# Patient Record
Sex: FEMALE | Race: WHITE | NOT HISPANIC OR LATINO | Employment: UNEMPLOYED | URBAN - METROPOLITAN AREA
[De-identification: names, ages, dates, MRNs, and addresses within clinical notes are randomized per-mention and may not be internally consistent; named-entity substitution may affect disease eponyms.]

---

## 2021-07-27 ENCOUNTER — TRANSFERRED RECORDS (OUTPATIENT)
Dept: HEALTH INFORMATION MANAGEMENT | Facility: CLINIC | Age: 66
End: 2021-07-27

## 2022-01-19 ENCOUNTER — TRANSFERRED RECORDS (OUTPATIENT)
Dept: HEALTH INFORMATION MANAGEMENT | Facility: CLINIC | Age: 67
End: 2022-01-19

## 2022-01-20 ENCOUNTER — TRANSFERRED RECORDS (OUTPATIENT)
Dept: HEALTH INFORMATION MANAGEMENT | Facility: CLINIC | Age: 67
End: 2022-01-20

## 2022-01-20 LAB — EJECTION FRACTION: 77 %

## 2022-06-30 ENCOUNTER — TRANSFERRED RECORDS (OUTPATIENT)
Dept: HEALTH INFORMATION MANAGEMENT | Facility: CLINIC | Age: 67
End: 2022-06-30

## 2022-06-30 LAB — EJECTION FRACTION: 74 %

## 2022-08-06 LAB
CHOLESTEROL (EXTERNAL): 132 MG/DL
HBA1C MFR BLD: 6.5 % (ref 0–5.7)
HDLC SERPL-MCNC: 46 MG/DL
LDL CHOLESTEROL (EXTERNAL): 53 MG/DL
LDL CHOLESTEROL CALCULATED (EXTERNAL): 53 MG/DL
NON HDL CHOLESTEROL (EXTERNAL): NORMAL MG/DL
TRIGLYCERIDES (EXTERNAL): 165 MG/DL

## 2022-08-24 ENCOUNTER — VIRTUAL VISIT (OUTPATIENT)
Dept: FAMILY MEDICINE | Facility: CLINIC | Age: 67
End: 2022-08-24
Payer: COMMERCIAL

## 2022-08-24 ENCOUNTER — TELEPHONE (OUTPATIENT)
Dept: CARDIOLOGY | Facility: CLINIC | Age: 67
End: 2022-08-24

## 2022-08-24 ENCOUNTER — TELEPHONE (OUTPATIENT)
Dept: FAMILY MEDICINE | Facility: CLINIC | Age: 67
End: 2022-08-24

## 2022-08-24 DIAGNOSIS — I77.819 AORTIC DILATATION (H): ICD-10-CM

## 2022-08-24 DIAGNOSIS — I25.10 CORONARY ARTERY DISEASE INVOLVING NATIVE HEART WITHOUT ANGINA PECTORIS, UNSPECIFIED VESSEL OR LESION TYPE: ICD-10-CM

## 2022-08-24 DIAGNOSIS — R55 VASOVAGAL REACTION: ICD-10-CM

## 2022-08-24 DIAGNOSIS — I35.0 AORTIC VALVE STENOSIS, ETIOLOGY OF CARDIAC VALVE DISEASE UNSPECIFIED: ICD-10-CM

## 2022-08-24 DIAGNOSIS — E11.9 TYPE 2 DIABETES MELLITUS WITHOUT COMPLICATION, WITHOUT LONG-TERM CURRENT USE OF INSULIN (H): ICD-10-CM

## 2022-08-24 DIAGNOSIS — U07.1 INFECTION DUE TO 2019 NOVEL CORONAVIRUS: Primary | ICD-10-CM

## 2022-08-24 DIAGNOSIS — B30.9 ACUTE VIRAL CONJUNCTIVITIS OF BOTH EYES: ICD-10-CM

## 2022-08-24 PROCEDURE — 99204 OFFICE O/P NEW MOD 45 MIN: CPT | Mod: 95 | Performed by: PHYSICIAN ASSISTANT

## 2022-08-24 RX ORDER — ASPIRIN 81 MG/1
81 TABLET, CHEWABLE ORAL DAILY
COMMUNITY
End: 2022-09-16

## 2022-08-24 RX ORDER — FENOFIBRATE 150 MG/1
CAPSULE ORAL
COMMUNITY
End: 2022-09-16

## 2022-08-24 RX ORDER — CETIRIZINE HYDROCHLORIDE 10 MG/1
10 TABLET ORAL EVERY EVENING
COMMUNITY
End: 2024-04-25

## 2022-08-24 RX ORDER — METFORMIN HCL 500 MG
1000 TABLET, EXTENDED RELEASE 24 HR ORAL 2 TIMES DAILY WITH MEALS
COMMUNITY
End: 2022-09-16

## 2022-08-24 RX ORDER — SIMVASTATIN 20 MG
20 TABLET ORAL EVERY OTHER DAY
COMMUNITY
End: 2022-09-16

## 2022-08-24 RX ORDER — NEOMYCIN/POLYMYXIN B/HYDROCORT 3.5-10K-1
1-2 SUSPENSION, DROPS(FINAL DOSAGE FORM)(ML) OPHTHALMIC (EYE) 4 TIMES DAILY
Qty: 7.5 ML | Refills: 0 | Status: SHIPPED | OUTPATIENT
Start: 2022-08-24 | End: 2022-08-29

## 2022-08-24 RX ORDER — DILTIAZEM HCL 60 MG
60 TABLET ORAL 2 TIMES DAILY
COMMUNITY
End: 2022-09-16

## 2022-08-24 RX ORDER — GUAIFENESIN AND DEXTROMETHORPHAN HYDROBROMIDE 1200; 60 MG/1; MG/1
1 TABLET, EXTENDED RELEASE ORAL 2 TIMES DAILY
Qty: 28 TABLET | Refills: 0 | Status: SHIPPED | OUTPATIENT
Start: 2022-08-24 | End: 2022-09-16

## 2022-08-24 RX ORDER — LOSARTAN POTASSIUM 100 MG/1
100 TABLET ORAL DAILY
COMMUNITY
End: 2022-09-16

## 2022-08-24 RX ORDER — SEMAGLUTIDE 1.34 MG/ML
1 INJECTION, SOLUTION SUBCUTANEOUS
COMMUNITY
End: 2022-09-16

## 2022-08-24 RX ORDER — GABAPENTIN 100 MG/1
200 CAPSULE ORAL AT BEDTIME
COMMUNITY
End: 2022-09-16

## 2022-08-24 RX ORDER — MONTELUKAST SODIUM 10 MG/1
10 TABLET ORAL AT BEDTIME
COMMUNITY
End: 2022-09-16

## 2022-08-24 RX ORDER — IPRATROPIUM BROMIDE 42 UG/1
2 SPRAY, METERED NASAL 4 TIMES DAILY
COMMUNITY
End: 2022-09-16

## 2022-08-24 RX ORDER — DAPAGLIFLOZIN 10 MG/1
10 TABLET, FILM COATED ORAL DAILY
COMMUNITY
End: 2022-09-16

## 2022-08-24 NOTE — NURSING NOTE
Patient messaged via [employer] a scan of medication list to pull in to her chart. Changes:    No longer uses [employer]

## 2022-08-24 NOTE — PROGRESS NOTES
Won is a 67 year old who is being evaluated via a billable video visit.      How would you like to obtain your AVS? MyChart  If the video visit is dropped, the invitation should be resent by: Text to cell phone: [phone]  Will anyone else be joining your video visit? No        Assessment & Plan   Problem List Items Addressed This Visit        Endocrine    Type 2 diabetes mellitus without complication, without long-term current use of insulin (H)    Relevant Medications    semaglutide (OZEMPIC, 1 MG/DOSE,) 2 MG/1.5ML pen    metFORMIN (GLUCOPHAGE XR) 500 MG 24 hr tablet    dapagliflozin (FARXIGA) 10 MG TABS tablet       Circulatory    Coronary artery disease involving native heart without angina pectoris, unspecified vessel or lesion type    Relevant Orders    Adult Cardiology Eval  Referral    Aortic valve stenosis, etiology of cardiac valve disease unspecified    Relevant Orders    Adult Cardiology Eval  Referral    Aortic dilatation (H)    Relevant Orders    Adult Cardiology Eval  Referral       Infectious/Inflammatory    Acute viral conjunctivitis of both eyes    Relevant Medications    neomycin-polymyxin-hydrocortisone (CORTISPORIN) 3.5-71711-2 ophthalmic suspension      Other Visit Diagnoses     Infection due to 2019 novel coronavirus    -  Primary    Relevant Medications    nirmatrelvir and ritonavir (PAXLOVID) therapy pack    Dextromethorphan-Guaifenesin  MG TB12    Vasovagal reaction             Paxlovid  Mucinex Dm 1 tablet twice a day for 10-14 days, or as needed until better   Corticosporin for pink eye 1 drops every 6 hours for 5 days     If symptoms worsen-go to ED    Continue current medications for cardiac conditions and establish care with cardiology  Get up slowly  Hydrate well daily     25 minutes spent on the date of the encounter doing chart review, history and exam, documentation and further activities per the note           Return in about 5 days (around  8/29/2022), or if symptoms worsen or fail to improve.    ROGELIO Lang [facility] JAZMIN Upton is a 67 year old, presenting for the following health issues:  Covid 19 Treatment (Positive yesterday, 8/23 with at home test.) and Eye Problem (Itching and drainage)      HPI       COVID-19 Symptom Review  How many days ago did these symptoms start? Yesterday, 8/23    Are any of the following symptoms significant for you?    New or worsening difficulty breathing? No    Worsening cough? Yes, I am coughing up mucus.    Fever or chills? Yes, I felt feverish or had chills.    Headache: YES    Sore throat: No    Chest pain: No    Diarrhea: No    Body aches? YES    What treatments has patient tried? Acetaminophen   Does patient live in a nursing home, group home, or shelter? No  Does patient have a way to get food/medications during quarantined? Yes, I have a friend or family member who can help me.        Eye(s) Problem  Onset/Duration: Yesterday with covid sympytoms  Description:   Location: YES  Pain: No  Redness: YES  Accompanying Signs & Symptoms:  Discharge/mattering: YES  Swelling: No  Visual changes: No  Fever: No  Nasal Congestion: YES  Bothered by bright lights: No  History:  Trauma: No  Foreign body exposure: No  Wearing contacts: No  Precipitating or alleviating factors: None  Therapies tried and outcome: None    Vascular Disease Follow-up      How often do you take nitroglycerin? Never    Do you take an aspirin every day? Yes  Patient has aortic valvee stenosis, aortic dilation.  Patient was seen by cardiology in June 2022 in [place], had echo  and it was stable. Patient had Stress test in March 2022, which as also normal.  Patient reports that she gest lightheaded when she squats, but it resolves immediately.       Review of Systems   Constitutional, HEENT, cardiovascular, pulmonary, gi and gu systems are negative, except as otherwise noted.      Objective            Vitals:  No vitals were obtained today due to virtual visit.    Physical Exam   GENERAL: Healthy, alert and no distress  EYES: Eyes grossly normal to inspection.  No discharge or erythema, or obvious scleral/conjunctival abnormalities.  RESP: No audible wheeze, cough, or visible cyanosis.  No visible retractions or increased work of breathing.    SKIN: Visible skin clear. No significant rash, abnormal pigmentation or lesions.  NEURO: Cranial nerves grossly intact.  Mentation and speech appropriate for age.  PSYCH: Mentation appears normal, affect normal/bright, judgement and insight intact, normal speech and appearance well-groomed.                Video-Visit Details    Video Start Time: 9:35 AM    Type of service:  Video Visit    Video End Time:9:59 AM    Originating Location (pt. Location): Home    Distant Location (provider location):  [facility]     Platform used for Video Visit: [employer]  [place].

## 2022-08-24 NOTE — TELEPHONE ENCOUNTER
Please reschedule [facility] visit on 9/8 to 1 hr new preventative visit--one of the reasons he is being seen is for coronary calcifications.

## 2022-08-24 NOTE — TELEPHONE ENCOUNTER
"Pharmacy requesting for clarification on Dextromethorphan-Guaifenesin  MG TB12. \"Strength only comes in 30-60mg and 1200mg. There is no 60-1200mg. Please clarify which strength is indicated for the patient.\"  "

## 2022-08-24 NOTE — PATIENT INSTRUCTIONS
At [facility], we strive to deliver an exceptional experience to you, every time we see you. If you receive a survey, please complete it as we do value your feedback.  If you have MyChart, you can expect to receive results automatically within 24 hours of their completion.  Your provider will send a note interpreting your results as well.   If you do not have MyChart, you should receive your results in about a week by mail.    Your care team:                            Family Medicine Internal Medicine   MD Tom Mckeon MD Shantel Branch-Fleming, MD Srinivasa Vaka, MD Katya Belousova, MIKAEL Delgadillo CNP, MD ([facility]   Humberto Nye, MD Natasha Morris MD Amelia Massimini APRN CNP Kim Thein, APRN CNP Bethany Templen, MD             Clinic hours: Monday - Thursday 7 am-6 pm; Fridays 7 am-5 pm.   Urgent care: Monday - Friday 10 am- 8 pm; Saturday and Sunday 9 am-5 pm.    Clinic: [phone]       [facility]: Monday - Thursday 8 am - 7 pm; Friday 8 am - 6 pm  [facility]: [phone]

## 2022-08-25 NOTE — TELEPHONE ENCOUNTER
It comes in 60-1200mg Mucinex DM maximum strength. That's the one I have. If they do not have it by prescription, patient can get it over the counter.    Lucia Brand PA-C

## 2022-08-26 NOTE — TELEPHONE ENCOUNTER
Called clinic and left voicemail with provider's message.     Left clinic number to call back.        Rochelle Simmons RN  [facility]

## 2022-09-15 NOTE — PROGRESS NOTES
ASSESSMENT AND PLAN:     (R93.89) Thickened endometrium  (primary encounter diagnosis)  Comment: Updating ultrasound - if endometrium lining no longer thickened, no further evaluation planned. If still present, I would attempt a repeat endometrial biopsy given the scant cellularity on her first.   Plan: US Pelvic W/Transvaginal*          (N95.8) Genitourinary syndrome of menopause  Comment: Chronic, stab.e  Continue vaginal estrogen and gabepentin for itching.   Plan: estradiol (ESTRACE) 0.1 MG/GM vaginal cream,         gabapentin (NEURONTIN) 100 MG capsule          (E11.9) Type 2 diabetes mellitus without complication, without long-term current use of insulin (H)  Comment: Chronic, stable.  Continue current medications  Update labs at physical in December.   Plan: dapagliflozin (FARXIGA) 10 MG TABS tablet,         metFORMIN (GLUCOPHAGE) 500 MG tablet,         Semaglutide, 1 MG/DOSE, (OZEMPIC, 1 MG/DOSE,) 4        MG/3ML SOPN    (E78.5) Hyperlipidemia LDL goal <100  (I25.10,  I25.84) Coronary artery calcification  Comment: Chronic, stable.  Continue current regimen.  On a low dose of simvastatin, 20mg every other day, likely due to drug interaction with diltiazem.   Plan: aspirin (ASA) 81 MG chewable tablet,         Fenofibrate 150 MG CAPS, simvastatin (ZOCOR) 20        MG tablet    (I10) Essential hypertension  Comment: Chronic, experiencing medication side effects  Bps are running quite low in office and home and is symptomatic.   Does have a lot of compelling indications for lower BP but we will trial Losartan at 50mg rather than 100mg.  Gave instructions to monitor BP at home. If raising above 120/80, will go back to Losartan 100mg.   Plan: losartan (COZAAR) 50 MG tablet, diltiazem         (CARDIZEM) 60 MG tablet          (J30.1) Seasonal allergic rhinitis due to pollen  Comment: Chronic, stable.   Plan: ipratropium (ATROVENT) 0.06 % nasal spray,         montelukast (SINGULAIR) 10 MG tablet          (I35.0)  Nonrheumatic aortic valve stenosis  Comment: Chronic, stable.   Has appointment to establish with cardiology here.   Due for echo next summer 2023.   Plan: diltiazem (CARDIZEM) 60 MG tablet          (I71.2) Ascending aortic aneurysm (H)  Comment: Chronic, stable.   Has appointment to establish with cardiology here.   Due for CT next summer 2023  Plan: diltiazem (CARDIZEM) 60 MG tablet     Review of prior external note(s) from - CareEverywhere information from [facility] reviewed  I spent a total of 70 minutes on the day of the visit.   Time spent doing chart review, history and exam, documentation and further activities per the note    Dustin Haynes MD   [facility]  09/16/2022, 6:37 PM      SUBJECTIVE:   Won is a 67 year old female who presents to clinic today to establish care and to discuss the following problem(s).    # Thickened endometrium  - developed vaginal bleeding  - had ultrasound which showed thickened endometrium  - had a biopsy, scant tissue, no abnormalities  - has not followed up on this in the past 3 years  - has not had any bleeding since then    # Genitourinary syndrome of menopause  - uses an estradiol cream 0.01% daily around urethra and twice a week vaginally  - uses cortisone 0.1% OTC cream daily on vulva for itching  - takes Gabapentin 200mg nightly for itching  - uses Aquaphor as well  - no vulvar skin changes  - works well but not always consistent with us    # High Cholesterol  - takes simvastatin 20mg every other day  - has had cholesterol checked recently, wasn't bad    # Hypertension  - takes Losartan 100mg daily and Diltiazem IR 60mg twice a day  - gets lightheaded when she squats for the past few months, mild    - had labs in the last 3 months: Cr 0.75, K 41.1, Na 144    # Aortic Valve Stenosis  # Aortic Dilation  # Coronary Artery Calcification on CT Scan  - 2015 went to the hospital with chest pain  - was found to have an aortic dilatation and AV  stenosis that have been monitored since then  - has had a slight progression since that time  - followed with cardiology  - has never had conventional angiography    - Cardiology note from Dr. Jigar Broussard from 7/1/22 states that she has an ascending aortic aneurysm that was 4.0 cm on a CT chest in 06/2022.  - Recommended a repeat echo and CT chest without contrast in 1 year.   - 1/2022 stress echo negative for ischemia per cardiology note (Jigar Broussard 7/1/22 with [facility])      - has an appointment with cardiology, Dr. Matson, end of October  - takes Losartan 100mg daily and Diltiazem IR 60mg twice a day  - gets lightheaded when she squats for the past few months, mild    - had an episode recently of dizziness after squatting at store, apple watch said pulse went up to 185 and stayed that high for at least 30 minutes  - typically heart rate is 60-80  - this has not happened again since then     Current Regimen  Losartan 100mg daily  Diltiazem IR 60mg twice a day  Aspirin 81mg daily  Simvastatin 20mg every other day (unsure why this is every other day)  Fenofibrate 150mg daily    6/30/22 Echo  1. Normal LV cavity size. Normal LV systolic function. LV EF is measured by Torres's biplane method at 74%. Indeterminate diastolic function due to 2 out of 4 diastolic dysfunction parameters present.   2. Normal RV size. Normal RV systolic function.  3. Severely dilated left atrium  4. Moderate posterior mitral annular calcification. Trace mitral regurgitation.   5. Aortic cusps appear severely calcified. Moderate aortic stenosis (HOWARD 1.2 cm2, mean AV gradient 23 mmHg, peak velocity 3.2 m/s). Trace aortic regurgitation.   6. Compared with prior study of 1/20/22, moderate aortic stenosis is again noted. Otherwise, no significant changes.     # Type 2 Diabetes Mellitus  - diagnosed around 2010  - has lost about 30 lbs since started Ozempic about 3 months ago  - saw endocrinology 1 month into Ozempic and A1c was  6.5%  - started on Ozempic to help with weight loss, not for diabetes control  - before Ozempic was on Victoza  - nausea is annoying but tolerable for now    Current Regimen:   Ozempic 1mg weekly (started 2022)   Farxiga (dapagliflozin) 10mg daily (since )  Metformin IR 1000mg twice a day    - Last eye exam: 2022 - has early cataracts  - Last foot exam: has had in the past year  - Last dental exam: 3 months ago, no concerns    # Environmental Allergies  - got allergy immunotherapy in [place], stopped after moving  - did for 2 years without relief  - summer allergies are the worst  - also has dog/cat allergy, but isn't around these often  - itchy/water eyes, nasal congestion and rhinorrhea    Current Regimen  Cetirizine 10mg daily  Montelukast 10mg daily  Ipratropium nasal spray  Sudafed and Benadryl PRN      Past Medical History:   Diagnosis Date     Aortic stenosis      Aortic valve stenosis      Benign colon polyp      CAD (coronary artery disease)      Diabetes mellitus, type 2 (H)      HTN (hypertension)      Hyperlipidemia LDL goal <100      Post-menopausal atrophic vaginitis      Thickened endometrium      Past Surgical History:   Procedure Laterality Date      SECTION       Family History   Problem Relation Age of Onset     Lung Cancer Mother         Small cell, did smoked     Diabetes Mother      Hypertension Mother      Goiter Mother      Mental Illness Mother         likely bipolar     Coronary Artery Disease Mother 42        MI, smoked     Atrial fibrillation Father      Hypertension Father      Parkinsonism Father      Diabetes Sister      Breast Cancer Sister      Hypertension Sister      Hypertension Brother      Cancer Maternal Grandmother         gynecologic cancer, unknown type     Chronic Obstructive Pulmonary Disease Paternal Grandmother      Stomach Cancer Paternal Grandfather      Bipolar Disorder Son      Colon Cancer No family hx of      Social History     Tobacco Use      "Smoking status: Never Smoker     Smokeless tobacco: Never Used   Vaping Use     Vaping Use: Never used   Substance Use Topics     Alcohol use: Yes     Alcohol/week: 1.0 standard drink     Types: 1 Standard drinks or equivalent per week     Comment: no more than 1 drink a week     Drug use: Never     Social History     Social History Narrative    Lives with     Two son    One son, daughter-in-law, and grandson live in [place]    One son, daughter-in-law live in the [facility] [place]    Gan in [place], [place]       Current Outpatient Medications   Medication     aspirin (ASA) 81 MG chewable tablet     cetirizine (ZYRTEC) 10 MG tablet     dapagliflozin (FARXIGA) 10 MG TABS tablet     diltiazem (CARDIZEM) 60 MG tablet     [START ON 9/19/2022] estradiol (ESTRACE) 0.1 MG/GM vaginal cream     Fenofibrate 150 MG CAPS     gabapentin (NEURONTIN) 100 MG capsule     ipratropium (ATROVENT) 0.06 % nasal spray     losartan (COZAAR) 50 MG tablet     metFORMIN (GLUCOPHAGE) 500 MG tablet     montelukast (SINGULAIR) 10 MG tablet     Semaglutide, 1 MG/DOSE, (OZEMPIC, 1 MG/DOSE,) 4 MG/3ML SOPN     simvastatin (ZOCOR) 20 MG tablet     No current facility-administered medications for this visit.     I have reviewed the patient's past medical, surgical, family, and social history.     OBJECTIVE:   /73 (BP Location: Right arm, Patient Position: Sitting, Cuff Size: Adult Regular)   Pulse 83   Temp 97.7  F (36.5  C) (Temporal)   Resp 12   Ht 1.725 m (5' 7.91\")   Wt 79.7 kg (175 lb 12 oz)   SpO2 97%   Breastfeeding No   BMI 26.79 kg/m      Constitutional: well-appearing, appears stated age  Eyes: conjunctivae without erythema, sclera anicteric.   Cardiac: regular rate and rhythm, 3/6 systolic murmur heart best at RUSB radiating into right carotid, no palpable thrill   Skin: no rashes, lesions, or wounds  Psych: affect is full and appropriate, speech is fluent and non-pressured  "

## 2022-09-16 ENCOUNTER — OFFICE VISIT (OUTPATIENT)
Dept: FAMILY MEDICINE | Facility: CLINIC | Age: 67
End: 2022-09-16
Payer: COMMERCIAL

## 2022-09-16 VITALS
DIASTOLIC BLOOD PRESSURE: 73 MMHG | HEART RATE: 83 BPM | TEMPERATURE: 97.7 F | SYSTOLIC BLOOD PRESSURE: 108 MMHG | RESPIRATION RATE: 12 BRPM | OXYGEN SATURATION: 97 % | HEIGHT: 68 IN | BODY MASS INDEX: 26.64 KG/M2 | WEIGHT: 175.75 LBS

## 2022-09-16 DIAGNOSIS — J30.1 SEASONAL ALLERGIC RHINITIS DUE TO POLLEN: ICD-10-CM

## 2022-09-16 DIAGNOSIS — N95.8 GENITOURINARY SYNDROME OF MENOPAUSE: ICD-10-CM

## 2022-09-16 DIAGNOSIS — I10 ESSENTIAL HYPERTENSION: ICD-10-CM

## 2022-09-16 DIAGNOSIS — R93.89 THICKENED ENDOMETRIUM: Primary | ICD-10-CM

## 2022-09-16 DIAGNOSIS — E78.5 HYPERLIPIDEMIA LDL GOAL <100: ICD-10-CM

## 2022-09-16 DIAGNOSIS — I25.10 CORONARY ARTERY CALCIFICATION: ICD-10-CM

## 2022-09-16 DIAGNOSIS — E11.9 TYPE 2 DIABETES MELLITUS WITHOUT COMPLICATION, WITHOUT LONG-TERM CURRENT USE OF INSULIN (H): ICD-10-CM

## 2022-09-16 DIAGNOSIS — I35.0 NONRHEUMATIC AORTIC VALVE STENOSIS: ICD-10-CM

## 2022-09-16 DIAGNOSIS — I71.21 ASCENDING AORTIC ANEURYSM (H): ICD-10-CM

## 2022-09-16 PROBLEM — N95.2 POST-MENOPAUSAL ATROPHIC VAGINITIS: Status: ACTIVE | Noted: 2022-09-16

## 2022-09-16 PROBLEM — Z91.09 ENVIRONMENTAL ALLERGIES: Chronic | Status: ACTIVE | Noted: 2022-09-16

## 2022-09-16 PROBLEM — N95.2 POST-MENOPAUSAL ATROPHIC VAGINITIS: Status: RESOLVED | Noted: 2022-09-16 | Resolved: 2022-09-16

## 2022-09-16 PROBLEM — B30.9 ACUTE VIRAL CONJUNCTIVITIS OF BOTH EYES: Status: RESOLVED | Noted: 2022-08-24 | Resolved: 2022-09-16

## 2022-09-16 PROBLEM — Z12.11 SCREENING FOR COLON CANCER: Chronic | Status: ACTIVE | Noted: 2022-09-16

## 2022-09-16 RX ORDER — ESTRADIOL 0.1 MG/G
2 CREAM VAGINAL
Qty: 42.5 G | Refills: 11 | Status: SHIPPED | OUTPATIENT
Start: 2022-09-19 | End: 2023-11-06

## 2022-09-16 RX ORDER — ASPIRIN 81 MG/1
81 TABLET, CHEWABLE ORAL DAILY
Qty: 90 TABLET | Refills: 3 | Status: SHIPPED | OUTPATIENT
Start: 2022-09-16 | End: 2022-09-21

## 2022-09-16 RX ORDER — DAPAGLIFLOZIN 10 MG/1
10 TABLET, FILM COATED ORAL DAILY
Qty: 90 TABLET | Refills: 3 | Status: SHIPPED | OUTPATIENT
Start: 2022-09-16 | End: 2022-09-21

## 2022-09-16 RX ORDER — SIMVASTATIN 20 MG
20 TABLET ORAL EVERY OTHER DAY
Qty: 45 TABLET | Refills: 3 | Status: SHIPPED | OUTPATIENT
Start: 2022-09-16 | End: 2023-05-24

## 2022-09-16 RX ORDER — IPRATROPIUM BROMIDE 42 UG/1
2 SPRAY, METERED NASAL 4 TIMES DAILY
Qty: 15 ML | Refills: 11 | Status: SHIPPED | OUTPATIENT
Start: 2022-09-16 | End: 2024-04-25

## 2022-09-16 RX ORDER — LOSARTAN POTASSIUM 50 MG/1
50 TABLET ORAL DAILY
Qty: 90 TABLET | Refills: 3 | Status: SHIPPED | OUTPATIENT
Start: 2022-09-16 | End: 2022-09-21

## 2022-09-16 RX ORDER — GABAPENTIN 100 MG/1
200 CAPSULE ORAL AT BEDTIME
Qty: 180 CAPSULE | Refills: 3 | Status: SHIPPED | OUTPATIENT
Start: 2022-09-16 | End: 2023-05-22

## 2022-09-16 RX ORDER — FENOFIBRATE 150 MG/1
150 CAPSULE ORAL DAILY
Qty: 90 CAPSULE | Refills: 3 | Status: SHIPPED | OUTPATIENT
Start: 2022-09-16 | End: 2022-09-20

## 2022-09-16 RX ORDER — SEMAGLUTIDE 1.34 MG/ML
1 INJECTION, SOLUTION SUBCUTANEOUS WEEKLY
Qty: 12 ML | Refills: 3 | Status: SHIPPED | OUTPATIENT
Start: 2022-09-16 | End: 2023-09-27

## 2022-09-16 RX ORDER — MONTELUKAST SODIUM 10 MG/1
10 TABLET ORAL AT BEDTIME
Qty: 90 TABLET | Refills: 3 | Status: SHIPPED | OUTPATIENT
Start: 2022-09-16 | End: 2022-09-21

## 2022-09-16 RX ORDER — DILTIAZEM HCL 60 MG
60 TABLET ORAL 2 TIMES DAILY
Qty: 180 TABLET | Refills: 3 | Status: SHIPPED | OUTPATIENT
Start: 2022-09-16 | End: 2023-08-28

## 2022-09-16 ASSESSMENT — ANXIETY QUESTIONNAIRES
3. WORRYING TOO MUCH ABOUT DIFFERENT THINGS: NOT AT ALL
GAD7 TOTAL SCORE: 1
6. BECOMING EASILY ANNOYED OR IRRITABLE: SEVERAL DAYS
7. FEELING AFRAID AS IF SOMETHING AWFUL MIGHT HAPPEN: NOT AT ALL
IF YOU CHECKED OFF ANY PROBLEMS ON THIS QUESTIONNAIRE, HOW DIFFICULT HAVE THESE PROBLEMS MADE IT FOR YOU TO DO YOUR WORK, TAKE CARE OF THINGS AT HOME, OR GET ALONG WITH OTHER PEOPLE: NOT DIFFICULT AT ALL
2. NOT BEING ABLE TO STOP OR CONTROL WORRYING: NOT AT ALL
5. BEING SO RESTLESS THAT IT IS HARD TO SIT STILL: NOT AT ALL
1. FEELING NERVOUS, ANXIOUS, OR ON EDGE: NOT AT ALL
GAD7 TOTAL SCORE: 1

## 2022-09-16 ASSESSMENT — PATIENT HEALTH QUESTIONNAIRE - PHQ9
5. POOR APPETITE OR OVEREATING: NOT AT ALL
SUM OF ALL RESPONSES TO PHQ QUESTIONS 1-9: 3

## 2022-09-16 NOTE — PATIENT INSTRUCTIONS
"1) Call the imaging center at [address] [place] [address] to scheduling an ultrasound to reassess your endometrium.  [phone]    2) Send me a copy of your test results: cholesterol, A1c, \"urine microalbumin\"    3) We are decreasing your Losartan from 100mg to 50mg a day for your blood pressure.  Please monitor your pressure for about a week after the change to make sure your average is still less than 120/80.  If it isn't, let me know    4) Consider scheduling with our dietician Shona Arroyo. You schedule with her just like you do with me.   "

## 2022-09-16 NOTE — NURSING NOTE
Prior to immunization administration, verified patients identity using patient s name and date of birth. Please see Immunization Activity for additional information.     Screening Questionnaire for Adult Immunization    Are you sick today?   No   Do you have allergies to medications, food, a vaccine component or latex?   No   Have you ever had a serious reaction after receiving a vaccination?   No   Do you have a long-term health problem with heart, lung, kidney, or metabolic disease (e.g., diabetes), asthma, a blood disorder, no spleen, complement component deficiency, a cochlear implant, or a spinal fluid leak?  Are you on long-term aspirin therapy?   No   Do you have cancer, leukemia, HIV/AIDS, or any other immune system problem?   No   Do you have a parent, brother, or sister with an immune system problem?   No   In the past 3 months, have you taken medications that affect  your immune system, such as prednisone, other steroids, or anticancer drugs; drugs for the treatment of rheumatoid arthritis, Crohn s disease, or psoriasis; or have you had radiation treatments?   No   Have you had a seizure, or a brain or other nervous system problem?   No   During the past year, have you received a transfusion of blood or blood    products, or been given immune (gamma) globulin or antiviral drug?   No   For women: Are you pregnant or is there a chance you could become       pregnant during the next month?   No   Have you received any vaccinations in the past 4 weeks?   No     Immunization questionnaire answers were all negative.        Per orders of Dr. Haynes, injection of Influenza Vaccine given by Monique Phillips MA. Patient instructed to remain in clinic for 15 minutes afterwards, and to report any adverse reaction to me immediately.       Screening performed by Monique Phillips MA on 9/16/2022 at 11:19 AM.

## 2022-09-18 ENCOUNTER — MYC MEDICAL ADVICE (OUTPATIENT)
Dept: FAMILY MEDICINE | Facility: CLINIC | Age: 67
End: 2022-09-18

## 2022-09-20 DIAGNOSIS — I25.10 CORONARY ARTERY CALCIFICATION: ICD-10-CM

## 2022-09-20 DIAGNOSIS — E78.5 HYPERLIPIDEMIA LDL GOAL <100: Primary | ICD-10-CM

## 2022-09-20 RX ORDER — FENOFIBRATE 160 MG/1
160 TABLET ORAL DAILY
Qty: 90 TABLET | Refills: 3 | Status: SHIPPED | OUTPATIENT
Start: 2022-09-20 | End: 2022-09-21

## 2022-09-20 NOTE — TELEPHONE ENCOUNTER
Original fenofibrate 150 mg not covered by insurance; 150 mg tab fenofibrate is alternative - consulted with Dr Long NORTH for fenofibrate 150 mg dose.  Forwarding to pt's pharmacy.  Elena Mann RN  [facility]

## 2022-09-21 ENCOUNTER — TELEPHONE (OUTPATIENT)
Dept: FAMILY MEDICINE | Facility: CLINIC | Age: 67
End: 2022-09-21

## 2022-09-21 DIAGNOSIS — I25.10 CORONARY ARTERY CALCIFICATION: ICD-10-CM

## 2022-09-21 DIAGNOSIS — J30.1 SEASONAL ALLERGIC RHINITIS DUE TO POLLEN: ICD-10-CM

## 2022-09-21 DIAGNOSIS — E78.5 HYPERLIPIDEMIA LDL GOAL <100: ICD-10-CM

## 2022-09-21 DIAGNOSIS — I10 ESSENTIAL HYPERTENSION: ICD-10-CM

## 2022-09-21 DIAGNOSIS — E11.9 TYPE 2 DIABETES MELLITUS WITHOUT COMPLICATION, WITHOUT LONG-TERM CURRENT USE OF INSULIN (H): ICD-10-CM

## 2022-09-21 RX ORDER — DAPAGLIFLOZIN 10 MG/1
10 TABLET, FILM COATED ORAL DAILY
Qty: 100 TABLET | Refills: 3 | Status: SHIPPED | OUTPATIENT
Start: 2022-09-21 | End: 2023-02-09

## 2022-09-21 RX ORDER — MONTELUKAST SODIUM 10 MG/1
10 TABLET ORAL AT BEDTIME
Qty: 100 TABLET | Refills: 3 | Status: SHIPPED | OUTPATIENT
Start: 2022-09-21 | End: 2023-08-11

## 2022-09-21 RX ORDER — ASPIRIN 81 MG/1
81 TABLET, CHEWABLE ORAL DAILY
Qty: 100 TABLET | Refills: 3 | Status: SHIPPED | OUTPATIENT
Start: 2022-09-21 | End: 2023-10-02

## 2022-09-21 RX ORDER — LOSARTAN POTASSIUM 50 MG/1
50 TABLET ORAL DAILY
Qty: 100 TABLET | Refills: 3 | Status: SHIPPED | OUTPATIENT
Start: 2022-09-21 | End: 2023-06-26

## 2022-09-21 RX ORDER — FENOFIBRATE 160 MG/1
160 TABLET ORAL DAILY
Qty: 100 TABLET | Refills: 3 | Status: SHIPPED | OUTPATIENT
Start: 2022-09-21 | End: 2023-09-05

## 2022-09-21 NOTE — TELEPHONE ENCOUNTER
[facility] contacted us and asked that all of Won's prescriptions sent on 9/16/22 for #90 tablets, be changed to #100 tablets to maximize her insurance benefits.  Rx's sent.  JORDI BarrettN, RN, [facility]  09/21/22  10:46 AM

## 2022-10-03 ENCOUNTER — HEALTH MAINTENANCE LETTER (OUTPATIENT)
Age: 67
End: 2022-10-03

## 2022-10-05 ENCOUNTER — ANCILLARY PROCEDURE (OUTPATIENT)
Dept: ULTRASOUND IMAGING | Facility: CLINIC | Age: 67
End: 2022-10-05
Attending: FAMILY MEDICINE
Payer: COMMERCIAL

## 2022-10-05 DIAGNOSIS — R93.89 THICKENED ENDOMETRIUM: ICD-10-CM

## 2022-10-05 PROCEDURE — 76830 TRANSVAGINAL US NON-OB: CPT | Performed by: RADIOLOGY

## 2022-10-05 PROCEDURE — 76856 US EXAM PELVIC COMPLETE: CPT | Performed by: RADIOLOGY

## 2022-10-06 NOTE — TELEPHONE ENCOUNTER
Action 10/6/22 [place]  [place]  Fax #[phone]   Action Taken Requested:    Cardiology Office notes    EKG Strips    Cardiac monitor    Cardiac imaging reports   2021 - current     2021 - current    2021 - current    2021 - current     Sent records to scanning 10/11

## 2022-10-21 ASSESSMENT — ANXIETY QUESTIONNAIRES
7. FEELING AFRAID AS IF SOMETHING AWFUL MIGHT HAPPEN: NOT AT ALL
8. IF YOU CHECKED OFF ANY PROBLEMS, HOW DIFFICULT HAVE THESE MADE IT FOR YOU TO DO YOUR WORK, TAKE CARE OF THINGS AT HOME, OR GET ALONG WITH OTHER PEOPLE?: NOT DIFFICULT AT ALL
GAD7 TOTAL SCORE: 0
IF YOU CHECKED OFF ANY PROBLEMS ON THIS QUESTIONNAIRE, HOW DIFFICULT HAVE THESE PROBLEMS MADE IT FOR YOU TO DO YOUR WORK, TAKE CARE OF THINGS AT HOME, OR GET ALONG WITH OTHER PEOPLE: NOT DIFFICULT AT ALL
2. NOT BEING ABLE TO STOP OR CONTROL WORRYING: NOT AT ALL
GAD7 TOTAL SCORE: 0
4. TROUBLE RELAXING: NOT AT ALL
1. FEELING NERVOUS, ANXIOUS, OR ON EDGE: NOT AT ALL
7. FEELING AFRAID AS IF SOMETHING AWFUL MIGHT HAPPEN: NOT AT ALL
3. WORRYING TOO MUCH ABOUT DIFFERENT THINGS: NOT AT ALL
5. BEING SO RESTLESS THAT IT IS HARD TO SIT STILL: NOT AT ALL
6. BECOMING EASILY ANNOYED OR IRRITABLE: NOT AT ALL

## 2022-10-24 ENCOUNTER — LAB (OUTPATIENT)
Dept: LAB | Facility: CLINIC | Age: 67
End: 2022-10-24
Payer: COMMERCIAL

## 2022-10-24 ENCOUNTER — OFFICE VISIT (OUTPATIENT)
Dept: CARDIOLOGY | Facility: CLINIC | Age: 67
End: 2022-10-24
Attending: INTERNAL MEDICINE
Payer: COMMERCIAL

## 2022-10-24 ENCOUNTER — PRE VISIT (OUTPATIENT)
Dept: CARDIOLOGY | Facility: CLINIC | Age: 67
End: 2022-10-24

## 2022-10-24 ENCOUNTER — TELEPHONE (OUTPATIENT)
Dept: FAMILY MEDICINE | Facility: CLINIC | Age: 67
End: 2022-10-24

## 2022-10-24 VITALS
HEART RATE: 70 BPM | BODY MASS INDEX: 27.44 KG/M2 | OXYGEN SATURATION: 97 % | WEIGHT: 174.8 LBS | HEIGHT: 67 IN | DIASTOLIC BLOOD PRESSURE: 76 MMHG | SYSTOLIC BLOOD PRESSURE: 118 MMHG

## 2022-10-24 DIAGNOSIS — Z11.59 NEED FOR HEPATITIS C SCREENING TEST: Primary | ICD-10-CM

## 2022-10-24 DIAGNOSIS — E11.9 TYPE 2 DIABETES MELLITUS WITHOUT COMPLICATION, WITHOUT LONG-TERM CURRENT USE OF INSULIN (H): Primary | Chronic | ICD-10-CM

## 2022-10-24 DIAGNOSIS — I25.10 CORONARY ARTERY DISEASE INVOLVING NATIVE HEART WITHOUT ANGINA PECTORIS, UNSPECIFIED VESSEL OR LESION TYPE: ICD-10-CM

## 2022-10-24 DIAGNOSIS — R42 DIZZINESS: ICD-10-CM

## 2022-10-24 DIAGNOSIS — I25.10 CORONARY ARTERY CALCIFICATION: Chronic | ICD-10-CM

## 2022-10-24 DIAGNOSIS — I77.819 AORTIC DILATATION (H): ICD-10-CM

## 2022-10-24 DIAGNOSIS — I10 ESSENTIAL HYPERTENSION: Chronic | ICD-10-CM

## 2022-10-24 DIAGNOSIS — I35.0 AORTIC VALVE STENOSIS, ETIOLOGY OF CARDIAC VALVE DISEASE UNSPECIFIED: ICD-10-CM

## 2022-10-24 LAB — APO A-I SERPL-MCNC: 45 MG/DL

## 2022-10-24 PROCEDURE — 99204 OFFICE O/P NEW MOD 45 MIN: CPT | Performed by: INTERNAL MEDICINE

## 2022-10-24 PROCEDURE — 83695 ASSAY OF LIPOPROTEIN(A): CPT | Performed by: INTERNAL MEDICINE

## 2022-10-24 PROCEDURE — 93005 ELECTROCARDIOGRAM TRACING: CPT

## 2022-10-24 PROCEDURE — G0463 HOSPITAL OUTPT CLINIC VISIT: HCPCS | Mod: 25

## 2022-10-24 PROCEDURE — 86803 HEPATITIS C AB TEST: CPT | Performed by: PHYSICIAN ASSISTANT

## 2022-10-24 PROCEDURE — 36415 COLL VENOUS BLD VENIPUNCTURE: CPT | Performed by: PATHOLOGY

## 2022-10-24 ASSESSMENT — PAIN SCALES - GENERAL: PAINLEVEL: NO PAIN (0)

## 2022-10-24 NOTE — PATIENT INSTRUCTIONS
October 24, 2022    Cardiology Provider You Saw During Your Visit: Dr. Matson      Medication Changes: none      Follow Up:   - Labs when able (Lpa)  - Follow-up with Dr. Matson in 6 months with Fasting Labs, Echocardiogram, and Carotid Ultrasound prior        Follow the American Heart Association Diet and Lifestyle Recommendations:  -Limit saturated fat, trans fat, sodium, red meat, sweets and sugar-sweetened beverages. If you choose to eat red meat, compare labels and select the leanest cuts available.  -Aim for at least 150 minutes of moderate physical activity or 75 minutes of vigorous physical activity - or an equal combination of both - each week.      To Reach Us:  -During business hours: [phone], press option # 1 to schedule an appointment or to leave a message for your care team.     -After hours, weekends or holidays: [phone], press option #4 and ask to speak to the on-call cardiologist. Inform them you are a patient at the [facility].      Paulina Hurst RN  Cardiology Care Coordinator - General Cardiology  [facility]

## 2022-10-24 NOTE — NURSING NOTE
October 24, 2022    Medication Changes: none      Follow Up:   - Labs when able (Lpa)  - Follow-up with Dr. Matson in 6 months with Fasting Labs, Echocardiogram, and Carotid Ultrasound prior    Patient verbalized understanding of all health information given and agreed to call with further questions or concerns.      Edison Monteiro

## 2022-10-24 NOTE — CONFIDENTIAL NOTE
Sammy Matson MD  Physician  Specialty:  Cardiovascular Disease  Progress Notes            HPI:     PAST MEDICAL HISTORY:  Past Medical History:   Diagnosis Date     Aortic stenosis      Aortic valve stenosis      Benign colon polyp      CAD (coronary artery disease)      Diabetes mellitus, type 2 (H)      HTN (hypertension)      Hyperlipidemia LDL goal <100      Post-menopausal atrophic vaginitis      Thickened endometrium        CURRENT MEDICATIONS:  Current Outpatient Medications   Medication Sig Dispense Refill     aspirin (ASA) 81 MG chewable tablet Take 1 tablet (81 mg) by mouth daily 100 tablet 3     cetirizine (ZYRTEC) 10 MG tablet Take 10 mg by mouth daily       dapagliflozin (FARXIGA) 10 MG TABS tablet Take 1 tablet (10 mg) by mouth daily 100 tablet 3     diltiazem (CARDIZEM) 60 MG tablet Take 1 tablet (60 mg) by mouth 2 times daily 180 tablet 3     estradiol (ESTRACE) 0.1 MG/GM vaginal cream Place 2 g vaginally twice a week 42.5 g 11     fenofibrate (TRIGLIDE/LOFIBRA) 160 MG tablet Take 1 tablet (160 mg) by mouth daily 100 tablet 3     gabapentin (NEURONTIN) 100 MG capsule Take 2 capsules (200 mg) by mouth At Bedtime 180 capsule 3     ipratropium (ATROVENT) 0.06 % nasal spray Spray 2 sprays into both nostrils 4 times daily 15 mL 11     losartan (COZAAR) 50 MG tablet Take 1 tablet (50 mg) by mouth daily 100 tablet 3     metFORMIN (GLUCOPHAGE) 500 MG tablet Take 2 tablets (1,000 mg) by mouth 2 times daily (with meals) 360 tablet 3     montelukast (SINGULAIR) 10 MG tablet Take 1 tablet (10 mg) by mouth At Bedtime 100 tablet 3     Semaglutide, 1 MG/DOSE, (OZEMPIC, 1 MG/DOSE,) 4 MG/3ML SOPN Inject 1 mg Subcutaneous once a week 12 mL 3     simvastatin (ZOCOR) 20 MG tablet Take 1 tablet (20 mg) by mouth every other day 45 tablet 3       PAST SURGICAL HISTORY:  Past Surgical History:   Procedure Laterality Date      SECTION         ALLERGIES     Allergies   Allergen Reactions     Avapro [Irbesartan]  Cough       FAMILY HISTORY:  Family History   Problem Relation Age of Onset     Lung Cancer Mother         Small cell, did smoked     Diabetes Mother      Hypertension Mother      Goiter Mother      Mental Illness Mother         likely bipolar     Coronary Artery Disease Mother 42        MI, smoked     Atrial fibrillation Father      Hypertension Father      Parkinsonism Father      Diabetes Sister      Breast Cancer Sister      Hypertension Sister      Hypertension Brother      Cancer Maternal Grandmother         gynecologic cancer, unknown type     Chronic Obstructive Pulmonary Disease Paternal Grandmother      Stomach Cancer Paternal Grandfather      Bipolar Disorder Son      Colon Cancer No family hx of        SOCIAL HISTORY:  Social History     Socioeconomic History     Marital status:      Spouse name: None     Number of children: None     Years of education: None     Highest education level: None   Tobacco Use     Smoking status: Never     Smokeless tobacco: Never   Vaping Use     Vaping Use: Never used   Substance and Sexual Activity     Alcohol use: Yes     Alcohol/week: 1.0 standard drink     Types: 1 Standard drinks or equivalent per week     Comment: no more than 1 drink a week     Drug use: Never     Sexual activity: Not Currently     Partners: Male   Social History Narrative    Lives with     Two son    One son, daughter-in-law, and grandson live in [place]    One son, daughter-in-law live in the same [facility]    Gan in [place], [place]       ROS:   Constitutional: No fever, chills, or sweats. No weight gain/loss   ENT: No visual disturbance, ear ache, epistaxis, sore throat  Allergies/Immunologic: Negative.   Respiratory: No cough, hemoptysia  Cardiovascular: As per HPI  GI: No nausea, vomiting, hematemesis, melena, or hematochezia  : No urinary frequency, dysuria, or hematuria  Integument: Negative  Psychiatric: Negative  Neuro: Negative  Endocrinology: Negative  "  Musculoskeletal: Negative    EXAM:  /76 (BP Location: Left arm, Patient Position: Sitting, Cuff Size: Adult Large)   Pulse 70   Ht 1.692 m (5' 6.61\")   Wt 79.3 kg (174 lb 12.8 oz)   SpO2 97%   BMI 27.70 kg/m    In general, the patient is a pleasant female in no apparent distress.    HEENT: NC/AT.  PERRLA.  EOMI.  Sclerae white, not injected.  Nares clear.  Pharynx without erythema or exudate.  Dentition intact.    Neck: No adenopathy.  No thyromegaly. Carotids +4/4 bilaterally without bruits.  No jugular venous distension.   Heart: RRR. Normal S1, S2 splits physiologically. No murmur, rub, click, or gallop. The PMI is in the 5th ICS in the midclavicular line. There is no heave.    Lungs: CTA.  No ronchi, wheezes, rales.  No dullness to percussion.   Abdomen: Soft, nontender, nondistended. No organomegaly.  No bruits.   Extremities: No clubbing, cyanosis, or edema.  The pulses are +4/4 at the radial, brachial, femoral, popliteal, DP, and PT sites bilaterally.  No bruits are noted.  Neurologic: Alert and oriented to person/place/time, normal speech, gait and affect  Skin: No petechiae, purpura or rash.    Labs:  LIPID RESULTS:  Lab Results   Component Value Date    TRIG 165 08/06/2022     Lpa is 45 mg/dl          CC  Patient Care Team:  Dustin Haynes MD as PCP - General (Family Medicine)  Dustin Haynes MD as Assigned PCP  Andre Rodriguez MD as Resident (OB/Gyn)  CAREN SIMMONS"

## 2022-10-24 NOTE — NURSING NOTE
Chief Complaint   Patient presents with     New Patient     Harvey Matson pt, referred d/t Aortic valve stenosis, aortic dilatation, coronary artery disease       Vitals were taken, medications reconciled, and EKG performed.    Louis Quijano  11:30 AM

## 2022-10-24 NOTE — LETTER
10/24/2022      RE: Won Urena  [address]  [facility] [address]       Dear Colleague,    Thank you for the opportunity to participate in the care of your patient, Won Urena, at the [facility] [place] [facility]. Please see a copy of my visit note below.    HPI:     I had the privilege to evaluate and examine Mrs Orlin Urena, wo is a 67 yr old female with a Hx of diabetes mellitus type 2, mixed dyslipidemia, non-rheumatic aorta calcified valve stenosis, aorta dilation.    She has been seen in [facility] - note is in patient's chart.    Patient denies chest pain, shortness of breath, palpations  and intermittent claudication.           PAST MEDICAL HISTORY:  Past Medical History:   Diagnosis Date     Abnormal Pap smear of cervix      Aortic stenosis      Aortic valve stenosis      Benign colon polyp      CAD (coronary artery disease)      Diabetes mellitus, type 2 (H)      Gestational diabetes      HTN (hypertension)      Hyperlipidemia LDL goal <100      Post-menopausal atrophic vaginitis      Thickened endometrium        CURRENT MEDICATIONS:  Current Outpatient Medications   Medication Sig Dispense Refill     aspirin (ASA) 81 MG chewable tablet Take 1 tablet (81 mg) by mouth daily 100 tablet 3     cetirizine (ZYRTEC) 10 MG tablet Take 10 mg by mouth daily       dapagliflozin (FARXIGA) 10 MG TABS tablet Take 1 tablet (10 mg) by mouth daily 100 tablet 3     diltiazem (CARDIZEM) 60 MG tablet Take 1 tablet (60 mg) by mouth 2 times daily 180 tablet 3     estradiol (ESTRACE) 0.1 MG/GM vaginal cream Place 2 g vaginally twice a week 42.5 g 11     fenofibrate (TRIGLIDE/LOFIBRA) 160 MG tablet Take 1 tablet (160 mg) by mouth daily 100 tablet 3     gabapentin (NEURONTIN) 100 MG capsule Take 2 capsules (200 mg) by mouth At Bedtime 180 capsule 3     ipratropium (ATROVENT) 0.06 % nasal spray Spray 2 sprays into both  nostrils 4 times daily 15 mL 11     losartan (COZAAR) 50 MG tablet Take 1 tablet (50 mg) by mouth daily 100 tablet 3     metFORMIN (GLUCOPHAGE) 500 MG tablet Take 2 tablets (1,000 mg) by mouth 2 times daily (with meals) 360 tablet 3     montelukast (SINGULAIR) 10 MG tablet Take 1 tablet (10 mg) by mouth At Bedtime 100 tablet 3     Semaglutide, 1 MG/DOSE, (OZEMPIC, 1 MG/DOSE,) 4 MG/3ML SOPN Inject 1 mg Subcutaneous once a week 12 mL 3     simvastatin (ZOCOR) 20 MG tablet Take 1 tablet (20 mg) by mouth every other day 45 tablet 3       PAST SURGICAL HISTORY:  Past Surgical History:   Procedure Laterality Date      SECTION       GYN SURGERY         ALLERGIES     Allergies   Allergen Reactions     Avapro [Irbesartan] Cough       FAMILY HISTORY:  Family History   Problem Relation Age of Onset     Lung Cancer Mother         Small cell, did smoked     Diabetes Mother      Hypertension Mother      Goiter Mother      Mental Illness Mother         likely bipolar     Coronary Artery Disease Mother         MI, smoked     Hyperlipidemia Mother      Thyroid Disease Mother         Goiter     Obesity Mother         most of family is obese     Atrial fibrillation Father      Hypertension Father      Parkinsonism Father      Hyperlipidemia Father      Diabetes Sister      Breast Cancer Sister      Hypertension Sister      Hypertension Brother      Cancer Maternal Grandmother         gynecologic cancer, unknown type     Uterine Cancer Maternal Grandmother      Chronic Obstructive Pulmonary Disease Paternal Grandmother      Uterine Cancer Paternal Grandmother      Stomach Cancer Paternal Grandfather      Colon Cancer Paternal Grandfather      Bipolar Disorder Son      Mental Illness Son      Depression Son        SOCIAL HISTORY:  Social History     Socioeconomic History     Marital status:      Spouse name: None     Number of children: None     Years of education: None     Highest education level: None   Tobacco  "Use     Smoking status: Never     Smokeless tobacco: Never   Vaping Use     Vaping Use: Never used   Substance and Sexual Activity     Alcohol use: Yes     Alcohol/week: 1.0 standard drink     Types: 1 Standard drinks or equivalent per week     Comment: less than 1 drink a week     Drug use: Never     Sexual activity: Not Currently     Partners: Male   Social History Narrative    Lives with     Two son    One son, daughter-in-law, and grandson live in [place]    One son, daughter-in-law live in the same [facility]    Gan in [place], [place]       ROS:   Constitutional: No fever, chills, or sweats. No weight gain/loss   ENT: No visual disturbance, ear ache, epistaxis, sore throat  Allergies/Immunologic: Negative.   Respiratory: No cough, hemoptysia  Cardiovascular: As per HPI  GI: No nausea, vomiting, hematemesis, melena, or hematochezia  : No urinary frequency, dysuria, or hematuria  Integument: Negative  Psychiatric: Negative  Neuro: Negative  Endocrinology: Negative   Musculoskeletal: Negative    EXAM:    /76 (BP Location: Left arm, Patient Position: Sitting, Cuff Size: Adult Large)   Pulse 70   Ht 1.692 m (5' 6.61\")   Wt 79.3 kg (174 lb 12.8 oz)   SpO2 97%   BMI 27.70 kg/m    In general, the patient is a pleasant female in no apparent distress.    HEENT: NC/AT.  PERRLA.  EOMI.  Sclerae white, not injected.  Nares clear.  Pharynx without erythema or exudate.  Dentition intact.    Neck: No adenopathy.  No thyromegaly. Carotids +4/4 bilaterally without bruits.  No jugular venous distension.   Heart: RRR. Systolic bruit at R2 and radiation to the neck and precordium.   Lungs: CTA.  No ronchi, wheezes, rales.  No dullness to percussion.   Abdomen: Soft, nontender, nondistended. No organomegaly.  No bruits.   Extremities: No clubbing, cyanosis, or edema.  The pulses are +4/4 at the radial, brachial, femoral, popliteal, DP, and PT sites bilaterally.  No bruits are noted.  Neurologic: " Alert and oriented to person/place/time, normal speech, gait and affect  Skin: No petechiae, purpura or rash.    Labs:  LIPID RESULTS:  Lab Results   Component Value Date    TRIG 165 08/06/2022       Procedures:  EKG: sin rhythm, RBBB.    Assessment and Plan:     I discussed the results with patient.  I discussed the importance of a heart healthy diabetes diet and lifestyle.    I discussed following items:    Medication Changes: none     Follow Up:   - Labs when able (Lpa)  - Follow-up with Dr. Matson in 6 months with Fasting Labs, Echocardiogram, and Carotid Ultrasound prior      Sammy Matson MD, PhD  Professor of Medicine  Division of Cardiology      CC  Patient Care Team:  Dustin Haynes MD as PCP - General (Family Medicine)  Andre Rodriguez MD as Resident (OB/Gyn)  CAREN SIMMONS

## 2022-10-25 LAB — HCV AB SERPL QL IA: NONREACTIVE

## 2022-10-26 LAB
ATRIAL RATE - MUSE: 72 BPM
DIASTOLIC BLOOD PRESSURE - MUSE: NORMAL MMHG
INTERPRETATION ECG - MUSE: NORMAL
P AXIS - MUSE: 39 DEGREES
PR INTERVAL - MUSE: 170 MS
QRS DURATION - MUSE: 148 MS
QT - MUSE: 442 MS
QTC - MUSE: 483 MS
R AXIS - MUSE: 270 DEGREES
SYSTOLIC BLOOD PRESSURE - MUSE: NORMAL MMHG
T AXIS - MUSE: 18 DEGREES
VENTRICULAR RATE- MUSE: 72 BPM

## 2022-10-26 ASSESSMENT — ENCOUNTER SYMPTOMS
DISTURBANCES IN COORDINATION: 0
HEADACHES: 0
INSOMNIA: 0
SPEECH CHANGE: 0
DEPRESSION: 0
DECREASED CONCENTRATION: 1
PANIC: 0
MEMORY LOSS: 0
SEIZURES: 0
TINGLING: 0
DIZZINESS: 1
NERVOUS/ANXIOUS: 1
TREMORS: 0
PARALYSIS: 0
NUMBNESS: 0
DYSURIA: 1
WEAKNESS: 0
LOSS OF CONSCIOUSNESS: 0
DECREASED LIBIDO: 0
HOT FLASHES: 0

## 2022-10-27 ENCOUNTER — OFFICE VISIT (OUTPATIENT)
Dept: OBGYN | Facility: CLINIC | Age: 67
End: 2022-10-27
Payer: COMMERCIAL

## 2022-10-27 VITALS
SYSTOLIC BLOOD PRESSURE: 128 MMHG | HEART RATE: 69 BPM | BODY MASS INDEX: 27.9 KG/M2 | HEIGHT: 66 IN | WEIGHT: 173.6 LBS | DIASTOLIC BLOOD PRESSURE: 78 MMHG

## 2022-10-27 DIAGNOSIS — Z87.42 HISTORY OF POSTMENOPAUSAL BLEEDING: Primary | ICD-10-CM

## 2022-10-27 DIAGNOSIS — R30.0 DYSURIA: ICD-10-CM

## 2022-10-27 LAB
ALBUMIN UR-MCNC: NEGATIVE MG/DL
APPEARANCE UR: CLEAR
BILIRUB UR QL STRIP: NEGATIVE
COLOR UR AUTO: YELLOW
GLUCOSE UR STRIP-MCNC: >=1000 MG/DL
HGB UR QL STRIP: NEGATIVE
KETONES UR STRIP-MCNC: NEGATIVE MG/DL
LEUKOCYTE ESTERASE UR QL STRIP: NEGATIVE
NITRATE UR QL: NEGATIVE
PH UR STRIP: 6.5 [PH] (ref 5–7)
RBC URINE: 0 /HPF
SP GR UR STRIP: 1.04 (ref 1–1.03)
UROBILINOGEN UR STRIP-MCNC: NORMAL MG/DL
WBC URINE: 0 /HPF

## 2022-10-27 PROCEDURE — 81001 URINALYSIS AUTO W/SCOPE: CPT | Performed by: STUDENT IN AN ORGANIZED HEALTH CARE EDUCATION/TRAINING PROGRAM

## 2022-10-27 PROCEDURE — G0463 HOSPITAL OUTPT CLINIC VISIT: HCPCS

## 2022-10-27 PROCEDURE — 99203 OFFICE O/P NEW LOW 30 MIN: CPT | Mod: GE | Performed by: OBSTETRICS & GYNECOLOGY

## 2022-10-27 NOTE — PROCEDURES
[facility]  New Visit    S: Ms. Won Urena is a 67 year old postmenopausal female presenting for a consultation due to previous hx of thickened endometrium. She says that she went through menopause at the age of 53 years. She had postmenopausal bleeding in , scant in nature but occurring 2x/month leading to an US that showed a thickened endometrium. Attempt was made to obtain a EMB but an inadequate sample was obtained. She was told that she had atrophic endometritis. She had this initial presentation and work up in [place], [place]. She does not have records of this care with her but plans to bring it next time. She had a recent US due to this hx. She denies any further bleeding. In addition, she notes that she sees her PCP for chronic vulvar irritation. She is currently using cortisone, vaginal estrogen cream, aquaphor, and gabapentin which helps. Sometimes has irritation of her urethra without infection. Would like to be tested for an UTI today.     GYN hx:  - had abnormal pap smears in the past that required cryo therapy. Remembers having 10 years worth of normal pap smears afterwards and was told that she no longer needs pap smears  - records not available for review  - currently taking cortisone, vaginal estrogen cream, aquaphor, and gabapentin for chronic vulvar irritation. Never had a biopsy.     PMH:  Past Medical History:   Diagnosis Date     Abnormal Pap smear of cervix      Aortic stenosis      Aortic valve stenosis      Benign colon polyp      CAD (coronary artery disease)      Diabetes mellitus, type 2 (H)      Gestational diabetes      HTN (hypertension)      Hyperlipidemia LDL goal <100      Post-menopausal atrophic vaginitis      Thickened endometrium      PSH:  Past Surgical History:   Procedure Laterality Date      SECTION       GYN SURGERY       Meds:  Current Outpatient Medications   Medication     aspirin (ASA) 81 MG chewable tablet     cetirizine (ZYRTEC) 10  "MG tablet     dapagliflozin (FARXIGA) 10 MG TABS tablet     diltiazem (CARDIZEM) 60 MG tablet     estradiol (ESTRACE) 0.1 MG/GM vaginal cream     fenofibrate (TRIGLIDE/LOFIBRA) 160 MG tablet     gabapentin (NEURONTIN) 100 MG capsule     ipratropium (ATROVENT) 0.06 % nasal spray     losartan (COZAAR) 50 MG tablet     metFORMIN (GLUCOPHAGE) 500 MG tablet     montelukast (SINGULAIR) 10 MG tablet     Semaglutide, 1 MG/DOSE, (OZEMPIC, 1 MG/DOSE,) 4 MG/3ML SOPN     simvastatin (ZOCOR) 20 MG tablet     No current facility-administered medications for this visit.     Allergies:   Allergies   Allergen Reactions     Avapro [Irbesartan] Cough     O:  /78 (BP Location: Right arm, Patient Position: Chair)   Pulse 69   Ht 1.676 m (5' 6\")   Wt 78.7 kg (173 lb 9.6 oz)   BMI 28.02 kg/m    Gen: Well-appearing, NAD  HEENT: Normocephalic, atraumatic  CV:        Well perfused; no LE edema  Pulm:  Normal respiratory effort and rate  Abd: Soft, non-tender, non-distended  Pelvic:  Normal appearing external female genitalia. Normal hair distribution. Vulva without lesions. No erythema. Vagina is without lesions. Physiological discharge. Cervix with no lesions, no cervical motion tenderness. Uterus is small, mobile, non-tender. No adnexal tenderness or masses.    TVUS:  EXAMINATION: US PELVIC TRANSABDOMINAL AND TRANSVAGINAL, 10/5/2022  11:15 AM      COMPARISON: None.     HISTORY: h/o postmenopausal bleeding 2019, found to have thickened  endometrium, biopsy with scant tissue. Has not follow up since but not  having bleeding; Thickened endometrium     TECHNIQUE: The pelvis was scanned in standard fashion with  transabdominal and transvaginal transducer(s) using both grey scale  and limited color Doppler techniques.     FINDINGS:  The uterus measures 5.2 x 4.4 x 2.7, and there is no evidence of a  focal fibroid.  The endometrium is within normal limits and measures 2  mm. Hypoechoic avascular area in the region of the cervix " demonstrated  measuring approximately 1.5 x 1.4 x 1.7 cm. There is no free fluid in  the pelvis.     The right ovary measures 1.2 x 0.7 x 1 cm and is within normal limits.  The left ovary is not visualized transabdominally or endovaginally.  There are some prominent vessels in the left adnexal region. No  adnexal mass. There is normal blood flow to the right ovary.                                                                      IMPRESSION:   1.  Endometrium is thin and uniform.  2.  There is a hypoechoic avascular area in the region of the cervix  of uncertain significance or etiology. No internal vascularity.  Correlation with speculum exam/hysteroscopy and previous Pap smears  suggested.  3.  Left ovary not visualized. Prominent left adnexal vessels.  4.  The right ovary within normal limits for postmenopausal status.    A/P:  Ms. Won Urena is a 67 year old postmenopausal female presenting for a consultation due to previous hx of thickened endometrium. Most recent TVUS with normal endometrial lining. No further bleeding since prior to her previous work-up. Hypoechoic, avascular area seen near the cervix on TVUS with unclear significance. Speculum exam and bimanual exam normal. Recommended having records from [place] sent to us for review. 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 Physical exam of vulva normal without any lesions. Can continue her current regimen. UA ordered per patient request due to mild irritation symptoms. No other concerns at this time. No interventions required. Recommended that she follow-up as needed or if she starts having postmenopausal bleeding again.     Staffed with Dr. Jacquelyn Rodriguez MD  OB/GYN PGY-4  10/27/22 6:17 PM    The Patient was seen in [facility] by KRISTEN RODRIGUEZ.  I reviewed the history & exam. Assessment and plan were jointly made.    Shital Valladares MD

## 2022-10-30 NOTE — PROGRESS NOTES
HPI:     I had the privilege to evaluate and examine Mrs Orlin Urena, wo is a 67 yr old female with a Hx of diabetes mellitus type 2, mixed dyslipidemia, non-rheumatic aorta calcified valve stenosis, aorta dilation.    She has been seen in [facility] - note is in patient's chart.    Patient denies chest pain, shortness of breath, palpations  and intermittent claudication.           PAST MEDICAL HISTORY:  Past Medical History:   Diagnosis Date     Abnormal Pap smear of cervix      Aortic stenosis      Aortic valve stenosis      Benign colon polyp      CAD (coronary artery disease)      Diabetes mellitus, type 2 (H)      Gestational diabetes      HTN (hypertension)      Hyperlipidemia LDL goal <100      Post-menopausal atrophic vaginitis      Thickened endometrium        CURRENT MEDICATIONS:  Current Outpatient Medications   Medication Sig Dispense Refill     aspirin (ASA) 81 MG chewable tablet Take 1 tablet (81 mg) by mouth daily 100 tablet 3     cetirizine (ZYRTEC) 10 MG tablet Take 10 mg by mouth daily       dapagliflozin (FARXIGA) 10 MG TABS tablet Take 1 tablet (10 mg) by mouth daily 100 tablet 3     diltiazem (CARDIZEM) 60 MG tablet Take 1 tablet (60 mg) by mouth 2 times daily 180 tablet 3     estradiol (ESTRACE) 0.1 MG/GM vaginal cream Place 2 g vaginally twice a week 42.5 g 11     fenofibrate (TRIGLIDE/LOFIBRA) 160 MG tablet Take 1 tablet (160 mg) by mouth daily 100 tablet 3     gabapentin (NEURONTIN) 100 MG capsule Take 2 capsules (200 mg) by mouth At Bedtime 180 capsule 3     ipratropium (ATROVENT) 0.06 % nasal spray Spray 2 sprays into both nostrils 4 times daily 15 mL 11     losartan (COZAAR) 50 MG tablet Take 1 tablet (50 mg) by mouth daily 100 tablet 3     metFORMIN (GLUCOPHAGE) 500 MG tablet Take 2 tablets (1,000 mg) by mouth 2 times daily (with meals) 360 tablet 3     montelukast (SINGULAIR) 10 MG tablet Take 1 tablet (10 mg) by mouth At Bedtime 100 tablet 3     Semaglutide, 1  MG/DOSE, (OZEMPIC, 1 MG/DOSE,) 4 MG/3ML SOPN Inject 1 mg Subcutaneous once a week 12 mL 3     simvastatin (ZOCOR) 20 MG tablet Take 1 tablet (20 mg) by mouth every other day 45 tablet 3       PAST SURGICAL HISTORY:  Past Surgical History:   Procedure Laterality Date      SECTION       GYN SURGERY         ALLERGIES     Allergies   Allergen Reactions     Avapro [Irbesartan] Cough       FAMILY HISTORY:  Family History   Problem Relation Age of Onset     Lung Cancer Mother         Small cell, did smoked     Diabetes Mother      Hypertension Mother      Goiter Mother      Mental Illness Mother         likely bipolar     Coronary Artery Disease Mother         MI, smoked     Hyperlipidemia Mother      Thyroid Disease Mother         Goiter     Obesity Mother         most of family is obese     Atrial fibrillation Father      Hypertension Father      Parkinsonism Father      Hyperlipidemia Father      Diabetes Sister      Breast Cancer Sister      Hypertension Sister      Hypertension Brother      Cancer Maternal Grandmother         gynecologic cancer, unknown type     Uterine Cancer Maternal Grandmother      Chronic Obstructive Pulmonary Disease Paternal Grandmother      Uterine Cancer Paternal Grandmother      Stomach Cancer Paternal Grandfather      Colon Cancer Paternal Grandfather      Bipolar Disorder Son      Mental Illness Son      Depression Son        SOCIAL HISTORY:  Social History     Socioeconomic History     Marital status:      Spouse name: None     Number of children: None     Years of education: None     Highest education level: None   Tobacco Use     Smoking status: Never     Smokeless tobacco: Never   Vaping Use     Vaping Use: Never used   Substance and Sexual Activity     Alcohol use: Yes     Alcohol/week: 1.0 standard drink     Types: 1 Standard drinks or equivalent per week     Comment: less than 1 drink a week     Drug use: Never     Sexual activity: Not Currently     Partners:  "Male   Social History Narrative    Lives with     Two son    One son, daughter-in-law, and grandson live in [place]    One son, daughter-in-law live in the same condo building    Gan in [place], [place]       ROS:   Constitutional: No fever, chills, or sweats. No weight gain/loss   ENT: No visual disturbance, ear ache, epistaxis, sore throat  Allergies/Immunologic: Negative.   Respiratory: No cough, hemoptysia  Cardiovascular: As per HPI  GI: No nausea, vomiting, hematemesis, melena, or hematochezia  : No urinary frequency, dysuria, or hematuria  Integument: Negative  Psychiatric: Negative  Neuro: Negative  Endocrinology: Negative   Musculoskeletal: Negative    EXAM:    /76 (BP Location: Left arm, Patient Position: Sitting, Cuff Size: Adult Large)   Pulse 70   Ht 1.692 m (5' 6.61\")   Wt 79.3 kg (174 lb 12.8 oz)   SpO2 97%   BMI 27.70 kg/m    In general, the patient is a pleasant female in no apparent distress.    HEENT: NC/AT.  PERRLA.  EOMI.  Sclerae white, not injected.  Nares clear.  Pharynx without erythema or exudate.  Dentition intact.    Neck: No adenopathy.  No thyromegaly. Carotids +4/4 bilaterally without bruits.  No jugular venous distension.   Heart: RRR. Systolic bruit at R2 and radiation to the neck and precordium.   Lungs: CTA.  No ronchi, wheezes, rales.  No dullness to percussion.   Abdomen: Soft, nontender, nondistended. No organomegaly.  No bruits.   Extremities: No clubbing, cyanosis, or edema.  The pulses are +4/4 at the radial, brachial, femoral, popliteal, DP, and PT sites bilaterally.  No bruits are noted.  Neurologic: Alert and oriented to person/place/time, normal speech, gait and affect  Skin: No petechiae, purpura or rash.    Labs:  LIPID RESULTS:  Lab Results   Component Value Date    TRIG 165 08/06/2022       Procedures:  EKG: sin rhythm, RBBB.    Assessment and Plan:     I discussed the results with patient.  I discussed the importance of a heart healthy " diabetes diet and lifestyle.    I discussed following items:    Medication Changes: none     Follow Up:   - Labs when able (Lpa)  - Follow-up with Dr. Matson in 6 months with Fasting Labs, Echocardiogram, and Carotid Ultrasound prior      Sammy Matson MD, PhD  Professor of Medicine  Division of Cardiology      CC  Patient Care Team:  Dustin Haynes MD as PCP - General (Family Medicine)  Dustin Haynes MD as Assigned PCP  Andre Rodriguez MD as Resident (OB/Gyn)  CAREN SIMOMNS

## 2022-11-11 DIAGNOSIS — E11.9 TYPE 2 DIABETES MELLITUS WITHOUT COMPLICATION, WITHOUT LONG-TERM CURRENT USE OF INSULIN (H): Primary | ICD-10-CM

## 2022-11-11 NOTE — TELEPHONE ENCOUNTER
Last visit 9/16/22, no future visit  Prescription approved per [facility] Refill Protocol.  Elena Mann RN  [facility]

## 2023-02-01 DIAGNOSIS — E11.9 TYPE 2 DIABETES MELLITUS WITHOUT COMPLICATION, WITHOUT LONG-TERM CURRENT USE OF INSULIN (H): ICD-10-CM

## 2023-02-01 RX ORDER — DAPAGLIFLOZIN 10 MG/1
10 TABLET, FILM COATED ORAL DAILY
Qty: 100 TABLET | Refills: 3 | Status: CANCELLED | OUTPATIENT
Start: 2023-02-01

## 2023-02-03 RX ORDER — DAPAGLIFLOZIN 10 MG/1
10 TABLET, FILM COATED ORAL DAILY
Qty: 100 TABLET | Refills: 3 | Status: CANCELLED | OUTPATIENT
Start: 2023-02-03

## 2023-02-09 DIAGNOSIS — E11.9 TYPE 2 DIABETES MELLITUS WITHOUT COMPLICATION, WITHOUT LONG-TERM CURRENT USE OF INSULIN (H): ICD-10-CM

## 2023-02-09 RX ORDER — DAPAGLIFLOZIN 10 MG/1
10 TABLET, FILM COATED ORAL DAILY
Qty: 100 TABLET | Refills: 2 | Status: SHIPPED | OUTPATIENT
Start: 2023-02-09 | End: 2023-09-28

## 2023-02-09 NOTE — TELEPHONE ENCOUNTER
Diabetic test strips and dapagliflozin Rxs were sent to [facility] last Fall for a year supply but they state they do not have any remaining refills on file. Will send remaining refills again per pt request.    Rhys WATTS, RN  02/09/23 8:46 AM

## 2023-02-12 ENCOUNTER — HEALTH MAINTENANCE LETTER (OUTPATIENT)
Age: 68
End: 2023-02-12

## 2023-03-03 ENCOUNTER — TELEPHONE (OUTPATIENT)
Dept: FAMILY MEDICINE | Facility: CLINIC | Age: 68
End: 2023-03-03

## 2023-03-03 DIAGNOSIS — E11.9 TYPE 2 DIABETES MELLITUS WITHOUT COMPLICATION, WITHOUT LONG-TERM CURRENT USE OF INSULIN (H): Primary | ICD-10-CM

## 2023-03-03 NOTE — TELEPHONE ENCOUNTER
Pt requesting new blood glucose monitor meter. Called [employer] to confirm that the requested meter would be covered by pt plan. Pharmacist confirms that Accu-Check Guide meter is covered.    Rhys WATTS, RN  03/03/23 4:10 PM

## 2023-03-06 ASSESSMENT — ACTIVITIES OF DAILY LIVING (ADL): CURRENT_FUNCTION: NO ASSISTANCE NEEDED

## 2023-03-06 ASSESSMENT — ENCOUNTER SYMPTOMS
HEMATURIA: 0
FEVER: 0
PALPITATIONS: 0
SHORTNESS OF BREATH: 0
HEADACHES: 0
SORE THROAT: 0
MYALGIAS: 0
ARTHRALGIAS: 0
WEAKNESS: 0
JOINT SWELLING: 0
PARESTHESIAS: 0
COUGH: 0
NERVOUS/ANXIOUS: 1
CONSTIPATION: 0
DYSURIA: 0
FREQUENCY: 0
CHILLS: 0
DIZZINESS: 0
ABDOMINAL PAIN: 0
DIARRHEA: 0
NAUSEA: 0
HEMATOCHEZIA: 0
BREAST MASS: 0
EYE PAIN: 0
HEARTBURN: 0

## 2023-03-09 ASSESSMENT — ACTIVITIES OF DAILY LIVING (ADL): CURRENT_FUNCTION: NO ASSISTANCE NEEDED

## 2023-03-09 ASSESSMENT — ENCOUNTER SYMPTOMS
HEMATURIA: 0
DIARRHEA: 0
DYSURIA: 0
DIZZINESS: 0
COUGH: 0
CHILLS: 0
ARTHRALGIAS: 0
ABDOMINAL PAIN: 0
JOINT SWELLING: 0
SORE THROAT: 0
SHORTNESS OF BREATH: 0
CONSTIPATION: 0
FEVER: 0
PARESTHESIAS: 0
BREAST MASS: 0
PALPITATIONS: 0
MYALGIAS: 0
NERVOUS/ANXIOUS: 1
HEADACHES: 0
EYE PAIN: 0
HEARTBURN: 0
HEMATOCHEZIA: 0
WEAKNESS: 0
FREQUENCY: 0
NAUSEA: 0

## 2023-03-09 NOTE — PROGRESS NOTES
"SUBJECTIVE:   Won is a 68 year old who presents for Preventive Visit.    Are you in the first 12 months of your Medicare coverage?  No    Healthy Habits:     In general, how would you rate your overall health?  Fair    Frequency of exercise:  None    Do you usually eat at least 4 servings of fruit and vegetables a day, include whole grains    & fiber and avoid regularly eating high fat or \"junk\" foods?  No    Taking medications regularly:  Yes    Medication side effects:  None    Ability to successfully perform activities of daily living:  No assistance needed    Home Safety:  No safety concerns identified    Hearing Impairment:  Difficulty following a conversation in a noisy restaurant or crowded room, need to ask people to speak up or repeat themselves and difficulty understanding speech on the telephone    In the past 6 months, have you been bothered by leaking of urine? Yes    In general, how would you rate your overall mental or emotional health?  Fair      PHQ-2 Total Score: 0    Additional concerns today:  Yes      Have you ever done Advance Care Planning? (For example, a Health Directive, POLST, or a discussion with a medical provider or your loved ones about your wishes): Yes, advance care planning is on file.     Fall risk  Fallen 2 or more times in the past year?: No  Any fall with injury in the past year?: No    Cognitive Screening   1) Repeat 3 items (Leader, Season, Table)    2) Clock draw: NORMAL  3) 3 item recall: Recalls 3 objects  Results: 3 items recalled: COGNITIVE IMPAIRMENT LESS LIKELY    Mini-CogTM Copyright DEYSI Holman. Licensed by the author for use in [facility]; reprinted with permission (raghu@.[email or web address]). All rights reserved.      # Anxiety  - a naturally anxious person, \"neurotic\"  - 's health concerns have become overwhelming for both of them  - son encouraged her to talk about medication    - last went to therapy earlier in the pandemic, not a good fit    - sleeps " well    PHQ 9/16/2022 3/12/2023   PHQ-9 Total Score 3 3   Q9: Thoughts of better off dead/self-harm past 2 weeks Not at all Not at all     NICOLETTE-7 SCORE 9/16/2022 10/21/2022 3/12/2023   Total Score - 0 (minimal anxiety) 2 (minimal anxiety)   Total Score 1 0 2         # Rash  - present for 2-3 months,  - waxes and wanes  - location is over coccyx in gluteal cleft  - scaly, bright red at times  - has used monistat and tinactin, topical steroids  - makes it uncomfortable to sit at times  - close to vagina but no vaginal irritation or discharge  - no history of eczema or psoriasis    # Itchy and watery eyes  # Environmental Allergies  - has been worse this winter, off and on  - gets rhinorrhea with it    - has allergies to ragweed, dust mites, dogs and cats  - stopped doing immunotherapy shots because of the time commitment and didn't feel they were helping after 2 years  - can't use eye drops for redness because they make itching worse  - uses lubricating eye drops    - Has used Flonase in the past which helps but gives metallic taste in mouth      - got allergy immunotherapy in [place], stopped after moving  - did for 2 years without relief  - summer allergies are the worst  - also has dog/cat allergy, but isn't around these often  - itchy/water eyes, nasal congestion and rhinorrhea     Current Regimen  Cetirizine 10mg daily  Montelukast 10mg daily  Ipratropium nasal spray  Sudafed and Benadryl PRN    # Urinary Frequency  - In the past 1-2 weeks frequency and urgency have increased, not getting to the bathroom in time  - no dysuria or hematuria  - getting up a couple of times at night to urinate, not every night  - has tried cutting down on tea  - has been inconsistent with vaginal and periurethral estrogen cream, but no more inconsistent than usual  - has had UTIs in the past but infrequent, maybe once a decade  - doesn't feel like a UTI      # Genitourinary syndrome of menopause  - uses an estradiol cream 0.01% daily  around urethra and twice a week vaginally  - uses cortisone 0.1% OTC cream daily on vulva for itching  - takes Gabapentin 200mg nightly for itching  - uses Aquaphor as well  - no vulvar skin changes  - works well but not always consistent with us    # High Cholesterol  - takes simvastatin 20mg every other day  - 10/24/22 Lp(a) 45  - 8/6/22 Chol 132, , HDL 46, LDL 53    # Hypertension   BP Readings from Last 5 Encounters:   03/13/23 105/72   10/27/22 128/78   10/24/22 118/76   09/16/22 108/73   - Current Regimen: Losartan 50mg daily, Diltiazem IR 60mg twice a day  - we decreased her Losartan from 100mg to 50mg daily after our last visit due to orthostatic dizziness which has now improved      # Aortic Valve Stenosis  # Aortic Dilation  # Coronary Artery Calcification on CT Scan  - 2015 went to the hospital with chest pain  - was found to have an aortic dilatation and AV stenosis that have been monitored since then  - has had a slight progression since that time  - followed with cardiology  - has never had conventional angiography     - Cardiology note from Dr. Jigar Broussard from 7/1/22 states that she has an ascending aortic aneurysm that was 4.0 cm on a CT chest in 06/2022.  - Recommended a repeat echo and CT chest without contrast in 1 year.   - 1/2022 stress echo negative for ischemia per cardiology note (Jigar Broussard 7/1/22 with [facility])    Current Regimen  Losartan 100mg daily  Diltiazem IR 60mg twice a day  Aspirin 81mg daily  Simvastatin 20mg every other day (unsure why this is every other day)  Fenofibrate 150mg daily     6/30/22 Echo  1. Normal LV cavity size. Normal LV systolic function. LV EF is measured by Torres's biplane method at 74%. Indeterminate diastolic function due to 2 out of 4 diastolic dysfunction parameters present.   2. Normal RV size. Normal RV systolic function.  3. Severely dilated left atrium  4. Moderate posterior mitral annular calcification. Trace mitral  regurgitation.   5. Aortic cusps appear severely calcified. Moderate aortic stenosis (HOWARD 1.2 cm2, mean AV gradient 23 mmHg, peak velocity 3.2 m/s). Trace aortic regurgitation.   6. Compared with prior study of 1/20/22, moderate aortic stenosis is again noted. Otherwise, no significant changes.       # Type 2 Diabetes Mellitus  - diagnosed around 2010    Current Regimen:   Ozempic 1mg weekly (started 06/2022)   Farxiga (dapagliflozin) 10mg daily (since 2017)  Metformin IR 1000mg twice a day    Wt Readings from Last 5 Encounters:   03/13/23 78.5 kg (173 lb)   10/27/22 78.7 kg (173 lb 9.6 oz)   10/24/22 79.3 kg (174 lb 12.8 oz)   09/16/22 79.7 kg (175 lb 12 oz)     - Last eye exam: scheduled in the next month or two, no history of retinopathy  - Last foot exam: today  - Last dental exam: a month ago, no concerns      Reviewed and updated as needed this visit by clinical staff   Tobacco  Allergies  Meds              Reviewed and updated as needed this visit by Provider     Meds             Social History     Tobacco Use     Smoking status: Never     Smokeless tobacco: Never   Substance Use Topics     Alcohol use: Yes     Alcohol/week: 1.0 standard drink     Types: 1 Standard drinks or equivalent per week     Comment: less than 1 drink a week       Alcohol Use 3/6/2023   Prescreen: >3 drinks/day or >7 drinks/week? No     Current providers sharing in care for this patient include:   Patient Care Team:  Dustin Haynes MD as PCP - General (Family Medicine)  Dustin Haynes MD as Assigned PCP  Andre Rodriguez MD as Resident (OB/Gyn)  Sammy Matson MD as Assigned Heart and Vascular Provider  Ghanshyam Ramirez MD as MD (Ophthalmology)    The following health maintenance items are reviewed in Epic and correct as of today:  Health Maintenance   Topic Date Due     BMP  Never done     DEXA  Never done     MICROALBUMIN  Never done     DIABETIC FOOT EXAM  Never done     EYE EXAM  Never done     MAMMO  SCREENING  Never done     ZOSTER IMMUNIZATION (2 of 2) 12/27/2019     MEDICARE ANNUAL WELLNESS VISIT  Never done     Pneumococcal Vaccine: 65+ Years (2 - PPSV23 if available, else PCV20) 08/23/2021     A1C  11/06/2022     COLORECTAL CANCER SCREENING  01/24/2026 (Originally 1955)     LIPID  08/06/2023     FALL RISK ASSESSMENT  03/13/2024     ADVANCE CARE PLANNING  03/13/2028     DTAP/TDAP/TD IMMUNIZATION (2 - Td or Tdap) 12/12/2029     HEPATITIS C SCREENING  Completed     PHQ-2 (once per calendar year)  Completed     INFLUENZA VACCINE  Completed     COVID-19 Vaccine  Completed     IPV IMMUNIZATION  Aged Out     MENINGITIS IMMUNIZATION  Aged Out     Any new diagnosis of family breast, ovarian, or bowel cancer? No    FHS-7: No flowsheet data found.    Mammogram Screening: Recommended mammography every 1-2 years with patient discussion and risk factor consideration  Pertinent mammograms are reviewed under the imaging tab.    Review of Systems   Constitutional: Negative for chills and fever.   HENT: Negative for congestion, ear pain, hearing loss and sore throat.    Eyes: Negative for pain and visual disturbance.   Respiratory: Negative for cough and shortness of breath.    Cardiovascular: Negative for chest pain, palpitations and peripheral edema.   Gastrointestinal: Negative for abdominal pain, constipation, diarrhea, heartburn, hematochezia and nausea.   Breasts:  Negative for tenderness, breast mass and discharge.   Genitourinary: Negative for dysuria, frequency, genital sores, hematuria, pelvic pain, urgency, vaginal bleeding and vaginal discharge.   Musculoskeletal: Negative for arthralgias, joint swelling and myalgias.   Skin: Negative for rash.   Neurological: Negative for dizziness, weakness, headaches and paresthesias.   Psychiatric/Behavioral: Positive for mood changes. The patient is nervous/anxious.        OBJECTIVE:   /72 (BP Location: Right arm, Patient Position: Sitting, Cuff Size: Adult  "Regular)   Pulse 70   Temp (!) 96.6  F (35.9  C) (Skin)   Resp 15   Ht 1.702 m (5' 7\")   Wt 78.5 kg (173 lb)   SpO2 97%   BMI 27.10 kg/m   Estimated body mass index is 27.1 kg/m  as calculated from the following:    Height as of this encounter: 1.702 m (5' 7\").    Weight as of this encounter: 78.5 kg (173 lb).  Physical Exam      Skin: flat blanching erythematous patch in superior gluteal cleft and both buttocks.Border is ill defined.     Diabetic foot exam: normal DP and PT pulses, no trophic changes or ulcerative lesions and normal sensory exam      Diagnostic Test Results:  Labs reviewed in Epic    ASSESSMENT / PLAN:     (Z00.00) Encounter for Medicare annual wellness exam  (primary encounter diagnosis)  Comment: Age appropriate screening and preventive services provided.   Has had both Shingrix vaccines - will send me dates.  Has had Dexa scan 2 years ago - will send me results.   Plan:     (R35.0) Urinary frequency  (N95.8) Genitourinary syndrome of menopause  Comment: Chronic, progressed. Likely due to KARSON of menopause. UA today with no signs of infections. Resume more regular kegel exercises and more regular use of vaginal and periurethral estrogen cream (twice a week). If not improving with this, will send for pelvic PT.   Plan: Urinalysis Macroscopic, CANCELED: Urinalysis         Macroscopic          (Z23) Need for pneumococcal vaccine  Comment: Plan: PNEUMOCOCCAL 20 VALENT CONJUGATE (PREVNAR 20)          (Z12.31) Encounter for screening mammogram for breast cancer  Comment: Plan: Mammogram - routine screening          (L30.9) Dermatitis  Comment: Unclear diagnosis - will refer to derm. Advised no more than 1 week on and a full week off for OTC hydrocortisone.   Plan: Adult Dermatology Referral          (E11.9) Type 2 diabetes mellitus without complication, without long-term current use of insulin (H)  Comment: Chronic, stable.   Has upcoming A1c and urine microalbumin with Dr. Matson   Has upcoming " "eye exam  Plan: AK FOOT EXAM NO CHARGE          (F41.9) Anxiety  Comment: Chronic, progressed.  Recommended follow up with [facility] for DA and counselign, which Won was agreeable to.   Plan:     (I35.0) Nonrheumatic aortic valve stenosis  (I71.21) Aneurysm of ascending aorta without rupture  (I25.10,  I25.84) Coronary artery calcification  (I10) Essential hypertension  (E78.5) Hyperlipidemia LDL goal <100  Comment: Chronic, stable. Follows with [facility] cardiology.   Plan:     (Z91.09) Environmental allergies  Comment: Chronic, progressed eye symptoms.   Recommended trying either ketotifen eye drops and/or Flonase. Continue montelukast and oral antihistamine.   Plan:     COUNSELING:  Reviewed preventive health counseling, as reflected in patient instructions      BMI:   Estimated body mass index is 27.1 kg/m  as calculated from the following:    Height as of this encounter: 1.702 m (5' 7\").    Weight as of this encounter: 78.5 kg (173 lb).       She reports that she has never smoked. She has never used smokeless tobacco.      Appropriate preventive services were discussed with this patient, including applicable screening as appropriate for cardiovascular disease, diabetes, osteopenia/osteoporosis, and glaucoma.  As appropriate for age/gender, discussed screening for colorectal cancer, prostate cancer, breast cancer, and cervical cancer. Checklist reviewing preventive services available has been given to the patient.    Reviewed patients plan of care and provided an AVS. The Basic Care Plan (routine screening as documented in Health Maintenance) for Won meets the Care Plan requirement. This Care Plan has been established and reviewed with the Patient.    Dustin Haynes MD  [facility]    Identified Health Risks:  Answers for HPI/ROS submitted by the patient on 3/12/2023  If you checked off any problems, how difficult have these problems made it for you to do your work, take care of things at home, or get " along with other people?: Not difficult at all  PHQ9 TOTAL SCORE: 3  NICOLETTE 7 TOTAL SCORE: 2

## 2023-03-12 ASSESSMENT — ANXIETY QUESTIONNAIRES
1. FEELING NERVOUS, ANXIOUS, OR ON EDGE: SEVERAL DAYS
8. IF YOU CHECKED OFF ANY PROBLEMS, HOW DIFFICULT HAVE THESE MADE IT FOR YOU TO DO YOUR WORK, TAKE CARE OF THINGS AT HOME, OR GET ALONG WITH OTHER PEOPLE?: SOMEWHAT DIFFICULT
6. BECOMING EASILY ANNOYED OR IRRITABLE: NOT AT ALL
IF YOU CHECKED OFF ANY PROBLEMS ON THIS QUESTIONNAIRE, HOW DIFFICULT HAVE THESE PROBLEMS MADE IT FOR YOU TO DO YOUR WORK, TAKE CARE OF THINGS AT HOME, OR GET ALONG WITH OTHER PEOPLE: SOMEWHAT DIFFICULT
7. FEELING AFRAID AS IF SOMETHING AWFUL MIGHT HAPPEN: NOT AT ALL
GAD7 TOTAL SCORE: 2
4. TROUBLE RELAXING: NOT AT ALL
GAD7 TOTAL SCORE: 2
7. FEELING AFRAID AS IF SOMETHING AWFUL MIGHT HAPPEN: NOT AT ALL
3. WORRYING TOO MUCH ABOUT DIFFERENT THINGS: NOT AT ALL
GAD7 TOTAL SCORE: 2
5. BEING SO RESTLESS THAT IT IS HARD TO SIT STILL: NOT AT ALL
2. NOT BEING ABLE TO STOP OR CONTROL WORRYING: SEVERAL DAYS

## 2023-03-12 ASSESSMENT — PATIENT HEALTH QUESTIONNAIRE - PHQ9
10. IF YOU CHECKED OFF ANY PROBLEMS, HOW DIFFICULT HAVE THESE PROBLEMS MADE IT FOR YOU TO DO YOUR WORK, TAKE CARE OF THINGS AT HOME, OR GET ALONG WITH OTHER PEOPLE: NOT DIFFICULT AT ALL
SUM OF ALL RESPONSES TO PHQ QUESTIONS 1-9: 3
SUM OF ALL RESPONSES TO PHQ QUESTIONS 1-9: 3

## 2023-03-13 ENCOUNTER — OFFICE VISIT (OUTPATIENT)
Dept: FAMILY MEDICINE | Facility: CLINIC | Age: 68
End: 2023-03-13
Payer: COMMERCIAL

## 2023-03-13 VITALS
RESPIRATION RATE: 15 BRPM | HEIGHT: 67 IN | WEIGHT: 173 LBS | BODY MASS INDEX: 27.15 KG/M2 | HEART RATE: 70 BPM | OXYGEN SATURATION: 97 % | DIASTOLIC BLOOD PRESSURE: 72 MMHG | SYSTOLIC BLOOD PRESSURE: 105 MMHG | TEMPERATURE: 96.6 F

## 2023-03-13 DIAGNOSIS — E11.9 TYPE 2 DIABETES MELLITUS WITHOUT COMPLICATION, WITHOUT LONG-TERM CURRENT USE OF INSULIN (H): Chronic | ICD-10-CM

## 2023-03-13 DIAGNOSIS — Z23 NEED FOR PNEUMOCOCCAL VACCINE: ICD-10-CM

## 2023-03-13 DIAGNOSIS — I10 ESSENTIAL HYPERTENSION: Chronic | ICD-10-CM

## 2023-03-13 DIAGNOSIS — I71.21 ANEURYSM OF ASCENDING AORTA WITHOUT RUPTURE (H): Chronic | ICD-10-CM

## 2023-03-13 DIAGNOSIS — Z00.00 ENCOUNTER FOR MEDICARE ANNUAL WELLNESS EXAM: Primary | ICD-10-CM

## 2023-03-13 DIAGNOSIS — F41.9 ANXIETY: ICD-10-CM

## 2023-03-13 DIAGNOSIS — Z91.09 ENVIRONMENTAL ALLERGIES: Chronic | ICD-10-CM

## 2023-03-13 DIAGNOSIS — Z12.31 ENCOUNTER FOR SCREENING MAMMOGRAM FOR BREAST CANCER: ICD-10-CM

## 2023-03-13 DIAGNOSIS — L30.9 DERMATITIS: ICD-10-CM

## 2023-03-13 DIAGNOSIS — E78.5 HYPERLIPIDEMIA LDL GOAL <100: Chronic | ICD-10-CM

## 2023-03-13 DIAGNOSIS — R35.0 URINARY FREQUENCY: ICD-10-CM

## 2023-03-13 DIAGNOSIS — I25.10 CORONARY ARTERY CALCIFICATION: Chronic | ICD-10-CM

## 2023-03-13 DIAGNOSIS — N95.8 GENITOURINARY SYNDROME OF MENOPAUSE: Chronic | ICD-10-CM

## 2023-03-13 DIAGNOSIS — I35.0 NONRHEUMATIC AORTIC VALVE STENOSIS: Chronic | ICD-10-CM

## 2023-03-13 LAB
ALBUMIN UR-MCNC: NEGATIVE MG/DL
APPEARANCE UR: CLEAR
BILIRUB UR QL STRIP: NEGATIVE
COLOR UR AUTO: YELLOW
GLUCOSE UR STRIP-MCNC: >=1000 MG/DL
HGB UR QL STRIP: NEGATIVE
KETONES UR STRIP-MCNC: NEGATIVE MG/DL
LEUKOCYTE ESTERASE UR QL STRIP: NEGATIVE
NITRATE UR QL: NEGATIVE
PH UR STRIP: 7 [PH] (ref 5–7)
SP GR UR STRIP: 1.01 (ref 1–1.03)
UROBILINOGEN UR STRIP-ACNC: 0.2 E.U./DL

## 2023-03-13 NOTE — PATIENT INSTRUCTIONS
For your eyes,  Try Ketotifen eye drops (allergy eye drops like Zatidor)  Try Flonase nasal spray      For your urinary symptoms:  We are checking for an infection today  Stay consistent with your kegel exercises and vaginal estrogen cream  If you're not getting better with these things AND don't have an infection, then let me know in a MyChart message and I'll refer you to pelvic floor physical therapy.       For anxiety:  Schedule a visit with our counselor Shawn Ullrich after making sure he's covered by your insurance plan      Please send me a message with the date of your last bone density scan and ideally a picture of the results or if they just say completely normal that's fine    Please send me the dates of your Shingrix vaccines    To schedule your mammogram, please call the imaging center  [address] [place] [address]  [phone]      For the rash:  Our [facility] dermatology group will call you to schedule    to Dermatology   [address], [address]   [place] [address] [phone]       [facility]   [address], [address]   [place] [address] [phone]

## 2023-03-13 NOTE — NURSING NOTE
Screening Questionnaire for Adult Immunization    Are you sick today?   No   Do you have allergies to medications, food, a vaccine component or latex?   No   Have you ever had a serious reaction after receiving a vaccination?   No   Do you have a long-term health problem with heart disease, lung disease, asthma, kidney disease, metabolic disease (e.g. diabetes), anemia, or other blood disorder?   No   Do you have cancer, leukemia, HIV/AIDS, or any other immune system problem?   No   In the past 3 months, have you taken medications that affect  your immune system, such as prednisone, other steroids, or anticancer drugs; drugs for the treatment of rheumatoid arthritis, Crohn s disease, or psoriasis; or have you had radiation treatments?   No   Have you had a seizure, or a brain or other nervous system problem?   No   During the past year, have you received a transfusion of blood or blood     products, or been given immune (gamma) globulin or antiviral drug?   No   For women: Are you pregnant or is there a chance you could become        pregnant during the next month?   No   Have you received any vaccinations in the past 4 weeks?   No     Immunization questionnaire answers were all negative.        Per orders of Dr. Haynes Patient instructed to remain in clinic for 15 minutes afterwards, and to report any adverse reaction to me immediately.       Screening performed by Kelsy Yap LPN on 3/13/2023 at 9:52 AM.

## 2023-03-13 NOTE — NURSING NOTE
"68 year old  Chief Complaint   Patient presents with     Physical     Rash on tailbone; itchy, watery eyes; anxiety; frequency and urgency to urinate.        Blood pressure 105/72, pulse 70, temperature (!) 96.6  F (35.9  C), temperature source Skin, resp. rate 15, height 1.702 m (5' 7\"), weight 78.5 kg (173 lb), SpO2 97 %, not currently breastfeeding. Body mass index is 27.1 kg/m .  Patient Active Problem List   Diagnosis     Type 2 diabetes mellitus without complication, without long-term current use of insulin (H)     Coronary artery calcification     Nonrheumatic aortic valve stenosis     Ascending aortic aneurysm     Thickened endometrium     Hyperlipidemia LDL goal <100     Essential hypertension     Screening for colon cancer     Genitourinary syndrome of menopause     Environmental allergies     Seasonal allergic rhinitis due to pollen       Wt Readings from Last 2 Encounters:   03/13/23 78.5 kg (173 lb)   10/27/22 78.7 kg (173 lb 9.6 oz)     BP Readings from Last 3 Encounters:   03/13/23 105/72   10/27/22 128/78   10/24/22 118/76         Current Outpatient Medications   Medication     aspirin (ASA) 81 MG chewable tablet     blood glucose (NO BRAND SPECIFIED) test strip     blood glucose monitoring (NO BRAND SPECIFIED) meter device kit     cetirizine (ZYRTEC) 10 MG tablet     dapagliflozin (FARXIGA) 10 MG TABS tablet     diltiazem (CARDIZEM) 60 MG tablet     estradiol (ESTRACE) 0.1 MG/GM vaginal cream     fenofibrate (TRIGLIDE/LOFIBRA) 160 MG tablet     gabapentin (NEURONTIN) 100 MG capsule     ipratropium (ATROVENT) 0.06 % nasal spray     losartan (COZAAR) 50 MG tablet     metFORMIN (GLUCOPHAGE) 500 MG tablet     montelukast (SINGULAIR) 10 MG tablet     Semaglutide, 1 MG/DOSE, (OZEMPIC, 1 MG/DOSE,) 4 MG/3ML SOPN     simvastatin (ZOCOR) 20 MG tablet     No current facility-administered medications for this visit.       Social History     Tobacco Use     Smoking status: Never     Smokeless tobacco: Never "   Vaping Use     Vaping Use: Never used   Substance Use Topics     Alcohol use: Yes     Alcohol/week: 1.0 standard drink     Types: 1 Standard drinks or equivalent per week     Comment: less than 1 drink a week     Drug use: Never       Health Maintenance Due   Topic Date Due     BMP  Never done     DEXA  Never done     MICROALBUMIN  Never done     DIABETIC FOOT EXAM  Never done     EYE EXAM  Never done     MAMMO SCREENING  Never done     ZOSTER IMMUNIZATION (2 of 2) 12/27/2019     MEDICARE ANNUAL WELLNESS VISIT  Never done     Pneumococcal Vaccine: 65+ Years (2 - PPSV23 if available, else PCV20) 08/23/2021     A1C  11/06/2022       No results found for: PAP      March 13, 2023 8:39 AM

## 2023-03-15 PROBLEM — R93.89 THICKENED ENDOMETRIUM: Chronic | Status: RESOLVED | Noted: 2022-09-16 | Resolved: 2023-03-15

## 2023-04-01 ENCOUNTER — MYC MEDICAL ADVICE (OUTPATIENT)
Dept: OPHTHALMOLOGY | Facility: CLINIC | Age: 68
End: 2023-04-01

## 2023-04-01 ENCOUNTER — OFFICE VISIT (OUTPATIENT)
Dept: OPHTHALMOLOGY | Facility: CLINIC | Age: 68
End: 2023-04-01
Payer: COMMERCIAL

## 2023-04-01 DIAGNOSIS — H02.831 DERMATOCHALASIS OF BOTH UPPER EYELIDS: ICD-10-CM

## 2023-04-01 DIAGNOSIS — H04.123 DRY EYE SYNDROME OF BILATERAL LACRIMAL GLANDS: ICD-10-CM

## 2023-04-01 DIAGNOSIS — H33.302 RETINAL BREAK, LEFT: ICD-10-CM

## 2023-04-01 DIAGNOSIS — E11.9 TYPE 2 DIABETES MELLITUS WITHOUT COMPLICATION, WITHOUT LONG-TERM CURRENT USE OF INSULIN (H): Primary | ICD-10-CM

## 2023-04-01 DIAGNOSIS — H25.813 COMBINED FORMS OF AGE-RELATED CATARACT OF BOTH EYES: ICD-10-CM

## 2023-04-01 DIAGNOSIS — H02.834 DERMATOCHALASIS OF BOTH UPPER EYELIDS: ICD-10-CM

## 2023-04-01 DIAGNOSIS — H53.60 NYCTALOPIA: ICD-10-CM

## 2023-04-01 PROCEDURE — 92004 COMPRE OPH EXAM NEW PT 1/>: CPT | Performed by: STUDENT IN AN ORGANIZED HEALTH CARE EDUCATION/TRAINING PROGRAM

## 2023-04-01 ASSESSMENT — TONOMETRY
OS_IOP_MMHG: 10
OD_IOP_MMHG: 11
IOP_METHOD: ICARE

## 2023-04-01 ASSESSMENT — REFRACTION_WEARINGRX
OD_CYLINDER: SPHERE
SPECS_TYPE: PAL
OD_SPHERE: +0.50
OS_SPHERE: +0.50
OS_ADD: +2.75
OS_AXIS: 010
OD_ADD: +2.75
OS_CYLINDER: +0.75

## 2023-04-01 ASSESSMENT — REFRACTION_MANIFEST
OD_CYLINDER: SPHERE
OD_ADD: +2.75
OS_CYLINDER: +0.75
OS_AXIS: 175
OS_ADD: +2.75
OS_SPHERE: +0.50
OD_SPHERE: +0.50

## 2023-04-01 ASSESSMENT — VISUAL ACUITY
METHOD: SNELLEN - LINEAR
OD_CC: 20/20
CORRECTION_TYPE: GLASSES
OS_CC+: -2
OS_CC: 20/20

## 2023-04-01 ASSESSMENT — CONF VISUAL FIELD
OD_SUPERIOR_TEMPORAL_RESTRICTION: 0
OD_NORMAL: 1
OD_INFERIOR_TEMPORAL_RESTRICTION: 0
OS_INFERIOR_NASAL_RESTRICTION: 0
OS_SUPERIOR_NASAL_RESTRICTION: 0
OS_NORMAL: 1
METHOD: COUNTING FINGERS
OD_INFERIOR_NASAL_RESTRICTION: 0
OD_SUPERIOR_NASAL_RESTRICTION: 0
OS_INFERIOR_TEMPORAL_RESTRICTION: 0
OS_SUPERIOR_TEMPORAL_RESTRICTION: 0

## 2023-04-01 ASSESSMENT — CUP TO DISC RATIO
OD_RATIO: 0.45
OS_RATIO: 0.45

## 2023-04-01 ASSESSMENT — EXTERNAL EXAM - LEFT EYE: OS_EXAM: NORMAL

## 2023-04-01 ASSESSMENT — EXTERNAL EXAM - RIGHT EYE: OD_EXAM: NORMAL

## 2023-04-01 NOTE — NURSING NOTE
Chief Complaints and History of Present Illnesses   Patient presents with     Eye Exam For Diabetes     Chief Complaint(s) and History of Present Illness(es)     Eye Exam For Diabetes            Laterality: both eyes    Context: night driving    Associated symptoms: glare (mild).  Negative for eye pain, flashes and floaters    Treatments tried: no treatments          Comments    Won Urena is a 68 year old female who presents for a diabetic exam. Last eye exam was over 1 year ago in [place]. Since exam, vision has slightly changed - she requires more lighting to read. Glare and night driving is bothersome. Denies using lubricating drops. No flashes or floaters. No eye pain.    Last A1c: 6.5%    Elvin Grosso COT 8:41 AM April 1, 2023

## 2023-04-01 NOTE — PROGRESS NOTES
CC -   diabetic eye exam    INTERVAL HISTORY - Initial visit    PMH -   Won Urena is a 68 year old female with PMH of DM2 who is here for a diabetic eye exam. She does note nyctalopia - she has been turning lights on so much her  has noticed. She has no cancer history. Her last eye exam was last Februray. She has never been diagnosed with diabetic eye disease.     Last A1c: 6.5% 8/2022    Past Medical History:  DM2  HTN  HLD  Aortic aneurysms (ascending)  Aortic stenosis (follows with cardiology annually)    Past Ocular History:  None, no laser, no LASIK      ASSESSMENT & PLAN    #DM2 without BDR   - age of diagnosis: 46   - Not on insulin   - discussed blood sugar and blood glucose control    # Nyctalopia OU   - developed over last 8 months   - could be 2/2 occult ischemia 2/2 DM   - DDx is broad, will bring back to clinic for OCT Mac and Optos with FAF   - consider FA and ffERG   - no hx of malignancy, no unexplained weight loss    # Operculated hole   - appears free of traction and without any associated SRF   - given phakic status, likely would benefit from laser in the future before cataract surgery   - reviewed RD sx 04/01/23    #Cataract   - mild, monitor    # Dermatochalasis   - pt bothered by lid hooding   - will refer to plastics    # JOVANY   - surface appears relatively healthy, but pt with sx of JOVANY   - may be exacerbated with blepharoplasty   - ATs recommended today    #Refractive error   - Mrx not released    return to clinic:   1. Retina for VTD with OCT Mac and Optos  2. Plastics for dermatochalasis       ATTESTATION     Attending Attestation:     Complete documentation of historical and exam elements from today's encounter can be found in the full encounter summary report (not reduplicated in this progress note).  I personally obtained the chief complaint(s) and history of present illness.  I confirmed and edited as necessary the review of systems, past medical/surgical history, family  history, social history, and examination findings as documented by others; and I examined the patient myself.  I personally reviewed the relevant tests, images, and reports as documented above.  I formulated and edited as necessary the assessment and plan and discussed the findings and management plan with the patient and family      Ghanshyam Ramirez MD  Retina Fellow  Department of Ophthalmology  [facility]  Pager: [phone]

## 2023-04-06 NOTE — TELEPHONE ENCOUNTER
FUTURE VISIT INFORMATION      FUTURE VISIT INFORMATION:    Date: 5/30/23    Time: 12:15pm    Location: [facility]  REFERRAL INFORMATION:    Referring provider:  Ghanshyam Ramirez MD    Referring providers clinic:  [facility]    Reason for visit/diagnosis  dermatochalasis    RECORDS REQUESTED FROM:       Clinic name Comments Records Status Imaging Status   [facility] Ov/referral 4/1/23 epic

## 2023-04-10 ENCOUNTER — TELEPHONE (OUTPATIENT)
Dept: OPHTHALMOLOGY | Facility: CLINIC | Age: 68
End: 2023-04-10
Payer: COMMERCIAL

## 2023-04-10 NOTE — TELEPHONE ENCOUNTER
Spoke with patient regarding rescheduling for sooner appt from wait list. Patient accepted sooner appt and rescheduled accordingly.

## 2023-04-13 NOTE — TELEPHONE ENCOUNTER
FUTURE VISIT INFORMATION      FUTURE VISIT INFORMATION:    Date: 5/16/23    Time: 10:00am    Location: [facility]  REFERRAL INFORMATION:    Referring provider:  Ghanshyam Ramirez MD    Referring providers clinic:  [facility]    Reason for visit/diagnosis  dermatochalasis     RECORDS REQUESTED FROM:         Clinic name Comments Records Status Imaging Status   [facility]/referral 4/1/23 epic

## 2023-04-18 DIAGNOSIS — E11.9 TYPE 2 DIABETES MELLITUS WITHOUT COMPLICATION, WITHOUT LONG-TERM CURRENT USE OF INSULIN (H): Primary | ICD-10-CM

## 2023-04-18 DIAGNOSIS — H43.393 VITREOUS SYNERESIS, BILATERAL: ICD-10-CM

## 2023-04-24 ENCOUNTER — ANCILLARY PROCEDURE (OUTPATIENT)
Dept: CARDIOLOGY | Facility: CLINIC | Age: 68
End: 2023-04-24
Attending: INTERNAL MEDICINE
Payer: COMMERCIAL

## 2023-04-24 ENCOUNTER — ANCILLARY PROCEDURE (OUTPATIENT)
Dept: ULTRASOUND IMAGING | Facility: CLINIC | Age: 68
End: 2023-04-24
Attending: INTERNAL MEDICINE
Payer: COMMERCIAL

## 2023-04-24 ENCOUNTER — LAB (OUTPATIENT)
Dept: LAB | Facility: CLINIC | Age: 68
End: 2023-04-24
Payer: COMMERCIAL

## 2023-04-24 VITALS
HEART RATE: 72 BPM | WEIGHT: 173.7 LBS | DIASTOLIC BLOOD PRESSURE: 84 MMHG | SYSTOLIC BLOOD PRESSURE: 137 MMHG | OXYGEN SATURATION: 97 % | HEIGHT: 68 IN | BODY MASS INDEX: 26.33 KG/M2

## 2023-04-24 DIAGNOSIS — R00.2 PALPITATIONS: ICD-10-CM

## 2023-04-24 DIAGNOSIS — I65.23 CAROTID ARTERY STENOSIS, ASYMPTOMATIC, BILATERAL: ICD-10-CM

## 2023-04-24 DIAGNOSIS — I35.0 AORTIC VALVE STENOSIS, ETIOLOGY OF CARDIAC VALVE DISEASE UNSPECIFIED: ICD-10-CM

## 2023-04-24 DIAGNOSIS — R07.9 CHEST PAIN: ICD-10-CM

## 2023-04-24 DIAGNOSIS — I25.10 CORONARY ARTERY DISEASE INVOLVING NATIVE HEART WITHOUT ANGINA PECTORIS, UNSPECIFIED VESSEL OR LESION TYPE: ICD-10-CM

## 2023-04-24 DIAGNOSIS — R00.2 PALPITATIONS: Primary | ICD-10-CM

## 2023-04-24 DIAGNOSIS — I10 ESSENTIAL HYPERTENSION: Chronic | ICD-10-CM

## 2023-04-24 DIAGNOSIS — R42 DIZZINESS: ICD-10-CM

## 2023-04-24 DIAGNOSIS — E11.9 TYPE 2 DIABETES MELLITUS WITHOUT COMPLICATION, WITHOUT LONG-TERM CURRENT USE OF INSULIN (H): Chronic | ICD-10-CM

## 2023-04-24 DIAGNOSIS — R07.2 PRECORDIAL PAIN: ICD-10-CM

## 2023-04-24 LAB
ALBUMIN SERPL BCG-MCNC: 4.7 G/DL (ref 3.5–5.2)
ALP SERPL-CCNC: 48 U/L (ref 35–104)
ALT SERPL W P-5'-P-CCNC: 12 U/L (ref 10–35)
ANION GAP SERPL CALCULATED.3IONS-SCNC: 9 MMOL/L (ref 7–15)
AST SERPL W P-5'-P-CCNC: 21 U/L (ref 10–35)
BILIRUB SERPL-MCNC: 0.4 MG/DL
BUN SERPL-MCNC: 21 MG/DL (ref 8–23)
CALCIUM SERPL-MCNC: 9.9 MG/DL (ref 8.8–10.2)
CHLORIDE SERPL-SCNC: 104 MMOL/L (ref 98–107)
CHOLEST SERPL-MCNC: 149 MG/DL
CREAT SERPL-MCNC: 0.76 MG/DL (ref 0.51–0.95)
CREAT UR-MCNC: 52.3 MG/DL
DEPRECATED HCO3 PLAS-SCNC: 28 MMOL/L (ref 22–29)
GFR SERPL CREATININE-BSD FRML MDRD: 85 ML/MIN/1.73M2
GLUCOSE SERPL-MCNC: 118 MG/DL (ref 70–99)
HBA1C MFR BLD: 5.7 %
HDLC SERPL-MCNC: 59 MG/DL
LDLC SERPL CALC-MCNC: 77 MG/DL
LDLC SERPL DIRECT ASSAY-MCNC: 76 MG/DL
LVEF ECHO: NORMAL
MICROALBUMIN UR-MCNC: <12 MG/L
MICROALBUMIN/CREAT UR: NORMAL MG/G{CREAT}
NONHDLC SERPL-MCNC: 90 MG/DL
POTASSIUM SERPL-SCNC: 4.1 MMOL/L (ref 3.4–5.3)
PROT SERPL-MCNC: 7.2 G/DL (ref 6.4–8.3)
SODIUM SERPL-SCNC: 141 MMOL/L (ref 136–145)
TRIGL SERPL-MCNC: 66 MG/DL

## 2023-04-24 PROCEDURE — 80061 LIPID PANEL: CPT | Performed by: PATHOLOGY

## 2023-04-24 PROCEDURE — 36415 COLL VENOUS BLD VENIPUNCTURE: CPT | Performed by: PATHOLOGY

## 2023-04-24 PROCEDURE — 93248 EXT ECG>7D<15D REV&INTERPJ: CPT | Performed by: INTERNAL MEDICINE

## 2023-04-24 PROCEDURE — 82570 ASSAY OF URINE CREATININE: CPT | Performed by: INTERNAL MEDICINE

## 2023-04-24 PROCEDURE — 93306 TTE W/DOPPLER COMPLETE: CPT | Performed by: INTERNAL MEDICINE

## 2023-04-24 PROCEDURE — 99214 OFFICE O/P EST MOD 30 MIN: CPT | Mod: 25 | Performed by: INTERNAL MEDICINE

## 2023-04-24 PROCEDURE — 83036 HEMOGLOBIN GLYCOSYLATED A1C: CPT | Performed by: INTERNAL MEDICINE

## 2023-04-24 PROCEDURE — 83721 ASSAY OF BLOOD LIPOPROTEIN: CPT | Performed by: INTERNAL MEDICINE

## 2023-04-24 PROCEDURE — 80053 COMPREHEN METABOLIC PANEL: CPT | Performed by: PATHOLOGY

## 2023-04-24 PROCEDURE — 93880 EXTRACRANIAL BILAT STUDY: CPT | Performed by: RADIOLOGY

## 2023-04-24 PROCEDURE — G0463 HOSPITAL OUTPT CLINIC VISIT: HCPCS | Performed by: INTERNAL MEDICINE

## 2023-04-24 ASSESSMENT — PAIN SCALES - GENERAL: PAINLEVEL: NO PAIN (0)

## 2023-04-24 NOTE — NURSING NOTE
Chief Complaint   Patient presents with     Follow Up     Huyen F/U     Vitals were taken and medications reconciled.    Yvon Luna, EMT  2:59 PM

## 2023-04-24 NOTE — PATIENT INSTRUCTIONS
April 24, 2023    Cardiology Provider You Saw During Your Visit: Dr. Matson      Medication Changes: None      Follow Up:   -14 day heart monitor placed today  -Coronary CT angiogram after 14 day heart monitor is off  -Follow up with Dr. Matson in 1 year with fasting labs prior to visit      Follow the American Heart Association Diet and Lifestyle Recommendations:  -Limit saturated fat, trans fat, sodium, red meat, sweets and sugar-sweetened beverages. If you choose to eat red meat, compare labels and select the leanest cuts available.  -Aim for at least 150 minutes of moderate physical activity or 75 minutes of vigorous physical activity - or an equal combination of both - each week.      To Reach Us:  -During business hours: [phone], press option # 1 to schedule an appointment or to leave a message for your care team.     -After hours, weekends or holidays: [phone], press option #4 and ask to speak to the on-call cardiologist. Inform them you are a patient at the [facility].        **If you have a cardiac device, please make sure you schedule an in-person device check just prior to your cardiology provider appointments**        Paulina Hurst RN  Cardiology Care Coordinator - General Cardiology  [facility]

## 2023-04-24 NOTE — NURSING NOTE
April 24, 2023    Medication Changes: None      Follow Up:   -14 day heart monitor placed today  -Coronary CT angiogram after 14 day heart monitor is off  -Follow up with Dr. Matson in 1 year with fasting labs prior to visit      Patient verbalized understanding of all health information given and agreed to call with further questions or concerns.      Paulina Hurst RN

## 2023-04-24 NOTE — LETTER
4/24/2023      RE: Won Urena  [address]  [facility] [address]       Dear Colleague,    Thank you for the opportunity to participate in the care of your patient, Won Urena, at the [facility] [place] [facility]. Please see a copy of my visit note below.    HPI:   I had the privilege to evaluate and examine Mrs Orlin Urena, wo is a 68 yr old female with a Hx of diabetes mellitus type 2, mixed dyslipidemia, non-rheumatic aorta calcified valve stenosis, aorta dilation.     She has been seen in [facility] - note is in patient's chart. Previously we saw patient in October 2022.     Patient denies chest pain, shortness of breath, palpations  and intermittent claudication.       PAST MEDICAL HISTORY:  Past Medical History:   Diagnosis Date    Abnormal Pap smear of cervix     Aortic stenosis     Aortic valve stenosis     Benign colon polyp     CAD (coronary artery disease)     Diabetes mellitus, type 2 (H)     Gestational diabetes     HTN (hypertension)     Hyperlipidemia LDL goal <100     Nonsenile cataract 2022    Very beginnings    Post-menopausal atrophic vaginitis     Thickened endometrium        CURRENT MEDICATIONS:  Current Outpatient Medications   Medication Sig Dispense Refill    aspirin (ASA) 81 MG chewable tablet Take 1 tablet (81 mg) by mouth daily 100 tablet 3    blood glucose (NO BRAND SPECIFIED) test strip Use to test blood sugar 1 times daily or as directed. 100 strip 2    blood glucose monitoring (NO BRAND SPECIFIED) meter device kit Use to test blood sugar one times daily or as directed. 1 kit 0    cetirizine (ZYRTEC) 10 MG tablet Take 10 mg by mouth daily      dapagliflozin (FARXIGA) 10 MG TABS tablet Take 1 tablet (10 mg) by mouth daily 100 tablet 2    diltiazem (CARDIZEM) 60 MG tablet Take 1 tablet (60 mg) by mouth 2 times daily 180 tablet 3    estradiol (ESTRACE) 0.1 MG/GM vaginal cream Place 2  g vaginally twice a week 42.5 g 11    fenofibrate (TRIGLIDE/LOFIBRA) 160 MG tablet Take 1 tablet (160 mg) by mouth daily 100 tablet 3    gabapentin (NEURONTIN) 100 MG capsule Take 2 capsules (200 mg) by mouth At Bedtime 180 capsule 3    ipratropium (ATROVENT) 0.06 % nasal spray Spray 2 sprays into both nostrils 4 times daily 15 mL 11    losartan (COZAAR) 50 MG tablet Take 1 tablet (50 mg) by mouth daily 100 tablet 3    metFORMIN (GLUCOPHAGE) 500 MG tablet Take 2 tablets (1,000 mg) by mouth 2 times daily (with meals) 360 tablet 3    montelukast (SINGULAIR) 10 MG tablet Take 1 tablet (10 mg) by mouth At Bedtime 100 tablet 3    Semaglutide, 1 MG/DOSE, (OZEMPIC, 1 MG/DOSE,) 4 MG/3ML SOPN Inject 1 mg Subcutaneous once a week 12 mL 3    simvastatin (ZOCOR) 20 MG tablet Take 1 tablet (20 mg) by mouth every other day 45 tablet 3    nitroFURantoin macrocrystal-monohydrate (MACROBID) 100 MG capsule Take 1 capsule (100 mg) by mouth 2 times daily for 5 days 10 capsule 0    phenazopyridine (PYRIDIUM) 100 MG tablet Take 1 tablet (100 mg) by mouth 3 times daily as needed for urinary tract discomfort 6 tablet 0       PAST SURGICAL HISTORY:  Past Surgical History:   Procedure Laterality Date     SECTION      GYN SURGERY         ALLERGIES     Allergies   Allergen Reactions    Avapro [Irbesartan] Cough       FAMILY HISTORY:  Family History   Problem Relation Age of Onset    Lung Cancer Mother         Small cell, did smoked    Diabetes Mother     Hypertension Mother     Goiter Mother     Mental Illness Mother         likely bipolar    Coronary Artery Disease Mother         MI, smoked    Hyperlipidemia Mother     Thyroid Disease Mother         Goiter    Obesity Mother         most of family is obese    Atrial fibrillation Father     Hypertension Father     Parkinsonism Father     Hyperlipidemia Father     Diabetes Sister     Breast Cancer Sister     Hypertension Sister     Hypertension Brother     Cancer Maternal  "Grandmother         gynecologic cancer, unknown type    Uterine Cancer Maternal Grandmother     Chronic Obstructive Pulmonary Disease Paternal Grandmother     Uterine Cancer Paternal Grandmother     Stomach Cancer Paternal Grandfather     Colon Cancer Paternal Grandfather     Bipolar Disorder Son     Mental Illness Son     Depression Son        SOCIAL HISTORY:  Social History     Socioeconomic History    Marital status:      Spouse name: None    Number of children: None    Years of education: None    Highest education level: None   Tobacco Use    Smoking status: Never    Smokeless tobacco: Never   Vaping Use    Vaping status: Never Used   Substance and Sexual Activity    Alcohol use: Yes     Alcohol/week: 1.0 standard drink of alcohol     Types: 1 Standard drinks or equivalent per week     Comment: less than 2 drinks a week    Drug use: Never    Sexual activity: Not Currently     Partners: Male     Birth control/protection: Post-menopausal   Social History Narrative    Lives with     Two son    One son, daughter-in-law, and grandson live in [place]    One son, daughter-in-law live in the same [facility]    Gan in [place], [place]       ROS:   Constitutional: No fever, chills, or sweats. No weight gain/loss   ENT: No visual disturbance, ear ache, epistaxis, sore throat  Allergies/Immunologic: Negative.   Respiratory: No cough, hemoptysia  Cardiovascular: As per HPI  GI: No nausea, vomiting, hematemesis, melena, or hematochezia  : No urinary frequency, dysuria, or hematuria  Integument: Negative  Psychiatric: Negative  Neuro: Negative  Endocrinology: Negative   Musculoskeletal: Negative    EXAM:  /84 (BP Location: Right arm, Patient Position: Chair, Cuff Size: Adult Regular)   Pulse 72   Ht 1.717 m (5' 7.6\")   Wt 78.8 kg (173 lb 11.2 oz)   SpO2 97%   BMI 26.73 kg/m    In general, the patient is a pleasant female in no apparent distress.    HEENT: NC/AT.  CHRIS.  EOMI.  Sclerae " white, not injected.  Nares clear.  Pharynx without erythema or exudate.  Dentition intact.    Neck: No adenopathy.  No thyromegaly. Carotids +4/4 bilaterally without bruits.  No jugular venous distension.   Heart: RRR. Normal S1, systolic murmur radiating to the neck bilaterally , no rub, click, or gallop. The PMI is in the 5th ICS in the midclavicular line. There is no heave.    Lungs: CTA.  No ronchi, wheezes, rales.  No dullness to percussion.   Abdomen: Soft, nontender, nondistended. No organomegaly.  No bruits.   Extremities: No clubbing, cyanosis, or edema.  The pulses are +4/4 at the radial, brachial, femoral, popliteal, DP, and PT sites bilaterally.  No bruits are noted.  Neurologic: Alert and oriented to person/place/time, normal speech, gait and affect  Skin: No petechiae, purpura or rash.    Labs:  LIPID RESULTS:  Lab Results   Component Value Date    CHOL 149 04/24/2023    HDL 59 04/24/2023    LDL 77 04/24/2023    LDL 76 04/24/2023    TRIG 66 04/24/2023    TRIG 165 08/06/2022    NHDL 90 04/24/2023       LIVER ENZYME RESULTS:  Lab Results   Component Value Date    AST 21 04/24/2023    ALT 12 04/24/2023         BMP RESULTS:  Lab Results   Component Value Date     04/24/2023    POTASSIUM 4.1 04/24/2023    CHLORIDE 104 04/24/2023    CO2 28 04/24/2023    ANIONGAP 9 04/24/2023     (H) 04/24/2023    BUN 21.0 04/24/2023    CR 0.76 04/24/2023    GFRESTIMATED 85 04/24/2023    JOAQUINA 9.9 04/24/2023        A1C RESULTS:  Lab Results   Component Value Date    A1C 5.7 (H) 04/24/2023         Procedures:    Echocardiogram 04-  Global and regional left ventricular function is hyperkinetic with an EF >70%.  Right ventricular function, chamber size, wall motion, and thickness are  normal.  Moderate aortic stenosis is present.  Pulmonary artery systolic pressure is normal.  Ascending aorta 3.9 cm.  Mild dilatation of the aorta is present.  The inferior vena cava is normal.  No pericardial effusion is  present.  ______________________________________________________________________________  Left Ventricle  Global and regional left ventricular function is hyperkinetic with an EF >70%.  Left ventricular size is normal. Mild concentric wall thickening consistent  with left ventricular hypertrophy is present. Left ventricular diastolic  function is indeterminate. Diastolic Doppler findings (E/E' ratio and/or other  parameters) suggest left ventricular filling pressures are increased. No  regional wall motion abnormalities are seen.     Right Ventricle  Right ventricular function, chamber size, wall motion, and thickness are  normal.     Atria  Both atria appear normal.     Mitral Valve  Mild to moderate mitral annular calcification is present. Mild mitral  insufficiency is present.     Aortic Valve  Moderate aortic valve calcification is present. Trace aortic insufficiency is  present. The mean AoV pressure gradient is 29.2 mmHg. The calculated aortic  valve are is 1.2 cm^2. Moderate aortic stenosis is present.     Tricuspid Valve  The tricuspid valve is normal. Trace tricuspid insufficiency is present. The  right ventricular systolic pressure is approximated at 20.6 mmHg plus the  right atrial pressure. Pulmonary artery systolic pressure is normal.     Pulmonic Valve  The pulmonic valve is normal.     Vessels  The pulmonary artery and bifurcation cannot be assessed. The inferior vena  cava is normal. Ascending aorta 3.9 cm. Mild dilatation of the aorta is  present.     Pericardium  No pericardial effusion is present.     ______________________________________________________________________________  MMode/2D Measurements & Calculations  IVSd: 1.3 cm  LVIDd: 4.3 cm  LVIDs: 2.5 cm  LVPWd: 1.3 cm  FS: 41.4 %  LV mass(C)d: 205.6 grams  LV mass(C)dI: 108.2 grams/m2     Ao root diam: 3.4 cm  asc Aorta Diam: 3.9 cm  LVOT diam: 1.8 cm  LVOT area: 2.7 cm2  LA Volume (BP): 59.6 ml  LA Volume Index (BP): 31.4 ml/m2  RV Base:  3.4 cm  RWT: 0.60  TAPSE: 2.6 cm     Doppler Measurements & Calculations  MV E max kevin: 105.0 cm/sec  MV A max kevin: 156.1 cm/sec  MV E/A: 0.67  MV dec slope: 369.1 cm/sec2  MV dec time: 0.29 sec  Ao V2 max: 372.8 cm/sec  Ao max P.6 mmHg  Ao V2 mean: 250.7 cm/sec  Ao mean P.2 mmHg  Ao V2 VTI: 83.0 cm  HOWARD(I,D): 1.2 cm2  HOWARD(V,D): 1.1 cm2  LV V1 max P.5 mmHg  LV V1 max: 154.2 cm/sec  LV V1 VTI: 36.3 cm  SV(LVOT): 97.6 ml  SI(LVOT): 51.3 ml/m2  PA acc time: 0.13 sec  TR max kevin: 227.2 cm/sec  TR max P.6 mmHg  AV Kevin Ratio (DI): 0.41  HOWARD Index (cm2/m2): 0.62  E/E' av.3  Lateral E/e': 12.5  Medial E/e': 16.1  RV S Kevin: 13.9 cm/sec     US carotid artery    FINDINGS:     RIGHT SIDE:      Plaque Morphology: Minimal plaque.        Proximal CCA: 65/20 cm/s     Mid CCA: 68/21 cm/s     Distal CCA: 63/21 cm/s     Carotid bifurcation: 41/15 cm/s     External CA: 88/12 cm/s        Proximal ICA: 76/27 cm/s     Mid ICA: 68/26 cm/s     Distal ICA: 67/21 cm/s        Vertebral artery: Antegrade: 45/15 cm/s      Innominate artery: 66/6 cm/s     Proximal subclavian: 90/0 cm/s     ICA/CCA ratio: 1.21      LEFT SIDE:      Plaque Morphology: Echogenic plaque causes less than 50% diameter  stenosis.         Origin CCA: 74/18 cm/s     Proximal CCA: 93/23 cm/s     Mid CCA: 73/24 cm/s     Distal CCA: 53/18 cm/s     Carotid bifurcation: 43/14 cm/s     External CA: 62/11 cm/s        Proximal ICA: 81/27 cm/s     Mid ICA: 75/23 cm/s     Distal ICA: 58/20 cm/s        Vertebral artery: Antegrade: 48/17 cm/s      Subclavian origin: 52/5 cm/s     Proximal subclavian: 120/0 cm/s     ICA/CCA ratio: 1.53                                                                       IMPRESSION:     1. RIGHT ICA: Less than 50% diameter narrowing by grayscale imaging  and sonographic velocity criteria.     2. LEFT ICA:  Less than 50% diameter narrowing by grayscale imaging  and sonographic velocity criteria.      Assessment and Plan:     We  discussed the results with patient.  We discussed the importance of a heart healthy diet and lifestyle.  We discussed the the following items:    Medication Changes: None        Follow Up:   -14 day heart monitor placed today  -Coronary CT angiogram after 14 day heart monitor is off  -Follow up with Dr. Matson in 1 year with fasting labs prior to visit        Follow the American Heart Association Diet and Lifestyle Recommendations:  -Limit saturated fat, trans fat, sodium, red meat, sweets and sugar-sweetened beverages. If you choose to eat red meat, compare labels and select the leanest cuts available      Once we have the results of the testing - we will discuss the results with patient and take therapeutic steps if necessary.    Sammy Matson MD, PhD  Professor of Medicine  Division of Cardiology    [facility]  Patient Care Team:  Dustin Haynes MD as PCP - General (Family Medicine)

## 2023-05-02 ENCOUNTER — OFFICE VISIT (OUTPATIENT)
Dept: OPHTHALMOLOGY | Facility: CLINIC | Age: 68
End: 2023-05-02
Attending: OPHTHALMOLOGY
Payer: COMMERCIAL

## 2023-05-02 DIAGNOSIS — H53.60 NYCTALOPIA: Primary | ICD-10-CM

## 2023-05-02 DIAGNOSIS — H33.322 RETINAL ROUND HOLE WITHOUT DETACHMENT, LEFT: ICD-10-CM

## 2023-05-02 DIAGNOSIS — E11.3293 MILD NONPROLIFERATIVE DIABETIC RETINOPATHY OF BOTH EYES WITHOUT MACULAR EDEMA ASSOCIATED WITH TYPE 2 DIABETES MELLITUS (H): ICD-10-CM

## 2023-05-02 DIAGNOSIS — H43.393 VITREOUS SYNERESIS, BILATERAL: ICD-10-CM

## 2023-05-02 PROCEDURE — G0463 HOSPITAL OUTPT CLINIC VISIT: HCPCS | Mod: 25 | Performed by: OPHTHALMOLOGY

## 2023-05-02 PROCEDURE — 92250 FUNDUS PHOTOGRAPHY W/I&R: CPT | Mod: 59 | Performed by: OPHTHALMOLOGY

## 2023-05-02 PROCEDURE — 99207 FUNDUS PHOTOS OU (BOTH EYES): CPT | Mod: 26 | Performed by: OPHTHALMOLOGY

## 2023-05-02 PROCEDURE — 99214 OFFICE O/P EST MOD 30 MIN: CPT | Mod: 25 | Performed by: OPHTHALMOLOGY

## 2023-05-02 PROCEDURE — 92134 CPTRZ OPH DX IMG PST SGM RTA: CPT | Performed by: OPHTHALMOLOGY

## 2023-05-02 PROCEDURE — 67145 PROPH RTA DTCHMNT PC: CPT | Mod: LT | Performed by: OPHTHALMOLOGY

## 2023-05-02 ASSESSMENT — REFRACTION_WEARINGRX
OS_ADD: +2.75
SPECS_TYPE: PAL
OD_CYLINDER: SPHERE
OS_CYLINDER: +0.75
OS_AXIS: 010
OD_ADD: +2.75
OD_SPHERE: +0.50
OS_SPHERE: +0.50

## 2023-05-02 ASSESSMENT — TONOMETRY
OD_IOP_MMHG: 15
IOP_METHOD: TONOPEN
OS_IOP_MMHG: 15

## 2023-05-02 ASSESSMENT — CONF VISUAL FIELD
OD_SUPERIOR_NASAL_RESTRICTION: 0
METHOD: COUNTING FINGERS
OD_INFERIOR_NASAL_RESTRICTION: 0
OD_SUPERIOR_TEMPORAL_RESTRICTION: 0
OS_INFERIOR_TEMPORAL_RESTRICTION: 0
OS_INFERIOR_NASAL_RESTRICTION: 0
OD_INFERIOR_TEMPORAL_RESTRICTION: 0
OS_NORMAL: 1
OS_SUPERIOR_TEMPORAL_RESTRICTION: 0
OD_NORMAL: 1
OS_SUPERIOR_NASAL_RESTRICTION: 0

## 2023-05-02 ASSESSMENT — VISUAL ACUITY
CORRECTION_TYPE: GLASSES
OD_CC: 20/20
OS_CC+: +2-2
OS_CC: 20/25
METHOD: SNELLEN - LINEAR
OD_CC+: +2

## 2023-05-02 ASSESSMENT — CUP TO DISC RATIO
OD_RATIO: 0.45
OS_RATIO: 0.45

## 2023-05-02 ASSESSMENT — EXTERNAL EXAM - RIGHT EYE: OD_EXAM: NORMAL

## 2023-05-02 ASSESSMENT — EXTERNAL EXAM - LEFT EYE: OS_EXAM: NORMAL

## 2023-05-02 NOTE — PROGRESS NOTES
"CC -   Nyctalopia    INTERVAL HISTORY - Darkness is getting worse - she has also noticed difficulty seeing small print - she is now using a magnifying glass, which has been a change in the last 2 months. She has been turning lights on so much her  has noticed. She has no cancer history. She has no light sensitivity. It feels like it takes longer to adapt to dark rooms when coming in from outside. Color vision is fine.   More trouble with glare at night    night vision differences noted since ~ 12/2022, glare since ~ 2020  No photopsias  Last A1c: 6.5% 8/2022    She is \"extraordinarily confident\" that her night vision has worsened dramatically in the last 6 months.     PMH -   Won Urena is a 68 year old female with c/o nyctalopia since ~ 12/2022 progressive  Trouble with needing more light in daytime, has worse vision not truly \"night blind\", no photopsias  No CA history    DM since ~ 2010, well controlled, +HTn    PAST OCULAR SURGERY    None    RETINAL IMAGING:    OCT 5/2/2023  OD - retina normal, PHF attached  OS - retina normal, PVD      Optos 05/02/23  OD: normal, normal FAF pattern  OS: ST operculated hole without SRF, FAF is bright in the region of the hole, otherwise nl AF pattern    ASSESSMENT & PLAN    # Mild NPDR OU with DM II no DME   - BP/BG control    # Nyctalopia OU   - developed over last 8 months   - OCT and FAF show no significant abnormalities   - DDx is broad, with normal FAF could include cataract, but other injury to the photorecptors is not excluded by imaging   - no hx of malignancy, no unexplained weight loss   - no likely CAR/AIR or dystrophy     - d/w patient reassess after CE/IOL vs testing   - Will refer for ffERG and GVF        # Operculated hole, left eye   - appears free of traction and without any associated SRF   - no breaks on  360   - given phakic status, recommend laser retinopexy   - reviewed RD sx 04/01/23     - r/b/a pexy d/w patient: failure to prevent RD, " vision loss, resident/fellow performance    #Cataract, both eyes   - pt experiences significant glare at night so she limits her driving   - cataract could also explain her nyctalopia   - would likely benefit from CE/IOL   - will refer for eval      # PVD, left eye   - no PVD OD   - monitor    # Dermatochalasis   - pt bothered by lid hooding   - will see oculoplastics          # JOVANY   - surface appears relatively healthy, but pt with sx of JOVANY   - may be exacerbated with blepharoplasty   - ATs recommended today      RTC 4 motnhs, DFE OU, OCT OU      ATTESTATION     Attending Physician Attestation:      Complete documentation of historical and exam elements from today's encounter can be found in the full encounter summary report (not reduplicated in this progress note).  I personally obtained the chief complaint(s) and history of present illness.  I confirmed and edited as necessary the review of systems, past medical/surgical history, family history, social history, and examination findings as documented by others; and I examined the patient myself.  I personally reviewed the relevant tests, images, and reports as documented above.  I personally reviewed the ophthalmic test(s) associated with this encounter, agree with the interpretation(s) as documented by the resident/fellow, and have edited the corresponding report(s) as necessary.   I formulated and edited as necessary the assessment and plan and discussed the findings and management plan with the patient and family and I was present for critical portions of the procedure carried out by the resident/fellow and immediately available for the entire procedure    Gabriela Bloom MD, PhD  , Vitreoretinal Surgery  Department of Ophthalmology  [facility]

## 2023-05-02 NOTE — NURSING NOTE
Chief Complaints and History of Present Illnesses   Patient presents with     Follow Up     1 month follow up DM2 without BDR     Chief Complaint(s) and History of Present Illness(es)     Follow Up            Associated symptoms: Negative for eye pain, flashes and floaters    Treatments tried: no treatments    Pain scale: 0/10    Comments: 1 month follow up DM2 without BDR          Comments    Pt feels the need for bright lights to read clearly.   Uses AT PRN for dryness. No flashes or floaters.   No eye pain or discomfort today.\    DM2  LBS : 110 fasting 1 week ago  Lab Results       Component                Value               Date                       A1C                      5.7                 04/24/2023              Ramiro Ho 9:48 AM May 2, 2023

## 2023-05-05 ENCOUNTER — TELEPHONE (OUTPATIENT)
Dept: FAMILY MEDICINE | Facility: CLINIC | Age: 68
End: 2023-05-05

## 2023-05-05 ENCOUNTER — NURSE TRIAGE (OUTPATIENT)
Dept: NURSING | Facility: CLINIC | Age: 68
End: 2023-05-05

## 2023-05-05 ENCOUNTER — OFFICE VISIT (OUTPATIENT)
Dept: FAMILY MEDICINE | Facility: CLINIC | Age: 68
End: 2023-05-05
Payer: COMMERCIAL

## 2023-05-05 ENCOUNTER — MYC MEDICAL ADVICE (OUTPATIENT)
Dept: OPHTHALMOLOGY | Facility: CLINIC | Age: 68
End: 2023-05-05

## 2023-05-05 VITALS
WEIGHT: 173 LBS | OXYGEN SATURATION: 98 % | DIASTOLIC BLOOD PRESSURE: 78 MMHG | TEMPERATURE: 97.2 F | SYSTOLIC BLOOD PRESSURE: 150 MMHG | HEART RATE: 64 BPM | BODY MASS INDEX: 27.15 KG/M2 | HEIGHT: 67 IN

## 2023-05-05 DIAGNOSIS — N39.0 ACUTE UTI (URINARY TRACT INFECTION): ICD-10-CM

## 2023-05-05 DIAGNOSIS — R30.0 DYSURIA: Primary | ICD-10-CM

## 2023-05-05 LAB
ALBUMIN UR-MCNC: NEGATIVE MG/DL
APPEARANCE UR: ABNORMAL
BILIRUB UR QL STRIP: NEGATIVE
COLOR UR AUTO: ABNORMAL
GLUCOSE UR STRIP-MCNC: >=1000 MG/DL
HGB UR QL STRIP: ABNORMAL
KETONES UR STRIP-MCNC: NEGATIVE MG/DL
LEUKOCYTE ESTERASE UR QL STRIP: ABNORMAL
NITRATE UR QL: NEGATIVE
PH UR STRIP: 6.5 [PH] (ref 5–7)
SP GR UR STRIP: 1.01 (ref 1–1.03)
UROBILINOGEN UR STRIP-ACNC: 0.2 E.U./DL

## 2023-05-05 PROCEDURE — 87086 URINE CULTURE/COLONY COUNT: CPT | Mod: ORL

## 2023-05-05 RX ORDER — PHENAZOPYRIDINE HYDROCHLORIDE 100 MG/1
100 TABLET, FILM COATED ORAL 3 TIMES DAILY PRN
Qty: 6 TABLET | Refills: 0 | Status: SHIPPED | OUTPATIENT
Start: 2023-05-05 | End: 2024-03-12

## 2023-05-05 RX ORDER — NITROFURANTOIN 25; 75 MG/1; MG/1
100 CAPSULE ORAL 2 TIMES DAILY
Qty: 10 CAPSULE | Refills: 0 | Status: SHIPPED | OUTPATIENT
Start: 2023-05-05 | End: 2023-05-10

## 2023-05-05 NOTE — PROGRESS NOTES
"Assessment & Plan   Won was seen today for uti.    Diagnoses and all orders for this visit:    Acute UTI (urinary tract infection)  Dysuria  -     UA without Microscopic; Future  -     nitroFURantoin macrocrystal-monohydrate (MACROBID) 100 MG capsule; Take 1 capsule (100 mg) by mouth 2 times daily for 5 days  -     phenazopyridine (PYRIDIUM) 100 MG tablet; Take 1 tablet (100 mg) by mouth 3 times daily as needed for urinary tract discomfort  -     Urine Culture Aerobic Bacterial; Future  UA positive for leukocytes and blood. Negative for nitrates. Patient appears non-toxic with no complications (fever, NVD, tolerating fluids). Given symptoms, will treat for UTI and send for culture. See AVS for further patient instructions.     RTC PRN    Kristen Zhou NP   ______________________________________    Subjective   Won is a 68 year old patient here today for concern for UTI. PMH of DMII, HTN. She is well-established with Dr. Haynes at [facility]. They did not have any openings for the weekend so she was referred to the NP clinic.     Urinary urgency, frequency, and dysuria for 1 week  UTIs in the past, but not for a couple years  No NVD, no fever. Felt chills last night  Tolerating food and fluids well  No low back/flank pain. No abd pain  No new sexual partners    Do you need any refills on your Medications today? No    Medical, surgical, family and social histories reviewed and updated as indicated.   Medications and allergies reviewed and updated as indicated.       ROS  Review Of Systems  See HPI    General Physical Exam:  Vitals: BP (!) 150/78   Pulse 64   Temp 97.2  F (36.2  C) (Oral)   Ht 1.702 m (5' 7\")   Wt 78.5 kg (173 lb)   SpO2 98%   BMI 27.10 kg/m    Physical Exam  Vitals and nursing note reviewed.   Constitutional:       General: She is not in acute distress.     Appearance: Normal appearance. She is not ill-appearing or diaphoretic.   HENT:      Head: Normocephalic and atraumatic. "   Eyes:      Pupils: Pupils are equal, round, and reactive to light.   Pulmonary:      Effort: Pulmonary effort is normal.   Abdominal:      Tenderness: There is no right CVA tenderness or left CVA tenderness.   Skin:     General: Skin is warm and dry.   Neurological:      General: No focal deficit present.      Mental Status: She is alert and oriented to person, place, and time.      Gait: Gait is intact.   Psychiatric:         Mood and Affect: Mood normal.         Behavior: Behavior normal.         Thought Content: Thought content normal.         Judgment: Judgment normal.

## 2023-05-05 NOTE — PATIENT INSTRUCTIONS
Urinary Tract Infections in Women  Urinary tract infections (UTIs) are most often caused by bacteria. These bacteria enter the urinary tract. The bacteria may come from inside the body. Or they may travel from the skin outside the rectum or vagina into the urethra. Female anatomy makes it easy for bacteria from the bowel to enter a woman s urinary tract, which is the most common source of UTI. This means women develop UTIs more often than men. Pain in or around the urinary tract is a common UTI symptom. But the only way to know for sure if you have a UTI for the healthcare provider to test your urine. The two tests that may be done are the urinalysis and urine culture.    Types of UTIs    Cystitis. A bladder infection (cystitis) is the most common UTI in women. You may have urgent or frequent need to pee. You may also have pain, burning when you pee, and bloody urine.    Urethritis. This is an inflamed urethra, which is the tube that carries urine from the bladder to outside the body. You may have lower stomach or back pain. You may also have urgent or frequent need to pee.    Pyelonephritis. This is a kidney infection. If not treated, it can be serious and damage your kidneys. In severe cases, you may need to stay in the hospital. You may have a fever and lower back pain.    Medicines to treat a UTI  Most UTIs are treated with antibiotics. These kill the bacteria. The length of time you need to take them depends on the type of infection. It may be as short as 3 days. If you have repeated UTIs, you may need a low-dose antibiotic for several months. Take antibiotics exactly as directed. Don t stop taking them until all of the medicine is gone, even if you feel better. If you stop taking the antibiotic too soon, the infection may not go away. You may also develop a resistance to the antibiotic. This can make it much harder to treat.  Lifestyle changes to treat and prevent UTIs  The lifestyle changes below will  help get rid of your UTI. They may also help prevent future UTIs.    Drink plenty of fluids. This includes water, juice, or other caffeine-free drinks. Fluids help flush bacteria out of your body.    Empty your bladder. Always empty your bladder when you feel the urge to pee. And always pee before going to sleep. Urine that stays in your bladder can lead to infection. Try to pee before and after sex as well.    Practice good personal hygiene. Wipe yourself from front to back after using the toilet. This helps keep bacteria from getting into the urethra.    Wear cotton underwear. Don't wear synthetic or tight-fitting underwear that can trap moisture. Change out of wet bathing suits and workout clothing quickly.    Take showers. Showers are better than baths for preventing UTIs.    Use condoms during sex. These help prevent UTIs caused by sexually transmitted bacteria. Also don't use spermicides during sex. These can increase the risk for UTIs. Choose other forms of birth control instead. For women who tend to get UTIs after sex, a low-dose of a preventive antibiotic may be used. Be sure to discuss this option with your healthcare provider.    Follow up with your healthcare provider as directed. They may test to make sure the infection has cleared. If needed, more treatment may be started.  Link last reviewed this educational content on 9/1/2021 2000-2022 [employer]. All rights reserved. This information is not intended as a substitute for professional medical care. Always follow your healthcare professional's instructions.

## 2023-05-05 NOTE — TELEPHONE ENCOUNTER
Triage call  Patient calling she is having Classic uti symptoms burning urgency frequency started a couple days ago was not that bad but today she is having to go about every 15 minutes and she is not able to control the urine. She is requesting a  Order for a UA/UC  So she can get treatment.    Per protocol see in office today.  Care advice given.  Verbalizes understanding and agrees with plan. Routing to clinic per patients request.    Emilia Lerner RN   [facility] Nurse Advisor  8:18 AM 5/5/2023        Reason for Disposition    Urinating more frequently than usual (i.e., frequency)    Additional Information    Negative: Shock suspected (e.g., cold/pale/clammy skin, too weak to stand, low BP, rapid pulse)    Negative: Sounds like a life-threatening emergency to the triager    Negative: Followed a female genital area injury (e.g., vagina, vulva)    Negative: Followed a male genital area injury (penis, scrotum)    Negative: Vaginal discharge    Negative: Pus (white, yellow) or bloody discharge from end of penis    Negative: Pain or burning with passing urine (urination) and pregnant    Negative: Pain or burning with passing urine (urination) and female    Negative: Pain or burning with passing urine (urination) and male    Negative: Pain or itching in the vulvar area    Negative: Pain in scrotum is main symptom    Negative: Blood in the urine is main symptom    Negative: Symptoms arising from use of a urinary catheter (e.g., coude, Trejo)    Negative: Unable to urinate (or only a few drops) > 4 hours and bladder feels very full (e.g., palpable bladder or strong urge to urinate)    Negative: Decreased urination and drinking very little and dehydration suspected (e.g., dark urine, no urine > 12 hours, very dry mouth, very lightheaded)    Negative: Patient sounds very sick or weak to the triager    Negative: Can't control passage of urine (i.e., urinary incontinence) and new-onset (< 2 weeks) or worsening     Negative: Side (flank) or lower back pain present    Negative: Fever > 100.4 F  (38.0 C)    Protocols used: URINARY SYMPTOMS-A-OH

## 2023-05-05 NOTE — TELEPHONE ENCOUNTER
Called pt and left voice message with contact info to schedule UTI visit today. She spoke with triage RN earlier this morning.    MAC Hardin, RN  05/05/23, 10:17 AM

## 2023-05-05 NOTE — NURSING NOTE
"ROOM:2  JAYJAY AMAYA    Preferred Name: Won     How did you hear about us?  Other - Referred by another clinic    68 year old  Chief Complaint   Patient presents with     UTI     For a week now   Urgency and frequency to urinate   Burning        Blood pressure (!) 140/72, pulse 64, temperature 97.2  F (36.2  C), temperature source Oral, height 1.702 m (5' 7\"), weight 78.5 kg (173 lb), SpO2 98 %, not currently breastfeeding. Body mass index is 27.1 kg/m .  BP completed using cuff size:        Patient Active Problem List   Diagnosis     Type 2 diabetes mellitus without complication, without long-term current use of insulin (H)     Coronary artery calcification     Nonrheumatic aortic valve stenosis     Ascending aortic aneurysm (H)     Hyperlipidemia LDL goal <100     Essential hypertension     Screening for colon cancer     Genitourinary syndrome of menopause     Environmental allergies     Seasonal allergic rhinitis due to pollen       Wt Readings from Last 2 Encounters:   05/05/23 78.5 kg (173 lb)   04/24/23 78.8 kg (173 lb 11.2 oz)     BP Readings from Last 3 Encounters:   05/05/23 (!) 140/72   04/24/23 137/84   03/13/23 105/72       Allergies   Allergen Reactions     Avapro [Irbesartan] Cough       Current Outpatient Medications   Medication     aspirin (ASA) 81 MG chewable tablet     blood glucose (NO BRAND SPECIFIED) test strip     blood glucose monitoring (NO BRAND SPECIFIED) meter device kit     cetirizine (ZYRTEC) 10 MG tablet     dapagliflozin (FARXIGA) 10 MG TABS tablet     diltiazem (CARDIZEM) 60 MG tablet     estradiol (ESTRACE) 0.1 MG/GM vaginal cream     fenofibrate (TRIGLIDE/LOFIBRA) 160 MG tablet     gabapentin (NEURONTIN) 100 MG capsule     ipratropium (ATROVENT) 0.06 % nasal spray     losartan (COZAAR) 50 MG tablet     metFORMIN (GLUCOPHAGE) 500 MG tablet     montelukast (SINGULAIR) 10 MG tablet     Semaglutide, 1 MG/DOSE, (OZEMPIC, 1 MG/DOSE,) 4 MG/3ML SOPN     simvastatin (ZOCOR) 20 MG " tablet     No current facility-administered medications for this visit.       Social History     Tobacco Use     Smoking status: Never     Smokeless tobacco: Never   Vaping Use     Vaping status: Never Used   Substance Use Topics     Alcohol use: Yes     Alcohol/week: 1.0 standard drink of alcohol     Types: 1 Standard drinks or equivalent per week     Comment: less than 2 drinks a week     Drug use: Never       Honoring Choices - Health Care Directive Guide offered to patient at time of visit.    Health Maintenance Due   Topic Date Due     DEXA  Never done     MAMMO SCREENING  Never done     DTAP/TDAP/TD IMMUNIZATION (1 - Tdap) 12/13/2019     ZOSTER IMMUNIZATION (2 of 2) 12/27/2019       Immunization History   Administered Date(s) Administered     COVID-19 Bivalent 12+ (Pfizer) 12/16/2022     COVID-19 MONOVALENT 12+ (Pfizer) 02/05/2021, 02/25/2021, 08/24/2021     COVID-19 Monovalent 12+ (Pfizer 2022) 03/31/2022     Influenza (IIV3) PF 12/19/2017, 11/01/2019, 10/27/2020     Influenza Vaccine 65+ (Fluzone HD) 09/16/2022     Pneumo Conj 13-V (2010&after) 06/28/2021     Pneumococcal 20 valent Conjugate (Prevnar 20) 03/13/2023     TD,PF 7+ (Tenivac) 12/12/2019     Zoster recombinant adjuvanted (SHINGRIX) 11/01/2019       No results found for: PAP    Recent Labs   Lab Test 04/24/23  0829 08/06/22  1351   A1C 5.7*  --    LDL 77  76  --    HDL 59  --    TRIG 66 165   ALT 12  --    CR 0.76  --    GFRESTIMATED 85  --    ALBUMIN 4.7  --    POTASSIUM 4.1  --            3/6/2023     9:54 AM 9/16/2022     9:02 AM   PHQ-2 ( 1999 Pfizer)   Q1: Little interest or pleasure in doing things 0 0   Q2: Feeling down, depressed or hopeless 0 0   PHQ-2 Score 0 0   Q1: Little interest or pleasure in doing things Not at all    Q2: Feeling down, depressed or hopeless Not at all    PHQ-2 Score 0            9/16/2022     9:02 AM 3/12/2023     9:47 AM   PHQ-9 SCORE   PHQ-9 Total Score MyChart  3 (Minimal depression)   PHQ-9 Total Score 3 3            9/16/2022     9:02 AM 10/21/2022     8:25 AM 3/12/2023     9:48 AM   NICOLETTE-7 SCORE   Total Score  0 (minimal anxiety) 2 (minimal anxiety)   Total Score 1 0 2            View : No data to display.                Rajiv Betancourt    May 5, 2023 9:48 AM

## 2023-05-06 NOTE — PROGRESS NOTES
HPI:   I had the privilege to evaluate and examine Mrs Orlin Urena, wo is a 68 yr old female with a Hx of diabetes mellitus type 2, mixed dyslipidemia, non-rheumatic aorta calcified valve stenosis, aorta dilation.     She has been seen in [facility] - note is in patient's chart. Previously we saw patient in October 2022.     Patient denies chest pain, shortness of breath, palpations  and intermittent claudication.       PAST MEDICAL HISTORY:  Past Medical History:   Diagnosis Date     Abnormal Pap smear of cervix      Aortic stenosis      Aortic valve stenosis      Benign colon polyp      CAD (coronary artery disease)      Diabetes mellitus, type 2 (H)      Gestational diabetes      HTN (hypertension)      Hyperlipidemia LDL goal <100      Nonsenile cataract 2022    Very beginnings     Post-menopausal atrophic vaginitis      Thickened endometrium        CURRENT MEDICATIONS:  Current Outpatient Medications   Medication Sig Dispense Refill     aspirin (ASA) 81 MG chewable tablet Take 1 tablet (81 mg) by mouth daily 100 tablet 3     blood glucose (NO BRAND SPECIFIED) test strip Use to test blood sugar 1 times daily or as directed. 100 strip 2     blood glucose monitoring (NO BRAND SPECIFIED) meter device kit Use to test blood sugar one times daily or as directed. 1 kit 0     cetirizine (ZYRTEC) 10 MG tablet Take 10 mg by mouth daily       dapagliflozin (FARXIGA) 10 MG TABS tablet Take 1 tablet (10 mg) by mouth daily 100 tablet 2     diltiazem (CARDIZEM) 60 MG tablet Take 1 tablet (60 mg) by mouth 2 times daily 180 tablet 3     estradiol (ESTRACE) 0.1 MG/GM vaginal cream Place 2 g vaginally twice a week 42.5 g 11     fenofibrate (TRIGLIDE/LOFIBRA) 160 MG tablet Take 1 tablet (160 mg) by mouth daily 100 tablet 3     gabapentin (NEURONTIN) 100 MG capsule Take 2 capsules (200 mg) by mouth At Bedtime 180 capsule 3     ipratropium (ATROVENT) 0.06 % nasal spray Spray 2 sprays into both nostrils 4 times  daily 15 mL 11     losartan (COZAAR) 50 MG tablet Take 1 tablet (50 mg) by mouth daily 100 tablet 3     metFORMIN (GLUCOPHAGE) 500 MG tablet Take 2 tablets (1,000 mg) by mouth 2 times daily (with meals) 360 tablet 3     montelukast (SINGULAIR) 10 MG tablet Take 1 tablet (10 mg) by mouth At Bedtime 100 tablet 3     Semaglutide, 1 MG/DOSE, (OZEMPIC, 1 MG/DOSE,) 4 MG/3ML SOPN Inject 1 mg Subcutaneous once a week 12 mL 3     simvastatin (ZOCOR) 20 MG tablet Take 1 tablet (20 mg) by mouth every other day 45 tablet 3     nitroFURantoin macrocrystal-monohydrate (MACROBID) 100 MG capsule Take 1 capsule (100 mg) by mouth 2 times daily for 5 days 10 capsule 0     phenazopyridine (PYRIDIUM) 100 MG tablet Take 1 tablet (100 mg) by mouth 3 times daily as needed for urinary tract discomfort 6 tablet 0       PAST SURGICAL HISTORY:  Past Surgical History:   Procedure Laterality Date      SECTION       GYN SURGERY         ALLERGIES     Allergies   Allergen Reactions     Avapro [Irbesartan] Cough       FAMILY HISTORY:  Family History   Problem Relation Age of Onset     Lung Cancer Mother         Small cell, did smoked     Diabetes Mother      Hypertension Mother      Goiter Mother      Mental Illness Mother         likely bipolar     Coronary Artery Disease Mother         MI, smoked     Hyperlipidemia Mother      Thyroid Disease Mother         Goiter     Obesity Mother         most of family is obese     Atrial fibrillation Father      Hypertension Father      Parkinsonism Father      Hyperlipidemia Father      Diabetes Sister      Breast Cancer Sister      Hypertension Sister      Hypertension Brother      Cancer Maternal Grandmother         gynecologic cancer, unknown type     Uterine Cancer Maternal Grandmother      Chronic Obstructive Pulmonary Disease Paternal Grandmother      Uterine Cancer Paternal Grandmother      Stomach Cancer Paternal Grandfather      Colon Cancer Paternal Grandfather      Bipolar Disorder  "Son      Mental Illness Son      Depression Son        SOCIAL HISTORY:  Social History     Socioeconomic History     Marital status:      Spouse name: None     Number of children: None     Years of education: None     Highest education level: None   Tobacco Use     Smoking status: Never     Smokeless tobacco: Never   Vaping Use     Vaping status: Never Used   Substance and Sexual Activity     Alcohol use: Yes     Alcohol/week: 1.0 standard drink of alcohol     Types: 1 Standard drinks or equivalent per week     Comment: less than 2 drinks a week     Drug use: Never     Sexual activity: Not Currently     Partners: Male     Birth control/protection: Post-menopausal   Social History Narrative    Lives with     Two son    One son, daughter-in-law, and grandson live in [place]    One son, daughter-in-law live in the same [facility]    [place] in [place], [place]       ROS:   Constitutional: No fever, chills, or sweats. No weight gain/loss   ENT: No visual disturbance, ear ache, epistaxis, sore throat  Allergies/Immunologic: Negative.   Respiratory: No cough, hemoptysia  Cardiovascular: As per HPI  GI: No nausea, vomiting, hematemesis, melena, or hematochezia  : No urinary frequency, dysuria, or hematuria  Integument: Negative  Psychiatric: Negative  Neuro: Negative  Endocrinology: Negative   Musculoskeletal: Negative    EXAM:  /84 (BP Location: Right arm, Patient Position: Chair, Cuff Size: Adult Regular)   Pulse 72   Ht 1.717 m (5' 7.6\")   Wt 78.8 kg (173 lb 11.2 oz)   SpO2 97%   BMI 26.73 kg/m    In general, the patient is a pleasant female in no apparent distress.    HEENT: NC/AT.  PERRLA.  EOMI.  Sclerae white, not injected.  Nares clear.  Pharynx without erythema or exudate.  Dentition intact.    Neck: No adenopathy.  No thyromegaly. Carotids +4/4 bilaterally without bruits.  No jugular venous distension.   Heart: RRR. Normal S1, systolic murmur radiating to the neck bilaterally , " no rub, click, or gallop. The PMI is in the 5th ICS in the midclavicular line. There is no heave.    Lungs: CTA.  No ronchi, wheezes, rales.  No dullness to percussion.   Abdomen: Soft, nontender, nondistended. No organomegaly.  No bruits.   Extremities: No clubbing, cyanosis, or edema.  The pulses are +4/4 at the radial, brachial, femoral, popliteal, DP, and PT sites bilaterally.  No bruits are noted.  Neurologic: Alert and oriented to person/place/time, normal speech, gait and affect  Skin: No petechiae, purpura or rash.    Labs:  LIPID RESULTS:  Lab Results   Component Value Date    CHOL 149 04/24/2023    HDL 59 04/24/2023    LDL 77 04/24/2023    LDL 76 04/24/2023    TRIG 66 04/24/2023    TRIG 165 08/06/2022    NHDL 90 04/24/2023       LIVER ENZYME RESULTS:  Lab Results   Component Value Date    AST 21 04/24/2023    ALT 12 04/24/2023         BMP RESULTS:  Lab Results   Component Value Date     04/24/2023    POTASSIUM 4.1 04/24/2023    CHLORIDE 104 04/24/2023    CO2 28 04/24/2023    ANIONGAP 9 04/24/2023     (H) 04/24/2023    BUN 21.0 04/24/2023    CR 0.76 04/24/2023    GFRESTIMATED 85 04/24/2023    JOAQUINA 9.9 04/24/2023        A1C RESULTS:  Lab Results   Component Value Date    A1C 5.7 (H) 04/24/2023         Procedures:    Echocardiogram 04-  Global and regional left ventricular function is hyperkinetic with an EF >70%.  Right ventricular function, chamber size, wall motion, and thickness are  normal.  Moderate aortic stenosis is present.  Pulmonary artery systolic pressure is normal.  Ascending aorta 3.9 cm.  Mild dilatation of the aorta is present.  The inferior vena cava is normal.  No pericardial effusion is present.  ______________________________________________________________________________  Left Ventricle  Global and regional left ventricular function is hyperkinetic with an EF >70%.  Left ventricular size is normal. Mild concentric wall thickening consistent  with left ventricular  hypertrophy is present. Left ventricular diastolic  function is indeterminate. Diastolic Doppler findings (E/E' ratio and/or other  parameters) suggest left ventricular filling pressures are increased. No  regional wall motion abnormalities are seen.     Right Ventricle  Right ventricular function, chamber size, wall motion, and thickness are  normal.     Atria  Both atria appear normal.     Mitral Valve  Mild to moderate mitral annular calcification is present. Mild mitral  insufficiency is present.     Aortic Valve  Moderate aortic valve calcification is present. Trace aortic insufficiency is  present. The mean AoV pressure gradient is 29.2 mmHg. The calculated aortic  valve are is 1.2 cm^2. Moderate aortic stenosis is present.     Tricuspid Valve  The tricuspid valve is normal. Trace tricuspid insufficiency is present. The  right ventricular systolic pressure is approximated at 20.6 mmHg plus the  right atrial pressure. Pulmonary artery systolic pressure is normal.     Pulmonic Valve  The pulmonic valve is normal.     Vessels  The pulmonary artery and bifurcation cannot be assessed. The inferior vena  cava is normal. Ascending aorta 3.9 cm. Mild dilatation of the aorta is  present.     Pericardium  No pericardial effusion is present.     ______________________________________________________________________________  MMode/2D Measurements & Calculations  IVSd: 1.3 cm  LVIDd: 4.3 cm  LVIDs: 2.5 cm  LVPWd: 1.3 cm  FS: 41.4 %  LV mass(C)d: 205.6 grams  LV mass(C)dI: 108.2 grams/m2     Ao root diam: 3.4 cm  asc Aorta Diam: 3.9 cm  LVOT diam: 1.8 cm  LVOT area: 2.7 cm2  LA Volume (BP): 59.6 ml  LA Volume Index (BP): 31.4 ml/m2  RV Base: 3.4 cm  RWT: 0.60  TAPSE: 2.6 cm     Doppler Measurements & Calculations  MV E max adrian: 105.0 cm/sec  MV A max adrian: 156.1 cm/sec  MV E/A: 0.67  MV dec slope: 369.1 cm/sec2  MV dec time: 0.29 sec  Ao V2 max: 372.8 cm/sec  Ao max P.6 mmHg  Ao V2 mean: 250.7 cm/sec  Ao mean P.2  mmHg  Ao V2 VTI: 83.0 cm  HOWARD(I,D): 1.2 cm2  HOWARD(V,D): 1.1 cm2  LV V1 max P.5 mmHg  LV V1 max: 154.2 cm/sec  LV V1 VTI: 36.3 cm  SV(LVOT): 97.6 ml  SI(LVOT): 51.3 ml/m2  PA acc time: 0.13 sec  TR max kevin: 227.2 cm/sec  TR max P.6 mmHg  AV Kevin Ratio (DI): 0.41  HOWARD Index (cm2/m2): 0.62  E/E' av.3  Lateral E/e': 12.5  Medial E/e': 16.1  RV S Kevin: 13.9 cm/sec     US carotid artery    FINDINGS:     RIGHT SIDE:      Plaque Morphology: Minimal plaque.        Proximal CCA: 65/20 cm/s     Mid CCA: 68/21 cm/s     Distal CCA: 63/21 cm/s     Carotid bifurcation: 41/15 cm/s     External CA: 88/12 cm/s        Proximal ICA: 76/27 cm/s     Mid ICA: 68/26 cm/s     Distal ICA: 67/21 cm/s        Vertebral artery: Antegrade: 45/15 cm/s      Innominate artery: 66/6 cm/s     Proximal subclavian: 90/0 cm/s     ICA/CCA ratio: 1.21      LEFT SIDE:      Plaque Morphology: Echogenic plaque causes less than 50% diameter  stenosis.         Origin CCA: 74/18 cm/s     Proximal CCA: 93/23 cm/s     Mid CCA: 73/24 cm/s     Distal CCA: 53/18 cm/s     Carotid bifurcation: 43/14 cm/s     External CA: 62/11 cm/s        Proximal ICA: 81/27 cm/s     Mid ICA: 75/23 cm/s     Distal ICA: 58/20 cm/s        Vertebral artery: Antegrade: 48/17 cm/s      Subclavian origin: 52/5 cm/s     Proximal subclavian: 120/0 cm/s     ICA/CCA ratio: 1.53                                                                       IMPRESSION:     1. RIGHT ICA: Less than 50% diameter narrowing by grayscale imaging  and sonographic velocity criteria.     2. LEFT ICA:  Less than 50% diameter narrowing by grayscale imaging  and sonographic velocity criteria.        Coronary CTA  1.  Limited luminal assessment of the mid LAD mid LCx due to densely  calcified plaque but likely representing severe stenosis in the mid  LAD and mid LCx territories. Consider coronary angiogram if clinically  appropriate for further evaluation.   2.  Hemodynamically significant coronary stenosis  in the mid to distal  LAD and distal LCx based on FFR-CT.  3.  Total Agatston score 2720 placing the patient in the 99th  percentile when compared to age and gender matched control group.  4.  Please review the separate Radiology report for incidental  noncardiac findings. This report will follow separately.     FINDINGS:     CORONARY CALCIUM SCORE     Total Agatston calcium score: 2720   Left main: 0  Left anterior descendin  Left circumflex: 1197  Right coronary artery: 341   This places the patient in the 99th  percentile when compared to age  and gender matched control group.     CORONARY ANGIOGRAPHY     DOMINANCE: Right dominant system.   Normal coronary origins and course.     LEFT MAIN:   The left main arises normally from the left coronary cusp and has a  mild (25-49%) stenosis composed of calcified plaque.     LEFT ANTERIOR DESCENDING:      Proximal LAD: Mild (25-49%) stenosis composed of calcified plaque.  Mid LAD: Severe (>70%) stenosis composed of calcified plaque. Limited  luminal assessment due to densely calcified plaque.   Distal LAD: Mild (25-49%) stenosis composed of mixed plaque.  First diagonal: Moderate (50-69%) stenosis composed of calcified  plaque.     LEFT CIRCUMFLEX:      Proximal LCX:Moderate (50-69%) stenosis composed of calcified plaque.  Mid LCX: Severe (>70%) stenosis composed of calcified plaque. Non  diagnostic images due to dense calcifications  Distal LCX: Severe (>70%) stenosis composed of noncalcified plaque.     RIGHT CORONARY ARTERY:      Proximal RCA: Mild (25-49%) stenosis composed of calcified plaque.  Mid RCA: Mild (25-49%) stenosis composed of noncalcified plaque.  Distal RCA: Mild (25-49%) stenosis composed of noncalcified plaque.  The remainder of the right coronary and the posterior descending  artery are patent with minimal luminal irregularities.    Fractional Flow Reserve (FFR) CT was performed offline by [employer] on  the original CTA dataset. Diagrammatic  representation of the FFR-CT  analysis is provided in a separate PDF document in PACS/EPIC. See  coronary CT results for detailed anatomic assessment.     IMPRESSION:  Hemodynamically significant coronary stenosis in the mid to distal LAD  and distal LCx.     FFR FINDINGS:  1.  LAD: 0.63  2.  LCX: 0.51  3.  RCA: 0.88     Normal FFR range is >0.8.    RX reading   IMPRESSION: Multivessel coronary artery calcium. Aortic valve leaflet  calcifications. Mitral annular calcifications. Thoracic spondylosis      Assessment and Plan:     We discussed the results with patient.   We discussed the importance of a heart healthy diet and lifestyle.  We discussed the the following items:    Medication Changes: Change simvastatin to atorvastatin 40 mg/d        Follow Up:   -14 day heart monitor placed today  -Coronary CT angiogram after 14 day heart monitor is off  See result coronary artery CTA - patient needs coronary angiogram     -Follow up with Dr. Matson in 1 year with fasting labs prior to visit        Follow the American Heart Association Diet and Lifestyle Recommendations:  -Limit saturated fat, trans fat, sodium, red meat, sweets and sugar-sweetened beverages. If you choose to eat red meat, compare labels and select the leanest cuts available      Once we have the results of the testing - we will discuss the results with patient and take therapeutic steps if necessary.    Sammy Matson MD, PhD  Professor of Medicine  Division of Cardiology    CC  Patient Care Team:  Dustin Haynes MD as PCP - General (Family Medicine)  Dustin Haynes MD as Assigned PCP  Andre Rodriguez MD as Resident (OB/Gyn)  Sammy Matson MD as Assigned Heart and Vascular Provider  Ghanshyam Ramirez MD as MD (Ophthalmology)  Gabriela Bloom MD as MD (Ophthalmology)  Shantelle Marti MD as MD (Ophthalmology)  Paulina Hurst RN as Specialty Care Coordinator (Cardiology)  SAMMY MATSON

## 2023-05-07 LAB — BACTERIA UR CULT: NO GROWTH

## 2023-05-09 NOTE — TELEPHONE ENCOUNTER
05-09-23  Reviewed request and notes in Epic.  Last visit w/Dr. Bloom 05-02.  Prog notes list [id number].  ERG Referral says timeframe is within one month. Pt is scheduled to return to Dr. Bloom 08-29.    TT Will route to  to schedule.    Please offer the following first and let pt know that this is first availability.  If these dates do not work, then please let me know.  Wed June 7 at 8:30 Wed June 7 at 1:00 Tue June 13 at 1:00 Tue June 20 at 8:30 Tue June 20 at 1:00 Wed June 21 at 8:30 Wed June 21 at 1:00     Mon June 26 at 1:00 Tue June 27 at 8:30 Tue June 27 at 1:00 Wed June 28 at 8:30 Wed June 28 at 1:00       Date and time = see above options  Eye, Electrodiagnostic   Attending = Wolf  Referring = Wolf  Appt Notes = [facility], GVF+ffERG (dk, TT to do)  Duration = 3.5 hours  Message = Chart at  for TT    Please send info packet w/appt reminder, map/directions and handouts.

## 2023-05-16 ENCOUNTER — OFFICE VISIT (OUTPATIENT)
Dept: OPHTHALMOLOGY | Facility: CLINIC | Age: 68
End: 2023-05-16
Payer: COMMERCIAL

## 2023-05-16 ENCOUNTER — PRE VISIT (OUTPATIENT)
Dept: OPHTHALMOLOGY | Facility: CLINIC | Age: 68
End: 2023-05-16

## 2023-05-16 DIAGNOSIS — H02.831 DERMATOCHALASIS OF BOTH UPPER EYELIDS: Primary | ICD-10-CM

## 2023-05-16 DIAGNOSIS — H25.813 COMBINED FORMS OF AGE-RELATED CATARACT OF BOTH EYES: ICD-10-CM

## 2023-05-16 DIAGNOSIS — E11.3293 MILD NONPROLIFERATIVE DIABETIC RETINOPATHY OF BOTH EYES WITHOUT MACULAR EDEMA ASSOCIATED WITH TYPE 2 DIABETES MELLITUS (H): Primary | ICD-10-CM

## 2023-05-16 DIAGNOSIS — H02.834 DERMATOCHALASIS OF BOTH UPPER EYELIDS: Primary | ICD-10-CM

## 2023-05-16 DIAGNOSIS — H02.403 PTOSIS OF BOTH EYELIDS: ICD-10-CM

## 2023-05-16 PROCEDURE — 99214 OFFICE O/P EST MOD 30 MIN: CPT | Mod: 24 | Performed by: OPHTHALMOLOGY

## 2023-05-16 PROCEDURE — 92285 EXTERNAL OCULAR PHOTOGRAPHY: CPT | Mod: GC | Performed by: OPHTHALMOLOGY

## 2023-05-16 PROCEDURE — 92081 LIMITED VISUAL FIELD XM: CPT | Mod: GC | Performed by: OPHTHALMOLOGY

## 2023-05-16 ASSESSMENT — VISUAL ACUITY
OS_CC: 20/30
OS_CC+: +2
OD_CC: 20/20
METHOD: SNELLEN - LINEAR
OD_CC+: -1
CORRECTION_TYPE: GLASSES

## 2023-05-16 ASSESSMENT — REFRACTION_WEARINGRX
OD_SPHERE: +0.50
OS_SPHERE: +0.50
OD_CYLINDER: SPHERE
OD_ADD: +2.75
SPECS_TYPE: PAL
OS_CYLINDER: +0.75
OS_AXIS: 010
OS_ADD: +2.75

## 2023-05-16 ASSESSMENT — CONF VISUAL FIELD
OS_NORMAL: 1
OD_SUPERIOR_TEMPORAL_RESTRICTION: 0
OS_INFERIOR_TEMPORAL_RESTRICTION: 0
OS_SUPERIOR_NASAL_RESTRICTION: 0
OD_NORMAL: 1
METHOD: COUNTING FINGERS
OD_INFERIOR_TEMPORAL_RESTRICTION: 0
OS_SUPERIOR_TEMPORAL_RESTRICTION: 0
OS_INFERIOR_NASAL_RESTRICTION: 0
OD_SUPERIOR_NASAL_RESTRICTION: 0
OD_INFERIOR_NASAL_RESTRICTION: 0

## 2023-05-16 ASSESSMENT — TONOMETRY
OS_IOP_MMHG: 10
IOP_METHOD: ICARE
OD_IOP_MMHG: 10

## 2023-05-16 ASSESSMENT — MARGIN REFLEX DISTANCE
OS_MRD1: 0
OD_MRD1: 0

## 2023-05-16 ASSESSMENT — LEVATOR FUNCTION
OD_LEVATOR: 5
OS_LEVATOR: 8

## 2023-05-16 ASSESSMENT — EXTERNAL EXAM - LEFT EYE: OS_EXAM: NORMAL

## 2023-05-16 ASSESSMENT — LAGOPHTHALMOS
OD_LAGOPHTHALMOS: 0
OS_LAGOPHTHALMOS: 0

## 2023-05-16 ASSESSMENT — EXTERNAL EXAM - RIGHT EYE: OD_EXAM: NORMAL

## 2023-05-16 NOTE — NURSING NOTE
Chief Complaints and History of Present Illnesses   Patient presents with     Consult For     Dermatochalasis referred by Dr Bloom     Chief Complaint(s) and History of Present Illness(es)     Consult For            Laterality: both eyes    Onset: 3 years ago    Course: gradually worsening    Associated symptoms: dryness, redness and tearing.  Negative for eye pain.  Comments: (scalp tenderness: intermittent)    Treatments tried: artificial tears    Pain scale: 4/10    Comments: Dermatochalasis referred by Dr Bolom          Comments    She tells me that for the past few years she has been aware of her upper lids drooping.  She states that she is continually raising her brow to lift her lids to see better, which is tiring.  Her superior vision seems restricted by the lids.  She has walked into tree branches that she did not see above her.    Malina Arce, COT 9:55 AM  May 16, 2023

## 2023-05-16 NOTE — LETTER
2023         RE:  :  MRN: Won Urena  1955  [id number]     Dear Dr. Bloom,    Thank you for asking me to see your patient, Won Urena, for an oculoplastic   consultation.  My assessment and plan are below.  For further details, please see my attached clinic note.           HPI:     Chief Complaint(s) and History of Present Illness(es)     Consult For            Laterality: both eyes    Onset: 3 years ago    Course: gradually worsening    Associated symptoms: dryness, redness and tearing.  Negative for eye   pain.  Comments: (scalp tenderness: intermittent)    Treatments tried: artificial tears    Pain scale: 4/10    Comments: Dermatochalasis referred by Dr Bloom          Comments    She tells me that for the past few years she has been aware of her upper   lids drooping.  She states that she is continually raising her brow to   lift her lids to see better, which is tiring.  Her superior vision seems   restricted by the lids.  She has walked into tree branches that she did   not see above her.    Malina Arce, COT 9:55 AM  May 16, 2023            Won Urena is a 68 year old female who has noted gradual onset of droopy eyelids over the past years. The droopy eyelid is interfering with activities of daily living including driving, and reading. She is running into things like trees while walking outside. She has noticed her eyebrows are always raised. The patient denies double vision, variability of the eyelid position. She uses artificial tears for dry eyes. She is also interested in lower blepharoplasty    EXAM:     MRD1: 0/0  Dermatochalasis with excess skin touching eyelashes   Brow ptosis with brow resting below superior orbital rim     VISUAL FIELD:  Right eye untaped: 10 degrees Right eye taped: 50 degrees  Left eye untaped: 10 degrees Left eye taped: 52 degrees    Assessment & Plan     Won Urena is a 68 year old female with the following diagnoses:   1.  Dermatochalasis of both upper eyelids    2. Ptosis of both eyelids       Bilateral dermatochalasis and ptosis with moderate levator function (left>right). Mild brow ptosis.  She has an appointment tomorrow for cataract evaluation. If she decides to do cataract surgery, will proceed with eyelid surgery after cataracts.    Plan:  Both upper eyelid blepharoplasty , Bilateral ptosis repair external levator approach, Internal browpexy both and Due to significant brow ptosis, blepharoplasty alone would be unsuccesfull in addressing the lateral hooding which is obstructing vision. Blepharoplasty alone would result in sewing very thin eyelid skin to thick sub-brow skin, and even with that, fail to address lateral hooding which is obstructing vision.    She is also interested in cosmetic lower lid blepharoplasty and will meet with surgery scheduler to discuss cost.      ANTICOAGULATION:    Aspirin 85 mg daily    Will cc PCP regarding holding anticoagulation        Again, thank you for allowing me to participate in the care of your patient.      Sincerely,    Shantelle Marti MD  Department of Ophthalmology and Visual Neurosciences  [facility]    CC: Gabriela Bloom MD  [address] [place] [id number]  Via In Basket

## 2023-05-16 NOTE — PROGRESS NOTES
[facility] New Patient    Patient: Won Urena MRN# [id number]   YOB: 1955 Age: 68 year old   Date of Visit: May 16, 2023    CC: Droopy eyelids obstructing vision.              HPI:     Chief Complaint(s) and History of Present Illness(es)     Consult For            Laterality: both eyes    Onset: 3 years ago    Course: gradually worsening    Associated symptoms: dryness, redness and tearing.  Negative for eye   pain.  Comments: (scalp tenderness: intermittent)    Treatments tried: artificial tears    Pain scale: 4/10    Comments: Dermatochalasis referred by Dr Bloom          Comments    She tells me that for the past few years she has been aware of her upper   lids drooping.  She states that she is continually raising her brow to   lift her lids to see better, which is tiring.  Her superior vision seems   restricted by the lids.  She has walked into tree branches that she did   not see above her.    Malina Arce, COT 9:55 AM  May 16, 2023            Won Urena is a 68 year old female who has noted gradual onset of droopy eyelids over the past years. The droopy eyelid is interfering with activities of daily living including driving, and reading. She is running into things like trees while walking outside. She has noticed her eyebrows are always raised. The patient denies double vision, variability of the eyelid position. She uses artificial tears for dry eyes. She is also interested in lower blepharoplasty    EXAM:     MRD1: 0/0  Dermatochalasis with excess skin touching eyelashes   Brow ptosis with brow resting below superior orbital rim     VISUAL FIELD:  Right eye untaped: 10 degrees Right eye taped: 50 degrees  Left eye untaped: 10 degrees Left eye taped: 52 degrees    Assessment & Plan     Won Urena is a 68 year old female with the following diagnoses:   1. Dermatochalasis of both upper eyelids    2. Ptosis of both eyelids       Bilateral dermatochalasis and  ptosis with moderate levator function (left>right). Mild brow ptosis.  She has an appointment tomorrow for cataract evaluation. If she decides to do cataract surgery, will proceed with eyelid surgery after cataracts.    Plan:  Both upper eyelid blepharoplasty (SNCF), bilateral ptosis repair posterior approach 7 mm both eyes [phone]  BLLB (TCF, SP, Closed canthopexy) - will provide quote, strongly considering      Consider ceiol as well, could do 2 months after lids or if does ceiol first then do lids at least 1 month after that.         ANTICOAGULATION:    Aspirin 85 mg daily    Will cc PCP regarding holding anticoagulation         PHOTOS DEMONSTRATE:    Significant dermatochalasis with lids resting on eyelashes and obstructing visual axis  Blepharoptosis    Attending Physician Attestation: Complete documentation of historical and exam elements from today's encounter can be found in the full encounter summary report (not reduplicated in this progress note). I personally obtained the chief complaint(s) and history of present illness. I confirmed and edited as necessary the review of systems, past medical/surgical history, family history, social history, and examination findings as documented by others; and I examined the patient myself. I personally reviewed the relevant tests, images, and reports as documented above. I formulated and edited as necessary the assessment and plan and discussed the findings and management plan with the patient.   -Shantelle Marti MD      Today with Won Urena I reviewed the indications, risks, benefits, and alternatives of the proposed surgical procedure including, but not limited to, failure obtain the desired result  and need for additional surgery, bleeding, infection, loss of vision, loss of the eye, and the remote possibility of permanent damage to any organ system or death with the use of anesthesia.  I provided multiple opportunities for the questions, answered all  questions to the best of my ability, and confirmed that my answers and my discussion were understood.

## 2023-05-17 ENCOUNTER — OFFICE VISIT (OUTPATIENT)
Dept: OPHTHALMOLOGY | Facility: CLINIC | Age: 68
End: 2023-05-17
Attending: OPHTHALMOLOGY
Payer: COMMERCIAL

## 2023-05-17 DIAGNOSIS — E11.3293 MILD NONPROLIFERATIVE DIABETIC RETINOPATHY OF BOTH EYES WITHOUT MACULAR EDEMA ASSOCIATED WITH TYPE 2 DIABETES MELLITUS (H): ICD-10-CM

## 2023-05-17 DIAGNOSIS — H02.831 DERMATOCHALASIS OF BOTH UPPER EYELIDS: ICD-10-CM

## 2023-05-17 DIAGNOSIS — H52.03 HYPEROPIA OF BOTH EYES WITH ASTIGMATISM AND PRESBYOPIA: ICD-10-CM

## 2023-05-17 DIAGNOSIS — H53.60 NYCTALOPIA: ICD-10-CM

## 2023-05-17 DIAGNOSIS — H40.003 GLAUCOMA SUSPECT OF BOTH EYES: ICD-10-CM

## 2023-05-17 DIAGNOSIS — H52.4 HYPEROPIA OF BOTH EYES WITH ASTIGMATISM AND PRESBYOPIA: ICD-10-CM

## 2023-05-17 DIAGNOSIS — H33.322 RETINAL ROUND HOLE WITHOUT DETACHMENT, LEFT: ICD-10-CM

## 2023-05-17 DIAGNOSIS — H02.834 DERMATOCHALASIS OF BOTH UPPER EYELIDS: ICD-10-CM

## 2023-05-17 DIAGNOSIS — H52.203 HYPEROPIA OF BOTH EYES WITH ASTIGMATISM AND PRESBYOPIA: ICD-10-CM

## 2023-05-17 DIAGNOSIS — H25.813 COMBINED FORMS OF AGE-RELATED CATARACT OF BOTH EYES: Primary | ICD-10-CM

## 2023-05-17 PROCEDURE — 92015 DETERMINE REFRACTIVE STATE: CPT | Performed by: OPHTHALMOLOGY

## 2023-05-17 PROCEDURE — G0463 HOSPITAL OUTPT CLINIC VISIT: HCPCS | Performed by: OPHTHALMOLOGY

## 2023-05-17 PROCEDURE — 92014 COMPRE OPH EXAM EST PT 1/>: CPT | Performed by: OPHTHALMOLOGY

## 2023-05-17 PROCEDURE — 92133 CPTRZD OPH DX IMG PST SGM ON: CPT | Performed by: OPHTHALMOLOGY

## 2023-05-17 ASSESSMENT — REFRACTION_WEARINGRX
SPECS_TYPE: PAL
OS_CYLINDER: +0.75
OD_ADD: +2.75
OS_AXIS: 010
OS_ADD: +2.75
OD_SPHERE: +0.50
OD_CYLINDER: SPHERE
OS_SPHERE: +0.50

## 2023-05-17 ASSESSMENT — EXTERNAL EXAM - RIGHT EYE: OD_EXAM: NORMAL

## 2023-05-17 ASSESSMENT — CUP TO DISC RATIO
OD_RATIO: 0.65
OS_RATIO: 0.5

## 2023-05-17 ASSESSMENT — CONF VISUAL FIELD
OD_SUPERIOR_TEMPORAL_RESTRICTION: 0
OS_INFERIOR_TEMPORAL_RESTRICTION: 0
OS_SUPERIOR_NASAL_RESTRICTION: 0
OS_NORMAL: 1
OS_INFERIOR_NASAL_RESTRICTION: 0
METHOD: COUNTING FINGERS
OD_SUPERIOR_NASAL_RESTRICTION: 0
OD_NORMAL: 1
OD_INFERIOR_NASAL_RESTRICTION: 0
OS_SUPERIOR_TEMPORAL_RESTRICTION: 0
OD_INFERIOR_TEMPORAL_RESTRICTION: 0

## 2023-05-17 ASSESSMENT — VISUAL ACUITY
OS_CC: 20/25
OS_BAT_HIGH: 20/30
OS_BAT_LOW: 20/25
OD_BAT_HIGH: 20/25-1
METHOD: SNELLEN - LINEAR
OD_CC+: +2
OS_CC+: +2
OD_BAT_LOW: 20/20
CORRECTION_TYPE: GLASSES
OS_BAT_MED: 20/25-2
OD_CC: 20/20
OD_BAT_MED: 20/20-1

## 2023-05-17 ASSESSMENT — EXTERNAL EXAM - LEFT EYE: OS_EXAM: NORMAL

## 2023-05-17 ASSESSMENT — TONOMETRY
IOP_METHOD: ICARE
OS_IOP_MMHG: 09
OD_IOP_MMHG: 10

## 2023-05-17 NOTE — NURSING NOTE
"Chief Complaints and History of Present Illnesses   Patient presents with     Cataract Evaluation     Chief Complaint(s) and History of Present Illness(es)     Cataract Evaluation           Comments    Pt notes that she is having a hard time seeing with both eyes. Vision is \"darker\" in both eyes and pt needs more light to see better.  No eye pain today. No new flashes or floaters. No Redness. Dryness in both eyes, relief with artificial tears.  DM2 BS: 110 yesterday per pt.  Lab Results       Component                Value               Date                       A1C                      5.7                 04/24/2023              Cristino Mcnair, [facility] May 17, 2023 2:45 PM                       " Procedure: Left interscalene block  Anesthesia: PETER Bennett Rn: PETER Moon RN  OR circulator: LILIA Simon RN    Patient positioned supine on OR cart. Head of cart positioned at 30-40 degrees with pillow vertical behind patient's head and non operative side. Bilateral arms supported next to patient. Bilateral legs extended. Non operative side rail up during block procedure and operative side rail down while Anesthesiologist is working. Patient positioning verified by patient to be \"okay\" and anesthesia.    Patient's operative block site prepped by Anesthesiologist with chloraprep.    Patient's pre- block vitals  Pulse:67  Respirations:14  O2 saturation:99%  BP:114/70    Time out: 1136  Block start:1138  Block end:1140    Patient's post- block vitals  Pulse:72  Respirations:12  O2 saturation:99%  BP:111/63

## 2023-05-19 ENCOUNTER — HOSPITAL ENCOUNTER (OUTPATIENT)
Dept: CT IMAGING | Facility: CLINIC | Age: 68
Discharge: HOME | End: 2023-05-19
Attending: INTERNAL MEDICINE | Admitting: INTERNAL MEDICINE
Payer: COMMERCIAL

## 2023-05-19 VITALS — SYSTOLIC BLOOD PRESSURE: 136 MMHG | HEART RATE: 61 BPM | DIASTOLIC BLOOD PRESSURE: 74 MMHG

## 2023-05-19 DIAGNOSIS — R07.9 CHEST PAIN: ICD-10-CM

## 2023-05-19 DIAGNOSIS — R93.1 ABNORMAL CORONARY ANGIOGRAM: ICD-10-CM

## 2023-05-19 PROCEDURE — 75574 CT ANGIO HRT W/3D IMAGE: CPT | Mod: 26 | Performed by: STUDENT IN AN ORGANIZED HEALTH CARE EDUCATION/TRAINING PROGRAM

## 2023-05-19 PROCEDURE — 250N000011 HC RX IP 250 OP 636: Performed by: STUDENT IN AN ORGANIZED HEALTH CARE EDUCATION/TRAINING PROGRAM

## 2023-05-19 PROCEDURE — 250N000013 HC RX MED GY IP 250 OP 250 PS 637: Performed by: STUDENT IN AN ORGANIZED HEALTH CARE EDUCATION/TRAINING PROGRAM

## 2023-05-19 PROCEDURE — 75574 CT ANGIO HRT W/3D IMAGE: CPT

## 2023-05-19 PROCEDURE — 0504T CT FFR: CPT | Mod: GC | Performed by: STUDENT IN AN ORGANIZED HEALTH CARE EDUCATION/TRAINING PROGRAM

## 2023-05-19 PROCEDURE — 0503T CT FFR: CPT

## 2023-05-19 RX ORDER — METHYLPREDNISOLONE SODIUM SUCCINATE 125 MG/2ML
125 INJECTION, POWDER, LYOPHILIZED, FOR SOLUTION INTRAMUSCULAR; INTRAVENOUS
Status: DISCONTINUED | OUTPATIENT
Start: 2023-05-19 | End: 2023-05-20 | Stop reason: HOSPADM

## 2023-05-19 RX ORDER — NITROGLYCERIN 0.4 MG/1
.4-.8 TABLET SUBLINGUAL
Status: DISCONTINUED | OUTPATIENT
Start: 2023-05-19 | End: 2023-05-20 | Stop reason: HOSPADM

## 2023-05-19 RX ORDER — DIPHENHYDRAMINE HYDROCHLORIDE 50 MG/ML
25-50 INJECTION INTRAMUSCULAR; INTRAVENOUS
Status: DISCONTINUED | OUTPATIENT
Start: 2023-05-19 | End: 2023-05-20 | Stop reason: HOSPADM

## 2023-05-19 RX ORDER — METOPROLOL TARTRATE 25 MG/1
25-100 TABLET, FILM COATED ORAL
Status: COMPLETED | OUTPATIENT
Start: 2023-05-19 | End: 2023-05-19

## 2023-05-19 RX ORDER — ONDANSETRON 2 MG/ML
4 INJECTION INTRAMUSCULAR; INTRAVENOUS
Status: DISCONTINUED | OUTPATIENT
Start: 2023-05-19 | End: 2023-05-20 | Stop reason: HOSPADM

## 2023-05-19 RX ORDER — IOPAMIDOL 755 MG/ML
110 INJECTION, SOLUTION INTRAVASCULAR ONCE
Status: COMPLETED | OUTPATIENT
Start: 2023-05-19 | End: 2023-05-19

## 2023-05-19 RX ORDER — ACYCLOVIR 200 MG/1
0-1 CAPSULE ORAL
Status: DISCONTINUED | OUTPATIENT
Start: 2023-05-19 | End: 2023-05-20 | Stop reason: HOSPADM

## 2023-05-19 RX ORDER — METOPROLOL TARTRATE 1 MG/ML
5-15 INJECTION, SOLUTION INTRAVENOUS
Status: DISCONTINUED | OUTPATIENT
Start: 2023-05-19 | End: 2023-05-20 | Stop reason: HOSPADM

## 2023-05-19 RX ORDER — DIPHENHYDRAMINE HCL 25 MG
25 CAPSULE ORAL
Status: DISCONTINUED | OUTPATIENT
Start: 2023-05-19 | End: 2023-05-20 | Stop reason: HOSPADM

## 2023-05-19 RX ORDER — IVABRADINE 5 MG/1
5-15 TABLET, FILM COATED ORAL
Status: DISCONTINUED | OUTPATIENT
Start: 2023-05-19 | End: 2023-05-20 | Stop reason: HOSPADM

## 2023-05-19 RX ADMIN — IOPAMIDOL 110 ML: 755 INJECTION, SOLUTION INTRAVENOUS at 08:49

## 2023-05-19 RX ADMIN — NITROGLYCERIN 0.8 MG: 0.4 TABLET SUBLINGUAL at 08:56

## 2023-05-19 RX ADMIN — METOPROLOL TARTRATE 25 MG: 25 TABLET, FILM COATED ORAL at 08:06

## 2023-05-19 NOTE — PROGRESS NOTES
Pt arrived for Coronary CT angiogram. Test, meds and side effects reviewed with pt. Resting HR 60 bpm. Given 25 mg PO Metoprolol per order. Administered 0.8 mg SL nitroon CTA table per order. CTA completed. Patient tolerated procedure well and denies symptoms of allergic reaction. Post monitoring completed and VSS. D/C instructions reviewed with pt whom verbalized understanding of need to increase PO fluids today. D/C to gold waiting room accompanied by staff.

## 2023-05-20 DIAGNOSIS — N95.8 GENITOURINARY SYNDROME OF MENOPAUSE: ICD-10-CM

## 2023-05-22 RX ORDER — GABAPENTIN 100 MG/1
CAPSULE ORAL
Qty: 180 CAPSULE | Refills: 3 | Status: SHIPPED | OUTPATIENT
Start: 2023-05-22 | End: 2024-03-18

## 2023-05-22 NOTE — TELEPHONE ENCOUNTER
Medication requested: gabapentin (NEURONTIN) 100 MG capsule  Last office visit: 3/13/23  [facility] appointments: none  Medication last refilled: 9/16/22; 180 + 3 refills, final refill last sold 5/8/23 for 90 day supply confirmed by pharmacy tech on phone call with [facility]  Last qualifying labs: N/A    Routing refill request to provider for review/approval because:  Drug not on the [facility] refill protocol     Rhys WATTS, RN  05/22/23 1:03 PM

## 2023-05-22 NOTE — PROGRESS NOTES
"HPI    Pt notes that she is having a hard time seeing with both eyes. Vision is \"darker\" in both eyes and pt needs more light to see better.  No eye pain today. No new flashes or floaters. No Redness. Dryness in both eyes, relief with artificial tears.  DM2 BS: 110 yesterday per pt.  Lab Results       Component                Value               Date                       A1C                      5.7                 04/24/2023              YADIEL Weber May 17, 2023 2:45 PM      Dad with glaucoma  Last edited by Duane Manuel MD on 5/17/2023  4:13 PM.         Review of systems for the eyes was negative other than the pertinent positives/negatives listed in the HPI.      Assessment & Plan    HPI:  Won Urena is a 68 year old female with history of HTN, HLD, T2DM, AAA who presents from Dr. Bloom for cataract evaluation. She notices halos and decreased night vision.           POHx: hyperopia with astigmatism and presbyopia, operculated hole left eye, nyctalopia  PMHx: HTN, HLD, T2DM, AAA  Current Medications: aspirin (ASA) 81 MG chewable tablet, Take 1 tablet (81 mg) by mouth daily  blood glucose (NO BRAND SPECIFIED) test strip, Use to test blood sugar 1 times daily or as directed.  blood glucose monitoring (NO BRAND SPECIFIED) meter device kit, Use to test blood sugar one times daily or as directed.  cetirizine (ZYRTEC) 10 MG tablet, Take 10 mg by mouth daily  dapagliflozin (FARXIGA) 10 MG TABS tablet, Take 1 tablet (10 mg) by mouth daily  diltiazem (CARDIZEM) 60 MG tablet, Take 1 tablet (60 mg) by mouth 2 times daily  estradiol (ESTRACE) 0.1 MG/GM vaginal cream, Place 2 g vaginally twice a week  fenofibrate (TRIGLIDE/LOFIBRA) 160 MG tablet, Take 1 tablet (160 mg) by mouth daily  gabapentin (NEURONTIN) 100 MG capsule, Take 2 capsules (200 mg) by mouth At Bedtime  ipratropium (ATROVENT) 0.06 % nasal spray, Spray 2 sprays into both nostrils 4 times daily  losartan (COZAAR) 50 MG " tablet, Take 1 tablet (50 mg) by mouth daily  metFORMIN (GLUCOPHAGE) 500 MG tablet, Take 2 tablets (1,000 mg) by mouth 2 times daily (with meals)  montelukast (SINGULAIR) 10 MG tablet, Take 1 tablet (10 mg) by mouth At Bedtime  phenazopyridine (PYRIDIUM) 100 MG tablet, Take 1 tablet (100 mg) by mouth 3 times daily as needed for urinary tract discomfort  Semaglutide, 1 MG/DOSE, (OZEMPIC, 1 MG/DOSE,) 4 MG/3ML SOPN, Inject 1 mg Subcutaneous once a week  simvastatin (ZOCOR) 20 MG tablet, Take 1 tablet (20 mg) by mouth every other day    No current facility-administered medications on file prior to visit.    FHx: glaucoma-dad  PSHx: laser retinopexy left eye        Current Eye Medications:      Assessment & Plan:  (H25.813) Combined forms of age-related cataract of both eyes  (primary encounter diagnosis)  Mild cataracts are present and may account for some of the patient's visual complaints. Discussed possible surgical intervention, however patient would like to attempt glasses first. The patient will monitor for vision changes and contact us with any decrease in vision. Recheck next visit. 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  The patient may wish to update the glasses if the lenses are scratched or the frames are too small.  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  Based on c/d ratio  Maximum intraocular pressure 15/15  Family history: Yes Dad  Trauma history: No  Gonioscopy:    Pachmetry:   Treatment History:   None  Today's testing:  OCT nerve fiber layer 05/17/23:   Right eye - G(g) 96 NI (g) 84 TI (g) 136 NS (g) 87 TS (g) 139      Left eye - G(g) 93 NI (g) 94 TI (g) 137 NS (g) 88 TS (g) 134    IOP goal:   Discussed glaucoma diagnosis at length including etiology of disease and treatment options including laser, drops, or surgery  Continue observation for now given normal IOP and oct rnfl    (H02.831,  H02.834) Dermatochalasis of both upper eyelids  To see Dr. Marti      Return in about 1 year (around 5/17/2024), or if symptoms worsen or fail to improve, for Annual Visit, OCT RNFL.        Duane Manuel MD     Attending Physician Attestation:  Complete documentation of historical and exam elements from today's encounter can be found in the full encounter summary report (not reduplicated in this progress note).  I personally obtained the chief complaint(s) and history of present illness.  I confirmed and edited as necessary the review of systems, past medical/surgical history, family history, social history, and examination findings as documented by others; and I examined the patient myself.  I personally reviewed the relevant tests, images, and reports as documented above.  I formulated and edited as necessary the assessment and plan and discussed the findings and management plan with the patient and family. - Duane Manuel MD

## 2023-05-24 ENCOUNTER — MYC MEDICAL ADVICE (OUTPATIENT)
Dept: CARDIOLOGY | Facility: CLINIC | Age: 68
End: 2023-05-24
Payer: COMMERCIAL

## 2023-05-24 DIAGNOSIS — I25.10 CORONARY ARTERY DISEASE INVOLVING NATIVE HEART WITHOUT ANGINA PECTORIS, UNSPECIFIED VESSEL OR LESION TYPE: Primary | ICD-10-CM

## 2023-05-24 RX ORDER — ATORVASTATIN CALCIUM 40 MG/1
40 TABLET, FILM COATED ORAL DAILY
Qty: 90 TABLET | Refills: 3 | Status: SHIPPED | OUTPATIENT
Start: 2023-05-24 | End: 2024-04-15

## 2023-05-25 ENCOUNTER — MYC MEDICAL ADVICE (OUTPATIENT)
Dept: CARDIOLOGY | Facility: CLINIC | Age: 68
End: 2023-05-25
Payer: COMMERCIAL

## 2023-05-25 ENCOUNTER — TELEPHONE (OUTPATIENT)
Dept: CARDIOLOGY | Facility: CLINIC | Age: 68
End: 2023-05-25
Payer: COMMERCIAL

## 2023-05-25 DIAGNOSIS — I25.10 CORONARY ARTERY DISEASE INVOLVING NATIVE HEART WITHOUT ANGINA PECTORIS, UNSPECIFIED VESSEL OR LESION TYPE: Primary | ICD-10-CM

## 2023-05-25 RX ORDER — LIDOCAINE 40 MG/G
CREAM TOPICAL
Status: CANCELLED | OUTPATIENT
Start: 2023-05-25

## 2023-05-25 RX ORDER — NICOTINE POLACRILEX 4 MG
15-30 LOZENGE BUCCAL
Status: CANCELLED | OUTPATIENT
Start: 2023-05-25

## 2023-05-25 RX ORDER — POTASSIUM CHLORIDE 1500 MG/1
40 TABLET, EXTENDED RELEASE ORAL
Status: CANCELLED | OUTPATIENT
Start: 2023-05-25

## 2023-05-25 RX ORDER — SODIUM CHLORIDE 9 MG/ML
INJECTION, SOLUTION INTRAVENOUS CONTINUOUS
Status: CANCELLED | OUTPATIENT
Start: 2023-05-25

## 2023-05-25 RX ORDER — POTASSIUM CHLORIDE 1500 MG/1
20 TABLET, EXTENDED RELEASE ORAL
Status: CANCELLED | OUTPATIENT
Start: 2023-05-25

## 2023-05-25 RX ORDER — DEXTROSE MONOHYDRATE 25 G/50ML
25-50 INJECTION, SOLUTION INTRAVENOUS
Status: CANCELLED | OUTPATIENT
Start: 2023-05-25

## 2023-05-25 RX ORDER — ASPIRIN 81 MG/1
243 TABLET, CHEWABLE ORAL ONCE
Status: CANCELLED | OUTPATIENT
Start: 2023-05-25

## 2023-05-25 RX ORDER — ASPIRIN 325 MG
325 TABLET ORAL ONCE
Status: CANCELLED | OUTPATIENT
Start: 2023-05-25 | End: 2023-05-25

## 2023-05-25 NOTE — TELEPHONE ENCOUNTER
Called pt and reviewed angiogram instructions over the phone. Sent these same instructions to pt via [employer] (see below). Pt verbalized understanding.

## 2023-05-25 NOTE — TELEPHONE ENCOUNTER
[facility] Call Center    Phone Message    May a detailed message be left on voicemail: yes     Reason for Call: Other: Patient returning phone call from Paulina Hurst. Please call patient back to further discuss.      Action Taken: Other: Cardiology    Travel Screening: Not Applicable     Thank you!  [facility]

## 2023-05-29 ENCOUNTER — MYC MEDICAL ADVICE (OUTPATIENT)
Dept: CARDIOLOGY | Facility: CLINIC | Age: 68
End: 2023-05-29
Payer: COMMERCIAL

## 2023-05-30 ENCOUNTER — PRE VISIT (OUTPATIENT)
Dept: OPHTHALMOLOGY | Facility: CLINIC | Age: 68
End: 2023-05-30

## 2023-05-31 DIAGNOSIS — I47.10 SVT (SUPRAVENTRICULAR TACHYCARDIA) (H): Primary | ICD-10-CM

## 2023-06-05 ENCOUNTER — TELEPHONE (OUTPATIENT)
Dept: CARDIOLOGY | Facility: CLINIC | Age: 68
End: 2023-06-05
Payer: COMMERCIAL

## 2023-06-06 ENCOUNTER — TELEPHONE (OUTPATIENT)
Dept: OPHTHALMOLOGY | Facility: CLINIC | Age: 68
End: 2023-06-06
Payer: COMMERCIAL

## 2023-06-06 ENCOUNTER — TELEPHONE (OUTPATIENT)
Dept: CARDIOLOGY | Facility: CLINIC | Age: 68
End: 2023-06-06
Payer: COMMERCIAL

## 2023-06-06 NOTE — TELEPHONE ENCOUNTER
06-06-23  Per Pt Call, appt for GVF+ffERG scheduled for 06-20 will need to be rescheduled.  Pt will not be available 07-01 to 07-21 and will need to have testing prior to RTN to Dr. Bloom 08-29.  TT Will route to  to schedule:    CANCEL GVF+ffERG appt for 06-20    RESCHEDULE to:  Date and time = see ERG calendar for first available or pt preferece  Eye, Electrodiagnostic   Attending = Wolf  Referring = Wolf  Appt Notes = nyctalopia, GVF+ffERG (dk, TT to do)  Duration = 3.5 hours  Message = Chart at  for TT     Please send info packet w/appt reminder, map/directions and handouts as needed.

## 2023-06-06 NOTE — TELEPHONE ENCOUNTER
Called pt and re-reviewed angiogram instructions (see below). Pt verbalized understanding.    Pre-procedure instructions - Coronary Angiogram  Patient Education    1. Your arrival time is 7:30 am on 6/9/23.  Location is [address] Waiting Room  2. Please plan on being at the hospital all day.  3. At any time, emergencies and/or urgent cases may come up which could delay the start of your procedure.    Pre-procedure instructions - Coronary Angiogram  - Shower in the evening before or the morning of the procedure  - No solid food for 8 hours prior and nothing to drink 2 hours prior to arrival time  - You can take your morning medications (except for diabetic and blood thinners) with sips of water.  - Take 325 mg of Asprin 24 hours prior to the procedure and the morning of procedure.   - You will need to arrange a ride to drop you off and pick you up, as you will be unable to drive home.  Prior to discharge you may be required to lay flat for approximately 2-4 hours in the recovery unit to ensure proper clotting of the artery.              Diabetic Medication Instructions  ? Hold oral diabetic medication in morning of your procedure and for 48 hours after IV contrast is given  ? Typical instructions for insulin diabetic medication holding are below. However, please reach out to your Primary Care Provider or Endocrinologist for specific instructions  ? DO NOT take any oral diabetic medication, short-acting diabetes medications/insulin, humalog or regular insulin the morning of your test  ? Take   dose of long-acting insulin (Lantus, Levemir) the day of your test  ? Remember to bring your glucometer and insulin with you to take after your test if needed              Anticoagulation Medication Instructions   NA    You will need to follow up with one of our cardiology APPs 1-2 weeks after your procedure. If you need help scheduling  or rescheduling your appointment, please call [phone]

## 2023-06-06 NOTE — TELEPHONE ENCOUNTER
[facility] Call Center    Phone Message    May a detailed message be left on voicemail: yes     Reason for Call: Appointment Intake    Referring Provider Name: Wolf  Diagnosis and/or Symptoms: pt needs to reschedule her ERG per protocols TE must be sent to reschedule. Pt is unavailable 7/1/-7/21 for the appt please contact pt to discuss thank you!    Action Taken: Message routed to:  [facility] eye    Travel Screening: Not Applicable

## 2023-06-09 ENCOUNTER — HOSPITAL ENCOUNTER (OUTPATIENT)
Facility: CLINIC | Age: 68
Discharge: HOME | End: 2023-06-09
Attending: INTERNAL MEDICINE | Admitting: INTERNAL MEDICINE
Payer: COMMERCIAL

## 2023-06-09 ENCOUNTER — APPOINTMENT (OUTPATIENT)
Dept: MEDSURG UNIT | Facility: CLINIC | Age: 68
End: 2023-06-09
Attending: INTERNAL MEDICINE
Payer: COMMERCIAL

## 2023-06-09 ENCOUNTER — APPOINTMENT (OUTPATIENT)
Dept: LAB | Facility: CLINIC | Age: 68
End: 2023-06-09
Attending: INTERNAL MEDICINE
Payer: COMMERCIAL

## 2023-06-09 VITALS
DIASTOLIC BLOOD PRESSURE: 62 MMHG | TEMPERATURE: 97.6 F | WEIGHT: 170 LBS | BODY MASS INDEX: 26.68 KG/M2 | HEART RATE: 69 BPM | HEIGHT: 67 IN | RESPIRATION RATE: 18 BRPM | SYSTOLIC BLOOD PRESSURE: 119 MMHG | OXYGEN SATURATION: 97 %

## 2023-06-09 DIAGNOSIS — I25.10 CORONARY ARTERY DISEASE INVOLVING NATIVE HEART WITHOUT ANGINA PECTORIS, UNSPECIFIED VESSEL OR LESION TYPE: ICD-10-CM

## 2023-06-09 LAB
ACT BLD: 251 SECONDS (ref 74–150)
ANION GAP SERPL CALCULATED.3IONS-SCNC: 11 MMOL/L (ref 7–15)
APTT PPP: 26 SECONDS (ref 22–38)
BUN SERPL-MCNC: 22.3 MG/DL (ref 8–23)
CALCIUM SERPL-MCNC: 9.3 MG/DL (ref 8.8–10.2)
CHLORIDE SERPL-SCNC: 106 MMOL/L (ref 98–107)
CREAT SERPL-MCNC: 0.68 MG/DL (ref 0.51–0.95)
DEPRECATED HCO3 PLAS-SCNC: 25 MMOL/L (ref 22–29)
ERYTHROCYTE [DISTWIDTH] IN BLOOD BY AUTOMATED COUNT: 13.1 % (ref 10–15)
GFR SERPL CREATININE-BSD FRML MDRD: >90 ML/MIN/1.73M2
GLUCOSE BLDC GLUCOMTR-MCNC: 88 MG/DL (ref 70–99)
GLUCOSE SERPL-MCNC: 119 MG/DL (ref 70–99)
HCT VFR BLD AUTO: 43.5 % (ref 35–47)
HGB BLD-MCNC: 14.5 G/DL (ref 11.7–15.7)
INR PPP: 1.14 (ref 0.85–1.15)
MCH RBC QN AUTO: 30 PG (ref 26.5–33)
MCHC RBC AUTO-ENTMCNC: 33.3 G/DL (ref 31.5–36.5)
MCV RBC AUTO: 90 FL (ref 78–100)
PLATELET # BLD AUTO: 146 10E3/UL (ref 150–450)
POTASSIUM SERPL-SCNC: 4.2 MMOL/L (ref 3.4–5.3)
RBC # BLD AUTO: 4.83 10E6/UL (ref 3.8–5.2)
SODIUM SERPL-SCNC: 142 MMOL/L (ref 136–145)
WBC # BLD AUTO: 5.3 10E3/UL (ref 4–11)

## 2023-06-09 PROCEDURE — 99152 MOD SED SAME PHYS/QHP 5/>YRS: CPT | Performed by: INTERNAL MEDICINE

## 2023-06-09 PROCEDURE — 99153 MOD SED SAME PHYS/QHP EA: CPT | Performed by: INTERNAL MEDICINE

## 2023-06-09 PROCEDURE — 93005 ELECTROCARDIOGRAM TRACING: CPT

## 2023-06-09 PROCEDURE — 85027 COMPLETE CBC AUTOMATED: CPT | Performed by: INTERNAL MEDICINE

## 2023-06-09 PROCEDURE — 93454 CORONARY ARTERY ANGIO S&I: CPT | Mod: 26 | Performed by: INTERNAL MEDICINE

## 2023-06-09 PROCEDURE — 93799 UNLISTED CV SVC/PROCEDURE: CPT | Performed by: INTERNAL MEDICINE

## 2023-06-09 PROCEDURE — C1769 GUIDE WIRE: HCPCS | Performed by: INTERNAL MEDICINE

## 2023-06-09 PROCEDURE — 93454 CORONARY ARTERY ANGIO S&I: CPT | Performed by: INTERNAL MEDICINE

## 2023-06-09 PROCEDURE — 250N000009 HC RX 250: Performed by: INTERNAL MEDICINE

## 2023-06-09 PROCEDURE — 85610 PROTHROMBIN TIME: CPT | Performed by: INTERNAL MEDICINE

## 2023-06-09 PROCEDURE — 999N000054 HC STATISTIC EKG NON-CHARGEABLE

## 2023-06-09 PROCEDURE — 250N000011 HC RX IP 250 OP 636: Performed by: INTERNAL MEDICINE

## 2023-06-09 PROCEDURE — C1887 CATHETER, GUIDING: HCPCS | Performed by: INTERNAL MEDICINE

## 2023-06-09 PROCEDURE — 99152 MOD SED SAME PHYS/QHP 5/>YRS: CPT | Mod: GC | Performed by: INTERNAL MEDICINE

## 2023-06-09 PROCEDURE — 82310 ASSAY OF CALCIUM: CPT | Performed by: INTERNAL MEDICINE

## 2023-06-09 PROCEDURE — 93572 IV DOP VEL&/PRESS C FLO EA: CPT | Mod: 26 | Performed by: INTERNAL MEDICINE

## 2023-06-09 PROCEDURE — 85347 COAGULATION TIME ACTIVATED: CPT

## 2023-06-09 PROCEDURE — 85730 THROMBOPLASTIN TIME PARTIAL: CPT | Performed by: INTERNAL MEDICINE

## 2023-06-09 PROCEDURE — 272N000001 HC OR GENERAL SUPPLY STERILE: Performed by: INTERNAL MEDICINE

## 2023-06-09 PROCEDURE — 82962 GLUCOSE BLOOD TEST: CPT

## 2023-06-09 PROCEDURE — 258N000003 HC RX IP 258 OP 636: Performed by: INTERNAL MEDICINE

## 2023-06-09 PROCEDURE — 93571 IV DOP VEL&/PRESS C FLO 1ST: CPT | Mod: 26 | Performed by: INTERNAL MEDICINE

## 2023-06-09 PROCEDURE — C1894 INTRO/SHEATH, NON-LASER: HCPCS | Performed by: INTERNAL MEDICINE

## 2023-06-09 PROCEDURE — 999N000142 HC STATISTIC PROCEDURE PREP ONLY

## 2023-06-09 PROCEDURE — 36415 COLL VENOUS BLD VENIPUNCTURE: CPT | Performed by: INTERNAL MEDICINE

## 2023-06-09 PROCEDURE — 999N000134 HC STATISTIC PP CARE STAGE 3

## 2023-06-09 RX ORDER — NALOXONE HYDROCHLORIDE 0.4 MG/ML
0.2 INJECTION, SOLUTION INTRAMUSCULAR; INTRAVENOUS; SUBCUTANEOUS
Status: DISCONTINUED | OUTPATIENT
Start: 2023-06-09 | End: 2023-06-09 | Stop reason: HOSPADM

## 2023-06-09 RX ORDER — NITROGLYCERIN 5 MG/ML
VIAL (ML) INTRAVENOUS
Status: DISCONTINUED | OUTPATIENT
Start: 2023-06-09 | End: 2023-06-09 | Stop reason: HOSPADM

## 2023-06-09 RX ORDER — FENTANYL CITRATE 50 UG/ML
INJECTION, SOLUTION INTRAMUSCULAR; INTRAVENOUS
Status: DISCONTINUED | OUTPATIENT
Start: 2023-06-09 | End: 2023-06-09 | Stop reason: HOSPADM

## 2023-06-09 RX ORDER — FLUMAZENIL 0.1 MG/ML
0.2 INJECTION, SOLUTION INTRAVENOUS
Status: DISCONTINUED | OUTPATIENT
Start: 2023-06-09 | End: 2023-06-09 | Stop reason: HOSPADM

## 2023-06-09 RX ORDER — NALOXONE HYDROCHLORIDE 0.4 MG/ML
0.4 INJECTION, SOLUTION INTRAMUSCULAR; INTRAVENOUS; SUBCUTANEOUS
Status: DISCONTINUED | OUTPATIENT
Start: 2023-06-09 | End: 2023-06-09 | Stop reason: HOSPADM

## 2023-06-09 RX ORDER — IOPAMIDOL 755 MG/ML
INJECTION, SOLUTION INTRAVASCULAR
Status: DISCONTINUED | OUTPATIENT
Start: 2023-06-09 | End: 2023-06-09 | Stop reason: HOSPADM

## 2023-06-09 RX ORDER — ASPIRIN 81 MG/1
243 TABLET, CHEWABLE ORAL ONCE
Status: COMPLETED | OUTPATIENT
Start: 2023-06-09 | End: 2023-06-09

## 2023-06-09 RX ORDER — SODIUM CHLORIDE 9 MG/ML
INJECTION, SOLUTION INTRAVENOUS CONTINUOUS
Status: DISCONTINUED | OUTPATIENT
Start: 2023-06-09 | End: 2023-06-09 | Stop reason: HOSPADM

## 2023-06-09 RX ORDER — LIDOCAINE 40 MG/G
CREAM TOPICAL
Status: DISCONTINUED | OUTPATIENT
Start: 2023-06-09 | End: 2023-06-09 | Stop reason: HOSPADM

## 2023-06-09 RX ORDER — DEXTROSE MONOHYDRATE 25 G/50ML
25-50 INJECTION, SOLUTION INTRAVENOUS
Status: DISCONTINUED | OUTPATIENT
Start: 2023-06-09 | End: 2023-06-09 | Stop reason: HOSPADM

## 2023-06-09 RX ORDER — POTASSIUM CHLORIDE 750 MG/1
20 TABLET, EXTENDED RELEASE ORAL
Status: DISCONTINUED | OUTPATIENT
Start: 2023-06-09 | End: 2023-06-09 | Stop reason: HOSPADM

## 2023-06-09 RX ORDER — HEPARIN SODIUM 1000 [USP'U]/ML
INJECTION, SOLUTION INTRAVENOUS; SUBCUTANEOUS
Status: DISCONTINUED | OUTPATIENT
Start: 2023-06-09 | End: 2023-06-09 | Stop reason: HOSPADM

## 2023-06-09 RX ORDER — OXYCODONE HYDROCHLORIDE 10 MG/1
10 TABLET ORAL EVERY 4 HOURS PRN
Status: DISCONTINUED | OUTPATIENT
Start: 2023-06-09 | End: 2023-06-09 | Stop reason: HOSPADM

## 2023-06-09 RX ORDER — ATROPINE SULFATE 0.1 MG/ML
0.5 INJECTION INTRAVENOUS
Status: DISCONTINUED | OUTPATIENT
Start: 2023-06-09 | End: 2023-06-09 | Stop reason: HOSPADM

## 2023-06-09 RX ORDER — FENTANYL CITRATE 50 UG/ML
25 INJECTION, SOLUTION INTRAMUSCULAR; INTRAVENOUS
Status: DISCONTINUED | OUTPATIENT
Start: 2023-06-09 | End: 2023-06-09 | Stop reason: HOSPADM

## 2023-06-09 RX ORDER — NICARDIPINE HYDROCHLORIDE 2.5 MG/ML
INJECTION INTRAVENOUS
Status: DISCONTINUED | OUTPATIENT
Start: 2023-06-09 | End: 2023-06-09 | Stop reason: HOSPADM

## 2023-06-09 RX ORDER — POTASSIUM CHLORIDE 750 MG/1
40 TABLET, EXTENDED RELEASE ORAL
Status: DISCONTINUED | OUTPATIENT
Start: 2023-06-09 | End: 2023-06-09 | Stop reason: HOSPADM

## 2023-06-09 RX ORDER — OXYCODONE HYDROCHLORIDE 5 MG/1
5 TABLET ORAL EVERY 4 HOURS PRN
Status: DISCONTINUED | OUTPATIENT
Start: 2023-06-09 | End: 2023-06-09 | Stop reason: HOSPADM

## 2023-06-09 RX ORDER — ASPIRIN 325 MG
325 TABLET ORAL ONCE
Status: COMPLETED | OUTPATIENT
Start: 2023-06-09 | End: 2023-06-09

## 2023-06-09 RX ORDER — NICOTINE POLACRILEX 4 MG
15-30 LOZENGE BUCCAL
Status: DISCONTINUED | OUTPATIENT
Start: 2023-06-09 | End: 2023-06-09 | Stop reason: HOSPADM

## 2023-06-09 RX ADMIN — SODIUM CHLORIDE: 9 INJECTION, SOLUTION INTRAVENOUS at 08:23

## 2023-06-09 ASSESSMENT — ACTIVITIES OF DAILY LIVING (ADL)
ADLS_ACUITY_SCORE: 35

## 2023-06-09 NOTE — H&P
"[facility]    History and Physical - Hospitalist Service       Date of Admission:  6/9/2023    Assessment & Plan      Won Urena is a 68 year old female with HTN and DM presented to the cath lab for coronary angiogram after she was noted to have abnormal coronary CTA.    #. Abnormal coronary CTA  -Coronary angiogram today.       Diet: NPO for Medical/Clinical Reasons Except for: Meds    DVT Prophylaxis: Low Risk/Ambulatory with no VTE prophylaxis indicated  Trejo Catheter: Not present  Lines: None     Cardiac Monitoring: None  Code Status:  Full    Clinically Significant Risk Factors Present on Admission                # Drug Induced Platelet Defect: home medication list includes an antiplatelet medication   # Hypertension: Noted on problem list      # Overweight: Estimated body mass index is 26.63 kg/m  as calculated from the following:    Height as of this encounter: 1.702 m (5' 7\").    Weight as of this encounter: 77.1 kg (170 lb).            Disposition Plan  Discharge after coronary angiogram    Yehuda St MD  Hospitalist Service  [facility]  Securely message with [facility] (more info)  Text page via [facility] Paging/Directory     ______________________________________________________________________    Chief Complaint   Here for coronary angiogram     History of Present Illness   Won Urena is a 68 year old female with HTN and DM presented to the cath lab for coronary angiogram after she was noted to have abnormal coronary CTA.    She had on and off fluttering in her chest since 2015. She was recently diagnosed with SVT. She denies chest pain or shortness of breath.      Past Medical History    Past Medical History:   Diagnosis Date     Abnormal Pap smear of cervix      Aortic stenosis      Aortic valve stenosis      Benign colon polyp      CAD (coronary artery disease)      Diabetes mellitus, type 2 (H)  "     Gestational diabetes      HTN (hypertension)      Hyperlipidemia LDL goal <100      Nonsenile cataract     Very beginnings     Post-menopausal atrophic vaginitis      Thickened endometrium        Past Surgical History   Past Surgical History:   Procedure Laterality Date      SECTION       GYN SURGERY         Prior to Admission Medications   Prior to Admission Medications   Prescriptions Last Dose Informant Patient Reported? Taking?   Semaglutide, 1 MG/DOSE, (OZEMPIC, 1 MG/DOSE,) 4 MG/3ML SOPN 2023 Self No No   Sig: Inject 1 mg Subcutaneous once a week   aspirin (ASA) 81 MG chewable tablet 2023 at 0545 Self No Yes   Sig: Take 1 tablet (81 mg) by mouth daily   atorvastatin (LIPITOR) 40 MG tablet 2023 at 1900 Self No Yes   Sig: Take 1 tablet (40 mg) by mouth daily   blood glucose (NO BRAND SPECIFIED) test strip  Self No No   Sig: Use to test blood sugar 1 times daily or as directed.   blood glucose monitoring (NO BRAND SPECIFIED) meter device kit  Self No No   Sig: Use to test blood sugar one times daily or as directed.   cetirizine (ZYRTEC) 10 MG tablet 2023 at 2200 Self Yes Yes   Sig: Take 10 mg by mouth daily   dapagliflozin (FARXIGA) 10 MG TABS tablet 2023 at am Self No Yes   Sig: Take 1 tablet (10 mg) by mouth daily   diltiazem (CARDIZEM) 60 MG tablet 2023 at 0545 Self No Yes   Sig: Take 1 tablet (60 mg) by mouth 2 times daily   estradiol (ESTRACE) 0.1 MG/GM vaginal cream Past Week Self No Yes   Sig: Place 2 g vaginally twice a week   fenofibrate (TRIGLIDE/LOFIBRA) 160 MG tablet 2023 at 0545 Self No Yes   Sig: Take 1 tablet (160 mg) by mouth daily   gabapentin (NEURONTIN) 100 MG capsule 2023 at 2200 Self No Yes   Sig: TAKE 2 CAPSULES BY MOUTH AT BEDTIME   ipratropium (ATROVENT) 0.06 % nasal spray Past Month Self No Yes   Sig: Spray 2 sprays into both nostrils 4 times daily   losartan (COZAAR) 50 MG tablet 2023 at 0545 Self No Yes   Sig: Take 1 tablet (50  mg) by mouth daily   metFORMIN (GLUCOPHAGE) 500 MG tablet 6/8/2023 at pm Self No Yes   Sig: Take 2 tablets (1,000 mg) by mouth 2 times daily (with meals)   montelukast (SINGULAIR) 10 MG tablet 6/8/2023 at pm Self No Yes   Sig: Take 1 tablet (10 mg) by mouth At Bedtime   phenazopyridine (PYRIDIUM) 100 MG tablet Past Month Self No Yes   Sig: Take 1 tablet (100 mg) by mouth 3 times daily as needed for urinary tract discomfort      Facility-Administered Medications: None        Physical Exam   Vital Signs: Temp: 98  F (36.7  C) Temp src: Oral BP: 120/69 Pulse: 65   Resp: 16 SpO2: 96 % O2 Device: None (Room air)    Weight: 170 lbs 0 oz    Constitutional: Awake, alert, cooperative.  HEENT: Normocephalic.   Neck: ROM normal.  Pulmonary: No increased work of breathing, CTAB.  Cardiovascular: RRR  Abdominal: Soft. ND.  Musculoskletal: Full range of motion noted.  No LE edema.  Skin: Warm.   Neurological: Awake, alert, oriented to name, place and time.     Psych: Appropriate mood and affect.        Data   Imaging results reviewed over the past 24 hrs:   No results found for this or any previous visit (from the past 24 hour(s)).  Recent Labs   Lab 06/09/23  0740   WBC 5.3   HGB 14.5   MCV 90   *   INR 1.14      POTASSIUM 4.2   CHLORIDE 106   CO2 25   BUN 22.3   CR 0.68   ANIONGAP 11   JOAQUINA 9.3   *

## 2023-06-09 NOTE — DISCHARGE INSTRUCTIONS
Going Home after a Coronary Angiogram  ______________________________________________    After you go home:  Have an adult stay with you for 24 hours.  Drink plenty of fluids.  You may eat your normal diet, unless your doctor tells you otherwise.  For 24 hours:  Relax and take it easy.  Do NOT smoke.  Do NOT make any important or legal decisions.  Do NOT drive or operate machines at home or at work.  Do NOT drink alcohol.  Remove the Band-Aid after 24 hours. If there is minor oozing, apply another Band-aid and remove it after 12 hours.  For 2 days, do NOT have sex or do any heavy exercise.  Do NOT take a bath, or use a hot tub or pool for at least 3 days. You may shower.    Care of wrist or arm site  It is normal to have soreness at the puncture site and mild tingling in your hand for up to 3 days.  For 2 days, do not use your hand or arm to support your weight (such as rising from a chair) or bend your wrist (such as lifting a garage door).  For 2 days, do not lift more than 5 pounds or exercise your arm (tennis, golf or bowling).    If you start bleeding from the site in your arm:  Sit down and press firmly on the site with your fingers for 10 minutes. Call your doctor as soon as you can.  If the bleeding stops, sit still and keep your wrist straight for 2 hours.    Medicines  If you have started taking Plavix or Effient, do not stop taking it until you talk to your heart doctor (cardiologist).  If you are on metformin (Glucophage), do not restart it until you have blood tests (within 2 to 3 days after discharge). When your doctor tells you it is safe, you may restart the metformin.  If you have stopped any other medicines, check with your nurse or provider about when to restart them.    Call 911 right away if you have bleeding that is heavy or does not stop.    Call your doctor if:  You have a large or growing hard lump around the site.  The site is red, swollen, hot or tender.  Blood or fluid is draining from  the site.  You have chills or a fever greater than 101 F (38 C).  Your leg or arm feels numb or cool.  You have hives, a rash or unusual itching.  [facility] [place]:  [phone] (7 days a week)

## 2023-06-09 NOTE — PROGRESS NOTES
D/I/A: Pt roomed on 3C in bay 31.  Arrived via litter and accompanied by cath lab RN on monitor.  VSSA.  Rhythm upon arrival SR on monitor.  Denies pain or sob.  Reviewed activity restrictions and when to notify RN, ie-changes to breathing or increased chest pressure or chest pain.  CCL access:  Right TR band with 13 ml of air.  Deflation to start at 1230.  BG at arrival 88  P: Continue to monitor status.  Discharge to home once meeting criteria.

## 2023-06-09 NOTE — PROGRESS NOTES
Pt arrives to 2a, with spouse, for CORS. H&P needs to be updated. Consent needs to be signed. Labs resulted. Pt prepped.

## 2023-06-12 ENCOUNTER — MYC MEDICAL ADVICE (OUTPATIENT)
Dept: CARDIOLOGY | Facility: CLINIC | Age: 68
End: 2023-06-12
Payer: COMMERCIAL

## 2023-06-12 NOTE — TELEPHONE ENCOUNTER
Called pt to review angiogram findings and see how pt is doing post procedure/check on how her access site looks.    Pt did not answer, so sent pt a [employer] message (see below).

## 2023-06-14 LAB
ATRIAL RATE - MUSE: 66 BPM
DIASTOLIC BLOOD PRESSURE - MUSE: NORMAL MMHG
INTERPRETATION ECG - MUSE: NORMAL
P AXIS - MUSE: 57 DEGREES
PR INTERVAL - MUSE: 160 MS
QRS DURATION - MUSE: 150 MS
QT - MUSE: 448 MS
QTC - MUSE: 469 MS
R AXIS - MUSE: 265 DEGREES
SYSTOLIC BLOOD PRESSURE - MUSE: NORMAL MMHG
T AXIS - MUSE: 15 DEGREES
VENTRICULAR RATE- MUSE: 66 BPM

## 2023-06-26 ENCOUNTER — OFFICE VISIT (OUTPATIENT)
Dept: CARDIOLOGY | Facility: CLINIC | Age: 68
End: 2023-06-26
Attending: CASE MANAGER/CARE COORDINATOR
Payer: COMMERCIAL

## 2023-06-26 VITALS
DIASTOLIC BLOOD PRESSURE: 87 MMHG | BODY MASS INDEX: 27.27 KG/M2 | SYSTOLIC BLOOD PRESSURE: 143 MMHG | WEIGHT: 174.1 LBS | HEART RATE: 73 BPM | OXYGEN SATURATION: 98 %

## 2023-06-26 DIAGNOSIS — E78.5 HYPERLIPIDEMIA LDL GOAL <100: Chronic | ICD-10-CM

## 2023-06-26 DIAGNOSIS — I25.10 CORONARY ARTERY DISEASE INVOLVING NATIVE HEART WITHOUT ANGINA PECTORIS, UNSPECIFIED VESSEL OR LESION TYPE: Primary | ICD-10-CM

## 2023-06-26 DIAGNOSIS — I10 ESSENTIAL HYPERTENSION: ICD-10-CM

## 2023-06-26 DIAGNOSIS — I71.21 ANEURYSM OF ASCENDING AORTA WITHOUT RUPTURE (H): Chronic | ICD-10-CM

## 2023-06-26 PROCEDURE — 99214 OFFICE O/P EST MOD 30 MIN: CPT | Performed by: CASE MANAGER/CARE COORDINATOR

## 2023-06-26 PROCEDURE — G0463 HOSPITAL OUTPT CLINIC VISIT: HCPCS | Performed by: CASE MANAGER/CARE COORDINATOR

## 2023-06-26 RX ORDER — LOSARTAN POTASSIUM 50 MG/1
50 TABLET ORAL EVERY MORNING
Qty: 100 TABLET | Refills: 3 | COMMUNITY
Start: 2023-06-26 | End: 2024-04-10

## 2023-06-26 ASSESSMENT — PAIN SCALES - GENERAL: PAINLEVEL: NO PAIN (0)

## 2023-06-26 NOTE — PATIENT INSTRUCTIONS
Patient Instructions:  It was a pleasure to see you in the cardiology clinic today.      If you have any questions, call  Mignon Foster RN, at [phone].   [facility] Cardiology Clinics.  To schedule an appointment or to leave a message for your Care Team Press #1  If you are a physician calling for another physician Press #2  For Billing Press #3  For Medical Records Press #4  We are encouraging the use of [employer] to communicate with your HealthCare Provider    Note the new medications: increase the losartan to 75 mg by mouth daily  Stop the following medications: none    The results from today include: none  Please follow up with cardiology in one year with an ECHO & fasting labs      If you have an urgent need after hours (8:00 am to 4:30 pm) please call [phone] and ask for the cardiology fellow on call.

## 2023-06-26 NOTE — LETTER
6/26/2023      RE: Won Urena  [address]  [facility] [address]       Dear Colleague,    Thank you for the opportunity to participate in the care of your patient, Won Urena, at the [facility] [place] [facility]. Please see a copy of my visit note below.      [facility] - [facility] [place]   Cardiology Clinic Note      HPI:   Ms. Won Urena is a 68 year old female with medical history pertinent for multivessel CAD, HLD, HTN, aortic valve stenosis, ascending aortic aneurysm, and T2DM. She presents to cardiology clinic for follow up of coronary angiogram.    Today in clinic, Orlin notes overall feeling well. She denies chest pain, dizziness, syncope, or lower extremity edema. She notes occasional fluttering sensations in her chest often associated with anxiety, which usually resolve with deep breathing.     She occasionally checks her blood pressure at home and notes that it's occasionally 130-140/90.    PAST MEDICAL HISTORY:  Past Medical History:   Diagnosis Date    Abnormal Pap smear of cervix     Aortic stenosis     Aortic valve stenosis     Benign colon polyp     CAD (coronary artery disease)     Diabetes mellitus, type 2 (H)     Gestational diabetes     HTN (hypertension)     Hyperlipidemia LDL goal <100     Nonsenile cataract 2022    Very beginnings    Post-menopausal atrophic vaginitis     Thickened endometrium        FAMILY HISTORY:  Family History   Problem Relation Age of Onset    Lung Cancer Mother         Small cell, did smoked    Diabetes Mother     Hypertension Mother     Goiter Mother     Mental Illness Mother         likely bipolar    Coronary Artery Disease Mother         MI, smoked    Hyperlipidemia Mother     Thyroid Disease Mother         Goiter    Obesity Mother         most of family is obese    Glaucoma Father     Atrial fibrillation Father     Hypertension Father     Parkinsonism Father      Hyperlipidemia Father     Cancer Maternal Grandmother         gynecologic cancer, unknown type    Uterine Cancer Maternal Grandmother     Chronic Obstructive Pulmonary Disease Paternal Grandmother     Uterine Cancer Paternal Grandmother     Stomach Cancer Paternal Grandfather     Colon Cancer Paternal Grandfather     Hypertension Brother     Diabetes Sister     Breast Cancer Sister     Hypertension Sister     Bipolar Disorder Son     Mental Illness Son     Depression Son     Macular Degeneration No family hx of        SOCIAL HISTORY:  Social History     Socioeconomic History    Marital status:    Tobacco Use    Smoking status: Never    Smokeless tobacco: Never   Vaping Use    Vaping Use: Never used   Substance and Sexual Activity    Alcohol use: Yes     Alcohol/week: 1.0 standard drink of alcohol     Types: 1 Standard drinks or equivalent per week     Comment: less than 2 drinks a week    Drug use: Never    Sexual activity: Not Currently     Partners: Male     Birth control/protection: Post-menopausal   Social History Narrative    Lives with     Two son    One son, daughter-in-law, and grandson live in [place]    One son, daughter-in-law live in the same [facility] in [place], [place]       CURRENT MEDICATIONS:  aspirin (ASA) 81 MG chewable tablet, Take 1 tablet (81 mg) by mouth daily  atorvastatin (LIPITOR) 40 MG tablet, Take 1 tablet (40 mg) by mouth daily  blood glucose (NO BRAND SPECIFIED) test strip, Use to test blood sugar 1 times daily or as directed.  blood glucose monitoring (NO BRAND SPECIFIED) meter device kit, Use to test blood sugar one times daily or as directed.  cetirizine (ZYRTEC) 10 MG tablet, Take 10 mg by mouth daily  dapagliflozin (FARXIGA) 10 MG TABS tablet, Take 1 tablet (10 mg) by mouth daily  diltiazem (CARDIZEM) 60 MG tablet, Take 1 tablet (60 mg) by mouth 2 times daily  estradiol (ESTRACE) 0.1 MG/GM vaginal cream, Place 2 g vaginally twice a  week  fenofibrate (TRIGLIDE/LOFIBRA) 160 MG tablet, Take 1 tablet (160 mg) by mouth daily  gabapentin (NEURONTIN) 100 MG capsule, TAKE 2 CAPSULES BY MOUTH AT BEDTIME  ipratropium (ATROVENT) 0.06 % nasal spray, Spray 2 sprays into both nostrils 4 times daily  losartan (COZAAR) 50 MG tablet, Take 1 tablet (50 mg) by mouth daily  metFORMIN (GLUCOPHAGE) 500 MG tablet, Take 2 tablets (1,000 mg) by mouth 2 times daily (with meals)  montelukast (SINGULAIR) 10 MG tablet, Take 1 tablet (10 mg) by mouth At Bedtime  phenazopyridine (PYRIDIUM) 100 MG tablet, Take 1 tablet (100 mg) by mouth 3 times daily as needed for urinary tract discomfort  Semaglutide, 1 MG/DOSE, (OZEMPIC, 1 MG/DOSE,) 4 MG/3ML SOPN, Inject 1 mg Subcutaneous once a week    No current facility-administered medications on file prior to visit.      ROS:   Refer to HPI    EXAM:  BP (!) 143/87 (BP Location: Right arm, Patient Position: Chair, Cuff Size: Adult Regular)   Pulse 73   Wt 79 kg (174 lb 1.6 oz)   SpO2 98%   BMI 27.27 kg/m    GENERAL: Appears comfortable, in no acute distress.   HEENT: Eye symmetrical, no discharge or icterus bilaterally. Mucous membranes moist and without lesions.  CV: RRR, +S1S2, holosystolic murmur. JVP not elevated   RESPIRATORY: Respirations regular, even, and unlabored. Lungs CTA throughout.   GI: Soft and non distended with normoactive bowel sounds present in all quadrants. No tenderness, rebound, guarding.  EXTREMITIES: No peripheral edema. 2+ bilateral pedal pulses.   NEUROLOGIC: Alert and oriented x 3. No focal deficits.   MUSCULOSKELETAL: No joint swelling or tenderness.   SKIN: No jaundice. No rashes or lesions.     Labs, reviewed with patient in clinic today:  CBC RESULTS:  Lab Results   Component Value Date    WBC 5.3 06/09/2023    RBC 4.83 06/09/2023    HGB 14.5 06/09/2023    HCT 43.5 06/09/2023    MCV 90 06/09/2023    MCH 30.0 06/09/2023    MCHC 33.3 06/09/2023    RDW 13.1 06/09/2023     (L) 06/09/2023        CMP RESULTS:  Lab Results   Component Value Date     06/09/2023    POTASSIUM 4.2 06/09/2023    CHLORIDE 106 06/09/2023    CO2 25 06/09/2023    ANIONGAP 11 06/09/2023    GLC 88 06/09/2023     (H) 06/09/2023    BUN 22.3 06/09/2023    CR 0.68 06/09/2023    GFRESTIMATED >90 06/09/2023    JOAQUINA 9.3 06/09/2023    BILITOTAL 0.4 04/24/2023    ALBUMIN 4.7 04/24/2023    ALKPHOS 48 04/24/2023    ALT 12 04/24/2023    AST 21 04/24/2023        INR RESULTS:  Lab Results   Component Value Date    INR 1.14 06/09/2023       No results found for: MAG  No results found for: NTBNPI  No results found for: NTBNP    EKG:    Echocardiogram 4/24/23:  Procedure  Echocardiogram with two-dimensional, color and spectral Doppler performed.  ______________________________________________________________________________  Interpretation Summary  Global and regional left ventricular function is hyperkinetic with an EF >70%.  Right ventricular function, chamber size, wall motion, and thickness are  normal.  Moderate aortic stenosis is present.  Pulmonary artery systolic pressure is normal.  Ascending aorta 3.9 cm.  Mild dilatation of the aorta is present.  The inferior vena cava is normal.  No pericardial effusion is present.  ______________________________________________________________________________  Left Ventricle  Global and regional left ventricular function is hyperkinetic with an EF >70%.  Left ventricular size is normal. Mild concentric wall thickening consistent  with left ventricular hypertrophy is present. Left ventricular diastolic  function is indeterminate. Diastolic Doppler findings (E/E' ratio and/or other  parameters) suggest left ventricular filling pressures are increased. No  regional wall motion abnormalities are seen.     Right Ventricle  Right ventricular function, chamber size, wall motion, and thickness are  normal.     Atria  Both atria appear normal.     Mitral Valve  Mild to moderate mitral annular  calcification is present. Mild mitral  insufficiency is present.     Aortic Valve  Moderate aortic valve calcification is present. Trace aortic insufficiency is  present. The mean AoV pressure gradient is 29.2 mmHg. The calculated aortic  valve are is 1.2 cm^2. Moderate aortic stenosis is present.     Tricuspid Valve  The tricuspid valve is normal. Trace tricuspid insufficiency is present. The  right ventricular systolic pressure is approximated at 20.6 mmHg plus the  right atrial pressure. Pulmonary artery systolic pressure is normal.     Pulmonic Valve  The pulmonic valve is normal.     Vessels  The pulmonary artery and bifurcation cannot be assessed. The inferior vena  cava is normal. Ascending aorta 3.9 cm. Mild dilatation of the aorta is  present.     Pericardium  No pericardial effusion is present.     ______________________________________________________________________________  MMode/2D Measurements & Calculations  IVSd: 1.3 cm  LVIDd: 4.3 cm  LVIDs: 2.5 cm  LVPWd: 1.3 cm  FS: 41.4 %  LV mass(C)d: 205.6 grams  LV mass(C)dI: 108.2 grams/m2     Ao root diam: 3.4 cm  asc Aorta Diam: 3.9 cm  LVOT diam: 1.8 cm  LVOT area: 2.7 cm2  LA Volume (BP): 59.6 ml  LA Volume Index (BP): 31.4 ml/m2  RV Base: 3.4 cm  RWT: 0.60  TAPSE: 2.6 cm     Doppler Measurements & Calculations  MV E max kevin: 105.0 cm/sec  MV A max kevin: 156.1 cm/sec  MV E/A: 0.67  MV dec slope: 369.1 cm/sec2  MV dec time: 0.29 sec  Ao V2 max: 372.8 cm/sec  Ao max P.6 mmHg  Ao V2 mean: 250.7 cm/sec  Ao mean P.2 mmHg  Ao V2 VTI: 83.0 cm  HOWARD(I,D): 1.2 cm2  HOWARD(V,D): 1.1 cm2  LV V1 max P.5 mmHg  LV V1 max: 154.2 cm/sec  LV V1 VTI: 36.3 cm  SV(LVOT): 97.6 ml  SI(LVOT): 51.3 ml/m2  PA acc time: 0.13 sec  TR max kevin: 227.2 cm/sec  TR max P.6 mmHg  AV Kevin Ratio (DI): 0.41  HOWARD Index (cm2/m2): 0.62  E/E' av.3  Lateral E/e': 12.5  Medial E/e': 16.1  RV S Kevin: 13.9 cm/sec    CT Angiogram 23:    IMPRESSION:  1.  Limited luminal  assessment of the mid LAD mid LCx due to densely  calcified plaque but likely representing severe stenosis in the mid  LAD and mid LCx territories. Consider coronary angiogram if clinically  appropriate for further evaluation.   2.  Hemodynamically significant coronary stenosis in the mid to distal  LAD and distal LCx based on FFR-CT.  3.  Total Agatston score 2720 placing the patient in the 99th  percentile when compared to age and gender matched control group.  4.  Please review the separate Radiology report for incidental  noncardiac findings. This report will follow separately.     FINDINGS:     CORONARY CALCIUM SCORE     Total Agatston calcium score: 2720   Left main: 0  Left anterior descendin  Left circumflex: 1197  Right coronary artery: 341   This places the patient in the 99th  percentile when compared to age  and gender matched control group.     CORONARY ANGIOGRAPHY     DOMINANCE: Right dominant system.   Normal coronary origins and course.     LEFT MAIN:   The left main arises normally from the left coronary cusp and has a  mild (25-49%) stenosis composed of calcified plaque.     LEFT ANTERIOR DESCENDING:      Proximal LAD: Mild (25-49%) stenosis composed of calcified plaque.  Mid LAD: Severe (>70%) stenosis composed of calcified plaque. Limited  luminal assessment due to densely calcified plaque.   Distal LAD: Mild (25-49%) stenosis composed of mixed plaque.  First diagonal: Moderate (50-69%) stenosis composed of calcified  plaque.     LEFT CIRCUMFLEX:      Proximal LCX:Moderate (50-69%) stenosis composed of calcified plaque.  Mid LCX: Severe (>70%) stenosis composed of calcified plaque. Non  diagnostic images due to dense calcifications  Distal LCX: Severe (>70%) stenosis composed of noncalcified plaque.     RIGHT CORONARY ARTERY:      Proximal RCA: Mild (25-49%) stenosis composed of calcified plaque.  Mid RCA: Mild (25-49%) stenosis composed of noncalcified plaque.  Distal RCA: Mild (25-49%)  stenosis composed of noncalcified plaque.  The remainder of the right coronary and the posterior descending  artery are patent with minimal luminal irregularities.     TECHNIQUE:  Fractional Flow Reserve (FFR) CT was performed offline by [employer] on  the original CTA dataset. Diagrammatic representation of the FFR-CT  analysis is provided in a separate PDF document in PACS/EPIC. See  coronary CT results for detailed anatomic assessment.     IMPRESSION:  Hemodynamically significant coronary stenosis in the mid to distal LAD  and distal LCx.     FINDINGS:  1.  LAD: 0.63  2.  LCX: 0.51  3.  RCA: 0.88     Normal FFR range is >0.8.    Coronary Angiogram 6/9/23:    Pre Procedure Diagnosis    stable known CAD       Conclusion         3rd Mrg lesion is 30% stenosed.    1st Diag lesion is 50% stenosed.    Prox RCA to Dist RCA lesion is 40% stenosed.    Prox LAD to Mid LAD lesion is 60% stenosed.    Prox Cx to Dist Cx lesion is 60% stenosed.     Moderate diffuse disease of the LAD and Lcx (Borderline Pd/Pa 0.92, dPR 0.88 for both lesions). Plan for medical management given lack of clinical symptoms.  The lesion are long, moderate and calcified.  Mild non-obstructive RCA disease.         Plan     Follow bedrest per protocol   Continued medical management and lifestyle modifications for cardiovascular risk factor optimizations.   Discharge today per protocol     Recommendations    General Recommendations:  - Patient given specific instructions regarding care of arteriotomy site, activity restrictions, signs and symptoms of cardiac or vascular complications and to seek immediate medical evaluation should they occur.   - Recommended follow up with doctor Dr. Matson.   - Arterial sheath removed from radial artery with TR Band placement.       Medications:  - Continue high dose statin therapy indefinitely.   - Risk factor management for atherosclerosis.     Coronary Findings    Diagnostic  Dominance: Right  Left Main   The vessel  "was visualized by selective angiography, is moderate in size and is angiographically normal.      Left Anterior Descending   The vessel is moderate in size.   Prox LAD to Mid LAD lesion is 60% stenosed. 60% diffuse LAD disease was visualized on diagnostic angiography. A 6 Fr XB 3.5 GC was used to engage the LM. Heparin was given to keep ACT >250. A 0.014\" Opsens was normalized in the LM and wired past the disease into the distal LAD. Pd/Pa was 0.92 and iFR was 0.88; given the diffuse nature of the disease and lack of clinical symptoms, no intervention was performed. Wire was removed and final angiography showed TESSY 3 flow and no evidence of dissection or perforation.      First Diagonal Branch   The vessel is large.   1st Diag lesion is 50% stenosed.      First Septal Branch   The vessel is small and is angiographically normal.      Second Septal Branch   The vessel is small and is angiographically normal.      Left Circumflex   Prox Cx to Dist Cx lesion is 60% stenosed. 60% diffuse LCx disease was visualized on diagnostic angiography. A 6 Fr XB 3.5 GC was used to engage the LM. Heparin was given to keep ACT >250. A 0.014\" Opsens was normalized in the LM and wired pastthe lesion into the distal LCx. Pd/Pa was 0.92 and iFR was 0.88; given the diffuse nature of the disease and lack of clinical symptoms, no intervention was performed. Wire was removed and final angiography showed TESSY 3 flow and no evidence ofdissection or perforation.      First Obtuse Marginal Branch   The vessel is small. There is mild diffuse disease throughout the vessel.      Second Obtuse Marginal Branch   The vessel is small and is angiographically normal.      Third Obtuse Marginal Branch   The vessel is moderate in size.   3rd Mrg lesion is 30% stenosed.      Right Coronary Artery   The vessel was visualized by selective angiography and is moderate in size.   Prox RCA to Dist RCA lesion is 40% stenosed.      Acute Marginal Branch   The " vessel is small.      Right Ventricular Branch   The vessel is small.      Right Posterior Descending Artery   The vessel is small and is angiographically normal.      Right Posterior Atrioventricular Artery   The vessel is moderate in size. There is mild diffuse disease throughout the vessel.      First Right Posterolateral Branch   The vessel is small and is angiographically normal.      Second Right Posterolateral Branch   The vessel is small and is angiographically normal.         Intervention     No interventions have been documented.         Assessment and Plan:   Ms. Urena is a 68 year old female with a PMH of multivessel CAD, HLD, HTN, aortic valve stenosis, ascending aortic aneurysm, and T2DM.    # Multivessel CAD  # HLD  Recent angiogram showed moderate diffuse disease of the LAD and Lcx (Borderline Pd/Pa 0.92, dPR 0.88 for both lesions), and mild non-obstructive RCA disease. Discussed with patient that there was no indication for stent placement, that that lesions will be medically managed.  - Continue atorvastatin 40mg daily  - Continue aspirin 81mg daily    # Ascending aortic aneurysm  # Aortic valve stenosis  Recent echocardiogram measured ascending aorta diameter at 3.9 cm. Mean AoV pressure gradient is 29.2 mmHg. The calculated aortic valve area is 1.2 cm^2.  She is not symptomatic, we discussed the importance of annual monitoring and valve replacements when pts start to become symptomatic.   - Annual echo monitoring     # HTN  Won notes that her BP at home occasionally creeps up to 130-140/90s. Hypertensive in clinic today.  - Increase losartan to 75mg daily  - Diltiazem 60mg BID    # DM2  - Farxiga 10mg dialy  - Metformin 1000mg daily  - Ozempic 1mg subcutaneous weekly      Follow up: 1 year with echo prior    Chart review time today: 10 minutes  Visit time today: 25 minutes  Total time spent today: 35 minutes        MIKAEL JULES CNP  General Cardiology   06/26/23

## 2023-06-26 NOTE — NURSING NOTE
Chief Complaint   Patient presents with     Follow Up     hospital follow up for coronary angiogram     Vitals were taken and medications reconciled.    John Lima, EMT  3:48 PM

## 2023-06-26 NOTE — PROGRESS NOTES
[facility] Cardiology - [facility] [place]   Cardiology Clinic Note      HPI:   Ms. Won Urena is a 68 year old female with medical history pertinent for multivessel CAD, HLD, HTN, aortic valve stenosis, ascending aortic aneurysm, and T2DM. She presents to cardiology clinic for follow up of coronary angiogram.    Today in clinic, Orlin notes overall feeling well. She denies chest pain, dizziness, syncope, or lower extremity edema. She notes occasional fluttering sensations in her chest often associated with anxiety, which usually resolve with deep breathing.     She occasionally checks her blood pressure at home and notes that it's occasionally 130-140/90.    PAST MEDICAL HISTORY:  Past Medical History:   Diagnosis Date     Abnormal Pap smear of cervix      Aortic stenosis      Aortic valve stenosis      Benign colon polyp      CAD (coronary artery disease)      Diabetes mellitus, type 2 (H)      Gestational diabetes      HTN (hypertension)      Hyperlipidemia LDL goal <100      Nonsenile cataract 2022    Very beginnings     Post-menopausal atrophic vaginitis      Thickened endometrium        FAMILY HISTORY:  Family History   Problem Relation Age of Onset     Lung Cancer Mother         Small cell, did smoked     Diabetes Mother      Hypertension Mother      Goiter Mother      Mental Illness Mother         likely bipolar     Coronary Artery Disease Mother         MI, smoked     Hyperlipidemia Mother      Thyroid Disease Mother         Goiter     Obesity Mother         most of family is obese     Glaucoma Father      Atrial fibrillation Father      Hypertension Father      Parkinsonism Father      Hyperlipidemia Father      Cancer Maternal Grandmother         gynecologic cancer, unknown type     Uterine Cancer Maternal Grandmother      Chronic Obstructive Pulmonary Disease Paternal Grandmother      Uterine Cancer Paternal Grandmother      Stomach Cancer Paternal Grandfather      Colon Cancer Paternal Grandfather       Hypertension Brother      Diabetes Sister      Breast Cancer Sister      Hypertension Sister      Bipolar Disorder Son      Mental Illness Son      Depression Son      Macular Degeneration No family hx of        SOCIAL HISTORY:  Social History     Socioeconomic History     Marital status:    Tobacco Use     Smoking status: Never     Smokeless tobacco: Never   Vaping Use     Vaping Use: Never used   Substance and Sexual Activity     Alcohol use: Yes     Alcohol/week: 1.0 standard drink of alcohol     Types: 1 Standard drinks or equivalent per week     Comment: less than 2 drinks a week     Drug use: Never     Sexual activity: Not Currently     Partners: Male     Birth control/protection: Post-menopausal   Social History Narrative    Lives with     Two son    One son, daughter-in-law, and grandson live in [place]    One son, daughter-in-law live in the same [facility] in [place], [place]       CURRENT MEDICATIONS:  aspirin (ASA) 81 MG chewable tablet, Take 1 tablet (81 mg) by mouth daily  atorvastatin (LIPITOR) 40 MG tablet, Take 1 tablet (40 mg) by mouth daily  blood glucose (NO BRAND SPECIFIED) test strip, Use to test blood sugar 1 times daily or as directed.  blood glucose monitoring (NO BRAND SPECIFIED) meter device kit, Use to test blood sugar one times daily or as directed.  cetirizine (ZYRTEC) 10 MG tablet, Take 10 mg by mouth daily  dapagliflozin (FARXIGA) 10 MG TABS tablet, Take 1 tablet (10 mg) by mouth daily  diltiazem (CARDIZEM) 60 MG tablet, Take 1 tablet (60 mg) by mouth 2 times daily  estradiol (ESTRACE) 0.1 MG/GM vaginal cream, Place 2 g vaginally twice a week  fenofibrate (TRIGLIDE/LOFIBRA) 160 MG tablet, Take 1 tablet (160 mg) by mouth daily  gabapentin (NEURONTIN) 100 MG capsule, TAKE 2 CAPSULES BY MOUTH AT BEDTIME  ipratropium (ATROVENT) 0.06 % nasal spray, Spray 2 sprays into both nostrils 4 times daily  losartan (COZAAR) 50 MG tablet, Take 1 tablet (50 mg) by  mouth daily  metFORMIN (GLUCOPHAGE) 500 MG tablet, Take 2 tablets (1,000 mg) by mouth 2 times daily (with meals)  montelukast (SINGULAIR) 10 MG tablet, Take 1 tablet (10 mg) by mouth At Bedtime  phenazopyridine (PYRIDIUM) 100 MG tablet, Take 1 tablet (100 mg) by mouth 3 times daily as needed for urinary tract discomfort  Semaglutide, 1 MG/DOSE, (OZEMPIC, 1 MG/DOSE,) 4 MG/3ML SOPN, Inject 1 mg Subcutaneous once a week    No current facility-administered medications on file prior to visit.      ROS:   Refer to HPI    EXAM:  BP (!) 143/87 (BP Location: Right arm, Patient Position: Chair, Cuff Size: Adult Regular)   Pulse 73   Wt 79 kg (174 lb 1.6 oz)   SpO2 98%   BMI 27.27 kg/m    GENERAL: Appears comfortable, in no acute distress.   HEENT: Eye symmetrical, no discharge or icterus bilaterally. Mucous membranes moist and without lesions.  CV: RRR, +S1S2, holosystolic murmur. JVP not elevated   RESPIRATORY: Respirations regular, even, and unlabored. Lungs CTA throughout.   GI: Soft and non distended with normoactive bowel sounds present in all quadrants. No tenderness, rebound, guarding.  EXTREMITIES: No peripheral edema. 2+ bilateral pedal pulses.   NEUROLOGIC: Alert and oriented x 3. No focal deficits.   MUSCULOSKELETAL: No joint swelling or tenderness.   SKIN: No jaundice. No rashes or lesions.     Labs, reviewed with patient in clinic today:  CBC RESULTS:  Lab Results   Component Value Date    WBC 5.3 06/09/2023    RBC 4.83 06/09/2023    HGB 14.5 06/09/2023    HCT 43.5 06/09/2023    MCV 90 06/09/2023    MCH 30.0 06/09/2023    MCHC 33.3 06/09/2023    RDW 13.1 06/09/2023     (L) 06/09/2023       CMP RESULTS:  Lab Results   Component Value Date     06/09/2023    POTASSIUM 4.2 06/09/2023    CHLORIDE 106 06/09/2023    CO2 25 06/09/2023    ANIONGAP 11 06/09/2023    GLC 88 06/09/2023     (H) 06/09/2023    BUN 22.3 06/09/2023    CR 0.68 06/09/2023    GFRESTIMATED >90 06/09/2023    JOAQUINA 9.3 06/09/2023     BILITOTAL 0.4 04/24/2023    ALBUMIN 4.7 04/24/2023    ALKPHOS 48 04/24/2023    ALT 12 04/24/2023    AST 21 04/24/2023        INR RESULTS:  Lab Results   Component Value Date    INR 1.14 06/09/2023       No results found for: MAG  No results found for: NTBNPI  No results found for: NTBNP    EKG:    Echocardiogram 4/24/23:  Procedure  Echocardiogram with two-dimensional, color and spectral Doppler performed.  ______________________________________________________________________________  Interpretation Summary  Global and regional left ventricular function is hyperkinetic with an EF >70%.  Right ventricular function, chamber size, wall motion, and thickness are  normal.  Moderate aortic stenosis is present.  Pulmonary artery systolic pressure is normal.  Ascending aorta 3.9 cm.  Mild dilatation of the aorta is present.  The inferior vena cava is normal.  No pericardial effusion is present.  ______________________________________________________________________________  Left Ventricle  Global and regional left ventricular function is hyperkinetic with an EF >70%.  Left ventricular size is normal. Mild concentric wall thickening consistent  with left ventricular hypertrophy is present. Left ventricular diastolic  function is indeterminate. Diastolic Doppler findings (E/E' ratio and/or other  parameters) suggest left ventricular filling pressures are increased. No  regional wall motion abnormalities are seen.     Right Ventricle  Right ventricular function, chamber size, wall motion, and thickness are  normal.     Atria  Both atria appear normal.     Mitral Valve  Mild to moderate mitral annular calcification is present. Mild mitral  insufficiency is present.     Aortic Valve  Moderate aortic valve calcification is present. Trace aortic insufficiency is  present. The mean AoV pressure gradient is 29.2 mmHg. The calculated aortic  valve are is 1.2 cm^2. Moderate aortic stenosis is present.     Tricuspid Valve  The  tricuspid valve is normal. Trace tricuspid insufficiency is present. The  right ventricular systolic pressure is approximated at 20.6 mmHg plus the  right atrial pressure. Pulmonary artery systolic pressure is normal.     Pulmonic Valve  The pulmonic valve is normal.     Vessels  The pulmonary artery and bifurcation cannot be assessed. The inferior vena  cava is normal. Ascending aorta 3.9 cm. Mild dilatation of the aorta is  present.     Pericardium  No pericardial effusion is present.     ______________________________________________________________________________  MMode/2D Measurements & Calculations  IVSd: 1.3 cm  LVIDd: 4.3 cm  LVIDs: 2.5 cm  LVPWd: 1.3 cm  FS: 41.4 %  LV mass(C)d: 205.6 grams  LV mass(C)dI: 108.2 grams/m2     Ao root diam: 3.4 cm  asc Aorta Diam: 3.9 cm  LVOT diam: 1.8 cm  LVOT area: 2.7 cm2  LA Volume (BP): 59.6 ml  LA Volume Index (BP): 31.4 ml/m2  RV Base: 3.4 cm  RWT: 0.60  TAPSE: 2.6 cm     Doppler Measurements & Calculations  MV E max kevin: 105.0 cm/sec  MV A max kevin: 156.1 cm/sec  MV E/A: 0.67  MV dec slope: 369.1 cm/sec2  MV dec time: 0.29 sec  Ao V2 max: 372.8 cm/sec  Ao max P.6 mmHg  Ao V2 mean: 250.7 cm/sec  Ao mean P.2 mmHg  Ao V2 VTI: 83.0 cm  HOWARD(I,D): 1.2 cm2  HOWARD(V,D): 1.1 cm2  LV V1 max P.5 mmHg  LV V1 max: 154.2 cm/sec  LV V1 VTI: 36.3 cm  SV(LVOT): 97.6 ml  SI(LVOT): 51.3 ml/m2  PA acc time: 0.13 sec  TR max keivn: 227.2 cm/sec  TR max P.6 mmHg  AV Kevin Ratio (DI): 0.41  HOWARD Index (cm2/m2): 0.62  E/E' av.3  Lateral E/e': 12.5  Medial E/e': 16.1  RV S Kevin: 13.9 cm/sec    CT Angiogram 23:    IMPRESSION:  1.  Limited luminal assessment of the mid LAD mid LCx due to densely  calcified plaque but likely representing severe stenosis in the mid  LAD and mid LCx territories. Consider coronary angiogram if clinically  appropriate for further evaluation.   2.  Hemodynamically significant coronary stenosis in the mid to distal  LAD and distal LCx based on  FFR-CT.  3.  Total Agatston score 2720 placing the patient in the 99th  percentile when compared to age and gender matched control group.  4.  Please review the separate Radiology report for incidental  noncardiac findings. This report will follow separately.     FINDINGS:     CORONARY CALCIUM SCORE     Total Agatston calcium score: 2720   Left main: 0  Left anterior descendin  Left circumflex: 1197  Right coronary artery: 341   This places the patient in the 99th  percentile when compared to age  and gender matched control group.     CORONARY ANGIOGRAPHY     DOMINANCE: Right dominant system.   Normal coronary origins and course.     LEFT MAIN:   The left main arises normally from the left coronary cusp and has a  mild (25-49%) stenosis composed of calcified plaque.     LEFT ANTERIOR DESCENDING:      Proximal LAD: Mild (25-49%) stenosis composed of calcified plaque.  Mid LAD: Severe (>70%) stenosis composed of calcified plaque. Limited  luminal assessment due to densely calcified plaque.   Distal LAD: Mild (25-49%) stenosis composed of mixed plaque.  First diagonal: Moderate (50-69%) stenosis composed of calcified  plaque.     LEFT CIRCUMFLEX:      Proximal LCX:Moderate (50-69%) stenosis composed of calcified plaque.  Mid LCX: Severe (>70%) stenosis composed of calcified plaque. Non  diagnostic images due to dense calcifications  Distal LCX: Severe (>70%) stenosis composed of noncalcified plaque.     RIGHT CORONARY ARTERY:      Proximal RCA: Mild (25-49%) stenosis composed of calcified plaque.  Mid RCA: Mild (25-49%) stenosis composed of noncalcified plaque.  Distal RCA: Mild (25-49%) stenosis composed of noncalcified plaque.  The remainder of the right coronary and the posterior descending  artery are patent with minimal luminal irregularities.     TECHNIQUE:  Fractional Flow Reserve (FFR) CT was performed offline by [email or web address] on  the original CTA dataset. Diagrammatic representation of the FFR-CT  analysis  is provided in a separate PDF document in PACS/EPIC. See  coronary CT results for detailed anatomic assessment.     IMPRESSION:  Hemodynamically significant coronary stenosis in the mid to distal LAD  and distal LCx.     FINDINGS:  1.  LAD: 0.63  2.  LCX: 0.51  3.  RCA: 0.88     Normal FFR range is >0.8.    Coronary Angiogram 6/9/23:    Pre Procedure Diagnosis    stable known CAD       Conclusion          3rd Mrg lesion is 30% stenosed.     1st Diag lesion is 50% stenosed.     Prox RCA to Dist RCA lesion is 40% stenosed.     Prox LAD to Mid LAD lesion is 60% stenosed.     Prox Cx to Dist Cx lesion is 60% stenosed.     1. Moderate diffuse disease of the LAD and Lcx (Borderline Pd/Pa 0.92, dPR 0.88 for both lesions). Plan for medical management given lack of clinical symptoms.  The lesion are long, moderate and calcified.  2. Mild non-obstructive RCA disease.         Plan      Follow bedrest per protocol    Continued medical management and lifestyle modifications for cardiovascular risk factor optimizations.    Discharge today per protocol     Recommendations    General Recommendations:  - Patient given specific instructions regarding care of arteriotomy site, activity restrictions, signs and symptoms of cardiac or vascular complications and to seek immediate medical evaluation should they occur.   - Recommended follow up with doctor Dr. Matson.   - Arterial sheath removed from radial artery with TR Band placement.       Medications:  - Continue high dose statin therapy indefinitely.   - Risk factor management for atherosclerosis.     Coronary Findings    Diagnostic  Dominance: Right  Left Main   The vessel was visualized by selective angiography, is moderate in size and is angiographically normal.      Left Anterior Descending   The vessel is moderate in size.   Prox LAD to Mid LAD lesion is 60% stenosed. 60% diffuse LAD disease was visualized on diagnostic angiography. A 6 Fr XB 3.5 GC was used to engage the LM.  "Heparin was given to keep ACT >250. A 0.014\" Opsens was normalized in the LM and wired past the disease into the distal LAD. Pd/Pa was 0.92 and iFR was 0.88; given the diffuse nature of the disease and lack of clinical symptoms, no intervention was performed. Wire was removed and final angiography showed TESSY 3 flow and no evidence of dissection or perforation.      First Diagonal Branch   The vessel is large.   1st Diag lesion is 50% stenosed.      First Septal Branch   The vessel is small and is angiographically normal.      Second Septal Branch   The vessel is small and is angiographically normal.      Left Circumflex   Prox Cx to Dist Cx lesion is 60% stenosed. 60% diffuse LCx disease was visualized on diagnostic angiography. A 6 Fr XB 3.5 GC was used to engage the LM. Heparin was given to keep ACT >250. A 0.014\" Opsens was normalized in the LM and wired pastthe lesion into the distal LCx. Pd/Pa was 0.92 and iFR was 0.88; given the diffuse nature of the disease and lack of clinical symptoms, no intervention was performed. Wire was removed and final angiography showed TESSY 3 flow and no evidence ofdissection or perforation.      First Obtuse Marginal Branch   The vessel is small. There is mild diffuse disease throughout the vessel.      Second Obtuse Marginal Branch   The vessel is small and is angiographically normal.      Third Obtuse Marginal Branch   The vessel is moderate in size.   3rd Mrg lesion is 30% stenosed.      Right Coronary Artery   The vessel was visualized by selective angiography and is moderate in size.   Prox RCA to Dist RCA lesion is 40% stenosed.      Acute Marginal Branch   The vessel is small.      Right Ventricular Branch   The vessel is small.      Right Posterior Descending Artery   The vessel is small and is angiographically normal.      Right Posterior Atrioventricular Artery   The vessel is moderate in size. There is mild diffuse disease throughout the vessel.      First Right " Posterolateral Branch   The vessel is small and is angiographically normal.      Second Right Posterolateral Branch   The vessel is small and is angiographically normal.         Intervention     No interventions have been documented.         Assessment and Plan:   Ms. Urena is a 68 year old female with a PMH of multivessel CAD, HLD, HTN, aortic valve stenosis, ascending aortic aneurysm, and T2DM.    # Multivessel CAD  # HLD  Recent angiogram showed moderate diffuse disease of the LAD and Lcx (Borderline Pd/Pa 0.92, dPR 0.88 for both lesions), and mild non-obstructive RCA disease. Discussed with patient that there was no indication for stent placement, that that lesions will be medically managed.  - Continue atorvastatin 40mg daily  - Continue aspirin 81mg daily    # Ascending aortic aneurysm  # Aortic valve stenosis  Recent echocardiogram measured ascending aorta diameter at 3.9 cm. Mean AoV pressure gradient is 29.2 mmHg. The calculated aortic valve area is 1.2 cm^2.  She is not symptomatic, we discussed the importance of annual monitoring and valve replacements when pts start to become symptomatic.   - Annual echo monitoring     # HTN  Won notes that her BP at home occasionally creeps up to 130-140/90s. Hypertensive in clinic today.  - Increase losartan to 75mg daily  - Diltiazem 60mg BID    # DM2  - Farxiga 10mg dialy  - Metformin 1000mg daily  - Ozempic 1mg subcutaneous weekly      Follow up: 1 year with echo prior    Chart review time today: 10 minutes  Visit time today: 25 minutes  Total time spent today: 35 minutes        MIKAEL JULES CNP  General Cardiology   06/26/23

## 2023-07-10 ENCOUNTER — ANCILLARY PROCEDURE (OUTPATIENT)
Dept: MAMMOGRAPHY | Facility: CLINIC | Age: 68
End: 2023-07-10
Attending: FAMILY MEDICINE
Payer: COMMERCIAL

## 2023-07-10 DIAGNOSIS — Z12.31 VISIT FOR SCREENING MAMMOGRAM: ICD-10-CM

## 2023-07-10 PROCEDURE — 77063 BREAST TOMOSYNTHESIS BI: CPT | Mod: 26 | Performed by: STUDENT IN AN ORGANIZED HEALTH CARE EDUCATION/TRAINING PROGRAM

## 2023-07-10 PROCEDURE — 77067 SCR MAMMO BI INCL CAD: CPT | Mod: 26 | Performed by: STUDENT IN AN ORGANIZED HEALTH CARE EDUCATION/TRAINING PROGRAM

## 2023-07-10 PROCEDURE — 77067 SCR MAMMO BI INCL CAD: CPT

## 2023-07-11 ENCOUNTER — TELEPHONE (OUTPATIENT)
Dept: NURSING | Facility: CLINIC | Age: 68
End: 2023-07-11
Payer: COMMERCIAL

## 2023-07-11 NOTE — TELEPHONE ENCOUNTER
Patient called and M for writer wanting to schedule surgery with Dr. Marti.     Shari Garrido on 7/11/2023 at 11:50 AM

## 2023-07-12 PROBLEM — H02.834 DERMATOCHALASIS OF BOTH UPPER EYELIDS: Status: ACTIVE | Noted: 2023-05-16

## 2023-07-12 PROBLEM — H02.403 PTOSIS OF BOTH EYELIDS: Status: ACTIVE | Noted: 2023-05-16

## 2023-07-12 PROBLEM — H02.831 DERMATOCHALASIS OF BOTH UPPER EYELIDS: Status: ACTIVE | Noted: 2023-05-16

## 2023-07-12 NOTE — TELEPHONE ENCOUNTER
Date Scheduled: 9-14-23  Facility: Surgery Locations: [facility]  Surgeon: Dr. Marti   Post-op appointment scheduled: 9-26-23 9:15am [facility]   scheduled?: No  Surgery packet/instructions confirmed received?  Yes  Pre op physical/PAC appointment:   Special Considerations:     Patient is interested in doing the lower eyelid blepharoplasty.      Abril Buckley  Surgery Scheduling Coordinator  [phone]

## 2023-07-19 ENCOUNTER — TELEPHONE (OUTPATIENT)
Dept: CARDIOLOGY | Facility: CLINIC | Age: 68
End: 2023-07-19
Payer: COMMERCIAL

## 2023-07-19 NOTE — TELEPHONE ENCOUNTER
Messaged patient to review recent elevated blood pressure reading.  Per [place] Community Me[place] guidelines, patients blood pressure is out of parameters and recheck blood pressure is recommended.    Last Blood Pressure: 6/26/23  Date: 143/87    Recent Blood Pressure: 124/82  Date: 7/17/23    Patient reported blood pressure updated in Epic. Blood pressure falls within [place] Community Measures guidelines.  Patient will follow up as previously advised.

## 2023-07-24 ENCOUNTER — OFFICE VISIT (OUTPATIENT)
Dept: CARDIOLOGY | Facility: CLINIC | Age: 68
End: 2023-07-24
Attending: INTERNAL MEDICINE
Payer: COMMERCIAL

## 2023-07-24 ENCOUNTER — NURSE TRIAGE (OUTPATIENT)
Dept: NURSING | Facility: CLINIC | Age: 68
End: 2023-07-24

## 2023-07-24 VITALS
BODY MASS INDEX: 26.8 KG/M2 | SYSTOLIC BLOOD PRESSURE: 96 MMHG | WEIGHT: 176.8 LBS | HEART RATE: 67 BPM | OXYGEN SATURATION: 96 % | DIASTOLIC BLOOD PRESSURE: 65 MMHG | HEIGHT: 68 IN

## 2023-07-24 DIAGNOSIS — I47.10 SVT (SUPRAVENTRICULAR TACHYCARDIA) (H): Primary | ICD-10-CM

## 2023-07-24 PROCEDURE — G0463 HOSPITAL OUTPT CLINIC VISIT: HCPCS | Performed by: INTERNAL MEDICINE

## 2023-07-24 PROCEDURE — 93010 ELECTROCARDIOGRAM REPORT: CPT | Performed by: INTERNAL MEDICINE

## 2023-07-24 PROCEDURE — 93005 ELECTROCARDIOGRAM TRACING: CPT

## 2023-07-24 PROCEDURE — 99214 OFFICE O/P EST MOD 30 MIN: CPT | Performed by: INTERNAL MEDICINE

## 2023-07-24 ASSESSMENT — PAIN SCALES - GENERAL: PAINLEVEL: MILD PAIN (2)

## 2023-07-24 NOTE — PROGRESS NOTES
HPI:   Ms. Won Urena is a 68 year old female with a PMH of multivessel CAD, HLD, HTN, aortic valve stenosis, ascending aortic aneurysm, T2DM and SVT.  She was referred for an EP evaluation.    She felt palpitation lasting for 5-10 min once a month.  She complained of chest pressure and denied SOB and dizziness.    PAST MEDICAL HISTORY:  Past Medical History:   Diagnosis Date    Abnormal Pap smear of cervix     Aortic stenosis     Aortic valve stenosis     Benign colon polyp     CAD (coronary artery disease)     Diabetes mellitus, type 2 (H)     Gestational diabetes     HTN (hypertension)     Hyperlipidemia LDL goal <100     Nonsenile cataract 2022    Very beginnings    Post-menopausal atrophic vaginitis     Thickened endometrium        CURRENT MEDICATIONS:  Current Outpatient Medications   Medication Sig Dispense Refill    aspirin (ASA) 81 MG chewable tablet Take 1 tablet (81 mg) by mouth daily 100 tablet 3    atorvastatin (LIPITOR) 40 MG tablet Take 1 tablet (40 mg) by mouth daily 90 tablet 3    blood glucose (NO BRAND SPECIFIED) test strip Use to test blood sugar 1 times daily or as directed. 100 strip 2    blood glucose monitoring (NO BRAND SPECIFIED) meter device kit Use to test blood sugar one times daily or as directed. 1 kit 0    cetirizine (ZYRTEC) 10 MG tablet Take 10 mg by mouth daily      dapagliflozin (FARXIGA) 10 MG TABS tablet Take 1 tablet (10 mg) by mouth daily 100 tablet 2    diltiazem (CARDIZEM) 60 MG tablet Take 1 tablet (60 mg) by mouth 2 times daily 180 tablet 3    estradiol (ESTRACE) 0.1 MG/GM vaginal cream Place 2 g vaginally twice a week 42.5 g 11    fenofibrate (TRIGLIDE/LOFIBRA) 160 MG tablet Take 1 tablet (160 mg) by mouth daily 100 tablet 3    gabapentin (NEURONTIN) 100 MG capsule TAKE 2 CAPSULES BY MOUTH AT BEDTIME 180 capsule 3    losartan (COZAAR) 50 MG tablet Take 1.5 tablets (75 mg) by mouth daily 100 tablet 3    metFORMIN (GLUCOPHAGE) 500 MG tablet Take 2 tablets (1,000 mg) by  mouth 2 times daily (with meals) 360 tablet 3    montelukast (SINGULAIR) 10 MG tablet Take 1 tablet (10 mg) by mouth At Bedtime 100 tablet 3    Semaglutide, 1 MG/DOSE, (OZEMPIC, 1 MG/DOSE,) 4 MG/3ML SOPN Inject 1 mg Subcutaneous once a week 12 mL 3    ipratropium (ATROVENT) 0.06 % nasal spray Spray 2 sprays into both nostrils 4 times daily (Patient not taking: Reported on 2023) 15 mL 11    phenazopyridine (PYRIDIUM) 100 MG tablet Take 1 tablet (100 mg) by mouth 3 times daily as needed for urinary tract discomfort (Patient not taking: Reported on 2023) 6 tablet 0       PAST SURGICAL HISTORY:  Past Surgical History:   Procedure Laterality Date     SECTION      CV CORONARY ANGIOGRAM N/A 2023    Procedure: Coronary Angiogram;  Surgeon: Geovanni Ibarra MD;  Location:  HEART CARDIAC CATH LAB    CV INSTANTANEOUS WAVE-FREE RATIO N/A 2023    Procedure: Instantaneous Wave-Free Ratio;  Surgeon: Geovanni Ibarra MD;  Location:  HEART CARDIAC CATH LAB    GYN SURGERY         ALLERGIES:     Allergies   Allergen Reactions    Avapro [Irbesartan] Cough       FAMILY HISTORY:  + Premature coronary artery disease  + Atrial fibrillation  - Sudden cardiac death     SOCIAL HISTORY:  Social History     Tobacco Use    Smoking status: Never    Smokeless tobacco: Never   Vaping Use    Vaping Use: Never used   Substance Use Topics    Alcohol use: Yes     Alcohol/week: 1.0 standard drink of alcohol     Types: 1 Standard drinks or equivalent per week     Comment: less than 2 drinks a week    Drug use: Never       ROS:   Constitutional: No fever, chills, or sweats. Weight stable.   ENT: No visual disturbance, ear ache, epistaxis, sore throat.   Cardiovascular: As per HPI.   Respiratory: No cough, hemoptysis.    GI: No nausea, vomiting, hematemesis, melena, or hematochezia.   : No hematuria.   Integument: Negative.   Psychiatric: Negative.   Hematologic:  Easy bruising, no easy bleeding.  Neuro:  "Negative.   Endocrinology: No significant heat or cold intolerance   Musculoskeletal: No myalgia.    Exam:  BP 96/65 (BP Location: Right arm, Patient Position: Chair, Cuff Size: Adult Regular)   Pulse 67   Ht 1.737 m (5' 8.39\")   Wt 80.2 kg (176 lb 12.8 oz)   SpO2 96%   BMI 26.58 kg/m    GENERAL APPEARANCE: healthy, alert and no distress  HEENT: no icterus, no xanthelasmas, normal pupil size and reaction, normal palate, mucosa moist, no central cyanosis  EXTREMITIES: peripheral pulses normal, no edema, no bruits  NEURO: alert and oriented to person/place/time, normal speech, gait and affect  SKIN: no ecchymoses, no rashes    Labs:  CBC RESULTS:   Lab Results   Component Value Date    WBC 5.3 06/09/2023    RBC 4.83 06/09/2023    HGB 14.5 06/09/2023    HCT 43.5 06/09/2023    MCV 90 06/09/2023    MCH 30.0 06/09/2023    MCHC 33.3 06/09/2023    RDW 13.1 06/09/2023     (L) 06/09/2023       BMP RESULTS:  Lab Results   Component Value Date     06/09/2023    POTASSIUM 4.2 06/09/2023    CHLORIDE 106 06/09/2023    CO2 25 06/09/2023    ANIONGAP 11 06/09/2023    GLC 88 06/09/2023     (H) 06/09/2023    BUN 22.3 06/09/2023    CR 0.68 06/09/2023    GFRESTIMATED >90 06/09/2023    JOAQUINA 9.3 06/09/2023        INR RESULTS:  Lab Results   Component Value Date    INR 1.14 06/09/2023       Procedures:  TTE on 4/24/2023: Reviewed.  Interpretation Summary  Global and regional left ventricular function is hyperkinetic with an EF >70%.  Right ventricular function, chamber size, wall motion, and thickness are  normal.  Moderate aortic stenosis is present.  Pulmonary artery systolic pressure is normal.  Ascending aorta 3.9 cm.  Mild dilatation of the aorta is present.  The inferior vena cava is normal.  No pericardial effusion is present.    CAG on 6/9/2023: Reviewed.    3rd Mrg lesion is 30% stenosed.    1st Diag lesion is 50% stenosed.    Prox RCA to Dist RCA lesion is 40% stenosed.    Prox LAD to Mid LAD lesion is 60% " stenosed.    Prox Cx to Dist Cx lesion is 60% stenosed.     Moderate diffuse disease of the LAD and Lcx (Borderline Pd/Pa 0.92, dPR 0.88 for both lesions). Plan for medical management given lack of clinical symptoms.  The lesion are long, moderate and calcified.  Mild non-obstructive RCA disease.    Zio patch in 5/2023: Reviewed.          ECG on 7/24/2023:Reviewed.      Assessment and Plan:   # Multivessel CAD  # HLD  # HTN  # Aortic valve stenosis  # Ascending aortic aneurysm  # T2DM  # SVT  ->  PAC runs per Zio patch in 5/2023.  I reassured the patient and advised her to let us know if she has more symptoms.  No regular follow up will be required.    I spent a total of 30 min today to review the records, see the patient, and complete the documents.     CC  Patient Care Team:  Dustin Haynes MD as PCP - General (Family Medicine)  Dustin Haynes MD as Assigned PCP  Andre Rodriguez MD as Resident (OB/Gyn)  Sammy Matson MD as Assigned Heart and Vascular Provider  Ghanshyam Ramirez MD as MD (Ophthalmology)  Gabriela Bloom MD as MD (Ophthalmology)  Shantelle Marti MD as MD (Ophthalmology)  Paulina Hurst, RN as Specialty Care Coordinator (Cardiology)  Duane Manuel MD as Assigned Surgical Provider  Niharika Olivo APRN CNP as Nurse Practitioner (Cardiology)  SAMMY MATSON

## 2023-07-24 NOTE — TELEPHONE ENCOUNTER
Nurse Triage SBAR    Is this a 2nd Level Triage? NO  Request for Mammogram Results    Situation:   Pt calls to ask why her mammogram results are not yet visible in MyChart (?)  [facility] patient.  Attempted to warm-transfer pt to [facility]'s back line, however goes to voicemail at this time.  Therefore advised pt her request would be routed to provider team for response.  Pt verbalizes understanding.  Will await response from clinic via phone or [employer].    Please contact pt via [employer] or phone ([phone])    Thank you-    [facility] Nurse Advisor     Reason for Disposition    Caller requesting lab results  (Exception: Routine or non-urgent lab result.)    Protocols used: PCP CALL - NO TRIAGE-A-

## 2023-07-24 NOTE — NURSING NOTE
Chief Complaint   Patient presents with    New Patient     NEW EP referral d/t SVT       Vitals were taken, medications reconciled.    Rivera Rodrigez, Facilitator   9:05 AM  Vitals were performed. Medications reconciled. EKG was performed.   Rivera Rodrigez - Visit Facilitator

## 2023-07-24 NOTE — LETTER
7/24/2023      RE: Won Urena  [address]  [facility] [address]       Dear Colleague,    Thank you for the opportunity to participate in the care of your patient, Won Urena, at the [facility] [place] [facility]. Please see a copy of my visit note below.    HPI:   Ms. Won Urena is a 68 year old female with a PMH of multivessel CAD, HLD, HTN, aortic valve stenosis, ascending aortic aneurysm, T2DM and SVT.  She was referred for an EP evaluation.    She felt palpitation lasting for 5-10 min once a month.  She complained of chest pressure and denied SOB and dizziness.    PAST MEDICAL HISTORY:  Past Medical History:   Diagnosis Date     Abnormal Pap smear of cervix      Aortic stenosis      Aortic valve stenosis      Benign colon polyp      CAD (coronary artery disease)      Diabetes mellitus, type 2 (H)      Gestational diabetes      HTN (hypertension)      Hyperlipidemia LDL goal <100      Nonsenile cataract 2022    Very beginnings     Post-menopausal atrophic vaginitis      Thickened endometrium        CURRENT MEDICATIONS:  Current Outpatient Medications   Medication Sig Dispense Refill     aspirin (ASA) 81 MG chewable tablet Take 1 tablet (81 mg) by mouth daily 100 tablet 3     atorvastatin (LIPITOR) 40 MG tablet Take 1 tablet (40 mg) by mouth daily 90 tablet 3     blood glucose (NO BRAND SPECIFIED) test strip Use to test blood sugar 1 times daily or as directed. 100 strip 2     blood glucose monitoring (NO BRAND SPECIFIED) meter device kit Use to test blood sugar one times daily or as directed. 1 kit 0     cetirizine (ZYRTEC) 10 MG tablet Take 10 mg by mouth daily       dapagliflozin (FARXIGA) 10 MG TABS tablet Take 1 tablet (10 mg) by mouth daily 100 tablet 2     diltiazem (CARDIZEM) 60 MG tablet Take 1 tablet (60 mg) by mouth 2 times daily 180 tablet 3     estradiol (ESTRACE) 0.1 MG/GM vaginal cream Place 2 g vaginally  twice a week 42.5 g 11     fenofibrate (TRIGLIDE/LOFIBRA) 160 MG tablet Take 1 tablet (160 mg) by mouth daily 100 tablet 3     gabapentin (NEURONTIN) 100 MG capsule TAKE 2 CAPSULES BY MOUTH AT BEDTIME 180 capsule 3     losartan (COZAAR) 50 MG tablet Take 1.5 tablets (75 mg) by mouth daily 100 tablet 3     metFORMIN (GLUCOPHAGE) 500 MG tablet Take 2 tablets (1,000 mg) by mouth 2 times daily (with meals) 360 tablet 3     montelukast (SINGULAIR) 10 MG tablet Take 1 tablet (10 mg) by mouth At Bedtime 100 tablet 3     Semaglutide, 1 MG/DOSE, (OZEMPIC, 1 MG/DOSE,) 4 MG/3ML SOPN Inject 1 mg Subcutaneous once a week 12 mL 3     ipratropium (ATROVENT) 0.06 % nasal spray Spray 2 sprays into both nostrils 4 times daily (Patient not taking: Reported on 2023) 15 mL 11     phenazopyridine (PYRIDIUM) 100 MG tablet Take 1 tablet (100 mg) by mouth 3 times daily as needed for urinary tract discomfort (Patient not taking: Reported on 2023) 6 tablet 0       PAST SURGICAL HISTORY:  Past Surgical History:   Procedure Laterality Date      SECTION       CV CORONARY ANGIOGRAM N/A 2023    Procedure: Coronary Angiogram;  Surgeon: Geovanni Ibarra MD;  Location: [facility] CARDIAC CATH LAB     CV INSTANTANEOUS WAVE-FREE RATIO N/A 2023    Procedure: Instantaneous Wave-Free Ratio;  Surgeon: Geovanni Ibarra MD;  Location:  HEART CARDIAC CATH LAB     GYN SURGERY         ALLERGIES:     Allergies   Allergen Reactions     Avapro [Irbesartan] Cough       FAMILY HISTORY:  + Premature coronary artery disease  + Atrial fibrillation  - Sudden cardiac death     SOCIAL HISTORY:  Social History     Tobacco Use     Smoking status: Never     Smokeless tobacco: Never   Vaping Use     Vaping Use: Never used   Substance Use Topics     Alcohol use: Yes     Alcohol/week: 1.0 standard drink of alcohol     Types: 1 Standard drinks or equivalent per week     Comment: less than 2 drinks a week     Drug use: Never  "      ROS:   Constitutional: No fever, chills, or sweats. Weight stable.   ENT: No visual disturbance, ear ache, epistaxis, sore throat.   Cardiovascular: As per HPI.   Respiratory: No cough, hemoptysis.    GI: No nausea, vomiting, hematemesis, melena, or hematochezia.   : No hematuria.   Integument: Negative.   Psychiatric: Negative.   Hematologic:  Easy bruising, no easy bleeding.  Neuro: Negative.   Endocrinology: No significant heat or cold intolerance   Musculoskeletal: No myalgia.    Exam:  BP 96/65 (BP Location: Right arm, Patient Position: Chair, Cuff Size: Adult Regular)   Pulse 67   Ht 1.737 m (5' 8.39\")   Wt 80.2 kg (176 lb 12.8 oz)   SpO2 96%   BMI 26.58 kg/m    GENERAL APPEARANCE: healthy, alert and no distress  HEENT: no icterus, no xanthelasmas, normal pupil size and reaction, normal palate, mucosa moist, no central cyanosis  NECK: no adenopathy, no asymmetry, masses, or scars, thyroid normal to palpation and no bruits, JVP not elevated  RESPIRATORY: lungs clear to auscultation - no rales, rhonchi or wheezes, no use of accessory muscles, no retractions, respirations are unlabored, normal respiratory rate  CARDIOVASCULAR: regular rhythm, normal S1 with physiologic split S2, no S3 or S4 and no murmur, click or rub, precordium quiet with normal PMI.  ABDOMEN: soft, non tender, without hepatosplenomegaly, no masses palpable, bowel sounds normal, aorta not enlarged by palpation, no abdominal bruits  EXTREMITIES: peripheral pulses normal, no edema, no bruits  NEURO: alert and oriented to person/place/time, normal speech, gait and affect  VASC: Radial, femoral, dorsalis pedis and posterior tibialis pulses are normal in volumes and symmetric bilaterally. No bruits are heard.  SKIN: no ecchymoses, no rashes    Labs:  CBC RESULTS:   Lab Results   Component Value Date    WBC 5.3 06/09/2023    RBC 4.83 06/09/2023    HGB 14.5 06/09/2023    HCT 43.5 06/09/2023    MCV 90 06/09/2023    MCH 30.0 06/09/2023    " MCHC 33.3 06/09/2023    RDW 13.1 06/09/2023     (L) 06/09/2023       BMP RESULTS:  Lab Results   Component Value Date     06/09/2023    POTASSIUM 4.2 06/09/2023    CHLORIDE 106 06/09/2023    CO2 25 06/09/2023    ANIONGAP 11 06/09/2023    GLC 88 06/09/2023     (H) 06/09/2023    BUN 22.3 06/09/2023    CR 0.68 06/09/2023    GFRESTIMATED >90 06/09/2023    JOAQUINA 9.3 06/09/2023        INR RESULTS:  Lab Results   Component Value Date    INR 1.14 06/09/2023       Procedures:  TTE on 4/24/2023: Reviewed.  Interpretation Summary  Global and regional left ventricular function is hyperkinetic with an EF >70%.  Right ventricular function, chamber size, wall motion, and thickness are  normal.  Moderate aortic stenosis is present.  Pulmonary artery systolic pressure is normal.  Ascending aorta 3.9 cm.  Mild dilatation of the aorta is present.  The inferior vena cava is normal.  No pericardial effusion is present.    CAG on 6/9/2023: Reviewed.     3rd Mrg lesion is 30% stenosed.     1st Diag lesion is 50% stenosed.     Prox RCA to Dist RCA lesion is 40% stenosed.     Prox LAD to Mid LAD lesion is 60% stenosed.     Prox Cx to Dist Cx lesion is 60% stenosed.     Moderate diffuse disease of the LAD and Lcx (Borderline Pd/Pa 0.92, dPR 0.88 for both lesions). Plan for medical management given lack of clinical symptoms.  The lesion are long, moderate and calcified.  Mild non-obstructive RCA disease.    Zio patch in 5/2023: Reviewed.          ECG on 7/24/2023:Reviewed.      Assessment and Plan:   # Multivessel CAD  # HLD  # HTN  # Aortic valve stenosis  # Ascending aortic aneurysm  # T2DM  # SVT  ->  PAC runs per Zio patch in 5/2023.  I reassured the patient and advised her to let us know if she has more symptoms.  No regular follow up will be required.    I spent a total of 30 min today to review the records, see the patient, and complete the documents.     CC  Patient Care Team:  Dustin Haynes MD as PCP - General  (Family Medicine)  Dustin Haynes MD as Assigned PCP  Andre Rodriguez MD as Resident (OB/Gyn)  Sammy Matson MD as Assigned Heart and Vascular Provider  Ghanshyam Ramirez MD as MD (Ophthalmology)  Gabriela Bloom MD as MD (Ophthalmology)  Shantelle Marti MD as MD (Ophthalmology)  Paulina Hurst RN as Specialty Care Coordinator (Cardiology)  Duane Mnauel MD as Assigned Surgical Provider  Niharika Olivo APRN CNP as Nurse Practitioner (Cardiology)  SAMMY MATSON      Please do not hesitate to contact me if you have any questions/concerns.     Sincerely,     Tyrell Guillaume MD

## 2023-07-24 NOTE — PATIENT INSTRUCTIONS
You were seen in the Electrophysiology Clinic today by: Dr. Guillaume    Plan:   Medication Changes: None.     Follow up Visit: with Dr. Matson as recommended. No regular follow up with Dr. Guillaume required at this time.       If you have further questions, please utilize [employer] to contact us.     Your Care Team:    EP Cardiology   Telephone Number     Nurse Line  Navdeep Hinojosa RN    [phone]     For scheduling appointments:   [place]   [phone]   For procedure scheduling:    Karen Daley     [phone]   For the Device Clinic (Pacemakers, ICDs, Loop Recorders)    During business hours: [phone]  After business hours:   [phone]- select option 4 and ask for job code [id number].       On-call cardiologist for after hours or on weekends:   [phone], option #4, and ask to speak to the on-call cardiologist.     Cardiovascular Clinic:   [address] [place] [address]. [place] [address]      As always, Thank you for trusting us with your health care needs!

## 2023-07-25 LAB
ATRIAL RATE - MUSE: 61 BPM
DIASTOLIC BLOOD PRESSURE - MUSE: NORMAL MMHG
INTERPRETATION ECG - MUSE: NORMAL
P AXIS - MUSE: 38 DEGREES
PR INTERVAL - MUSE: 168 MS
QRS DURATION - MUSE: 146 MS
QT - MUSE: 432 MS
QTC - MUSE: 434 MS
R AXIS - MUSE: 270 DEGREES
SYSTOLIC BLOOD PRESSURE - MUSE: NORMAL MMHG
T AXIS - MUSE: 31 DEGREES
VENTRICULAR RATE- MUSE: 61 BPM

## 2023-08-01 ENCOUNTER — ALLIED HEALTH/NURSE VISIT (OUTPATIENT)
Dept: OPHTHALMOLOGY | Facility: CLINIC | Age: 68
End: 2023-08-01
Attending: OPHTHALMOLOGY
Payer: COMMERCIAL

## 2023-08-01 DIAGNOSIS — E11.3293 MILD NONPROLIFERATIVE DIABETIC RETINOPATHY OF BOTH EYES WITHOUT MACULAR EDEMA ASSOCIATED WITH TYPE 2 DIABETES MELLITUS (H): ICD-10-CM

## 2023-08-01 DIAGNOSIS — H53.60 NYCTALOPIA: ICD-10-CM

## 2023-08-01 DIAGNOSIS — H43.393 VITREOUS SYNERESIS, BILATERAL: ICD-10-CM

## 2023-08-01 PROCEDURE — 92273 FULL FIELD ERG W/I&R: CPT

## 2023-08-01 PROCEDURE — 92273 FULL FIELD ERG W/I&R: CPT | Mod: 26 | Performed by: OPHTHALMOLOGY

## 2023-08-01 PROCEDURE — 92083 EXTENDED VISUAL FIELD XM: CPT | Mod: 26 | Performed by: OPHTHALMOLOGY

## 2023-08-01 PROCEDURE — 92083 EXTENDED VISUAL FIELD XM: CPT

## 2023-08-01 PROCEDURE — 999N000103 HC STATISTIC NO CHARGE FACILITY FEE

## 2023-08-01 ASSESSMENT — REFRACTION_WEARINGRX
OD_SPHERE: +0.50
OD_CYLINDER: SPHERE
OS_ADD: +2.75
SPECS_TYPE: PAL
OD_ADD: +2.75
OS_SPHERE: +0.50
OS_CYLINDER: +0.75
OS_AXIS: 010

## 2023-08-01 ASSESSMENT — VISUAL ACUITY
METHOD: SNELLEN - LINEAR
CORRECTION_TYPE: GLASSES
OD_CC: 20/25
OS_CC+: -1+2
OS_CC: 20/25
OD_CC+: -2+2

## 2023-08-01 NOTE — LETTER
2023      RE: Won Urena  [address]  [place] [address]       STANDARD GVF and ffERG REPORT    ffERG Date:  2023    Referring:  Wolf    RE:  Won Urena  MRN:  [id number]  :  1955    CLINICAL HISTORY: The patient is a  68 year old woman with a history of nyctalopia.  She has been noticing symptoms since  at least, with dramatic worsening in the first half of .    IMPRESSION:            GVF:   A GVF was obtained by the technician. The test is reliable. The test is essentially normal in each eye, with full isopters. There is a small mid-peripheral scotoma noted in the right eye of uncertain clinical significance.  The horizontal field diameter with the III4e isopter was 120 degrees in the right eye and 110 degrees in the left eye.    ffERG:   A full field ERG was obtained in both eye using DTL electrodes and ISCEV standards.  Per the technician, the recording was essentially reliable in both eyes.       Under scotopic conditions, the results for the two eyes were similar with only non-specific irregularities.  In particular,  the dim white flash amplitudes were normal and the implicit times were normal.  The response to the 3.0 and 10.0 bright white flash showed normal A and B-wave amplitudes.  For the 3.0 bright flash, the implicit time was delayed by 2 ms, but was normal for the 10.0 flash.   Oscillatory potentials were normal.     Under photopic conditions, the recordings were normal in each eye.  In particular, the 30 Hz flicker amplitudes were normal and the implicit times were normal.    The single photopic flash morphology was normal in each right eye.  Numerically,  the A-wave amplitudes were normal and the implicit times were normal.  The B-wave amplitudes were normal and the implicit times were normal.  The 30Hz flicker is more reliable for numerical measurements of the photopic response.                    Visual Acuity Right Eye : 20/25-2+2      W/gls,  +0.50sph    Visual Acuity Left Eye : 20/25-1+2      W/gls, +0.50 + 0.75x010                                                          ALL AVERAGED                   Data for Full-Field ERG  Right Eye  Left Eye   DARK-ADAPTED Patient Normal Patient   0.01 ERG (tisha) b-wave amplitude ( v) 196 69.6 to 348.2 173*   0.01 ERG (tisha) b-wave implicit time (ms) 93.7 78.2 to 117.2 97.1*         3.0 ERG (combined) a-wave amplitude ( v) -142 -259.4 to -98 -133   3.0 ERG (combined) a-wave implicit time (ms) 16.6 11.5 to 20.3 21.1   3.0 ERG (combined) b-wave amplitude ( v) 163 159.2 to 421.6 234   3.0 ERG (combined) b-wave implicit time (ms) 59.4 41.2 to 57.2 57.1         10.0 ERG (brighter combined)a-wave amplitude ( v) -184 -291 to -110 -174   10.0 ERG (brighter combined)a-wave implicit time (ms) 13.9 9.9 to 15.1 14.4   10.0 ERG (brighter combined)b-wave amplitude ( v) 275 191.5 to 403.1 263*   10.0 ERG (brighter combined)b-wave implicit time (ms) 52.7 39.1 to 55.3 53.3*         3.0 Oscillatory Potentials + present +   LIGHT-ADAPTED       3.0 Flicker (30Hz) amplitude ( v) 74 56.4 to 151.2 66   3.0 Flicker (30Hz) implicit time (ms) 25.5 21.8 to 31.6 25.5         3.0 ERG (cone) a-wave amplitude ( v) -19 -45.1 to -13.7 -19   3.0 ERG (cone) a-wave implicit time (ms) 13.9 11.4 to 16.8 12.8   3.0 ERG (cone) b-wave amplitude ( v) 84 62.4 to 174.2 80   3.0 ERG (cone) b-wave implicit time (ms) 29.4 26.8 to 34.2 30   * = manipulated cursors  parentheses = cursors at selected peaks  ---- = residual to non-measurable  xxxx = not tested          INTERPRETATION  The GVE had only mild non-specific abnormalities in the right eye with normal isopter width.    The ffERG shows only mild non-specific abnormalities in the right eye.  Together, these non-specific abnormalities are most likely not clinically significant. There is no likely evidence of dystrophy or inflammatory process causing the patient's symptoms. If there is still a clinical concern  after her cataract surgery, repeat testing could be performed to look for interval change.            Sincerely,     Gabriela Bloom MD, PhD               [facility] EYE FULL FIELD ERG

## 2023-08-01 NOTE — PROGRESS NOTES
STANDARD GVF and ffERG REPORT    ffERG Date:  2023    Referring:  Wolf    RE:  Won Urena  MRN:  [id number]  :  1955    CLINICAL HISTORY: The patient is a  68 year old woman with a history of nyctalopia.  She has been noticing symptoms since  at least, with dramatic worsening in the first half of .    IMPRESSION:            GVF:   A GVF was obtained by the technician. The test is reliable. The test is essentially normal in each eye, with full isopters. There is a small mid-peripheral scotoma noted in the right eye of uncertain clinical significance.  The horizontal field diameter with the III4e isopter was 120 degrees in the right eye and 110 degrees in the left eye.    ffERG:   A full field ERG was obtained in both eye using DTL electrodes and ISCEV standards.  Per the technician, the recording was essentially reliable in both eyes.       Under scotopic conditions, the results for the two eyes were similar with only non-specific irregularities.  In particular,  the dim white flash amplitudes were normal and the implicit times were normal.  The response to the 3.0 and 10.0 bright white flash showed normal A and B-wave amplitudes.  For the 3.0 bright flash, the implicit time was delayed by 2 ms, but was normal for the 10.0 flash.   Oscillatory potentials were normal.     Under photopic conditions, the recordings were normal in each eye.  In particular, the 30 Hz flicker amplitudes were normal and the implicit times were normal.    The single photopic flash morphology was normal in each right eye.  Numerically,  the A-wave amplitudes were normal and the implicit times were normal.  The B-wave amplitudes were normal and the implicit times were normal.  The 30Hz flicker is more reliable for numerical measurements of the photopic response.                    Visual Acuity Right Eye : 20/25-2+2      W/gls, +0.50sph    Visual Acuity Left Eye : 20/25-1+2      W/gls, +0.50 + 0.75x010                                                           ALL AVERAGED                   Data for Full-Field ERG  Right Eye  Left Eye   DARK-ADAPTED Patient Normal Patient   0.01 ERG (tisha) b-wave amplitude ( v) 196 69.6 to 348.2 173*   0.01 ERG (tisha) b-wave implicit time (ms) 93.7 78.2 to 117.2 97.1*         3.0 ERG (combined) a-wave amplitude ( v) -142 -259.4 to -98 -133   3.0 ERG (combined) a-wave implicit time (ms) 16.6 11.5 to 20.3 21.1   3.0 ERG (combined) b-wave amplitude ( v) 163 159.2 to 421.6 234   3.0 ERG (combined) b-wave implicit time (ms) 59.4 41.2 to 57.2 57.1         10.0 ERG (brighter combined)a-wave amplitude ( v) -184 -291 to -110 -174   10.0 ERG (brighter combined)a-wave implicit time (ms) 13.9 9.9 to 15.1 14.4   10.0 ERG (brighter combined)b-wave amplitude ( v) 275 191.5 to 403.1 263*   10.0 ERG (brighter combined)b-wave implicit time (ms) 52.7 39.1 to 55.3 53.3*         3.0 Oscillatory Potentials + present +   LIGHT-ADAPTED       3.0 Flicker (30Hz) amplitude ( v) 74 56.4 to 151.2 66   3.0 Flicker (30Hz) implicit time (ms) 25.5 21.8 to 31.6 25.5         3.0 ERG (cone) a-wave amplitude ( v) -19 -45.1 to -13.7 -19   3.0 ERG (cone) a-wave implicit time (ms) 13.9 11.4 to 16.8 12.8   3.0 ERG (cone) b-wave amplitude ( v) 84 62.4 to 174.2 80   3.0 ERG (cone) b-wave implicit time (ms) 29.4 26.8 to 34.2 30   * = manipulated cursors  parentheses = cursors at selected peaks  ---- = residual to non-measurable  xxxx = not tested          INTERPRETATION  The GVE had only mild non-specific abnormalities in the right eye with normal isopter width.    The ffERG shows only mild non-specific abnormalities in the right eye.  Together, these non-specific abnormalities are most likely not clinically significant. There is no likely evidence of dystrophy or inflammatory process causing the patient's symptoms. If there is still a clinical concern after her cataract surgery, repeat testing could be performed to look for interval  change.            Sincerely,     Gabriela Bloom MD, PhD

## 2023-08-01 NOTE — NURSING NOTE
Referred by Dr. Bloom for GVF+ffERG.  Has seen multiple eye providers including Kaleb Carpenter for CE, [facility] for blepharoplasty.  Last eye exam w/Dr. Bloom 05-02-23:  Mild NPDR with DM II, no DME.  Nyctalopia developed over last 8 months.  Dermatochalasis.  JOVANY. DDx is broad, with normal FAF could include cataract, but other injury to the photoreceptors is not excluded by imaging.  No hx of malignancy, no unexplained weight loss.  No likely CAR/AIR or dystrophy.  Scheduled to return to Dr. Bloom 08-29; will await results.

## 2023-08-09 ENCOUNTER — TRANSCRIBE ORDERS (OUTPATIENT)
Dept: OTHER | Age: 68
End: 2023-08-09

## 2023-08-09 ENCOUNTER — PATIENT OUTREACH (OUTPATIENT)
Dept: ONCOLOGY | Facility: CLINIC | Age: 68
End: 2023-08-09
Payer: COMMERCIAL

## 2023-08-09 DIAGNOSIS — Z80.3 FAMILY HISTORY OF BREAST CANCER: Primary | ICD-10-CM

## 2023-08-09 NOTE — PROGRESS NOTES
Writer received Cancer Risk Management Program referral, referred for:     Referral called in per Pt/ following mammogram, radiologist suggested GC as Pt sister has Breast Cancer/ Pt records, mammogram with [facility]      Reviewed for appropriate plan, and sent to New Patient Scheduling for completion.

## 2023-08-10 DIAGNOSIS — J30.1 SEASONAL ALLERGIC RHINITIS DUE TO POLLEN: ICD-10-CM

## 2023-08-10 DIAGNOSIS — E11.3293 MILD NONPROLIFERATIVE DIABETIC RETINOPATHY OF BOTH EYES WITHOUT MACULAR EDEMA ASSOCIATED WITH TYPE 2 DIABETES MELLITUS (H): Primary | ICD-10-CM

## 2023-08-10 DIAGNOSIS — E11.9 TYPE 2 DIABETES MELLITUS WITHOUT COMPLICATION, WITHOUT LONG-TERM CURRENT USE OF INSULIN (H): ICD-10-CM

## 2023-08-11 RX ORDER — MONTELUKAST SODIUM 10 MG/1
1 TABLET ORAL AT BEDTIME
Qty: 100 TABLET | Refills: 2 | Status: SHIPPED | OUTPATIENT
Start: 2023-08-11 | End: 2024-05-24

## 2023-08-11 RX ORDER — BLOOD-GLUCOSE METER
EACH MISCELLANEOUS
Qty: 1 KIT | Refills: 0 | Status: SHIPPED | OUTPATIENT
Start: 2023-08-11 | End: 2024-03-18

## 2023-08-11 NOTE — TELEPHONE ENCOUNTER
Last visit 3/13/23, no future appt  Prescription approved per [facility] Refill Protocol.  Elena Mann RN  [facility]

## 2023-08-13 ENCOUNTER — HEALTH MAINTENANCE LETTER (OUTPATIENT)
Age: 68
End: 2023-08-13

## 2023-08-20 ENCOUNTER — HOSPITAL ENCOUNTER (EMERGENCY)
Facility: CLINIC | Age: 68
Discharge: HOME | End: 2023-08-21
Attending: EMERGENCY MEDICINE
Payer: COMMERCIAL

## 2023-08-20 ENCOUNTER — NURSE TRIAGE (OUTPATIENT)
Dept: NURSING | Facility: CLINIC | Age: 68
End: 2023-08-20
Payer: COMMERCIAL

## 2023-08-20 DIAGNOSIS — H53.9 VISION CHANGES: ICD-10-CM

## 2023-08-20 LAB
ALBUMIN SERPL BCG-MCNC: 4.8 G/DL (ref 3.5–5.2)
ALP SERPL-CCNC: 49 U/L (ref 35–104)
ALT SERPL W P-5'-P-CCNC: 10 U/L (ref 0–50)
ANION GAP SERPL CALCULATED.3IONS-SCNC: 12 MMOL/L (ref 7–15)
AST SERPL W P-5'-P-CCNC: 29 U/L (ref 0–45)
BASOPHILS # BLD AUTO: 0 10E3/UL (ref 0–0.2)
BASOPHILS NFR BLD AUTO: 1 %
BILIRUB SERPL-MCNC: 0.3 MG/DL
BUN SERPL-MCNC: 23.4 MG/DL (ref 8–23)
CALCIUM SERPL-MCNC: 10.1 MG/DL (ref 8.8–10.2)
CHLORIDE SERPL-SCNC: 105 MMOL/L (ref 98–107)
CREAT SERPL-MCNC: 0.87 MG/DL (ref 0.51–0.95)
CRP SERPL-MCNC: <3 MG/L
DEPRECATED HCO3 PLAS-SCNC: 23 MMOL/L (ref 22–29)
EOSINOPHIL # BLD AUTO: 0.2 10E3/UL (ref 0–0.7)
EOSINOPHIL NFR BLD AUTO: 3 %
ERYTHROCYTE [DISTWIDTH] IN BLOOD BY AUTOMATED COUNT: 13.1 % (ref 10–15)
ERYTHROCYTE [SEDIMENTATION RATE] IN BLOOD BY WESTERGREN METHOD: 5 MM/HR (ref 0–30)
GFR SERPL CREATININE-BSD FRML MDRD: 73 ML/MIN/1.73M2
GLUCOSE SERPL-MCNC: 105 MG/DL (ref 70–99)
HCT VFR BLD AUTO: 40.7 % (ref 35–47)
HGB BLD-MCNC: 13.5 G/DL (ref 11.7–15.7)
IMM GRANULOCYTES # BLD: 0 10E3/UL
IMM GRANULOCYTES NFR BLD: 0 %
INR PPP: 1.12 (ref 0.85–1.15)
LYMPHOCYTES # BLD AUTO: 2.5 10E3/UL (ref 0.8–5.3)
LYMPHOCYTES NFR BLD AUTO: 43 %
MCH RBC QN AUTO: 29.3 PG (ref 26.5–33)
MCHC RBC AUTO-ENTMCNC: 33.2 G/DL (ref 31.5–36.5)
MCV RBC AUTO: 89 FL (ref 78–100)
MONOCYTES # BLD AUTO: 0.5 10E3/UL (ref 0–1.3)
MONOCYTES NFR BLD AUTO: 8 %
NEUTROPHILS # BLD AUTO: 2.7 10E3/UL (ref 1.6–8.3)
NEUTROPHILS NFR BLD AUTO: 45 %
NRBC # BLD AUTO: 0 10E3/UL
NRBC BLD AUTO-RTO: 0 /100
PLATELET # BLD AUTO: 155 10E3/UL (ref 150–450)
POTASSIUM SERPL-SCNC: 4.3 MMOL/L (ref 3.4–5.3)
PROT SERPL-MCNC: 7 G/DL (ref 6.4–8.3)
RBC # BLD AUTO: 4.6 10E6/UL (ref 3.8–5.2)
SODIUM SERPL-SCNC: 140 MMOL/L (ref 136–145)
WBC # BLD AUTO: 5.9 10E3/UL (ref 4–11)

## 2023-08-20 PROCEDURE — 99285 EMERGENCY DEPT VISIT HI MDM: CPT | Mod: 25 | Performed by: EMERGENCY MEDICINE

## 2023-08-20 PROCEDURE — 36415 COLL VENOUS BLD VENIPUNCTURE: CPT | Performed by: EMERGENCY MEDICINE

## 2023-08-20 PROCEDURE — 80053 COMPREHEN METABOLIC PANEL: CPT | Performed by: EMERGENCY MEDICINE

## 2023-08-20 PROCEDURE — 99284 EMERGENCY DEPT VISIT MOD MDM: CPT | Performed by: EMERGENCY MEDICINE

## 2023-08-20 PROCEDURE — 85025 COMPLETE CBC W/AUTO DIFF WBC: CPT | Performed by: EMERGENCY MEDICINE

## 2023-08-20 PROCEDURE — 86140 C-REACTIVE PROTEIN: CPT | Performed by: EMERGENCY MEDICINE

## 2023-08-20 PROCEDURE — 85610 PROTHROMBIN TIME: CPT | Performed by: EMERGENCY MEDICINE

## 2023-08-20 PROCEDURE — 85652 RBC SED RATE AUTOMATED: CPT | Performed by: EMERGENCY MEDICINE

## 2023-08-20 RX ORDER — IOPAMIDOL 755 MG/ML
75 INJECTION, SOLUTION INTRAVASCULAR ONCE
Status: COMPLETED | OUTPATIENT
Start: 2023-08-21 | End: 2023-08-21

## 2023-08-20 ASSESSMENT — VISUAL ACUITY
OD: 20/25
OS: 20/25

## 2023-08-20 ASSESSMENT — ACTIVITIES OF DAILY LIVING (ADL): ADLS_ACUITY_SCORE: 35

## 2023-08-21 ENCOUNTER — APPOINTMENT (OUTPATIENT)
Dept: CT IMAGING | Facility: CLINIC | Age: 68
End: 2023-08-21
Attending: EMERGENCY MEDICINE
Payer: COMMERCIAL

## 2023-08-21 VITALS
TEMPERATURE: 97.8 F | OXYGEN SATURATION: 99 % | RESPIRATION RATE: 18 BRPM | HEART RATE: 62 BPM | DIASTOLIC BLOOD PRESSURE: 80 MMHG | SYSTOLIC BLOOD PRESSURE: 137 MMHG

## 2023-08-21 PROCEDURE — 70498 CT ANGIOGRAPHY NECK: CPT

## 2023-08-21 PROCEDURE — 70450 CT HEAD/BRAIN W/O DYE: CPT | Mod: XU

## 2023-08-21 PROCEDURE — 70496 CT ANGIOGRAPHY HEAD: CPT

## 2023-08-21 PROCEDURE — 250N000011 HC RX IP 250 OP 636: Performed by: EMERGENCY MEDICINE

## 2023-08-21 PROCEDURE — 70498 CT ANGIOGRAPHY NECK: CPT | Mod: 26 | Performed by: RADIOLOGY

## 2023-08-21 PROCEDURE — 70450 CT HEAD/BRAIN W/O DYE: CPT | Mod: 26 | Performed by: RADIOLOGY

## 2023-08-21 PROCEDURE — 70496 CT ANGIOGRAPHY HEAD: CPT | Mod: 26 | Performed by: RADIOLOGY

## 2023-08-21 RX ORDER — CARBOXYMETHYLCELLULOSE SODIUM 5 MG/ML
1 SOLUTION/ DROPS OPHTHALMIC 4 TIMES DAILY
Qty: 70 EACH | Refills: 0 | Status: SHIPPED | OUTPATIENT
Start: 2023-08-21

## 2023-08-21 RX ADMIN — IOPAMIDOL 75 ML: 755 INJECTION, SOLUTION INTRAVENOUS at 00:04

## 2023-08-21 ASSESSMENT — ACTIVITIES OF DAILY LIVING (ADL)
ADLS_ACUITY_SCORE: 35
ADLS_ACUITY_SCORE: 35

## 2023-08-21 NOTE — CONSULTS
OPHTHALMOLOGY CONSULT NOTE  08/21/23    Patient: Won Urena    ASSESSMENT/PLAN:     Won Urena is a 68 year old female who presented with transient visual change.    #Transient visual disturbance, both eyes  #Dry Eyes, both eyes  #Nuclear sclerotic cataracts, both eyes  #Increased cup to disc ratio, R > L   #Mild Nonproliferative diabetic retinopathy OU  Episode of blurred vision both eyes only noticeable with reading small print. Has waxed and waned during ED course. Evaluation by neurology negative. On exam, vision is J1 OU. does have 1-2+ Punctate epithelial erosions bilaterally with mild Meibomian gland dysfunction. Cataracts notable on exam and possibly visually significant. Fundus notable for increased cup to disc ratio in both eyes, R worse than L, though intraocular pressure okay. Mild Nonproliferative diabetic retinopathy in both eyes does not appear to have progressed, though OCT macula would be the most appropriate test to elucidate an early, mild macular edema. In short, this patient has multiple ocular comorbidities that could be causing a subtle vision changes.Transient blurry vision while reading may be related to dry eyes.    Recommendations:  - Artificial tears four times per day, both eyes  - Artificial tears ointment nightly at bedtime, both eyes  - warm compresses BID to both eyes x 5-10 min each time  - Follow up in retina clinic as scheduled on 08/29 for ongoing monitoring of mild nonproliferative diabetic retinopathy OU and OCT macula if appropriate per Dr. Bloom and reassessment of blurry vision  - Follow up during annual appointment with Dr. Manuel for cataract eval or sooner if they become more visually significant, along with evaluation for dry eyes  - counseled return/RD precautions  - further work up and management per primary    It is our pleasure to participate in this patient's care and treatment. Please contact us with any further questions or  concerns.    Discussed with Dr. Brower who agreed with this assessment and plan.     Ophthalmology will follow up in clinic. Please contact ophthalmologist on call with any questions or concerns. We appreciate your care of this patient.    Moshe Daley MD, PGY2  Ophthalmology Resident  [facility]    HISTORY OF PRESENTING ILLNESS:     Won Urena is a 68 year old female who presented on 08/21/23 with an isolated episode of transient visual change.    Noted that her vision has started to be more blurry when trying to read the small print of books or magazines. This has been occurring for about one day. States that she is able to read the small print of a handout in the emergency department.    10+ review of systems were otherwise negative except for that which has been stated above.      OCULAR/MEDICAL/SURGICAL HISTORIES:     Past Ocular History: Mild Nonproliferative diabetic retinopathy OU, glaucoma suspect OU, retinal tear s/p laser OS  Last eye exam: 8/01/23   Glasses: wearing  Eyedrops: none    Pertinent Systemic Medications:   No current facility-administered medications for this encounter.     Current Outpatient Medications   Medication     aspirin (ASA) 81 MG chewable tablet     atorvastatin (LIPITOR) 40 MG tablet     blood glucose (NO BRAND SPECIFIED) test strip     Blood Glucose Monitoring Suppl (ACCU-CHEK GUIDE) w/Device KIT     cetirizine (ZYRTEC) 10 MG tablet     dapagliflozin (FARXIGA) 10 MG TABS tablet     diltiazem (CARDIZEM) 60 MG tablet     estradiol (ESTRACE) 0.1 MG/GM vaginal cream     fenofibrate (TRIGLIDE/LOFIBRA) 160 MG tablet     gabapentin (NEURONTIN) 100 MG capsule     ipratropium (ATROVENT) 0.06 % nasal spray     losartan (COZAAR) 50 MG tablet     metFORMIN (GLUCOPHAGE) 500 MG tablet     montelukast (SINGULAIR) 10 MG tablet     phenazopyridine (PYRIDIUM) 100 MG tablet     Semaglutide, 1 MG/DOSE, (OZEMPIC, 1 MG/DOSE,) 4 MG/3ML SOPN         Past Medical History:  Past  Medical History:   Diagnosis Date     Abnormal Pap smear of cervix      Aortic stenosis      Aortic valve stenosis      Benign colon polyp      CAD (coronary artery disease)      Diabetes mellitus, type 2 (H)      Gestational diabetes      HTN (hypertension)      Hyperlipidemia LDL goal <100      Nonsenile cataract     Very beginnings     Post-menopausal atrophic vaginitis      Thickened endometrium        Past Surgical History:   Past Surgical History:   Procedure Laterality Date      SECTION       CV CORONARY ANGIOGRAM N/A 2023    Procedure: Coronary Angiogram;  Surgeon: Geovanni Ibarra MD;  Location:  [facility]     CV INSTANTANEOUS WAVE-FREE RATIO N/A 2023    Procedure: Instantaneous Wave-Free Ratio;  Surgeon: Geovanni Ibarra MD;  Location:  [facility]     GYN SURGERY         Family History:   No history of macular degeneration or glaucoma    Social History:   No tobacco use    EXAMINATION:     Base Eye Exam       Visual Acuity (Snellen - Linear)         Right Left    Near cc J1 J1              Tonometry (Tonopen, 3:36 AM)         Right Left    Pressure 10 11              Pupils         Pupils Shape React APD    Right PERRL Round Brisk None    Left PERRL Round Brisk None              Visual Fields         Left Right     Full Full              Extraocular Movement         Right Left     Full, Ortho Full, Ortho              Neuro/Psych       Oriented x3: Yes    Mood/Affect: Normal              Dilation       Both eyes: 1.0% Mydriacyl, 2.5% Jp Synephrine @ 3:36 AM                  Additional Tests       Color         Right Left    Ishihara                   Slit Lamp and Fundus Exam       External Exam         Right Left    External Normal Normal              Slit Lamp Exam         Right Left    Lids/Lashes dermatochalasis with some frontalis drive and mild-moderate hooding of lids dermatochalasis with some frontalis drive and  mild-moderate hooding of lids    Conjunctiva/Sclera White and quiet White and quiet    Cornea 1+ PEE 1+ PEE    Anterior Chamber Deep and quiet Deep and quiet    Iris Dilated Dilated    Lens 2+ Nuclear sclerosis cataract, 1+ Cortical cataract 2+ Nuclear sclerosis cataract, Trace Cortical cataract    Anterior Vitreous syneresis, AV cler PVD, AV cler              Fundus Exam         Right Left    Disc slight tilt Normal    C/D Ratio 0.65 0.5    Macula Normal Normal    Vessels Normal no NVE    Periphery small DBH ST ST operculated hole with surrounding laser                    Labs/Studies/Imaging Performed    CT head w/o contrast; CTA head and neck with contrast     IMPRESSION:   HEAD CT:  No acute intracranial process.     HEAD CTA:  Normal CTA [place].     NECK CTA:  Normal neck CTA.    Moshe Daley MD  Resident Physician, PGY2  Department of Ophthalmology  08/21/23 2:39 AM

## 2023-08-21 NOTE — DISCHARGE INSTRUCTIONS
Please follow-up with your ophthalmologist at your previously scheduled appointment.  Return to the emergency department if any worsening symptoms.

## 2023-08-21 NOTE — CONSULTS
[facility]    Stroke Consult Note    Reason for Consult:  Blurry vision    Chief Complaint: Eye Problem     HPI  Won Urena is a 68 year old female with a history of CAD, T2DM, and HTN who presents for evaluation blurry vision while reading fine print. The patient reports that she was in her normal state of health 8/20 at 2000 when she began to notice that she was having blurry vision while reading small print. She notified her  and called the nurse triage line. The nurse triage line recommended that the patient come to the ED for further evaluation of her symptoms.     The patient reports that she has never had any similar symptoms like this before.  Denies any associated weakness, numbness, or speech difficulties.  Reports taking daily aspirin but does not take any blood thinning medications.  No recent drug or alcohol use.  Patient did not feel lightheaded, nor was she hypoglycemic during the episode (glucose 107 per report).  Her blood pressure at home during this episode was approximately 140 systolic.  She notes the vision loss was throughout all visual fields of her bilateral eyes and did not improve with covering 1 eye.  She did not appreciate blurring of vision with anything but reading small print.  The symptoms began to improve at approximately 2130 8/20/23, but were still present on initial evaluation.  No recent medication changes    Impression  68 year old female with a history of CAD, T2DM, and HTN who presents for evaluation blurry vision while reading fine print.  NIHSS 0.  Exam unremarkable.  CT/CTA unremarkable.  At this stage given the nonlocalizing deficits, improving subjective symptoms, negative imaging and unremarkable exam have low suspicion for acute neurological process.  Given this, no further neurological work-up is necessary at this time.    Recommendations   - No further neurological workup is needed at this time    Patient  "Follow-up    - Follow-up with PCP     Thank you for this consult.     The patient was discussed with Stroke Staff Dr. Alberto.    Geovanni Leal MD  PGY-4 Neurology Resident  Stroke [facility] *[id number]  _____________________________________________________    Clinically Significant Risk Factors Present on Admission                # Drug Induced Platelet Defect: home medication list includes an antiplatelet medication   # Hypertension: Noted on problem list      # Overweight: Estimated body mass index is 26.58 kg/m  as calculated from the following:    Height as of 7/24/23: 1.737 m (5' 8.39\").    Weight as of 7/24/23: 80.2 kg (176 lb 12.8 oz).                 Past Medical History    Past Medical History:   Diagnosis Date    Abnormal Pap smear of cervix     Aortic stenosis     Aortic valve stenosis     Benign colon polyp     CAD (coronary artery disease)     Diabetes mellitus, type 2 (H)     Gestational diabetes     HTN (hypertension)     Hyperlipidemia LDL goal <100     Nonsenile cataract 2022    Very beginnings    Post-menopausal atrophic vaginitis     Thickened endometrium      Medications   Home Meds  Prior to Admission medications    Medication Sig Start Date End Date Taking? Authorizing Provider   aspirin (ASA) 81 MG chewable tablet Take 1 tablet (81 mg) by mouth daily 9/21/22   Dustin Haynes MD   atorvastatin (LIPITOR) 40 MG tablet Take 1 tablet (40 mg) by mouth daily 5/24/23   Sammy Matson MD   blood glucose (NO BRAND SPECIFIED) test strip Use to test blood sugar 1 times daily or as directed. 2/9/23   Dustin Haynes MD   Blood Glucose Monitoring Suppl (ACCU-CHEK GUIDE) w/Device KIT USE TO TEST BLOOD SUGAR ONE TIME DAILY OR AS DIRECTED 8/11/23   Dustin Haynes MD   cetirizine (ZYRTEC) 10 MG tablet Take 10 mg by mouth daily    Reported, Patient   dapagliflozin (FARXIGA) 10 MG TABS tablet Take 1 tablet (10 mg) by mouth daily 2/9/23   Dustin Haynes MD   diltiazem (CARDIZEM) 60 MG tablet Take 1 " tablet (60 mg) by mouth 2 times daily 9/16/22   Dustin Haynes MD   estradiol (ESTRACE) 0.1 MG/GM vaginal cream Place 2 g vaginally twice a week 9/19/22   Dustin Haynse MD   fenofibrate (TRIGLIDE/LOFIBRA) 160 MG tablet Take 1 tablet (160 mg) by mouth daily 9/21/22   Dustin Haynes MD   gabapentin (NEURONTIN) 100 MG capsule TAKE 2 CAPSULES BY MOUTH AT BEDTIME 5/22/23   Dustin Haynes MD   ipratropium (ATROVENT) 0.06 % nasal spray Spray 2 sprays into both nostrils 4 times daily  Patient not taking: Reported on 7/24/2023 9/16/22   Dustin Haynes MD   losartan (COZAAR) 50 MG tablet Take 1.5 tablets (75 mg) by mouth daily 6/26/23   Niharika Olivo APRN CNP   metFORMIN (GLUCOPHAGE) 500 MG tablet Take 2 tablets (1,000 mg) by mouth 2 times daily (with meals) 9/16/22   Dustin Haynes MD   montelukast (SINGULAIR) 10 MG tablet TAKE 1 TABLET BY MOUTH AT  BEDTIME 8/11/23   Dustin Haynes MD   phenazopyridine (PYRIDIUM) 100 MG tablet Take 1 tablet (100 mg) by mouth 3 times daily as needed for urinary tract discomfort  Patient not taking: Reported on 7/24/2023 5/5/23   Kristen Zhou, NP   Semaglutide, 1 MG/DOSE, (OZEMPIC, 1 MG/DOSE,) 4 MG/3ML SOPN Inject 1 mg Subcutaneous once a week 9/16/22   Dustin Haynes MD       Scheduled Meds      Infusion Meds      Allergies   Allergies   Allergen Reactions    Avapro [Irbesartan] Cough          PHYSICAL EXAMINATION   Temp:  [97.8  F (36.6  C)] 97.8  F (36.6  C)  Pulse:  [62-69] 62  Resp:  [16] 16  BP: (137-151)/(72-78) 137/72  SpO2:  [97 %-99 %] 99 %    General: Awake and alert in NAD   HEENT: Normocephalic, atraumatic, no epistaxis, no oral lesions  Resp: Breathing comfortably on room air  Extremities: Warm and well perfused, peripheral pulses present, no edema  Skin: Not jaundiced, no rash, no ecchymoses  Psych: Normal mood and affect    Neuro:  Mental status: Awake, alert, attentive; oriented to person, place, and time  Speech: Fluent,  comprehension intact; No dysarthria, no paraphasic errors noted  Cranial nerves: Visual fields intact, Eyes conjugate, EOMI w/ normal and smooth pursuit, face expression symmetric, facial sensation intact to light touch and symmetric, hearing intact to conversation, shoulder shrug strong, palate rise symmetric, tongue/uvula midline.  Motor: Tone normal. LUE is 5/5, RUE is 5/5, LLE is 5/5, RLE is 5/5. No abnormal movements noted. Pronator drift absent.   Reflexes: Toes down-going bilaterally.  Sensory: Intact to light touch in all 4 extremities  Coordination: FNF intact bilaterally with no dysmetria; Normal heel-shin test bilaterally with no dysmetria noted  Gait: Deferred    Stroke Scales  National [employer] Stroke Scale  Exam Interval: ED evaluation   Score    Level of consciousness: (0)   Alert, keenly responsive    LOC questions: (0)   Answers both questions correctly    LOC commands: (0)   Performs both tasks correctly    Best gaze: (0)   Normal    Visual: (0)   No visual loss    Facial palsy: (0)   Normal symmetrical movements    Motor arm (left): (0)   No drift    Motor arm (right): (0)   No drift    Motor leg (left): (0)   No drift    Motor leg (right): (0)   No drift    Limb ataxia: (0)   Absent    Sensory: (0)   Normal- no sensory loss    Best language: (0)   Normal- no aphasia    Dysarthria: (0)   Normal    Extinction and inattention: (0)   No abnormality        Total Score:  0           Imaging  I personally reviewed all imaging; relevant findings per HPI.    Labs Data   CBC  Recent Labs   Lab 08/20/23 2311   WBC 5.9   RBC 4.60   HGB 13.5   HCT 40.7        Basic Metabolic Panel   Recent Labs   Lab 08/20/23 2311      POTASSIUM 4.3   CHLORIDE 105   CO2 23   BUN 23.4*   CR 0.87   *   JOAQUINA 10.1     Liver Panel  Recent Labs   Lab 08/20/23 2311   PROTTOTAL 7.0   ALBUMIN 4.8   BILITOTAL 0.3   ALKPHOS 49   AST 29   ALT 10     INR    Recent Labs   Lab Test 08/20/23 2311  06/09/23  0740   INR 1.12 1.14           Stroke Consult Data Data   This was a non-emergent, non-telestroke consult.

## 2023-08-21 NOTE — ED PROVIDER NOTES
ED Provider Note  [facility]      History     Chief Complaint   Patient presents with    Eye Problem     HPI  Won Urena is a 68 year old female who has known PMH notable for ascending aortic aneurysm, CAD, aortic stenosis, DM2, HTN, HLD, reported extensive ophthalmologic issues (nonproliferative diabetic retinopathy, retinal hole w/o detachment s/p treatment), who presents today with acute onset bilateral vision changes.    Patient describes an extensive ophthalmologic history, including nonproliferative diabetic retinopathy, possible retinal hole without detachment s/p treatment (see EMR for full details).  Had been at her usual baseline where she has occasional floaters, etc. but no acute changes.  Then today when she was reading, she noticed that she had difficulty reading small font and that small font was very blurry for her.  If she looked out further at large font or general things in her room, she did not notice any type of blurred vision or vision changes.  She has had no double vision/diplopia, no loss of vision, no darkening of vision or curtain coming over the other eyes.  No new spider webs, flashers or floaters.  No eye trauma.  No eye pain.  No pain or trouble with extraocular movements.  With this no headache, no neck pain or stiffness.     She reports that she had her carotids which before and I do see this in the EMR where both right and left carotids had less than 50% diameter narrowing.  Has not had particular vision changes like this previously.    No other new symptoms or complaints at this time. Full ROS completed w/o additional findings.       Past Medical History  Past Medical History:   Diagnosis Date    Abnormal Pap smear of cervix     Aortic stenosis     Aortic valve stenosis     Benign colon polyp     CAD (coronary artery disease)     Diabetes mellitus, type 2 (H)     Gestational diabetes     HTN (hypertension)     Hyperlipidemia LDL goal <100      Nonsenile cataract     Very beginnings    Post-menopausal atrophic vaginitis     Thickened endometrium      Past Surgical History:   Procedure Laterality Date     SECTION      CV CORONARY ANGIOGRAM N/A 2023    Procedure: Coronary Angiogram;  Surgeon: Geovanni Ibarra MD;  Location:  [facility]    CV INSTANTANEOUS WAVE-FREE RATIO N/A 2023    Procedure: Instantaneous Wave-Free Ratio;  Surgeon: Geovanni Ibarra MD;  Location:  [facility]    GYN SURGERY       carboxymethylcellulose PF (REFRESH PLUS) 0.5 % ophthalmic solution  aspirin (ASA) 81 MG chewable tablet  atorvastatin (LIPITOR) 40 MG tablet  blood glucose (NO BRAND SPECIFIED) test strip  Blood Glucose Monitoring Suppl (ACCU-CHEK GUIDE) w/Device KIT  cetirizine (ZYRTEC) 10 MG tablet  dapagliflozin (FARXIGA) 10 MG TABS tablet  diltiazem (CARDIZEM) 60 MG tablet  estradiol (ESTRACE) 0.1 MG/GM vaginal cream  fenofibrate (TRIGLIDE/LOFIBRA) 160 MG tablet  gabapentin (NEURONTIN) 100 MG capsule  ipratropium (ATROVENT) 0.06 % nasal spray  losartan (COZAAR) 50 MG tablet  metFORMIN (GLUCOPHAGE) 500 MG tablet  montelukast (SINGULAIR) 10 MG tablet  phenazopyridine (PYRIDIUM) 100 MG tablet  Semaglutide, 1 MG/DOSE, (OZEMPIC, 1 MG/DOSE,) 4 MG/3ML SOPN      Allergies   Allergen Reactions    Avapro [Irbesartan] Cough     Family History  Family History   Problem Relation Age of Onset    Lung Cancer Mother         Small cell, did smoked    Diabetes Mother     Hypertension Mother     Goiter Mother     Mental Illness Mother         likely bipolar    Coronary Artery Disease Mother         MI, smoked    Hyperlipidemia Mother     Thyroid Disease Mother         Goiter    Obesity Mother         most of family is obese    Glaucoma Father     Atrial fibrillation Father     Hypertension Father     Parkinsonism Father     Hyperlipidemia Father     Diabetes Sister     Breast Cancer Sister         in her 60s.    Hypertension  Sister     Cancer Maternal Grandmother         gynecologic cancer, unknown type    Uterine Cancer Maternal Grandmother     Chronic Obstructive Pulmonary Disease Paternal Grandmother     Uterine Cancer Paternal Grandmother     Stomach Cancer Paternal Grandfather     Colon Cancer Paternal Grandfather     Hypertension Brother     Bipolar Disorder Son     Mental Illness Son     Depression Son     Macular Degeneration No family hx of      Social History   Social History     Tobacco Use    Smoking status: Never    Smokeless tobacco: Never   Vaping Use    Vaping Use: Never used   Substance Use Topics    Alcohol use: Yes     Alcohol/week: 1.0 standard drink of alcohol     Types: 1 Standard drinks or equivalent per week     Comment: less than 2 drinks a week    Drug use: Never      Past medical history, past surgical history, medications, allergies, family history, and social history were reviewed with the patient. No additional pertinent items.      A complete review of systems was performed with pertinent positives and negatives noted in the HPI, and all other systems negative.    Physical Exam   BP: (!) 151/78  Pulse: 69  Temp: 97.8  F (36.6  C)  Resp: 16  SpO2: 97 %  Physical Exam    CONSTITUTIONAL: Well-developed and well-nourished. Awake and alert. Non-toxic appearance. No acute distress.   HENT:   - Head: Normocephalic and atraumatic.   - Ears: External ear grossly normal.   - Nose: Nose normal. No rhinorrhea. No epistaxis.   - Mouth/Throat: MMM  EYES: Conjunctivae and lids are normal. No scleral icterus. PERRL. EOMI. No pain w/ EOMs.No abnormal eye movements. Acuity 20/25 on left, right and both.    NECK: Normal range of motion and phonation normal. Neck supple.  No tracheal deviation, no stridor. No edema or erythema noted.  Radiation of cardiac murmur vs bruits audible on both sides of the neck  CARDIOVASCULAR: Normal rate, regular rhythm, loud systolic murmur heard best at the right upper sternal  border  PULMONARY/CHEST: Normal work of breathing. No accessory muscle usage or stridor. No respiratory distress.  No appreciable abnormal breath sounds.  ABDOMEN: Soft, non-distended. No tenderness. No peritoneal findings, no rigidity, rebound or guarding.  No palpable masses or abnormal pulsatility appreciated.  MUSCULOSKELETAL: Extremities warm and seemingly well perfused. No edema or calf tenderness.  NEUROLOGIC: Awake, alert. Not disoriented. No seizure activity. GCS 15. CNs otherwise intact. Strength/sensation intact. Normal coordination.   SKIN: Skin is warm and dry. No rash noted. No diaphoresis. No pallor.   PSYCHIATRIC: Normal mood and affect. Speech and behavior normal. Thought processes linear. Cognition and memory are normal. No SI/HI reported.    ED Course, Procedures, & Data     ED Course as of 08/21/23 0119   Sun Aug 20, 2023   2258 Saw patient in VTA.  She does not seem to be meeting obvious stroke code activation criteria, but I am calling the stroke team to see if they would recommended for more subtle type of presentation.  Or at a minimum we will be getting a stroke consult.   2303 Discussed with the Neuro Stroke team.  They do not think that the patient warrants Stroke Code activation, and with these symptoms they would not recommend TNK, but they will see her in consult.  Appreciate their assistance.               Assessment & Plan    IMPRESSION:   68 year old female w/ PMH notable for ascending aortic aneurysm, CAD, aortic stenosis, DM2, HTN, HLD, reported extensive ophthalmologic issues (nonproliferative diabetic retinopathy, retinal hole w/o detachment s/p treatment), who presents today with acute onset bilateral vision changes.as described further above.     Clinically appears nontoxic, NAD. Vitals WNL. No obvious acute ocular findings on exam, neuro exam WNL/nonfocal. No other acute findings. DDx includes, but not limited to, worsening of retinopathy, recurrent retinal issue, unlikely  stroke, carotid US, et al.       PLAN:   - Labs, head/neck imaging, discussion w/ Neuro   - Risks/benefits of pursuing imaging reviewed and accepted.     RESULTS:  Labs:   - No obvious emergent findings  Imaging: Written preliminary reports reviewed:  HEAD CT:  No acute intracranial process.  HEAD CTA:  Normal CTA [place].  NECK CTA:  Normal neck CTA.  Results/reports reviewed w/ patient who expresses understanding of findings and F/U recommendations.    INTERVENTIONS:   - Neurology consult  - Ophthalmology consult    RE-EVALUATION:  - The patient's symptoms were    - Pt otherwise continues to do well here in the ED, no acute issues or apparent concerning changes in vitals or clinical appearance.    DISCUSSIONS:  - w/ Neuro: Discussed w/ Neuro. They did not think would need stroke code activation. They have seen and evaluated the patient here in the ED and reviewed imaging. They don't think any emergent neurologic cause, defer to Ophthalmology  - w/ Patient: I have reviewed the available findings, plan, need for close follow up, strict return/safety instructions with the patient.     -   SIGNOUT:  Patient signed out to overnight EM Physician.  Impression at shift change:  - Vision changes (improving, cleared by Neurology)  - Loud systolic murmur  Pending at shift change:   - Ophthalmology consult  Tentative plan:   - F/U Ophthalmology consult, dispo as per their thoughts      ______________________________________________________________________            Karyn Oliver MD  [facility] EMERGENCY DEPARTMENT  8/20/2023     Karyn Oliver MD  08/22/23 1627       Karyn Oliver MD  08/30/23 0519       Krayn Oliver MD  08/30/23 0520

## 2023-08-21 NOTE — ED TRIAGE NOTES
Patient ambulatory to triage for vision changes. Patient states around 8pm she was suddenly having trouble reading small font. Patient states it has gotten a little better since 8pm. R eye is 20/25, L eye is 20/25.

## 2023-08-21 NOTE — ED PROVIDER NOTES
Sign Out Provider: Dr. Oliver    Sign Out Plan: Won Urena is a 68 year old female who has known PMH notable for ascending aortic aneurysm, CAD, aortic stenosis, DM2, HTN, HLD, reported extensive ophthalmologic issues (nonproliferative diabetic retinopathy, retinal hole w/o detachment s/p treatment), who presents today with acute onset bilateral vision changes.  Stroke work-up negative.  Patient pending ophthalmology evaluation, recommendations.    Reassessment: Ophthalmology evaluated the patient.  Patient here with transient vision loss, history of multiple comorbidities.  Recommend artificial tears 4 times a day, follow-up in retina clinic as previously scheduled on 8/29.  Plan for discharge.    Disposition: Discharge        Kim Arias MD  08/21/23 2437

## 2023-08-21 NOTE — TELEPHONE ENCOUNTER
Nurse Triage SBAR    Is this a 2nd Level Triage? YES, LICENSED PRACTITIONER REVIEW IS REQUIRED    Situation: Pt called with concerns for visual changes.    Background: Pt states she had sudden visual changes about an hour p/t our conversation.    Assessment: Pt states she suddenly could no longer read fine print. She checked her BP and BG which were fine. She also tried to put some lubricating eye drops in which didn't help either. Denies weakness in any extremity.    Protocol Recommended Disposition:   Go to ED Now (Or PCP Triage)    Recommendation: Dispo to go to ER as pt does not have a PCP within [place]. She was directed to the best ER for her situation closest to her.      Reason for Disposition   [1] Blurred vision or visual changes AND [2] present now AND [3] sudden onset or new (e.g., minutes, hours, days)  (Exception: seeing floaters / black specks OR previously diagnosed migraine headaches with same symptoms)    Additional Information   Negative: Followed getting substance in the eye   Negative: Foreign body or object is or was lodged in the eye   Negative: Weakness of the face, arm or leg on one side of the body   Negative: Complete loss of vision in 1 or both eyes   Negative: SEVERE eye pain   Negative: Followed an eye injury   Negative: Double vision   Negative: Patient sounds very sick or weak to the triager    Protocols used: Vision Loss or Change-DEACON-RANDA Rosas RN  [facility] Nurse Advisor   8/20/2023  9:45 PM

## 2023-08-22 ENCOUNTER — PATIENT OUTREACH (OUTPATIENT)
Dept: FAMILY MEDICINE | Facility: CLINIC | Age: 68
End: 2023-08-22

## 2023-08-22 NOTE — TELEPHONE ENCOUNTER
Met with pt face to face to discuss a number of care coordination issues for her and her .  Most notably, pt is looking for pool therapy resources other than [facility] for her , DME information, and [employer].  She is also looking for dietician information and therapy information for herself.  I gave her a list of pool therapy options in the [facility], and explained the process in getting DME for her  as it is needed.  He is currently in need of a new wheelchair cushion for her power wheelchair. Updated PCP that a wheelchair seating clinic referral may be appropriate.  She has filled out his portion of the [employer] application. I will assist Dr. Haynes in completing and sending in the form.      Finally I gave her the names of the RD and [facility] in clinic and encouraged her to set up appointments with them at the .    MAYNOR Cain

## 2023-08-25 DIAGNOSIS — I35.0 NONRHEUMATIC AORTIC VALVE STENOSIS: ICD-10-CM

## 2023-08-25 DIAGNOSIS — I71.21 ASCENDING AORTIC ANEURYSM (H): ICD-10-CM

## 2023-08-25 DIAGNOSIS — I10 ESSENTIAL HYPERTENSION: ICD-10-CM

## 2023-08-28 RX ORDER — DILTIAZEM HCL 60 MG
60 TABLET ORAL 2 TIMES DAILY
Qty: 200 TABLET | Refills: 2 | Status: SHIPPED | OUTPATIENT
Start: 2023-08-28 | End: 2024-05-24

## 2023-08-28 NOTE — TELEPHONE ENCOUNTER
Medication requested: diltiazem (CARDIZEM) 60 MG tablet   Last office visit: 3/13/23  [facility] appointments: none  Medication last refilled: 9/16/22; 180 + 3 refills  Last qualifying labs:   BP Readings from Last 3 Encounters:   08/21/23 137/80   07/24/23 96/65   06/26/23 (!) 143/87     Component      Latest Ref Rng 8/20/2023  11:11 PM   ALT      0 - 50 U/L 10      Component      Latest Ref Rng 8/20/2023  11:11 PM   Creatinine      0.51 - 0.95 mg/dL 0.87      Prescription approved per [facility] Refill Protocol.    Rhys WATTS, RN  08/28/23 11:52 AM

## 2023-08-29 ENCOUNTER — OFFICE VISIT (OUTPATIENT)
Dept: OPHTHALMOLOGY | Facility: CLINIC | Age: 68
End: 2023-08-29
Attending: OPHTHALMOLOGY
Payer: COMMERCIAL

## 2023-08-29 DIAGNOSIS — E11.3293 MILD NONPROLIFERATIVE DIABETIC RETINOPATHY OF BOTH EYES WITHOUT MACULAR EDEMA ASSOCIATED WITH TYPE 2 DIABETES MELLITUS (H): ICD-10-CM

## 2023-08-29 PROCEDURE — 92134 CPTRZ OPH DX IMG PST SGM RTA: CPT | Performed by: OPHTHALMOLOGY

## 2023-08-29 PROCEDURE — G0463 HOSPITAL OUTPT CLINIC VISIT: HCPCS | Performed by: OPHTHALMOLOGY

## 2023-08-29 PROCEDURE — 99214 OFFICE O/P EST MOD 30 MIN: CPT | Performed by: OPHTHALMOLOGY

## 2023-08-29 ASSESSMENT — CONF VISUAL FIELD
OD_SUPERIOR_TEMPORAL_RESTRICTION: 0
OS_SUPERIOR_NASAL_RESTRICTION: 0
OD_SUPERIOR_NASAL_RESTRICTION: 0
METHOD: COUNTING FINGERS
OD_NORMAL: 1
OS_NORMAL: 1
OS_SUPERIOR_TEMPORAL_RESTRICTION: 0
OS_INFERIOR_TEMPORAL_RESTRICTION: 0
OS_INFERIOR_NASAL_RESTRICTION: 0
OD_INFERIOR_TEMPORAL_RESTRICTION: 0
OD_INFERIOR_NASAL_RESTRICTION: 0

## 2023-08-29 ASSESSMENT — VISUAL ACUITY
OS_CC+: -2
METHOD: SNELLEN - LINEAR
OS_CC: 20/20
OD_CC: 20/25
CORRECTION_TYPE: GLASSES

## 2023-08-29 ASSESSMENT — TONOMETRY
OD_IOP_MMHG: 14
IOP_METHOD: TONOPEN
OS_IOP_MMHG: 14

## 2023-08-29 ASSESSMENT — REFRACTION_WEARINGRX
OS_CYLINDER: +0.75
OS_SPHERE: +0.50
OS_ADD: +2.75
OD_CYLINDER: SPHERE
OD_SPHERE: +0.50
OS_AXIS: 175
OD_ADD: +2.75

## 2023-08-29 ASSESSMENT — EXTERNAL EXAM - LEFT EYE: OS_EXAM: NORMAL

## 2023-08-29 ASSESSMENT — CUP TO DISC RATIO
OS_RATIO: 0.5
OD_RATIO: 0.65

## 2023-08-29 ASSESSMENT — EXTERNAL EXAM - RIGHT EYE: OD_EXAM: NORMAL

## 2023-08-29 NOTE — PROGRESS NOTES
"CC -   Nyctalopia    INTERVAL HISTORY - Was in ED 1 week ago with blurry vision, determined to be JOVANY, improved back to baseline.  Had blurry vision with reading, but distance vision was fine, no amaurosis, both eyes but ?worse OS    No changes to night vision since BONIFACIO      [facility] -   Won Urena is a 68 year old female with c/o nyctalopia since ~ 12/2022 progressive  Trouble with needing more light in daytime, has worse vision not truly \"night blind\", no photopsias  No CA history    DM since ~ 2010, well controlled, +HTn    PAST OCULAR SURGERY  Laser pexy OS 5/2/2023      RETINAL IMAGING:    OCT 5/2/2023  OD - retina normal, PHF attached  OS - retina normal, PVD      Optos 05/02/23  OD: normal, normal FAF pattern  OS: ST operculated hole without SRF, FAF is bright in the region of the hole, otherwise nl AF pattern    ASSESSMENT & PLAN    # Mild NPDR OU with DM II no DME   - BP/BG control    # Nyctalopia OU   - developed over last 8 months   - OCT and FAF show no significant abnormalities   - no hx of malignancy, no unexplained weight loss     - no likely CAR/AIR or dystrophy based on ffERG & GVF 8/2023   - ?d/t cataracts, OAG suspect but still has good RNFL on OCT 5/2023      # OAG suspect   - CDR enlarged/mild asymmetry   - OCT RNFL OK 5/2023   - sees Kaleb        # Operculated hole, left eye   - s/p pexy 5/2023   - well contained      #Cataract, both eyes   - pt experiences significant glare at night so she limits her driving   - cataract could also explain her nyctalopia   - saw Kaleb 5/2023 will defer for now        # PVD, left eye   - no PVD OD   - monitor    # Dermatochalasis   - pt bothered by lid hooding   - saw Elizabethh 5/2023 planned BUBL 9/2023        # JOVANY   - surface appears relatively healthy, but pt with sx of JOVANY   - may be exacerbated with blepharoplasty   - ATs recommended today      RTC 12 motnhs, DFE OU, OCT OU      ATTESTATION     Attending Physician Attestation:      Complete " documentation of historical and exam elements from today's encounter can be found in the full encounter summary report (not reduplicated in this progress note).  I personally obtained the chief complaint(s) and history of present illness.  I confirmed and edited as necessary the review of systems, past medical/surgical history, family history, social history, and examination findings as documented by others; and I examined the patient myself.  I personally reviewed the relevant tests, images, and reports as documented above.  I formulated and edited as necessary the assessment and plan and discussed the findings and management plan with the patient and family    Gabriela Bloom MD, PhD  , Vitreoretinal Surgery  Department of Ophthalmology  [facility]

## 2023-08-29 NOTE — NURSING NOTE
Chief Complaints and History of Present Illnesses   Patient presents with    Diabetic Retinopathy Follow Up     4 month follow up BE     Chief Complaint(s) and History of Present Illness(es)       Diabetic Retinopathy Follow Up              Comments: 4 month follow up BE              Comments    Pt states vision is the same as last visit. No eye pain today. No no new flashes or floaters.  Pt notes that she was seen in the ED about 2 weeks ago for blurred vision.  DM2 BS: Pt did not check sugars today.  Lab Results       Component                Value               Date                       A1C                      5.7                 04/24/2023              YADIEL Weber August 29, 2023 10:13 AM

## 2023-09-04 DIAGNOSIS — I25.10 CORONARY ARTERY CALCIFICATION: ICD-10-CM

## 2023-09-04 DIAGNOSIS — E78.5 HYPERLIPIDEMIA LDL GOAL <100: ICD-10-CM

## 2023-09-05 DIAGNOSIS — E11.9 TYPE 2 DIABETES MELLITUS WITHOUT COMPLICATION, WITHOUT LONG-TERM CURRENT USE OF INSULIN (H): ICD-10-CM

## 2023-09-05 RX ORDER — FENOFIBRATE 160 MG/1
160 TABLET ORAL DAILY
Qty: 100 TABLET | Refills: 1 | Status: SHIPPED | OUTPATIENT
Start: 2023-09-05 | End: 2024-03-18

## 2023-09-05 NOTE — TELEPHONE ENCOUNTER
Medication requested: fenofibrate (TRIGLIDE/LOFIBRA) 160 MG tablet   Last office visit: 3/13/23  [facility] appointments: none  Medication last refilled: 9/21/22; 100 + 3 refills  Last qualifying labs:   Component      Latest Ref Rng 4/24/2023  8:29 AM   Cholesterol      <200 mg/dL 149    Triglycerides      <150 mg/dL 66    HDL Cholesterol      >=50 mg/dL 59    LDL Cholesterol Calculated      <=100 mg/dL 77    Non HDL Cholesterol      <130 mg/dL 90      Prescription approved per [facility] Refill Protocol.    Rhys WATTS, RN  09/05/23 4:15 PM

## 2023-09-06 RX ORDER — DAPAGLIFLOZIN 10 MG/1
10 TABLET, FILM COATED ORAL DAILY
Qty: 100 TABLET | Refills: 2 | OUTPATIENT
Start: 2023-09-06

## 2023-09-12 ENCOUNTER — PRE VISIT (OUTPATIENT)
Dept: SURGERY | Facility: CLINIC | Age: 68
End: 2023-09-12

## 2023-09-12 ENCOUNTER — ANESTHESIA EVENT (OUTPATIENT)
Dept: SURGERY | Facility: AMBULATORY SURGERY CENTER | Age: 68
End: 2023-09-12
Payer: COMMERCIAL

## 2023-09-12 ENCOUNTER — VIRTUAL VISIT (OUTPATIENT)
Dept: SURGERY | Facility: CLINIC | Age: 68
End: 2023-09-12
Payer: COMMERCIAL

## 2023-09-12 DIAGNOSIS — H02.403 PTOSIS OF BOTH EYELIDS: ICD-10-CM

## 2023-09-12 DIAGNOSIS — Z01.818 PREOP EXAMINATION: Primary | ICD-10-CM

## 2023-09-12 DIAGNOSIS — H02.831 DERMATOCHALASIS OF BOTH UPPER EYELIDS: ICD-10-CM

## 2023-09-12 DIAGNOSIS — H02.834 DERMATOCHALASIS OF BOTH UPPER EYELIDS: ICD-10-CM

## 2023-09-12 DIAGNOSIS — E11.9 TYPE 2 DIABETES MELLITUS WITHOUT COMPLICATION, WITHOUT LONG-TERM CURRENT USE OF INSULIN (H): Chronic | ICD-10-CM

## 2023-09-12 PROCEDURE — 99204 OFFICE O/P NEW MOD 45 MIN: CPT | Mod: VID

## 2023-09-12 RX ORDER — MULTIPLE VITAMINS W/ MINERALS TAB 9MG-400MCG
1 TAB ORAL EVERY EVENING
COMMUNITY

## 2023-09-12 RX ORDER — ERGOCALCIFEROL (VITAMIN D2) 10 MCG
1 TABLET ORAL EVERY EVENING
COMMUNITY

## 2023-09-12 RX ORDER — UBIDECARENONE 75 MG
100 CAPSULE ORAL EVERY EVENING
COMMUNITY

## 2023-09-12 ASSESSMENT — COPD QUESTIONNAIRES: COPD: 0

## 2023-09-12 ASSESSMENT — ENCOUNTER SYMPTOMS
SEIZURES: 0
ORTHOPNEA: 0

## 2023-09-12 ASSESSMENT — LIFESTYLE VARIABLES: TOBACCO_USE: 0

## 2023-09-12 NOTE — PATIENT INSTRUCTIONS
Preparing for Your Surgery      Name:  Won Urena   MRN:  [id number]   :  1955   Today's Date:  2023         Arriving for surgery:  Surgery date:  2023  Arrival time:  12:00 pm    Restrictions due to COVID 19:    Please maintain social distance.  Masking is optional.      parking is available for anyone with mobility limitations or disabilities. (Monday- Friday 7 am- 5 pm)    Please come to:    [facility]  [address] [place] [id number]    Please check in on the 5th floor at the Ambulatory Surgery Center.      What can I eat or drink?    -  You may eat and drink normally until 8 hours prior to arrival  time. (Until 4 am)  -  You may have clear liquids until 2 hours prior to arrival  time. (Until 10 am)    Examples of clear liquids:  Water  Clear broth  Juices (apple, white grape, white cranberry  and cider) without pulp  Noncarbonated, powder based beverages  (lemonade and Virgilio-Aid)  Sodas (Sprite, 7-Up, ginger ale and seltzer)  Coffee or tea (without milk or cream)  Gatorade      Which medicines can I take?    Hold Aspirin for 7 days before surgery.   Hold Multivitamins for 7 days before surgery.  Hold Supplements for 7 days before surgery.  Hold Ibuprofen (Advil, Motrin) for 1 day before surgery--unless otherwise directed by surgeon.  Hold Naproxen (Aleve) for 4 days before surgery.    Hold Semaglutide (Ozempic) for 7 days before surgery    Hold Dapgliflozin (Farxiga) for 3 days before surgery         No alcohol or cannabis products for 24 hours prior to procedure.      -  DO NOT take the following medications the day of surgery:  Cetirizine  Losartan  Metformin        -  PLEASE TAKE the following medications the day of surgery:   Diltiazem  Eye drops and Nasal spray per your routine  Phenazopyridine       How do I prepare myself?  - Please take 2 showers (one the night prior to surgery and one the morning of surgery) using Scrubcare or Hibiclens  soap.    Use this soap only from the neck to your toes.     Leave the soap on your skin for one minute--then rinse thoroughly.      You may use your own shampoo and conditioner. No other hair products.   - Please remove all jewelry and body piercings.  - No lotions, deodorants or fragrance.  - No makeup or fingernail polish.   - Bring your ID and insurance card.    -If you have a Deep Brain Stimulator, a Spinal Cord Stimulator, or any implanted Neuro Device, you must bring the remote to the Surgery Center.         ALL PATIENTS ARE REQUIRED TO HAVE A RESPONSIBLE ADULT TO DRIVE AND BE IN ATTENDANCE WITH THEM FOR 24 HOURS FOLLOWING SURGERY.     Covid testing policy as of 12/06/2022  Your surgeon will notify and schedule you for a COVID test if one is needed before surgery--please direct any questions or COVID symptoms to your surgeon      Questions or Concerns:    -For questions regarding the day of surgery, please contact the Ambulatory Surgery Center at [phone].    -If you have health changes between today and your surgery, please contact your surgeon.     - For questions after surgery, please contact your surgeon's office.

## 2023-09-12 NOTE — PROGRESS NOTES
Won is a 68 year old who is being evaluated via a billable video visit.      How would you like to obtain your AVS? Nataly Sanabria   Won is a 68 year old, presenting for the following health issues:  Pre-Op Exam    HPI           Review of Systems       Physical Exam     AYSE Jones LPN

## 2023-09-12 NOTE — H&P
Pre-Operative H & P     CC:  Preoperative exam to assess for increased cardiopulmonary risk while undergoing surgery and anesthesia.    Date of Encounter: 9/12/2023  Primary Care Physician:  Dustin Haynes     Reason for visit:   Encounter Diagnoses   Name Primary?    Preop examination Yes    Dermatochalasis of both upper eyelids     Ptosis of both eyelids     Type 2 diabetes mellitus without complication, without long-term current use of insulin (H)        HPI  Won Urena is a 68 year old female who presents for pre-operative H & P in preparation for  Procedure Information       Case: [id number] Date/Time: 09/28/23 1335    Procedures:       Both upper eyelid blepharoplasty and ptosis repair (Bilateral: Eye)      Both lower eyelid blepharoplasty (Bilateral: Eye)    Anesthesia type: General    Diagnosis:       Dermatochalasis of both upper eyelids [H02.831, H02.834]      Ptosis of both eyelids [H02.403]    Pre-op diagnosis:       Dermatochalasis of both upper eyelids [H02.831, H02.834]      Ptosis of both eyelids [H02.403]    Location: [place] [address] / [facility]    Providers: Shantelle Marti MD            Won Urena is a 68 year old female with a PMH significant for HTN, HLD, CAD, moderate aortic stenosis, ascending aortic aneurysm, and type II DM who has noted gradual onset of droopy eyelids over the past years. The droopy eyelid is interfering with activities of daily living including driving, and reading. She consulted with Dr. Marti on 5/16/23 and the above surgery was recommended for further management.      History is obtained from the patient and chart review    Hx of abnormal bleeding or anti-platelet use: Aspirin 81mg    Menstrual history: No LMP recorded. Patient is postmenopausal.:      Past Medical History  Past Medical History:   Diagnosis Date    Abnormal Pap smear of cervix     Aortic stenosis     Aortic valve stenosis     Benign  colon polyp     CAD (coronary artery disease)     Diabetes mellitus, type 2 (H)     Gestational diabetes     HTN (hypertension)     Hyperlipidemia LDL goal <100     Nonsenile cataract     Very beginnings    Post-menopausal atrophic vaginitis     Thickened endometrium        Past Surgical History  Past Surgical History:   Procedure Laterality Date     SECTION      CV CORONARY ANGIOGRAM N/A 2023    Procedure: Coronary Angiogram;  Surgeon: Geovanni Ibarra MD;  Location:  [facility]    CV INSTANTANEOUS WAVE-FREE RATIO N/A 2023    Procedure: Instantaneous Wave-Free Ratio;  Surgeon: Geovanni Ibarra MD;  Location:  [facility]    GYN SURGERY         Prior to Admission Medications  Current Outpatient Medications   Medication Sig Dispense Refill    aspirin (ASA) 81 MG chewable tablet Take 1 tablet (81 mg) by mouth daily (Patient taking differently: Take 81 mg by mouth every evening) 100 tablet 3    atorvastatin (LIPITOR) 40 MG tablet Take 1 tablet (40 mg) by mouth daily (Patient taking differently: Take 40 mg by mouth every evening) 90 tablet 3    carboxymethylcellulose PF (REFRESH PLUS) 0.5 % ophthalmic solution Place 1 drop into both eyes 4 times daily 70 each 0    cetirizine (ZYRTEC) 10 MG tablet Take 10 mg by mouth every evening      cyanocobalamin (VITAMIN B-12) 100 MCG tablet Take 100 mcg by mouth every evening      dapagliflozin (FARXIGA) 10 MG TABS tablet Take 1 tablet (10 mg) by mouth daily (Patient taking differently: Take 10 mg by mouth every morning) 100 tablet 2    diltiazem (CARDIZEM) 60 MG tablet TAKE 1 TABLET BY MOUTH  TWICE DAILY 200 tablet 2    estradiol (ESTRACE) 0.1 MG/GM vaginal cream Place 2 g vaginally twice a week 42.5 g 11    fenofibrate (TRIGLIDE/LOFIBRA) 160 MG tablet TAKE 1 TABLET BY MOUTH  DAILY (Patient taking differently: Take 160 mg by mouth every evening) 100 tablet 1    gabapentin (NEURONTIN) 100 MG capsule TAKE 2 CAPSULES  BY MOUTH AT BEDTIME (Patient taking differently: Take 200 mg by mouth At Bedtime) 180 capsule 3    ipratropium (ATROVENT) 0.06 % nasal spray Spray 2 sprays into both nostrils 4 times daily (Patient taking differently: Spray 2 sprays into both nostrils 4 times daily as needed) 15 mL 11    losartan (COZAAR) 50 MG tablet Take 75 mg by mouth every morning 100 tablet 3    metFORMIN (GLUCOPHAGE) 500 MG tablet Take 2 tablets (1,000 mg) by mouth 2 times daily (with meals) 360 tablet 3    montelukast (SINGULAIR) 10 MG tablet TAKE 1 TABLET BY MOUTH AT  BEDTIME 100 tablet 2    multivitamin w/minerals (MULTI-VITAMIN) tablet Take 1 tablet by mouth every evening      phenazopyridine (PYRIDIUM) 100 MG tablet Take 1 tablet (100 mg) by mouth 3 times daily as needed for urinary tract discomfort 6 tablet 0    Semaglutide, 1 MG/DOSE, (OZEMPIC, 1 MG/DOSE,) 4 MG/3ML SOPN Inject 1 mg Subcutaneous once a week (Patient taking differently: Inject 1 mg Subcutaneous once a week Sunday) 12 mL 3    Vitamin D, Cholecalciferol, 10 MCG (400 UNIT) TABS Take by mouth every evening      blood glucose (NO BRAND SPECIFIED) test strip Use to test blood sugar 1 times daily or as directed. 100 strip 2    Blood Glucose Monitoring Suppl (ACCU-CHEK GUIDE) w/Device KIT USE TO TEST BLOOD SUGAR ONE TIME DAILY OR AS DIRECTED 1 kit 0       Allergies  Allergies   Allergen Reactions    Avapro [Irbesartan] Cough       Social History  Social History     Socioeconomic History    Marital status:      Spouse name: Not on file    Number of children: Not on file    Years of education: Not on file    Highest education level: Not on file   Occupational History    Not on file   Tobacco Use    Smoking status: Never     Passive exposure: Never    Smokeless tobacco: Never   Vaping Use    Vaping Use: Never used   Substance and Sexual Activity    Alcohol use: Yes     Alcohol/week: 1.0 standard drink of alcohol     Types: 1 Standard drinks or equivalent per week     Comment:  less than 2 drinks a week    Drug use: Never    Sexual activity: Not Currently     Partners: Male     Birth control/protection: Post-menopausal   Other Topics Concern    Not on file   Social History Narrative    Lives with     Two son    One son, daughter-in-law, and grandson live in [place]    One son, daughter-in-law live in the same [facility]    Gan in [place], [place]     Social Determinants of Health     Financial Resource Strain: Not on file   Food Insecurity: Not on file   Transportation Needs: Not on file   Physical Activity: Not on file   Stress: Not on file   Social Connections: Not on file   Intimate Partner Violence: Not on file   Housing Stability: Not on file       Family History  Family History   Problem Relation Age of Onset    Lung Cancer Mother         Small cell, did smoked    Diabetes Mother     Hypertension Mother     Goiter Mother     Mental Illness Mother         likely bipolar    Coronary Artery Disease Mother 42        MI, smoked    Hyperlipidemia Mother     Thyroid Disease Mother         Goiter    Obesity Mother         most of family is obese    Glaucoma Father     Atrial fibrillation Father     Hypertension Father     Parkinsonism Father     Hyperlipidemia Father     Diabetes Sister     Breast Cancer Sister         in her 60s.    Hypertension Sister     Hypertension Brother     Cancer Maternal Grandmother         gynecologic cancer, unknown type    Uterine Cancer Maternal Grandmother     Chronic Obstructive Pulmonary Disease Paternal Grandmother     Uterine Cancer Paternal Grandmother     Stomach Cancer Paternal Grandfather     Colon Cancer Paternal Grandfather     Bipolar Disorder Son     Mental Illness Son     Depression Son     Macular Degeneration No family hx of     Anesthesia Reaction No family hx of     Venous thrombosis No family hx of        Review of Systems  The complete review of systems is negative other than noted in the HPI or here.   Anesthesia  Evaluation   Pt has had prior anesthetic.     No history of anesthetic complications       ROS/MED HX  ENT/Pulmonary:     (+)     EMILIANO risk factors,  hypertension,    allergic rhinitis,                         (-) tobacco use, asthma, COPD and recent URI   Neurologic:  - neg neurologic ROS  (-) no seizures and no CVA   Cardiovascular:     (+) Dyslipidemia hypertension-range: Avg 120-130/80s at home/ -  CAD -  - -   Taking blood thinners                        valvular problems/murmurs type: AS     Previous cardiac testing   Echo: Date: 4/24/23 Results:  Interpretation Summary  Global and regional left ventricular function is hyperkinetic with an EF >70%.  Right ventricular function, chamber size, wall motion, and thickness are  normal.  Moderate aortic stenosis is present.  Pulmonary artery systolic pressure is normal.  Ascending aorta 3.9 cm.  Mild dilatation of the aorta is present.  The inferior vena cava is normal.  No pericardial effusion is present.    Stress Test:  Date: Results:    ECG Reviewed:  Date: 7/24/23 Results:  Sinus rhythm  Left axis deviation  Right bundle branch block      Cath:  Date: 6/9/23 Results:     3rd Mrg lesion is 30% stenosed.     1st Diag lesion is 50% stenosed.     Prox RCA to Dist RCA lesion is 40% stenosed.     Prox LAD to Mid LAD lesion is 60% stenosed.     Prox Cx to Dist Cx lesion is 60% stenosed.     1. Moderate diffuse disease of the LAD and Lcx (Borderline Pd/Pa 0.92, dPR 0.88 for both lesions). Plan for medical management given lack of clinical symptoms.  The lesion are long, moderate and calcified.  2. Mild non-obstructive RCA disease.   (-) orthopnea/PND   METS/Exercise Tolerance: >4 METS Comment: - Can walk 2-3 miles continuously without exertional symptoms    Hematologic:  - neg hematologic  ROS  (-) history of blood clots and history of blood transfusion   Musculoskeletal:  - neg musculoskeletal ROS     GI/Hepatic:  - neg GI/hepatic ROS  (-) GERD and liver disease    Renal/Genitourinary:  - neg Renal ROS  (-) renal disease   Endo:     (+)  type II DM, Last HgA1c: 5.7, date: 4/24/23,   Normal glucose range: Avg FBG around ,            (-) chronic steroid usage   Psychiatric/Substance Use:  - neg psychiatric ROS     Infectious Disease:  - neg infectious disease ROS     Malignancy:  - neg malignancy ROS  (-) malignancy   Other:            Virtual visit -  No vitals were obtained    Physical Exam  Constitutional: Awake, alert, cooperative, no apparent distress, and appears stated age.  Eyes: Pupils equal  HENT: Normocephalic  Respiratory: non labored breathing   Neurologic: Awake, alert, oriented to name, place and time.   Neuropsychiatric: Calm, cooperative. Normal affect.      Prior Labs/Diagnostic Studies   All labs and imaging personally reviewed     Component      Latest Ref Rng 8/20/2023  11:11 PM   WBC      4.0 - 11.0 10e3/uL 5.9    RBC Count      3.80 - 5.20 10e6/uL 4.60    Hemoglobin      11.7 - 15.7 g/dL 13.5    Hematocrit      35.0 - 47.0 % 40.7    MCV      78 - 100 fL 89    MCH      26.5 - 33.0 pg 29.3    MCHC      31.5 - 36.5 g/dL 33.2    RDW      10.0 - 15.0 % 13.1    Platelet Count      150 - 450 10e3/uL 155    % Neutrophils      % 45    % Lymphocytes      % 43    % Monocytes      % 8    % Eosinophils      % 3    % Basophils      % 1    % Immature Granulocytes      % 0    NRBCs per 100 WBC      <1 /100 0    Absolute Neutrophils      1.6 - 8.3 10e3/uL 2.7    Absolute Lymphocytes      0.8 - 5.3 10e3/uL 2.5    Absolute Monocytes      0.0 - 1.3 10e3/uL 0.5    Absolute Eosinophils      0.0 - 0.7 10e3/uL 0.2    Absolute Basophils      0.0 - 0.2 10e3/uL 0.0    Absolute Immature Granulocytes      <=0.4 10e3/uL 0.0    Absolute NRBCs      10e3/uL 0.0    Sodium      136 - 145 mmol/L 140    Potassium      3.4 - 5.3 mmol/L 4.3    Chloride      98 - 107 mmol/L 105    Carbon Dioxide (CO2)      22 - 29 mmol/L 23    Anion Gap      7 - 15 mmol/L 12    Urea Nitrogen      8.0 -  23.0 mg/dL 23.4 (H)    Creatinine      0.51 - 0.95 mg/dL 0.87    Calcium      8.8 - 10.2 mg/dL 10.1    Glucose      70 - 99 mg/dL 105 (H)    Alkaline Phosphatase      35 - 104 U/L 49    AST      0 - 45 U/L 29    ALT      0 - 50 U/L 10    Protein Total      6.4 - 8.3 g/dL 7.0    Albumin      3.5 - 5.2 g/dL 4.8    Bilirubin Total      <=1.2 mg/dL 0.3    GFR Estimate      >60 mL/min/1.73m2 73       Legend:  (H) High    EKG/ stress test - if available please see in ROS above       The patient's records and results personally reviewed by this provider.     Outside records reviewed from: Care Everywhere    Labs today: A1C      Assessment    Won Urena is a 68 year old female seen as a PAC referral for risk assessment and optimization for anesthesia.    Plan/Recommendations  Pt will be optimized for the proposed procedure.  See below for details on the assessment, risk, and preoperative recommendations    NEUROLOGY  - No history of TIA, CVA or seizure  -Post Op delirium risk factors:  No risk identified    HEENT  - No current airway concerns.  Will need to be reassessed day of surgery.  Mallampati: Unable to assess  TM: Unable to assess  - Ptosis of both eyelids (see HPI) - surgery planned as above.  - Seasonal allergies  Hold cetirizine on DOS.     CARDIAC  - No history of Afib  - CAD. Moderate diffuse disease of the LAD and LCx and mild non-obstructive disease of the RCA per angiogram on 6/9/23 (see above). She is medically managed due to a lack of symptoms. Last visit with cardiology on 7/24/23. Patient is able to achieve > 4 METS and denies any symptoms of CP, chest tightness, SOB.   - Ascending aortic aneurysm (3.9cm) per echo 4/24/23. Monitored with yearly echos per cardiology.  - Moderate aortic stenosis. Denies SOB, orthopnea or LE edema.   - Hypertension, well-controlled.   Hold losartan on DOS.   - Hyperlipidemia managed on atorvastatin and fenofibrate  - METS (Metabolic Equivalents)  Patient performs 4 or  "more METS exercise without symptoms            Total Score: 0      RCRI-Low risk: Class 2 0.9% complication rate            Total Score: 1    RCRI: CAD        PULMONARY  EMILIANO Low Risk            Total Score: 2    EMILIANO: Hypertension    EMILIANO: Over 50 ys old      - Denies asthma or inhaler use  - Tobacco History    History   Smoking Status    Never   Smokeless Tobacco    Never       GI  PONV High Risk  Total Score: 3           1 AN PONV: Pt is Female    1 AN PONV: Patient is not a current smoker    1 AN PONV: Intended Post Op Opioids        /RENAL  - Baseline Creatinine  0.87    ENDOCRINE    - BMI: Estimated body mass index is 26.58 kg/m  as calculated from the following:    Height as of 7/24/23: 1.737 m (5' 8.39\").    Weight as of 7/24/23: 80.2 kg (176 lb 12.8 oz).  Overweight (BMI 25.0-29.9)  - Diabetes  Diabetes Mellitus, Type II, non-insulin dependent.  Last A1C on 4/24/23 was 5.7. Rechecking today.   Please see PAC RN instructions for medication hold recommendations. Recommend close monitoring of the patient's blood glucose levels throughout the perioperative period.    HEME  VTE Low Risk 0.26%            Total Score: 1    VTE: Greater than 59 yrs old      - Platelet disfunction second to Aspirin (Jinny, many others)      Different anesthesia methods/types have been discussed with the patient, but they are aware that the final plan will be decided by the assigned anesthesia provider on the date of service.    The patient is optimized for their procedure. AVS with information on surgery time/arrival time, meds and NPO status given by nursing staff. No further diagnostic testing indicated.    Please refer to the physical examination documented by the anesthesiologist in the anesthesia record on the day of surgery.    Video-Visit Details    Type of service:  Video Visit    Provider received verbal consent for a Video Visit from the patient? Yes     Originating Location (pt. Location): Home    Distant Location " (provider location):  Off-site  Mode of Communication:  Video Conference via [employer]  On the day of service:     Prep time: 19 minutes  Visit time: 10 minutes  Documentation time: 15 minutes  ------------------------------------------  Total time: 44 minutes      MIKAEL Martinez CNP  Preoperative Assessment Center  [facility]  Phone: [phone]  Fax: [phone]

## 2023-09-12 NOTE — TELEPHONE ENCOUNTER
FUTURE VISIT INFORMATION      SURGERY INFORMATION:  Date: 23  Location:  or  Surgeon:  Shantelle Marti MD   Anesthesia Type:  general  Procedure: Both upper eyelid blepharoplasty and ptosis repair Both lower eyelid blepharoplasty     RECORDS REQUESTED FROM:       Primary Care Provider:   Dustin Haynes MD   - [facility]    Pertinent Medical History: coronary artery calcification, hypertension, CAD    Most recent EKG+ [id number]    Most recent ECHO: 23    Most recent Coronary Angiogram: 23

## 2023-09-13 ENCOUNTER — TELEPHONE (OUTPATIENT)
Dept: OPHTHALMOLOGY | Facility: CLINIC | Age: 68
End: 2023-09-13
Payer: COMMERCIAL

## 2023-09-13 NOTE — TELEPHONE ENCOUNTER
Spoke with patient regarding rescheduling POST-OP Appointment. Scheduled patient accordingly and patient is aware of date, time and location.-Per Patient

## 2023-09-15 ENCOUNTER — LAB (OUTPATIENT)
Dept: LAB | Facility: CLINIC | Age: 68
End: 2023-09-15
Payer: COMMERCIAL

## 2023-09-15 DIAGNOSIS — E11.9 TYPE 2 DIABETES MELLITUS WITHOUT COMPLICATION, WITHOUT LONG-TERM CURRENT USE OF INSULIN (H): Chronic | ICD-10-CM

## 2023-09-15 DIAGNOSIS — Z01.818 PREOP EXAMINATION: ICD-10-CM

## 2023-09-15 LAB — HBA1C MFR BLD: 6.3 %

## 2023-09-15 PROCEDURE — 99000 SPECIMEN HANDLING OFFICE-LAB: CPT | Performed by: PATHOLOGY

## 2023-09-15 PROCEDURE — 36415 COLL VENOUS BLD VENIPUNCTURE: CPT | Performed by: PATHOLOGY

## 2023-09-15 PROCEDURE — 83036 HEMOGLOBIN GLYCOSYLATED A1C: CPT

## 2023-09-20 ENCOUNTER — OFFICE VISIT (OUTPATIENT)
Dept: FAMILY MEDICINE | Facility: CLINIC | Age: 68
End: 2023-09-20
Payer: COMMERCIAL

## 2023-09-20 VITALS
HEART RATE: 70 BPM | SYSTOLIC BLOOD PRESSURE: 118 MMHG | BODY MASS INDEX: 26.72 KG/M2 | TEMPERATURE: 98.3 F | DIASTOLIC BLOOD PRESSURE: 79 MMHG | OXYGEN SATURATION: 98 % | WEIGHT: 177.75 LBS | RESPIRATION RATE: 17 BRPM

## 2023-09-20 DIAGNOSIS — Z23 NEED FOR VACCINATION: Primary | ICD-10-CM

## 2023-09-20 DIAGNOSIS — L08.9 LOCAL INFECTION OF SKIN AND SUBCUTANEOUS TISSUE: ICD-10-CM

## 2023-09-20 LAB
MRSA DNA SPEC QL NAA+PROBE: NEGATIVE
SA TARGET DNA: POSITIVE

## 2023-09-20 PROCEDURE — 87641 MR-STAPH DNA AMP PROBE: CPT | Mod: ORL | Performed by: FAMILY MEDICINE

## 2023-09-20 RX ORDER — MUPIROCIN 20 MG/G
OINTMENT TOPICAL 2 TIMES DAILY
Qty: 30 G | Refills: 0 | Status: SHIPPED | OUTPATIENT
Start: 2023-09-20 | End: 2023-09-30

## 2023-09-20 NOTE — NURSING NOTE
68 year old  Chief Complaint   Patient presents with    Derm Problem     Sores on nose, cysts, lump under armpit -- doing eye surgery next Thursday, wanting to ensure there aren't any infections.       Blood pressure 118/79, pulse 70, temperature 98.3  F (36.8  C), temperature source Temporal, resp. rate 17, weight 80.6 kg (177 lb 12 oz), SpO2 98 %, not currently breastfeeding. Body mass index is 26.72 kg/m .  Patient Active Problem List   Diagnosis    Type 2 diabetes mellitus without complication, without long-term current use of insulin (H)    Coronary artery calcification    Nonrheumatic aortic valve stenosis    Ascending aortic aneurysm (H)    Hyperlipidemia LDL goal <100    Essential hypertension    Screening for colon cancer    Genitourinary syndrome of menopause    Environmental allergies    Seasonal allergic rhinitis due to pollen    Status post coronary angiogram    Coronary artery disease involving native heart without angina pectoris, unspecified vessel or lesion type    Dermatochalasis of both upper eyelids    Ptosis of both eyelids       Wt Readings from Last 2 Encounters:   09/20/23 80.6 kg (177 lb 12 oz)   07/24/23 80.2 kg (176 lb 12.8 oz)     BP Readings from Last 3 Encounters:   09/20/23 118/79   08/21/23 137/80   07/24/23 96/65         Current Outpatient Medications   Medication    aspirin (ASA) 81 MG chewable tablet    atorvastatin (LIPITOR) 40 MG tablet    blood glucose (NO BRAND SPECIFIED) test strip    Blood Glucose Monitoring Suppl (ACCU-CHEK GUIDE) w/Device KIT    carboxymethylcellulose PF (REFRESH PLUS) 0.5 % ophthalmic solution    cetirizine (ZYRTEC) 10 MG tablet    cyanocobalamin (VITAMIN B-12) 100 MCG tablet    dapagliflozin (FARXIGA) 10 MG TABS tablet    diltiazem (CARDIZEM) 60 MG tablet    estradiol (ESTRACE) 0.1 MG/GM vaginal cream    fenofibrate (TRIGLIDE/LOFIBRA) 160 MG tablet    gabapentin (NEURONTIN) 100 MG capsule    ipratropium (ATROVENT) 0.06 % nasal spray    losartan (COZAAR)  50 MG tablet    metFORMIN (GLUCOPHAGE) 500 MG tablet    montelukast (SINGULAIR) 10 MG tablet    multivitamin w/minerals (MULTI-VITAMIN) tablet    phenazopyridine (PYRIDIUM) 100 MG tablet    Semaglutide, 1 MG/DOSE, (OZEMPIC, 1 MG/DOSE,) 4 MG/3ML SOPN    Vitamin D, Cholecalciferol, 10 MCG (400 UNIT) TABS     No current facility-administered medications for this visit.       Social History     Tobacco Use    Smoking status: Never     Passive exposure: Never    Smokeless tobacco: Never   Vaping Use    Vaping Use: Never used   Substance Use Topics    Alcohol use: Yes     Alcohol/week: 1.0 standard drink of alcohol     Types: 1 Standard drinks or equivalent per week     Comment: less than 2 drinks a week    Drug use: Never       Health Maintenance Due   Topic Date Due    DEXA  Never done    DTAP/TDAP/TD IMMUNIZATION (1 - Tdap) 12/13/2019    ZOSTER IMMUNIZATION (2 of 2) 12/27/2019    COVID-19 Vaccine (6 - Pfizer series) 04/16/2023    INFLUENZA VACCINE (1) 09/01/2023       No results found for: PAP      September 20, 2023 1:45 PM    Prior to immunization administration, verified patients identity using patient s name and date of birth. Please see Immunization Activity for additional information.     Screening Questionnaire for Adult Immunization    Are you sick today?   No   Do you have allergies to medications, food, a vaccine component or latex?   No   Have you ever had a serious reaction after receiving a vaccination?   No   Do you have a long-term health problem with heart, lung, kidney, or metabolic disease (e.g., diabetes), asthma, a blood disorder, no spleen, complement component deficiency, a cochlear implant, or a spinal fluid leak?  Are you on long-term aspirin therapy?   No   Do you have cancer, leukemia, HIV/AIDS, or any other immune system problem?   No   Do you have a parent, brother, or sister with an immune system problem?   No   In the past 3 months, have you taken medications that affect  your immune  system, such as prednisone, other steroids, or anticancer drugs; drugs for the treatment of rheumatoid arthritis, Crohn s disease, or psoriasis; or have you had radiation treatments?   No   Have you had a seizure, or a brain or other nervous system problem?   No   During the past year, have you received a transfusion of blood or blood    products, or been given immune (gamma) globulin or antiviral drug?   No   For women: Are you pregnant or is there a chance you could become       pregnant during the next month?   No   Have you received any vaccinations in the past 4 weeks?   No     Immunization questionnaire answers were all negative.      Patient instructed to remain in clinic for 15 minutes afterwards, and to report any adverse reactions.     Screening performed by Kelsy Yap LPN on 9/20/2023 at 1:50 PM.

## 2023-09-20 NOTE — PROGRESS NOTES
"  Assessment & Plan   Problem List Items Addressed This Visit    None  Visit Diagnoses       Need for vaccination    -  Primary    Relevant Orders    INFLUENZA VACCINE 65+ (FLUZONE HD) (Completed)    Local infection of skin and subcutaneous tissue        Relevant Medications    mupirocin (BACTROBAN) 2 % external ointment    Other Relevant Orders    Methicillin Resistant Staph Aureus PCR           Chief suspicion for MRSA colonization. Swab as noted above. Starting topical mupirocin as ordered with plan to notify her of lab results. I do not think this should prohibit upcoming surgery, but will await lab results and symptom progress.     32 minutes spent on the date of the encounter doing chart review, history and exam, documentation and further activities as noted.      MD HUSSEIN Mccarty [facility]    Subjective   Won is a 68 year old, presenting for the following health issues:  Derm Problem (Sores on nose, cysts, lump under armpit -- doing eye surgery next Thursday, wanting to ensure there aren't any infections.)      HPI   \"Cysts on face, under arms, sores in nose\"  Sores in her nose  - first noticed these a few weeks ago  - started using a saline rinse in her nose and symptoms eventually resolved  - but then returned within the past few days  - is trying to use the saline spray again  Sores on external nose  - two lesions, now resolving  Swollen lump under LEFT arm  - shaves her arm pits  - moderate tenderness to palpation, no bleeding or drainage, no surrounding redness   For all of the above issues, Won is principally worried because she has a surgery for her eyes coming up and she is concerned that if this is all some sort of a bacterial infection it could get much worse if she proceeds with the surgery.        Review of Systems   Constitutional, HEENT, cardiovascular, pulmonary, gi and gu systems are negative, except as otherwise noted.      Objective    /79 (BP Location: " Left arm, Patient Position: Sitting, Cuff Size: Adult Regular)   Pulse 70   Temp 98.3  F (36.8  C) (Temporal)   Resp 17   Wt 80.6 kg (177 lb 12 oz)   SpO2 98%   BMI 26.72 kg/m    Body mass index is 26.72 kg/m .  Physical Exam   GENERAL: healthy, alert and no distress  NECK: no adenopathy, no asymmetry, masses, or scars and thyroid normal to palpation  RESP: lungs clear to auscultation - no rales, rhonchi or wheezes  CV: regular rate and rhythm, normal S1 S2, no S3 or S4, no murmur, click or rub, no peripheral edema and peripheral pulses strong  ABDOMEN: soft, nontender, no hepatosplenomegaly, no masses and bowel sounds normal  MS: no gross musculoskeletal defects noted, no edema  SKIN:   - 2 healing lesions on bridge and LEFT side of nose: no bleeding, drainage, swelling, associated erythema  - no visualized sores in nares today, no bleeding or drainage from nostrils  - mass at LEFT axilla: mobile, rubbery, minimal tenderness to palpation, no bleeding or drainage, no surrounding erythema

## 2023-09-25 DIAGNOSIS — E11.9 TYPE 2 DIABETES MELLITUS WITHOUT COMPLICATION, WITHOUT LONG-TERM CURRENT USE OF INSULIN (H): ICD-10-CM

## 2023-09-27 DIAGNOSIS — E11.9 TYPE 2 DIABETES MELLITUS WITHOUT COMPLICATION, WITHOUT LONG-TERM CURRENT USE OF INSULIN (H): ICD-10-CM

## 2023-09-27 RX ORDER — SEMAGLUTIDE 1.34 MG/ML
INJECTION, SOLUTION SUBCUTANEOUS
Qty: 9 ML | Refills: 3 | Status: SHIPPED | OUTPATIENT
Start: 2023-09-27 | End: 2024-03-12

## 2023-09-27 NOTE — TELEPHONE ENCOUNTER
Medication requested: Semaglutide, 1 MG/DOSE, (OZEMPIC, 1 MG/DOSE,) 4 MG/3ML SOPN   Last office visit: 9/20/23  [facility] appointments: none  Medication last refilled: 9/16/22; 12 mL + 3 refills  Last qualifying labs:   Component      Latest Ref Rng 9/15/2023  2:57 PM   Hemoglobin A1C      <5.7 % 6.3 (H)       Component      Latest Ref Rng 8/20/2023  11:11 PM   Creatinine      0.51 - 0.95 mg/dL 0.87      Prescription approved per [facility] Refill Protocol.    Rhys WATTS, RN  09/27/23 11:25 AM

## 2023-09-28 ENCOUNTER — HOSPITAL ENCOUNTER (OUTPATIENT)
Facility: AMBULATORY SURGERY CENTER | Age: 68
Discharge: HOME | End: 2023-09-28
Attending: OPHTHALMOLOGY
Payer: COMMERCIAL

## 2023-09-28 ENCOUNTER — ANESTHESIA (OUTPATIENT)
Dept: SURGERY | Facility: AMBULATORY SURGERY CENTER | Age: 68
End: 2023-09-28
Payer: COMMERCIAL

## 2023-09-28 VITALS
OXYGEN SATURATION: 99 % | SYSTOLIC BLOOD PRESSURE: 126 MMHG | RESPIRATION RATE: 20 BRPM | HEART RATE: 64 BPM | BODY MASS INDEX: 26.22 KG/M2 | TEMPERATURE: 97 F | DIASTOLIC BLOOD PRESSURE: 62 MMHG | WEIGHT: 173 LBS | HEIGHT: 68 IN

## 2023-09-28 DIAGNOSIS — H02.834 DERMATOCHALASIS OF BOTH UPPER EYELIDS: ICD-10-CM

## 2023-09-28 DIAGNOSIS — Z98.890 POSTSURGICAL STATES FOLLOWING SURGERY OF EYE AND ADNEXA: Primary | ICD-10-CM

## 2023-09-28 DIAGNOSIS — H02.831 DERMATOCHALASIS OF BOTH UPPER EYELIDS: ICD-10-CM

## 2023-09-28 DIAGNOSIS — H02.403 PTOSIS OF BOTH EYELIDS: ICD-10-CM

## 2023-09-28 DIAGNOSIS — J30.1 SEASONAL ALLERGIC RHINITIS DUE TO POLLEN: ICD-10-CM

## 2023-09-28 LAB — GLUCOSE BLDC GLUCOMTR-MCNC: 108 MG/DL (ref 70–99)

## 2023-09-28 PROCEDURE — 370N000011: Mod: DBP

## 2023-09-28 PROCEDURE — 67903 REPAIR EYELID DEFECT: CPT | Mod: 50 | Performed by: OPHTHALMOLOGY

## 2023-09-28 PROCEDURE — 96999 UNLISTED SPEC DERM SVC/PX: CPT | Performed by: OPHTHALMOLOGY

## 2023-09-28 PROCEDURE — 82962 GLUCOSE BLOOD TEST: CPT | Performed by: PATHOLOGY

## 2023-09-28 PROCEDURE — 360N000061 HC FACILITY ASC - 30 MINUTES: Mod: DBP

## 2023-09-28 PROCEDURE — 67903 REPAIR EYELID DEFECT: CPT | Mod: RT

## 2023-09-28 RX ORDER — FENTANYL CITRATE 50 UG/ML
INJECTION, SOLUTION INTRAMUSCULAR; INTRAVENOUS PRN
Status: DISCONTINUED | OUTPATIENT
Start: 2023-09-28 | End: 2023-09-28

## 2023-09-28 RX ORDER — ERYTHROMYCIN 5 MG/G
OINTMENT OPHTHALMIC
Qty: 14 G | Refills: 0 | Status: SHIPPED | OUTPATIENT
Start: 2023-09-28 | End: 2023-10-10

## 2023-09-28 RX ORDER — OXYCODONE HYDROCHLORIDE 5 MG/1
10 TABLET ORAL
Status: DISCONTINUED | OUTPATIENT
Start: 2023-09-28 | End: 2023-09-29 | Stop reason: HOSPADM

## 2023-09-28 RX ORDER — LIDOCAINE HYDROCHLORIDE 20 MG/ML
INJECTION, SOLUTION INFILTRATION; PERINEURAL PRN
Status: DISCONTINUED | OUTPATIENT
Start: 2023-09-28 | End: 2023-09-28

## 2023-09-28 RX ORDER — FENTANYL CITRATE 50 UG/ML
25 INJECTION, SOLUTION INTRAMUSCULAR; INTRAVENOUS EVERY 5 MIN PRN
Status: DISCONTINUED | OUTPATIENT
Start: 2023-09-28 | End: 2023-09-28 | Stop reason: HOSPADM

## 2023-09-28 RX ORDER — LIDOCAINE 40 MG/G
CREAM TOPICAL
Status: DISCONTINUED | OUTPATIENT
Start: 2023-09-28 | End: 2023-09-28 | Stop reason: HOSPADM

## 2023-09-28 RX ORDER — HYDROMORPHONE HYDROCHLORIDE 1 MG/ML
0.2 INJECTION, SOLUTION INTRAMUSCULAR; INTRAVENOUS; SUBCUTANEOUS EVERY 5 MIN PRN
Status: DISCONTINUED | OUTPATIENT
Start: 2023-09-28 | End: 2023-09-28 | Stop reason: HOSPADM

## 2023-09-28 RX ORDER — ONDANSETRON 4 MG/1
4 TABLET, ORALLY DISINTEGRATING ORAL EVERY 30 MIN PRN
Status: DISCONTINUED | OUTPATIENT
Start: 2023-09-28 | End: 2023-09-28 | Stop reason: HOSPADM

## 2023-09-28 RX ORDER — SODIUM CHLORIDE, SODIUM LACTATE, POTASSIUM CHLORIDE, CALCIUM CHLORIDE 600; 310; 30; 20 MG/100ML; MG/100ML; MG/100ML; MG/100ML
INJECTION, SOLUTION INTRAVENOUS CONTINUOUS
Status: DISCONTINUED | OUTPATIENT
Start: 2023-09-28 | End: 2023-09-28 | Stop reason: HOSPADM

## 2023-09-28 RX ORDER — ONDANSETRON 4 MG/1
4 TABLET, ORALLY DISINTEGRATING ORAL EVERY 30 MIN PRN
Status: DISCONTINUED | OUTPATIENT
Start: 2023-09-28 | End: 2023-09-29 | Stop reason: HOSPADM

## 2023-09-28 RX ORDER — ONDANSETRON 2 MG/ML
4 INJECTION INTRAMUSCULAR; INTRAVENOUS EVERY 30 MIN PRN
Status: DISCONTINUED | OUTPATIENT
Start: 2023-09-28 | End: 2023-09-28 | Stop reason: HOSPADM

## 2023-09-28 RX ORDER — ONDANSETRON 2 MG/ML
4 INJECTION INTRAMUSCULAR; INTRAVENOUS EVERY 30 MIN PRN
Status: DISCONTINUED | OUTPATIENT
Start: 2023-09-28 | End: 2023-09-29 | Stop reason: HOSPADM

## 2023-09-28 RX ORDER — HYDROMORPHONE HYDROCHLORIDE 1 MG/ML
0.4 INJECTION, SOLUTION INTRAMUSCULAR; INTRAVENOUS; SUBCUTANEOUS EVERY 5 MIN PRN
Status: DISCONTINUED | OUTPATIENT
Start: 2023-09-28 | End: 2023-09-28 | Stop reason: HOSPADM

## 2023-09-28 RX ORDER — ERYTHROMYCIN 5 MG/G
OINTMENT OPHTHALMIC PRN
Status: DISCONTINUED | OUTPATIENT
Start: 2023-09-28 | End: 2023-09-28 | Stop reason: HOSPADM

## 2023-09-28 RX ORDER — OXYCODONE HYDROCHLORIDE 5 MG/1
5 TABLET ORAL
Status: DISCONTINUED | OUTPATIENT
Start: 2023-09-28 | End: 2023-09-29 | Stop reason: HOSPADM

## 2023-09-28 RX ORDER — LABETALOL HYDROCHLORIDE 5 MG/ML
10 INJECTION, SOLUTION INTRAVENOUS
Status: DISCONTINUED | OUTPATIENT
Start: 2023-09-28 | End: 2023-09-28 | Stop reason: HOSPADM

## 2023-09-28 RX ORDER — NEOMYCIN POLYMYXIN B SULFATES AND DEXAMETHASONE 3.5; 10000; 1 MG/ML; [USP'U]/ML; MG/ML
SUSPENSION/ DROPS OPHTHALMIC
Qty: 10 ML | Refills: 0 | Status: SHIPPED | OUTPATIENT
Start: 2023-09-28 | End: 2023-10-10

## 2023-09-28 RX ORDER — ACETAMINOPHEN 325 MG/1
975 TABLET ORAL ONCE
Status: COMPLETED | OUTPATIENT
Start: 2023-09-28 | End: 2023-09-28

## 2023-09-28 RX ORDER — FENTANYL CITRATE 50 UG/ML
50 INJECTION, SOLUTION INTRAMUSCULAR; INTRAVENOUS EVERY 5 MIN PRN
Status: DISCONTINUED | OUTPATIENT
Start: 2023-09-28 | End: 2023-09-28 | Stop reason: HOSPADM

## 2023-09-28 RX ORDER — HYDRALAZINE HYDROCHLORIDE 20 MG/ML
2.5-5 INJECTION INTRAMUSCULAR; INTRAVENOUS EVERY 10 MIN PRN
Status: DISCONTINUED | OUTPATIENT
Start: 2023-09-28 | End: 2023-09-28 | Stop reason: HOSPADM

## 2023-09-28 RX ORDER — PROPOFOL 10 MG/ML
INJECTION, EMULSION INTRAVENOUS CONTINUOUS PRN
Status: DISCONTINUED | OUTPATIENT
Start: 2023-09-28 | End: 2023-09-28

## 2023-09-28 RX ORDER — EPHEDRINE SULFATE 50 MG/ML
INJECTION, SOLUTION INTRAMUSCULAR; INTRAVENOUS; SUBCUTANEOUS PRN
Status: DISCONTINUED | OUTPATIENT
Start: 2023-09-28 | End: 2023-09-28

## 2023-09-28 RX ORDER — DAPAGLIFLOZIN 10 MG/1
10 TABLET, FILM COATED ORAL EVERY MORNING
Qty: 100 TABLET | Refills: 1 | Status: SHIPPED | OUTPATIENT
Start: 2023-09-28 | End: 2024-02-26

## 2023-09-28 RX ORDER — PROPOFOL 10 MG/ML
INJECTION, EMULSION INTRAVENOUS PRN
Status: DISCONTINUED | OUTPATIENT
Start: 2023-09-28 | End: 2023-09-28

## 2023-09-28 RX ORDER — DEXAMETHASONE SODIUM PHOSPHATE 4 MG/ML
INJECTION, SOLUTION INTRA-ARTICULAR; INTRALESIONAL; INTRAMUSCULAR; INTRAVENOUS; SOFT TISSUE PRN
Status: DISCONTINUED | OUTPATIENT
Start: 2023-09-28 | End: 2023-09-28

## 2023-09-28 RX ORDER — ONDANSETRON 2 MG/ML
INJECTION INTRAMUSCULAR; INTRAVENOUS PRN
Status: DISCONTINUED | OUTPATIENT
Start: 2023-09-28 | End: 2023-09-28

## 2023-09-28 RX ORDER — GLYCOPYRROLATE 0.2 MG/ML
INJECTION, SOLUTION INTRAMUSCULAR; INTRAVENOUS PRN
Status: DISCONTINUED | OUTPATIENT
Start: 2023-09-28 | End: 2023-09-28

## 2023-09-28 RX ADMIN — GLYCOPYRROLATE 0.2 MG: 0.2 INJECTION, SOLUTION INTRAMUSCULAR; INTRAVENOUS at 11:05

## 2023-09-28 RX ADMIN — Medication 100 MCG: at 11:29

## 2023-09-28 RX ADMIN — SODIUM CHLORIDE, SODIUM LACTATE, POTASSIUM CHLORIDE, CALCIUM CHLORIDE: 600; 310; 30; 20 INJECTION, SOLUTION INTRAVENOUS at 10:36

## 2023-09-28 RX ADMIN — LIDOCAINE HYDROCHLORIDE 100 MG: 20 INJECTION, SOLUTION INFILTRATION; PERINEURAL at 10:59

## 2023-09-28 RX ADMIN — ACETAMINOPHEN 975 MG: 325 TABLET ORAL at 10:28

## 2023-09-28 RX ADMIN — FENTANYL CITRATE 50 MCG: 50 INJECTION, SOLUTION INTRAMUSCULAR; INTRAVENOUS at 10:59

## 2023-09-28 RX ADMIN — PROPOFOL 150 MCG/KG/MIN: 10 INJECTION, EMULSION INTRAVENOUS at 10:59

## 2023-09-28 RX ADMIN — Medication 100 MCG: at 11:18

## 2023-09-28 RX ADMIN — Medication 100 MCG: at 10:59

## 2023-09-28 RX ADMIN — ONDANSETRON 4 MG: 2 INJECTION INTRAMUSCULAR; INTRAVENOUS at 11:05

## 2023-09-28 RX ADMIN — Medication 100 MCG: at 11:37

## 2023-09-28 RX ADMIN — EPHEDRINE SULFATE 5 MG: 50 INJECTION, SOLUTION INTRAMUSCULAR; INTRAVENOUS; SUBCUTANEOUS at 11:05

## 2023-09-28 RX ADMIN — Medication 100 MCG: at 11:16

## 2023-09-28 RX ADMIN — Medication 100 MCG: at 11:01

## 2023-09-28 RX ADMIN — Medication 100 MCG: at 12:00

## 2023-09-28 RX ADMIN — Medication 100 MCG: at 11:52

## 2023-09-28 RX ADMIN — Medication 100 MCG: at 11:41

## 2023-09-28 RX ADMIN — Medication 100 MCG: at 11:09

## 2023-09-28 RX ADMIN — PROPOFOL 120 MG: 10 INJECTION, EMULSION INTRAVENOUS at 10:59

## 2023-09-28 RX ADMIN — Medication 100 MCG: at 11:49

## 2023-09-28 RX ADMIN — DEXAMETHASONE SODIUM PHOSPHATE 4 MG: 4 INJECTION, SOLUTION INTRA-ARTICULAR; INTRALESIONAL; INTRAMUSCULAR; INTRAVENOUS; SOFT TISSUE at 11:05

## 2023-09-28 NOTE — ANESTHESIA PREPROCEDURE EVALUATION
Anesthesia Pre-Procedure Evaluation    Patient: Won Urena   MRN: [id number] : 1955        Procedure : Procedure(s):  Both upper eyelid blepharoplasty and ptosis repair  Both lower eyelid blepharoplasty          Past Medical History:   Diagnosis Date    Abnormal Pap smear of cervix     Aortic stenosis     Aortic valve stenosis     Benign colon polyp     CAD (coronary artery disease)     Diabetes mellitus, type 2 (H)     Gestational diabetes     HTN (hypertension)     Hyperlipidemia LDL goal <100     Nonsenile cataract     Very beginnings    Post-menopausal atrophic vaginitis     Thickened endometrium       Past Surgical History:   Procedure Laterality Date     SECTION      CV CORONARY ANGIOGRAM N/A 2023    Procedure: Coronary Angiogram;  Surgeon: Geovanni Ibarra MD;  Location:  [facility]    CV INSTANTANEOUS WAVE-FREE RATIO N/A 2023    Procedure: Instantaneous Wave-Free Ratio;  Surgeon: Geovanni Ibarra MD;  Location:  [facility]    GYN SURGERY        Allergies   Allergen Reactions    Avapro [Irbesartan] Cough      Social History     Tobacco Use    Smoking status: Never     Passive exposure: Never    Smokeless tobacco: Never   Substance Use Topics    Alcohol use: Yes     Alcohol/week: 1.0 standard drink of alcohol     Types: 1 Standard drinks or equivalent per week     Comment: less than 2 drinks a week      Wt Readings from Last 1 Encounters:   23 78.5 kg (173 lb)        Anesthesia Evaluation            ROS/MED HX  ENT/Pulmonary:       Neurologic:       Cardiovascular: Comment: Aortic Stenosis    (+) Dyslipidemia hypertension- -  CAD -  - -                                      METS/Exercise Tolerance:     Hematologic:       Musculoskeletal:       GI/Hepatic:       Renal/Genitourinary:       Endo:     (+)  type II DM,                    Psychiatric/Substance Use:       Infectious Disease:       Malignancy:       Other:             Physical Exam    Airway  airway exam normal      Mallampati: II   TM distance: > 3 FB   Neck ROM: full   Mouth opening: > 3 cm    Respiratory Devices and Support         Dental       (+) Completely normal teeth      Cardiovascular          Rhythm and rate: regular and normal     Pulmonary   pulmonary exam normal        breath sounds clear to auscultation           OUTSIDE LABS:  CBC:   Lab Results   Component Value Date    WBC 5.9 08/20/2023    WBC 5.3 06/09/2023    HGB 13.5 08/20/2023    HGB 14.5 06/09/2023    HCT 40.7 08/20/2023    HCT 43.5 06/09/2023     08/20/2023     (L) 06/09/2023     BMP:   Lab Results   Component Value Date     08/20/2023     06/09/2023    POTASSIUM 4.3 08/20/2023    POTASSIUM 4.2 06/09/2023    CHLORIDE 105 08/20/2023    CHLORIDE 106 06/09/2023    CO2 23 08/20/2023    CO2 25 06/09/2023    BUN 23.4 (H) 08/20/2023    BUN 22.3 06/09/2023    CR 0.87 08/20/2023    CR 0.68 06/09/2023     (H) 09/28/2023     (H) 08/20/2023     COAGS:   Lab Results   Component Value Date    PTT 26 06/09/2023    INR 1.12 08/20/2023     POC: No results found for: BGM, HCG, HCGS  HEPATIC:   Lab Results   Component Value Date    ALBUMIN 4.8 08/20/2023    PROTTOTAL 7.0 08/20/2023    ALT 10 08/20/2023    AST 29 08/20/2023    ALKPHOS 49 08/20/2023    BILITOTAL 0.3 08/20/2023     OTHER:   Lab Results   Component Value Date    A1C 6.3 (H) 09/15/2023    JOAQUINA 10.1 08/20/2023    SED 5 08/20/2023       Anesthesia Plan    ASA Status:  3    NPO Status:  NPO Appropriate    Anesthesia Type: General.     - Airway: LMA   Induction: Intravenous.   Maintenance: Balanced.        Consents    Anesthesia Plan(s) and associated risks, benefits, and realistic alternatives discussed. Questions answered and patient/representative(s) expressed understanding.     - Discussed:     - Discussed with:  Patient      - Extended Intubation/Ventilatory Support Discussed: No.      - Patient is DNR/DNI Status: No      Use of blood products discussed: No .     Postoperative Care    Pain management: IV analgesics, Oral pain medications, Multi-modal analgesia.   PONV prophylaxis: Ondansetron (or other 5HT-3), Background Propofol Infusion     Comments:                Leobardo Lehman MD

## 2023-09-28 NOTE — ANESTHESIA POSTPROCEDURE EVALUATION
Patient: Won Urena    Procedure: Procedure(s):  Both upper eyelid blepharoplasty and ptosis repair  Both lower eyelid blepharoplasty       Anesthesia Type:  General    Note:  Disposition: Outpatient   Postop Pain Control: Uneventful            Sign Out: Well controlled pain   PONV: No   Neuro/Psych: Uneventful            Sign Out: Acceptable/Baseline neuro status   Airway/Respiratory: Uneventful            Sign Out: Acceptable/Baseline resp. status   CV/Hemodynamics: Uneventful            Sign Out: Acceptable CV status   Other NRE: NONE   DID A NON-ROUTINE EVENT OCCUR? No           Last vitals:  Vitals Value Taken Time   /65 09/28/23 1230   Temp 36.1  C (97  F) 09/28/23 1230   Pulse 68 09/28/23 1238   Resp 9 09/28/23 1238   SpO2 99 % 09/28/23 1238   Vitals shown include unvalidated device data.    Electronically Signed By: Leobardo Lehman MD  September 28, 2023  2:54 PM

## 2023-09-28 NOTE — ANESTHESIA PROCEDURE NOTES
Airway       Patient location during procedure: OR       Procedure Start/Stop Times: 9/28/2023 11:00 AM  Staff -        CRNA: Kiera Griffith       Performed By: CRNAIndications and Patient Condition       Indications for airway management: erinn-procedural       Induction type:intravenous       Mask difficulty assessment: 1 - vent by mask    Final Airway Details       Final airway type: supraglottic airway    Supraglottic Airway Details        Type: LMA       Brand: LMA Unique       LMA size: 4    Post intubation assessment        Placement verified by: capnometry, equal breath sounds and chest rise        Number of attempts at approach: 1       Number of other approaches attempted: 0       Secured with: silk tape       Ease of procedure: easy       Dentition: Intact and Unchanged    Medication(s) Administered   Medication Administration Time: 9/28/2023 11:00 AM

## 2023-09-28 NOTE — TELEPHONE ENCOUNTER
Dapagliflozin (Farxiga) 10 mg    Last Office Visit: 9/20/23  [facility] Appointments: None  Medication last refilled: 2/9/23 #100 with 2 refill(s)    Required labs per protocol:    LAB REF RANGE 4/24/23 8/20/23   Creatinine 0.67-1.17 mg/dL 0.76 0.87   Potassium 3.4-5.3 mmol/L 4.1 4.3   Hgb A1C 0.0-5.6 % 5.7 High 6.3 High     Prescription approved per [facility] Refill Protocol.    Maria L Samayoa, JORDIN, RN, CCM

## 2023-09-28 NOTE — DISCHARGE INSTRUCTIONS
Post-operative Instructions  Ophthalmic Plastic and Reconstructive Surgery    Shantelle Marti M.D.     All instructions apply to the operated eye(s) or eyelid(s).    Wound care and personal care  ? Apply ice compresses and gentle pressure 15 minutes on, 15 minutes off, for 2 days. If you are sleeping, you don't need to wake up to ice. As long as there is no further bleeding, after two days, switch to warm water compresses for five minutes, four times a day until seen by your physician.   ? You may shower or wash your hair the day after surgery. Do not go swimming for at least 2 weeks to prevent contamination of your wounds.  ? You may go for walks and light activity is ok, but no heavy (over 15 pounds) lifting, bending or excessive straining for one week.   ? Do not apply make-up to the eyes or eyelids for 2 weeks after surgery.  ? Expect some swelling, bruising, black eye (even into the lower eyelids and cheeks). Also expect serum caking, crusting and discharge from the eye and/or incisions. A small amount of surface bleeding, and depending on the type of surgery, bleeding from the inside of the eyelid, is normal for the first 48 hours.  ? Avoid straining, bending at the waist, or lifting more than 15 pounds for 1 week. Sleeping with your head elevated, such as in a recliner, for the first several days can help swelling resolve more quickly.   ? Do continue to ambulate (walk) as you normally would - being sedentary after surgery can cause blood clots.   ? Your eye(s) and eyelid(s) may be painful and tender. This is normal after surgery.    Contact information and follow-up  ? Return to the Eye Clinic for a follow-up appointment with your physician as scheduled. If no appointment has been scheduled:   - [facility] eye clinic: [phone] for an appointment with Dr. Marti within 2 to 3 weeks from your date of surgery.    After hours or on weekends and holidays, call [phone] and ask to  speak with the ophthalmologist on call.    An on call person can be reached after hours for concerns. The on call doctor should not call in medication refill requests after hours or on weekends, so please plan accordingly. An effort has been made to provide adequate pain medications following every surgery, and refills will not be provided in most instances.     Medications  ? Restart all regular home medications and eye drops. If you take Plavix or Aspirin on a regular basis, wait for 72 hours after your surgery before restarting these in order to decrease the risk of bleeding complications.  ? Avoid aspirin and aspirin-like medications (Motrin, Aleve, Ibuprofen, [place] etc) for 72 hours to reduce the risk of bleeding. You may take Tylenol (acetaminophen) for pain.  ? In addition to your home medications, take the following post-operative medications as prescribed by your physician.    ? Apply antibiotic ointment to all sutures three times a day, and into the operated eye(s) at night.   Once you run out, you can apply Vaseline or Aquaphor (over the counter) to the incisions. Don't put the Vaseline or Aquaphor into your eyes.   ? If you have ocular irritation, you can use over the counter artificial tears such as Refresh, Systane, or Blink. Do not use Visine, Clear Eyes, or any other drop that gets the red out.   ? In many cases, postoperative discomfort can be managed with Tylenol alone. If narcotic pain medication was prescribed, take pain pills at prescribed frequency as needed for pain.    WARNING:   ? Prescribed narcotic medications may make you drowsy. You must not drive a car, operate heavy machinery or drink alcohol while taking them.  ? Prescribed narcotic pain medications may cause constipation and nausea.   [facility]  Home Care Following Anesthesia  For 24 hours after surgery:  Get plenty of rest.  A responsible adult must stay with you for at least 24 hours  after you leave the surgery center.  Do not drive or use heavy equipment.  If you have weakness or tingling, don't drive or use heavy equipment until this feeling goes away.   Do not drink alcohol.   Avoid strenuous or risky activities.  Ask for help when climbing stairs.  You may feel lightheaded.  IF so, sit for a few minutes before standing.  Have someone help you get up.   If you have nausea (feel sick to your stomach): Drink only clear liquids such as apple juice, ginger ale, broth or 7-Up.  Rest may also help.  Be sure to drink enough fluids.  Move to a regular diet as you feel able.   You may have a slight fever.  Call the doctor if your fever is over 100 F (37.7 C) (taken under the tongue) or lasts longer than 24 hours.  You may have a dry mouth, a sore throat, muscle aches or trouble sleeping. These should go away after 24 hours.  Do not make important or legal decisions.   It is recommended to avoid smoking.               Tips for taking pain medications  To get the best pain relief possible, remember these points:  Take pain medications as directed, before pain becomes severe.  Pain medication can upset your stomach: taking it with food may help.  Constipation is a common side effect of pain medication. Drink plenty of  fluids.  Eat foods high in fiber. Take a stool softener if recommended by your doctor or pharmacist.  Do not drink alcohol, drive or operate machinery while taking pain medications.  Ask about other ways to control pain, such as with heat, ice or relaxation.    Tylenol/Acetaminophen Consumption    If you feel your pain relief is insufficient, you may take Tylenol/Acetaminophen in addition to your narcotic pain medication.   Be careful not to exceed 4,000 mg of Tylenol/Acetaminophen in a 24 hour period from all sources.  If you are taking extra strength Tylenol/acetaminophen (500 mg), the maximum dose is 8 tablets in 24 hours.  If you are taking regular strength acetaminophen (325 mg), the  maximum dose is 12 tablets in 24 hours.    Call a doctor for any of the following:  Signs of infection (fever, growing tenderness at the surgery site, a large amount of drainage or bleeding, severe pain, foul-smelling drainage, redness, swelling).  It has been over 8 to 10 hours since surgery and you are still not able to urinate (pass water).  Headache for over 24 hours.  Numbness, tingling or weakness the day after surgery (if you had spinal anesthesia).  Signs of Covid-19 infection (temperature over 100 degrees, shortness of breath, cough, loss of taste/smell, generalized body aches, persistent headache, chills, sore throat, nausea/vomiting/diarrhea)  Your doctor is:       Dr. Shantelle Marti, Ophthalmology: [phone]               Or dial [phone] and ask for the resident on call for:  Ophthalmology  For emergency care, call the:  [facility]:  [phone] (TTY for hearing impaired: [phone])

## 2023-09-28 NOTE — OP NOTE
PREOPERATIVE DIAGNOSES:   1. Bilateral upper eyelid ptosis and dermatochalasis.   2. Bilateral lower eyelid steatochalasis and dermatochalasis.   POSTOPERATIVE DIAGNOSES:   1. Bilateral upper eyelid ptosis and dermatochalasis.   2.  Bilateral lower eyelid steatochalasis and dermatochalasis.   PROCEDURES:   1. Bilateral upper eyelid blepharoplasty.   2. Bilateral upper eyelid ptosis repair posterior advancement of upper eyelid retractors 7 mm right eye and 7 mm left eye.   3. Bilateral lower eyelid cosmetic blepharoplasty.   ANESTHESIA: General anesthesia with local infiltration of 1% lidocaine with epinephrine 1:061229 and 0.5% Marcaine in a 50/50 mixture.   SURGEON: Shantelle Marti MD  ASSISTANT: Moshe Daley MD  COMPLICATIONS: None.   ESTIMATED BLOOD LOSS: Less than 5 mL.   INDICATION FOR PROCEDURE: Won Urena  presented with upper lid ptosis and dermatochalasis interfering with the superior visual field and activities of daily living, and also had lower eyelid steatochalasis and dermatochalasis and desired aesthetic correction. After risks, benefits and alternatives to the proposed procedures were explained, informed consent was obtained.   DESCRIPTION OF PROCEDURE: The patient was brought to the operating room and placed supine on the operating table. Under general anesthesia, the upper lid crease and excess upper eyelid skin was marked on each upper eyelid and the upper and lower eyelids infiltrated with local anesthetic, and she was prepped and draped in the typical sterile ophthalmic fashion.     Attention was first directed to the right lower eyelid. Transconjunctival incision was performed in the fornix with monopolar cautery. The herniated orbital fat pads were debulked with monopolar cautery. Care was taken to avoid the inferior oblique muscle during the dissection between the medial and central fat pads. Hemostasis was obtained with monopolar cautery. Skin was closed with interrupted 6-0  plain gut sutures. A pinch skin incision was then performed using the green fixation forceps to pinch the excess skin inferior to the lash line in the subciliary space. Pinched skin was excised with a Lovely scissors. Hemostasis was obtained with high temperature cautery and the skin closed with running 6-0 plain gut suture. Attention was directed to the left side, where the same lower lid procedures were performed.     Attention was then directed to the right upper lid. Skin was incised following marked lines, and the skin flap was excised with a high temperature cautery. Hemostasis was obtained. Nasally, the orbicularis muscle and septum were opened a small amount of the nasal fat pad was excised with the cautery. A 4-0 silk suture was placed through the eyelid margin, and the eyelid everted over a Desmarres retractor. A 6-0 silk suture was threaded 3.5 mm from the superior tarsal border through the conjunctiva, Moss's muscle, and levator aponeurosis. This suture was then used to elevate the conjunctiva, Moss's muscle, an levator aponeurosis, which was then clamped with a Putterman clamp. A 6-0 plain gut suture was run in a horizontal mattress fashion from lateral to medial and then medial to lateral in a double mattress fashion. The clamped tissues were excised with a #15 blade. This effectively advanced the upper eyelid retractors. The sutures were externalized through the upper eyelid blepharoplasty incision. A double armed 5-0 Vicryl through the lateral canthal angle into the upper eyelid wound. This was then passed through the periosteum of the lateral orbital rim. The upper eyelid skin was then closed with running 6-0 plain gut suture after the lid was reverted to its normal position and the 4-0 silk stay suture removed.     Attention was directed to the left upper eyelid, where the same procedures were performed. Erythromycin ophthalmic ointment was applied to all incisions and into the eyes. The  patient tolerated the procedure well. Won Urena  left the operating room in stable condition.     Shantelle Marti MD

## 2023-09-28 NOTE — ANESTHESIA CARE TRANSFER NOTE
Patient: Won Urena    Procedure: Procedure(s):  Both upper eyelid blepharoplasty and ptosis repair  Both lower eyelid blepharoplasty       Diagnosis: Dermatochalasis of both upper eyelids [H02.831, H02.834]  Ptosis of both eyelids [H02.403]  Diagnosis Additional Information: No value filed.    Anesthesia Type:   General     Note:    Oropharynx: oropharynx clear of all foreign objects and spontaneously breathing  Level of Consciousness: drowsy  Oxygen Supplementation: face mask  Level of Supplemental Oxygen (L/min / FiO2): 8  Independent Airway: airway patency satisfactory and stable  Dentition: dentition unchanged  Vital Signs Stable: post-procedure vital signs reviewed and stable  Report to RN Given: handoff report given  Patient transferred to: PACU    Handoff Report: Identifed the Patient, Identified the Reponsible Provider, Reviewed the pertinent medical history, Discussed the surgical course, Reviewed Intra-OP anesthesia mangement and issues during anesthesia, Set expectations for post-procedure period and Allowed opportunity for questions and acknowledgement of understanding      Vitals:  Vitals Value Taken Time   /65 09/28/23 1216   Temp 97.2    Pulse 73 09/28/23 1218   Resp 17 09/28/23 1218   SpO2 99 % 09/28/23 1218   Vitals shown include unvalidated device data.    Electronically Signed By: JOSIE SMILEY  September 28, 2023  12:19 PM

## 2023-10-02 ENCOUNTER — E-VISIT (OUTPATIENT)
Dept: URGENT CARE | Facility: CLINIC | Age: 68
End: 2023-10-02
Payer: COMMERCIAL

## 2023-10-02 ENCOUNTER — OFFICE VISIT (OUTPATIENT)
Dept: FAMILY MEDICINE | Facility: CLINIC | Age: 68
End: 2023-10-02
Payer: COMMERCIAL

## 2023-10-02 VITALS
OXYGEN SATURATION: 97 % | DIASTOLIC BLOOD PRESSURE: 84 MMHG | SYSTOLIC BLOOD PRESSURE: 149 MMHG | TEMPERATURE: 98 F | RESPIRATION RATE: 20 BRPM | BODY MASS INDEX: 26.33 KG/M2 | WEIGHT: 173.2 LBS | HEART RATE: 88 BPM

## 2023-10-02 DIAGNOSIS — R30.0 DYSURIA: Primary | ICD-10-CM

## 2023-10-02 DIAGNOSIS — N39.0 URINARY TRACT INFECTION WITHOUT HEMATURIA, SITE UNSPECIFIED: Primary | ICD-10-CM

## 2023-10-02 DIAGNOSIS — N30.00 ACUTE CYSTITIS WITHOUT HEMATURIA: Primary | ICD-10-CM

## 2023-10-02 LAB
BACTERIA #/AREA URNS HPF: ABNORMAL /HPF
CLUE CELLS: ABNORMAL
RBC #/AREA URNS AUTO: ABNORMAL /HPF
SQUAMOUS #/AREA URNS AUTO: ABNORMAL /LPF
TRICHOMONAS, WET PREP: ABNORMAL
WBC #/AREA URNS AUTO: ABNORMAL /HPF
WBC CLUMPS #/AREA URNS HPF: PRESENT /HPF
WBC'S/HIGH POWER FIELD, WET PREP: ABNORMAL
YEAST, WET PREP: ABNORMAL

## 2023-10-02 PROCEDURE — 99213 OFFICE O/P EST LOW 20 MIN: CPT | Performed by: FAMILY MEDICINE

## 2023-10-02 PROCEDURE — 87210 SMEAR WET MOUNT SALINE/INK: CPT | Performed by: FAMILY MEDICINE

## 2023-10-02 PROCEDURE — 81015 MICROSCOPIC EXAM OF URINE: CPT | Performed by: FAMILY MEDICINE

## 2023-10-02 PROCEDURE — 99421 OL DIG E/M SVC 5-10 MIN: CPT | Performed by: EMERGENCY MEDICINE

## 2023-10-02 PROCEDURE — 87186 SC STD MICRODIL/AGAR DIL: CPT | Performed by: FAMILY MEDICINE

## 2023-10-02 PROCEDURE — 87086 URINE CULTURE/COLONY COUNT: CPT | Performed by: FAMILY MEDICINE

## 2023-10-02 RX ORDER — NITROFURANTOIN 25; 75 MG/1; MG/1
100 CAPSULE ORAL 2 TIMES DAILY
Qty: 10 CAPSULE | Refills: 0 | Status: SHIPPED | OUTPATIENT
Start: 2023-10-02 | End: 2023-10-07

## 2023-10-02 NOTE — PROGRESS NOTES
Assessment & Plan     Urinary tract infection without hematuria, site unspecified  *no sign pyelo  - UA Microscopic with Reflex to Culture  - Wet prep - Clinic Collect  - Urine Culture  - nitroFURantoin macrocrystal-monohydrate (MACROBID) 100 MG capsule  Dispense: 10 capsule; Refill: 0       [id number]}    No follow-ups on file.    Vikas Dumont MD  [facility]    ------------------------------------------------------------------------  Subjective     Fifidurga Urena presents to clinic today for the following health issues:  chief complaint  HPI  About 5 days of frequency, urgenycy odor and dysuria. Not really vag discharge. No fever nor flank pain. No sti risks       Review of Systems        Objective    BP (!) 149/84 (BP Location: Left arm, Patient Position: Sitting, Cuff Size: Adult Regular)   Pulse 88   Temp 98  F (36.7  C) (Oral)   Resp 20   Wt 78.6 kg (173 lb 3.2 oz)   SpO2 97%   BMI 26.33 kg/m    Physical Exam   GENERAL: healthy, alert and no distress    Results for orders placed or performed in visit on 10/02/23   UA Microscopic with Reflex to Culture     Status: Abnormal   Result Value Ref Range    Bacteria Urine Many (A) None Seen /HPF    RBC Urine 0-2 0-2 /HPF /HPF    WBC Urine  (A) 0-5 /HPF /HPF    Squamous Epithelials Urine Few (A) None Seen /LPF    WBC Clumps Urine Present (A) None Seen /HPF   Wet prep - Clinic Collect     Status: Abnormal    Specimen: Vagina; Swab   Result Value Ref Range    Trichomonas Absent Absent    Yeast Absent Absent    Clue Cells Absent Absent    WBCs/high power field 3+ (A) None

## 2023-10-02 NOTE — PATIENT INSTRUCTIONS
Dear Won Urena,     After reviewing your responses, I would like you to come in for a urine test to make sure we treat you correctly. This urine test is to evaluate you for a possible urinary tract infection, and should be scheduled for today or tomorrow. Schedule a Lab Only appointment here.     Lab appointments are not available at most locations on the weekends. If no Lab Only appointment is available, you should be seen in any of our convenient Walk-in or Urgent Care Centers, which can be found on our website here.     You will receive instructions with your results in [employer] once they are available.     If your symptoms worsen, you develop pain in your back or stomach, develop fevers, or are not improving in 5 days, please contact your primary care provider for an appointment or visit a Walk-in or Urgent Care Center to be seen.     Thanks again for choosing us as your health care partner,     Geovanni Cornell MD

## 2023-10-04 LAB — BACTERIA UR CULT: ABNORMAL

## 2023-10-10 ENCOUNTER — TELEPHONE (OUTPATIENT)
Dept: OPHTHALMOLOGY | Facility: CLINIC | Age: 68
End: 2023-10-10

## 2023-10-10 ENCOUNTER — OFFICE VISIT (OUTPATIENT)
Dept: OPHTHALMOLOGY | Facility: CLINIC | Age: 68
End: 2023-10-10
Payer: COMMERCIAL

## 2023-10-10 DIAGNOSIS — H02.403 PTOSIS OF BOTH EYELIDS: ICD-10-CM

## 2023-10-10 DIAGNOSIS — H02.834 DERMATOCHALASIS OF BOTH UPPER EYELIDS: Primary | ICD-10-CM

## 2023-10-10 DIAGNOSIS — H02.831 DERMATOCHALASIS OF BOTH UPPER EYELIDS: Primary | ICD-10-CM

## 2023-10-10 DIAGNOSIS — H04.123 DRY EYE SYNDROME OF BILATERAL LACRIMAL GLANDS: ICD-10-CM

## 2023-10-10 PROCEDURE — 99024 POSTOP FOLLOW-UP VISIT: CPT | Performed by: OPHTHALMOLOGY

## 2023-10-10 NOTE — TELEPHONE ENCOUNTER
Spoke with patient regarding scheduling a Return in about 8 weeks (around 12/5/2023). Patient was scheduled accordingly [facility]Per [facility]

## 2023-10-10 NOTE — PROGRESS NOTES
"Chief Complaint(s) and History of Present Illness(es)    No visit information to display       Doing well. Happy with outcome. Excited she doesn't have to raise her eyebrows to see.     Patient Instructions   - Apply warm compresses for 1 minute two to three times a day until your bruising has resolved. You can continue this for one more month.   - Cool compresses can help with swelling and itching, you can alternate cool compresses with warm compresses if you find it helpful.  - Apply Aquaphor or Vaseline to the incision at bedtime until it is smooth.    - If you have symptoms of eye irritation, it is good to use over the counter artificial tears. Good brands include Refresh, Blink, and Systane. Do NOT get drops that are for \"red eyes.\"   - It is normal for the incision to appear raised, red, itch and have small lumps. You can gently massage any small bumps along the incision line. These can take up to six months to resolve.    Return in about 8 weeks (around 12/5/2023).      Attending Physician Attestation: Complete documentation of historical and exam elements from today's encounter can be found in the full encounter summary report (not reduplicated in this progress note). I personally obtained the chief complaint(s) and history of present illness. I confirmed and edited as necessary the review of systems, past medical/surgical history, family history, social history, and examination findings as documented by others; and I examined the patient myself. I personally reviewed the relevant tests, images, and reports as documented above. I formulated and edited as necessary the assessment and plan and discussed the findings and management plan with the patient and family. I personally reviewed the ophthalmic test(s) associated with this encounter, agree with the interpretation(s) as documented by the resident/fellow, and have edited the corresponding report(s) as necessary. Shantelle Marti MD    "

## 2023-10-13 ENCOUNTER — TELEPHONE (OUTPATIENT)
Dept: OPHTHALMOLOGY | Facility: CLINIC | Age: 68
End: 2023-10-13
Payer: COMMERCIAL

## 2023-10-13 NOTE — TELEPHONE ENCOUNTER
LVM for patient regarding request for rescheduling POST-OP with . Dates of 12/6/23 through 12/8/23 would require patient to be seen in [facility]. Provided direct number for rescheduling. Also, sent patient a [employer]  message.

## 2023-10-16 ENCOUNTER — TELEPHONE (OUTPATIENT)
Dept: OPHTHALMOLOGY | Facility: CLINIC | Age: 68
End: 2023-10-16
Payer: COMMERCIAL

## 2023-10-16 ENCOUNTER — TELEPHONE (OUTPATIENT)
Dept: CARDIOLOGY | Facility: CLINIC | Age: 68
End: 2023-10-16
Payer: COMMERCIAL

## 2023-10-16 NOTE — TELEPHONE ENCOUNTER
Messaged patient to review recent elevated blood pressure reading.  Per [place] Community Me[place], patients blood pressure is out of parameters and recheck blood pressure is recommended.    Recent Blood Pressure: 124/84  Date: 10/6/23    Patient reported blood pressure updated in Epic. Blood pressure falls within [place] Community Me[place] guidelines.  Patient will follow up as previously advised.

## 2023-10-16 NOTE — TELEPHONE ENCOUNTER
Patient returned VM regarding rescheduling POST-OP with . Rescheduled patient accordingly and patient will see appointment in [facility].-Per Patient

## 2023-10-23 DIAGNOSIS — E11.9 TYPE 2 DIABETES MELLITUS WITHOUT COMPLICATION, WITHOUT LONG-TERM CURRENT USE OF INSULIN (H): ICD-10-CM

## 2023-10-24 NOTE — TELEPHONE ENCOUNTER
Metformin (Glucophage) 500 mg    Last Office Visit: 10/2/23  [place] [facility] [place]  Medication last refilled: 9/16/22 #360 with 3 refill(s)    Required labs per protocol:    LAB REF RANGE 4/24/23 8/20/23   Creatinine 0.67-1.17 mg/dL 0.76 0.87   Hgb A1C 0.0-5.6 % 5.7 High 6.3 High     Prescription approved per [facility] Refill Protocol.    JORDI BarrettN, RN, CCM

## 2023-11-01 ENCOUNTER — TELEPHONE (OUTPATIENT)
Dept: DERMATOLOGY | Facility: CLINIC | Age: 68
End: 2023-11-01
Payer: COMMERCIAL

## 2023-11-01 NOTE — TELEPHONE ENCOUNTER
Via phone patient was rescheduled for the following:    Appointment type: New  Provider: Dr. Payton  Return date: 02-29-24  Specialty phone number: [phone]

## 2023-11-05 DIAGNOSIS — N95.8 GENITOURINARY SYNDROME OF MENOPAUSE: ICD-10-CM

## 2023-11-06 RX ORDER — ESTRADIOL 0.1 MG/G
CREAM VAGINAL
Qty: 85 G | Refills: 3 | Status: SHIPPED | OUTPATIENT
Start: 2023-11-06 | End: 2024-10-01

## 2023-11-06 NOTE — TELEPHONE ENCOUNTER
Medication requested: estradiol (ESTRACE) 0.1 MG/GM vaginal cream   Last office visit: 9/20/23  [facility] appointments: none  Medication last refilled: 9/19/22; 42.5 g + 11 refills  Last qualifying labs:   BP Readings from Last 3 Encounters:   10/02/23 (!) 149/84   09/28/23 126/62   09/20/23 118/79     Last mammogram 7/10/23    Routing refill request to provider for review/approval because:  BP greater than 140/90 within last year, failed protocol    Rhys WATTS, RN  11/06/23 10:50 AM

## 2023-11-18 ENCOUNTER — IMMUNIZATION (OUTPATIENT)
Dept: FAMILY MEDICINE | Facility: CLINIC | Age: 68
End: 2023-11-18
Payer: COMMERCIAL

## 2023-11-18 PROCEDURE — 91320 SARSCV2 VAC 30MCG TRS-SUC IM: CPT

## 2023-11-18 PROCEDURE — 90480 ADMN SARSCOV2 VAC 1/ONLY CMP: CPT

## 2023-12-08 ENCOUNTER — MYC MEDICAL ADVICE (OUTPATIENT)
Dept: FAMILY MEDICINE | Facility: CLINIC | Age: 68
End: 2023-12-08

## 2023-12-08 NOTE — TELEPHONE ENCOUNTER
Pt sent message about testing positive for covid and is interested in treatment.   After reviewing medication list, [email or web address] as she has several medications that require a provider visit, and we do not have any openings left today in our schedule for a virtual visit.    MAC Hardin, RN  12/08/23, 3:02 PM

## 2023-12-31 ENCOUNTER — HEALTH MAINTENANCE LETTER (OUTPATIENT)
Age: 68
End: 2023-12-31

## 2024-01-02 ENCOUNTER — TELEPHONE (OUTPATIENT)
Dept: OPHTHALMOLOGY | Facility: CLINIC | Age: 69
End: 2024-01-02
Payer: COMMERCIAL

## 2024-01-09 ENCOUNTER — OFFICE VISIT (OUTPATIENT)
Dept: OPHTHALMOLOGY | Facility: CLINIC | Age: 69
End: 2024-01-09
Payer: COMMERCIAL

## 2024-01-09 DIAGNOSIS — Z98.890 POST-OPERATIVE STATE: Primary | ICD-10-CM

## 2024-01-09 PROCEDURE — 99212 OFFICE O/P EST SF 10 MIN: CPT | Mod: GC | Performed by: OPHTHALMOLOGY

## 2024-01-09 ASSESSMENT — VISUAL ACUITY
OD_SC+: -2
OS_SC+: -2
OS_SC: 20/25
METHOD: SNELLEN - LINEAR
OD_SC: 20/20

## 2024-01-09 ASSESSMENT — TONOMETRY
OS_IOP_MMHG: 10
OD_IOP_MMHG: 10
IOP_METHOD: ICARE

## 2024-01-09 ASSESSMENT — MARGIN REFLEX DISTANCE
OS_MRD1: 5
OD_MRD1: 5

## 2024-01-09 ASSESSMENT — EXTERNAL EXAM - LEFT EYE: OS_EXAM: NORMAL

## 2024-01-09 ASSESSMENT — EXTERNAL EXAM - RIGHT EYE: OD_EXAM: NORMAL

## 2024-01-09 NOTE — NURSING NOTE
Chief Complaints and History of Present Illnesses   Patient presents with    Post Op (Ophthalmology) Both Eyes     POM#3 s/p BULB, BUL ptosis repair posterior advancement of upper eyelid retractors 7 mm RE and 7mm LE, BLL cosmetic blepharoplasty       Chief Complaint(s) and History of Present Illness(es)       Post Op (Ophthalmology) Both Eyes              Laterality: both eyes    Associated symptoms: redness.  Negative for dryness, eye pain and tearing    Treatments tried: artificial tears    Response to treatment: significant improvement    Comments: POM#3 s/p BULB, BUL ptosis repair posterior advancement of upper eyelid retractors 7 mm RE and 7mm LE, BLL cosmetic blepharoplasty                Comments    Pt notes that her eyes are feeling good, she notes that she has had a rash on the RLL for a few weeks and feels that it is improving. Denies itching, burning, or pain. Patient is using AT OU BID.     Lorraine SHAH January 9, 2024 7:25 AM

## 2024-01-09 NOTE — PROGRESS NOTES
Chief Complaint(s) and History of Present Illness(es)     Post Op (Ophthalmology) Both Eyes            Laterality: both eyes    Associated symptoms: redness.  Negative for dryness, eye pain and tearing      Treatments tried: artificial tears    Response to treatment: significant improvement    Comments: POM#3 s/p BULB, BUL ptosis repair posterior advancement of   upper eyelid retractors 7 mm RE and 7mm LE, BLL cosmetic blepharoplasty         Comments    Pt notes that her eyes are feeling good, she notes that she has had a rash   on the RLL for a few weeks and feels that it is improving. Denies itching,   burning, or pain. Patient is using AT OU BID.     Lorraine Lazo COT January 9, 2024 7:25 AM     Won Urena is about 3.5 months status post op.  She is doing well. 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 See patient instructions with regards to warm compresses and wipes.     She will follow up with me on an as needed basis. Has follow-up with Dr. Bloom and Dr. Manuel.     Yvon Andres MD  Resident Physician, PGY-2  Department of Ophthalmology      Complete documentation of historical and exam elements from today's encounter can be found in the full encounter summary report (not reduplicated in this progress note). I personally obtained the chief complaint(s) and history of present illness.  I confirmed and edited as necessary the review of systems, past medical/surgical history, family history, social history, and examination findings as documented by others; and I examined the patient myself. I personally reviewed the relevant tests, images, and reports as documented above. I formulated and edited as necessary the assessment and plan and discussed the findings and management plan with the patient and family. - Shantelle Marti MD

## 2024-01-09 NOTE — PATIENT INSTRUCTIONS
Lid scrubs: These can help prevent buildup of debris on eyelids/eyelashes and help reduce inflammation of the eyelids (blepharitis). Use daily. You can also use baby shampoo on a warm washcloth when you shower instead of the below wipes.     -Ocusoft Plus lid wipes  -Ocusoft Plus Hypochlor foaming lid cleanser  -Systane lid wipes  -Tranquileyes 1% Tea Tree Eyelid & Facial Cleanser by [employer]  -Avenova Lid   -Cliradex Lid Wipes  -We Love Eyes Foaming Tea Tree Cleanser      Warm compresses: Use 1-2x/day for 5-10 minutes over closed eyelids  -Mitul mask  -Tranquileyes beaded mask  -Mibo heating pad  -[place] REST & RELIEF Eye Mask (Hot or Cold)  -I-RELIEF Therapy Mask  -OcuTherm Essentials Kit    You can also use a warm wet washcloth - however this frequently loses heat quickly and can dry your skin out a bit so we recommend any of the above re-usable beaded/gel eyemasks      To purchase these products you can look over-the-counter at [employer] or purchase online at the following websites:  -www.[email or web address]  -www.[employer]

## 2024-01-12 DIAGNOSIS — I25.10 CORONARY ARTERY CALCIFICATION: ICD-10-CM

## 2024-01-12 DIAGNOSIS — E78.5 HYPERLIPIDEMIA LDL GOAL <100: ICD-10-CM

## 2024-01-12 DIAGNOSIS — E11.9 TYPE 2 DIABETES MELLITUS WITHOUT COMPLICATION, WITHOUT LONG-TERM CURRENT USE OF INSULIN (H): ICD-10-CM

## 2024-01-12 RX ORDER — DAPAGLIFLOZIN 10 MG/1
10 TABLET, FILM COATED ORAL EVERY MORNING
Qty: 100 TABLET | Refills: 2 | OUTPATIENT
Start: 2024-01-12

## 2024-01-12 RX ORDER — FENOFIBRATE 160 MG/1
160 TABLET ORAL DAILY
Qty: 100 TABLET | Refills: 2 | OUTPATIENT
Start: 2024-01-12

## 2024-01-13 ENCOUNTER — LAB (OUTPATIENT)
Dept: LAB | Facility: CLINIC | Age: 69
End: 2024-01-13
Payer: COMMERCIAL

## 2024-01-13 DIAGNOSIS — I25.10 CORONARY ARTERY DISEASE INVOLVING NATIVE HEART WITHOUT ANGINA PECTORIS, UNSPECIFIED VESSEL OR LESION TYPE: ICD-10-CM

## 2024-01-13 LAB
CHOLEST SERPL-MCNC: 119 MG/DL
FASTING STATUS PATIENT QL REPORTED: YES
HDLC SERPL-MCNC: 44 MG/DL
LDLC SERPL CALC-MCNC: 59 MG/DL
LDLC SERPL DIRECT ASSAY-MCNC: 64 MG/DL
NONHDLC SERPL-MCNC: 75 MG/DL
TRIGL SERPL-MCNC: 80 MG/DL

## 2024-01-13 PROCEDURE — 99000 SPECIMEN HANDLING OFFICE-LAB: CPT | Performed by: PATHOLOGY

## 2024-01-13 PROCEDURE — 80061 LIPID PANEL: CPT | Performed by: PATHOLOGY

## 2024-01-13 PROCEDURE — 83721 ASSAY OF BLOOD LIPOPROTEIN: CPT | Performed by: INTERNAL MEDICINE

## 2024-01-13 PROCEDURE — 36415 COLL VENOUS BLD VENIPUNCTURE: CPT | Performed by: PATHOLOGY

## 2024-01-15 DIAGNOSIS — I25.10 CORONARY ARTERY CALCIFICATION: ICD-10-CM

## 2024-01-15 DIAGNOSIS — E11.9 TYPE 2 DIABETES MELLITUS WITHOUT COMPLICATION, WITHOUT LONG-TERM CURRENT USE OF INSULIN (H): ICD-10-CM

## 2024-01-15 DIAGNOSIS — E78.5 HYPERLIPIDEMIA LDL GOAL <100: ICD-10-CM

## 2024-01-16 RX ORDER — BLOOD-GLUCOSE METER
EACH MISCELLANEOUS
OUTPATIENT
Start: 2024-01-16

## 2024-01-16 RX ORDER — FENOFIBRATE 160 MG/1
160 TABLET ORAL DAILY
Qty: 100 TABLET | Refills: 2 | OUTPATIENT
Start: 2024-01-16

## 2024-01-16 RX ORDER — DAPAGLIFLOZIN 10 MG/1
10 TABLET, FILM COATED ORAL EVERY MORNING
Qty: 100 TABLET | Refills: 2 | OUTPATIENT
Start: 2024-01-16

## 2024-01-19 DIAGNOSIS — E11.9 TYPE 2 DIABETES MELLITUS WITHOUT COMPLICATION, WITHOUT LONG-TERM CURRENT USE OF INSULIN (H): ICD-10-CM

## 2024-01-22 RX ORDER — BLOOD SUGAR DIAGNOSTIC
STRIP MISCELLANEOUS
Qty: 100 STRIP | Refills: 2 | Status: ON HOLD | OUTPATIENT
Start: 2024-01-22 | End: 2024-06-12

## 2024-02-09 ENCOUNTER — TELEPHONE (OUTPATIENT)
Dept: FAMILY MEDICINE | Facility: CLINIC | Age: 69
End: 2024-02-09

## 2024-02-09 NOTE — TELEPHONE ENCOUNTER
Prior Authorization Retail Medication Request    Medication/Dose: OZEMPIC, 1 MG/DOSE, 4 MG/3ML pen  Diagnosis and ICD code (if different than what is on RX):  Same  New/renewal/insurance change PA/secondary ins. PA: Renewal  Previously Tried and Failed:  N/A  Rationale:  N/A    Insurance   Primary: Same  Insurance ID:  Same    Secondary (if applicable): Same  Insurance ID:  Same    Pharmacy Information (if different than what is on RX)  Name:  Same  Phone:  Same  Fax: Same    Rhys WATTS, RN  02/09/24 12:14 PM

## 2024-02-13 NOTE — TELEPHONE ENCOUNTER
Prior Authorization Not Needed per Insurance    Medication: OZEMPIC (1 MG/DOSE) 4 MG/3ML SC SOPN      No pa needed, test claim comes back refill too soon, which tells me that the insurance is covering the medication.

## 2024-02-23 DIAGNOSIS — E11.9 TYPE 2 DIABETES MELLITUS WITHOUT COMPLICATION, WITHOUT LONG-TERM CURRENT USE OF INSULIN (H): ICD-10-CM

## 2024-02-26 RX ORDER — DAPAGLIFLOZIN 10 MG/1
10 TABLET, FILM COATED ORAL EVERY MORNING
Qty: 100 TABLET | Refills: 1 | Status: SHIPPED | OUTPATIENT
Start: 2024-02-26 | End: 2024-07-29

## 2024-02-26 NOTE — TELEPHONE ENCOUNTER
Medication requested: dapagliflozin (FARXIGA) 10 MG TABS tablet   Last office visit: 9/20/23  [facility] appointments: 3/15/24  Medication last refilled: 9/28/23; 100 + 1 refill  Last qualifying labs:   Component      Latest Ref Rng 9/15/2023  2:57 PM   Hemoglobin A1C      <5.7 % 6.3 (H)      Component      Latest Ref Rng 8/20/2023  11:11 PM   Potassium      3.4 - 5.3 mmol/L 4.3      Component      Latest Ref Rng 8/20/2023  11:11 PM   GFR Estimate      >60 mL/min/1.73m2 73      Prescription approved per [facility] Refill Protocol.    Rhys WATTS, RN  02/26/24 1:31 PM

## 2024-03-08 SDOH — HEALTH STABILITY: PHYSICAL HEALTH: ON AVERAGE, HOW MANY DAYS PER WEEK DO YOU ENGAGE IN MODERATE TO STRENUOUS EXERCISE (LIKE A BRISK WALK)?: 4 DAYS

## 2024-03-08 SDOH — HEALTH STABILITY: PHYSICAL HEALTH: ON AVERAGE, HOW MANY MINUTES DO YOU ENGAGE IN EXERCISE AT THIS LEVEL?: 30 MIN

## 2024-03-08 ASSESSMENT — SOCIAL DETERMINANTS OF HEALTH (SDOH): HOW OFTEN DO YOU GET TOGETHER WITH FRIENDS OR RELATIVES?: TWICE A WEEK

## 2024-03-12 ENCOUNTER — MYC REFILL (OUTPATIENT)
Dept: FAMILY MEDICINE | Facility: CLINIC | Age: 69
End: 2024-03-12

## 2024-03-12 DIAGNOSIS — I25.10 CORONARY ARTERY DISEASE INVOLVING NATIVE HEART WITHOUT ANGINA PECTORIS, UNSPECIFIED VESSEL OR LESION TYPE: ICD-10-CM

## 2024-03-12 DIAGNOSIS — E11.9 TYPE 2 DIABETES MELLITUS WITHOUT COMPLICATION, WITHOUT LONG-TERM CURRENT USE OF INSULIN (H): ICD-10-CM

## 2024-03-12 DIAGNOSIS — I25.10 CORONARY ARTERY CALCIFICATION: ICD-10-CM

## 2024-03-12 DIAGNOSIS — E78.5 HYPERLIPIDEMIA LDL GOAL <100: ICD-10-CM

## 2024-03-12 DIAGNOSIS — N95.8 GENITOURINARY SYNDROME OF MENOPAUSE: ICD-10-CM

## 2024-03-12 RX ORDER — SEMAGLUTIDE 1.34 MG/ML
INJECTION, SOLUTION SUBCUTANEOUS
Qty: 9 ML | Refills: 0 | Status: SHIPPED | OUTPATIENT
Start: 2024-03-12 | End: 2024-05-24

## 2024-03-12 NOTE — TELEPHONE ENCOUNTER
Semaglutide (1 mg/dose) Ozempic 4 mg/3 ml pen    Last Office Visit: 10/2/23  [facility] [place]  Medication last refilled: 9/27/23 #9 ml with 3 refill(s)    Required labs per protocol:    LAB REF RANGE 4/24/23 9/15/23   Creatinine 0.67-1.17 mg/dL 0.76 0.87   Hgb A1C 0.0-5.6 % 5.7 High 6.3 High       MyChart message sent to patient to call and schedule appointment.    Maria L Samayoa, BSN, RN, CCM

## 2024-03-15 ENCOUNTER — OFFICE VISIT (OUTPATIENT)
Dept: FAMILY MEDICINE | Facility: CLINIC | Age: 69
End: 2024-03-15
Payer: COMMERCIAL

## 2024-03-15 VITALS
SYSTOLIC BLOOD PRESSURE: 109 MMHG | TEMPERATURE: 97.7 F | DIASTOLIC BLOOD PRESSURE: 71 MMHG | WEIGHT: 170 LBS | BODY MASS INDEX: 25.76 KG/M2 | RESPIRATION RATE: 17 BRPM | HEART RATE: 66 BPM | HEIGHT: 68 IN | OXYGEN SATURATION: 96 %

## 2024-03-15 DIAGNOSIS — Z23 NEED FOR VACCINATION: ICD-10-CM

## 2024-03-15 DIAGNOSIS — N30.01 ACUTE CYSTITIS WITH HEMATURIA: ICD-10-CM

## 2024-03-15 DIAGNOSIS — R30.0 DYSURIA: ICD-10-CM

## 2024-03-15 DIAGNOSIS — Z00.00 ENCOUNTER FOR MEDICARE ANNUAL WELLNESS EXAM: Primary | ICD-10-CM

## 2024-03-15 DIAGNOSIS — Z78.0 ASYMPTOMATIC POSTMENOPAUSAL STATUS: ICD-10-CM

## 2024-03-15 DIAGNOSIS — E11.9 TYPE 2 DIABETES MELLITUS WITHOUT COMPLICATION, WITHOUT LONG-TERM CURRENT USE OF INSULIN (H): ICD-10-CM

## 2024-03-15 PROBLEM — H02.403 PTOSIS OF BOTH EYELIDS: Status: RESOLVED | Noted: 2023-05-16 | Resolved: 2024-03-15

## 2024-03-15 PROBLEM — H02.834 DERMATOCHALASIS OF BOTH UPPER EYELIDS: Status: RESOLVED | Noted: 2023-05-16 | Resolved: 2024-03-15

## 2024-03-15 PROBLEM — H02.831 DERMATOCHALASIS OF BOTH UPPER EYELIDS: Status: RESOLVED | Noted: 2023-05-16 | Resolved: 2024-03-15

## 2024-03-15 LAB
ALBUMIN UR-MCNC: 100 MG/DL
APPEARANCE UR: ABNORMAL
BILIRUB UR QL STRIP: ABNORMAL
CAOX CRY #/AREA URNS HPF: ABNORMAL /HPF
COLOR UR AUTO: ABNORMAL
GLUCOSE UR STRIP-MCNC: >=1000 MG/DL
HBA1C MFR BLD: 5.8 % (ref 0–5.6)
HGB UR QL STRIP: ABNORMAL
KETONES UR STRIP-MCNC: NEGATIVE MG/DL
LEUKOCYTE ESTERASE UR QL STRIP: ABNORMAL
MUCOUS THREADS #/AREA URNS LPF: PRESENT /LPF
NITRATE UR QL: POSITIVE
PH UR STRIP: 6 [PH] (ref 5–7)
RBC URINE: 47 /HPF
SP GR UR STRIP: 1.02 (ref 1–1.03)
SQUAMOUS EPITHELIAL: 1 /HPF
TRANSITIONAL EPI: 2 /HPF
UROBILINOGEN UR STRIP-MCNC: 4 MG/DL
WBC CLUMPS #/AREA URNS HPF: PRESENT /HPF
WBC URINE: >182 /HPF

## 2024-03-15 PROCEDURE — 81001 URINALYSIS AUTO W/SCOPE: CPT | Mod: ORL | Performed by: FAMILY MEDICINE

## 2024-03-15 PROCEDURE — 87086 URINE CULTURE/COLONY COUNT: CPT | Mod: ORL | Performed by: FAMILY MEDICINE

## 2024-03-15 PROCEDURE — 87186 SC STD MICRODIL/AGAR DIL: CPT | Mod: ORL | Performed by: FAMILY MEDICINE

## 2024-03-15 PROCEDURE — 80048 BASIC METABOLIC PNL TOTAL CA: CPT | Mod: ORL | Performed by: FAMILY MEDICINE

## 2024-03-15 PROCEDURE — 82570 ASSAY OF URINE CREATININE: CPT | Mod: ORL | Performed by: FAMILY MEDICINE

## 2024-03-15 RX ORDER — RESPIRATORY SYNCYTIAL VIRUS VACCINE 120MCG/0.5
0.5 KIT INTRAMUSCULAR ONCE
Qty: 1 EACH | Refills: 0 | Status: CANCELLED | OUTPATIENT
Start: 2024-03-15 | End: 2024-03-15

## 2024-03-15 RX ORDER — NITROFURANTOIN 25; 75 MG/1; MG/1
100 CAPSULE ORAL 2 TIMES DAILY
Qty: 10 CAPSULE | Refills: 0 | Status: SHIPPED | OUTPATIENT
Start: 2024-03-15 | End: 2024-03-20

## 2024-03-15 NOTE — PATIENT INSTRUCTIONS
Nice to meet you today!    We will draw lab tests today. I will contact you in the next 2-3 business days with the results of these labs.   We may consider decreasing ozempic dosing or changing to an alternative medication    Try switching cetirizine to Xyzal (levocetirizine) or to Allegra (fexofenadine).  Preventive Care Advice   This is general advice given by our system to help you stay healthy. However, your care team may have specific advice just for you. Please talk to your care team about your preventive care needs.  Nutrition  Eat 5 or more servings of fruits and vegetables each day.  Try wheat bread, brown rice and whole grain pasta (instead of white bread, rice, and pasta).  Get enough calcium and vitamin D. Check the label on foods and aim for 100% of the RDA (recommended daily allowance).  Lifestyle  Exercise at least 150 minutes each week   (30 minutes a day, 5 days a week).  Do muscle strengthening activities 2 days a week. These help control your weight and prevent disease.  No smoking.  Wear sunscreen to prevent skin cancer.  Have a dental exam and cleaning every 6 months.  Yearly exams  See your health care team every year to talk about:  Any changes in your health.  Any medicines your care team has prescribed.  Preventive care, family planning, and ways to prevent chronic diseases.  Shots (vaccines)   HPV shots (up to age 26), if you've never had them before.  Hepatitis B shots (up to age 59), if you've never had them before.  COVID-19 shot: Get this shot when it's due.  Flu shot: Get a flu shot every year.  Tetanus shot: Get a tetanus shot every 10 years.  Pneumococcal, hepatitis A, and RSV shots: Ask your care team if you need these based on your risk.  Shingles shot (for age 50 and up).  General health tests  Diabetes screening:  Starting at age 35, Get screened for diabetes at least every 3 years.  If you are younger than age 35, ask your care team if you should be screened for  diabetes.  Cholesterol test: At age 39, start having a cholesterol test every 5 years, or more often if advised.  Bone density scan (DEXA): At age 50, ask your care team if you should have this scan for osteoporosis (brittle bones).  Hepatitis C: Get tested at least once in your life.  STIs (sexually transmitted infections)  Before age 24: Ask your care team if you should be screened for STIs.  After age 24: Get screened for STIs if you're at risk. You are at risk for STIs (including HIV) if:  You are sexually active with more than one person.  You don't use condoms every time.  You or a partner was diagnosed with a sexually transmitted infection.  If you are at risk for HIV, ask about PrEP medicine to prevent HIV.  Get tested for HIV at least once in your life, whether you are at risk for HIV or not.  Cancer screening tests  Cervical cancer screening: If you have a cervix, begin getting regular cervical cancer screening tests at age 21. Most people who have regular screenings with normal results can stop after age 65. Talk about this with your provider.  Breast cancer scan (mammogram): If you've ever had breasts, begin having regular mammograms starting at age 40. This is a scan to check for breast cancer.  Colon cancer screening: It is important to start screening for colon cancer at age 45.  Have a colonoscopy test every 10 years (or more often if you're at risk) Or, ask your provider about stool tests like a FIT test every year or Cologuard test every 3 years.  To learn more about your testing options, visit: https://www.[employer]/173191.pdf.  For help making a decision, visit: https://bit.ly/es06484.  Prostate cancer screening test: If you have a prostate and are age 55 to 69, ask your provider if you would benefit from a yearly prostate cancer screening test.  Lung cancer screening: If you are a current or former smoker age 50 to 80, ask your care team if ongoing lung cancer screenings are right for  you.  For informational purposes only. Not to replace the advice of your health care provider. Copyright   2023 [facility]. All rights reserved. Clinically reviewed by the  [employer] [place] [facility]. [employer] 411038 - REV 01/24.    Hearing Loss: Care Instructions  Overview     Hearing loss is a sudden or slow decrease in how well you hear. It can range from slight to profound. Permanent hearing loss can occur with aging. It also can happen when you are exposed long-term to loud noise. Examples include listening to loud music, riding motorcycles, or being around other loud machines.  Hearing loss can affect your work and home life. It can make you feel lonely or depressed. You may feel that you have lost your independence. But hearing aids and other devices can help you hear better and feel connected to others.  Follow-up care is a key part of your treatment and safety. Be sure to make and go to all appointments, and call your doctor if you are having problems. It's also a good idea to know your test results and keep a list of the medicines you take.  How can you care for yourself at home?  Avoid loud noises whenever possible. This helps keep your hearing from getting worse.  Always wear hearing protection around loud noises.  Wear a hearing aid as directed.  A professional can help you pick a hearing aid that will work best for you.  You can also get hearing aids over the counter for mild to moderate hearing loss.  Have hearing tests as your doctor suggests. They can show whether your hearing has changed. Your hearing aid may need to be adjusted.  Use other devices as needed. These may include:  Telephone amplifiers and hearing aids that can connect to a television, stereo, radio, or microphone.  Devices that use lights or vibrations. These alert you to the doorbell, a ringing telephone, or a baby monitor.  Television closed-captioning. This shows the words at the bottom of the screen.  "Most new TVs can do this.  TTY (text telephone). This lets you type messages back and forth on the telephone instead of talking or listening. These devices are also called TDD. When messages are typed on the keyboard, they are sent over the phone line to a receiving TTY. The message is shown on a monitor.  Use text messaging, social media, and email if it is hard for you to communicate by telephone.  Try to learn a listening technique called speechreading. It is not lipreading. You pay attention to people's gestures, expressions, posture, and tone of voice. These clues can help you understand what a person is saying. Face the person you are talking to, and have them face you. Make sure the lighting is good. You need to see the other person's face clearly.  Think about counseling if you need help to adjust to your hearing loss.  When should you call for help?  Watch closely for changes in your health, and be sure to contact your doctor if:    You think your hearing is getting worse.     You have new symptoms, such as dizziness or nausea.   Where can you learn more?  Go to https://www.[employer].net/patiented  Enter R798 in the search box to learn more about \"Hearing Loss: Care Instructions.\"  Current as of: September 27, 2023               Content Version: 14.0    7460-9487 [employer].   Care instructions adapted under license by your healthcare professional. If you have questions about a medical condition or this instruction, always ask your healthcare professional. [employer] disclaims any warranty or liability for your use of this information.      Learning About Stress  What is stress?     Stress is your body's response to a hard situation. Your body can have a physical, emotional, or mental response. Stress is a fact of life for most people, and it affects everyone differently. What causes stress for you may not be stressful for someone else.  A lot of things can cause stress. You may " feel stress when you go on a job interview, take a test, or run a race. This kind of short-term stress is normal and even useful. It can help you if you need to work hard or react quickly. For example, stress can help you finish an important job on time.  Long-term stress is caused by ongoing stressful situations or events. Examples of long-term stress include long-term health problems, ongoing problems at work, or conflicts in your family. Long-term stress can harm your health.  How does stress affect your health?  When you are stressed, your body responds as though you are in danger. It makes hormones that speed up your heart, make you breathe faster, and give you a burst of energy. This is called the fight-or-flight stress response. If the stress is over quickly, your body goes back to normal and no harm is done.  But if stress happens too often or lasts too long, it can have bad effects. Long-term stress can make you more likely to get sick, and it can make symptoms of some diseases worse. If you tense up when you are stressed, you may develop neck, shoulder, or low back pain. Stress is linked to high blood pressure and heart disease.  Stress also harms your emotional health. It can make you leon, tense, or depressed. Your relationships may suffer, and you may not do well at work or school.  What can you do to manage stress?  You can try these things to help manage stress:   Do something active. Exercise or activity can help reduce stress. Walking is a great way to get started. Even everyday activities such as housecleaning or yard work can help.  Try yoga or cesar chi. These techniques combine exercise and meditation. You may need some training at first to learn them.  Do something you enjoy. For example, listen to music or go to a movie. Practice your hobby or do volunteer work.  Meditate. This can help you relax, because you are not worrying about what happened before or what may happen in the future.  Do  "guided imagery. Imagine yourself in any setting that helps you feel calm. You can use online videos, books, or a teacher to guide you.  Do breathing exercises. For example:  From a standing position, bend forward from the waist with your knees slightly bent. Let your arms dangle close to the floor.  Breathe in slowly and deeply as you return to a standing position. Roll up slowly and lift your head last.  Hold your breath for just a few seconds in the standing position.  Breathe out slowly and bend forward from the waist.  Let your feelings out. Talk, laugh, cry, and express anger when you need to. Talking with supportive friends or family, a counselor, or a jeni leader about your feelings is a healthy way to relieve stress. Avoid discussing your feelings with people who make you feel worse.  Write. It may help to write about things that are bothering you. This helps you find out how much stress you feel and what is causing it. When you know this, you can find better ways to cope.  What can you do to prevent stress?  You might try some of these things to help prevent stress:  Manage your time. This helps you find time to do the things you want and need to do.  Get enough sleep. Your body recovers from the stresses of the day while you are sleeping.  Get support. Your family, friends, and community can make a difference in how you experience stress.  Limit your news feed. Avoid or limit time on social media or news that may make you feel stressed.  Do something active. Exercise or activity can help reduce stress. Walking is a great way to get started.  Where can you learn more?  Go to https://www.[employer].net/patiented  Enter N032 in the search box to learn more about \"Learning About Stress.\"  Current as of: October 24, 2023               Content Version: 14.0    7057-4067 [employer].   Care instructions adapted under license by your healthcare professional. If you have questions about a medical " condition or this instruction, always ask your healthcare professional. [employer] disclaims any warranty or liability for your use of this information.

## 2024-03-15 NOTE — NURSING NOTE
"69 year old  Chief Complaint   Patient presents with    Physical     Urethritis -- pain is bad today, what can she do? Currently taking AZO. Moles -- check or derm referral? OTC allergy meds -- hay fever is bad right now.       Blood pressure 109/71, pulse 66, temperature 97.7  F (36.5  C), temperature source Temporal, resp. rate 17, height 1.727 m (5' 8\"), weight 77.1 kg (170 lb), SpO2 96%, not currently breastfeeding. Body mass index is 25.85 kg/m .  Patient Active Problem List   Diagnosis    Type 2 diabetes mellitus without complication, without long-term current use of insulin (H)    Coronary artery calcification    Nonrheumatic aortic valve stenosis    Ascending aortic aneurysm (H24)    Hyperlipidemia LDL goal <100    Essential hypertension    Screening for colon cancer    Genitourinary syndrome of menopause    Environmental allergies    Seasonal allergic rhinitis due to pollen    Status post coronary angiogram    Coronary artery disease involving native heart without angina pectoris, unspecified vessel or lesion type    Dermatochalasis of both upper eyelids    Ptosis of both eyelids       Wt Readings from Last 2 Encounters:   03/15/24 77.1 kg (170 lb)   10/02/23 78.6 kg (173 lb 3.2 oz)     BP Readings from Last 3 Encounters:   03/15/24 109/71   10/02/23 (!) 149/84   09/28/23 126/62         Current Outpatient Medications   Medication    ACCU-CHEK GUIDE test strip    atorvastatin (LIPITOR) 40 MG tablet    Blood Glucose Monitoring Suppl (ACCU-CHEK GUIDE) w/Device KIT    carboxymethylcellulose PF (REFRESH PLUS) 0.5 % ophthalmic solution    cetirizine (ZYRTEC) 10 MG tablet    cyanocobalamin (VITAMIN B-12) 100 MCG tablet    dapagliflozin (FARXIGA) 10 MG TABS tablet    diltiazem (CARDIZEM) 60 MG tablet    estradiol (ESTRACE) 0.1 MG/GM vaginal cream    fenofibrate (TRIGLIDE/LOFIBRA) 160 MG tablet    gabapentin (NEURONTIN) 100 MG capsule    ipratropium (ATROVENT) 0.06 % nasal spray    losartan (COZAAR) 50 MG tablet    " "metFORMIN (GLUCOPHAGE) 500 MG tablet    montelukast (SINGULAIR) 10 MG tablet    multivitamin w/minerals (MULTI-VITAMIN) tablet    Semaglutide, 1 MG/DOSE, (OZEMPIC, 1 MG/DOSE,) 4 MG/3ML pen    Vitamin D, Cholecalciferol, 10 MCG (400 UNIT) TABS     No current facility-administered medications for this visit.       Social History     Tobacco Use    Smoking status: Never     Passive exposure: Never    Smokeless tobacco: Never   Vaping Use    Vaping Use: Never used   Substance Use Topics    Alcohol use: Yes     Alcohol/week: 1.0 standard drink of alcohol     Types: 1 Standard drinks or equivalent per week     Comment: less than 2 drinks a week    Drug use: Never       Health Maintenance Due   Topic Date Due    DEXA  Never done    RSV VACCINE (Pregnancy & 60+) (1 - 1-dose 60+ series) Never done    DTAP/TDAP/TD IMMUNIZATION (1 - Tdap) 12/13/2019    A1C  12/15/2023    MEDICARE ANNUAL WELLNESS VISIT  03/13/2024    DIABETIC FOOT EXAM  03/15/2024       No results found for: \"PAP\"      March 15, 2024 7:52 AM    "

## 2024-03-15 NOTE — PROGRESS NOTES
Preventive Care Visit  [facility]  Nannette Gallagher MD, Family Medicine  Mar 15, 2024      Assessment & Plan     Won was seen today for physical.    Diagnoses and all orders for this visit:    Encounter for Medicare annual wellness exam    Type 2 diabetes mellitus without complication, without long-term current use of insulin (H)  -     HEMOGLOBIN A1C; Future  -     Basic metabolic panel; Future  -     Albumin Random Urine Quantitative with Creat Ratio; Future  -     FOOT EXAM  NO CHARGE [[id number]]    Asymptomatic postmenopausal status  -     DEXA HIP/PELVIS/SPINE - Future; Future    Dysuria  -     Routine UA with micro reflex to culture; Standing    Need for vaccination  -     COVID-19 12+ (2023-24) (MODERNA)      Diabetes - recheck A1c today. If < 6.5, would consider reducing semaglutide dose and/or changing to alternative GLP1 with better insurance coverage (appears Trulicity may be preferred although all meds require PA)    Following with cardiology for HLD, coronary artery calcifications, known AORTIC STENOSIS    Recurrent UTI, most recent ESBL. Will check UA/Ucx today given symptoms. Standing order for UA placed, would prefer not to treat without culture/sensitivities given history of resistant infection.    Allergies - trial alternative oral antihistamine. Consider referral back to allergy given incomplete improvement with current therapy    Update DEXA.    Monitor lesion on mons pubis - recommend shave removal/biopsy if growing/changing    Has appointment with breast cancer risk assessment team.      Counseling  Appropriate preventive services were discussed with this patient, including applicable screening as appropriate for fall prevention, nutrition, physical activity, Tobacco-use cessation, weight loss and cognition.  Checklist reviewing preventive services available has been given to the patient.  Reviewed patient's diet, addressing concerns and/or questions.   The patient was provided with  written information regarding signs of hearing loss.   Information on urinary incontinence and treatment options given to patient.     Follow-up: 6 months for DM    Subjective   Won is a 69 year old, presenting for the following:  Physical (Urethritis -- pain is bad today, what can she do? Currently taking AZO. Moles -- check or derm referral? OTC allergy meds -- hay fever is bad right now.)        Health Care Directive  Patient does not have a Health Care Directive or Living Will: Advance Directive received and scanned. Click on Code in the patient header to view.    HPI    Has moles that she would like looked at.    Gets urethritis - sudden onset of urgency/frequency  Takes Azo, sometimes will resolve the symptoms, sometimes it will not. Often needs antibiotics.    Allergies - cetirizine is not incredibly effective  Was doing allergy shots, helped some but didn't want to continue  Lost sense of taste with Flonase. Using Atrovent nasal spray and sinus irrigation    FBG < 100 regularly  Has been exercising more regularly, lost 5#    Gabapentin - uses for vulvar discomfort.   Using vaginal estrogen, seems to help. Also hoping to reduce episode of urthetritis        3/8/2024   General Health   How would you rate your overall physical health? Good   Feel stress (tense, anxious, or unable to sleep) To some extent   (!) STRESS CONCERN      3/8/2024   Nutrition   Diet: Diabetic         3/8/2024   Exercise   Days per week of moderate/strenous exercise 4 days   Average minutes spent exercising at this level 30 min         3/8/2024   Social Factors   Frequency of gathering with friends or relatives Twice a week   Worry food won't last until get money to buy more No   Food not last or not have enough money for food? No   Do you have housing?  Yes   Are you worried about losing your housing? Yes   Lack of transportation? No   Unable to get utilities (heat,electricity)? No   Want help with housing or utility concern? No    (!) HOUSING CONCERN PRESENT      3/8/2024   Fall Risk   Fallen 2 or more times in the past year? No   Trouble with walking or balance? No          3/8/2024   Activities of Daily Living- Home Safety   Needs help with the following daily activites None of the above   Safety concerns in the home None of the above         3/8/2024   Dental   Dentist two times every year? Yes         3/8/2024   Hearing Screening   Hearing concerns? (!) I NEED TO ASK PEOPLE TO SPEAK UP OR REPEAT THEMSELVES.    (!) IT'S HARD TO FOLLOW A CONVERSATION IN A NOISY RESTAURANT OR CROWDED ROOM.    (!) TROUBLE UNDERSTANDING SOFT OR WHISPERED SPEECH.    (!) TROUBLE UNDERSTANDING SPEECH ON THE TELEPHONE   Has a hearing exam coming up at hearing school (has hearing aid)        3/8/2024   Driving Risk Screening   Patient/family members have concerns about driving No         3/8/2024   General Alertness/Fatigue Screening   Have you been more tired than usual lately? No         3/8/2024   Urinary Incontinence Screening   Bothered by leaking urine in past 6 months Yes   Usually associated with urethritis  Rarely happens outside of this        3/8/2024   TB Screening   Were you born outside of [place]?  No         Today's PHQ-2 Score:       3/14/2024     1:19 PM   PHQ-2 ( 1999 Pfizer)   Q1: Little interest or pleasure in doing things 0   Q2: Feeling down, depressed or hopeless 0   PHQ-2 Score 0   Q1: Little interest or pleasure in doing things Not at all   Q2: Feeling down, depressed or hopeless Not at all   PHQ-2 Score 0   Planning to talk with Shai about anxiety. This has improved since she retired. Her son feels like she could benefit from medication.  Used Valium very sparingly when she was in a tough spot in her relationship. Took one or two of these TOTAL.        3/8/2024   Substance Use   Alcohol more than 3/day or more than 7/wk No   Do you have a current opioid prescription? No   How severe/bad is pain from 1 to 10? 0/10 (No Pain)   Do you use any  other substances recreationally? No     Social History     Tobacco Use    Smoking status: Never     Passive exposure: Never    Smokeless tobacco: Never   Vaping Use    Vaping Use: Never used   Substance Use Topics    Alcohol use: Yes     Alcohol/week: 1.0 standard drink of alcohol     Types: 1 Standard drinks or equivalent per week     Comment: less than 2 drinks a week    Drug use: Never     Comment: gummy 1x/month           7/10/2023   LAST FHS-7 RESULTS   1st degree relative breast or ovarian cancer Yes   Any relative bilateral breast cancer No   Any male have breast cancer No   Any ONE woman have BOTH breast AND ovarian cancer No   Any woman with breast cancer before 50yrs No   2 or more relatives with breast AND/OR ovarian cancer No   2 or more relatives with breast AND/OR bowel cancer Yes     Mammogram Screening - Mammogram every 1-2 years updated in Health Maintenance based on mutual decision making, has upcoming risk stratification appointment    ASCVD Risk   The ASCVD Risk score (Nick COLÓN, et al., 2019) failed to calculate for the following reasons:    The valid total cholesterol range is 130 to 320 mg/dL      Reviewed and updated as needed this visit by Provider   Tobacco  Allergies  Meds  Problems  Med Hx  Surg Hx  Fam Hx  Soc   Hx Sexual Activity            Current providers sharing in care for this patient include:  Patient Care Team:  Nannette Gallagher MD as PCP - General (Family Medicine)  Dustin Haynes MD as Assigned PCP  Andre Rodriguez MD as Resident (OB/Gyn)  Sammy Matson MD as Assigned Heart and Vascular Provider  Ghanshyam Ramirez MD as MD (Ophthalmology)  Gabriela Bloom MD as MD (Ophthalmology)  Shantelle Marti MD as MD (Ophthalmology)  Paulina Hurst RN as Specialty Care Coordinator (Cardiology)  Niharika Olivo, APRN CNP as Nurse Practitioner (Cardiology)  Karyn Matthews MD as Resident (Student in organized health care  "education/training program)  Lita Crespo APRN CNP as Nurse Practitioner (Anesthesiology)  Shantelle Marti MD as Assigned Surgical Provider  Priya Lindsay PA-C as Physician Assistant (Dermatology)  Self, Referred, MD as Lu Rodriguez AuD as Audiologist (Audiology)    The following health maintenance items are reviewed in Epic and correct as of today:  Health Maintenance   Topic Date Due    DEXA  Never done    DTAP/TDAP/TD IMMUNIZATION (1 - Tdap) 12/13/2019    A1C  12/15/2023    COLORECTAL CANCER SCREENING  01/24/2026 (Originally 1/24/1965)    MICROALBUMIN  04/24/2024    BMP  08/20/2024    EYE EXAM  08/29/2024    LIPID  01/13/2025    MEDICARE ANNUAL WELLNESS VISIT  03/15/2025    DIABETIC FOOT EXAM  03/15/2025    FALL RISK ASSESSMENT  03/15/2025    MAMMO SCREENING  07/10/2025    ADVANCE CARE PLANNING  03/15/2028    HEPATITIS C SCREENING  Completed    PHQ-2 (once per calendar year)  Completed    INFLUENZA VACCINE  Completed    Pneumococcal Vaccine: 65+ Years  Completed    ZOSTER IMMUNIZATION  Completed    RSV VACCINE (Pregnancy & 60+)  Completed    COVID-19 Vaccine  Completed    IPV IMMUNIZATION  Aged Out    HPV IMMUNIZATION  Aged Out    MENINGITIS IMMUNIZATION  Aged Out    RSV MONOCLONAL ANTIBODY  Aged Out          Objective    Exam  /71 (BP Location: Left arm, Patient Position: Sitting, Cuff Size: Adult Regular)   Pulse 66   Temp 97.7  F (36.5  C) (Temporal)   Resp 17   Ht 1.727 m (5' 8\")   Wt 77.1 kg (170 lb)   SpO2 96%   BMI 25.85 kg/m     Estimated body mass index is 25.85 kg/m  as calculated from the following:    Height as of this encounter: 1.727 m (5' 8\").    Weight as of this encounter: 77.1 kg (170 lb).    Physical Exam  GENERAL: alert and no distress  EYES: Eyes grossly normal to inspection, PERRL and conjunctivae and sclerae normal  HENT: ear canals and TM's normal, nose and mouth without ulcers or lesions  NECK: no adenopathy, no asymmetry, masses, or scars  RESP: lungs clear to " auscultation - no rales, rhonchi or wheezes  CV: regular rate and rhythm, grade III-IV/VI systolic murmur best heard over RUSB  ABDOMEN: soft, nontender, no hepatosplenomegaly, no masses and bowel sounds normal  MS: no gross musculoskeletal defects noted, no edema  SKIN: no suspicious lesions or rashes. Scattered Sks and hemangiomas. 3 mm pigmented lesion over mons pubis   NEURO: Normal strength and tone, mentation intact and speech normal  PSYCH: mentation appears normal, affect normal/bright         3/15/2024   Mini Cog   Clock Draw Score 2 Normal   3 Item Recall 3 objects recalled   Mini Cog Total Score 5       Signed Electronically by: Nannette Gallagher MD

## 2024-03-15 NOTE — PROGRESS NOTES
Prior to immunization administration, verified patients identity using patient s name and date of birth. Please see Immunization Activity for additional information.     Screening Questionnaire for Adult Immunization    Are you sick today?   No   Do you have allergies to medications, food, a vaccine component or latex?   No   Have you ever had a serious reaction after receiving a vaccination?   No   Do you have a long-term health problem with heart, lung, kidney, or metabolic disease (e.g., diabetes), asthma, a blood disorder, no spleen, complement component deficiency, a cochlear implant, or a spinal fluid leak?  Are you on long-term aspirin therapy?   No   Do you have cancer, leukemia, HIV/AIDS, or any other immune system problem?   No   Do you have a parent, brother, or sister with an immune system problem?   No   In the past 3 months, have you taken medications that affect  your immune system, such as prednisone, other steroids, or anticancer drugs; drugs for the treatment of rheumatoid arthritis, Crohn s disease, or psoriasis; or have you had radiation treatments?   No   Have you had a seizure, or a brain or other nervous system problem?   No   During the past year, have you received a transfusion of blood or blood    products, or been given immune (gamma) globulin or antiviral drug?   No   For women: Are you pregnant or is there a chance you could become       pregnant during the next month?   No   Have you received any vaccinations in the past 4 weeks?   No     Immunization questionnaire answers were all negative.      Patient instructed to remain in clinic for 15 minutes afterwards, and to report any adverse reactions.     Screening performed by Kelsy Yap LPN on 3/15/2024 at 8:47 AM.

## 2024-03-16 LAB
ANION GAP SERPL CALCULATED.3IONS-SCNC: 10 MMOL/L (ref 7–15)
BACTERIA UR CULT: ABNORMAL
BUN SERPL-MCNC: 21.8 MG/DL (ref 8–23)
CALCIUM SERPL-MCNC: 9.5 MG/DL (ref 8.8–10.2)
CHLORIDE SERPL-SCNC: 107 MMOL/L (ref 98–107)
CREAT SERPL-MCNC: 0.78 MG/DL (ref 0.51–0.95)
CREAT UR-MCNC: 238 MG/DL
DEPRECATED HCO3 PLAS-SCNC: 26 MMOL/L (ref 22–29)
EGFRCR SERPLBLD CKD-EPI 2021: 82 ML/MIN/1.73M2
GLUCOSE SERPL-MCNC: 101 MG/DL (ref 70–99)
MICROALBUMIN UR-MCNC: 431 MG/L
MICROALBUMIN/CREAT UR: 181.09 MG/G CR (ref 0–25)
POTASSIUM SERPL-SCNC: 4.2 MMOL/L (ref 3.4–5.3)
SODIUM SERPL-SCNC: 143 MMOL/L (ref 135–145)

## 2024-03-18 ENCOUNTER — OFFICE VISIT (OUTPATIENT)
Dept: FAMILY MEDICINE | Facility: CLINIC | Age: 69
End: 2024-03-18
Payer: COMMERCIAL

## 2024-03-18 DIAGNOSIS — Z71.3 DIETARY COUNSELING AND SURVEILLANCE: Primary | ICD-10-CM

## 2024-03-18 RX ORDER — FENOFIBRATE 160 MG/1
160 TABLET ORAL EVERY EVENING
Qty: 100 TABLET | Refills: 3 | Status: SHIPPED | OUTPATIENT
Start: 2024-03-18

## 2024-03-18 RX ORDER — GABAPENTIN 100 MG/1
200 CAPSULE ORAL AT BEDTIME
Qty: 200 CAPSULE | Refills: 3 | Status: SHIPPED | OUTPATIENT
Start: 2024-03-18 | End: 2024-10-01

## 2024-03-18 RX ORDER — BLOOD-GLUCOSE METER
EACH MISCELLANEOUS
Qty: 1 KIT | Refills: 0 | Status: ON HOLD | OUTPATIENT
Start: 2024-03-18 | End: 2024-06-12

## 2024-03-18 RX ORDER — ATORVASTATIN CALCIUM 40 MG/1
40 TABLET, FILM COATED ORAL DAILY
Qty: 90 TABLET | Refills: 3 | OUTPATIENT
Start: 2024-03-18

## 2024-03-18 NOTE — PATIENT INSTRUCTIONS
Would suggest a goal 75-90 grams of protein per day. One way to achieve this is to try for around 25 grams per meal in addition to a protein source with your snack. My Fitness Pal is a nice peterson to start tracking.  Protein sources: nut butter (2 tbsp - 6 g), eggs (1 = 6 g), meat (1 oz- 8 gm), beans/lentil (1/2 cup = 8-10 g), greek yogurt, cottage cheese, cheese/string cheese, quinoa are all examples (check labels for exact protein count). Aim for protein and produce each time you eat.   Smoothies might be a nice way to include some extra fruit/vegetable - but also a way to include protein (greek yogurt, protein powder, cottage cheese)  One sweet treat per day - either pastry in the morning or ice cream at night, but not both. Consider hot tea as an evening ritual for the times that you don't have ice cream.    Follow up: In 1-3 months or as needed; [facility]    Shona Arroyo RD

## 2024-03-18 NOTE — PROGRESS NOTES
[facility] Nutrition Assessment    Won is a 69 year old female who presents for nutrition counseling related to weight management (maintenance while decreasing dose of ozempic) and overall health. Hx of diabetes now well controlled. She describes having always struggled with weight, was up to 285 lb for a long time. Has been on Ozempic for several years, now decreasing dose and would like to maintain weight as well as build strength.     Won's  has a complicated health picture including hx of bowel obstruction and ileus needing a low fiber diet. They are comfortable navigating this for the most part, but have gotten away from including vegetables and it is something they would like to include more of. Also starting to notice need for more intentional protein sources. Finds it too easy to eat toast and peanut butter often. She also notes having a sweet tooth.     Recent diet recall:  Wake at 7 am, coffee   will do one boost per day, thinking about Naked protein powder  Breakfast 9 am: toast and peanut butter, likely some fruit (fresh or frozen)  Lunch: 1/2 sandwich or leftovers  Dinner: pasta (frozen bag with red or cream sauce), meat and potatoes, 1-2 vegetables  Ice cream or dessert before bed  Likes pastry/sweets also, sometimes for breakfast      Social:  ([place]os[place]). Retired one month ago (still working 5-10 hrs/week).  is wheelchair bound, NWB on foot after a surgery. Will be mobile in 2-6 months.    Physical activity: Working out for the last 4 weeks. 30 minutes on the orat.io.   Medications: includes farxiga, ozempic, metformin, lipitor  Vitamins/Supplements: not discussed today    Recent weights:   Wt Readings from Last 6 Encounters:   03/15/24 77.1 kg (170 lb)   10/02/23 78.6 kg (173 lb 3.2 oz)   09/28/23 78.5 kg (173 lb)   09/20/23 80.6 kg (177 lb 12 oz)   07/24/23 80.2 kg (176 lb 12.8 oz)   06/26/23 79 kg (174 lb 1.6 oz)     Estimated body mass index is 25.85 kg/m  as  "calculated from the following:    Height as of 3/15/24: 1.727 m (5' 8\").    Weight as of 3/15/24: 77.1 kg (170 lb).      Recent A1C values:   Hemoglobin A1C   Date Value Ref Range Status   03/15/2024 5.8 (H) 0.0 - 5.6 % Final     Comment:     Normal <5.7%   Prediabetes 5.7-6.4%    Diabetes 6.5% or higher     Note: Adopted from ADA consensus guidelines.   09/15/2023 6.3 (H) <5.7 % Final     Comment:     Normal <5.7%   Prediabetes 5.7-6.4%    Diabetes 6.5% or higher     Note: Adopted from ADA consensus guidelines.   04/24/2023 5.7 (H) <5.7 % Final     Comment:     Normal <5.7%   Prediabetes 5.7-6.4%    Diabetes 6.5% or higher     Note: Adopted from ADA consensus guidelines.     Recent lipid values:   Cholesterol   Date Value Ref Range Status   01/13/2024 119 <200 mg/dL Final   04/24/2023 149 <200 mg/dL Final     Direct Measure HDL   Date Value Ref Range Status   01/13/2024 44 (L) >=50 mg/dL Final   04/24/2023 59 >=50 mg/dL Final     LDL Cholesterol Calculated   Date Value Ref Range Status   01/13/2024 59 <=100 mg/dL Final   04/24/2023 77 <=100 mg/dL Final     LDL Cholesterol Direct   Date Value Ref Range Status   01/13/2024 64 <100 mg/dL Final     Comment:     Age 2-19 years:  Desirable: 0-110 mg/dL   Borderline high: 110-129 mg/dL   High: >= 130 mg/dL    Age 20 years and older:  Desirable: <100mg/dL  Above desirable: 100-129 mg/dL   Borderline high: 130-159 mg/dL   High: 160-189 mg/dL   Very high: >= 190 mg/dL   04/24/2023 76 <100 mg/dL Final     Comment:     Age 2-19 years:  Desirable: 0-110 mg/dL   Borderline high: 110-129 mg/dL   High: >= 130 mg/dL    Age 20 years and older:  Desirable: <100mg/dL  Above desirable: 100-129 mg/dL   Borderline high: 130-159 mg/dL   High: 160-189 mg/dL   Very high: >= 190 mg/dL     Triglycerides   Date Value Ref Range Status   01/13/2024 80 <150 mg/dL Final   04/24/2023 66 <150 mg/dL Final     Triglycerides (External)   Date Value Ref Range Status   08/06/2022 165 n/a - n/a mg/dL " Final         Intervention(s):  Won is a 69 year old female who presents for nutrition counseling related to weight management (maintenance while decreasing dose of ozempic) and overall health.   Would suggest a goal 75-90 grams of protein per day. One way to achieve this is to try for around 25 grams per meal in addition to a protein source with your snack. My Fitness Pal is a nice peterson to start tracking.  Protein sources: nut butter (2 tbsp - 6 g), eggs (1 = 6 g), meat (1 oz- 8 gm), beans/lentil (1/2 cup = 8-10 g), greek yogurt, cottage cheese, cheese/string cheese, quinoa are all examples (check labels for exact protein count). Aim for protein and produce each time you eat.   Smoothies might be a nice way to include some extra fruit/vegetable - but also a way to include protein (greek yogurt, protein powder, cottage cheese)  One sweet treat per day - either pastry in the morning or ice cream at night, but not both. Consider hot tea as an evening ritual for the times that you don't have ice cream.    Follow up: In 1-3 months or as needed; [facility]. Discussed options for pairing a follow up visit with an individual visit with .     Patient referred by Nannette Gallagher MD   Total time spent with patient 45 minutes    Shona Arroyo RD

## 2024-03-19 NOTE — TELEPHONE ENCOUNTER
Medication requested: Blood Glucose Monitoring Suppl (ACCU-CHEK GUIDE) w/Device KIT   Last office visit: 9/20/2023  [facility] appointments: 3/15/2024  Medication last refilled: 8/11/2023; 1 kit + 0 refills    Medication requested: fenofibrate (TRIGLIDE/LOFIBRA) 160 MG tablet   Medication last refilled: 9/5/2023; 100 tabs + 1 refill    Medication requested: gabapentin (NEURONTIN) 100 MG capsule   Medication last refilled: 5/22/2023; 180 + 3 refills  Last qualifying labs:     Pt has annual physical on 3/15/2024. Will hold in case Dr. Gallagher orders meds during visit.    MAC Hardin, RN  03/12/24, 1:36 PM    
Awake/Alert

## 2024-03-22 ENCOUNTER — MYC MEDICAL ADVICE (OUTPATIENT)
Dept: FAMILY MEDICINE | Facility: CLINIC | Age: 69
End: 2024-03-22

## 2024-03-22 DIAGNOSIS — I25.10 CORONARY ARTERY DISEASE INVOLVING NATIVE HEART WITHOUT ANGINA PECTORIS, UNSPECIFIED VESSEL OR LESION TYPE: ICD-10-CM

## 2024-03-23 ENCOUNTER — HOSPITAL ENCOUNTER (EMERGENCY)
Facility: CLINIC | Age: 69
Discharge: HOME | End: 2024-03-23
Attending: STUDENT IN AN ORGANIZED HEALTH CARE EDUCATION/TRAINING PROGRAM | Admitting: STUDENT IN AN ORGANIZED HEALTH CARE EDUCATION/TRAINING PROGRAM
Payer: COMMERCIAL

## 2024-03-23 ENCOUNTER — APPOINTMENT (OUTPATIENT)
Dept: GENERAL RADIOLOGY | Facility: CLINIC | Age: 69
End: 2024-03-23
Attending: STUDENT IN AN ORGANIZED HEALTH CARE EDUCATION/TRAINING PROGRAM
Payer: COMMERCIAL

## 2024-03-23 VITALS
HEART RATE: 73 BPM | OXYGEN SATURATION: 98 % | RESPIRATION RATE: 18 BRPM | DIASTOLIC BLOOD PRESSURE: 97 MMHG | TEMPERATURE: 98.3 F | SYSTOLIC BLOOD PRESSURE: 119 MMHG

## 2024-03-23 DIAGNOSIS — I47.10 SVT (SUPRAVENTRICULAR TACHYCARDIA) (H): ICD-10-CM

## 2024-03-23 DIAGNOSIS — I35.0 NONRHEUMATIC AORTIC VALVE STENOSIS: Chronic | ICD-10-CM

## 2024-03-23 LAB
ALBUMIN SERPL BCG-MCNC: 4.5 G/DL (ref 3.5–5.2)
ALBUMIN UR-MCNC: NEGATIVE MG/DL
ALP SERPL-CCNC: 56 U/L (ref 40–150)
ALT SERPL W P-5'-P-CCNC: 18 U/L (ref 0–50)
ANION GAP SERPL CALCULATED.3IONS-SCNC: 14 MMOL/L (ref 7–15)
APPEARANCE UR: CLEAR
AST SERPL W P-5'-P-CCNC: 33 U/L (ref 0–45)
BASOPHILS # BLD AUTO: 0 10E3/UL (ref 0–0.2)
BASOPHILS NFR BLD AUTO: 1 %
BILIRUB SERPL-MCNC: 0.5 MG/DL
BILIRUB UR QL STRIP: NEGATIVE
BUN SERPL-MCNC: 20.4 MG/DL (ref 8–23)
CALCIUM SERPL-MCNC: 10 MG/DL (ref 8.8–10.2)
CHLORIDE SERPL-SCNC: 107 MMOL/L (ref 98–107)
COLOR UR AUTO: ABNORMAL
CREAT SERPL-MCNC: 0.71 MG/DL (ref 0.51–0.95)
DEPRECATED HCO3 PLAS-SCNC: 21 MMOL/L (ref 22–29)
EGFRCR SERPLBLD CKD-EPI 2021: >90 ML/MIN/1.73M2
EOSINOPHIL # BLD AUTO: 0.2 10E3/UL (ref 0–0.7)
EOSINOPHIL NFR BLD AUTO: 5 %
ERYTHROCYTE [DISTWIDTH] IN BLOOD BY AUTOMATED COUNT: 13.5 % (ref 10–15)
FLUAV RNA SPEC QL NAA+PROBE: NEGATIVE
FLUBV RNA RESP QL NAA+PROBE: NEGATIVE
GLUCOSE SERPL-MCNC: 103 MG/DL (ref 70–99)
GLUCOSE UR STRIP-MCNC: >=1000 MG/DL
HCT VFR BLD AUTO: 41.5 % (ref 35–47)
HGB BLD-MCNC: 14 G/DL (ref 11.7–15.7)
HGB UR QL STRIP: NEGATIVE
HOLD SPECIMEN: NORMAL
HOLD SPECIMEN: NORMAL
IMM GRANULOCYTES # BLD: 0 10E3/UL
IMM GRANULOCYTES NFR BLD: 0 %
KETONES UR STRIP-MCNC: 10 MG/DL
LEUKOCYTE ESTERASE UR QL STRIP: NEGATIVE
LIPASE SERPL-CCNC: 66 U/L (ref 13–60)
LYMPHOCYTES # BLD AUTO: 1.2 10E3/UL (ref 0.8–5.3)
LYMPHOCYTES NFR BLD AUTO: 25 %
MAGNESIUM SERPL-MCNC: 1.7 MG/DL (ref 1.7–2.3)
MCH RBC QN AUTO: 29.9 PG (ref 26.5–33)
MCHC RBC AUTO-ENTMCNC: 33.7 G/DL (ref 31.5–36.5)
MCV RBC AUTO: 89 FL (ref 78–100)
MONOCYTES # BLD AUTO: 0.4 10E3/UL (ref 0–1.3)
MONOCYTES NFR BLD AUTO: 8 %
MUCOUS THREADS #/AREA URNS LPF: PRESENT /LPF
NEUTROPHILS # BLD AUTO: 3.1 10E3/UL (ref 1.6–8.3)
NEUTROPHILS NFR BLD AUTO: 61 %
NITRATE UR QL: NEGATIVE
NRBC # BLD AUTO: 0 10E3/UL
NRBC BLD AUTO-RTO: 0 /100
PH UR STRIP: 7 [PH] (ref 5–7)
PHOSPHATE SERPL-MCNC: 3.3 MG/DL (ref 2.5–4.5)
PLATELET # BLD AUTO: 151 10E3/UL (ref 150–450)
POTASSIUM SERPL-SCNC: 3.9 MMOL/L (ref 3.4–5.3)
PROT SERPL-MCNC: 6.5 G/DL (ref 6.4–8.3)
RBC # BLD AUTO: 4.69 10E6/UL (ref 3.8–5.2)
RBC URINE: <1 /HPF
RSV RNA SPEC NAA+PROBE: NEGATIVE
SARS-COV-2 RNA RESP QL NAA+PROBE: NEGATIVE
SODIUM SERPL-SCNC: 142 MMOL/L (ref 135–145)
SP GR UR STRIP: 1.02 (ref 1–1.03)
SQUAMOUS EPITHELIAL: <1 /HPF
TROPONIN T SERPL HS-MCNC: 12 NG/L
TROPONIN T SERPL HS-MCNC: 20 NG/L
TROPONIN T SERPL HS-MCNC: 25 NG/L
TSH SERPL DL<=0.005 MIU/L-ACNC: 0.43 UIU/ML (ref 0.3–4.2)
UROBILINOGEN UR STRIP-MCNC: NORMAL MG/DL
WBC # BLD AUTO: 5 10E3/UL (ref 4–11)
WBC URINE: 1 /HPF

## 2024-03-23 PROCEDURE — 258N000003 HC RX IP 258 OP 636: Performed by: STUDENT IN AN ORGANIZED HEALTH CARE EDUCATION/TRAINING PROGRAM

## 2024-03-23 PROCEDURE — 84484 ASSAY OF TROPONIN QUANT: CPT | Performed by: STUDENT IN AN ORGANIZED HEALTH CARE EDUCATION/TRAINING PROGRAM

## 2024-03-23 PROCEDURE — 93010 ELECTROCARDIOGRAM REPORT: CPT | Performed by: STUDENT IN AN ORGANIZED HEALTH CARE EDUCATION/TRAINING PROGRAM

## 2024-03-23 PROCEDURE — 36415 COLL VENOUS BLD VENIPUNCTURE: CPT | Performed by: STUDENT IN AN ORGANIZED HEALTH CARE EDUCATION/TRAINING PROGRAM

## 2024-03-23 PROCEDURE — 99284 EMERGENCY DEPT VISIT MOD MDM: CPT | Mod: 25 | Performed by: STUDENT IN AN ORGANIZED HEALTH CARE EDUCATION/TRAINING PROGRAM

## 2024-03-23 PROCEDURE — 85025 COMPLETE CBC W/AUTO DIFF WBC: CPT | Performed by: STUDENT IN AN ORGANIZED HEALTH CARE EDUCATION/TRAINING PROGRAM

## 2024-03-23 PROCEDURE — 83735 ASSAY OF MAGNESIUM: CPT | Performed by: STUDENT IN AN ORGANIZED HEALTH CARE EDUCATION/TRAINING PROGRAM

## 2024-03-23 PROCEDURE — 96361 HYDRATE IV INFUSION ADD-ON: CPT | Performed by: STUDENT IN AN ORGANIZED HEALTH CARE EDUCATION/TRAINING PROGRAM

## 2024-03-23 PROCEDURE — 250N000013 HC RX MED GY IP 250 OP 250 PS 637: Performed by: STUDENT IN AN ORGANIZED HEALTH CARE EDUCATION/TRAINING PROGRAM

## 2024-03-23 PROCEDURE — 83690 ASSAY OF LIPASE: CPT | Performed by: STUDENT IN AN ORGANIZED HEALTH CARE EDUCATION/TRAINING PROGRAM

## 2024-03-23 PROCEDURE — 71046 X-RAY EXAM CHEST 2 VIEWS: CPT | Mod: 26 | Performed by: RADIOLOGY

## 2024-03-23 PROCEDURE — 81001 URINALYSIS AUTO W/SCOPE: CPT | Performed by: STUDENT IN AN ORGANIZED HEALTH CARE EDUCATION/TRAINING PROGRAM

## 2024-03-23 PROCEDURE — 87637 SARSCOV2&INF A&B&RSV AMP PRB: CPT | Performed by: STUDENT IN AN ORGANIZED HEALTH CARE EDUCATION/TRAINING PROGRAM

## 2024-03-23 PROCEDURE — 96360 HYDRATION IV INFUSION INIT: CPT | Performed by: STUDENT IN AN ORGANIZED HEALTH CARE EDUCATION/TRAINING PROGRAM

## 2024-03-23 PROCEDURE — 84443 ASSAY THYROID STIM HORMONE: CPT | Performed by: STUDENT IN AN ORGANIZED HEALTH CARE EDUCATION/TRAINING PROGRAM

## 2024-03-23 PROCEDURE — 93005 ELECTROCARDIOGRAM TRACING: CPT | Performed by: STUDENT IN AN ORGANIZED HEALTH CARE EDUCATION/TRAINING PROGRAM

## 2024-03-23 PROCEDURE — 99285 EMERGENCY DEPT VISIT HI MDM: CPT | Mod: 25 | Performed by: STUDENT IN AN ORGANIZED HEALTH CARE EDUCATION/TRAINING PROGRAM

## 2024-03-23 PROCEDURE — 80053 COMPREHEN METABOLIC PANEL: CPT | Performed by: STUDENT IN AN ORGANIZED HEALTH CARE EDUCATION/TRAINING PROGRAM

## 2024-03-23 PROCEDURE — 71046 X-RAY EXAM CHEST 2 VIEWS: CPT

## 2024-03-23 PROCEDURE — 84100 ASSAY OF PHOSPHORUS: CPT | Performed by: STUDENT IN AN ORGANIZED HEALTH CARE EDUCATION/TRAINING PROGRAM

## 2024-03-23 RX ORDER — ASPIRIN 81 MG/1
243 TABLET, CHEWABLE ORAL ONCE
Status: COMPLETED | OUTPATIENT
Start: 2024-03-23 | End: 2024-03-23

## 2024-03-23 RX ADMIN — ASPIRIN 81 MG CHEWABLE TABLET 243 MG: 81 TABLET CHEWABLE at 13:31

## 2024-03-23 RX ADMIN — SODIUM CHLORIDE, POTASSIUM CHLORIDE, SODIUM LACTATE AND CALCIUM CHLORIDE 500 ML: 600; 310; 30; 20 INJECTION, SOLUTION INTRAVENOUS at 13:22

## 2024-03-23 ASSESSMENT — ANXIETY QUESTIONNAIRES
8. IF YOU CHECKED OFF ANY PROBLEMS, HOW DIFFICULT HAVE THESE MADE IT FOR YOU TO DO YOUR WORK, TAKE CARE OF THINGS AT HOME, OR GET ALONG WITH OTHER PEOPLE?: SOMEWHAT DIFFICULT
6. BECOMING EASILY ANNOYED OR IRRITABLE: SEVERAL DAYS
IF YOU CHECKED OFF ANY PROBLEMS ON THIS QUESTIONNAIRE, HOW DIFFICULT HAVE THESE PROBLEMS MADE IT FOR YOU TO DO YOUR WORK, TAKE CARE OF THINGS AT HOME, OR GET ALONG WITH OTHER PEOPLE: SOMEWHAT DIFFICULT
GAD7 TOTAL SCORE: 6
GAD7 TOTAL SCORE: 6
3. WORRYING TOO MUCH ABOUT DIFFERENT THINGS: SEVERAL DAYS
4. TROUBLE RELAXING: SEVERAL DAYS
7. FEELING AFRAID AS IF SOMETHING AWFUL MIGHT HAPPEN: SEVERAL DAYS
2. NOT BEING ABLE TO STOP OR CONTROL WORRYING: SEVERAL DAYS
GAD7 TOTAL SCORE: 6
7. FEELING AFRAID AS IF SOMETHING AWFUL MIGHT HAPPEN: SEVERAL DAYS
5. BEING SO RESTLESS THAT IT IS HARD TO SIT STILL: NOT AT ALL
1. FEELING NERVOUS, ANXIOUS, OR ON EDGE: SEVERAL DAYS

## 2024-03-23 ASSESSMENT — COLUMBIA-SUICIDE SEVERITY RATING SCALE - C-SSRS
2. HAVE YOU ACTUALLY HAD ANY THOUGHTS OF KILLING YOURSELF IN THE PAST MONTH?: NO
6. HAVE YOU EVER DONE ANYTHING, STARTED TO DO ANYTHING, OR PREPARED TO DO ANYTHING TO END YOUR LIFE?: NO
1. IN THE PAST MONTH, HAVE YOU WISHED YOU WERE DEAD OR WISHED YOU COULD GO TO SLEEP AND NOT WAKE UP?: NO

## 2024-03-23 ASSESSMENT — ACTIVITIES OF DAILY LIVING (ADL)
ADLS_ACUITY_SCORE: 35

## 2024-03-23 ASSESSMENT — PATIENT HEALTH QUESTIONNAIRE - PHQ9
10. IF YOU CHECKED OFF ANY PROBLEMS, HOW DIFFICULT HAVE THESE PROBLEMS MADE IT FOR YOU TO DO YOUR WORK, TAKE CARE OF THINGS AT HOME, OR GET ALONG WITH OTHER PEOPLE: SOMEWHAT DIFFICULT
SUM OF ALL RESPONSES TO PHQ QUESTIONS 1-9: 6
SUM OF ALL RESPONSES TO PHQ QUESTIONS 1-9: 6

## 2024-03-23 NOTE — ED NOTES
Bed: ED27  Expected date:   Expected time:   Means of arrival:   Comments:  [place] [address]  69F  Was SVT, converted w vagal maneuvers, HR now 100

## 2024-03-23 NOTE — ED TRIAGE NOTES
BIBA for chest pressure and SOB.  EMS found her in SVT.  Valsalva maneuver converted her to NSR.  Was hypertensive while in SVT.  Remains hypertensive but less so.  Chest pressure and SOB resolved.

## 2024-03-23 NOTE — ED PROVIDER NOTES
ED Provider Note  [facility]      History     Chief Complaint   Patient presents with    Chest Pain     HPI  Won Urena is a 69 year old female with history of CAD with no history of stents, ascending aortic aneurysm, moderate aortic stenosis, HTN, DMII, and HLD presents to the emergency department via EMS for chest pressure and shortness of breath, found to be in SVT.    Patient notes that she was on the toilet when she started noticing some chest pressure that was intense in severity, associated with some shortness of breath.  This gradually worsened, and her watch was telling her her heart rate was between 180 and 200.  They called EMS who found that she was in SVT on their EKG there.  Had her blow on a syringe for Valsalva which converted her to normal sinus.  Notes that her chest pressure and shortness of breath have completely resolved, although now does note that she could have some residual pressure if she focused hard on it.    Denies any recent changes in any medications except for her recent the diagnosed urinary tract infection that she is on Macrobid for, with 1 more day of antibiotics.  Feels as though the symptoms are resolving although she does have some baseline urethritis and feels similar dysuria  as her normal baseline because of this.  No recent alcohol or drug use.  Does drink 1 cup of coffee a day which she is decreased from previous.  No other recent changes that she can point to her.  Has been starting an exercise regimen lately and has not been having any exertional chest pain although she is short of breath with getting her heart rate into the 140s    Past Medical History  Past Medical History:   Diagnosis Date    Abnormal Pap smear of cervix     Aortic stenosis     Aortic valve stenosis     Benign colon polyp     CAD (coronary artery disease)     Dermatochalasis of both upper eyelids     Diabetes mellitus, type 2 (H)     Gestational diabetes     HTN  (hypertension)     Hyperlipidemia LDL goal <100     Nonsenile cataract     Very beginnings    Post-menopausal atrophic vaginitis     Ptosis of both eyelids     Thickened endometrium      Past Surgical History:   Procedure Laterality Date     SECTION      COMBINED REPAIR PTOSIS WITH BLEPHAROPLASTY Bilateral 2023    Procedure: Both upper eyelid blepharoplasty and ptosis repair;  Surgeon: Shantelle Marti MD;  Location: [facility] OR    COSMETIC BLEPHAROPLASTY LOWER LIDS BILATERAL Bilateral 2023    Procedure: Both lower eyelid blepharoplasty;  Surgeon: Shantelle Marti MD;  Location: [facility] OR    CV CORONARY ANGIOGRAM N/A 2023    Procedure: Coronary Angiogram;  Surgeon: Geovanni Ibarra MD;  Location:  [facility]    CV INSTANTANEOUS WAVE-FREE RATIO N/A 2023    Procedure: Instantaneous Wave-Free Ratio;  Surgeon: Geovanni Ibarra MD;  [facility]     ACCU-CHEK GUIDE test strip  atorvastatin (LIPITOR) 40 MG tablet  Blood Glucose Monitoring Suppl (ACCU-CHEK GUIDE) w/Device KIT  carboxymethylcellulose PF (REFRESH PLUS) 0.5 % ophthalmic solution  cetirizine (ZYRTEC) 10 MG tablet  cyanocobalamin (VITAMIN B-12) 100 MCG tablet  dapagliflozin (FARXIGA) 10 MG TABS tablet  diltiazem (CARDIZEM) 60 MG tablet  estradiol (ESTRACE) 0.1 MG/GM vaginal cream  fenofibrate (TRIGLIDE/LOFIBRA) 160 MG tablet  gabapentin (NEURONTIN) 100 MG capsule  ipratropium (ATROVENT) 0.06 % nasal spray  losartan (COZAAR) 50 MG tablet  metFORMIN (GLUCOPHAGE) 500 MG tablet  montelukast (SINGULAIR) 10 MG tablet  multivitamin w/minerals (MULTI-VITAMIN) tablet  semaglutide (OZEMPIC) 2 MG/3ML pen  Semaglutide, 1 MG/DOSE, (OZEMPIC, 1 MG/DOSE,) 4 MG/3ML pen  Vitamin D, Cholecalciferol, 10 MCG (400 UNIT) TABS      Allergies   Allergen Reactions    Avapro [Irbesartan] Cough    Fluticasone Other (See Comments)     Loss of sense of taste     Family History  Family History   Problem  Relation Age of Onset    Lung Cancer Mother         Small cell, did smoked    Diabetes Mother     Hypertension Mother     Goiter Mother     Mental Illness Mother         likely bipolar    Coronary Artery Disease Mother 42        MI, smoked    Hyperlipidemia Mother     Thyroid Disease Mother         Goiter    Obesity Mother         most of family is obese    Glaucoma Father     Atrial fibrillation Father     Hypertension Father     Parkinsonism Father     Hyperlipidemia Father     Diabetes Sister     Breast Cancer Sister 60    Hypertension Sister     Hypertension Brother     Cancer Maternal Grandmother         gynecologic cancer, unknown type    Uterine Cancer Maternal Grandmother     Chronic Obstructive Pulmonary Disease Paternal Grandmother     Uterine Cancer Paternal Grandmother     Stomach Cancer Paternal Grandfather     Colon Cancer Paternal Grandfather     Bipolar Disorder Son     Mental Illness Son     Depression Son     Macular Degeneration No family hx of     Anesthesia Reaction No family hx of     Venous thrombosis No family hx of      Social History   Social History     Tobacco Use    Smoking status: Never     Passive exposure: Never    Smokeless tobacco: Never   Vaping Use    Vaping Use: Never used   Substance Use Topics    Alcohol use: Yes     Alcohol/week: 1.0 standard drink of alcohol     Types: 1 Standard drinks or equivalent per week     Comment: less than 2 drinks a week    Drug use: Never     Comment: gummy 1x/month         A medically appropriate review of systems was performed with pertinent positives and negatives noted in the HPI, and all other systems negative.    Physical Exam   BP: (!) 158/84  Pulse: 93  Temp: 98.4  F (36.9  C)  Resp: 18  SpO2: 96 %  Physical Exam  GEN: Well appearing, non toxic, cooperative  HEENT: normocephalic and atraumatic, PERRLA, EOMI  CV: well-perfused, normal skin color for ethnicity, regular rate and rhythm  PULM: breathing comfortably, in no respiratory distress,  clear to auscultation upper and lower lung fields  ABD: nondistended, soft, nontender  EXT: Full range of motion.  No edema.  NEURO: awake, conversant, grossly normal bilateral upper and lower extremity strength & ROM   SKIN: No rashes, ecchymosis, or lacerations  PSYCH: Calm and cooperative, interactive      ED Course, Procedures, & Data      Procedures            EKG Interpretation:      Interpreted by Meg Lei MD  Time reviewed: 1255  Symptoms at time of EKG: slight chest pressure   Rhythm: normal sinus   Rate: normal  Axis: normal  Ectopy: Baseline right bundle branch block  Conduction: normal  ST Segments/ T Waves: T wave changes in 2 and V1 that could represent possible left atrial enlargement new compared to previous  Q Waves: none  Comparison to prior: see below    Clinical Impression: Baseline EKG aside from mild worsening prolonged QTc, possibly rate related, and changes that could be related to possible left atrial enlargement      Results for orders placed or performed during the hospital encounter of 03/23/24   XR Chest 2 Views     Status: None    Narrative    EXAM: XR CHEST 2 VIEWS  3/23/2024 1:43 PM      HISTORY: CP, SVT    COMPARISON: None.    FINDINGS: Two views of the chest. Trachea is midline. Cardiac  silhouette is within normal limits. No focal consolidation. No pleural  effusion or pneumothorax. Visualized upper abdomen is unremarkable. No  acute osseous abnormalities.      Impression    IMPRESSION: No acute airspace disease.    I have personally reviewed the examination and initial interpretation  and I agree with the findings.    ZULY MEADOWS MD         SYSTEM ID:  [id number]   Comprehensive metabolic panel     Status: Abnormal   Result Value Ref Range    Sodium 142 135 - 145 mmol/L    Potassium 3.9 3.4 - 5.3 mmol/L    Carbon Dioxide (CO2) 21 (L) 22 - 29 mmol/L    Anion Gap 14 7 - 15 mmol/L    Urea Nitrogen 20.4 8.0 - 23.0 mg/dL    Creatinine 0.71 0.51 - 0.95 mg/dL    GFR Estimate >90  >60 mL/min/1.73m2    Calcium 10.0 8.8 - 10.2 mg/dL    Chloride 107 98 - 107 mmol/L    Glucose 103 (H) 70 - 99 mg/dL    Alkaline Phosphatase 56 40 - 150 U/L    AST 33 0 - 45 U/L    ALT 18 0 - 50 U/L    Protein Total 6.5 6.4 - 8.3 g/dL    Albumin 4.5 3.5 - 5.2 g/dL    Bilirubin Total 0.5 <=1.2 mg/dL   Troponin T, High Sensitivity     Status: Normal   Result Value Ref Range    Troponin T, High Sensitivity 12 <=14 ng/L   [place] Draw     Status: None (In process)    Narrative    The following orders were created for panel order [place] Draw.  Procedure                               Abnormality         Status                     ---------                               -----------         ------                     Extra Blue Top Tube[[id number]]                              Final result               Extra Red Top Tube[[id number]]                               Final result               Extra Green Top (Lithium...[[id number]]                                                 Extra Purple Top Tube[[id number]]                                                         Please view results for these tests on the individual orders.   CBC with platelets and differential     Status: None   Result Value Ref Range    WBC Count 5.0 4.0 - 11.0 10e3/uL    RBC Count 4.69 3.80 - 5.20 10e6/uL    Hemoglobin 14.0 11.7 - 15.7 g/dL    Hematocrit 41.5 35.0 - 47.0 %    MCV 89 78 - 100 fL    MCH 29.9 26.5 - 33.0 pg    MCHC 33.7 31.5 - 36.5 g/dL    RDW 13.5 10.0 - 15.0 %    Platelet Count 151 150 - 450 10e3/uL    % Neutrophils 61 %    % Lymphocytes 25 %    % Monocytes 8 %    % Eosinophils 5 %    % Basophils 1 %    % Immature Granulocytes 0 %    NRBCs per 100 WBC 0 <1 /100    Absolute Neutrophils 3.1 1.6 - 8.3 10e3/uL    Absolute Lymphocytes 1.2 0.8 - 5.3 10e3/uL    Absolute Monocytes 0.4 0.0 - 1.3 10e3/uL    Absolute Eosinophils 0.2 0.0 - 0.7 10e3/uL    Absolute Basophils 0.0 0.0 - 0.2 10e3/uL    Absolute Immature Granulocytes 0.0 <=0.4 10e3/uL     Absolute NRBCs 0.0 10e3/uL   Extra Blue Top Tube     Status: None   Result Value Ref Range    Hold Specimen JIC    Extra Red Top Tube     Status: None   Result Value Ref Range    Hold Specimen JIC    TSH with free T4 reflex     Status: Normal   Result Value Ref Range    TSH 0.43 0.30 - 4.20 uIU/mL   Magnesium     Status: Normal   Result Value Ref Range    Magnesium 1.7 1.7 - 2.3 mg/dL   Phosphorus     Status: Normal   Result Value Ref Range    Phosphorus 3.3 2.5 - 4.5 mg/dL   Symptomatic Influenza A/B, RSV, & SARS-CoV2 PCR (COVID-19) Nose     Status: Normal    Specimen: Nose; Swab   Result Value Ref Range    Influenza A PCR Negative Negative    Influenza B PCR Negative Negative    RSV PCR Negative Negative    SARS CoV2 PCR Negative Negative    Narrative    Testing was performed using the Xpert Xpress CoV2/Flu/RSV Assay on the Cepheid GeneXpert Instrument. This test should be ordered for the detection of SARS-CoV-2, influenza, and RSV viruses in individuals who meet clinical and/or epidemiological criteria. Test performance is unknown in asymptomatic patients. This test is for in vitro diagnostic use under the FDA EUA for laboratories certified under CLIA to perform high or moderate complexity testing. This test has not been FDA cleared or approved. A negative result does not rule out the presence of PCR inhibitors in the specimen or target RNA in concentration below the limit of detection for the assay. If only one viral target is positive but coinfection with multiple targets is suspected, the sample should be re-tested with another FDA cleared, approved, or authorized test, if coinfection would change clinical management. This test was validated by the [facility] [email or web address]. These laboratories are certified under the Clinical Laboratory Improvement Amendments of 1988 (CLIA-88) as qualified to perform high complexity laboratory testing.   Lipase     Status: Abnormal   Result Value Ref Range    Lipase 66  (H) 13 - 60 U/L   UA with Microscopic reflex to Culture     Status: Abnormal    Specimen: Urine, Clean Catch   Result Value Ref Range    Color Urine Light Yellow Colorless, Straw, Light Yellow, Yellow    Appearance Urine Clear Clear    Glucose Urine >=1000 (A) Negative mg/dL    Bilirubin Urine Negative Negative    Ketones Urine 10 (A) Negative mg/dL    Specific Gravity Urine 1.016 1.003 - 1.035    Blood Urine Negative Negative    pH Urine 7.0 5.0 - 7.0    Protein Albumin Urine Negative Negative mg/dL    Urobilinogen Urine Normal Normal, 2.0 mg/dL    Nitrite Urine Negative Negative    Leukocyte Esterase Urine Negative Negative    Mucus Urine Present (A) None Seen /LPF    RBC Urine <1 <=2 /HPF    WBC Urine 1 <=5 /HPF    Squamous Epithelials Urine <1 <=1 /HPF    Narrative    Urine Culture not indicated   Troponin T, High Sensitivity     Status: Abnormal   Result Value Ref Range    Troponin T, High Sensitivity 25 (H) <=14 ng/L   Troponin T, High Sensitivity     Status: Abnormal   Result Value Ref Range    Troponin T, High Sensitivity 20 (H) <=14 ng/L   EKG 12 lead     Status: None (Preliminary result)   Result Value Ref Range    Systolic Blood Pressure  mmHg    Diastolic Blood Pressure  mmHg    Ventricular Rate 91 BPM    Atrial Rate 91 BPM    KY Interval 204 ms    QRS Duration 132 ms     ms    QTc 501 ms    P Axis 70 degrees    R AXIS 267 degrees    T Axis 24 degrees    Interpretation ECG       Sinus rhythm  Right bundle branch block  Abnormal ECG     CBC with platelets differential     Status: None    Narrative    The following orders were created for panel order CBC with platelets differential.  Procedure                               Abnormality         Status                     ---------                               -----------         ------                     CBC with platelets and d...[[id number]]                      Final result                 Please view results for these tests on the individual  orders.     Medications   lactated ringers BOLUS 500 mL (0 mLs Intravenous Stopped 3/23/24 1649)   aspirin (ASA) chewable tablet 243 mg (243 mg Oral $Given 3/23/24 1331)     Labs Ordered and Resulted from Time of ED Arrival to Time of ED Departure   COMPREHENSIVE METABOLIC PANEL - Abnormal       Result Value    Sodium 142      Potassium 3.9      Carbon Dioxide (CO2) 21 (*)     Anion Gap 14      Urea Nitrogen 20.4      Creatinine 0.71      GFR Estimate >90      Calcium 10.0      Chloride 107      Glucose 103 (*)     Alkaline Phosphatase 56      AST 33      ALT 18      Protein Total 6.5      Albumin 4.5      Bilirubin Total 0.5     LIPASE - Abnormal    Lipase 66 (*)    ROUTINE UA WITH MICROSCOPIC REFLEX TO CULTURE - Abnormal    Color Urine Light Yellow      Appearance Urine Clear      Glucose Urine >=1000 (*)     Bilirubin Urine Negative      Ketones Urine 10 (*)     Specific Gravity Urine 1.016      Blood Urine Negative      pH Urine 7.0      Protein Albumin Urine Negative      Urobilinogen Urine Normal      Nitrite Urine Negative      Leukocyte Esterase Urine Negative      Mucus Urine Present (*)     RBC Urine <1      WBC Urine 1      Squamous Epithelials Urine <1     TROPONIN T, HIGH SENSITIVITY - Abnormal    Troponin T, High Sensitivity 25 (*)    TROPONIN T, HIGH SENSITIVITY - Abnormal    Troponin T, High Sensitivity 20 (*)    TROPONIN T, HIGH SENSITIVITY - Normal    Troponin T, High Sensitivity 12     TSH WITH FREE T4 REFLEX - Normal    TSH 0.43     MAGNESIUM - Normal    Magnesium 1.7     PHOSPHORUS - Normal    Phosphorus 3.3     INFLUENZA A/B, RSV, & SARS-COV2 PCR - Normal    Influenza A PCR Negative      Influenza B PCR Negative      RSV PCR Negative      SARS CoV2 PCR Negative     CBC WITH PLATELETS AND DIFFERENTIAL    WBC Count 5.0      RBC Count 4.69      Hemoglobin 14.0      Hematocrit 41.5      MCV 89      MCH 29.9      MCHC 33.7      RDW 13.5      Platelet Count 151      % Neutrophils 61      %  Lymphocytes 25      % Monocytes 8      % Eosinophils 5      % Basophils 1      % Immature Granulocytes 0      NRBCs per 100 WBC 0      Absolute Neutrophils 3.1      Absolute Lymphocytes 1.2      Absolute Monocytes 0.4      Absolute Eosinophils 0.2      Absolute Basophils 0.0      Absolute Immature Granulocytes 0.0      Absolute NRBCs 0.0       XR Chest 2 Views   Final Result   IMPRESSION: No acute airspace disease.      I have personally reviewed the examination and initial interpretation   and I agree with the findings.      ZULY MEADOWS MD            SYSTEM ID:  [id number]      Echocardiogram Complete    (Results Pending)          Critical care was not performed.     Medical Decision Making  The patient's presentation was of high complexity (an acute health issue posing potential threat to life or bodily function).    The patient's evaluation involved:  review of external note(s) from 3+ sources (see separate area of note for details)  review of 3+ test result(s) ordered prior to this encounter (see separate area of note for details)  ordering and/or review of 3+ test(s) in this encounter (see separate area of note for details)    The patient's management necessitated high risk (a decision regarding hospitalization).    Assessment & Plan    69-year-old female with a past medical history of CAD, ascending aortic aneurysm, aortic stenosis presenting to the emergency department due to a 10 to 15-minute episode of chest pressure and shortness of breath found to be in SVT which was converted using Valsalva maneuver now noted to have significant improvement of her symptoms, and initially stated she had no chest pain but states that if she focuses hard she may have a slight pressure still in her chest.  EKG shows no evidence of current ischemia.  She does have some changes that could be related to left atrial enlargement on her EKG otherwise, baseline EKG for her.     Considered ischemia as a cause of her chest pain  and will plan to evaluate with troponin likely needing to trend this.  Will evaluate for electrolyte abnormalities, thyroid test.  Will plan for x-ray to evaluate for any underlying pneumonia, although lower suspicion for this.  With her recent urinary tract infection, she could have some more cardiac irritability related to this.  Considered worsening of her aortic stenosis, however baseline murmur appreciated here, no other significant concerning symptoms with no head-bobbing, although this could be the cause of her having possible left atrial enlargement.  Did consider complication related to her ascending aortic aneurysm, however with no significant ongoing chest pain, no other concerning findings at this point in time low suspicion of rupture, or dissection.    I have reviewed the nursing notes. I have reviewed the findings, diagnosis, plan and need for follow up with the patient.    New Prescriptions    No medications on file       Final diagnoses:   SVT (supraventricular tachycardia)   Nonrheumatic aortic valve stenosis       Meg Lei MD   [facility] EMERGENCY DEPARTMENT  3/23/2024     Meg Lei MD  03/23/24 1927

## 2024-03-24 NOTE — DISCHARGE INSTRUCTIONS
You were seen in the emergency department due to fast heart rate found to have SVT.  This was broken prior to coming in by blowing on the syringe.    If you develop similar symptoms to what you had before, we would recommend that you do similar by blowing on a syringe, or bearing down as though you are having while holding your breath in order to try and see if you get yourself out of the rhythm.  If you have ongoing severe chest pain associated with it or difficulty breathing, or feel like you are to pass out we would recommend calling 911 and returning to the ER.    Otherwise, we do recommend that you follow-up with your cardiologist.  There were some EKG changes that can happen with increasing pressure in your left atrium which can happen sometimes with aortic stenosis.  As you are due for your echocardiogram anyway, we have ordered this for you.  Generally they will call you in the next 1-2 business days to schedule this.  Otherwise, please follow-up with your primary care doctor or cardiologist as needed.  Return to the ER with any other severe concerns    Regarding your recent medication changes to Xyzal, this is actually the better medication that would be less likely to cause you to go into SVT.  We do recommend you continue this instead of the Zyrtec.

## 2024-03-25 ENCOUNTER — TELEPHONE (OUTPATIENT)
Dept: CARDIOLOGY | Facility: CLINIC | Age: 69
End: 2024-03-25
Payer: COMMERCIAL

## 2024-03-25 LAB
ATRIAL RATE - MUSE: 91 BPM
DIASTOLIC BLOOD PRESSURE - MUSE: NORMAL MMHG
INTERPRETATION ECG - MUSE: NORMAL
P AXIS - MUSE: 70 DEGREES
PR INTERVAL - MUSE: 204 MS
QRS DURATION - MUSE: 132 MS
QT - MUSE: 408 MS
QTC - MUSE: 501 MS
R AXIS - MUSE: 267 DEGREES
SYSTOLIC BLOOD PRESSURE - MUSE: NORMAL MMHG
T AXIS - MUSE: 24 DEGREES
VENTRICULAR RATE- MUSE: 91 BPM
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
## 2024-03-27 ENCOUNTER — OFFICE VISIT (OUTPATIENT)
Dept: BEHAVIORAL HEALTH | Facility: CLINIC | Age: 69
End: 2024-03-27
Payer: COMMERCIAL

## 2024-03-27 DIAGNOSIS — F41.1 GAD (GENERALIZED ANXIETY DISORDER): Primary | ICD-10-CM

## 2024-03-27 RX ORDER — ATORVASTATIN CALCIUM 40 MG/1
40 TABLET, FILM COATED ORAL DAILY
Qty: 90 TABLET | Refills: 3 | OUTPATIENT
Start: 2024-03-27

## 2024-03-27 ASSESSMENT — COLUMBIA-SUICIDE SEVERITY RATING SCALE - C-SSRS
1. HAVE YOU WISHED YOU WERE DEAD OR WISHED YOU COULD GO TO SLEEP AND NOT WAKE UP?: NO
2. HAVE YOU ACTUALLY HAD ANY THOUGHTS OF KILLING YOURSELF?: NO
ATTEMPT LIFETIME: NO
6. HAVE YOU EVER DONE ANYTHING, STARTED TO DO ANYTHING, OR PREPARED TO DO ANYTHING TO END YOUR LIFE?: NO
TOTAL  NUMBER OF INTERRUPTED ATTEMPTS LIFETIME: NO
TOTAL  NUMBER OF ABORTED OR SELF INTERRUPTED ATTEMPTS LIFETIME: NO

## 2024-04-04 ENCOUNTER — ANCILLARY PROCEDURE (OUTPATIENT)
Dept: CARDIOLOGY | Facility: CLINIC | Age: 69
End: 2024-04-04
Attending: STUDENT IN AN ORGANIZED HEALTH CARE EDUCATION/TRAINING PROGRAM
Payer: COMMERCIAL

## 2024-04-04 DIAGNOSIS — I35.0 NONRHEUMATIC AORTIC VALVE STENOSIS: Chronic | ICD-10-CM

## 2024-04-04 DIAGNOSIS — I47.10 SVT (SUPRAVENTRICULAR TACHYCARDIA) (H): ICD-10-CM

## 2024-04-04 LAB — LVEF ECHO: NORMAL

## 2024-04-04 PROCEDURE — 93306 TTE W/DOPPLER COMPLETE: CPT | Performed by: INTERNAL MEDICINE

## 2024-04-04 NOTE — PROGRESS NOTES
[facility]      PATIENT'S NAME: Won Urena  PREFERRED NAME: Won  PRONOUNS:  she/her     MRN: [id number]  : 1955  ADDRESS: [address] [place] [id number]  ACCT. NUMBER:  [id number]  DATE OF SERVICE: 24  PREFERRED PHONE: [phone]  May we leave a program related message: Yes  EMERGENCY CONTACT: was obtained : Cristhian Urena .  SERVICE MODALITY:  In-person      Behavioral Health Clinician Progress Note    Patient Name: Won Urena           Service Type:  Individual      Service Location:   Face to Face in Clinic     Session Start Time: 11:33 am  Session End Time: 12:07 pm      Session Length: 16 - 37      Attendees: Patient     Service Modality:  In-person    Visit Activities (Refresh list every visit): [facility] Only    Diagnostic Assessment Date: 3/27/24  Treatment Plan Review Date: 2024  CGI Review Date:   Promis 10 Review Date: 24    Clinical Global Impressions  First:  Considering your total clinical experience with this particular patient population, how severe are the patient's symptoms at this time?: 4 (3/28/2024  5:37 PM)    Most recent:  No data recorded    See Flowsheets for today's PHQ-9 and NICOLETTE-7 results  Previous PHQ-9:       3/12/2023     9:47 AM 3/23/2024     3:27 PM 2024    11:22 AM   PHQ-9 SCORE   PHQ-9 Total Score MyChart 3 (Minimal depression) 6 (Mild depression) 3 (Minimal depression)   PHQ-9 Total Score 3 6 3     Previous NICOLETTE-7:       10/21/2022     8:25 AM 3/12/2023     9:48 AM 3/23/2024     3:28 PM   NICOLETTE-7 SCORE   Total Score 0 (minimal anxiety) 2 (minimal anxiety) 6 (mild anxiety)   Total Score 0 2 6           DATA  Extended Session (60+ minutes): No  Interactive Complexity: No  Crisis: No  [facility] Patient: No    Treatment Objective(s) Addressed in This Session:  Target Behavior(s):  exercise; stress management    Anxiety: will experience a reduction in anxiety, will develop more effective coping skills  "to manage anxiety symptoms, will develop healthy cognitive patterns and beliefs, and will increase ability to function adaptively    Current Stressors / Issues:  Won reported mild improvement in mood since initial visit. [facility] used MI interventions to focus on change, elicit change talk, and prompt identification of identification of goals.  Won cited evidence for improvement, namely, she's exercising, \"I'm doing more of chores around the house\",  she's been \"more attentive\" to , and \"I'm cooking a little bit more\".   While Won noted things are currently OK, she knows there will be difficult times to come (eg 's declining health; unexpected issues), so her goals are \"I can come to you and problem solve\", \"I want to do things for myself and not feel bad about it\", and she wants to be able to care for her family without getting irritable, impatient, and not being present (wants to be fully present with family members).  With Won's permission, [facility] educated Won about warning signs, Won responded by sharing her warning signs: \"when I'm wound tight\", \"I get chest pressure\", short with herself and with , \"I procrastinate\", stops exercising).   [facility] then use Sol'n Focused interventions to focus discussion on past coping skills, Won stated \"I love meditation and yoga\" and learned about meditation in [place] and [place].  [facility] educated about how meditation and breathing can be used as both proactive and in-the-moment stress management strategy, and encouraged practice in the morning and evening.  Won was receptive to \"finding tapes\" of past meditation exercises and resuming meditation exercises.  Won agreed to the following:     Meditate 2x's per day  Exercise:  and [place]  Watch for/be mindful of warning signs  Return to [facility] services in 2 weeks         Progress on Treatment Objective(s) / Homework:  New Objective established this session - PREPARATION (Decided to " change - considering how); Intervened by negotiating a change plan and determining options / strategies for behavior change, identifying triggers, exploring social supports, and working towards setting a date to begin behavior change    Motivational Interviewing    MI Intervention: Co-Developed Goal: decreased anxiety, Expressed Empathy/Understanding, Supported Autonomy, Collaboration, Evocation, Open-ended questions, Reflections: simple and complex, and Change talk (evoked)     Change Talk Expressed by the Patient: Desire to change Ability to change Reasons to change Committment to change    Provider Response to Change Talk: E - Evoked more info from patient about behavior change, A - Affirmed patient's thoughts, decisions, or attempts at behavior change, R - Reflected patient's change talk, and S - Summarized patient's change talk statements    Also provided psychoeducation about behavioral health condition, symptoms, and treatment options    Care Plan review completed: Yes    Medication Review:  No current psychiatric medications prescribed    Medication Compliance:  NA    Changes in Health Issues:   None reported    Chemical Use Review:   Substance Use: Chemical use reviewed, no active concerns identified      Tobacco Use: No current tobacco use.      Assessment: Current Emotional / Mental Status (status of significant symptoms):  Risk status (Self / Other harm or suicidal ideation)  Patient denies a history of suicidal ideation, suicide attempts, self-injurious behavior, homicidal ideation, homicidal behavior, and and other safety concerns  Patient denies current fears or concerns for personal safety.  Patient denies current or recent suicidal ideation or behaviors.  Patient denies current or recent homicidal ideation or behaviors.  Patient denies current or recent self injurious behavior or ideation.  Patient denies other safety concerns.  A safety and risk management plan has not been developed at this time,  however patient was encouraged to call [place] [phone] should there be a change in any of these risk factors.    Appearance:   Appropriate   Eye Contact:   Good   Psychomotor Behavior: Normal   Attitude:   Cooperative   Orientation:   All  Speech   Rate / Production: Normal    Volume:  Normal   Mood:    Anxious   Affect:    Worrisome   Thought Content:  Clear   Thought Form:  Coherent  Logical   Insight:    Good     Diagnoses:  1. NICOLETTE (generalized anxiety disorder)        Collateral Reports Completed:  Routed note to PCP    Plan: (Homework, other):  Patient was given information about behavioral services and encouraged to schedule a follow up appointment with the clinic [facility] in 2 weeks.  She was also given information about mental health symptoms and treatment options  and breathing exercises .  CD Recommendations: No indications of CD issues.       Shawn G. Ullrich, [facility], [facility]    ______________________________________________________________________    Integrated Primary Care Behavioral Health Treatment Plan    Patient's Name: Won Urena  YOB: 1955    Date of Creation: 4/5/25  Date Treatment Plan Last Reviewed/Revised: 4/5/24    DSM5 Diagnoses: 300.02 (F41.1) Generalized Anxiety Disorder  Psychosocial / Contextual Factors: , , heterosexual female; 2 adult,  sons, grandchild; works part-time  .  PROMIS (reviewed every 90 days): pt completed on 3/20/24    Referral / Collaboration:  Referral to another professional/service is not indicated at this time..    Anticipated number of session for this episode of care: 8  Anticipation frequency of session: Every other week  Anticipated Duration of each session: 38-52 minutes  Treatment plan will be reviewed in 90 days or when goals have been changed.       MeasurableTreatment Goal(s) related to diagnosis / functional impairment(s)  Goal 1: Patient will increase self-knowledge about anxiety and be able to reduce anxiety by  "effectively implementing 3 coping skills/strategies.     I will know I've met my goal when: \"I want to do things for myself and not feel bad about it\", be present for her family        Objective #A (Patient Action)    Patient will identify 3 initial signs or symptoms of anxiety.  Status: New - Date: 4/5/24      Intervention(s)  Therapist will teach  warning signs; emotional, cognitive, behavioral, and physical .    Objective #B  Patient will practice deep breathing at least 2x;'s a day/meditation  Status: New - Date: 4/5/24      Intervention(s)  Therapist will teach diaphragmatic breathing technique and exercises .      Objective #C  Patient will  attempt at least 3 different relaxation activities in the next 3 months .  Status: New - Date: 4/5/24      Intervention(s)  Therapist will teach relaxation strategies, including guided imagery, PMR, . .        Patient has reviewed and agreed to the above plan.      Shawn G. Ullrich, [facility], [facility]  April 4, 2024        "

## 2024-04-05 ENCOUNTER — OFFICE VISIT (OUTPATIENT)
Dept: BEHAVIORAL HEALTH | Facility: CLINIC | Age: 69
End: 2024-04-05
Payer: COMMERCIAL

## 2024-04-05 DIAGNOSIS — F41.1 GAD (GENERALIZED ANXIETY DISORDER): Primary | ICD-10-CM

## 2024-04-07 DIAGNOSIS — I25.10 CORONARY ARTERY DISEASE INVOLVING NATIVE HEART WITHOUT ANGINA PECTORIS, UNSPECIFIED VESSEL OR LESION TYPE: ICD-10-CM

## 2024-04-08 NOTE — PROGRESS NOTES
[facility] Cardiology - [facility] [place]   Cardiology Clinic Note      HPI:   Ms. Won Urena is a 69 year old female with medical history pertinent for multivessel CAD, HLD, HTN, aortic valve stenosis, ascending aortic aneurysm, and T2DM. She presents to cardiology clinic for follow up of coronary angiogram.    Today in clinic, Orlin notes overall feeling well. She denies chest pain, dizziness, syncope, or lower extremity edema. She notes occasional fluttering sensations in her chest often associated with anxiety, which usually resolve with deep breathing.     She occasionally checks her blood pressure at home and notes that it's occasionally 130-140/90.    Interval History 4/10/24:  Recent ED visit for SVT, resolved with valsava maneuver. Troponin slightly elevated by flat 25 ->20. Won has started exercising more, goes to the gym with a , does [employer], [place] classes, etc. She feels occasionally SOB with exertion, not at rest. She has to work hard to get heart rate above 143. Denies dizziness or syncope. Home /80s.    PAST MEDICAL HISTORY:  Past Medical History:   Diagnosis Date    Abnormal Pap smear of cervix     Aortic stenosis     Aortic valve stenosis     Benign colon polyp     CAD (coronary artery disease)     Dermatochalasis of both upper eyelids     Diabetes mellitus, type 2 (H)     Gestational diabetes     HTN (hypertension)     Hyperlipidemia LDL goal <100     Nonsenile cataract 2022    Very beginnings    Post-menopausal atrophic vaginitis     Ptosis of both eyelids     Thickened endometrium        FAMILY HISTORY:  Family History   Problem Relation Age of Onset    Lung Cancer Mother         Small cell, did smoked    Diabetes Mother     Hypertension Mother     Goiter Mother     Mental Illness Mother         likely bipolar    Coronary Artery Disease Mother 42        MI, smoked    Hyperlipidemia Mother     Thyroid Disease Mother         Goiter    Obesity Mother         most of family is  obese    Glaucoma Father     Atrial fibrillation Father     Hypertension Father     Parkinsonism Father     Hyperlipidemia Father     Diabetes Sister     Breast Cancer Sister 60    Hypertension Sister     Hypertension Brother     Cancer Maternal Grandmother         gynecologic cancer, unknown type    Uterine Cancer Maternal Grandmother     Chronic Obstructive Pulmonary Disease Paternal Grandmother     Uterine Cancer Paternal Grandmother     Stomach Cancer Paternal Grandfather     Colon Cancer Paternal Grandfather     Bipolar Disorder Son     Mental Illness Son     Depression Son     Macular Degeneration No family hx of     Anesthesia Reaction No family hx of     Venous thrombosis No family hx of        SOCIAL HISTORY:  Social History     Socioeconomic History    Marital status:    Tobacco Use    Smoking status: Never    Smokeless tobacco: Never   Vaping Use    Vaping Use: Never used   Substance and Sexual Activity    Alcohol use: Yes     Alcohol/week: 1.0 standard drink of alcohol     Types: 1 Standard drinks or equivalent per week     Comment: less than 2 drinks a week    Drug use: Never    Sexual activity: Not Currently     Partners: Male     Birth control/protection: Post-menopausal   Social History Narrative    Lives with     Two son    One son, daughter-in-law, and grandson live in [place]    One son, daughter-in-law live in the same [facility] in [place], [place]       CURRENT MEDICATIONS:  Current Outpatient Medications   Medication Sig Dispense Refill    ACCU-CHEK GUIDE test strip CHECK BLOOD SUGAR DAILY OR AS  DIRECTED 100 strip 2    atorvastatin (LIPITOR) 40 MG tablet Take 1 tablet (40 mg) by mouth daily (Patient taking differently: Take 40 mg by mouth every evening) 90 tablet 3    Blood Glucose Monitoring Suppl (ACCU-CHEK GUIDE) w/Device KIT USE TO TEST BLOOD SUGAR ONCE  DAILY OR AS DIRECTED 1 kit 0    carboxymethylcellulose PF (REFRESH PLUS) 0.5 % ophthalmic solution  Place 1 drop into both eyes 4 times daily 70 each 0    cyanocobalamin (VITAMIN B-12) 100 MCG tablet Take 100 mcg by mouth every evening      dapagliflozin (FARXIGA) 10 MG TABS tablet TAKE 1 TABLET BY MOUTH IN THE  MORNING 100 tablet 1    diltiazem (CARDIZEM) 60 MG tablet TAKE 1 TABLET BY MOUTH  TWICE DAILY 200 tablet 2    estradiol (ESTRACE) 0.1 MG/GM vaginal cream INSERT 1/2 APPLICATORFUL  VAGINALLY TWICE WEEKLY 85 g 3    fenofibrate (TRIGLIDE/LOFIBRA) 160 MG tablet Take 1 tablet (160 mg) by mouth every evening 100 tablet 3    gabapentin (NEURONTIN) 100 MG capsule Take 2 capsules (200 mg) by mouth at bedtime 200 capsule 3    ipratropium (ATROVENT) 0.06 % nasal spray Spray 2 sprays into both nostrils 4 times daily (Patient taking differently: Spray 2 sprays into both nostrils 4 times daily as needed) 15 mL 11    levocetirizine (XYZAL) 5 MG tablet Take 5 mg by mouth every evening      losartan (COZAAR) 50 MG tablet Take 50 mg by mouth every morning 100 tablet 3    metFORMIN (GLUCOPHAGE) 500 MG tablet TAKE 2 TABLETS BY MOUTH  TWICE DAILY WITH MEALS 360 tablet 3    montelukast (SINGULAIR) 10 MG tablet TAKE 1 TABLET BY MOUTH AT  BEDTIME 100 tablet 2    multivitamin w/minerals (MULTI-VITAMIN) tablet Take 1 tablet by mouth every evening      semaglutide (OZEMPIC) 2 MG/3ML pen Inject 0.5 mg Subcutaneous every 7 days 9 mL 3    Semaglutide, 1 MG/DOSE, (OZEMPIC, 1 MG/DOSE,) 4 MG/3ML pen INJECT SUBCUTANEOUSLY 1 MG  EVERY WEEK 9 mL 0    Vitamin D, Cholecalciferol, 10 MCG (400 UNIT) TABS Take by mouth every evening      cetirizine (ZYRTEC) 10 MG tablet Take 10 mg by mouth every evening (Patient not taking: Reported on 4/10/2024)       No current facility-administered medications for this visit.       ROS:   Refer to HPI    EXAM:  /81 (BP Location: Right arm, Patient Position: Chair, Cuff Size: Adult Regular)   Pulse 63   Wt 78 kg (172 lb)   SpO2 97%   BMI 26.15 kg/m    GENERAL: Appears comfortable, in no acute  "distress.   HEENT: Eye symmetrical, no discharge or icterus bilaterally. Mucous membranes moist and without lesions.  CV: RRR, +S1S2, holosystolic murmur. JVP not elevated   RESPIRATORY: Respirations regular, even, and unlabored. Lungs CTA throughout.   GI: Soft and non distended with normoactive bowel sounds present in all quadrants. No tenderness, rebound, guarding.  EXTREMITIES: No peripheral edema. 2+ bilateral pedal pulses.   NEUROLOGIC: Alert and oriented x 3. No focal deficits.   MUSCULOSKELETAL: No joint swelling or tenderness.   SKIN: No jaundice. No rashes or lesions.     Labs, reviewed with patient in clinic today:  CBC RESULTS:  Lab Results   Component Value Date    WBC 5.0 03/23/2024    RBC 4.69 03/23/2024    HGB 14.0 03/23/2024    HCT 41.5 03/23/2024    MCV 89 03/23/2024    MCH 29.9 03/23/2024    MCHC 33.7 03/23/2024    RDW 13.5 03/23/2024     03/23/2024       CMP RESULTS:  Lab Results   Component Value Date     03/23/2024    POTASSIUM 3.9 03/23/2024    CHLORIDE 107 03/23/2024    CO2 21 (L) 03/23/2024    ANIONGAP 14 03/23/2024     (H) 03/23/2024     (H) 09/28/2023    BUN 20.4 03/23/2024    CR 0.71 03/23/2024    GFRESTIMATED >90 03/23/2024    JOAQUINA 10.0 03/23/2024    BILITOTAL 0.5 03/23/2024    ALBUMIN 4.5 03/23/2024    ALKPHOS 56 03/23/2024    ALT 18 03/23/2024    AST 33 03/23/2024        INR RESULTS:  Lab Results   Component Value Date    INR 1.12 08/20/2023       Lab Results   Component Value Date    MAG 1.7 03/23/2024     No results found for: \"NTBNPI\"  No results found for: \"NTBNP\"    EKG 3/2024:      Echocardiogram 4/24/23:  Procedure  Echocardiogram with two-dimensional, color and spectral Doppler performed.  ______________________________________________________________________________  Interpretation Summary  Global and regional left ventricular function is hyperkinetic with an EF >70%.  Right ventricular function, chamber size, wall motion, and thickness " are  normal.  Moderate aortic stenosis is present.  Pulmonary artery systolic pressure is normal.  Ascending aorta 3.9 cm.  Mild dilatation of the aorta is present.  The inferior vena cava is normal.  No pericardial effusion is present.  ______________________________________________________________________________  Left Ventricle  Global and regional left ventricular function is hyperkinetic with an EF >70%.  Left ventricular size is normal. Mild concentric wall thickening consistent  with left ventricular hypertrophy is present. Left ventricular diastolic  function is indeterminate. Diastolic Doppler findings (E/E' ratio and/or other  parameters) suggest left ventricular filling pressures are increased. No  regional wall motion abnormalities are seen.     Right Ventricle  Right ventricular function, chamber size, wall motion, and thickness are  normal.     Atria  Both atria appear normal.     Mitral Valve  Mild to moderate mitral annular calcification is present. Mild mitral  insufficiency is present.     Aortic Valve  Moderate aortic valve calcification is present. Trace aortic insufficiency is  present. The mean AoV pressure gradient is 29.2 mmHg. The calculated aortic  valve are is 1.2 cm^2. Moderate aortic stenosis is present.     Tricuspid Valve  The tricuspid valve is normal. Trace tricuspid insufficiency is present. The  right ventricular systolic pressure is approximated at 20.6 mmHg plus the  right atrial pressure. Pulmonary artery systolic pressure is normal.     Pulmonic Valve  The pulmonic valve is normal.     Vessels  The pulmonary artery and bifurcation cannot be assessed. The inferior vena  cava is normal. Ascending aorta 3.9 cm. Mild dilatation of the aorta is  present.     Pericardium  No pericardial effusion is present.     ______________________________________________________________________________  MMode/2D Measurements & Calculations  IVSd: 1.3 cm  LVIDd: 4.3 cm  LVIDs: 2.5 cm  LVPWd: 1.3  cm  FS: 41.4 %  LV mass(C)d: 205.6 grams  LV mass(C)dI: 108.2 grams/m2     Ao root diam: 3.4 cm  asc Aorta Diam: 3.9 cm  LVOT diam: 1.8 cm  LVOT area: 2.7 cm2  LA Volume (BP): 59.6 ml  LA Volume Index (BP): 31.4 ml/m2  RV Base: 3.4 cm  RWT: 0.60  TAPSE: 2.6 cm     Doppler Measurements & Calculations  MV E max kevin: 105.0 cm/sec  MV A max kevin: 156.1 cm/sec  MV E/A: 0.67  MV dec slope: 369.1 cm/sec2  MV dec time: 0.29 sec  Ao V2 max: 372.8 cm/sec  Ao max P.6 mmHg  Ao V2 mean: 250.7 cm/sec  Ao mean P.2 mmHg  Ao V2 VTI: 83.0 cm  HOWARD(I,D): 1.2 cm2  HOWARD(V,D): 1.1 cm2  LV V1 max P.5 mmHg  LV V1 max: 154.2 cm/sec  LV V1 VTI: 36.3 cm  SV(LVOT): 97.6 ml  SI(LVOT): 51.3 ml/m2  PA acc time: 0.13 sec  TR max kevin: 227.2 cm/sec  TR max P.6 mmHg  AV Kevin Ratio (DI): 0.41  HOWARD Index (cm2/m2): 0.62  E/E' av.3  Lateral E/e': 12.5  Medial E/e': 16.1  RV S Kevin: 13.9 cm/sec    CT Angiogram 23:    IMPRESSION:  1.  Limited luminal assessment of the mid LAD mid LCx due to densely  calcified plaque but likely representing severe stenosis in the mid  LAD and mid LCx territories. Consider coronary angiogram if clinically  appropriate for further evaluation.   2.  Hemodynamically significant coronary stenosis in the mid to distal  LAD and distal LCx based on FFR-CT.  3.  Total Agatston score 2720 placing the patient in the 99th  percentile when compared to age and gender matched control group.  4.  Please review the separate Radiology report for incidental  noncardiac findings. This report will follow separately.     FINDINGS:     CORONARY CALCIUM SCORE     Total Agatston calcium score: 2720   Left main: 0  Left anterior descendin  Left circumflex: 1197  Right coronary artery: 341   This places the patient in the 99th  percentile when compared to age  and gender matched control group.     CORONARY ANGIOGRAPHY     DOMINANCE: Right dominant system.   Normal coronary origins and course.     LEFT MAIN:   The left  main arises normally from the left coronary cusp and has a  mild (25-49%) stenosis composed of calcified plaque.     LEFT ANTERIOR DESCENDING:      Proximal LAD: Mild (25-49%) stenosis composed of calcified plaque.  Mid LAD: Severe (>70%) stenosis composed of calcified plaque. Limited  luminal assessment due to densely calcified plaque.   Distal LAD: Mild (25-49%) stenosis composed of mixed plaque.  First diagonal: Moderate (50-69%) stenosis composed of calcified  plaque.     LEFT CIRCUMFLEX:      Proximal LCX:Moderate (50-69%) stenosis composed of calcified plaque.  Mid LCX: Severe (>70%) stenosis composed of calcified plaque. Non  diagnostic images due to dense calcifications  Distal LCX: Severe (>70%) stenosis composed of noncalcified plaque.     RIGHT CORONARY ARTERY:      Proximal RCA: Mild (25-49%) stenosis composed of calcified plaque.  Mid RCA: Mild (25-49%) stenosis composed of noncalcified plaque.  Distal RCA: Mild (25-49%) stenosis composed of noncalcified plaque.  The remainder of the right coronary and the posterior descending  artery are patent with minimal luminal irregularities.     TECHNIQUE:  Fractional Flow Reserve (FFR) CT was performed offline by [employer] on  the original CTA dataset. Diagrammatic representation of the FFR-CT  analysis is provided in a separate PDF document in PACS/EPIC. See  coronary CT results for detailed anatomic assessment.     IMPRESSION:  Hemodynamically significant coronary stenosis in the mid to distal LAD  and distal LCx.     FINDINGS:  1.  LAD: 0.63  2.  LCX: 0.51  3.  RCA: 0.88     Normal FFR range is >0.8.    Coronary Angiogram 6/9/23:    Pre Procedure Diagnosis    stable known CAD       Conclusion         3rd Mrg lesion is 30% stenosed.    1st Diag lesion is 50% stenosed.    Prox RCA to Dist RCA lesion is 40% stenosed.    Prox LAD to Mid LAD lesion is 60% stenosed.    Prox Cx to Dist Cx lesion is 60% stenosed.     Moderate diffuse disease of the LAD and Lcx  "(Borderline Pd/Pa 0.92, dPR 0.88 for both lesions). Plan for medical management given lack of clinical symptoms.  The lesion are long, moderate and calcified.  Mild non-obstructive RCA disease.         Plan     Follow bedrest per protocol   Continued medical management and lifestyle modifications for cardiovascular risk factor optimizations.   Discharge today per protocol     Recommendations    General Recommendations:  - Patient given specific instructions regarding care of arteriotomy site, activity restrictions, signs and symptoms of cardiac or vascular complications and to seek immediate medical evaluation should they occur.   - Recommended follow up with doctor Dr. Matson.   - Arterial sheath removed from radial artery with TR Band placement.       Medications:  - Continue high dose statin therapy indefinitely.   - Risk factor management for atherosclerosis.     Coronary Findings    Diagnostic  Dominance: Right  Left Main   The vessel was visualized by selective angiography, is moderate in size and is angiographically normal.      Left Anterior Descending   The vessel is moderate in size.   Prox LAD to Mid LAD lesion is 60% stenosed. 60% diffuse LAD disease was visualized on diagnostic angiography. A 6 Fr XB 3.5 GC was used to engage the LM. Heparin was given to keep ACT >250. A 0.014\" Opsens was normalized in the LM and wired past the disease into the distal LAD. Pd/Pa was 0.92 and iFR was 0.88; given the diffuse nature of the disease and lack of clinical symptoms, no intervention was performed. Wire was removed and final angiography showed TESSY 3 flow and no evidence of dissection or perforation.      First Diagonal Branch   The vessel is large.   1st Diag lesion is 50% stenosed.      First Septal Branch   The vessel is small and is angiographically normal.      Second Septal Branch   The vessel is small and is angiographically normal.      Left Circumflex   Prox Cx to Dist Cx lesion is 60% stenosed. 60% " "diffuse LCx disease was visualized on diagnostic angiography. A 6 Fr XB 3.5 GC was used to engage the LM. Heparin was given to keep ACT >250. A 0.014\" Opsens was normalized in the LM and wired pastthe lesion into the distal LCx. Pd/Pa was 0.92 and iFR was 0.88; given the diffuse nature of the disease and lack of clinical symptoms, no intervention was performed. Wire was removed and final angiography showed TESSY 3 flow and no evidence ofdissection or perforation.      First Obtuse Marginal Branch   The vessel is small. There is mild diffuse disease throughout the vessel.      Second Obtuse Marginal Branch   The vessel is small and is angiographically normal.      Third Obtuse Marginal Branch   The vessel is moderate in size.   3rd Mrg lesion is 30% stenosed.      Right Coronary Artery   The vessel was visualized by selective angiography and is moderate in size.   Prox RCA to Dist RCA lesion is 40% stenosed.      Acute Marginal Branch   The vessel is small.      Right Ventricular Branch   The vessel is small.      Right Posterior Descending Artery   The vessel is small and is angiographically normal.      Right Posterior Atrioventricular Artery   The vessel is moderate in size. There is mild diffuse disease throughout the vessel.      First Right Posterolateral Branch   The vessel is small and is angiographically normal.      Second Right Posterolateral Branch   The vessel is small and is angiographically normal.         Intervention     No interventions have been documented.         Assessment and Plan:   Ms. Urena is a 68 year old female with a PMH of multivessel CAD, HLD, HTN, aortic valve stenosis, ascending aortic aneurysm, and T2DM.    # Multivessel CAD  # HLD  Recent angiogram showed moderate diffuse disease of the LAD and Lcx (Borderline Pd/Pa 0.92, dPR 0.88 for both lesions), and mild non-obstructive RCA disease. Discussed with patient that there was no indication for stent placement, that that lesions will " be medically managed.  Pt notes mild leg cramps with atorva that also occurred with simvastatin, she will occasionally take statin EOD.   - Continue atorvastatin 40mg daily  - Continue aspirin 81mg daily    # Aortic valve stenosis  Echo 4/4/24 shows worsening aortic valve stenosis, with peak velocity 4.4 m/sec, MG 41mmHg, and HOWARD 0.98 cm^2, severity has worsened from echo 1 year prior. Pt notes SOB with exertion, none at rest.  - referral to structural clinic for TAVR workup     # NSVT  Recent ED visit for SVT, resolved with Valsalva. Ziopatch 2023 shows 17 runs of NSVT, longest run 17 beats.   - Discussed PRN medication with BB vs valsalva maneuvers, pt would prefer to start with maneuvers, if frequency of SVT increases we can add BB.    # Ascending aortic aneurysm  Stable at 3.7 cm on recent echo, prior echo showed 3.9cm.  - annaul echo  - tight BP control <130/90    # HTN, well controlled   Home /80s  - losartan 50mg daily  - Diltiazem 60mg BID    # DM2  Most recent A1c 5.8%  - Farxiga 10mg daily  - Metformin 1000mg daily  - Ozempic 0.5mg subcutaneous weekly      Follow up: RTC 1 year with echo prior    Chart review time today: 10 minutes  Visit time today: 16 minutes  Total time spent today: 24 minutes        MIKAEL JULES CNP  General Cardiology   04/10/24

## 2024-04-10 ENCOUNTER — OFFICE VISIT (OUTPATIENT)
Dept: CARDIOLOGY | Facility: CLINIC | Age: 69
End: 2024-04-10
Attending: CASE MANAGER/CARE COORDINATOR
Payer: COMMERCIAL

## 2024-04-10 ENCOUNTER — MYC MEDICAL ADVICE (OUTPATIENT)
Dept: CARDIOLOGY | Facility: CLINIC | Age: 69
End: 2024-04-10

## 2024-04-10 ENCOUNTER — TELEPHONE (OUTPATIENT)
Dept: CARDIOLOGY | Facility: CLINIC | Age: 69
End: 2024-04-10

## 2024-04-10 ENCOUNTER — MYC REFILL (OUTPATIENT)
Dept: FAMILY MEDICINE | Facility: CLINIC | Age: 69
End: 2024-04-10

## 2024-04-10 VITALS
SYSTOLIC BLOOD PRESSURE: 137 MMHG | DIASTOLIC BLOOD PRESSURE: 81 MMHG | OXYGEN SATURATION: 97 % | BODY MASS INDEX: 26.15 KG/M2 | HEART RATE: 63 BPM | WEIGHT: 172 LBS

## 2024-04-10 DIAGNOSIS — I25.10 CORONARY ARTERY DISEASE INVOLVING NATIVE HEART WITHOUT ANGINA PECTORIS, UNSPECIFIED VESSEL OR LESION TYPE: ICD-10-CM

## 2024-04-10 DIAGNOSIS — I10 ESSENTIAL HYPERTENSION: ICD-10-CM

## 2024-04-10 DIAGNOSIS — I71.21 ANEURYSM OF ASCENDING AORTA WITHOUT RUPTURE (H): ICD-10-CM

## 2024-04-10 DIAGNOSIS — I47.29 NSVT (NONSUSTAINED VENTRICULAR TACHYCARDIA) (H): ICD-10-CM

## 2024-04-10 DIAGNOSIS — I35.0 NONRHEUMATIC AORTIC VALVE STENOSIS: Primary | ICD-10-CM

## 2024-04-10 DIAGNOSIS — R00.2 PALPITATIONS: ICD-10-CM

## 2024-04-10 PROCEDURE — G0463 HOSPITAL OUTPT CLINIC VISIT: HCPCS | Performed by: CASE MANAGER/CARE COORDINATOR

## 2024-04-10 PROCEDURE — 99213 OFFICE O/P EST LOW 20 MIN: CPT | Performed by: CASE MANAGER/CARE COORDINATOR

## 2024-04-10 RX ORDER — LEVOCETIRIZINE DIHYDROCHLORIDE 5 MG/1
5 TABLET, FILM COATED ORAL EVERY EVENING
COMMUNITY

## 2024-04-10 RX ORDER — ASPIRIN 81 MG/1
81 TABLET ORAL EVERY EVENING
COMMUNITY

## 2024-04-10 ASSESSMENT — PAIN SCALES - GENERAL: PAINLEVEL: NO PAIN (0)

## 2024-04-10 NOTE — LETTER
4/10/2024      RE: Won Urena  [address]  [address]  [facility] [address]       Dear Colleague,    Thank you for the opportunity to participate in the care of your patient, Won Urena, at the [facility] [place] at [facility]. Please see a copy of my visit note below.      [facility] [place] [facility] [place]      HPI:   Ms. Won Urena is a 69 year old female with medical history pertinent for multivessel CAD, HLD, HTN, aortic valve stenosis, ascending aortic aneurysm, and T2DM. She presents to cardiology clinic for follow up of coronary angiogram.    Today in clinic, Orlin notes overall feeling well. She denies chest pain, dizziness, syncope, or lower extremity edema. She notes occasional fluttering sensations in her chest often associated with anxiety, which usually resolve with deep breathing.     She occasionally checks her blood pressure at home and notes that it's occasionally 130-140/90.    Interval History 4/10/24:  Recent ED visit for SVT, resolved with valsava maneuver. Troponin slightly elevated by flat 25 ->20. Won has started exercising more, goes to the gym with a , does [employer], [place] classes, etc. She feels occasionally SOB with exertion, not at rest. She has to work hard to get heart rate above 143. Denies dizziness or syncope. Home /80s.    PAST MEDICAL HISTORY:  Past Medical History:   Diagnosis Date     Abnormal Pap smear of cervix      Aortic stenosis      Aortic valve stenosis      Benign colon polyp      CAD (coronary artery disease)      Dermatochalasis of both upper eyelids      Diabetes mellitus, type 2 (H)      Gestational diabetes      HTN (hypertension)      Hyperlipidemia LDL goal <100      Nonsenile cataract 2022    Very beginnings     Post-menopausal atrophic vaginitis      Ptosis of both eyelids      Thickened endometrium        FAMILY HISTORY:  Family History    Problem Relation Age of Onset     Lung Cancer Mother         Small cell, did smoked     Diabetes Mother      Hypertension Mother      Goiter Mother      Mental Illness Mother         likely bipolar     Coronary Artery Disease Mother 42        MI, smoked     Hyperlipidemia Mother      Thyroid Disease Mother         Goiter     Obesity Mother         most of family is obese     Glaucoma Father      Atrial fibrillation Father      Hypertension Father      Parkinsonism Father      Hyperlipidemia Father      Diabetes Sister      Breast Cancer Sister 60     Hypertension Sister      Hypertension Brother      Cancer Maternal Grandmother         gynecologic cancer, unknown type     Uterine Cancer Maternal Grandmother      Chronic Obstructive Pulmonary Disease Paternal Grandmother      Uterine Cancer Paternal Grandmother      Stomach Cancer Paternal Grandfather      Colon Cancer Paternal Grandfather      Bipolar Disorder Son      Mental Illness Son      Depression Son      Macular Degeneration No family hx of      Anesthesia Reaction No family hx of      Venous thrombosis No family hx of        SOCIAL HISTORY:  Social History     Socioeconomic History     Marital status:    Tobacco Use     Smoking status: Never     Smokeless tobacco: Never   Vaping Use     Vaping Use: Never used   Substance and Sexual Activity     Alcohol use: Yes     Alcohol/week: 1.0 standard drink of alcohol     Types: 1 Standard drinks or equivalent per week     Comment: less than 2 drinks a week     Drug use: Never     Sexual activity: Not Currently     Partners: Male     Birth control/protection: Post-menopausal   Social History Narrative    Lives with     Two son    One son, daughter-in-law, and grandson live in [place]    One son, daughter-in-law live in the same [facility]    Gan in [place], [place]       CURRENT MEDICATIONS:  Current Outpatient Medications   Medication Sig Dispense Refill     ACCU-CHEK GUIDE test strip  CHECK BLOOD SUGAR DAILY OR AS  DIRECTED 100 strip 2     atorvastatin (LIPITOR) 40 MG tablet Take 1 tablet (40 mg) by mouth daily (Patient taking differently: Take 40 mg by mouth every evening) 90 tablet 3     Blood Glucose Monitoring Suppl (ACCU-CHEK GUIDE) w/Device KIT USE TO TEST BLOOD SUGAR ONCE  DAILY OR AS DIRECTED 1 kit 0     carboxymethylcellulose PF (REFRESH PLUS) 0.5 % ophthalmic solution Place 1 drop into both eyes 4 times daily 70 each 0     cyanocobalamin (VITAMIN B-12) 100 MCG tablet Take 100 mcg by mouth every evening       dapagliflozin (FARXIGA) 10 MG TABS tablet TAKE 1 TABLET BY MOUTH IN THE  MORNING 100 tablet 1     diltiazem (CARDIZEM) 60 MG tablet TAKE 1 TABLET BY MOUTH  TWICE DAILY 200 tablet 2     estradiol (ESTRACE) 0.1 MG/GM vaginal cream INSERT 1/2 APPLICATORFUL  VAGINALLY TWICE WEEKLY 85 g 3     fenofibrate (TRIGLIDE/LOFIBRA) 160 MG tablet Take 1 tablet (160 mg) by mouth every evening 100 tablet 3     gabapentin (NEURONTIN) 100 MG capsule Take 2 capsules (200 mg) by mouth at bedtime 200 capsule 3     ipratropium (ATROVENT) 0.06 % nasal spray Spray 2 sprays into both nostrils 4 times daily (Patient taking differently: Spray 2 sprays into both nostrils 4 times daily as needed) 15 mL 11     levocetirizine (XYZAL) 5 MG tablet Take 5 mg by mouth every evening       losartan (COZAAR) 50 MG tablet Take 50 mg by mouth every morning 100 tablet 3     metFORMIN (GLUCOPHAGE) 500 MG tablet TAKE 2 TABLETS BY MOUTH  TWICE DAILY WITH MEALS 360 tablet 3     montelukast (SINGULAIR) 10 MG tablet TAKE 1 TABLET BY MOUTH AT  BEDTIME 100 tablet 2     multivitamin w/minerals (MULTI-VITAMIN) tablet Take 1 tablet by mouth every evening       semaglutide (OZEMPIC) 2 MG/3ML pen Inject 0.5 mg Subcutaneous every 7 days 9 mL 3     Semaglutide, 1 MG/DOSE, (OZEMPIC, 1 MG/DOSE,) 4 MG/3ML pen INJECT SUBCUTANEOUSLY 1 MG  EVERY WEEK 9 mL 0     Vitamin D, Cholecalciferol, 10 MCG (400 UNIT) TABS Take by mouth every evening        "cetirizine (ZYRTEC) 10 MG tablet Take 10 mg by mouth every evening (Patient not taking: Reported on 4/10/2024)       No current facility-administered medications for this visit.       ROS:   Refer to HPI    EXAM:  /81 (BP Location: Right arm, Patient Position: Chair, Cuff Size: Adult Regular)   Pulse 63   Wt 78 kg (172 lb)   SpO2 97%   BMI 26.15 kg/m    GENERAL: Appears comfortable, in no acute distress.   HEENT: Eye symmetrical, no discharge or icterus bilaterally. Mucous membranes moist and without lesions.  CV: RRR, +S1S2, holosystolic murmur. JVP not elevated   RESPIRATORY: Respirations regular, even, and unlabored. Lungs CTA throughout.   GI: Soft and non distended with normoactive bowel sounds present in all quadrants. No tenderness, rebound, guarding.  EXTREMITIES: No peripheral edema. 2+ bilateral pedal pulses.   NEUROLOGIC: Alert and oriented x 3. No focal deficits.   MUSCULOSKELETAL: No joint swelling or tenderness.   SKIN: No jaundice. No rashes or lesions.     Labs, reviewed with patient in clinic today:  CBC RESULTS:  Lab Results   Component Value Date    WBC 5.0 03/23/2024    RBC 4.69 03/23/2024    HGB 14.0 03/23/2024    HCT 41.5 03/23/2024    MCV 89 03/23/2024    MCH 29.9 03/23/2024    MCHC 33.7 03/23/2024    RDW 13.5 03/23/2024     03/23/2024       CMP RESULTS:  Lab Results   Component Value Date     03/23/2024    POTASSIUM 3.9 03/23/2024    CHLORIDE 107 03/23/2024    CO2 21 (L) 03/23/2024    ANIONGAP 14 03/23/2024     (H) 03/23/2024     (H) 09/28/2023    BUN 20.4 03/23/2024    CR 0.71 03/23/2024    GFRESTIMATED >90 03/23/2024    JOAQUINA 10.0 03/23/2024    BILITOTAL 0.5 03/23/2024    ALBUMIN 4.5 03/23/2024    ALKPHOS 56 03/23/2024    ALT 18 03/23/2024    AST 33 03/23/2024        INR RESULTS:  Lab Results   Component Value Date    INR 1.12 08/20/2023       Lab Results   Component Value Date    MAG 1.7 03/23/2024     No results found for: \"NTBNPI\"  No results found for: " "\"NTBNP\"    EKG 3/2024:      Echocardiogram 4/24/23:  Procedure  Echocardiogram with two-dimensional, color and spectral Doppler performed.  ______________________________________________________________________________  Interpretation Summary  Global and regional left ventricular function is hyperkinetic with an EF >70%.  Right ventricular function, chamber size, wall motion, and thickness are  normal.  Moderate aortic stenosis is present.  Pulmonary artery systolic pressure is normal.  Ascending aorta 3.9 cm.  Mild dilatation of the aorta is present.  The inferior vena cava is normal.  No pericardial effusion is present.  ______________________________________________________________________________  Left Ventricle  Global and regional left ventricular function is hyperkinetic with an EF >70%.  Left ventricular size is normal. Mild concentric wall thickening consistent  with left ventricular hypertrophy is present. Left ventricular diastolic  function is indeterminate. Diastolic Doppler findings (E/E' ratio and/or other  parameters) suggest left ventricular filling pressures are increased. No  regional wall motion abnormalities are seen.     Right Ventricle  Right ventricular function, chamber size, wall motion, and thickness are  normal.     Atria  Both atria appear normal.     Mitral Valve  Mild to moderate mitral annular calcification is present. Mild mitral  insufficiency is present.     Aortic Valve  Moderate aortic valve calcification is present. Trace aortic insufficiency is  present. The mean AoV pressure gradient is 29.2 mmHg. The calculated aortic  valve are is 1.2 cm^2. Moderate aortic stenosis is present.     Tricuspid Valve  The tricuspid valve is normal. Trace tricuspid insufficiency is present. The  right ventricular systolic pressure is approximated at 20.6 mmHg plus the  right atrial pressure. Pulmonary artery systolic pressure is normal.     Pulmonic Valve  The pulmonic valve is normal.   "   Vessels  The pulmonary artery and bifurcation cannot be assessed. The inferior vena  cava is normal. Ascending aorta 3.9 cm. Mild dilatation of the aorta is  present.     Pericardium  No pericardial effusion is present.     ______________________________________________________________________________  MMode/2D Measurements & Calculations  IVSd: 1.3 cm  LVIDd: 4.3 cm  LVIDs: 2.5 cm  LVPWd: 1.3 cm  FS: 41.4 %  LV mass(C)d: 205.6 grams  LV mass(C)dI: 108.2 grams/m2     Ao root diam: 3.4 cm  asc Aorta Diam: 3.9 cm  LVOT diam: 1.8 cm  LVOT area: 2.7 cm2  LA Volume (BP): 59.6 ml  LA Volume Index (BP): 31.4 ml/m2  RV Base: 3.4 cm  RWT: 0.60  TAPSE: 2.6 cm     Doppler Measurements & Calculations  MV E max kevin: 105.0 cm/sec  MV A max kevin: 156.1 cm/sec  MV E/A: 0.67  MV dec slope: 369.1 cm/sec2  MV dec time: 0.29 sec  Ao V2 max: 372.8 cm/sec  Ao max P.6 mmHg  Ao V2 mean: 250.7 cm/sec  Ao mean P.2 mmHg  Ao V2 VTI: 83.0 cm  HOWARD(I,D): 1.2 cm2  HOWARD(V,D): 1.1 cm2  LV V1 max P.5 mmHg  LV V1 max: 154.2 cm/sec  LV V1 VTI: 36.3 cm  SV(LVOT): 97.6 ml  SI(LVOT): 51.3 ml/m2  PA acc time: 0.13 sec  TR max kevin: 227.2 cm/sec  TR max P.6 mmHg  AV Kevin Ratio (DI): 0.41  HOWARD Index (cm2/m2): 0.62  E/E' av.3  Lateral E/e': 12.5  Medial E/e': 16.1  RV S Kevin: 13.9 cm/sec    CT Angiogram 23:    IMPRESSION:  1.  Limited luminal assessment of the mid LAD mid LCx due to densely  calcified plaque but likely representing severe stenosis in the mid  LAD and mid LCx territories. Consider coronary angiogram if clinically  appropriate for further evaluation.   2.  Hemodynamically significant coronary stenosis in the mid to distal  LAD and distal LCx based on FFR-CT.  3.  Total Agatston score 2720 placing the patient in the 99th  percentile when compared to age and gender matched control group.  4.  Please review the separate Radiology report for incidental  noncardiac findings. This report will follow separately.      FINDINGS:     CORONARY CALCIUM SCORE     Total Agatston calcium score: 2720   Left main: 0  Left anterior descendin  Left circumflex: 1197  Right coronary artery: 341   This places the patient in the 99th  percentile when compared to age  and gender matched control group.     CORONARY ANGIOGRAPHY     DOMINANCE: Right dominant system.   Normal coronary origins and course.     LEFT MAIN:   The left main arises normally from the left coronary cusp and has a  mild (25-49%) stenosis composed of calcified plaque.     LEFT ANTERIOR DESCENDING:      Proximal LAD: Mild (25-49%) stenosis composed of calcified plaque.  Mid LAD: Severe (>70%) stenosis composed of calcified plaque. Limited  luminal assessment due to densely calcified plaque.   Distal LAD: Mild (25-49%) stenosis composed of mixed plaque.  First diagonal: Moderate (50-69%) stenosis composed of calcified  plaque.     LEFT CIRCUMFLEX:      Proximal LCX:Moderate (50-69%) stenosis composed of calcified plaque.  Mid LCX: Severe (>70%) stenosis composed of calcified plaque. Non  diagnostic images due to dense calcifications  Distal LCX: Severe (>70%) stenosis composed of noncalcified plaque.     RIGHT CORONARY ARTERY:      Proximal RCA: Mild (25-49%) stenosis composed of calcified plaque.  Mid RCA: Mild (25-49%) stenosis composed of noncalcified plaque.  Distal RCA: Mild (25-49%) stenosis composed of noncalcified plaque.  The remainder of the right coronary and the posterior descending  artery are patent with minimal luminal irregularities.     TECHNIQUE:  Fractional Flow Reserve (FFR) CT was performed offline by [employer] on  the original CTA dataset. Diagrammatic representation of the FFR-CT  analysis is provided in a separate PDF document in [employer]/EPIC. See  coronary CT results for detailed anatomic assessment.     IMPRESSION:  Hemodynamically significant coronary stenosis in the mid to distal LAD  and distal LCx.     FINDINGS:  1.  LAD: 0.63  2.  LCX:  "0.51  3.  RCA: 0.88     Normal FFR range is >0.8.    Coronary Angiogram 6/9/23:    Pre Procedure Diagnosis    stable known CAD       Conclusion          3rd Mrg lesion is 30% stenosed.     1st Diag lesion is 50% stenosed.     Prox RCA to Dist RCA lesion is 40% stenosed.     Prox LAD to Mid LAD lesion is 60% stenosed.     Prox Cx to Dist Cx lesion is 60% stenosed.     Moderate diffuse disease of the LAD and Lcx (Borderline Pd/Pa 0.92, dPR 0.88 for both lesions). Plan for medical management given lack of clinical symptoms.  The lesion are long, moderate and calcified.  Mild non-obstructive RCA disease.         Plan      Follow bedrest per protocol    Continued medical management and lifestyle modifications for cardiovascular risk factor optimizations.    Discharge today per protocol     Recommendations    General Recommendations:  - Patient given specific instructions regarding care of arteriotomy site, activity restrictions, signs and symptoms of cardiac or vascular complications and to seek immediate medical evaluation should they occur.   - Recommended follow up with doctor Dr. Matson.   - Arterial sheath removed from radial artery with TR Band placement.       Medications:  - Continue high dose statin therapy indefinitely.   - Risk factor management for atherosclerosis.     Coronary Findings    Diagnostic  Dominance: Right  Left Main   The vessel was visualized by selective angiography, is moderate in size and is angiographically normal.      Left Anterior Descending   The vessel is moderate in size.   Prox LAD to Mid LAD lesion is 60% stenosed. 60% diffuse LAD disease was visualized on diagnostic angiography. A 6 Fr XB 3.5 GC was used to engage the LM. Heparin was given to keep ACT >250. A 0.014\" Opsens was normalized in the LM and wired past the disease into the distal LAD. Pd/Pa was 0.92 and iFR was 0.88; given the diffuse nature of the disease and lack of clinical symptoms, no intervention was performed. Wire " "was removed and final angiography showed TESSY 3 flow and no evidence of dissection or perforation.      First Diagonal Branch   The vessel is large.   1st Diag lesion is 50% stenosed.      First Septal Branch   The vessel is small and is angiographically normal.      Second Septal Branch   The vessel is small and is angiographically normal.      Left Circumflex   Prox Cx to Dist Cx lesion is 60% stenosed. 60% diffuse LCx disease was visualized on diagnostic angiography. A 6 Fr XB 3.5 GC was used to engage the LM. Heparin was given to keep ACT >250. A 0.014\" Opsens was normalized in the LM and wired pastthe lesion into the distal LCx. Pd/Pa was 0.92 and iFR was 0.88; given the diffuse nature of the disease and lack of clinical symptoms, no intervention was performed. Wire was removed and final angiography showed TESSY 3 flow and no evidence ofdissection or perforation.      First Obtuse Marginal Branch   The vessel is small. There is mild diffuse disease throughout the vessel.      Second Obtuse Marginal Branch   The vessel is small and is angiographically normal.      Third Obtuse Marginal Branch   The vessel is moderate in size.   3rd Mrg lesion is 30% stenosed.      Right Coronary Artery   The vessel was visualized by selective angiography and is moderate in size.   Prox RCA to Dist RCA lesion is 40% stenosed.      Acute Marginal Branch   The vessel is small.      Right Ventricular Branch   The vessel is small.      Right Posterior Descending Artery   The vessel is small and is angiographically normal.      Right Posterior Atrioventricular Artery   The vessel is moderate in size. There is mild diffuse disease throughout the vessel.      First Right Posterolateral Branch   The vessel is small and is angiographically normal.      Second Right Posterolateral Branch   The vessel is small and is angiographically normal.         Intervention     No interventions have been documented.         Assessment and Plan:   Ms. " Romel is a 68 year old female with a PMH of multivessel CAD, HLD, HTN, aortic valve stenosis, ascending aortic aneurysm, and T2DM.    # Multivessel CAD  # HLD  Recent angiogram showed moderate diffuse disease of the LAD and Lcx (Borderline Pd/Pa 0.92, dPR 0.88 for both lesions), and mild non-obstructive RCA disease. Discussed with patient that there was no indication for stent placement, that that lesions will be medically managed.  Pt notes mild leg cramps with atorva that also occurred with simvastatin, she will occasionally take statin EOD.   - Continue atorvastatin 40mg daily  - Continue aspirin 81mg daily    # Aortic valve stenosis  Echo 4/4/24 shows worsening aortic valve stenosis, with peak velocity 4.4 m/sec, MG 41mmHg, and HOWARD 0.98 cm^2, severity has worsened from echo 1 year prior. Pt notes SOB with exertion, none at rest.  - referral to structural clinic for TAVR workup     # NSVT  Recent ED visit for SVT, resolved with Valsalva. Ziopatch 2023 shows 17 runs of NSVT, longest run 17 beats.   - Discussed PRN medication with BB vs valsalva maneuvers, pt would prefer to start with maneuvers, if frequency of SVT increases we can add BB.    # Ascending aortic aneurysm  Stable at 3.7 cm on recent echo, prior echo showed 3.9cm.  - annaul echo  - tight BP control <130/90    # HTN, well controlled   Home /80s  - losartan 25mg daily  - Diltiazem 60mg BID    # DM2  Most recent A1c 5.8%  - Farxiga 10mg daily  - Metformin 1000mg daily  - Ozempic 0.5mg subcutaneous weekly      Follow up: RTC 1 year with echo prior    Chart review time today: 10 minutes  Visit time today: 16 minutes  Total time spent today: 24 minutes        MIKAEL JULES CNP  General Cardiology   04/10/24                Optimal Vascular Metrics    Blood Pressure   BP < 140/90 Yes    On Aspirin  No: Started today    On Statin  Yes    Tobacco use  No      Please do not hesitate to contact me if you have any questions/concerns.      Sincerely,     MIKAEL JULES CNP

## 2024-04-10 NOTE — TELEPHONE ENCOUNTER
Left Voicemail (1st ATTEMPT) for the patient to call back and schedule the following:     Appointment type: Aortic Stenosis - TAVR consult  Provider: Dr. England  Return date: tentative 4/25/24  Specialty phone number: [phone]   Additional appointment(s) needed: n/a  Additonal Notes:  Called and left vm with my contact information to call me back .

## 2024-04-10 NOTE — NURSING NOTE
Chief Complaint   Patient presents with    Follow Up     annual follow up for CAD       Vitals were taken, medications reconciled.    Rivera Rodrigez, Facilitator   9:19 AM

## 2024-04-10 NOTE — PATIENT INSTRUCTIONS
You were seen today in the Cardiovascular Clinic at the [facility] by:       MIKAEL SHOOK CNP    Your visit summary and instructions are as follows:    Referral to structural heart clinic for aortic stenosis      Return to cardiology clinic as needed     Thank you for your visit today!     Please MyChart message me or call my nurse if you have any questions or concerns.      During Business Hours:  [phone], option # 1 (University) then option # 4 (medical questions) and ask to speak with my nurse.     After hours, weekends or holidays:   [phone], Option #4  Ask to speak to the On-Call Cardiologist. Inform them you are a cardiology patient at the [facility].

## 2024-04-10 NOTE — PROGRESS NOTES
Optimal Vascular Metrics    Blood Pressure   BP < 140/90 Yes    On Aspirin  No: Started today    On Statin  Yes    Tobacco use  No

## 2024-04-11 RX ORDER — LOSARTAN POTASSIUM 50 MG/1
50 TABLET ORAL EVERY MORNING
Qty: 100 TABLET | Refills: 3 | Status: ON HOLD | OUTPATIENT
Start: 2024-04-11 | End: 2024-06-17

## 2024-04-15 DIAGNOSIS — I35.0 AORTIC VALVE STENOSIS: Primary | ICD-10-CM

## 2024-04-15 DIAGNOSIS — I50.9 ACUTE HEART FAILURE, UNSPECIFIED HEART FAILURE TYPE (H): ICD-10-CM

## 2024-04-15 RX ORDER — ATORVASTATIN CALCIUM 40 MG/1
40 TABLET, FILM COATED ORAL DAILY
Qty: 90 TABLET | Refills: 3 | Status: ON HOLD | OUTPATIENT
Start: 2024-04-15 | End: 2024-06-14

## 2024-04-15 NOTE — TELEPHONE ENCOUNTER
atorvastatin (LIPITOR) 40 MG tablet 90 tablet 3 5/24/2023     Last Office Visit: 4/10/24  Future Office visit:   4/25/24    Antihyperlipidemic agents Passed   Isabela Pedersen RN  P Red Flag Triage/MRT

## 2024-04-18 ASSESSMENT — ANXIETY QUESTIONNAIRES
7. FEELING AFRAID AS IF SOMETHING AWFUL MIGHT HAPPEN: SEVERAL DAYS
GAD7 TOTAL SCORE: 3
6. BECOMING EASILY ANNOYED OR IRRITABLE: NOT AT ALL
4. TROUBLE RELAXING: NOT AT ALL
2. NOT BEING ABLE TO STOP OR CONTROL WORRYING: SEVERAL DAYS
3. WORRYING TOO MUCH ABOUT DIFFERENT THINGS: NOT AT ALL
8. IF YOU CHECKED OFF ANY PROBLEMS, HOW DIFFICULT HAVE THESE MADE IT FOR YOU TO DO YOUR WORK, TAKE CARE OF THINGS AT HOME, OR GET ALONG WITH OTHER PEOPLE?: NOT DIFFICULT AT ALL
GAD7 TOTAL SCORE: 3
IF YOU CHECKED OFF ANY PROBLEMS ON THIS QUESTIONNAIRE, HOW DIFFICULT HAVE THESE PROBLEMS MADE IT FOR YOU TO DO YOUR WORK, TAKE CARE OF THINGS AT HOME, OR GET ALONG WITH OTHER PEOPLE: NOT DIFFICULT AT ALL
1. FEELING NERVOUS, ANXIOUS, OR ON EDGE: SEVERAL DAYS
7. FEELING AFRAID AS IF SOMETHING AWFUL MIGHT HAPPEN: SEVERAL DAYS
5. BEING SO RESTLESS THAT IT IS HARD TO SIT STILL: NOT AT ALL
GAD7 TOTAL SCORE: 3

## 2024-04-18 ASSESSMENT — PATIENT HEALTH QUESTIONNAIRE - PHQ9
SUM OF ALL RESPONSES TO PHQ QUESTIONS 1-9: 2
SUM OF ALL RESPONSES TO PHQ QUESTIONS 1-9: 2
10. IF YOU CHECKED OFF ANY PROBLEMS, HOW DIFFICULT HAVE THESE PROBLEMS MADE IT FOR YOU TO DO YOUR WORK, TAKE CARE OF THINGS AT HOME, OR GET ALONG WITH OTHER PEOPLE: NOT DIFFICULT AT ALL

## 2024-04-18 NOTE — PROGRESS NOTES
[facility]      PATIENT'S NAME: Won Urena  PREFERRED NAME: Won  PRONOUNS:  she/her     MRN: [id number]  : 1955  ADDRESS: [address] [place] [id number]  ACCT. NUMBER:  [id number]  DATE OF SERVICE: 24  PREFERRED PHONE: [phone]  May we leave a program related message: Yes  EMERGENCY CONTACT: was obtained : Cristhian Urena .  SERVICE MODALITY:  In-person      Behavioral Health Clinician Progress Note    Patient Name: Won Urena           Service Type:  Individual      Service Location:   Face to Face in Clinic     Session Start Time: 12:31 pm  Session End Time: 1:02 pm      Session Length: 16 - 37      Attendees: Patient     Service Modality:  In-person    Visit Activities (Refresh list every visit): [facility] Only    Diagnostic Assessment Date: 3/27/24  Treatment Plan Review Date: 2024  CGI Review Date:   Promis 10 Review Date: 24    Clinical Global Impressions  First:  Considering your total clinical experience with this particular patient population, how severe are the patient's symptoms at this time?: 4 (3/28/2024  5:37 PM)    Most recent:  No data recorded    See Flowsheets for today's PHQ-9 and NICOLETTE-7 results  Previous PHQ-9:       3/23/2024     3:27 PM 2024    11:22 AM 2024    10:16 AM   PHQ-9 SCORE   PHQ-9 Total Score MyChart 6 (Mild depression) 3 (Minimal depression) 2 (Minimal depression)   PHQ-9 Total Score 6 3 2     Previous NICOLETTE-7:       3/12/2023     9:48 AM 3/23/2024     3:28 PM 2024    10:17 AM   NICOLETTE-7 SCORE   Total Score 2 (minimal anxiety) 6 (mild anxiety) 3 (minimal anxiety)   Total Score 2 6 3           DATA  Extended Session (60+ minutes): No  Interactive Complexity: No  Crisis: No  [facility] Patient: No    Treatment Objective(s) Addressed in This Session:  Target Behavior(s):  exercise; stress management    Anxiety: will experience a reduction in anxiety, will develop more effective coping skills to  "manage anxiety symptoms, will develop healthy cognitive patterns and beliefs, and will increase ability to function adaptively    Current Stressors / Issues:  Won stated, \"I'm freelling a little more anxiety, a little more chest pressure\", explaining she's worried about upcoming appt regarding possibility of heart valve replacement and her  has been \"more depressed\" lately (has upcoming appt about getting off the cast foot).  [facility] used MI and Solution Focused strategies to focus on change and effective use of coping strategies to manage symptoms.   Won expressed change talk, stating, \"I've worked on our plan a little bit\" and then shared the following:    Effective Strategies:  -breathing and meditating  -some anxiety is reasonable   -signed up for somatic breathing class  -meeting with   -attending [place] exercise class  -Processing the stress with   -Reminding herself of the positives  -Socializing  -4-5 hrs of work per week (enjoys the work)  -normalizing worry by reminding herself that most people would have some worry about a heart procedure    After listing the above strategies, Won stated, \"I have the right mix\", \"I think I have the right plan\" and is confident that if she follows through with plan she can effective manage stressors.       Progress on Treatment Objective(s) / Homework:  No improvement - ACTION (Actively working towards change); Intervened by reinforcing change plan / affirming steps taken    Motivational Interviewing    MI Intervention: Co-Developed Goal: decreased anxiety, Expressed Empathy/Understanding, Supported Autonomy, Collaboration, Evocation, Open-ended questions, Reflections: simple and complex, and Change talk (evoked)     Change Talk Expressed by the Patient: Desire to change Ability to change Reasons to change Committment to change    Provider Response to Change Talk: E - Evoked more info from patient about behavior change, A - Affirmed patient's " thoughts, decisions, or attempts at behavior change, R - Reflected patient's change talk, and S - Summarized patient's change talk statements    Also provided psychoeducation about behavioral health condition, symptoms, and treatment options    Solution-Focused Therapy  Explore patterns in patient's behaviors and relationships and discuss options for new behaviors  Explore new options for problem-solving, communication, managing stress, etc.    Care Plan review completed: Yes    Medication Review:  No current psychiatric medications prescribed    Medication Compliance:  NA    Changes in Health Issues:   None reported    Chemical Use Review:   Substance Use: Chemical use reviewed, no active concerns identified      Tobacco Use: No current tobacco use.      Assessment: Current Emotional / Mental Status (status of significant symptoms):  Risk status (Self / Other harm or suicidal ideation)  Patient denies a history of suicidal ideation, suicide attempts, self-injurious behavior, homicidal ideation, homicidal behavior, and and other safety concerns  Patient denies current fears or concerns for personal safety.  Patient denies current or recent suicidal ideation or behaviors.  Patient denies current or recent homicidal ideation or behaviors.  Patient denies current or recent self injurious behavior or ideation.  Patient denies other safety concerns.  A safety and risk management plan has not been developed at this time, however patient was encouraged to call [facility] should there be a change in any of these risk factors.    Appearance:   Appropriate   Eye Contact:   Good   Psychomotor Behavior: Normal   Attitude:   Cooperative   Orientation:   All  Speech   Rate / Production: Normal    Volume:  Normal   Mood:    Anxious   Affect:    Congruent with mood   Thought Content:  Clear   Thought Form:  Coherent  Logical   Insight:    Good     Diagnoses:  1. NICOLETTE (generalized anxiety disorder)          Collateral  "Reports Completed:  Routed note to PCP    Plan: (Homework, other):  Patient was given information about behavioral services and encouraged to schedule a follow up appointment with the clinic [facility] in 1 month.  She was also given information about mental health symptoms and treatment options  and breathing exercises .  CD Recommendations: No indications of CD issues.       Shawn G. Ullrich, [facility], [facility]    ______________________________________________________________________    Integrated Primary Care Behavioral Health Treatment Plan    Patient's Name: Won Urena  YOB: 1955    Date of Creation: 4/5/25  Date Treatment Plan Last Reviewed/Revised: 4/5/24    DSM5 Diagnoses: 300.02 (F41.1) Generalized Anxiety Disorder  Psychosocial / Contextual Factors: , , heterosexual female; 2 adult,  sons, grandchild; works part-time  .  PROMIS (reviewed every 90 days): pt completed on 3/20/24    Referral / Collaboration:  Referral to another professional/service is not indicated at this time..    Anticipated number of session for this episode of care: 8  Anticipation frequency of session: Every other week  Anticipated Duration of each session: 38-52 minutes  Treatment plan will be reviewed in 90 days or when goals have been changed.       MeasurableTreatment Goal(s) related to diagnosis / functional impairment(s)  Goal 1: Patient will increase self-knowledge about anxiety and be able to reduce anxiety by effectively implementing 3 coping skills/strategies.     I will know I've met my goal when: \"I want to do things for myself and not feel bad about it\", be present for her family        Objective #A (Patient Action)    Patient will identify 3 initial signs or symptoms of anxiety.  Status: New - Date: 4/5/24      Intervention(s)  Therapist will teach  warning signs; emotional, cognitive, behavioral, and physical .    Objective #B  Patient will practice deep breathing at least 2x;'s a " day/meditation  Status: New - Date: 4/5/24      Intervention(s)  Therapist will teach diaphragmatic breathing technique and exercises .      Objective #C  Patient will  attempt at least 3 different relaxation activities in the next 3 months .  Status: New - Date: 4/5/24      Intervention(s)  Therapist will teach relaxation strategies, including guided imagery, PMR, . .        Patient has reviewed and agreed to the above plan.      Shawn G. Ullrich, [facility], [facility]  April 4, 2024

## 2024-04-19 ENCOUNTER — OFFICE VISIT (OUTPATIENT)
Dept: BEHAVIORAL HEALTH | Facility: CLINIC | Age: 69
End: 2024-04-19
Payer: COMMERCIAL

## 2024-04-19 DIAGNOSIS — F41.1 GAD (GENERALIZED ANXIETY DISORDER): Primary | ICD-10-CM

## 2024-04-22 ENCOUNTER — TELEPHONE (OUTPATIENT)
Dept: CARDIOLOGY | Facility: CLINIC | Age: 69
End: 2024-04-22
Payer: COMMERCIAL

## 2024-04-22 NOTE — TELEPHONE ENCOUNTER
Called patient and reviewed TAVR CT instructions. All questioned answered at this time.        Kiera Alves RN  Lead Structural Heart Coordinator  TAVR, [place]

## 2024-04-22 NOTE — PATIENT INSTRUCTIONS
4/25   11:30 Labs then TAVR CT  1:30 pm Clinic TAVR Dr. Bethea     Pre-procedure instructions - CT Angiogram TAVR/TMVR  Patient Education    Your arrival time is 1130.  Location is [address]  How do I prepare for my exam? (Food and drink instructions)  **You will have contrast for this exam.**  No Food and Drink Restrictions     The day before your exam, drink extra fluids-at least six 8-ounce glasses (unless your doctor tells you to restrict your fluids).     What should I wear: Please wear loose clothing, such as a sweat suit or jogging clothes.  Avoid snaps, zippers and other metal. We may ask you to undress and put on a hospital gown.     How long does the exam take: Plan to spend up to 1 hour at the hospital     What should I bring: Please bring any scans or X-rays taken at other hospitals, if similar tests were done. It is safest to leave personal items at home.     Do I need a :  No  is needed.     What do I need to tell my doctor?  Be sure to tell your doctor:  * If you have any allergies.  * If there's any chance you are pregnant.  * If you are breastfeeding.  * If you have diabetes as your medication may need to be adjusted for this exam.     What should I do after the exam: No restrictions, You may resume normal activities.     What is this test: A CT (computed tomography) scan is a series of pictures that allows us to look inside your body. The scanner creates images of the body in cross sections, much like slices of bread. This helps us see any problems more clearly. You may receive contrast (X-ray dye) before or during your scan. Contrast is given through an IV (small needle in your arm).     Who should I call with questions: If you have any questions, please call the Imaging Department where you will have your exam. Directions, parking instructions, and other information is  available on our website, [email or web address].

## 2024-04-25 ENCOUNTER — HOSPITAL ENCOUNTER (OUTPATIENT)
Dept: CT IMAGING | Facility: CLINIC | Age: 69
Discharge: HOME | End: 2024-04-25
Attending: INTERNAL MEDICINE
Payer: COMMERCIAL

## 2024-04-25 ENCOUNTER — LAB (OUTPATIENT)
Dept: LAB | Facility: CLINIC | Age: 69
End: 2024-04-25
Attending: INTERNAL MEDICINE
Payer: COMMERCIAL

## 2024-04-25 ENCOUNTER — OFFICE VISIT (OUTPATIENT)
Dept: CARDIOLOGY | Facility: CLINIC | Age: 69
End: 2024-04-25
Attending: INTERNAL MEDICINE
Payer: COMMERCIAL

## 2024-04-25 VITALS
WEIGHT: 171.1 LBS | DIASTOLIC BLOOD PRESSURE: 76 MMHG | BODY MASS INDEX: 26.02 KG/M2 | HEART RATE: 73 BPM | OXYGEN SATURATION: 98 % | SYSTOLIC BLOOD PRESSURE: 148 MMHG

## 2024-04-25 DIAGNOSIS — I35.0 AORTIC VALVE STENOSIS: ICD-10-CM

## 2024-04-25 DIAGNOSIS — I50.9 ACUTE HEART FAILURE, UNSPECIFIED HEART FAILURE TYPE (H): ICD-10-CM

## 2024-04-25 DIAGNOSIS — I35.0 SEVERE AORTIC STENOSIS: ICD-10-CM

## 2024-04-25 DIAGNOSIS — I35.0 NONRHEUMATIC AORTIC VALVE STENOSIS: Primary | ICD-10-CM

## 2024-04-25 DIAGNOSIS — I35.0 SEVERE AORTIC STENOSIS: Primary | ICD-10-CM

## 2024-04-25 DIAGNOSIS — I25.10 CORONARY ARTERY DISEASE INVOLVING NATIVE CORONARY ARTERY OF NATIVE HEART WITHOUT ANGINA PECTORIS: ICD-10-CM

## 2024-04-25 LAB
ANION GAP SERPL CALCULATED.3IONS-SCNC: 11 MMOL/L (ref 7–15)
BUN SERPL-MCNC: 22.4 MG/DL (ref 8–23)
CALCIUM SERPL-MCNC: 9.8 MG/DL (ref 8.8–10.2)
CHLORIDE SERPL-SCNC: 105 MMOL/L (ref 98–107)
CREAT SERPL-MCNC: 0.73 MG/DL (ref 0.51–0.95)
DEPRECATED HCO3 PLAS-SCNC: 22 MMOL/L (ref 22–29)
EGFRCR SERPLBLD CKD-EPI 2021: 89 ML/MIN/1.73M2
GLUCOSE SERPL-MCNC: 111 MG/DL (ref 70–99)
NT-PROBNP SERPL-MCNC: 198 PG/ML (ref 0–900)
POTASSIUM SERPL-SCNC: 4.2 MMOL/L (ref 3.4–5.3)
SODIUM SERPL-SCNC: 138 MMOL/L (ref 135–145)

## 2024-04-25 PROCEDURE — 250N000011 HC RX IP 250 OP 636: Performed by: STUDENT IN AN ORGANIZED HEALTH CARE EDUCATION/TRAINING PROGRAM

## 2024-04-25 PROCEDURE — 80048 BASIC METABOLIC PNL TOTAL CA: CPT

## 2024-04-25 PROCEDURE — 93010 ELECTROCARDIOGRAM REPORT: CPT | Mod: GC | Performed by: INTERNAL MEDICINE

## 2024-04-25 PROCEDURE — 74174 CTA ABD&PLVS W/CONTRAST: CPT | Mod: 26 | Performed by: STUDENT IN AN ORGANIZED HEALTH CARE EDUCATION/TRAINING PROGRAM

## 2024-04-25 PROCEDURE — 83880 ASSAY OF NATRIURETIC PEPTIDE: CPT

## 2024-04-25 PROCEDURE — 99213 OFFICE O/P EST LOW 20 MIN: CPT | Mod: 25 | Performed by: INTERNAL MEDICINE

## 2024-04-25 PROCEDURE — 36415 COLL VENOUS BLD VENIPUNCTURE: CPT

## 2024-04-25 PROCEDURE — 93005 ELECTROCARDIOGRAM TRACING: CPT

## 2024-04-25 PROCEDURE — 71275 CT ANGIOGRAPHY CHEST: CPT | Mod: 26 | Performed by: STUDENT IN AN ORGANIZED HEALTH CARE EDUCATION/TRAINING PROGRAM

## 2024-04-25 PROCEDURE — 74174 CTA ABD&PLVS W/CONTRAST: CPT

## 2024-04-25 PROCEDURE — G0463 HOSPITAL OUTPT CLINIC VISIT: HCPCS | Mod: 25 | Performed by: INTERNAL MEDICINE

## 2024-04-25 RX ORDER — IOPAMIDOL 755 MG/ML
110 INJECTION, SOLUTION INTRAVASCULAR ONCE
Status: COMPLETED | OUTPATIENT
Start: 2024-04-25 | End: 2024-04-25

## 2024-04-25 RX ADMIN — IOPAMIDOL 110 ML: 755 INJECTION, SOLUTION INTRAVENOUS at 11:54

## 2024-04-25 ASSESSMENT — PAIN SCALES - GENERAL: PAINLEVEL: MILD PAIN (2)

## 2024-04-25 NOTE — PROGRESS NOTES
Structural Heart Coordinator visit:  Provided additional education regarding TAVR/MitraClip procedure, after being present for discussion with physician. Explained the work-up process and next steps for patient. Patient provided our direct contact number and instructed to call with any questions.     Pre- TAVR    Completed 5 meter Walk and KCCQ.       5 meter walk: 4.70  Trial 1:5  Trail 2: 4.89  Trial 3: 4.23      Albumin: 4.5  Dental Clearance: Passed      KCCQ Results:   1a. 5  1b. 5  1c. 4  2. 5  3. 7  4. 7  5. 5  6. 5  7. 3  8a. 4  8b. 5  8c. 5    Sheryl Borrero CMA  Structural Heart   [facility]

## 2024-04-25 NOTE — NURSING NOTE
Chief Complaint   Patient presents with    New Patient     New TAVR- TAVR Work-up     Vitals were taken, medications reconciled, and EKG was performed.    Ezequiel Muller, EMT  1:58 PM

## 2024-04-25 NOTE — LETTER
4/25/2024      RE: Won Urena  [address]  [address]  [facility] [address]       Dear Colleague,    Thank you for the opportunity to participate in the care of your patient, Won Urena, at the [facility]ART [facility]IN[facility]. Please see a copy of my visit note below.        [facility] HEART CARE  CARDIOVASCULAR DIVISION    VALVE CLINIC CONSULTATION    PRIMARY CARDIOLOGIST: Dr. Olivo      PERTINENT CLINICAL HISTORY & REASON FOR CONSULTATION:     Won Urena is a very pleasant 69 year old female with severe aortic valvular stenosis referred to our clinic for evaluation and consideration for potential transcatheter aortic valve replacement (TAVR). Her severe aortic stenosis is characterized with an HOWARD of 0.98 cm2, mean PG 41 mmHg and peak V of 4.4 m/sec with LVEF >70% and DI 0.27 by echocardiogram on 4/5/24. Her additional past medical history if notable for SVT, known coronary artery disease, Type II DM, hyperlipidemia, and hypertension.     Patient has been known to have aortic stenosis, followed serially by echo. Most recent echo shows progression of her aortic stenosis to the severe range. Over the past several months the patient has been experiencing some increased fatigue especially when trying to get her heart rate above 120 bpm. She also reports some retrosternal chest discomfort which typically occurs with anxiety and is not exertional. She denies any PND, orthopnea, and/or leg swelling. She has no other acute complaints at this time.      PAST MEDICAL HISTORY:     Past Medical History:   Diagnosis Date     Abnormal Pap smear of cervix      Aortic stenosis      Aortic valve stenosis      Benign colon polyp      CAD (coronary artery disease)      Dermatochalasis of both upper eyelids      Diabetes mellitus, type 2 (H)      Gestational diabetes      HTN (hypertension)      Hyperlipidemia LDL goal <100      Nonsenile cataract      Very beginnings     Post-menopausal atrophic vaginitis      Ptosis of both eyelids      Thickened endometrium       PAST SURGICAL HISTORY:     Past Surgical History:   Procedure Laterality Date      SECTION       COMBINED REPAIR PTOSIS WITH BLEPHAROPLASTY Bilateral 2023    Procedure: Both upper eyelid blepharoplasty and ptosis repair;  Surgeon: Shantelle Marti MD;  Location: [facility] OR     COSMETIC BLEPHAROPLASTY LOWER LIDS BILATERAL Bilateral 2023    Procedure: Both lower eyelid blepharoplasty;  Surgeon: Shantelle Marti MD;  Location: [facility] OR     CV CORONARY ANGIOGRAM N/A 2023    Procedure: Coronary Angiogram;  Surgeon: Geovanni Ibarra MD;  Location:  HEART CARDIAC CATH LAB     CV INSTANTANEOUS WAVE-FREE RATIO N/A 2023    Procedure: Instantaneous Wave-Free Ratio;  Surgeon: Geovanni Ibrara MD;  Location:  HEART CARDIAC CATH LAB      CURRENT MEDICATIONS:     Current Outpatient Medications   Medication Sig Dispense Refill     ACCU-CHEK GUIDE test strip CHECK BLOOD SUGAR DAILY OR AS  DIRECTED 100 strip 2     aspirin 81 MG EC tablet Take 81 mg by mouth daily       atorvastatin (LIPITOR) 40 MG tablet Take 1 tablet (40 mg) by mouth daily 90 tablet 3     Blood Glucose Monitoring Suppl (ACCU-CHEK GUIDE) w/Device KIT USE TO TEST BLOOD SUGAR ONCE  DAILY OR AS DIRECTED 1 kit 0     carboxymethylcellulose PF (REFRESH PLUS) 0.5 % ophthalmic solution Place 1 drop into both eyes 4 times daily 70 each 0     cyanocobalamin (VITAMIN B-12) 100 MCG tablet Take 100 mcg by mouth every evening       dapagliflozin (FARXIGA) 10 MG TABS tablet TAKE 1 TABLET BY MOUTH IN THE  MORNING 100 tablet 1     diltiazem (CARDIZEM) 60 MG tablet TAKE 1 TABLET BY MOUTH  TWICE DAILY 200 tablet 2     estradiol (ESTRACE) 0.1 MG/GM vaginal cream INSERT 1/2 APPLICATORFUL  VAGINALLY TWICE WEEKLY 85 g 3     fenofibrate (TRIGLIDE/LOFIBRA) 160 MG tablet Take 1 tablet (160 mg) by mouth every evening 100  tablet 3     gabapentin (NEURONTIN) 100 MG capsule Take 2 capsules (200 mg) by mouth at bedtime 200 capsule 3     ipratropium (ATROVENT) 0.06 % nasal spray Spray 2 sprays into both nostrils 4 times daily (Patient taking differently: Spray 2 sprays into both nostrils 4 times daily as needed) 15 mL 11     levocetirizine (XYZAL) 5 MG tablet Take 5 mg by mouth every evening       losartan (COZAAR) 50 MG tablet Take 1 tablet (50 mg) by mouth every morning 100 tablet 3     metFORMIN (GLUCOPHAGE) 500 MG tablet TAKE 2 TABLETS BY MOUTH  TWICE DAILY WITH MEALS 360 tablet 3     montelukast (SINGULAIR) 10 MG tablet TAKE 1 TABLET BY MOUTH AT  BEDTIME 100 tablet 2     multivitamin w/minerals (MULTI-VITAMIN) tablet Take 1 tablet by mouth every evening       Semaglutide, 1 MG/DOSE, (OZEMPIC, 1 MG/DOSE,) 4 MG/3ML pen INJECT SUBCUTANEOUSLY 1 MG  EVERY WEEK 9 mL 0     Vitamin D, Cholecalciferol, 10 MCG (400 UNIT) TABS Take by mouth every evening       cetirizine (ZYRTEC) 10 MG tablet Take 10 mg by mouth every evening (Patient not taking: Reported on 4/10/2024)       semaglutide (OZEMPIC) 2 MG/3ML pen Inject 0.5 mg Subcutaneous every 7 days 9 mL 3      ALLERGIES:     Allergies   Allergen Reactions     Avapro [Irbesartan] Cough     Fluticasone Other (See Comments)     Loss of sense of taste      FAMILY HISTORY:     Family History   Problem Relation Age of Onset     Lung Cancer Mother         Small cell, did smoked     Diabetes Mother      Hypertension Mother      Goiter Mother      Mental Illness Mother         likely bipolar     Coronary Artery Disease Mother 42        MI, smoked     Hyperlipidemia Mother      Thyroid Disease Mother         Goiter     Obesity Mother         most of family is obese     Glaucoma Father      Atrial fibrillation Father      Hypertension Father      Parkinsonism Father      Hyperlipidemia Father      Diabetes Sister      Breast Cancer Sister 60     Hypertension Sister      Hypertension Brother      Cancer  Maternal Grandmother         gynecologic cancer, unknown type     Uterine Cancer Maternal Grandmother      Chronic Obstructive Pulmonary Disease Paternal Grandmother      Uterine Cancer Paternal Grandmother      Stomach Cancer Paternal Grandfather      Colon Cancer Paternal Grandfather      Bipolar Disorder Son      Mental Illness Son      Depression Son      Macular Degeneration No family hx of      Anesthesia Reaction No family hx of      Venous thrombosis No family hx of       SOCIAL HISTORY:     Social History     Socioeconomic History     Marital status:      Spouse name: None     Number of children: None     Years of education: None     Highest education level: None   Tobacco Use     Smoking status: Never     Passive exposure: Never     Smokeless tobacco: Never   Vaping Use     Vaping status: Never Used   Substance and Sexual Activity     Alcohol use: Yes     Alcohol/week: 1.0 standard drink of alcohol     Types: 1 Standard drinks or equivalent per week     Comment: less than 2 drinks a week     Drug use: Never     Comment: gummy 1x/month     Sexual activity: Not Currently     Partners: Male     Birth control/protection: Post-menopausal   Social History Narrative    Lives with     Two son    One son, daughter-in-law, and grandson live in [place]    One son, daughter-in-law live in the same [facility] in [place], [place]     Social Determinants of Health     Financial Resource Strain: Low Risk  (3/8/2024)    Financial Resource Strain      Within the past 12 months, have you or your family members you live with been unable to get utilities (heat, electricity) when it was really needed?: No   Food Insecurity: Low Risk  (3/8/2024)    Food Insecurity      Within the past 12 months, did you worry that your food would run out before you got money to buy more?: No      Within the past 12 months, did the food you bought just not last and you didn t have money to get more?: No    Transportation Needs: Low Risk  (3/8/2024)    Transportation Needs      Within the past 12 months, has lack of transportation kept you from medical appointments, getting your medicines, non-medical meetings or appointments, work, or from getting things that you need?: No   Physical Activity: Insufficiently Active (3/8/2024)    Exercise Vital Sign      Days of Exercise per Week: 4 days      Minutes of Exercise per Session: 30 min   Stress: Stress Concern Present (3/8/2024)    [employer] - Occupational Stress Questionnaire      Feeling of Stress : To some extent   Social Connections: Unknown (3/8/2024)    Social Connection and Isolation Panel [NHANES]      Frequency of Social Gatherings with Friends and Family: Twice a week   Interpersonal Safety: Low Risk  (3/15/2024)    Interpersonal Safety      Do you feel physically and emotionally safe where you currently live?: Yes      Within the past 12 months, have you been hit, slapped, kicked or otherwise physically hurt by someone?: No      Within the past 12 months, have you been humiliated or emotionally abused in other ways by your partner or ex-partner?: No   Housing Stability: High Risk (3/8/2024)    Housing Stability      Do you have housing? : Yes      Are you worried about losing your housing?: Yes      REVIEW OF SYSTEMS:     Constitutional: No fevers or chills  Skin: No new rash or itching  Eyes: No acute change in vision  Ears/Nose/Throat: No purulent rhinorrhea, new hearing loss, or new vertigo  Respiratory: No cough or hemoptysis  Cardiovascular: See HPI  Gastrointestinal: No change in appetite, vomiting, hematemesis or diarrhea  Genitourinary: No dysuria or hematuria  Musculoskeletal: No new back pain, neck pain or muscle pain  Neurologic: No new headaches, focal weakness or behavior changes  Psychiatric: No hallucinations, excessive alcohol consumption or illegal drug usage  Hematologic/Lymphatic/Immunologic: No bleeding,  chills, fever, night sweats or weight loss  Endocrine: No new cold intolerance, heat intolerance, polyphagia, polydipsia or polyuria      PHYSICAL EXAMINATION:     BP (!) 148/76 (BP Location: Right arm, Patient Position: Chair, Cuff Size: Adult Regular)   Pulse 73   Wt 77.6 kg (171 lb 1.6 oz)   SpO2 98%   BMI 26.02 kg/m      GENERAL: No acute distress.  HEENT: EOMI. Sclerae white, not injected. Nares clear. Pharynx without erythema or exudate.   Neck: No adenopathy. No thyromegaly. No jugular venous distension.   Heart: Regular rate and rhythm. Systolic ejection murmur.  Lungs: Clear to auscultation. No ronchi, wheezes, rales.   Abdomen: Soft, nontender, nondistended. Bowel sounds present.  Extremities: No clubbing, cyanosis, or edema.   Neurologic: Alert and oriented to person/place/time, normal speech and affect. No focal deficits.  Skin: No petechiae, purpura or rash.     LABORATORY DATA:     LIPID RESULTS:  Lab Results   Component Value Date    CHOL 119 01/13/2024    HDL 44 (L) 01/13/2024    LDL 64 01/13/2024    LDL 59 01/13/2024    TRIG 80 01/13/2024    TRIG 165 08/06/2022     LIVER ENZYME RESULTS:  Lab Results   Component Value Date    AST 33 03/23/2024    ALT 18 03/23/2024     CBC RESULTS:  Lab Results   Component Value Date    WBC 5.0 03/23/2024    RBC 4.69 03/23/2024    HGB 14.0 03/23/2024    HCT 41.5 03/23/2024    MCV 89 03/23/2024    MCH 29.9 03/23/2024    MCHC 33.7 03/23/2024    RDW 13.5 03/23/2024     03/23/2024     BMP RESULTS:  Lab Results   Component Value Date     04/25/2024    POTASSIUM 4.2 04/25/2024    CHLORIDE 105 04/25/2024    CO2 22 04/25/2024    ANIONGAP 11 04/25/2024     (H) 04/25/2024     (H) 09/28/2023    BUN 22.4 04/25/2024    CR 0.73 04/25/2024    GFRESTIMATED 89 04/25/2024    JOAQUINA 9.8 04/25/2024      A1C RESULTS:  Lab Results   Component Value Date    A1C 5.8 (H) 03/15/2024     INR RESULTS:  Lab Results   Component Value Date    INR 1.12 08/20/2023    INR  1.14 06/09/2023      PROCEDURES & FURTHER ASSESSMENTS:     ECG dated 4/25/24:  Normal sinus rhythm with right bundle branch block and left anterior fascicular block    Echocardiogram dated 4/5/24:  Global and regional left ventricular function is hyperkinetic with an EF >70%.  Right ventricular function, chamber size, wall motion, and thickness are  normal.  Severe aortic stenosis is present.  The peak aortic velocity is 4.4 m/sec.  The mean gradient across the aortic valve is 41 mmHg.  The aortic valve area is 0.98 cm^2, by the continuity equation.  SVI 54.2ml/m2. DI 0.27. AVAI 0.52cm2/m2.  Pulmonary artery systolic pressure is normal.  The inferior vena cava is normal.  No pericardial effusion is present.  Increase in severity of aortic stenosis since previous study.    STS RISK SCORE 2.6%  DENTAL SCREEN: Pass  NYHA CLASS: II    Mallampati score: 3    ASA physical status classification: I     CLINICAL IMPRESSION:     Won Urena is a very pleasant 69 year old female with severe aortic valvular stenosis referred to our clinic for evaluation and consideration for potential transcatheter aortic valve replacement (TAVR). Her severe aortic stenosis is characterized with an HOWARD of 0.98 cm2, mean PG 41 mmHg and peak V of 4.4 m/sec with LVEF >70% and DI 0.27 by echocardiogram on 4/5/24. Her additional past medical history if notable for SVT, known coronary artery disease, Type II DM, hyperlipidemia, and hypertension.      We discussed the natural history of severe aortic stenosis, associated symptoms and available treatment options including transcatheter versus surgical aortic valve replacement. Patient is a appropriate candidate for transcatheter aortic valve replacement based on age, frailty, co-morbidities and surgical mortality risk estimation of 2.6% based on the STS score.     The pre-procedural TAVR evaluation currently requires additional assessment with coronary angiogram, and right heart catheterization  prior to presentation to the Heart team for discussion during our multi-disciplinary conference for consensus decision and procedural planning.     Plan Summary:  1) Severe Aortic Stenosis:  - Proceed with TAVR evalution   - Coronary angiogram, and right heart catheterization and left heart catheterization  - Following completion of TAVR work-up patient case will be discussed during our multi-disciplinary conference for consensus decision and procedural planning.    Patient was evaluated in clinic with Dr. Bethea .    Cameron Heard MD  [facility] Cardiology Team    Greater than 30 minutes were spent with patient and family providing education regarding progression of aortic stenosis, symptom recognition and management as well as approach to treatment and post-procedural expectations.     CC  Patient Care Team:  Nannette Gallagher MD as PCP - General (Family Medicine)  Andre Rodriguez MD as Resident (OB/Gyn)  Ghanshyam Ramirez MD as MD (Ophthalmology)  Gabriela Bloom MD as MD (Ophthalmology)  Shantelle Marti MD as MD (Ophthalmology)  Paulina Hurst RN as Specialty Care Coordinator (Cardiology)  Macria Zhou APRN CNP as Nurse Practitioner (Cardiology)  Karyn Matthews MD as Resident (Student in organized health care education/training program)  Lita Crespo APRN CNP as Nurse Practitioner (Anesthesiology)  Shantelle Marti MD as Assigned Surgical Provider  Priya Lindsay PA-C as Physician Assistant (Dermatology)  Radha Richards MD as Lu Rodriguez AuD as Audiologist (Audiology)  Nannette Gallagher MD as Assigned PCP  Kiera Alves, RN as Registered Nurse (Cardiology)  Kiera Alves, RN as Registered Nurse (Cardiology)  Marcia Zhou, APRN CNP as Assigned Heart and Vascular Provider  MARCIA ZHOU   I have seen and examined the patient with the Valve team and reviewed the pertinent laboratory results. I agree with the assessment and plan of the  progress note above. I spent 40 minutes face-to-face and/or discussing and coordinating care.      Steve Bethea MD  Interventional Cardiology  Pager: [phone]      Structural Heart Coordinator visit:  Provided additional education regarding TAVR/MitraClip procedure, after being present for discussion with physician. Explained the work-up process and next steps for patient. Patient provided our direct contact number and instructed to call with any questions.     Pre- TAVR    Completed 5 meter Walk and KCCQ.       5 meter walk: 4.70  Trial 1:5  Trail 2: 4.89  Trial 3: 4.23      Albumin: 4.5  Dental Clearance: Passed      KCCQ Results:   1a. 5  1b. 5  1c. 4  2. 5  3. 7  4. 7  5. 5  6. 5  7. 3  8a. 4  8b. 5  8c. 5    Sheryl Borrero CMA  Structural Heart   [facility]     Please do not hesitate to contact me if you have any questions/concerns.     Sincerely,     Steve Bethea MD

## 2024-04-25 NOTE — PATIENT INSTRUCTIONS
You were seen today in the Cardiovascular Clinic at the [facility].      Cardiology Providers you saw during your visit:  Dr. Bethea    Recommendations:    Angiogram and Right heart cath 5/2  Surgeon visit pending      Nursing questions:  Kiera PURCELL;  Nataly messages are great! Otherwise [phone] if you don't hear back within 24 hrs please call back.   For emergencies call 911.      Thank you for your visit today!     Kiera Alves RN  Lead Structural Heart Coordinator  TAVR, MitraClip and Watchman      Pre-procedure instructions - Coronary Angiogram  Patient Education    Your arrival time is 0730.  Location is [address] Waiting Room  Please plan on being at the hospital all day.  At any time, emergencies and/or urgent cases may come up which could delay the start of your procedure.    Pre-procedure instructions - Coronary Angiogram  Shower in the evening before or the morning of the procedure  No solid food for 8 hours prior and nothing to drink 2 hours prior to arrival time  You can take your morning medications (except for diabetic and blood thinners) with sips of water.  Take 325 mg of Asprin 24 hours prior to the procedure and the morning of procedure.   You will need to arrange a ride to drop you off and pick you up, as you will be unable to drive home.  Prior to discharge you may be required to lay flat for approximately 2-4 hours in the recovery unit to ensure proper clotting of the artery. You will need a responsible adult to stay with you for 24 hours post-procedure.              Diabetic Medication Instructions  Hold oral diabetic medication in morning of your procedure and for 48 hours after IV contrast is given  Typical instructions for insulin diabetic medication holding are below. However, please reach out to your Primary Care Provider or Endocrinologist for specific instructions  DO  NOT take any oral diabetic medication, short-acting diabetes medications/insulin, humalog or regular insulin the morning of your test  Take   dose of long-acting insulin (Lantus, Levemir) the day of your test  Remember to bring your glucometer and insulin with you to take after your test if needed  GLP-1 Agonists Instructions  DO NOT take injectable GLP-1 agonists semaglutide (Ozempic, Wegovy), dulaglutide (Trulicity), exenatide ER (Bydureon), tirzepatide (Mounjaro), or oral semaglutide (Rybelsus) for 7 days prior your procedure  Hold once daily injectable GLP-1 agonists exenatide (Byetta), liraglutide (Saxenda, Victoza), lixisenatide (Soligua) the day before and day of your procedure                  Anticoagulation Medication Instructions   NA    You will need to follow up with one of our cardiology APPs 1-2 weeks after your procedure. If you need help scheduling or rescheduling your appointment, please call [phone]

## 2024-04-25 NOTE — PROGRESS NOTES
[facility] HEART CARE  CARDIOVASCULAR DIVISION    VALVE CLINIC CONSULTATION    PRIMARY CARDIOLOGIST: Dr. Olivo      PERTINENT CLINICAL HISTORY & REASON FOR CONSULTATION:     Won Urena is a very pleasant 69 year old female with severe aortic valvular stenosis referred to our clinic for evaluation and consideration for potential transcatheter aortic valve replacement (TAVR). Her severe aortic stenosis is characterized with an HOWARD of 0.98 cm2, mean PG 41 mmHg and peak V of 4.4 m/sec with LVEF >70% and DI 0.27 by echocardiogram on 24. Her additional past medical history if notable for SVT, known coronary artery disease, Type II DM, hyperlipidemia, and hypertension.     Patient has been known to have aortic stenosis, followed serially by echo. Most recent echo shows progression of her aortic stenosis to the severe range. Over the past several months the patient has been experiencing some increased fatigue especially when trying to get her heart rate above 120 bpm. She also reports some retrosternal chest discomfort which typically occurs with anxiety and is not exertional. She denies any PND, orthopnea, and/or leg swelling. She has no other acute complaints at this time.      PAST MEDICAL HISTORY:     Past Medical History:   Diagnosis Date    Abnormal Pap smear of cervix     Aortic stenosis     Aortic valve stenosis     Benign colon polyp     CAD (coronary artery disease)     Dermatochalasis of both upper eyelids     Diabetes mellitus, type 2 (H)     Gestational diabetes     HTN (hypertension)     Hyperlipidemia LDL goal <100     Nonsenile cataract     Very beginnings    Post-menopausal atrophic vaginitis     Ptosis of both eyelids     Thickened endometrium       PAST SURGICAL HISTORY:     Past Surgical History:   Procedure Laterality Date     SECTION      COMBINED REPAIR PTOSIS WITH BLEPHAROPLASTY Bilateral 2023    Procedure: Both upper eyelid blepharoplasty and ptosis repair;   Surgeon: Shantelle Marti MD;  Location: [facility] OR    COSMETIC BLEPHAROPLASTY LOWER LIDS BILATERAL Bilateral 09/28/2023    Procedure: Both lower eyelid blepharoplasty;  Surgeon: Shantelle Marti MD;  Location: [facility] OR    CV CORONARY ANGIOGRAM N/A 06/09/2023    Procedure: Coronary Angiogram;  Surgeon: Geovanni Ibarra MD;  Location:  HEART CARDIAC CATH LAB    CV INSTANTANEOUS WAVE-FREE RATIO N/A 06/09/2023    Procedure: Instantaneous Wave-Free Ratio;  Surgeon: Geovanni Ibarra MD;  Location:  HEART CARDIAC CATH LAB      CURRENT MEDICATIONS:     Current Outpatient Medications   Medication Sig Dispense Refill    ACCU-CHEK GUIDE test strip CHECK BLOOD SUGAR DAILY OR AS  DIRECTED 100 strip 2    aspirin 81 MG EC tablet Take 81 mg by mouth daily      atorvastatin (LIPITOR) 40 MG tablet Take 1 tablet (40 mg) by mouth daily 90 tablet 3    Blood Glucose Monitoring Suppl (ACCU-CHEK GUIDE) w/Device KIT USE TO TEST BLOOD SUGAR ONCE  DAILY OR AS DIRECTED 1 kit 0    carboxymethylcellulose PF (REFRESH PLUS) 0.5 % ophthalmic solution Place 1 drop into both eyes 4 times daily 70 each 0    cyanocobalamin (VITAMIN B-12) 100 MCG tablet Take 100 mcg by mouth every evening      dapagliflozin (FARXIGA) 10 MG TABS tablet TAKE 1 TABLET BY MOUTH IN THE  MORNING 100 tablet 1    diltiazem (CARDIZEM) 60 MG tablet TAKE 1 TABLET BY MOUTH  TWICE DAILY 200 tablet 2    estradiol (ESTRACE) 0.1 MG/GM vaginal cream INSERT 1/2 APPLICATORFUL  VAGINALLY TWICE WEEKLY 85 g 3    fenofibrate (TRIGLIDE/LOFIBRA) 160 MG tablet Take 1 tablet (160 mg) by mouth every evening 100 tablet 3    gabapentin (NEURONTIN) 100 MG capsule Take 2 capsules (200 mg) by mouth at bedtime 200 capsule 3    ipratropium (ATROVENT) 0.06 % nasal spray Spray 2 sprays into both nostrils 4 times daily (Patient taking differently: Spray 2 sprays into both nostrils 4 times daily as needed) 15 mL 11    levocetirizine (XYZAL) 5 MG tablet Take 5 mg by mouth every evening       losartan (COZAAR) 50 MG tablet Take 1 tablet (50 mg) by mouth every morning 100 tablet 3    metFORMIN (GLUCOPHAGE) 500 MG tablet TAKE 2 TABLETS BY MOUTH  TWICE DAILY WITH MEALS 360 tablet 3    montelukast (SINGULAIR) 10 MG tablet TAKE 1 TABLET BY MOUTH AT  BEDTIME 100 tablet 2    multivitamin w/minerals (MULTI-VITAMIN) tablet Take 1 tablet by mouth every evening      Semaglutide, 1 MG/DOSE, (OZEMPIC, 1 MG/DOSE,) 4 MG/3ML pen INJECT SUBCUTANEOUSLY 1 MG  EVERY WEEK 9 mL 0    Vitamin D, Cholecalciferol, 10 MCG (400 UNIT) TABS Take by mouth every evening      cetirizine (ZYRTEC) 10 MG tablet Take 10 mg by mouth every evening (Patient not taking: Reported on 4/10/2024)      semaglutide (OZEMPIC) 2 MG/3ML pen Inject 0.5 mg Subcutaneous every 7 days 9 mL 3      ALLERGIES:     Allergies   Allergen Reactions    Avapro [Irbesartan] Cough    Fluticasone Other (See Comments)     Loss of sense of taste      FAMILY HISTORY:     Family History   Problem Relation Age of Onset    Lung Cancer Mother         Small cell, did smoked    Diabetes Mother     Hypertension Mother     Goiter Mother     Mental Illness Mother         likely bipolar    Coronary Artery Disease Mother 42        MI, smoked    Hyperlipidemia Mother     Thyroid Disease Mother         Goiter    Obesity Mother         most of family is obese    Glaucoma Father     Atrial fibrillation Father     Hypertension Father     Parkinsonism Father     Hyperlipidemia Father     Diabetes Sister     Breast Cancer Sister 60    Hypertension Sister     Hypertension Brother     Cancer Maternal Grandmother         gynecologic cancer, unknown type    Uterine Cancer Maternal Grandmother     Chronic Obstructive Pulmonary Disease Paternal Grandmother     Uterine Cancer Paternal Grandmother     Stomach Cancer Paternal Grandfather     Colon Cancer Paternal Grandfather     Bipolar Disorder Son     Mental Illness Son     Depression Son     Macular Degeneration No family hx of     Anesthesia  Reaction No family hx of     Venous thrombosis No family hx of       SOCIAL HISTORY:     Social History     Socioeconomic History    Marital status:      Spouse name: None    Number of children: None    Years of education: None    Highest education level: None   Tobacco Use    Smoking status: Never     Passive exposure: Never    Smokeless tobacco: Never   Vaping Use    Vaping status: Never Used   Substance and Sexual Activity    Alcohol use: Yes     Alcohol/week: 1.0 standard drink of alcohol     Types: 1 Standard drinks or equivalent per week     Comment: less than 2 drinks a week    Drug use: Never     Comment: gummy 1x/month    Sexual activity: Not Currently     Partners: Male     Birth control/protection: Post-menopausal   Social History Narrative    Lives with     Two son    One son, daughter-in-law, and grandson live in [place]    One son, daughter-in-law live in the same [facility] in [place], [place]     Social Determinants of Health     Financial Resource Strain: Low Risk  (3/8/2024)    Financial Resource Strain     Within the past 12 months, have you or your family members you live with been unable to get utilities (heat, electricity) when it was really needed?: No   Food Insecurity: Low Risk  (3/8/2024)    Food Insecurity     Within the past 12 months, did you worry that your food would run out before you got money to buy more?: No     Within the past 12 months, did the food you bought just not last and you didn t have money to get more?: No   Transportation Needs: Low Risk  (3/8/2024)    Transportation Needs     Within the past 12 months, has lack of transportation kept you from medical appointments, getting your medicines, non-medical meetings or appointments, work, or from getting things that you need?: No   Physical Activity: Insufficiently Active (3/8/2024)    Exercise Vital Sign     Days of Exercise per Week: 4 days     Minutes of Exercise per Session: 30 min    Stress: Stress Concern Present (3/8/2024)    [employer] [place] - Occupational Stress Questionnaire     Feeling of Stress : To some extent   Social Connections: Unknown (3/8/2024)    Social Connection and Isolation Panel [NHANES]     Frequency of Social Gatherings with Friends and Family: Twice a week   Interpersonal Safety: Low Risk  (3/15/2024)    Interpersonal Safety     Do you feel physically and emotionally safe where you currently live?: Yes     Within the past 12 months, have you been hit, slapped, kicked or otherwise physically hurt by someone?: No     Within the past 12 months, have you been humiliated or emotionally abused in other ways by your partner or ex-partner?: No   Housing Stability: High Risk (3/8/2024)    Housing Stability     Do you have housing? : Yes     Are you worried about losing your housing?: Yes      REVIEW OF SYSTEMS:     Constitutional: No fevers or chills  Skin: No new rash or itching  Eyes: No acute change in vision  Ears/Nose/Throat: No purulent rhinorrhea, new hearing loss, or new vertigo  Respiratory: No cough or hemoptysis  Cardiovascular: See HPI  Gastrointestinal: No change in appetite, vomiting, hematemesis or diarrhea  Genitourinary: No dysuria or hematuria  Musculoskeletal: No new back pain, neck pain or muscle pain  Neurologic: No new headaches, focal weakness or behavior changes  Psychiatric: No hallucinations, excessive alcohol consumption or illegal drug usage  Hematologic/Lymphatic/Immunologic: No bleeding, chills, fever, night sweats or weight loss  Endocrine: No new cold intolerance, heat intolerance, polyphagia, polydipsia or polyuria      PHYSICAL EXAMINATION:     BP (!) 148/76 (BP Location: Right arm, Patient Position: Chair, Cuff Size: Adult Regular)   Pulse 73   Wt 77.6 kg (171 lb 1.6 oz)   SpO2 98%   BMI 26.02 kg/m      GENERAL: No acute distress.  HEENT: EOMI. Sclerae white, not injected. Nares clear. Pharynx without erythema or  exudate.   Neck: No adenopathy. No thyromegaly. No jugular venous distension.   Heart: Regular rate and rhythm. Systolic ejection murmur.  Lungs: Clear to auscultation. No ronchi, wheezes, rales.   Abdomen: Soft, nontender, nondistended. Bowel sounds present.  Extremities: No clubbing, cyanosis, or edema.   Neurologic: Alert and oriented to person/place/time, normal speech and affect. No focal deficits.  Skin: No petechiae, purpura or rash.     LABORATORY DATA:     LIPID RESULTS:  Lab Results   Component Value Date    CHOL 119 01/13/2024    HDL 44 (L) 01/13/2024    LDL 64 01/13/2024    LDL 59 01/13/2024    TRIG 80 01/13/2024    TRIG 165 08/06/2022     LIVER ENZYME RESULTS:  Lab Results   Component Value Date    AST 33 03/23/2024    ALT 18 03/23/2024     CBC RESULTS:  Lab Results   Component Value Date    WBC 5.0 03/23/2024    RBC 4.69 03/23/2024    HGB 14.0 03/23/2024    HCT 41.5 03/23/2024    MCV 89 03/23/2024    MCH 29.9 03/23/2024    MCHC 33.7 03/23/2024    RDW 13.5 03/23/2024     03/23/2024     BMP RESULTS:  Lab Results   Component Value Date     04/25/2024    POTASSIUM 4.2 04/25/2024    CHLORIDE 105 04/25/2024    CO2 22 04/25/2024    ANIONGAP 11 04/25/2024     (H) 04/25/2024     (H) 09/28/2023    BUN 22.4 04/25/2024    CR 0.73 04/25/2024    GFRESTIMATED 89 04/25/2024    JOAQUINA 9.8 04/25/2024      A1C RESULTS:  Lab Results   Component Value Date    A1C 5.8 (H) 03/15/2024     INR RESULTS:  Lab Results   Component Value Date    INR 1.12 08/20/2023    INR 1.14 06/09/2023      PROCEDURES & FURTHER ASSESSMENTS:     ECG dated 4/25/24:  Normal sinus rhythm with right bundle branch block and left anterior fascicular block    Echocardiogram dated 4/5/24:  Global and regional left ventricular function is hyperkinetic with an EF >70%.  Right ventricular function, chamber size, wall motion, and thickness are  normal.  Severe aortic stenosis is present.  The peak aortic velocity is 4.4 m/sec.  The  mean gradient across the aortic valve is 41 mmHg.  The aortic valve area is 0.98 cm^2, by the continuity equation.  SVI 54.2ml/m2. DI 0.27. AVAI 0.52cm2/m2.  Pulmonary artery systolic pressure is normal.  The inferior vena cava is normal.  No pericardial effusion is present.  Increase in severity of aortic stenosis since previous study.    STS RISK SCORE 2.6%  DENTAL SCREEN: Pass  NYHA CLASS: II    Mallampati score: 3    ASA physical status classification: I     CLINICAL IMPRESSION:     Won Urena is a very pleasant 69 year old female with severe aortic valvular stenosis referred to our clinic for evaluation and consideration for potential transcatheter aortic valve replacement (TAVR). Her severe aortic stenosis is characterized with an HOWARD of 0.98 cm2, mean PG 41 mmHg and peak V of 4.4 m/sec with LVEF >70% and DI 0.27 by echocardiogram on 4/5/24. Her additional past medical history if notable for SVT, known coronary artery disease, Type II DM, hyperlipidemia, and hypertension.      We discussed the natural history of severe aortic stenosis, associated symptoms and available treatment options including transcatheter versus surgical aortic valve replacement. Patient is a appropriate candidate for transcatheter aortic valve replacement based on age, frailty, co-morbidities and surgical mortality risk estimation of 2.6% based on the STS score.     The pre-procedural TAVR evaluation currently requires additional assessment with coronary angiogram, and right heart catheterization prior to presentation to the Heart team for discussion during our multi-disciplinary conference for consensus decision and procedural planning.     Plan Summary:  1) Severe Aortic Stenosis:  - Proceed with TAVR evalution   - Coronary angiogram, and right heart catheterization and left heart catheterization  - Following completion of TAVR work-up patient case will be discussed during our multi-disciplinary conference for consensus decision  and procedural planning.    Patient was evaluated in clinic with Dr. Bethea .    Cameron Heard MD  [facility] Cardiology Team    Greater than 30 minutes were spent with patient and family providing education regarding progression of aortic stenosis, symptom recognition and management as well as approach to treatment and post-procedural expectations.     CC  Patient Care Team:  Nannette Gallagher MD as PCP - General (Family Medicine)  Andre Rodriguez MD as Resident (OB/Gyn)  Ghanshyam Ramirez MD as MD (Ophthalmology)  Gabriela Bloom MD as MD (Ophthalmology)  Shantelle Marti MD as MD (Ophthalmology)  Paulina Hurst RN as Specialty Care Coordinator (Cardiology)  Marcia Zhou APRN CNP as Nurse Practitioner (Cardiology)  Karyn Matthews MD as Resident (Student in organized health care education/training program)  Lita Crespo APRN CNP as Nurse Practitioner (Anesthesiology)  Shantelle Marti MD as Assigned Surgical Provider  Priya Lindsay PA-C as Physician Assistant (Dermatology)  Radha Richards MD as Lu Rodriguez AuD as Audiologist (Audiology)  Nannette Gallagher MD as Assigned PCP  Kiera Alves, RN as Registered Nurse (Cardiology)  Kiera Alves, RN as Registered Nurse (Cardiology)  Marcia Zhou APRN CNP as Assigned Heart and Vascular Provider  MARCIA ZHOU   I have seen and examined the patient with the Valve team and reviewed the pertinent laboratory results. I agree with the assessment and plan of the progress note above. I spent 40 minutes face-to-face and/or discussing and coordinating care.      Steve Bethea MD  Interventional Cardiology  Pager: [phone]

## 2024-04-26 RX ORDER — ASPIRIN 81 MG/1
243 TABLET, CHEWABLE ORAL ONCE
Status: CANCELLED | OUTPATIENT
Start: 2024-04-26

## 2024-04-26 RX ORDER — POTASSIUM CHLORIDE 1500 MG/1
20 TABLET, EXTENDED RELEASE ORAL
Status: CANCELLED | OUTPATIENT
Start: 2024-04-26

## 2024-04-26 RX ORDER — LIDOCAINE 40 MG/G
CREAM TOPICAL
Status: CANCELLED | OUTPATIENT
Start: 2024-04-26

## 2024-04-26 RX ORDER — SODIUM CHLORIDE 9 MG/ML
INJECTION, SOLUTION INTRAVENOUS CONTINUOUS
Status: CANCELLED | OUTPATIENT
Start: 2024-04-26

## 2024-04-26 RX ORDER — ASPIRIN 325 MG
325 TABLET ORAL ONCE
Status: CANCELLED | OUTPATIENT
Start: 2024-04-26 | End: 2024-04-26

## 2024-04-26 RX ORDER — POTASSIUM CHLORIDE 1500 MG/1
40 TABLET, EXTENDED RELEASE ORAL
Status: CANCELLED | OUTPATIENT
Start: 2024-04-26

## 2024-04-29 ENCOUNTER — VIRTUAL VISIT (OUTPATIENT)
Dept: ONCOLOGY | Facility: CLINIC | Age: 69
End: 2024-04-29
Payer: COMMERCIAL

## 2024-04-29 ENCOUNTER — TELEPHONE (OUTPATIENT)
Dept: CARDIOLOGY | Facility: CLINIC | Age: 69
End: 2024-04-29
Payer: COMMERCIAL

## 2024-04-29 DIAGNOSIS — Z80.3 FAMILY HISTORY OF MALIGNANT NEOPLASM OF BREAST: ICD-10-CM

## 2024-04-29 DIAGNOSIS — Z80.0 FAMILY HISTORY OF MALIGNANT NEOPLASM OF COLON: Primary | ICD-10-CM

## 2024-04-29 DIAGNOSIS — Z80.49 FAMILY HISTORY OF MALIGNANT NEOPLASM OF GENITAL ORGAN: ICD-10-CM

## 2024-04-29 PROCEDURE — 96040 HC GENETIC COUNSELING, EACH 30 MINUTES: CPT | Mod: GT,95 | Performed by: GENETIC COUNSELOR, MS

## 2024-04-29 NOTE — TELEPHONE ENCOUNTER
Attempted telephone call to review angiogram instructions, left voicemail.      Kiera Alves RN  Lead Structural Heart Coordinator  TAVR, [facility] and Watchman

## 2024-04-29 NOTE — LETTER
2024    Won Urena  [address]     [facility] [address]      Dear Won,    It was a pleasure speaking with you over video on 2024. Here is a copy of the progress note from our discussion. If you have any additional questions, please feel free to call.    Referring Provider: Self-Referred    Presenting Information:   I met with Won for her video genetic counseling visit, through the Cancer Risk Management Program, to discuss her family history of breast, colon, and gynecological cancer. Today we reviewed this history, cancer screening recommendations, and available genetic testing options.    Personal History:  Won is a 69 year old year old female. She does not have any personal history of cancer. She had her first menstrual period at age 12 or 13, her first child at age 27, and completed menopause at age 53. Won has her ovaries, fallopian tubes and uterus in place, and she has had no ovarian cancer screening to date. She reports that she has not used hormone replacement therapy.      She has annual clinical breast exams and mammograms; her most recent mammogram in 2023 was normal. Won reported beginning her colonoscopies at the age of 51. Her most recent colonoscopy at age 61 was reported as normal and follow-up was recommended in 10 years. She does not regularly do any other cancer screening at this time.     Family History: (Please see scanned pedigree for detailed family history information)  Won's sister was diagnosed with breast cancer at age 62. She had negative genetic testing for 35 genes: APC, DENNISE, AXIN2, BARD1, BMPR1A, BRCA1, BRCA2, BRIP1, CDH1, CDK4, CDKN2A, CHEK2, EPCAM, HOXB13, GALNT12, GREM1, MLH1, MSH2, MSH3, MSH6, MUTYH, NBN, NTHL1, PALB2, PMS2, POLE, POLD1, PTEN, RAD51C, RAD51D, RNF43, RPS20, SMAD4, STK11, and TP53.  Won's mother was diagnosed with lung cancer at age 70 and  at age 74. She had a history of smoking and asbestos  exposure.  Won's maternal grandmother was diagnosed with a gynecological cancer in her mid-50's and  in her late 50's.    Won's paternal grandmother was diagnosed with a gynecological cancer at an older age and  in her 80's.  Won's paternal grandfather was diagnosed with colon or stomach cancer at age 40 and  in his early 50's.  Her maternal ethnicity is [place]. Her paternal ethnicity is [place]. There is no known Ashkenazi Orthodoxy ancestry on either side of her family. There is no reported consanguinity.    Discussion:  Won's family history of colon, breast, and gynecological cancer is suggestive of a hereditary cancer syndrome.  We reviewed the features of sporadic, familial, and hereditary cancers. We discussed that Snowden syndrome could be possible hereditary explanations for her family history of cancer. Snowden syndrome can be caused by a mutation in one of five genes:  MLH1, MSH2, MSH6, PMS2, and EPCAM.  The highest cancer risks associated with Snowden syndrome include colon cancer, endometrial/uterine cancer, gastric cancer, bladder cancer, prostate cancer, and ovarian cancer.  Other cancers have also been reported with Snowden syndrome, such as urinary tract, pancreas, bile duct, and other cancers.  We discussed the natural history and genetics of hereditary cancer. A detailed handout regarding hereditary cancer, along with the other information we discussed, will be mailed to Won at the end of our appointment today and can be found in the after visit summary. Topics included: inheritance pattern, cancer risks, cancer screening recommendations, and also risks, benefits and limitations of testing.  Based on her personal and family history, Won meets current National Comprehensive Cancer Network (NCCN) criteria for genetic testing of Snowden syndrome genes (i.e. EPCAM, MLH1, MSH2, MSH6, and PMS2) along with other high risk colorectal cancer genes.  We discussed that there are  additional genes that could cause increased risk for breast, colon, and/or gynecological cancer. As many of these genes present with overlapping features in a family and accurate cancer risk cannot always be established based upon the pedigree analysis alone, it would be reasonable for Won to consider panel genetic testing to analyze multiple genes at once.  Genetic testing is available for Snowden syndrome as part of the patient's core panel. This will then be automatically reflexed to [facility]'s Common Hereditary Cancers panel.  Genetic testing is available for 48 genes associated with hereditary cancer: Common Hereditary Cancers panel (APC, DENNISE, AXIN2, BAP1, BARD1, BMPR1A, BRCA1, BRCA2, BRIP1, CDH1, CDK4, CDKN2A, CHEK2, CTNNA1, DICER1, EPCAM, FH, GREM1, HOXB13, KIT, MBD4, MEN1, MLH1, MSH2, MSH3, MSH6, MUTYH, NF1, NTHL1, PALB2, PDGFRA, PMS2, POLD1, POLE, PTEN, RAD51C, RAD51D, SDHA, SDHB, SDHC, SDHD, SMAD4, SMARCA4, STK11, TP53, TSC1, TSC2, and VHL).  We discussed that many of the genes in the Common Hereditary Cancers panel are associated with specific hereditary cancer syndromes and published management guidelines: Hereditary Breast and Ovarian Cancer syndrome (BRCA1, BRCA2), Snowden syndrome (MLH1, MSH2, MSH6, PMS2, EPCAM), Familial Adenomatous Polyposis (APC), Hereditary Diffuse Gastric Cancer (CDH1), Familial Atypical Multiple Mole Melanoma syndrome (CDK4, CDKN2A), Juvenile Polyposis syndrome (BMPR1A, SMAD4), Cowden syndrome (PTEN), Li Fraumeni syndrome (TP53), Peutz-Jeghers syndrome (STK11), MUTYH Associated Polyposis (MUTYH), Tuberous Sclerosis complex (TSC1, TSC2), Neurofibromatosis type 1 (NF1), Hereditary Leiomyomatosis and Renal Cell Carcinoma (FH), Multiple Endocrine Neoplasia type 1 (MEN1), Hereditary Paraganglioma and Pheochromocytoma (SDHA, SDHB, SDHC, SDHD), and von Hippel-Lindau (VHL).   The DENNISE, AXIN2, BRIP1, CHEK2, GREM1, MSH3, NTHL1, PALB2, POLD1, POLE, RAD51C, and RAD51D genes are associated  with increased cancer risk and have published management guidelines for certain cancers.    The remaining genes (BAP1, BARD1, CTNNA1, DICER1, HOXB13, KIT, MBD4, PDGFRA, and SMARCA4) are associated with increased cancer risk and may allow us to make medical recommendations when mutations are identified.      Won would like to submit a blood sample for her genetic testing. She will go to her [facility] at her earliest convenience to get her blood drawn for her genetic testing.   Verbal consent was given over video and written on the consent form. Turnaround time is approximately 4 weeks once the lab receives the sample.  Should Won have any questions regarding the billing for her genetic testing, she should visit [facility]'s billing website at [email or web address] or call them at [phone].  Medical Management: For Won, we reviewed that the information from genetic testing may determine:  additional cancer screening for which Won may qualify (i.e. mammogram and breast MRI, more frequent colonoscopies, more frequent dermatologic exams, etc.),  options for risk reducing surgeries Won could consider (i.e. bilateral mastectomy, surgery to remove her ovaries and/or uterus, etc.),    and targeted chemotherapies if she were to develop certain cancers in the future (i.e. immunotherapy for individuals with Snowden syndrome, PARP inhibitors, etc.).   These recommendations and possible targeted chemotherapies will be discussed in detail once genetic testing is completed.     Plan:  1) Today Won elected to proceed with Snowden syndrome testing with automatic reflex to [facility]'s Common Hereditary Cancers panel.  2) A copy of the consent form and the after visit summary will be sent to Won.  3) This information should be available in approximately 4 weeks, once the lab receives the sample.  4) I will call Won with the results once they  become available.    Time spent on video: 28 minutes    John Arce MS, [facility]  Licensed, Certified Genetic Counselor

## 2024-04-29 NOTE — PATIENT INSTRUCTIONS
Pre-procedure instructions - Coronary Angiogram  Patient Education     Your arrival time is 0630.  Location is [address] Waiting Room  Please plan on being at the hospital all day.  At any time, emergencies and/or urgent cases may come up which could delay the start of your procedure.     Pre-procedure instructions - Coronary Angiogram  Shower in the evening before or the morning of the procedure  No solid food for 8 hours prior and nothing to drink 2 hours prior to arrival time  You can take your morning medications (except for diabetic and blood thinners) with sips of water.  Take 325 mg of Asprin 24 hours prior to the procedure and the morning of procedure.   You will need to arrange a ride to drop you off and pick you up, as you will be unable to drive home.  Prior to discharge you may be required to lay flat for approximately 2-4 hours in the recovery unit to ensure proper clotting of the artery. You will need a responsible adult to stay with you for 24 hours post-procedure.               Diabetic Medication Instructions  Hold oral diabetic medication in morning of your procedure and for 48 hours after IV contrast is given  Typical instructions for insulin diabetic medication holding are below. However, please reach out to your Primary Care Provider or Endocrinologist for specific instructions  DO NOT take any oral diabetic medication, short-acting diabetes medications/insulin, humalog or regular insulin the morning of your test  Take   dose of long-acting insulin (Lantus, Levemir) the day of your test  Remember to bring your glucometer and insulin with you to take after your test if needed  GLP-1 Agonists Instructions  DO NOT take injectable GLP-1 agonists semaglutide (Ozempic, Wegovy), dulaglutide (Trulicity), exenatide ER (Bydureon), tirzepatide (Mounjaro), or oral semaglutide (Rybelsus) for 7 days prior your  procedure  Hold once daily injectable GLP-1 agonists exenatide (Byetta), liraglutide (Saxenda, Victoza), lixisenatide (Soligua) the day before and day of your procedure                    Anticoagulation Medication Instructions   NA     You will need to follow up with one of our cardiology APPs 1-2 weeks after your procedure. If you need help scheduling or rescheduling your appointment, please call [phone]

## 2024-04-29 NOTE — TELEPHONE ENCOUNTER
Telephone call to review angiogram instructions. Also discussed [place] message that was sent today in regards to exercise. Attempted telephone call to her , number provided by pt, left voicemail.    Kiera Alves RN, BSN  Lead Structural Heart Coordinator  [facility] [employer] [facility] [employer]

## 2024-04-29 NOTE — PROGRESS NOTES
2024    Referring Provider: Self-Referred    Presenting Information:   I met with Won for her video genetic counseling visit, through the [facility] Program, to discuss her family history of breast, colon, and gynecological cancer. Today we reviewed this history, cancer screening recommendations, and available genetic testing options.    Personal History:  Won is a 69 year old year old female. She does not have any personal history of cancer. She had her first menstrual period at age 12 or 13, her first child at age 27, and completed menopause at age 53. Won has her ovaries, fallopian tubes and uterus in place, and she has had no ovarian cancer screening to date. She reports that she has not used hormone replacement therapy.      She has annual clinical breast exams and mammograms; her most recent mammogram in 2023 was normal. Won reported beginning her colonoscopies at the age of 51. Her most recent colonoscopy at age 61 was reported as normal and follow-up was recommended in 10 years. She does not regularly do any other cancer screening at this time.     Family History: (Please see scanned pedigree for detailed family history information)  Won's sister was diagnosed with breast cancer at age 62. She had negative genetic testing for 35 genes: APC, DENNISE, AXIN2, BARD1, BMPR1A, BRCA1, BRCA2, BRIP1, CDH1, CDK4, CDKN2A, CHEK2, EPCAM, HOXB13, GALNT12, GREM1, MLH1, MSH2, MSH3, MSH6, MUTYH, NBN, NTHL1, PALB2, PMS2, POLE, POLD1, PTEN, RAD51C, RAD51D, RNF43, RPS20, SMAD4, STK11, and TP53.  Won's mother was diagnosed with lung cancer at age 70 and  at age 74. She had a history of smoking and asbestos exposure.  Won's maternal grandmother was diagnosed with a gynecological cancer in her mid-50's and  in her late 50's.  Won's paternal grandmother was diagnosed with a gynecological cancer at an older age and  in her 80's.  Won's paternal grandfather was  diagnosed with colon or stomach cancer at age 40 and  in his early 50's.  Her maternal ethnicity is [place]oese. Her paternal ethnicity is [place]. There is no known Ashkenazi Yarsanism ancestry on either side of her family. There is no reported consanguinity.    Discussion:  Won's family history of colon, breast, and gynecological cancer is suggestive of a hereditary cancer syndrome.  We reviewed the features of sporadic, familial, and hereditary cancers. We discussed that Snowden syndrome could be possible hereditary explanations for her family history of cancer. Snowden syndrome can be caused by a mutation in one of five genes:  MLH1, MSH2, MSH6, PMS2, and EPCAM.  The highest cancer risks associated with Snowden syndrome include colon cancer, endometrial/uterine cancer, gastric cancer, bladder cancer, prostate cancer, and ovarian cancer.  Other cancers have also been reported with Snowden syndrome, such as urinary tract, pancreas, bile duct, and other cancers.  We discussed the natural history and genetics of hereditary cancer. A detailed handout regarding hereditary cancer, along with the other information we discussed, will be mailed to Won at the end of our appointment today and can be found in the after visit summary. Topics included: inheritance pattern, cancer risks, cancer screening recommendations, and also risks, benefits and limitations of testing.  Based on her personal and family history, Won meets current National Comprehensive Cancer Network (NCCN) criteria for genetic testing of Snowden syndrome genes (i.e. EPCAM, MLH1, MSH2, MSH6, and PMS2) along with other high risk colorectal cancer genes.  We discussed that there are additional genes that could cause increased risk for breast, colon, and/or gynecological cancer. As many of these genes present with overlapping features in a family and accurate cancer risk cannot always be established based upon the pedigree analysis alone, it would be reasonable  for Won to consider panel genetic testing to analyze multiple genes at once.  Genetic testing is available for Snowden syndrome as part of the patient's core panel. This will then be automatically reflexed to [facility]'s Common Hereditary Cancers panel.  Genetic testing is available for 48 genes associated with hereditary cancer: Common Hereditary Cancers panel (APC, DENNISE, AXIN2, BAP1, BARD1, BMPR1A, BRCA1, BRCA2, BRIP1, CDH1, CDK4, CDKN2A, CHEK2, CTNNA1, DICER1, EPCAM, FH, GREM1, HOXB13, KIT, MBD4, MEN1, MLH1, MSH2, MSH3, MSH6, MUTYH, NF1, NTHL1, PALB2, PDGFRA, PMS2, POLD1, POLE, PTEN, RAD51C, RAD51D, SDHA, SDHB, SDHC, SDHD, SMAD4, SMARCA4, STK11, TP53, TSC1, TSC2, and VHL).  We discussed that many of the genes in the Common Hereditary Cancers panel are associated with specific hereditary cancer syndromes and published management guidelines: Hereditary Breast and Ovarian Cancer syndrome (BRCA1, BRCA2), Snowden syndrome (MLH1, MSH2, MSH6, PMS2, EPCAM), Familial Adenomatous Polyposis (APC), Hereditary Diffuse Gastric Cancer (CDH1), Familial Atypical Multiple Mole Melanoma syndrome (CDK4, CDKN2A), Juvenile Polyposis syndrome (BMPR1A, SMAD4), Cowden syndrome (PTEN), Li Fraumeni syndrome (TP53), Peutz-Jeghers syndrome (STK11), MUTYH Associated Polyposis (MUTYH), Tuberous Sclerosis complex (TSC1, TSC2), Neurofibromatosis type 1 (NF1), Hereditary Leiomyomatosis and Renal Cell Carcinoma (FH), Multiple Endocrine Neoplasia type 1 (MEN1), Hereditary Paraganglioma and Pheochromocytoma (SDHA, SDHB, SDHC, SDHD), and von Hippel-Lindau (VHL).   The DENNISE, AXIN2, BRIP1, CHEK2, GREM1, MSH3, NTHL1, PALB2, POLD1, POLE, RAD51C, and RAD51D genes are associated with increased cancer risk and have published management guidelines for certain cancers.    The remaining genes (BAP1, BARD1, CTNNA1, DICER1, HOXB13, KIT, MBD4, PDGFRA, and SMARCA4) are associated with increased cancer risk and may allow us to make medical recommendations when  mutations are identified.    Won would like to submit a blood sample for her genetic testing. She will go to her nearest [facility] at her earliest convenience to get her blood drawn for her genetic testing.   Verbal consent was given over video and written on the consent form. Turnaround time is approximately 4 weeks once the lab receives the sample.  Should Won have any questions regarding the billing for her genetic testing, she should visit [facility]'s billing website at [email or web address] or call them at [phone].  Medical Management: For Won, we reviewed that the information from genetic testing may determine:  additional cancer screening for which Won may qualify (i.e. mammogram and breast MRI, more frequent colonoscopies, more frequent dermatologic exams, etc.),  options for risk reducing surgeries Won could consider (i.e. bilateral mastectomy, surgery to remove her ovaries and/or uterus, etc.),    and targeted chemotherapies if she were to develop certain cancers in the future (i.e. immunotherapy for individuals with Snowden syndrome, PARP inhibitors, etc.).   These recommendations and possible targeted chemotherapies will be discussed in detail once genetic testing is completed.     Plan:  1) Today Won elected to proceed with Snowden syndrome testing with automatic reflex to [facility]'s Common Hereditary Cancers panel.  2) A copy of the consent form and the after visit summary will be sent to Won.  3) This information should be available in approximately 4 weeks, once the lab receives the sample.  4) I will call Won with the results once they become available.    Time spent on video: 28 minutes    John Arce MS, [facility] [place]  Licensed, Certified Genetic Counselor      Virtual Visit Details    Type of service:  Video Visit     Originating Location (pt. Location): Home  Distant Location (provider location):  Off-site  Platform  used for Video Visit: [employer]

## 2024-04-29 NOTE — NURSING NOTE
Is the patient currently in the state of [place]? YES    Visit mode:VIDEO    If the visit is dropped, the patient can be reconnected by: VIDEO VISIT: Text to cell phone:   Telephone Information:   Mobile [phone]       Will anyone else be joining the visit? NO  (If patient encounters technical issues they should call [phone]    How would you like to obtain your AVS? MyChart    Are changes needed to the allergy or medication list? N/A    Are refills needed on medications prescribed by this physician? NO    Reason for visit: Consult    Loraine CHRIS

## 2024-04-29 NOTE — PATIENT INSTRUCTIONS
Assessing Cancer Risk  Only about 5-10% of cancers are thought to be due to an inherited cancer susceptibility gene.    These families often have:  Several people with the same or related types of cancer  Cancers diagnosed at a young age (before age 50)  Individuals with more than one primary cancer  Multiple generations of the family affected with cancer    Some people may be candidates for genetic testing of more than one gene.  For these families, genetic testing using a cancer panel may be offered.  These panels will test different genes known to increase the risk for breast, ovarian, uterine, and/or other cancers. All of the genes discussed below have published clinical management guidelines for individuals who are found to carry a mutation. The purpose of this handout is to serve as a brief summary of the genes analyzed by the panels used to inquire about hereditary breast and gynecologic cancer:  APC, DENNISE, BMPR1A, BRCA1, BRCA2, BRIP1, CDH1, CHEK2, EPCAM, GREM1, MLH1, MSH2, MSH6, MUTYH, PMS2, POLD1, POLE, PTEN, PALB2, RAD51C, RAD51D, SMAD4, STK11, and TP53.   ______________________________________________________________________________  Hereditary Breast and Ovarian Cancer Syndrome   (BRCA1 and BRCA2)  A single mutation in one of the copies of BRCA1 or BRCA2 increases the risk for breast and ovarian cancer, among others.  The risk for pancreatic cancer and melanoma may also be slightly increased in some families.  The chart below shows the chance that someone with a BRCA mutation would develop cancer in his or her lifetime1,2,3,4.        A person s ethnic background is also important to consider, as individuals of Ashkenazi Episcopal ancestry have a higher chance of having a BRCA gene mutation.  There are three BRCA mutations that occur more frequently in this population.    Snowden Syndrome   (MLH1, MSH2, MSH6, PMS2, and EPCAM)  Currently five genes are known to cause Snowden Syndrome: MLH1, MSH2, MSH6, PMS2,  and EPCAM.  A single mutation in one of the Snowden Syndrome genes increases the risk for colon, endometrial, ovarian, and stomach cancers.  Other cancers that occur less commonly in Snowden Syndrome include urinary tract, skin, and brain cancers.  The chart below shows the chance that a person with Snowden syndrome would develop cancer in his or her lifetime5.      *Cancer risk varies depending on Snowden syndrome gene found    Cowden Syndrome   (PTEN)  Cowden syndrome is a hereditary condition that increases the risk for breast, thyroid, endometrial, colon, and kidney cancer.  Cowden syndrome is caused by a mutation in the PTEN gene.  A single mutation in one of the copies of PTEN causes Cowden syndrome and increases cancer risk.  The chart below shows the chance that someone with a PTEN mutation would develop cancer in their lifetime6,7.  Other benign features seen in some individuals with Cowden syndrome include benign skin lesions (facial papules, keratoses, lipomas), learning disability, autism, thyroid nodules, colon polyps, and larger head size.      *One recent study found breast cancer risk to be increased to 85%    Li-Fraumeni Syndrome   (TP53)  Li-Fraumeni Syndrome (LFS) is a cancer predisposition syndrome caused by a mutation in the TP53 gene. A single mutation in one of the copies of TP53 increases the risk for multiple cancers. Individuals with LFS are at an increased risk for developing cancer at a young age. The lifetime risk for development of a LFS-associated cancer is 50% by age 30 and 90% by age 60.   Core Cancers: Sarcomas, Breast, Brain, Lung, Leukemias/Lymphomas, Adrenocortical carcinomas  Other Cancers: Gastrointestinal, Thyroid, Skin, Genitourinary    Hereditary Diffuse Gastric Cancer   (CDH1)  Currently, one gene is known to cause hereditary diffuse gastric cancer (HDGC): CDH1.  Individuals with HDGC are at increased risk for diffuse gastric cancer and lobular breast cancer. Of people diagnosed  with HDGC, 30-50% have a mutation in the CDH1 gene.  This suggests there are likely other genes that may cause HDGC that have not been identified yet.      Lifetime Cancer Risks    General Population HDGC    Diffuse Gastric  <1% ~80%   Breast 12% 39-52%       Familial Adenomatous Polyposis (FAP)  FAP is a hereditary cancer syndrome caused by mutations in the APC gene. The condition is known to cause hundreds to thousands of adenomatous polyps in the colon, creating a  carpet  of polyps. Some individuals have what is called attenuated FAP (AFAP), a milder form of FAP with fewer polyps and typically later onset. Individuals with an APC gene mutation are at an increased risk for colon, thyroid, and duodenal cancers, as well as several other types of cancer1.  Other features of this condition may include: osteomas, dental anomalies, benign skin lesions, CHRPE ( freckle  on the inside of the eye), and desmoid tumors.      Lifetime Cancer Risks     Cancer Type General Population FAP   Colon  5% near 100%   Thyroid (papillary) 1% 1-12%   Duodenal <1% 5%   Liver  <1% 1-2% before age 5   Pancreas <1% 1%   Stomach <1% 1%       Juvenile Polyposis Syndrome (JPS)  JPS is characterized by hamartomatous polyps, called juvenile polyps, in the gastrointestinal tract.  Juvenile  refers to the type of polyps seen in this hereditary cancer condition, not the age of onset. Currently, mutations in two genes are known to cause JPS: BMPR1A and SMAD4. Of individuals clinically diagnosed with JPS, only 40% have an identifiable mutation in one of these genes, suggesting there are other genes that cause JPS that have not been discovered yet. Individuals with JPS are at an increased risk for colon cancer and stomach cancer 2,3,4. Pancreatic and small bowel cancers have also been reported in JPS, but the actual risks are unknown.         Lifetime Cancer Risks    Cancer Type General Population JPS   Colon 5% 40-50%   Gastric/Duodenal <1% 10-21%      Some individuals with SMAD4 mutations have a condition called JPS/HHT (Juvenile Polyposis/Hereditary Hemorrhagic Telangiectasia) where in addition to JPS, individuals may have nose bleeds and clotting issues.       MUTYH-Associated Polyposis (MAP)  MAP is a hereditary cancer syndrome caused by mutations in the MUTYH gene. Unlike the other hereditary cancer genes discussed in this handout, two mutations in the MUTYH gene cause MAP and increase cancer risk. Those affected with MAP typically have between  adenomatous polyps. This syndrome also increases the risk for colon and duodenal cancer. Current research suggests that other cancers may be associated with MUTYH mutations, as well. The table below includes the risk that someone with two MUTYH gene mutations would develop cancer in their lifetime; of note, there is also an increased colon cancer risk for individuals who carry only one MUTYH gene mutation5,6,7.       Lifetime Cancer Risks    Cancer Type General Population MAP   Colon 5% %   Duodenal  <1% 5%       Peutz-Jeghers syndrome (PJS)  PJS is a hereditary cancer syndrome caused by mutations in the STK11 gene. This condition can be distinguished from other hereditary syndromes by the presence of hamartomatous polyps in the gastrointestinal tract and freckles present in unusual places such as the hands, feet, neck, and lips. Individuals with Peutz-Jeghers syndrome have an increased risk for colon, breast, pancreatic, and other cancers3.  Men are at risk for testicular tumors which can affect hormones in the body. Women are at risk for sex cord tumors of the ovaries and a rare aggressive type of cervical cancer.     Lifetime Cancer Risks    Cancer Type General Population PJS   Breast 12% 45-50%   Colon 5% 39%   Stomach <1% 29%   Pancreas 1.5% 11-36%   Small Intestine <1% 13%     Ovarian  2%  18%   Lung 6-7% 15-17%     Additional Genes Associated with Increased Colon Cancer Risk  CHEK2  CHEK2 is a  moderate-risk breast cancer gene.  Women who have a mutation in CHEK2 have around a 2-fold increased risk for breast cancer compared to the general population, and this risk may be higher depending upon family history.12,13,14.  Mutations in CHEK2 have also been shown to increase the risk of a number of other cancers, including colon and prostate, however these cancer risks are currently not well understood.     DENNISE  DENNISE is a moderate-risk breast cancer gene. Women who have a mutation in DENNISE can have between a 2-4 fold increased risk for breast cancer compared to the general population8. DENNISE mutations have also been associated with increased risk for pancreatic cancer, however an estimate of this cancer risk is not well understood9. Individuals who inherit two DENNISE mutations have a condition called ataxia-telangiectasia (AT).  This rare autosomal recessive condition affects the nervous system and immune system, and is associated with progressive cerebellar ataxia beginning in childhood.  Individuals with ataxia-telangiectasia often have a weakened immune system and have an increased risk for childhood cancers.    PALB2  Mutations in PALB2 have been shown to increase the risk of breast cancer up to 33-58% in some families; where individuals fall within this risk range is dependent upon family [id number]. PALB2 mutations have also been associated with increased risk for pancreatic cancer, although this risk has not been quantified yet.  Individuals who inherit two PALB2 mutations--one from their mother and one from their father--have a condition called Fanconi Anemia.  This rare autosomal recessive condition is associated with short stature, developmental delay, bone marrow failure, and increased risk for childhood cancers.    GREM1  GREM1 is a moderate-risk colon polyposis gene. Duplications of this gene are more commonly found in individuals with Ashkenazi Adventism [id number]. Mutations in GREM1 are associated with  colon polyps and therefore an increased risk of colon cancer; however the estimated cancer risk is not well [id number].     POLD1 and POLE  POLD1 and POLE are moderate-risk colon cancer genes. Carriers of a mutation in one of these genes increases the lifetime risk of colorectal gmizog66,18,19,20. Mutations in these genes may also be associated with increased risk for other cancers including: endometrial cancer, duodenal adenomas and carcinomas, and brain tumors.    BRIP1, RAD51C and RAD51D  Mutations in BRIP1, RAD51C, and RAD51D have been shown to increase the risk of ovarian cancer and possibly female breast cancer as well14,15 .       Lifetime Cancer Risk    General Population BRIP1 RAD51C RAD51D   Ovarian 1-2% ~5-8% ~5-9% ~7-15%       Inheritance  All of the cancer syndromes reviewed above are inherited in an autosomal dominant pattern.  This means that if a parent has a mutation, each of his or her children will have a 50% chance of inheriting that same mutation.  Therefore, each child--male or female--would have a 50% chance of being at increased risk for developing cancer.      Image obtained from Genetics Home Reference, 2013     Mutations in some genes can occur de robin, which means that a person s mutation occurred for the first time in them and was not inherited from a parent.  Now that they have the mutation, however, it can be passed on to future generations.    Genetic Testing  Genetic testing involves a blood test and will look at the genetic information in the APC, DENNISE, BMPR1A, BRCA1, BRCA2, BRIP1, CDH1, CHEK2, EPCAM, GREM1, MLH1, MSH2, MSH6, MUTYH, PMS2, POLD1, POLE, PTEN, PALB2, RAD51C, RAD51D, SMAD4, STK11, and TP53.genes for any harmful mutations that are associated with increased cancer risk.  If possible, it is recommended that the person(s) who has had cancer be tested before other family members.  That person will give us the most useful information about whether or not a specific gene is  associated with the cancer in the family.    Results  There are three possible results of genetic testing:  Positive--a harmful mutation was identified in one or more of the genes  Negative--no mutation was identified in any of the genes on this panel  Variant of unknown significance--a variation in one of the genes was identified, but it is unclear how this impacts cancer risk in the family    Advantages and Disadvantages   There are advantages and disadvantages to genetic testing.    Advantages  May clarify your cancer risk  Can help you make medical decisions  May explain the cancers in your family  May give useful information to your family members (if you share your results)    Disadvantages  Possible negative emotional impact of learning about inherited cancer risk  Uncertainty in interpreting a negative test result in some situations  Possible genetic discrimination concerns (see below)    Genetic Information Nondiscrimination Act (LUKE)  LUKE is a federal law that protects individuals from health insurance or employment discrimination based on a genetic test result alone.  Although rare, there are currently no legal discrimination protections in terms of life insurance, long term care, or disability insurances.  Visit the National Human Genome Research [place] website to learn more.    Reducing Cancer Risk  All of the genes described above have nationally recognized cancer screening guidelines that would be recommended for individuals who test positive.  In addition to increased cancer screening, surgeries may be offered or recommended to reduce cancer risk.  Recommendations are based upon an individual s genetic test result as well as their personal and family history of cancer.    Questions to Think About Regarding Genetic Testing:  What effect will the test result have on me and my relationship with my family members if I have an inherited gene mutation?  If I don t have a gene mutation?  Should I share  my test results, and how will my family react to this news, which may also affect them?  Are my children ready to learn new information that may one day affect their own health?    Hereditary Cancer Resources    FORCE: Facing Our Risk of Cancer Empowered [email or web address]   Bright Pink bebrightpink.org   Li-Fraumeni Syndrome Association lfsassociation.org   PTEN World PTENworld.com   No stomach for cancer, Inc. nostomachforcancer.org   Stomach cancer relief network Scrnet.org   Collaborative Group of the Americas on Inherited Colorectal Cancer (CGA) cgaicc.com    Cancer Care cancercare.org   American Cancer Society (ACS) cancer.org   National Cancer [place] (NCI) cancer.gov     Please call us if you have any questions or concerns.   Cancer Risk Management Program [phone] ([phone])  John Arce, MS, [facility]  [phone]  Bianca Dinero, MS [facility] [phone]  Nery Lockhart, MS, [facility]  [phone]  Lou Guerrier, MS, [facility]  [phone]  Yael Thurston, MS, [facility]  [phone]  Melody Aviles, MS, [facility]  [phone]  Lilly Abebe, MS, [facility]  [phone]    References  Kenisha WORTHY, Gt PDP, Kavitha S, Zoila ROBLEDO, Yonas JE, Joelle JL, Eldon N, Alex H, Francisco O, Wilian A, Isidra B, Chelsea P, Manadriana S, Aria DM, Krishna N, Raul E, Reta H, Vlad E, Luz J, Osmel J, Heather B, Tulcarolus H, Thorlacius S, Eerola H, Raquelna H, Kianna K, Damaris OP. Average risks of breast and ovarian cancer associated with BRCA1 or BRCA2 mutations detected in case series unselected for family history: a combined analysis of 222 studies. Am J Hum Vero. 2003;72:1117-30.  Shelby N, Vinita M, Morgan G.  BRCA1 and BRCA2 Hereditary Breast and Ovarian Cancer. Gene Reviews online. 2013.  Chilango YC, Harsha S, Yolande G, Mer S. Breast cancer risk among male BRCA1 and BRCA2 mutation carriers. J Natl Cancer Inst. 2007;99:1811-4.  Bradley TOBAR, Silva I, Shay J, Miguel A E, Yuko CORRAL, Nora F. Risk of breast cancer in  male BRCA2 carriers. J Med Vero. 2010;47:710-1.  National Comprehensive Cancer Network. Clinical practice guidelines in oncology, colorectal cancer screening. Available online (registration required). 2015.  Francisco MH, Candi J, Jessica J, Elmer LA, Rayray MS, Michelle C. Lifetime cancer risks in individuals with germline PTEN mutations. Clin Cancer Res. 2012;18:400-7.  [place] BEGUM. Cowden Syndrome: A Critical Review of the Clinical Literature. J Vero . 2009:18:13-27.  Suzie A, Dallas D, Hua S, Nury P, Funmilayo T, Asia M, Juventino B, Omayra H, Aj R, Shelly K, Amarjit L, Bradley DG, Aria D, Kasi DF, Mary Grace MR, The Breast Cancer Susceptibility Collaboration ([place]) & Triston TAVERA. DENNISE mutations that cause ataxia-telangiectasia are breast cancer susceptibility alleles. Nature Genetics. 2006;38:873-875  Ye N , Jacky Y, Soniya J, Julio Cesar L, Briana GM , Spring ML, Gallinger S, Giron AG, Syngal S, Johana ML, Chelsea J , Evonne R, Shantelle SZ, Eskeren JR, Александр VE, Merrill M, Vogelstein B, Louise N, Mimi RH, Long KW, and Marcelle AP. DENNISE mutations in patients with hereditary pancreatic cancer. Cancer Discover. 2012;2:41-46  Kenisha CERVANTES., et al. Breast-Cancer Risk in Families with Mutations in PALB2. NEJM. 2014; 371(6):497-506.  CHEK2 Breast Cancer Case-Control Consortium. CHEK2*1100delC and susceptibility to breast cancer: A collaborative analysis involving 10,860 breast cancer cases and 9,065 controls from 10 studies. Am J Hum Vero, 74 (2004), pp. 6798-9938  Jocelyn T, Lee S, Orlando K, et al. Spectrum of Mutations in BRCA1, BRCA2, CHEK2, and TP53 in Families at High Risk of Breast Cancer. GERARDO. 2006;295(12):2038-9364.   Rita COREA, Anthony MCCORMICK, Pilo WORTHY, et al. Risk of breast cancer in women with a CHEK2 mutation with and without a family history of breast cancer. J Clin Oncol. 2011;29:9859-5302.  Alex H, Soraya E, Julio BARKSDALE, et al. Contribution of germline mutations in the RAD51B,  RAD51C, and RAD51D genes to ovarian cancer in the population. J Clin Oncol. 2015;33(26):7839-2904. Doi:10.1200/JCO.2015.61.2408.  Lola MA, Sandra NAVARRO, Josefina P, et al. Mutations in BRIP1 confer high risk of ovarian cancer. Kavita Vero. 2011;43(11):9433-4667. doi:10.1038/ng.955.  Cait MOORE, Shannon HILLS, Carlos G, Kevin E, Rika J, et al. The Prevalence of thyroid cancer and benign thyroid disease in patients with familial adenomatous polyposis may be higher than previously recognized. Clin Colorectal Cancer. 2012;11:304-308.  Yumiko L, Van Sagrario A, Diamond L, To COREA, Petar K, et al. Risk of colorectal cancer in juvenile polyposis. Gut. 2007;56:965-967.  Jason FG, Earle MN, Mango CA. Colorectal cancer risk in hamartomatous polyposis syndromes. World Journal of Gastrointestinal Surgery. 2015;27:25-32  Sarahi MG. Guidance on gastrointestinal surveillance for hereditary non-polyposis colorectal cancer, familial adenomatous polypolis, juvenile polyposis, and Peutz-Jeghers syndrome. Gut. 2002;51:21-27.  Miguelito AK, Joel LATHA, Omari HILLSG et al. Risk of extracolonic cancers for people with biallelic and monoallelic mutations in MUTYH. Int J of Cancer. 2016;139:4339-6692.  Ainsley S, Demetrilafwn S, Gonikki H, Markel K, Chapin M, et al. MUTYH-associated polyposis: 70 of 71 patients with biallelic mutations present with an attenuated or atypical phenotype. Int J of Cancer. 2006;119:807-814.  Alec GIRON, Poornima F, Angel I, Jayce M, Alec H, et al. MUTYH mutation carriers have increased breast cancer risk. Cancer. 2012;2290-8743.  Francisco MH, Candi J, Jessica J, Elmer LA, Orjenelle MS, Eng C. Lifetime cancer risks in individuals with germline PTEN mutations. Clin Cancer Res. 2012;18:400-7.  [place] BEGUM. Cowden Syndrome: A Critical Review of the Clinical Literature. J Vero . 2009:18:13-27.  National [facility] Cancer Network. Clinical practice guidelines in oncology, colorectal cancer screening.  Available online (registration required). 2013.  National Cancer [place]. SEER Cancer Stat Fact Sheets.  December 2013.  CHEK2 Breast Cancer Case-Control Consortium. CHEK2*1100delC and susceptibility to breast cancer: A collaborative analysis involving 10,860 breast cancer cases and 9,065 controls from 10 studies. Am J Hum Vero, 74 (2004), pp. 2409-2533  Jocelyn T, Lee S, Orlando K, et al. Spectrum of Mutations in BRCA1, BRCA2, CHEK2, and TP53 in Families at High Risk of Breast Cancer. GERARDO. 2006;295(12):1325-3044.  Rita C, Anthony D, Pilo A, et al. Risk of breast cancer in women with a CHEK2 mutation with and without a family history of breast cancer. J Clin Oncol. 2011;29:0212-7375.  Erick HILLS, Philip E, Andrea J, Annie N, Elijah M et al. Defining the polyposis/colorectal cancer phenotype associated with the GREM1 duplication: counseling and management guidelines. Vero .Res. 2016;98:1-5.  Ashok E, Joao S, Troy A, Lashawn Mcneal, et al. Hereditary mixed polyposis syndrome is caused by a 40kb upstream duplication that leads to increased and ectopic expression of the BMP antagonist GREM1. Kavita Vero. 2015;44:699-703.  ANMOL Cullen. et al. Germline mutations affecting the proofreading domains of POLE and POLD1 predispose to colorectal adenomas and carcinomas. Kavita. Vero. 45, 136-44 (2013).  ALFREDO Ozuna. et al. POLE and POLD1 mutations in 529 roslyn with familial colorectal cancer and/or polyposis: review of reported cases and recommendations for genetic testing and surveillance. Vero. Med. (2015). doi:10.1038/gim.2015.75  OSCAR Dumont. et al. New insights into POLE and POLD1 germline mutations in familial colorectal cancer and polyposis. Hum. Mol. Vero. 23, 0259-12 (2014).  KENDALL Dupree et al. Frequency and phenotypic spectrum of germline mutations in POLE and seven other polymerase genes in 266 patients with colorectal adenomas and carcinomas. Int. J. Cancer 137, 320-31 (2015).

## 2024-04-30 LAB
ATRIAL RATE - MUSE: 69 BPM
DIASTOLIC BLOOD PRESSURE - MUSE: NORMAL MMHG
INTERPRETATION ECG - MUSE: NORMAL
P AXIS - MUSE: 57 DEGREES
PR INTERVAL - MUSE: 178 MS
QRS DURATION - MUSE: 146 MS
QT - MUSE: 434 MS
QTC - MUSE: 465 MS
R AXIS - MUSE: -88 DEGREES
SYSTOLIC BLOOD PRESSURE - MUSE: NORMAL MMHG
T AXIS - MUSE: 37 DEGREES
VENTRICULAR RATE- MUSE: 69 BPM

## 2024-05-02 ENCOUNTER — HOSPITAL ENCOUNTER (OUTPATIENT)
Facility: CLINIC | Age: 69
Discharge: HOME | End: 2024-05-02
Attending: INTERNAL MEDICINE | Admitting: INTERNAL MEDICINE
Payer: COMMERCIAL

## 2024-05-02 ENCOUNTER — APPOINTMENT (OUTPATIENT)
Dept: MEDSURG UNIT | Facility: CLINIC | Age: 69
End: 2024-05-02
Attending: INTERNAL MEDICINE
Payer: COMMERCIAL

## 2024-05-02 ENCOUNTER — APPOINTMENT (OUTPATIENT)
Dept: LAB | Facility: CLINIC | Age: 69
End: 2024-05-02
Attending: INTERNAL MEDICINE
Payer: COMMERCIAL

## 2024-05-02 VITALS
HEIGHT: 68 IN | HEART RATE: 57 BPM | BODY MASS INDEX: 25.13 KG/M2 | RESPIRATION RATE: 21 BRPM | DIASTOLIC BLOOD PRESSURE: 69 MMHG | TEMPERATURE: 97.8 F | WEIGHT: 165.8 LBS | OXYGEN SATURATION: 99 % | SYSTOLIC BLOOD PRESSURE: 119 MMHG

## 2024-05-02 DIAGNOSIS — I25.10 CAD (CORONARY ARTERY DISEASE): ICD-10-CM

## 2024-05-02 DIAGNOSIS — I25.10 CORONARY ARTERY DISEASE INVOLVING NATIVE CORONARY ARTERY OF NATIVE HEART WITHOUT ANGINA PECTORIS: ICD-10-CM

## 2024-05-02 DIAGNOSIS — I35.0 SEVERE AORTIC STENOSIS: ICD-10-CM

## 2024-05-02 DIAGNOSIS — E11.9 TYPE 2 DIABETES MELLITUS WITHOUT COMPLICATION, WITHOUT LONG-TERM CURRENT USE OF INSULIN (H): Chronic | ICD-10-CM

## 2024-05-02 DIAGNOSIS — I25.10 CORONARY ARTERY DISEASE INVOLVING NATIVE CORONARY ARTERY WITHOUT ANGINA PECTORIS: ICD-10-CM

## 2024-05-02 PROBLEM — Z98.890 STATUS POST CORONARY ANGIOGRAM: Status: ACTIVE | Noted: 2023-06-09

## 2024-05-02 LAB
ANION GAP SERPL CALCULATED.3IONS-SCNC: 10 MMOL/L (ref 7–15)
BUN SERPL-MCNC: 21.9 MG/DL (ref 8–23)
CALCIUM SERPL-MCNC: 9.8 MG/DL (ref 8.8–10.2)
CHLORIDE SERPL-SCNC: 107 MMOL/L (ref 98–107)
CREAT SERPL-MCNC: 0.73 MG/DL (ref 0.51–0.95)
DEPRECATED HCO3 PLAS-SCNC: 24 MMOL/L (ref 22–29)
EGFRCR SERPLBLD CKD-EPI 2021: 89 ML/MIN/1.73M2
ERYTHROCYTE [DISTWIDTH] IN BLOOD BY AUTOMATED COUNT: 13.8 % (ref 10–15)
GLUCOSE BLDC GLUCOMTR-MCNC: 85 MG/DL (ref 70–99)
GLUCOSE SERPL-MCNC: 117 MG/DL (ref 70–99)
HCT VFR BLD AUTO: 40.2 % (ref 35–47)
HGB BLD-MCNC: 12.8 G/DL (ref 11.7–15.7)
HGB BLD-MCNC: 13.2 G/DL (ref 11.7–15.7)
INR PPP: 1.2 (ref 0.85–1.15)
MCH RBC QN AUTO: 29.7 PG (ref 26.5–33)
MCHC RBC AUTO-ENTMCNC: 32.8 G/DL (ref 31.5–36.5)
MCV RBC AUTO: 90 FL (ref 78–100)
PLATELET # BLD AUTO: 141 10E3/UL (ref 150–450)
POTASSIUM SERPL-SCNC: 4.2 MMOL/L (ref 3.4–5.3)
RBC # BLD AUTO: 4.45 10E6/UL (ref 3.8–5.2)
SODIUM SERPL-SCNC: 141 MMOL/L (ref 135–145)
WBC # BLD AUTO: 4.3 10E3/UL (ref 4–11)

## 2024-05-02 PROCEDURE — 250N000009 HC RX 250: Performed by: INTERNAL MEDICINE

## 2024-05-02 PROCEDURE — 85027 COMPLETE CBC AUTOMATED: CPT | Performed by: INTERNAL MEDICINE

## 2024-05-02 PROCEDURE — 85610 PROTHROMBIN TIME: CPT | Performed by: INTERNAL MEDICINE

## 2024-05-02 PROCEDURE — 93005 ELECTROCARDIOGRAM TRACING: CPT

## 2024-05-02 PROCEDURE — C1751 CATH, INF, PER/CENT/MIDLINE: HCPCS | Performed by: INTERNAL MEDICINE

## 2024-05-02 PROCEDURE — 93456 R HRT CORONARY ARTERY ANGIO: CPT | Performed by: INTERNAL MEDICINE

## 2024-05-02 PROCEDURE — 250N000011 HC RX IP 250 OP 636: Performed by: INTERNAL MEDICINE

## 2024-05-02 PROCEDURE — 36415 COLL VENOUS BLD VENIPUNCTURE: CPT | Performed by: INTERNAL MEDICINE

## 2024-05-02 PROCEDURE — C1894 INTRO/SHEATH, NON-LASER: HCPCS | Performed by: INTERNAL MEDICINE

## 2024-05-02 PROCEDURE — C1726 CATH, BAL DIL, NON-VASCULAR: HCPCS | Performed by: INTERNAL MEDICINE

## 2024-05-02 PROCEDURE — 99152 MOD SED SAME PHYS/QHP 5/>YRS: CPT | Performed by: INTERNAL MEDICINE

## 2024-05-02 PROCEDURE — 999N000134 HC STATISTIC PP CARE STAGE 3

## 2024-05-02 PROCEDURE — 85018 HEMOGLOBIN: CPT

## 2024-05-02 PROCEDURE — 82962 GLUCOSE BLOOD TEST: CPT

## 2024-05-02 PROCEDURE — 93456 R HRT CORONARY ARTERY ANGIO: CPT | Mod: 26 | Performed by: INTERNAL MEDICINE

## 2024-05-02 PROCEDURE — 99153 MOD SED SAME PHYS/QHP EA: CPT | Performed by: INTERNAL MEDICINE

## 2024-05-02 PROCEDURE — 80048 BASIC METABOLIC PNL TOTAL CA: CPT | Performed by: INTERNAL MEDICINE

## 2024-05-02 PROCEDURE — 99152 MOD SED SAME PHYS/QHP 5/>YRS: CPT | Mod: GC | Performed by: INTERNAL MEDICINE

## 2024-05-02 PROCEDURE — C1887 CATHETER, GUIDING: HCPCS | Performed by: INTERNAL MEDICINE

## 2024-05-02 PROCEDURE — 272N000001 HC OR GENERAL SUPPLY STERILE: Performed by: INTERNAL MEDICINE

## 2024-05-02 PROCEDURE — 999N000142 HC STATISTIC PROCEDURE PREP ONLY

## 2024-05-02 PROCEDURE — 999N000054 HC STATISTIC EKG NON-CHARGEABLE

## 2024-05-02 RX ORDER — HEPARIN SODIUM 1000 [USP'U]/ML
INJECTION, SOLUTION INTRAVENOUS; SUBCUTANEOUS
Status: DISCONTINUED | OUTPATIENT
Start: 2024-05-02 | End: 2024-05-02 | Stop reason: HOSPADM

## 2024-05-02 RX ORDER — FENTANYL CITRATE 50 UG/ML
25 INJECTION, SOLUTION INTRAMUSCULAR; INTRAVENOUS
Status: DISCONTINUED | OUTPATIENT
Start: 2024-05-02 | End: 2024-05-02 | Stop reason: HOSPADM

## 2024-05-02 RX ORDER — FENTANYL CITRATE 50 UG/ML
INJECTION, SOLUTION INTRAMUSCULAR; INTRAVENOUS
Status: DISCONTINUED | OUTPATIENT
Start: 2024-05-02 | End: 2024-05-02 | Stop reason: HOSPADM

## 2024-05-02 RX ORDER — ATROPINE SULFATE 0.1 MG/ML
0.5 INJECTION INTRAVENOUS
Status: DISCONTINUED | OUTPATIENT
Start: 2024-05-02 | End: 2024-05-02 | Stop reason: HOSPADM

## 2024-05-02 RX ORDER — SODIUM CHLORIDE 9 MG/ML
INJECTION, SOLUTION INTRAVENOUS CONTINUOUS
Status: DISCONTINUED | OUTPATIENT
Start: 2024-05-02 | End: 2024-05-02 | Stop reason: HOSPADM

## 2024-05-02 RX ORDER — NALOXONE HYDROCHLORIDE 0.4 MG/ML
0.2 INJECTION, SOLUTION INTRAMUSCULAR; INTRAVENOUS; SUBCUTANEOUS
Status: DISCONTINUED | OUTPATIENT
Start: 2024-05-02 | End: 2024-05-02 | Stop reason: HOSPADM

## 2024-05-02 RX ORDER — LIDOCAINE 40 MG/G
CREAM TOPICAL
Status: DISCONTINUED | OUTPATIENT
Start: 2024-05-02 | End: 2024-05-02 | Stop reason: HOSPADM

## 2024-05-02 RX ORDER — OXYCODONE HYDROCHLORIDE 10 MG/1
10 TABLET ORAL EVERY 4 HOURS PRN
Status: DISCONTINUED | OUTPATIENT
Start: 2024-05-02 | End: 2024-05-02 | Stop reason: HOSPADM

## 2024-05-02 RX ORDER — FLUMAZENIL 0.1 MG/ML
0.2 INJECTION, SOLUTION INTRAVENOUS
Status: DISCONTINUED | OUTPATIENT
Start: 2024-05-02 | End: 2024-05-02 | Stop reason: HOSPADM

## 2024-05-02 RX ORDER — ASPIRIN 325 MG
325 TABLET ORAL ONCE
Status: COMPLETED | OUTPATIENT
Start: 2024-05-02 | End: 2024-05-02

## 2024-05-02 RX ORDER — ASPIRIN 81 MG/1
243 TABLET, CHEWABLE ORAL ONCE
Status: COMPLETED | OUTPATIENT
Start: 2024-05-02 | End: 2024-05-02

## 2024-05-02 RX ORDER — NALOXONE HYDROCHLORIDE 0.4 MG/ML
0.4 INJECTION, SOLUTION INTRAMUSCULAR; INTRAVENOUS; SUBCUTANEOUS
Status: DISCONTINUED | OUTPATIENT
Start: 2024-05-02 | End: 2024-05-02 | Stop reason: HOSPADM

## 2024-05-02 RX ORDER — POTASSIUM CHLORIDE 750 MG/1
40 TABLET, EXTENDED RELEASE ORAL
Status: DISCONTINUED | OUTPATIENT
Start: 2024-05-02 | End: 2024-05-02 | Stop reason: HOSPADM

## 2024-05-02 RX ORDER — OXYCODONE HYDROCHLORIDE 5 MG/1
5 TABLET ORAL EVERY 4 HOURS PRN
Status: DISCONTINUED | OUTPATIENT
Start: 2024-05-02 | End: 2024-05-02 | Stop reason: HOSPADM

## 2024-05-02 RX ORDER — POTASSIUM CHLORIDE 750 MG/1
20 TABLET, EXTENDED RELEASE ORAL
Status: DISCONTINUED | OUTPATIENT
Start: 2024-05-02 | End: 2024-05-02 | Stop reason: HOSPADM

## 2024-05-02 RX ORDER — IOPAMIDOL 755 MG/ML
INJECTION, SOLUTION INTRAVASCULAR
Status: DISCONTINUED | OUTPATIENT
Start: 2024-05-02 | End: 2024-05-02 | Stop reason: HOSPADM

## 2024-05-02 RX ORDER — NICARDIPINE HYDROCHLORIDE 2.5 MG/ML
INJECTION INTRAVENOUS
Status: DISCONTINUED | OUTPATIENT
Start: 2024-05-02 | End: 2024-05-02 | Stop reason: HOSPADM

## 2024-05-02 RX ADMIN — LIDOCAINE: 40 CREAM TOPICAL at 08:21

## 2024-05-02 ASSESSMENT — ACTIVITIES OF DAILY LIVING (ADL)
ADLS_ACUITY_SCORE: 35

## 2024-05-02 NOTE — DISCHARGE INSTRUCTIONS
Going Home after a Coronary Angiogram and Right Heart Catheterization  ______________________________________________  After you go home:  Have an adult stay with you for 24 hours.  Drink plenty of fluids.  You may eat your normal diet, unless your doctor tells you otherwise.  For 24 hours:  Relax and take it easy.  Do NOT smoke.  Do NOT make any important or legal decisions.  Do NOT drive or operate machines at home or at work.  Do NOT drink alcohol.  Remove the Band-Aid after 24 hours. If there is minor oozing, apply another Band-aid and remove it after 12 hours.  For 2 days, do NOT have sex or do any heavy exercise.  Do NOT take a bath, or use a hot tub or pool for at least 3 days. You may shower.    Care of neck site: Monitor neck site for bleeding, swelling, or voice changes. If you notice bleeding or swelling immediately apply pressure to the site and call number below to speak with Cardiology Fellow.  If you experience any changes in your breathing you should call your doctor immediately or come to the closest Emergency Department.  Do not drive yourself.    Care of wrist or arm site  It is normal to have soreness at the puncture site and mild tingling in your hand for up to 3 days.  For 2 days, do not use your hand or arm to support your weight (such as rising from a chair) or bend your wrist (such as lifting a garage door).  For 2 days, do not lift more than 5 pounds or exercise your arm (tennis, golf or bowling).    If you start bleeding from the site in your arm:  Sit down and press firmly on the site with your fingers for 10 minutes. Call your doctor as soon as you can.  If the bleeding stops, sit still and keep your wrist straight for 2 hours.    Medicines  If you have started taking Plavix or Effient, do not stop taking it until you talk to your heart doctor (cardiologist).  If you are on metformin (Glucophage), do not restart it until you have blood tests (within 2 to 3 days after discharge). When  your doctor tells you it is safe, you may restart the metformin.  If you have stopped any other medicines, check with your nurse or provider about when to restart them.    Call 911 right away if you have bleeding that is heavy or does not stop.    Call your doctor if:  You have a large or growing hard lump around the site.  The site is red, swollen, hot or tender.  Blood or fluid is draining from the site.  You have chills or a fever greater than 101 F (38 C).  Your leg or arm feels numb or cool.  You have hives, a rash or unusual itching.      [facility] [place]:  [phone] (7 days a week)

## 2024-05-02 NOTE — PROGRESS NOTES
D/I/A: Pt roomed on 3C in bay 32.  Arrived via litter and accompanied by CCL RN. On/Off: On monitor.  VSSA.  Rhythm upon arrival SB on monitor.  Denies pain or sob.  Reviewed activity restrictions and when to notify RN, ie-changes to breathing or increased chest pressure or chest pain.  CCL access:  TR band to right distal site, C/D/I. Can start to deflate at 1130. RIJ site C/D/I.   P: Continue to monitor status.  Discharge to home once meeting criteria.

## 2024-05-02 NOTE — Clinical Note
dry, intact, no bleeding and no hematoma. 7fr sheath pulled from RIJ. Manual pressure applied. Hemostasis achieved. Occlusive dressing in place.   6fr sheath pulled from right distal radial artery. TR band applied (see flowsheets for volume in band). CMS intact.

## 2024-05-02 NOTE — PROGRESS NOTES
Pt prepped for right heart cath and CORS.PIV placed and NS flush started.Jose Eduardo groin shaved and prepped and pulses marked and charted.IV contrast discharge instructions reviewed and sheet given to pt.Consent signed and questions answered.

## 2024-05-02 NOTE — DISCHARGE SUMMARY
Pt discharged to home. VSS on RA. Right radial access site C/D/I, negative for a hematoma. Up ambulating and voiding w/o difficulty. PIV access removed. Tolerating PO intake. Discharge instructions reviewed and all questions answered. Pt ambulated downstairs with RN to the front entrance where they were picked up by family.

## 2024-05-03 LAB
ATRIAL RATE - MUSE: 49 BPM
DIASTOLIC BLOOD PRESSURE - MUSE: NORMAL MMHG
INTERPRETATION ECG - MUSE: NORMAL
P AXIS - MUSE: 29 DEGREES
PR INTERVAL - MUSE: 172 MS
QRS DURATION - MUSE: 150 MS
QT - MUSE: 494 MS
QTC - MUSE: 446 MS
R AXIS - MUSE: -88 DEGREES
SYSTOLIC BLOOD PRESSURE - MUSE: NORMAL MMHG
T AXIS - MUSE: -3 DEGREES
VENTRICULAR RATE- MUSE: 49 BPM

## 2024-05-03 NOTE — PROGRESS NOTES
0.5mg midazolam IV push inadvertently charted at 5/2/24 1026 intra-procedure; not administered. Total dose given during procedure was 2.0mg.

## 2024-05-06 ENCOUNTER — CARE COORDINATION (OUTPATIENT)
Dept: CARDIOLOGY | Facility: CLINIC | Age: 69
End: 2024-05-06
Payer: COMMERCIAL

## 2024-05-06 NOTE — PROGRESS NOTES
Pt's angiogram result was discussed with Structural heart team. Plan to refer patient for CABG/AVR. Attempted telephone call to patient, will send MyChart message.       Kiera Alves RN  Lead Structural Heart Care Coordinator

## 2024-05-06 NOTE — PROGRESS NOTES
See MedPro messages for communication in regards to note below.    Kiera Alves RN, BSN  Lead Structural Heart Coordinator  TAVR, [employer] and Watchman

## 2024-05-07 ENCOUNTER — LAB (OUTPATIENT)
Dept: LAB | Facility: CLINIC | Age: 69
End: 2024-05-07
Payer: COMMERCIAL

## 2024-05-07 ENCOUNTER — PREP FOR PROCEDURE (OUTPATIENT)
Dept: CARDIOLOGY | Facility: CLINIC | Age: 69
End: 2024-05-07

## 2024-05-07 DIAGNOSIS — Z80.3 FAMILY HISTORY OF MALIGNANT NEOPLASM OF BREAST: ICD-10-CM

## 2024-05-07 DIAGNOSIS — Z80.0 FAMILY HISTORY OF MALIGNANT NEOPLASM OF COLON: ICD-10-CM

## 2024-05-07 DIAGNOSIS — Z80.49 FAMILY HISTORY OF MALIGNANT NEOPLASM OF GENITAL ORGAN: ICD-10-CM

## 2024-05-07 DIAGNOSIS — I25.10 CAD (CORONARY ARTERY DISEASE): Primary | ICD-10-CM

## 2024-05-07 PROCEDURE — 36415 COLL VENOUS BLD VENIPUNCTURE: CPT | Performed by: PATHOLOGY

## 2024-05-08 ENCOUNTER — HOSPITAL ENCOUNTER (INPATIENT)
Facility: CLINIC | Age: 69
Setting detail: SURGERY ADMIT
End: 2024-05-08
Attending: STUDENT IN AN ORGANIZED HEALTH CARE EDUCATION/TRAINING PROGRAM | Admitting: STUDENT IN AN ORGANIZED HEALTH CARE EDUCATION/TRAINING PROGRAM
Payer: COMMERCIAL

## 2024-05-08 ENCOUNTER — TELEPHONE (OUTPATIENT)
Dept: CARDIOLOGY | Facility: CLINIC | Age: 69
End: 2024-05-08
Payer: COMMERCIAL

## 2024-05-08 DIAGNOSIS — I99.8 OTHER DISORDER OF CIRCULATORY SYSTEM: ICD-10-CM

## 2024-05-08 DIAGNOSIS — I35.0 SEVERE AORTIC STENOSIS: ICD-10-CM

## 2024-05-08 DIAGNOSIS — I25.10 CORONARY ARTERY DISEASE DUE TO CALCIFIED CORONARY LESION: Primary | ICD-10-CM

## 2024-05-08 DIAGNOSIS — R09.89 OTHER SPECIFIED SYMPTOMS AND SIGNS INVOLVING THE CIRCULATORY AND RESPIRATORY SYSTEMS: ICD-10-CM

## 2024-05-08 DIAGNOSIS — I25.84 CORONARY ARTERY DISEASE DUE TO CALCIFIED CORONARY LESION: Primary | ICD-10-CM

## 2024-05-09 RX ORDER — CHLORHEXIDINE GLUCONATE ORAL RINSE 1.2 MG/ML
10 SOLUTION DENTAL ONCE
Status: CANCELLED | OUTPATIENT
Start: 2024-05-09 | End: 2024-05-09

## 2024-05-09 RX ORDER — PHENYLEPHRINE HCL IN 0.9% NACL 50MG/250ML
.1-6 PLASTIC BAG, INJECTION (ML) INTRAVENOUS CONTINUOUS
Status: CANCELLED | OUTPATIENT
Start: 2024-05-29

## 2024-05-09 RX ORDER — CEFAZOLIN SODIUM 2 G/50ML
2 SOLUTION INTRAVENOUS SEE ADMIN INSTRUCTIONS
Status: CANCELLED | OUTPATIENT
Start: 2024-05-09

## 2024-05-09 RX ORDER — CEFAZOLIN SODIUM 2 G/50ML
2 SOLUTION INTRAVENOUS
Status: CANCELLED | OUTPATIENT
Start: 2024-05-09

## 2024-05-09 RX ORDER — ACETAMINOPHEN 325 MG/1
975 TABLET ORAL ONCE
Status: CANCELLED | OUTPATIENT
Start: 2024-05-09 | End: 2024-05-09

## 2024-05-09 RX ORDER — FAMOTIDINE 20 MG/1
20 TABLET, FILM COATED ORAL
Status: CANCELLED | OUTPATIENT
Start: 2024-05-09

## 2024-05-09 RX ORDER — GABAPENTIN 100 MG/1
100 CAPSULE ORAL
Status: CANCELLED | OUTPATIENT
Start: 2024-05-09

## 2024-05-09 RX ORDER — NOREPINEPHRINE BITARTRATE 0.06 MG/ML
.01-.1 INJECTION, SOLUTION INTRAVENOUS CONTINUOUS
Status: CANCELLED | OUTPATIENT
Start: 2024-05-29

## 2024-05-09 RX ORDER — DEXMEDETOMIDINE HYDROCHLORIDE 4 UG/ML
.2-1.2 INJECTION, SOLUTION INTRAVENOUS CONTINUOUS
Status: CANCELLED | OUTPATIENT
Start: 2024-05-29

## 2024-05-09 RX ORDER — ASPIRIN 81 MG/1
162 TABLET, CHEWABLE ORAL
Status: CANCELLED | OUTPATIENT
Start: 2024-05-09

## 2024-05-09 RX ORDER — LIDOCAINE 40 MG/G
CREAM TOPICAL
Status: CANCELLED | OUTPATIENT
Start: 2024-05-09

## 2024-05-09 RX ORDER — ASPIRIN 81 MG/1
81 TABLET, CHEWABLE ORAL
Status: CANCELLED | OUTPATIENT
Start: 2024-05-09

## 2024-05-15 DIAGNOSIS — Z80.3 FAMILY HISTORY OF MALIGNANT NEOPLASM OF BREAST: Primary | ICD-10-CM

## 2024-05-15 DIAGNOSIS — Z80.0 FAMILY HISTORY OF MALIGNANT NEOPLASM OF COLON: ICD-10-CM

## 2024-05-15 DIAGNOSIS — Z80.49 FAMILY HISTORY OF MALIGNANT NEOPLASM OF GENITAL ORGAN: ICD-10-CM

## 2024-05-15 LAB
ABO/RH(D): NORMAL
ANTIBODY SCREEN: NEGATIVE
SCANNED LAB RESULT: NORMAL
SPECIMEN EXPIRATION DATE: NORMAL

## 2024-05-16 ENCOUNTER — ANCILLARY PROCEDURE (OUTPATIENT)
Dept: ULTRASOUND IMAGING | Facility: CLINIC | Age: 69
End: 2024-05-16
Attending: STUDENT IN AN ORGANIZED HEALTH CARE EDUCATION/TRAINING PROGRAM
Payer: COMMERCIAL

## 2024-05-16 ENCOUNTER — LAB (OUTPATIENT)
Dept: LAB | Facility: CLINIC | Age: 69
End: 2024-05-16
Payer: COMMERCIAL

## 2024-05-16 ENCOUNTER — OFFICE VISIT (OUTPATIENT)
Dept: SURGERY | Facility: CLINIC | Age: 69
End: 2024-05-16
Payer: COMMERCIAL

## 2024-05-16 ENCOUNTER — PRE VISIT (OUTPATIENT)
Dept: SURGERY | Facility: CLINIC | Age: 69
End: 2024-05-16

## 2024-05-16 ENCOUNTER — PREP FOR PROCEDURE (OUTPATIENT)
Dept: CARDIOLOGY | Facility: CLINIC | Age: 69
End: 2024-05-16

## 2024-05-16 ENCOUNTER — OFFICE VISIT (OUTPATIENT)
Dept: CARDIOLOGY | Facility: CLINIC | Age: 69
End: 2024-05-16
Attending: STUDENT IN AN ORGANIZED HEALTH CARE EDUCATION/TRAINING PROGRAM
Payer: COMMERCIAL

## 2024-05-16 VITALS
SYSTOLIC BLOOD PRESSURE: 113 MMHG | OXYGEN SATURATION: 98 % | HEIGHT: 67 IN | TEMPERATURE: 98.6 F | DIASTOLIC BLOOD PRESSURE: 56 MMHG | WEIGHT: 167 LBS | BODY MASS INDEX: 26.21 KG/M2 | HEART RATE: 60 BPM

## 2024-05-16 VITALS
WEIGHT: 167.8 LBS | OXYGEN SATURATION: 96 % | HEART RATE: 60 BPM | BODY MASS INDEX: 25.51 KG/M2 | DIASTOLIC BLOOD PRESSURE: 71 MMHG | SYSTOLIC BLOOD PRESSURE: 124 MMHG

## 2024-05-16 DIAGNOSIS — I35.0 SEVERE AORTIC STENOSIS: ICD-10-CM

## 2024-05-16 DIAGNOSIS — I25.10 CORONARY ARTERY DISEASE DUE TO CALCIFIED CORONARY LESION: Primary | ICD-10-CM

## 2024-05-16 DIAGNOSIS — I25.84 CORONARY ARTERY DISEASE DUE TO CALCIFIED CORONARY LESION: Primary | ICD-10-CM

## 2024-05-16 DIAGNOSIS — I25.84 CORONARY ARTERY DISEASE DUE TO CALCIFIED CORONARY LESION: ICD-10-CM

## 2024-05-16 DIAGNOSIS — Z01.818 PRE-OP EVALUATION: Primary | ICD-10-CM

## 2024-05-16 DIAGNOSIS — I25.10 CORONARY ARTERY DISEASE DUE TO CALCIFIED CORONARY LESION: ICD-10-CM

## 2024-05-16 DIAGNOSIS — I99.8 OTHER DISORDER OF CIRCULATORY SYSTEM: ICD-10-CM

## 2024-05-16 DIAGNOSIS — I71.21 ANEURYSM OF ASCENDING AORTA WITHOUT RUPTURE (H): Chronic | ICD-10-CM

## 2024-05-16 DIAGNOSIS — I25.10 CORONARY ARTERY DISEASE INVOLVING NATIVE HEART WITHOUT ANGINA PECTORIS, UNSPECIFIED VESSEL OR LESION TYPE: ICD-10-CM

## 2024-05-16 DIAGNOSIS — R09.89 OTHER SPECIFIED SYMPTOMS AND SIGNS INVOLVING THE CIRCULATORY AND RESPIRATORY SYSTEMS: ICD-10-CM

## 2024-05-16 DIAGNOSIS — E78.5 HYPERLIPIDEMIA LDL GOAL <100: Chronic | ICD-10-CM

## 2024-05-16 DIAGNOSIS — I10 ESSENTIAL HYPERTENSION: ICD-10-CM

## 2024-05-16 LAB
ALBUMIN UR-MCNC: NEGATIVE MG/DL
APPEARANCE UR: CLEAR
APTT PPP: 28 SECONDS (ref 22–38)
BILIRUB UR QL STRIP: NEGATIVE
CHOLEST SERPL-MCNC: 137 MG/DL
COLOR UR AUTO: ABNORMAL
FASTING STATUS PATIENT QL REPORTED: NORMAL
GLUCOSE UR STRIP-MCNC: >=1000 MG/DL
HDLC SERPL-MCNC: 53 MG/DL
HGB UR QL STRIP: ABNORMAL
KETONES UR STRIP-MCNC: NEGATIVE MG/DL
LDLC SERPL CALC-MCNC: 71 MG/DL
LEUKOCYTE ESTERASE UR QL STRIP: NEGATIVE
MAGNESIUM SERPL-MCNC: 1.9 MG/DL (ref 1.7–2.3)
NITRATE UR QL: NEGATIVE
NONHDLC SERPL-MCNC: 84 MG/DL
PH UR STRIP: 6 [PH] (ref 5–7)
RBC URINE: 6 /HPF
SP GR UR STRIP: 1.02 (ref 1–1.03)
SQUAMOUS EPITHELIAL: 1 /HPF
TRANSITIONAL EPI: <1 /HPF
TRIGL SERPL-MCNC: 67 MG/DL
UROBILINOGEN UR STRIP-MCNC: NORMAL MG/DL
WBC URINE: 1 /HPF

## 2024-05-16 PROCEDURE — 84134 ASSAY OF PREALBUMIN: CPT | Performed by: STUDENT IN AN ORGANIZED HEALTH CARE EDUCATION/TRAINING PROGRAM

## 2024-05-16 PROCEDURE — 99213 OFFICE O/P EST LOW 20 MIN: CPT | Performed by: PHYSICIAN ASSISTANT

## 2024-05-16 PROCEDURE — 81001 URINALYSIS AUTO W/SCOPE: CPT | Performed by: PATHOLOGY

## 2024-05-16 PROCEDURE — 99204 OFFICE O/P NEW MOD 45 MIN: CPT | Performed by: STUDENT IN AN ORGANIZED HEALTH CARE EDUCATION/TRAINING PROGRAM

## 2024-05-16 PROCEDURE — 83735 ASSAY OF MAGNESIUM: CPT | Performed by: PATHOLOGY

## 2024-05-16 PROCEDURE — 85730 THROMBOPLASTIN TIME PARTIAL: CPT | Performed by: PATHOLOGY

## 2024-05-16 PROCEDURE — G0463 HOSPITAL OUTPT CLINIC VISIT: HCPCS | Performed by: STUDENT IN AN ORGANIZED HEALTH CARE EDUCATION/TRAINING PROGRAM

## 2024-05-16 PROCEDURE — 99000 SPECIMEN HANDLING OFFICE-LAB: CPT | Performed by: PATHOLOGY

## 2024-05-16 PROCEDURE — 86900 BLOOD TYPING SEROLOGIC ABO: CPT | Performed by: STUDENT IN AN ORGANIZED HEALTH CARE EDUCATION/TRAINING PROGRAM

## 2024-05-16 PROCEDURE — 36415 COLL VENOUS BLD VENIPUNCTURE: CPT | Performed by: PATHOLOGY

## 2024-05-16 PROCEDURE — 93880 EXTRACRANIAL BILAT STUDY: CPT | Performed by: STUDENT IN AN ORGANIZED HEALTH CARE EDUCATION/TRAINING PROGRAM

## 2024-05-16 PROCEDURE — 80061 LIPID PANEL: CPT | Performed by: PATHOLOGY

## 2024-05-16 ASSESSMENT — PAIN SCALES - GENERAL
PAINLEVEL: MILD PAIN (3)
PAINLEVEL: NO PAIN (0)

## 2024-05-16 ASSESSMENT — ENCOUNTER SYMPTOMS: SEIZURES: 0

## 2024-05-16 ASSESSMENT — LIFESTYLE VARIABLES: TOBACCO_USE: 0

## 2024-05-16 NOTE — LETTER
Cardiothoracic Surgery          This patient is scheduled to undergo a heart valve repair or replacement.    Please have the dentist sign the attached form and fax or e-mail it to the Cardiothoracic Surgery office prior to their surgery date.    Primary fax number:  [facility] ([facility]) [phone]    E-mail:   [facility] ([facility]): [email or web address]  (Shari)      Please contact Shari Dinero RN at [phone].      Thank you,  Cardiothoracic Surgery Office                             PATIENT:   NOAH RIVERO    :   1955       DATE OF DENTAL VISIT: __________________                                                    ___________________________   was seen by me today for dental clearance prior to heart valve surgery.     ______  No additional treatment is needed prior to surgery.    ______  Further treatment is needed and will be completed prior to surgery date.    ______  Surgery date may need to be rescheduled due to this condition     _________________________________________________________.    Dr. _______________________    Phone _______________________.      Clinic name ____________________________________________________      Address_______________________________________________________

## 2024-05-16 NOTE — PATIENT INSTRUCTIONS
Preparing for Your Surgery      Name:  Won Urena   MRN:  [id number]   :  1955   Today's Date:  2024       Arriving for surgery:  Surgery date:  24  Arrival time:  5:00 am  Surgery time: 7:30 am    Please come to:     Please come to:       [facility] [place] [facility]    [address] [place]  [address]     The [facility] ([facility] [place] Patient/Visitor Ramp is at [address]. Patients and visitors who self-park will receive the reduced hospital parking rate. If the Patient /Visitor Ramp is full, please follow the signs to the [employer] car park located at the main hospital entrance.       parking is available (24 hours/ 7 days a week)      Discounted parking pass options are available for patients and visitors. They can be purchased at the [employer] desk at the main hospital entrance.     -    Stop at the security desk and they will direct surgery patients to the [facility] [phone]        - If you need directions, a wheelchair or an escort please stop at the Information/security desk in the lobby.     What can I eat or drink?  -  You may eat and drink normally up to 8 hours prior to arrival time. (Until 9:00 pm on 24)  -  You may have clear liquids until 2 hours prior to arrival time. (Until 3:00 am on 24)    Examples of clear liquids:  Water  Clear broth  Juices (apple, white grape, white cranberry  and cider) without pulp  Noncarbonated, powder based beverages  (lemonade and Virgilio-Aid)  Sodas (Sprite, 7-Up, ginger ale and seltzer)  Coffee or tea (without milk or cream)  Gatorade    -  No Alcohol or cannabis products for at least 24 hours before surgery.     Which medicines can I take?    Hold Multivitamins for 7 days before surgery.  Hold Supplements for 7 days before surgery.    Hold Ibuprofen (Advil, Motrin) for 7 day(s) before surgery  Hold  Naproxen (Aleve) for 7 days before surgery.    Aspirin- stay on    Hold Semaglutide (Ozempic) for 7 days before surgery- hold dose on 5/26/24    Hold (Dapagliflozin (Farxiga) for 3 days before surgery. Take last dose 5/25/24    Hold Losartan (Cozaar) for 2 days before surgery. Take last dose 5/26/24    -  DO NOT take these medications the day of surgery:   No creams   Fenofibrate (Lofibra)   Metformin (Glucophage)      -  PLEASE TAKE these medications the day of surgery:   Diltiazem      How do I prepare myself?  - Please take 2 showers (one the night prior to surgery and one the morning of surgery) using Scrubcare or Hibiclens soap.    Use this soap only from the neck to your toes.     Leave the soap on your skin for one minute--then rinse thoroughly.      You may use your own shampoo and conditioner. No other hair products.   - Please remove all jewelry and body piercings.  - No lotions, deodorants or fragrance.  - No makeup or fingernail polish.   - Bring your ID and insurance card.    -If you use a CPAP machine, please bring the CPAP machine, tubing, and mask to hospital.    -If you have a Deep Brain Stimulator, Spinal Cord Stimulator, or any Neuro Stimulator device---you must bring the remote control to the hospital.      Covid testing policy as of 12/06/2022  Your surgeon will notify and schedule you for a COVID test if one is needed before surgery--please direct any questions or COVID symptoms to your surgeon      Questions or Concerns:    - For any questions regarding the day of surgery or your hospital stay, please contact the Pre Admission Nursing Office at [phone].       - If you have health changes between today and your surgery, please call your surgeon.       - For questions after surgery, please call your surgeons office.           Current Visitor Guidelines    You may have 2 visitors in the pre op area.    Visiting hours: 8 a.m. to 8:30 p.m.    Patients confirmed or suspected to have symptoms of  COVID 19 or flu:     No visitors allowed for adult patients.   Children (under age 18) can have 1 named visitor.     People who are sick or showing symptoms of COVID 19 or flu:    Are not allowed to visit patients--we can only make exceptions in special situations.       Please follow these guidelines for your visit:          Please maintain social distance          Masking is optional--however at times you may be asked to wear a mask for the safety of yourself and others     Clean your hands with alcohol hand . Do this when you arrive at and leave the building and patient room,    And again after you touch your mask or anything in the room.     Go directly to and from the room you are visiting.     Stay in the patient s room during your visit. Limit going to other places in the hospital as much as possible     Leave bags and jackets at home or in the car.     For everyone s health, please don t come and go during your visit. That includes for smoking   during your visit.

## 2024-05-16 NOTE — LETTER
2024      RE: Won Urena  [address]  [address]  [facility] [address]       Dear Colleague,    Thank you for the opportunity to participate in the care of your patient, Won Urena, at the [facility] [place] [facility]. Please see a copy of my visit note below.    Dear Colleagues,     We had the pleasure of seeing Won Urena today in our Cardiac Surgery Clinic at the [facility]. As you know she is a pleasant 69-year-old woman with a history of CAD, SVT, DM2 (A1c 5.8), HLD, and HTN who presents for evaluation of severe aortic stenosis.     TTE shows severe aortic stenosis with HOWARD 0.98, MG 41, PV 4.4, EF 70%. Cardiac catheterization reveals multivessel coronary artery disease.    Of note she reports increased fatigue and also some chest pressure or discomfort with exertion.     ROS: 10 point review of systems was performed and negative except as described above.     Past Medical History:   Past Medical History:   Diagnosis Date     Abnormal Pap smear of cervix      Aortic stenosis      Aortic valve stenosis      Benign colon polyp      CAD (coronary artery disease)      Dermatochalasis of both upper eyelids      Diabetes mellitus, type 2 (H)      Gestational diabetes      HTN (hypertension)      Hyperlipidemia LDL goal <100      Nonsenile cataract     Very beginnings     Post-menopausal atrophic vaginitis      Ptosis of both eyelids      Thickened endometrium         Past Surgical History:  Past Surgical History:   Procedure Laterality Date      SECTION       COMBINED REPAIR PTOSIS WITH BLEPHAROPLASTY Bilateral 2023    Procedure: Both upper eyelid blepharoplasty and ptosis repair;  Surgeon: Shantelle Marti MD;  Location: [facility] OR     COSMETIC BLEPHAROPLASTY LOWER LIDS BILATERAL Bilateral 2023    Procedure: Both lower eyelid blepharoplasty;  Surgeon: Shantelle Marti MD;  Location:  [facility] OR     CV CORONARY ANGIOGRAM N/A 06/09/2023    Procedure: Coronary Angiogram;  Surgeon: Geovanni Ibarra MD;  Location:  HEART CARDIAC CATH LAB     CV CORONARY ANGIOGRAM N/A 5/2/2024    Procedure: Coronary Angiogram;  Surgeon: Bassam Flores MD;  Location: [facility] CARDIAC CATH LAB     CV INSTANTANEOUS WAVE-FREE RATIO N/A 06/09/2023    Procedure: Instantaneous Wave-Free Ratio;  Surgeon: Geovanni Ibarra MD;  Location:  HEART CARDIAC CATH LAB     CV RIGHT HEART CATH MEASUREMENTS RECORDED N/A 5/2/2024    Procedure: Right Heart Catheterization;  Surgeon: Bassam Flores MD;  Location:  HEART CARDIAC CATH LAB         Family History: Mother - MI at 42     Social History: She lives with  and has 2 sons. She recently retired and exercises regularly. She denies smoking and uses alcohol occasionally.      Medications:   Current Outpatient Medications   Medication Sig Dispense Refill     ACCU-CHEK GUIDE test strip CHECK BLOOD SUGAR DAILY OR AS  DIRECTED 100 strip 2     aspirin 81 MG EC tablet Take 81 mg by mouth every evening       atorvastatin (LIPITOR) 40 MG tablet Take 1 tablet (40 mg) by mouth daily (Patient taking differently: Take 40 mg by mouth every evening) 90 tablet 3     Blood Glucose Monitoring Suppl (ACCU-CHEK GUIDE) w/Device KIT USE TO TEST BLOOD SUGAR ONCE  DAILY OR AS DIRECTED 1 kit 0     carboxymethylcellulose PF (REFRESH PLUS) 0.5 % ophthalmic solution Place 1 drop into both eyes 4 times daily 70 each 0     cyanocobalamin (VITAMIN B-12) 100 MCG tablet Take 100 mcg by mouth every evening       dapagliflozin (FARXIGA) 10 MG TABS tablet TAKE 1 TABLET BY MOUTH IN THE  MORNING 100 tablet 1     diltiazem (CARDIZEM) 60 MG tablet TAKE 1 TABLET BY MOUTH  TWICE DAILY 200 tablet 2     estradiol (ESTRACE) 0.1 MG/GM vaginal cream INSERT 1/2 APPLICATORFUL  VAGINALLY TWICE WEEKLY 85 g 3     fenofibrate (TRIGLIDE/LOFIBRA) 160 MG tablet Take 1 tablet (160 mg) by  mouth every evening 100 tablet 3     gabapentin (NEURONTIN) 100 MG capsule Take 2 capsules (200 mg) by mouth at bedtime 200 capsule 3     levocetirizine (XYZAL) 5 MG tablet Take 5 mg by mouth every evening       losartan (COZAAR) 50 MG tablet Take 1 tablet (50 mg) by mouth every morning 100 tablet 3     metFORMIN (GLUCOPHAGE) 500 MG tablet TAKE 2 TABLETS BY MOUTH  TWICE DAILY WITH MEALS 360 tablet 3     montelukast (SINGULAIR) 10 MG tablet TAKE 1 TABLET BY MOUTH AT  BEDTIME 100 tablet 2     multivitamin w/minerals (MULTI-VITAMIN) tablet Take 1 tablet by mouth every evening       Semaglutide, 1 MG/DOSE, (OZEMPIC, 1 MG/DOSE,) 4 MG/3ML pen INJECT SUBCUTANEOUSLY 1 MG  EVERY WEEK (Patient taking differently: 1 mg every 7 days INJECT SUBCUTANEOUSLY 1 MG  EVERY WEEK Sunday 5/12/24) 9 mL 0     Vitamin D, Cholecalciferol, 10 MCG (400 UNIT) TABS Take by mouth every evening       No current facility-administered medications for this visit.        Allergies:   Allergies   Allergen Reactions     Avapro [Irbesartan] Cough     Fluticasone Other (See Comments)     Loss of sense of taste        Physical Exam:  /71 (BP Location: Right arm, Patient Position: Chair, Cuff Size: Adult Regular)   Pulse 60   Wt 76.1 kg (167 lb 12.8 oz)   SpO2 96%   BMI 25.51 kg/m    Alert, pleasant, in no acute distress  Sclera anicteric  Neck supple JVD flat  Trachea midline  No prior chest incisions  Breathing unlabored on room air  Regular rate and rhythm  Abdomen soft, non-tender  Extremities warm, no edema      Labs  CBC RESULTS:   Recent Labs   Lab Test 05/02/24  1013 05/02/24  0711   WBC  --  4.3   RBC  --  4.45   HGB 12.8 13.2   HCT  --  40.2   MCV  --  90   MCH  --  29.7   MCHC  --  32.8   RDW  --  13.8   PLT  --  141*      Last Comprehensive Metabolic Panel:  Lab Results   Component Value Date     05/02/2024    POTASSIUM 4.2 05/02/2024    CHLORIDE 107 05/02/2024    CO2 24 05/02/2024    ANIONGAP 10 05/02/2024    GLC 85 05/02/2024     BUN 21.9 05/02/2024    CR 0.73 05/02/2024    GFRESTIMATED 89 05/02/2024    JOAQUINA 9.8 05/02/2024        Imaging: All imaging studies were reviewed and interpreted by me.    Cardiac catheterization 5/2/24:  Right Heart Catheterization:  RA --/6/3   RV 30/5  PA 30/10/15   PCW --/15/9   Smooth CO 5.18   Smooth CI 2.74   TD CO 5.87   TD CI 3.1   PA sat 66%   Hgb 12.8 g/dL     LHC significant for 70% LAD stenosis, 60% Lcx stenosis, and 40% RCA stenosis.    TTE 4/4/24:  Global and regional left ventricular function is hyperkinetic with an EF >70%.  Right ventricular function, chamber size, wall motion, and thickness are  normal.  Severe aortic stenosis is present.  The peak aortic velocity is 4.4 m/sec.  The mean gradient across the aortic valve is 41 mmHg.  The aortic valve area is 0.98 cm^2, by the continuity equation.  SVI 54.2ml/m2. DI 0.27. AVAI 0.52cm2/m2.  Pulmonary artery systolic pressure is normal.  The inferior vena cava is normal.  No pericardial effusion is present.  Increase in severity of aortic stenosis since previous study.       In summary Won Urena is a 69-year-old woman with a history of CAD, SVT, DM2 (A1c 5.8), HLD, and HTN who presents for evaluation of severe, symptomatic aortic stenosis and severe, multivessel coronary artery disease.     I recommend surgical aortic valve replacement (tissue) with possible aortic root enlargement and surgical revascularization with coronary artery bypass grafting with LIMA and saphenous vein for conduits. The stenosis of the Lcx artery is not significant enough to support radial artery harvest. I described the technical details of the operation, as well as the expected postoperative course and recovery to the patient. I also discussed the risks, benefits, and alternatives to the procedure. The risks include but are not limited to bleeding, infection, stroke, heart or graft failure with myocardial infarction, dysrhythmia, need for pacemaker, respiratory failure,  kidney or liver injury, bowel or limb ischemia, need for reoperation, and death. The patient understands these risks and would like to proceed with surgery.      The following preoperative workup should be completed prior to surgery: carotid US      Thank you very much for this referral,    Mitali Nicole MD

## 2024-05-16 NOTE — H&P
Pre-Operative H & P     CC:  Preoperative exam to assess for increased cardiopulmonary risk while undergoing surgery and anesthesia.    Date of Encounter: 2024  Primary Care Physician:  Nannette Gallagher     Reason for visit:   Encounter Diagnosis   Name Primary?    Pre-op evaluation Yes       HPI  Won Urena is a 69 year old female who presents for pre-operative H & P in preparation for  Procedure Information       Case: [id number] Date/Time: 24 0730    Procedures:       CORONARY ARTERY BYPASS GRAFT AND ANY ASSOCIATED PROCEDURES, (Chest)      possible AORTIC VALVE REPLACEMENT (Chest)      POSSIBLE AORTIC ROOT ENLARGEMENT (Chest)    Anesthesia type: General    Diagnosis: CAD (coronary artery disease) [I25.10]    Pre-op diagnosis: CAD (coronary artery disease) [I25.10]    Location:  OR [address] [place] [address] OR    Providers: Mitali Nicole MD          Patient is being evaluated for comorbid conditions of hypertension, dyslipidemia, diabetes    Ms. Urena has a history of CAD, aortic valve disease. She was seen by Dr. Nicole earlier today and is now scheduled for the above procedure.     Hx of abnormal bleeding or anti-platelet use: ASA    Menstrual history: No LMP recorded. Patient is postmenopausal.     Past Medical History  Past Medical History:   Diagnosis Date    Abnormal Pap smear of cervix     Aortic stenosis     Aortic valve stenosis     Benign colon polyp     CAD (coronary artery disease)     Dermatochalasis of both upper eyelids     Diabetes mellitus, type 2 (H)     Gestational diabetes     HTN (hypertension)     Hyperlipidemia LDL goal <100     Nonsenile cataract     Very beginnings    Post-menopausal atrophic vaginitis     Ptosis of both eyelids     Thickened endometrium        Past Surgical History  Past Surgical History:   Procedure Laterality Date     SECTION      COMBINED REPAIR PTOSIS WITH BLEPHAROPLASTY Bilateral 2023    Procedure: Both upper eyelid blepharoplasty and ptosis repair;   Surgeon: Shantelle Marti MD;  Location: [facility] OR    COSMETIC BLEPHAROPLASTY LOWER LIDS BILATERAL Bilateral 09/28/2023    Procedure: Both lower eyelid blepharoplasty;  Surgeon: Shantelle Marti MD;  Location: [facility] OR    CV CORONARY ANGIOGRAM N/A 06/09/2023    Procedure: Coronary Angiogram;  Surgeon: Geovanni Ibarra MD;  [facility]    CV CORONARY ANGIOGRAM N/A 5/2/2024    Procedure: Coronary Angiogram;  Surgeon: Bassam Flores MD;  [facility]    CV INSTANTANEOUS WAVE-FREE RATIO N/A 06/09/2023    Procedure: Instantaneous Wave-Free Ratio;  Surgeon: Geovanni Ibarra MD;  [facility]    CV RIGHT HEART CATH MEASUREMENTS RECORDED N/A 5/2/2024    Procedure: Right Heart Catheterization;  Surgeon: Bassam Flores MD;  [facility]       Prior to Admission Medications  Current Outpatient Medications   Medication Sig Dispense Refill    aspirin 81 MG EC tablet Take 81 mg by mouth every evening      atorvastatin (LIPITOR) 40 MG tablet Take 1 tablet (40 mg) by mouth daily (Patient taking differently: Take 40 mg by mouth every evening) 90 tablet 3    carboxymethylcellulose PF (REFRESH PLUS) 0.5 % ophthalmic solution Place 1 drop into both eyes 4 times daily 70 each 0    cyanocobalamin (VITAMIN B-12) 100 MCG tablet Take 100 mcg by mouth every evening      dapagliflozin (FARXIGA) 10 MG TABS tablet TAKE 1 TABLET BY MOUTH IN THE  MORNING 100 tablet 1    diltiazem (CARDIZEM) 60 MG tablet TAKE 1 TABLET BY MOUTH  TWICE DAILY 200 tablet 2    estradiol (ESTRACE) 0.1 MG/GM vaginal cream INSERT 1/2 APPLICATORFUL  VAGINALLY TWICE WEEKLY 85 g 3    fenofibrate (TRIGLIDE/LOFIBRA) 160 MG tablet Take 1 tablet (160 mg) by mouth every evening 100 tablet 3    gabapentin (NEURONTIN) 100 MG capsule Take 2 capsules (200 mg) by mouth at bedtime 200 capsule 3    levocetirizine (XYZAL) 5 MG tablet Take 5 mg by mouth  every evening      losartan (COZAAR) 50 MG tablet Take 1 tablet (50 mg) by mouth every morning 100 tablet 3    metFORMIN (GLUCOPHAGE) 500 MG tablet TAKE 2 TABLETS BY MOUTH  TWICE DAILY WITH MEALS 360 tablet 3    montelukast (SINGULAIR) 10 MG tablet TAKE 1 TABLET BY MOUTH AT  BEDTIME 100 tablet 2    multivitamin w/minerals (MULTI-VITAMIN) tablet Take 1 tablet by mouth every evening      Semaglutide, 1 MG/DOSE, (OZEMPIC, 1 MG/DOSE,) 4 MG/3ML pen INJECT SUBCUTANEOUSLY 1 MG  EVERY WEEK (Patient taking differently: 1 mg every 7 days INJECT SUBCUTANEOUSLY 1 MG  EVERY WEEK Sunday 5/12/24) 9 mL 0    Vitamin D, Cholecalciferol, 10 MCG (400 UNIT) TABS Take by mouth every evening      ACCU-CHEK GUIDE test strip CHECK BLOOD SUGAR DAILY OR AS  DIRECTED 100 strip 2    Blood Glucose Monitoring Suppl (ACCU-CHEK GUIDE) w/Device KIT USE TO TEST BLOOD SUGAR ONCE  DAILY OR AS DIRECTED 1 kit 0       Allergies  Allergies   Allergen Reactions    Avapro [Irbesartan] Cough    Fluticasone Other (See Comments)     Loss of sense of taste       Social History  Social History     Socioeconomic History    Marital status:      Spouse name: Not on file    Number of children: Not on file    Years of education: Not on file    Highest education level: Not on file   Occupational History    Not on file   Tobacco Use    Smoking status: Never     Passive exposure: Never    Smokeless tobacco: Never   Vaping Use    Vaping status: Never Used   Substance and Sexual Activity    Alcohol use: Yes     Alcohol/week: 1.0 standard drink of alcohol     Types: 1 Standard drinks or equivalent per week     Comment: less than 2 drinks a week    Drug use: Not Currently     Comment: gummy 1x/month    Sexual activity: Not Currently     Partners: Male     Birth control/protection: Post-menopausal   Other Topics Concern    Not on file   Social History Narrative    Lives with     Two son    One son, daughter-in-law, and grandson live in [place]    One son,  daughter-in-law live in the same [facility]    [place] in [place], [place]     Social Determinants of Health     Financial Resource Strain: Low Risk  (3/8/2024)    Financial Resource Strain     Within the past 12 months, have you or your family members you live with been unable to get utilities (heat, electricity) when it was really needed?: No   Food Insecurity: Low Risk  (3/8/2024)    Food Insecurity     Within the past 12 months, did you worry that your food would run out before you got money to buy more?: No     Within the past 12 months, did the food you bought just not last and you didn t have money to get more?: No   Transportation Needs: Low Risk  (3/8/2024)    Transportation Needs     Within the past 12 months, has lack of transportation kept you from medical appointments, getting your medicines, non-medical meetings or appointments, work, or from getting things that you need?: No   Physical Activity: Insufficiently Active (3/8/2024)    Exercise Vital Sign     Days of Exercise per Week: 4 days     Minutes of Exercise per Session: 30 min   Stress: Stress Concern Present (3/8/2024)    [employer] - Occupational Stress Questionnaire     Feeling of Stress : To some extent   Social Connections: Unknown (3/8/2024)    Social Connection and Isolation Panel [NHANES]     Frequency of Communication with Friends and Family: Not on file     Frequency of Social Gatherings with Friends and Family: Twice a week     Attends [employer] Services: Not on file     Active Member of Clubs or Organizations: Not on file     Attends Club or Organization Meetings: Not on file     Marital Status: Not on file   Interpersonal Safety: Low Risk  (3/15/2024)    Interpersonal Safety     Do you feel physically and emotionally safe where you currently live?: Yes     Within the past 12 months, have you been hit, slapped, kicked or otherwise physically hurt by someone?: No     Within the past 12 months, have you  been humiliated or emotionally abused in other ways by your partner or ex-partner?: No   Housing Stability: High Risk (3/8/2024)    Housing Stability     Do you have housing? : Yes     Are you worried about losing your housing?: Yes       Family History  Family History   Problem Relation Age of Onset    Lung Cancer Mother         Small cell, did smoked    Diabetes Mother     Hypertension Mother     Goiter Mother     Mental Illness Mother         likely bipolar    Coronary Artery Disease Mother 42        MI, smoked    Hyperlipidemia Mother     Thyroid Disease Mother         Goiter    Obesity Mother         most of family is obese    Glaucoma Father     Atrial fibrillation Father     Hypertension Father     Parkinsonism Father     Hyperlipidemia Father     Diabetes Sister     Breast Cancer Sister 60    Hypertension Sister     Hypertension Brother     Cancer Maternal Grandmother         gynecologic cancer, unknown type    Uterine Cancer Maternal Grandmother     Chronic Obstructive Pulmonary Disease Paternal Grandmother     Uterine Cancer Paternal Grandmother     Stomach Cancer Paternal Grandfather     Colon Cancer Paternal Grandfather     Bipolar Disorder Son     Mental Illness Son     Depression Son     Macular Degeneration No family hx of     Anesthesia Reaction No family hx of     Venous thrombosis No family hx of        Review of Systems  The complete review of systems is negative other than noted in the HPI or here.   Anesthesia Evaluation   Pt has had prior anesthetic.     No history of anesthetic complications       ROS/MED HX  ENT/Pulmonary:     (+)     EMILIANO risk factors,  hypertension,                              (-) tobacco use   Neurologic:  - neg neurologic ROS  (-) no seizures and no CVA   Cardiovascular:     (+) Dyslipidemia hypertension- -  CAD -  - -   Taking blood thinners                              Previous cardiac testing   Echo: Date: 4/2024 Results:  Interpretation Summary  Global and regional  "left ventricular function is hyperkinetic with an EF >70%.  Right ventricular function, chamber size, wall motion, and thickness are  normal.  Severe aortic stenosis is present.  The peak aortic velocity is 4.4 m/sec.  The mean gradient across the aortic valve is 41 mmHg.  The aortic valve area is 0.98 cm^2, by the continuity equation.  SVI 54.2ml/m2. DI 0.27. AVAI 0.52cm2/m2.  Pulmonary artery systolic pressure is normal.  The inferior vena cava is normal.  No pericardial effusion is present.  Increase in severity of aortic stenosis since previous study.    Stress Test:  Date: Results:    ECG Reviewed:  Date: 5/2024 Results:  Sinus bradycardia  Left axis deviation  Right bundle branch block  Abnormal ECG  Cath:  Date: 5/2024 Results:     Right sided filling pressures are normal.     Left sided filling pressures are normal.     Normal PA pressures.     Normal cardiac output level.     Hemodynamic data has been modified in Epic per physician review.      METS/Exercise Tolerance:  Comment: Limited activity (goal HR <110), limited to 10 pounds of weight lifting. Outside of those restrictions, doing regular exercise with some CLARKE   Hematologic:  - neg hematologic  ROS  (-) history of blood clots and history of blood transfusion   Musculoskeletal:  - neg musculoskeletal ROS     GI/Hepatic:  - neg GI/hepatic ROS  (-) GERD   Renal/Genitourinary:  - neg Renal ROS     Endo:     (+)  type II DM, Last HgA1c: 5.8, date: 3/15/24, Not using insulin,  Normal glucose range: fasting <110,            (-) chronic steroid usage   Psychiatric/Substance Use:       Infectious Disease:  - neg infectious disease ROS     Malignancy:       Other:            /56 (BP Location: Right arm, Patient Position: Sitting, Cuff Size: Adult Regular)   Pulse 60   Temp 98.6  F (37  C) (Oral)   Ht 1.702 m (5' 7\")   Wt 75.8 kg (167 lb)   SpO2 98%   BMI 26.16 kg/m      Physical Exam   Constitutional: Awake, alert, cooperative, no apparent " distress, and appears stated age.  Eyes: Pupils equal, round and reactive to light, extra ocular muscles intact, sclera clear, conjunctiva normal.  HENT: Normocephalic, oral pharynx with moist mucus membranes, good dentition.   Respiratory: Clear to auscultation bilaterally, no crackles or wheezing.  Cardiovascular: Regular rate and rhythm, systolic murmur present.  No edema. Palpable pulses to radial arteries.   GI: Normal bowel sounds, soft, non-distended, non-tender  Genitourinary:  deferred  Skin: Warm and dry.    Musculoskeletal: Full ROM of neck. There is no redness, warmth, or swelling of the exposed joints. Gross motor strength is normal.    Neurologic: Awake, alert, oriented to name, place and time. Cranial nerves II-XII are grossly intact.   Neuropsychiatric: Calm, cooperative. Normal affect.     Prior Labs/Diagnostic Studies   All labs and imaging personally reviewed     EKG/ stress test - if available please see in ROS above   Echo result w/o MOPS: Interpretation SummaryGlobal and regional left ventricular function is hyperkinetic with an EF >70%.Right ventricular function, chamber size, wall motion, and thickness arenormal.Severe aortic stenosis is present.The peak aortic velocity is 4.4 m/sec.The mean gradient across the aortic valve is 41 mmHg.The aortic valve area is 0.98 cm^2, by the continuity equation.SVI 54.2ml/m2. DI 0.27. AVAI 0.52cm2/m2.Pulmonary artery systolic pressure is normal.The inferior vena cava is normal.No pericardial effusion is present.Increase in severity of aortic stenosis since previous study.           No data to display                  The patient's records and results personally reviewed by this provider.     Outside records reviewed from: Care Everywhere    LAB/DIAGNOSTIC STUDIES TODAY:  multiple labs    Assessment    Won Urena is a 69 year old female seen as a PAC referral for risk assessment and optimization for anesthesia.    Plan/Recommendations  Pt will be  "optimized for the proposed procedure.  See below for details on the assessment, risk, and preoperative recommendations    NEUROLOGY  - No history of TIA, CVA or seizure  -Post Op delirium risk factors:  No risk identified    ENT  - No current airway concerns.  Will need to be reassessed day of surgery.  Mallampati: II  TM: > 3    CARDIAC  -CAD, aortic valve disease. She is now scheduled for the above procedure.   -hypertension using losartan, will hold x2 days prior to surgery   -dyslipidemia using lipitor  -previous cardiac testing above     PULMONARY  EMILIANO Low Risk             Total Score: 2    EMILIANO: Hypertension    EMILIANO: Over 50 ys old      - Denies asthma or inhaler use  - Tobacco History    History   Smoking Status    Never   Smokeless Tobacco    Never       GI  PONV High Risk  Total Score: 3           1 AN PONV: Pt is Female    1 AN PONV: Patient is not a current smoker    1 AN PONV: Intended Post Op Opioids        /RENAL  - Baseline Creatinine WNL    ENDOCRINE    - BMI: Estimated body mass index is 26.16 kg/m  as calculated from the following:    Height as of this encounter: 1.702 m (5' 7\").    Weight as of this encounter: 75.8 kg (167 lb).  Overweight (BMI 25.0-29.9)  -last A1c 5.8    HEME  VTE Low Risk 0.26%             Total Score: 1    VTE: Greater than 59 yrs old      - Platelet disfunction second to Aspirin (Jinny, many others)  -T&S updated today     MSK  0/5 on frailty screening       Different anesthesia methods/types have been discussed with the patient, but they are aware that the final plan will be decided by the assigned anesthesia provider on the date of service.    The patient is optimized for their procedure. AVS with information on surgery time/arrival time, meds and NPO status given by nursing staff. No further diagnostic testing indicated.      On the day of service:     Prep time: 5 minutes  Visit time: 9 minutes  Documentation time: 12 " minutes  ------------------------------------------  Total time: 26 minutes      Joceline El PA-C  Preoperative Assessment Center  [facility]  [facility]  Phone: [phone]  Fax: [phone]

## 2024-05-16 NOTE — NURSING NOTE
Chief Complaint   Patient presents with    New Patient     New CV surgery     Vitals were taken and medications reconciled.    John Lima, EMT  12:53 PM

## 2024-05-17 LAB — PREALB SERPL-MCNC: 18.2 MG/DL (ref 20–40)

## 2024-05-21 DIAGNOSIS — H40.003 GLAUCOMA SUSPECT OF BOTH EYES: Primary | ICD-10-CM

## 2024-05-21 NOTE — PROGRESS NOTES
Dear Colleagues,     We had the pleasure of seeing Won Urena today in our Cardiac Surgery Clinic at the [facility]. As you know she is a pleasant 69-year-old woman with a history of CAD, SVT, DM2 (A1c 5.8), HLD, and HTN who presents for evaluation of severe aortic stenosis.     TTE shows severe aortic stenosis with HOWARD 0.98, MG 41, PV 4.4, EF 70%. Cardiac catheterization reveals multivessel coronary artery disease.    Of note she reports increased fatigue and also some chest pressure or discomfort with exertion.     ROS: 10 point review of systems was performed and negative except as described above.     Past Medical History:   Past Medical History:   Diagnosis Date    Abnormal Pap smear of cervix     Aortic stenosis     Aortic valve stenosis     Benign colon polyp     CAD (coronary artery disease)     Dermatochalasis of both upper eyelids     Diabetes mellitus, type 2 (H)     Gestational diabetes     HTN (hypertension)     Hyperlipidemia LDL goal <100     Nonsenile cataract     Very beginnings    Post-menopausal atrophic vaginitis     Ptosis of both eyelids     Thickened endometrium         Past Surgical History:  Past Surgical History:   Procedure Laterality Date     SECTION      COMBINED REPAIR PTOSIS WITH BLEPHAROPLASTY Bilateral 2023    Procedure: Both upper eyelid blepharoplasty and ptosis repair;  Surgeon: Shantelle Marti MD;  Location: [facility] OR    COSMETIC BLEPHAROPLASTY LOWER LIDS BILATERAL Bilateral 2023    Procedure: Both lower eyelid blepharoplasty;  Surgeon: Shantelle Marti MD;  Location: [facility] OR    CV CORONARY ANGIOGRAM N/A 2023    Procedure: Coronary Angiogram;  Surgeon: Geovanni Ibarra MD;  Location: [facility] CARDIAC CATH LAB    CV CORONARY ANGIOGRAM N/A 2024    Procedure: Coronary Angiogram;  Surgeon: Bassam Flores MD;  [facility]    CV INSTANTANEOUS WAVE-FREE RATIO N/A 2023     Procedure: Instantaneous Wave-Free Ratio;  Surgeon: Geovanni Ibarra MD;  Location:  [facility]    CV RIGHT HEART CATH MEASUREMENTS RECORDED N/A 5/2/2024    Procedure: Right Heart Catheterization;  Surgeon: Bassam Flores MD;  Location:  [facility]         Family History: Mother - MI at 42     Social History: She lives with  and has 2 sons. She recently retired and exercises regularly. She denies smoking and uses alcohol occasionally.      Medications:   Current Outpatient Medications   Medication Sig Dispense Refill    ACCU-CHEK GUIDE test strip CHECK BLOOD SUGAR DAILY OR AS  DIRECTED 100 strip 2    aspirin 81 MG EC tablet Take 81 mg by mouth every evening      atorvastatin (LIPITOR) 40 MG tablet Take 1 tablet (40 mg) by mouth daily (Patient taking differently: Take 40 mg by mouth every evening) 90 tablet 3    Blood Glucose Monitoring Suppl (ACCU-CHEK GUIDE) w/Device KIT USE TO TEST BLOOD SUGAR ONCE  DAILY OR AS DIRECTED 1 kit 0    carboxymethylcellulose PF (REFRESH PLUS) 0.5 % ophthalmic solution Place 1 drop into both eyes 4 times daily 70 each 0    cyanocobalamin (VITAMIN B-12) 100 MCG tablet Take 100 mcg by mouth every evening      dapagliflozin (FARXIGA) 10 MG TABS tablet TAKE 1 TABLET BY MOUTH IN THE  MORNING 100 tablet 1    diltiazem (CARDIZEM) 60 MG tablet TAKE 1 TABLET BY MOUTH  TWICE DAILY 200 tablet 2    estradiol (ESTRACE) 0.1 MG/GM vaginal cream INSERT 1/2 APPLICATORFUL  VAGINALLY TWICE WEEKLY 85 g 3    fenofibrate (TRIGLIDE/LOFIBRA) 160 MG tablet Take 1 tablet (160 mg) by mouth every evening 100 tablet 3    gabapentin (NEURONTIN) 100 MG capsule Take 2 capsules (200 mg) by mouth at bedtime 200 capsule 3    levocetirizine (XYZAL) 5 MG tablet Take 5 mg by mouth every evening      losartan (COZAAR) 50 MG tablet Take 1 tablet (50 mg) by mouth every morning 100 tablet 3    metFORMIN (GLUCOPHAGE) 500 MG tablet TAKE 2 TABLETS BY MOUTH  TWICE DAILY WITH  MEALS 360 tablet 3    montelukast (SINGULAIR) 10 MG tablet TAKE 1 TABLET BY MOUTH AT  BEDTIME 100 tablet 2    multivitamin w/minerals (MULTI-VITAMIN) tablet Take 1 tablet by mouth every evening      Semaglutide, 1 MG/DOSE, (OZEMPIC, 1 MG/DOSE,) 4 MG/3ML pen INJECT SUBCUTANEOUSLY 1 MG  EVERY WEEK (Patient taking differently: 1 mg every 7 days INJECT SUBCUTANEOUSLY 1 MG  EVERY WEEK Sunday 5/12/24) 9 mL 0    Vitamin D, Cholecalciferol, 10 MCG (400 UNIT) TABS Take by mouth every evening       No current facility-administered medications for this visit.        Allergies:   Allergies   Allergen Reactions    Avapro [Irbesartan] Cough    Fluticasone Other (See Comments)     Loss of sense of taste        Physical Exam:  /71 (BP Location: Right arm, Patient Position: Chair, Cuff Size: Adult Regular)   Pulse 60   Wt 76.1 kg (167 lb 12.8 oz)   SpO2 96%   BMI 25.51 kg/m    Alert, pleasant, in no acute distress  Sclera anicteric  Neck supple JVD flat  Trachea midline  No prior chest incisions  Breathing unlabored on room air  Regular rate and rhythm  Abdomen soft, non-tender  Extremities warm, no edema      Labs  CBC RESULTS:   Recent Labs   Lab Test 05/02/24  1013 05/02/24  0711   WBC  --  4.3   RBC  --  4.45   HGB 12.8 13.2   HCT  --  40.2   MCV  --  90   MCH  --  29.7   MCHC  --  32.8   RDW  --  13.8   PLT  --  141*      Last Comprehensive Metabolic Panel:  Lab Results   Component Value Date     05/02/2024    POTASSIUM 4.2 05/02/2024    CHLORIDE 107 05/02/2024    CO2 24 05/02/2024    ANIONGAP 10 05/02/2024    GLC 85 05/02/2024    BUN 21.9 05/02/2024    CR 0.73 05/02/2024    GFRESTIMATED 89 05/02/2024    JOAQUINA 9.8 05/02/2024        Imaging: All imaging studies were reviewed and interpreted by me.    Cardiac catheterization 5/2/24:  Right Heart Catheterization:  RA --/6/3   RV 30/5  PA 30/10/15   PCW --/15/9   Smotoh CO 5.18   Smooth CI 2.74   TD CO 5.87   TD CI 3.1   PA sat 66%   Hgb 12.8 g/dL     LHC significant for  70% LAD stenosis, 60% Lcx stenosis, and 40% RCA stenosis.    TTE 4/4/24:  Global and regional left ventricular function is hyperkinetic with an EF >70%.  Right ventricular function, chamber size, wall motion, and thickness are  normal.  Severe aortic stenosis is present.  The peak aortic velocity is 4.4 m/sec.  The mean gradient across the aortic valve is 41 mmHg.  The aortic valve area is 0.98 cm^2, by the continuity equation.  SVI 54.2ml/m2. DI 0.27. AVAI 0.52cm2/m2.  Pulmonary artery systolic pressure is normal.  The inferior vena cava is normal.  No pericardial effusion is present.  Increase in severity of aortic stenosis since previous study.       In summary Won Urena is a 69-year-old woman with a history of CAD, SVT, DM2 (A1c 5.8), HLD, and HTN who presents for evaluation of severe, symptomatic aortic stenosis and severe, multivessel coronary artery disease.     I recommend surgical aortic valve replacement (tissue) with possible aortic root enlargement and surgical revascularization with coronary artery bypass grafting with LIMA and saphenous vein for conduits. The stenosis of the Lcx artery is not significant enough to support radial artery harvest. I described the technical details of the operation, as well as the expected postoperative course and recovery to the patient. I also discussed the risks, benefits, and alternatives to the procedure. The risks include but are not limited to bleeding, infection, stroke, heart or graft failure with myocardial infarction, dysrhythmia, need for pacemaker, respiratory failure, kidney or liver injury, bowel or limb ischemia, need for reoperation, and death. The patient understands these risks and would like to proceed with surgery.      The following preoperative workup should be completed prior to surgery: carotid US      Thank you very much for this referral,    Mitali Nicole MD

## 2024-05-22 ENCOUNTER — OFFICE VISIT (OUTPATIENT)
Dept: OPHTHALMOLOGY | Facility: CLINIC | Age: 69
End: 2024-05-22
Attending: OPHTHALMOLOGY
Payer: COMMERCIAL

## 2024-05-22 ENCOUNTER — VIRTUAL VISIT (OUTPATIENT)
Dept: ONCOLOGY | Facility: CLINIC | Age: 69
End: 2024-05-22
Attending: GENETIC COUNSELOR, MS
Payer: COMMERCIAL

## 2024-05-22 DIAGNOSIS — H40.003 GLAUCOMA SUSPECT OF BOTH EYES: ICD-10-CM

## 2024-05-22 DIAGNOSIS — H25.813 COMBINED FORMS OF AGE-RELATED CATARACT OF BOTH EYES: ICD-10-CM

## 2024-05-22 DIAGNOSIS — H52.203 HYPEROPIA OF BOTH EYES WITH ASTIGMATISM AND PRESBYOPIA: ICD-10-CM

## 2024-05-22 DIAGNOSIS — H33.322 RETINAL ROUND HOLE WITHOUT DETACHMENT, LEFT: ICD-10-CM

## 2024-05-22 DIAGNOSIS — Z80.0 FAMILY HISTORY OF MALIGNANT NEOPLASM OF COLON: ICD-10-CM

## 2024-05-22 DIAGNOSIS — H52.4 HYPEROPIA OF BOTH EYES WITH ASTIGMATISM AND PRESBYOPIA: ICD-10-CM

## 2024-05-22 DIAGNOSIS — E11.9 TYPE 2 DIABETES MELLITUS WITHOUT RETINOPATHY (H): Primary | ICD-10-CM

## 2024-05-22 DIAGNOSIS — Z80.49 FAMILY HISTORY OF MALIGNANT NEOPLASM OF GENITAL ORGAN: ICD-10-CM

## 2024-05-22 DIAGNOSIS — H52.03 HYPEROPIA OF BOTH EYES WITH ASTIGMATISM AND PRESBYOPIA: ICD-10-CM

## 2024-05-22 DIAGNOSIS — Z80.3 FAMILY HISTORY OF MALIGNANT NEOPLASM OF BREAST: Primary | ICD-10-CM

## 2024-05-22 PROCEDURE — G0463 HOSPITAL OUTPT CLINIC VISIT: HCPCS | Performed by: OPHTHALMOLOGY

## 2024-05-22 PROCEDURE — 92015 DETERMINE REFRACTIVE STATE: CPT

## 2024-05-22 PROCEDURE — 92014 COMPRE OPH EXAM EST PT 1/>: CPT | Performed by: OPHTHALMOLOGY

## 2024-05-22 PROCEDURE — 92133 CPTRZD OPH DX IMG PST SGM ON: CPT | Performed by: OPHTHALMOLOGY

## 2024-05-22 PROCEDURE — 999N000069 HC STATISTIC GENETIC COUNSELING, < 16 MIN: Mod: GT,95 | Performed by: GENETIC COUNSELOR, MS

## 2024-05-22 ASSESSMENT — REFRACTION_WEARINGRX
OD_CYLINDER: SPHERE
OS_ADD: +2.75
OD_SPHERE: +0.50
OS_SPHERE: +0.50
OD_ADD: +2.75
OS_AXIS: 175
OS_CYLINDER: +0.75

## 2024-05-22 ASSESSMENT — EXTERNAL EXAM - RIGHT EYE: OD_EXAM: NORMAL

## 2024-05-22 ASSESSMENT — CONF VISUAL FIELD
OS_INFERIOR_NASAL_RESTRICTION: 0
OS_SUPERIOR_TEMPORAL_RESTRICTION: 0
OS_INFERIOR_TEMPORAL_RESTRICTION: 0
OD_SUPERIOR_TEMPORAL_RESTRICTION: 0
OS_NORMAL: 1
OD_INFERIOR_NASAL_RESTRICTION: 0
OS_SUPERIOR_NASAL_RESTRICTION: 0
OD_SUPERIOR_NASAL_RESTRICTION: 0
METHOD: COUNTING FINGERS
OD_NORMAL: 1
OD_INFERIOR_TEMPORAL_RESTRICTION: 0

## 2024-05-22 ASSESSMENT — VISUAL ACUITY
OS_CC+: -3
METHOD: SNELLEN - LINEAR
OD_CC: 20/20
CORRECTION_TYPE: GLASSES
OS_CC: 20/20
OD_CC+: -2

## 2024-05-22 ASSESSMENT — TONOMETRY
OS_IOP_MMHG: 13
OD_IOP_MMHG: 12
IOP_METHOD: TONOPEN

## 2024-05-22 ASSESSMENT — REFRACTION_MANIFEST
OS_ADD: +2.75
OD_ADD: +2.75
OD_SPHERE: +0.25
OS_AXIS: 175
OS_CYLINDER: +1.00
OS_SPHERE: +0.25

## 2024-05-22 ASSESSMENT — SLIT LAMP EXAM - LIDS
COMMENTS: NORMAL
COMMENTS: NORMAL

## 2024-05-22 ASSESSMENT — EXTERNAL EXAM - LEFT EYE: OS_EXAM: NORMAL

## 2024-05-22 ASSESSMENT — CUP TO DISC RATIO
OS_RATIO: 0.5
OD_RATIO: 0.65

## 2024-05-22 NOTE — PROGRESS NOTES
"5/22/2024    Referring Provider: Self Referred    Presenting Information:  I spoke to Won by video today to discuss her genetic testing results. Her blood was drawn on 5/7/24. The Common Hereditary Cancers panel was ordered from [employer]. This testing was done because of Won's family history of breast, stomach, and gynecological cancer.    Genetic Testing Result: NEGATIVE  Won is negative for mutations in APC, DENNISE, AXIN2, BAP1, BARD1, BMPR1A, BRCA1, BRCA2, BRIP1, CDH1, CDK4, CDKN2A, CHEK2, CTNNA1, DICER1, EPCAM, FH, GREM1, HOXB13, KIT, MBD4, MEN1, MLH1, MSH2, MSH3, MSH6, MUTYH, NF1, NTHL1, PALB2, PDGFRA, PMS2, POLD1, POLE, PTEN, RAD51C, RAD51D, SDHA, SDHB, SDHC, SDHD, SMAD4, SMARCA4, STK11, TP53, TSC1, TSC2, and VHL. No mutations were found in any of the 48 genes analyzed. This test involved sequencing and deletion/duplication analysis of all genes with the exceptions of EPCAM and GREM1 (deletions/duplications only) and SDHA (sequencing only).     A copy of the test report can be found in the Laboratory tab, dated 5/7/24, and named \"LABORATORY MISCELLANEOUS ORDER\". The report is scanned in as a linked document.    Interpretation:  We discussed several different interpretations of this negative test result.    One explanation may be that there is a different gene or combination of genes and environment that are associated with the cancers in this family.  It is possible that her paternal grandfather did have a mutation in one of these genes and she did not inherit it.  There is also a small possibility that there is a mutation in one of these genes, and the testing laboratory could not find it with their current testing methods.       Screening:  Based on this negative test result, it is important for Won and her relatives to refer back to the family history for appropriate cancer screening.    Based on her personal and family history, Won has a 8.2% lifetime risk of developing breast cancer based " on the SUMMER model. Therefore, Won does not meet current National Comprehensive Cancer Network (NCCN) guidelines for high risk breast screening, which is offered to women with a 20% lifetime risk or higher. However, it is still important for Won to continue with routine breast screening under the care of her physicians. Breast cancer screening is generally recommended to begin approximately 10 years younger than the earliest age of breast cancer diagnosis in the family, or at age 40, whichever comes first. In this family, screening may begin at age 40. Won is encouraged to discuss breast screening with her physicians.   Other population cancer screening options, such as those recommended by the American Cancer Society and the National Comprehensive Cancer Network (NCCN), are also appropriate for Won and her family. These screening recommendations may change if there are changes to Won's personal and/or family history of cancer. Final screening recommendations should be made by each individual's primary care provider.      Inheritance:  We reviewed autosomal dominant inheritance. We discussed that Won did not pass on an identifiable mutation in these genes to her children based on this test result. Mutations in these genes do not skip generations.      Summary:  We do not have an explanation for Won's family history of cancer. While no genetic changes were identified, Won may still be at risk for certain cancers due to family history, environmental factors, or other genetic causes not identified by this test. Because of that, it is important that she continue with cancer screening based on her personal and family history as discussed above.    Genetic testing is rapidly advancing, and new cancer susceptibility genes will most likely be identified in the future. Therefore, I encouraged Won to contact me annually or if there are changes in her personal or family history. This may  change how we assess her cancer risk, screening, and the testing we would offer.    Plan:  1.  A copy of the test results will be sent to Won.  2. She plans to follow-up with her other providers.  3. She should contact me regularly, or sooner if her family history changes.    If Won has any further questions, I encouraged her to contact me via [employer].    Time spent on video: 5 minutes.    John Arce MS, [facility]  Licensed, Certified Genetic Counselor      Virtual Visit Details    Type of service:  Video Visit     Originating Location (pt. Location): Home  Distant Location (provider location):  Off-site  Platform used for Video Visit: [employer]

## 2024-05-22 NOTE — LETTER
"May 22, 2024    Won Urena  [address]    [place] [address]      Dear Won,    It was a pleasure speaking with you over video on 5/22/2024. Here is a copy of the progress note from our discussion. If you have any additional questions, please feel free to call.    Referring Provider: Self Referred    Presenting Information:  I spoke to Won by video today to discuss her genetic testing results. Her blood was drawn on 5/7/24. The Common Hereditary Cancers panel was ordered from [employer]. This testing was done because of Won's family history of breast, stomach, and gynecological cancer.    Genetic Testing Result: NEGATIVE  Won is negative for mutations in APC, DENNISE, AXIN2, BAP1, BARD1, BMPR1A, BRCA1, BRCA2, BRIP1, CDH1, CDK4, CDKN2A, CHEK2, CTNNA1, DICER1, EPCAM, FH, GREM1, HOXB13, KIT, MBD4, MEN1, MLH1, MSH2, MSH3, MSH6, MUTYH, NF1, NTHL1, PALB2, PDGFRA, PMS2, POLD1, POLE, PTEN, RAD51C, RAD51D, SDHA, SDHB, SDHC, SDHD, SMAD4, SMARCA4, STK11, TP53, TSC1, TSC2, and VHL. No mutations were found in any of the 48 genes analyzed. This test involved sequencing and deletion/duplication analysis of all genes with the exceptions of EPCAM and GREM1 (deletions/duplications only) and SDHA (sequencing only).     A copy of the test report can be found in the Laboratory tab, dated 5/7/24, and named \"LABORATORY MISCELLANEOUS ORDER\". The report is scanned in as a linked document.    Interpretation:  We discussed several different interpretations of this negative test result.    One explanation may be that there is a different gene or combination of genes and environment that are associated with the cancers in this family.  It is possible that her paternal grandfather did have a mutation in one of these genes and she did not inherit it.  There is also a small possibility that there is a mutation in one of these genes, and the testing laboratory could not find it with their current testing methods.   "       Screening:  Based on this negative test result, it is important for Won and her relatives to refer back to the family history for appropriate cancer screening.    Based on her personal and family history, Won has a 8.2% lifetime risk of developing breast cancer based on the SUMMER model. Therefore, Won does not meet current National Comprehensive Cancer Network (NCCN) guidelines for high risk breast screening, which is offered to women with a 20% lifetime risk or higher. However, it is still important for Won to continue with routine breast screening under the care of her physicians. Breast cancer screening is generally recommended to begin approximately 10 years younger than the earliest age of breast cancer diagnosis in the family, or at age 40, whichever comes first. In this family, screening may begin at age 40. Won is encouraged to discuss breast screening with her physicians.   Other population cancer screening options, such as those recommended by the American Cancer Society and the National Comprehensive Cancer Network (NCCN), are also appropriate for Won and her family. These screening recommendations may change if there are changes to Won's personal and/or family history of cancer. Final screening recommendations should be made by each individual's primary care provider.      Inheritance:  We reviewed autosomal dominant inheritance. We discussed that Won did not pass on an identifiable mutation in these genes to her children based on this test result. Mutations in these genes do not skip generations.      Summary:  We do not have an explanation for Won's family history of cancer. While no genetic changes were identified, Won may still be at risk for certain cancers due to family history, environmental factors, or other genetic causes not identified by this test. Because of that, it is important that she continue with cancer screening based on her personal and  family history as discussed above.    Genetic testing is rapidly advancing, and new cancer susceptibility genes will most likely be identified in the future. Therefore, I encouraged Won to contact me annually or if there are changes in her personal or family history. This may change how we assess her cancer risk, screening, and the testing we would offer.    Plan:  1.  A copy of the test results will be sent to Won.  2. She plans to follow-up with her other providers.  3. She should contact me regularly, or sooner if her family history changes.    If Won has any further questions, I encouraged her to contact me via [employer].    Time spent on video: 5 minutes.    John Arce MS, [facility]  Licensed, Certified Genetic Counselor

## 2024-05-22 NOTE — NURSING NOTE
Chief Complaints and History of Present Illnesses   Patient presents with    Follow Up     Follow up for glaucoma suspect     Chief Complaint(s) and History of Present Illness(es)       Follow Up              Laterality: both eyes    Associated symptoms: Negative for flashes and floaters    Treatments tried: artificial tears    Pain scale: 0/10    Comments: Follow up for glaucoma suspect              Comments    The patient reports stable vision.  She is scheduled with open heart surgery next week.  She has no flashes or floaters.  Kathi Powers COA, COA 8:01 AM 05/22/2024

## 2024-05-22 NOTE — NURSING NOTE
Is the patient currently in the state of [place]? YES    Visit mode:VIDEO    If the visit is dropped, the patient can be reconnected by: VIDEO VISIT: Text to cell phone:   Telephone Information:   Mobile [phone]       Will anyone else be joining the visit? No  (If patient encounters technical issues they should call [phone])    How would you like to obtain your AVS? MyChart    Are changes needed to the allergy or medication list? N/A    Are refills needed on medications prescribed by this physician? NO    Rooming Documentation: Assigned questionnaire(s) completed .    Reason for visit: DOT Angela

## 2024-05-22 NOTE — Clinical Note
"    5/22/2024         RE: Won Urena  [address]  [address]  [place] [address]        Dear Colleague,    Thank you for referring your patient, Won Urena, to the [facility]. Please see a copy of my visit note below.    Virtual Visit Details    Type of service:  Video Visit   Video Start Time: {video visit start/end time for provider to select:711346}  Video End Time:{video visit start/end time for provider to select:720827}    Originating Location (pt. Location): {video visit patient location:491714::\"Home\"}  {PROVIDER LOCATION On-site should be selected for visits conducted from your clinic location or adjoining [facility] hospital, academic office, or other nearby [facility] building. Off-site should be selected for all other provider locations, including home:659810}  Distant Location (provider location):  {virtual location provider:632063}  Platform used for Video Visit: {Virtual Visit Platforms:770955::\"[employer]\"}    5/22/2024    Referring Provider: Self Referred    Presenting Information:  I spoke to Won by video today to discuss her genetic testing results. Her blood was drawn on 5/7/24. The Common Hereditary Cancers panel was ordered from [employer]. This testing was done because of Won's family history of breast, stomach, and gynecological cancer.    Genetic Testing Result: NEGATIVE  Won is negative for mutations in APC, DENNISE, AXIN2, BAP1, BARD1, BMPR1A, BRCA1, BRCA2, BRIP1, CDH1, CDK4, CDKN2A, CHEK2, CTNNA1, DICER1, EPCAM, FH, GREM1, HOXB13, KIT, MBD4, MEN1, MLH1, MSH2, MSH3, MSH6, MUTYH, NF1, NTHL1, PALB2, PDGFRA, PMS2, POLD1, POLE, PTEN, RAD51C, RAD51D, SDHA, SDHB, SDHC, SDHD, SMAD4, SMARCA4, STK11, TP53, TSC1, TSC2, and VHL. No mutations were found in any of the 48 genes analyzed. This test involved sequencing and deletion/duplication analysis of all genes with the exceptions of EPCAM and GREM1 (deletions/duplications only) and SDHA (sequencing only).     A copy of " "the test report can be found in the Laboratory tab, dated 5/7/24, and named \"LABORATORY MISCELLANEOUS ORDER\". The report is scanned in as a linked document.    Interpretation:  We discussed several different interpretations of this negative test result.    One explanation may be that there is a different gene or combination of genes and environment that are associated with the cancers in this family.  It is possible that her paternal grandfather did have a mutation in one of these genes and she did not inherit it.  There is also a small possibility that there is a mutation in one of these genes, and the testing laboratory could not find it with their current testing methods.       Screening:  Based on this negative test result, it is important for Won and her relatives to refer back to the family history for appropriate cancer screening.    Based on her personal and family history, Won has a 8.2% lifetime risk of developing breast cancer based on the SUMMER model. Therefore, Won does not meet current National Comprehensive Cancer Network (NCCN) guidelines for high risk breast screening, which is offered to women with a 20% lifetime risk or higher. However, it is still important for Won to continue with routine breast screening under the care of her physicians. Breast cancer screening is generally recommended to begin approximately 10 years younger than the earliest age of breast cancer diagnosis in the family, or at age 40, whichever comes first. In this family, screening may begin at age 40. Won is encouraged to discuss breast screening with her physicians.   Other population cancer screening options, such as those recommended by the American Cancer Society and the National Comprehensive Cancer Network (NCCN), are also appropriate for Won and her family. These screening recommendations may change if there are changes to Won's personal and/or family history of cancer. Final screening " recommendations should be made by each individual's primary care provider.      Inheritance:  We reviewed autosomal dominant inheritance. We discussed that Won did not pass on an identifiable mutation in these genes to her children based on this test result. Mutations in these genes do not skip generations.      Summary:  We do not have an explanation for Won's family history of cancer. While no genetic changes were identified, Won may still be at risk for certain cancers due to family history, environmental factors, or other genetic causes not identified by this test. Because of that, it is important that she continue with cancer screening based on her personal and family history as discussed above.    Genetic testing is rapidly advancing, and new cancer susceptibility genes will most likely be identified in the future. Therefore, I encouraged Won to contact me annually or if there are changes in her personal or family history. This may change how we assess her cancer risk, screening, and the testing we would offer.    Plan:  1.  A copy of the test results will be sent to Wno.  2. She plans to follow-up with her other providers.  3. She should contact me regularly, or sooner if her family history changes.    If Won has any further questions, I encouraged her to contact me via [employer].    Time spent on video: *** minutes.    John Arce MS, [facility]  Licensed, Certified Genetic Counselor    ***  Virtual Visit Details    Type of service:  Video Visit     Originating Location (pt. Location): Home  Distant Location (provider location):  Off-site  Platform used for Video Visit: [employer]      Again, thank you for allowing me to participate in the care of your patient.        Sincerely,        John Arce GC

## 2024-05-22 NOTE — PROGRESS NOTES
HPI       Follow Up    In both eyes.  Associated symptoms include Negative for flashes and floaters.  Treatments tried include artificial tears.  Pain was noted as 0/10. Additional comments: Follow up for glaucoma suspect             Comments    The patient reports stable vision.  She is scheduled with open heart surgery next week.  She has no flashes or floaters.  WOJCIECH Nielsen, COA 8:01 AM 05/22/2024            Last edited by Kathi Powers COA on 5/22/2024  8:01 AM.         Review of systems for the eyes was negative other than the pertinent positives/negatives listed in the HPI.      Assessment & Plan    HPI:  Won Urena is a 69 year old female with history of HTN, HLD, T2DM, AAA who presents for complete eye exam. She is seeing well. Denies redness, tearing flashes or floaters.       POHx: hyperopia with astigmatism and presbyopia, operculated hole left eye, nyctalopia  PMHx: HTN, HLD, T2DM, AAA  Current Medications:   Current Outpatient Medications   Medication Sig Dispense Refill    ACCU-CHEK GUIDE test strip CHECK BLOOD SUGAR DAILY OR AS  DIRECTED 100 strip 2    aspirin 81 MG EC tablet Take 81 mg by mouth every evening      atorvastatin (LIPITOR) 40 MG tablet Take 1 tablet (40 mg) by mouth daily (Patient taking differently: Take 40 mg by mouth every evening) 90 tablet 3    Blood Glucose Monitoring Suppl (ACCU-CHEK GUIDE) w/Device KIT USE TO TEST BLOOD SUGAR ONCE  DAILY OR AS DIRECTED 1 kit 0    carboxymethylcellulose PF (REFRESH PLUS) 0.5 % ophthalmic solution Place 1 drop into both eyes 4 times daily 70 each 0    cyanocobalamin (VITAMIN B-12) 100 MCG tablet Take 100 mcg by mouth every evening      dapagliflozin (FARXIGA) 10 MG TABS tablet TAKE 1 TABLET BY MOUTH IN THE  MORNING 100 tablet 1    diltiazem (CARDIZEM) 60 MG tablet TAKE 1 TABLET BY MOUTH  TWICE DAILY 200 tablet 2    estradiol (ESTRACE) 0.1 MG/GM vaginal cream INSERT 1/2 APPLICATORFUL  VAGINALLY TWICE WEEKLY 85 g 3     fenofibrate (TRIGLIDE/LOFIBRA) 160 MG tablet Take 1 tablet (160 mg) by mouth every evening 100 tablet 3    gabapentin (NEURONTIN) 100 MG capsule Take 2 capsules (200 mg) by mouth at bedtime 200 capsule 3    levocetirizine (XYZAL) 5 MG tablet Take 5 mg by mouth every evening      losartan (COZAAR) 50 MG tablet Take 1 tablet (50 mg) by mouth every morning 100 tablet 3    metFORMIN (GLUCOPHAGE) 500 MG tablet TAKE 2 TABLETS BY MOUTH  TWICE DAILY WITH MEALS 360 tablet 3    montelukast (SINGULAIR) 10 MG tablet TAKE 1 TABLET BY MOUTH AT  BEDTIME 100 tablet 2    multivitamin w/minerals (MULTI-VITAMIN) tablet Take 1 tablet by mouth every evening      Semaglutide, 1 MG/DOSE, (OZEMPIC, 1 MG/DOSE,) 4 MG/3ML pen INJECT SUBCUTANEOUSLY 1 MG  EVERY WEEK (Patient taking differently: 1 mg every 7 days INJECT SUBCUTANEOUSLY 1 MG  EVERY WEEK Sunday 5/12/24) 9 mL 0    Vitamin D, Cholecalciferol, 10 MCG (400 UNIT) TABS Take by mouth every evening       No current facility-administered medications for this visit.     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 Discussed possible surgical intervention, however patient would like to attempt glasses first. The patient will monitor for vision changes and contact us with any decrease in vision. Recheck next visit. May consider cataract surgery at any time    (E11.9) Type 2 diabetes mellitus without retinopathy (H)  (primary encounter diagnosis)  Most recent HgBA1c 5.8 on 3/15/24  No background diabetic retinopathy or neovascularization noted on today's exam.  Discussed ocular and systemic benefits of blood pressure and blood sugar control.  Return in 1 year for full exam with dilation or sooner if changes to vision.        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  The patient may wish to update the glasses if the lenses are scratched or the frames are too small.  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  Based on c/d ratio  Maximum intraocular pressure 15/15  Family history: Yes Dad  Trauma history: No  Gonioscopy:   Pachmetry:   Treatment History:   None  Today's testing:  OCT nerve fiber layer 05/22/24   Right eye - G(g) 91 NI (g) 78 TI (g) 125 NS (g) 89 TS (g) 138      Left eye - G(g) 99 NI (g) 94 TI (g) 136 NS (g) 91 TS (g) 140  OCT nerve fiber layer 05/17/23:   Right eye - G(g) 96 NI (g) 84 TI (g) 136 NS (g) 87 TS (g) 139      Left eye - G(g) 93 NI (g) 94 TI (g) 137 NS (g) 88 TS (g) 134    Mild thinning inferiorly right eye vs previous but still within normal limits, IOP within normal limits  Continue observation for now given normal IOP and oct rnfl      Return in about 1 year (around 5/22/2025) for Annual Visit-v/t/d/MRx, OCT RNFL.        Duane Manuel MD     Attending Physician Attestation:  Complete documentation of historical and exam elements from today's encounter can be found in the full encounter summary report (not reduplicated in this progress note).  I personally obtained the chief complaint(s) and history of present illness.  I confirmed and edited as necessary the review of systems, past medical/surgical history, family history, social history, and examination findings as documented by others; and I examined the patient myself.  I personally reviewed the relevant tests, images, and reports as documented above.  I formulated and edited as necessary the assessment and plan and discussed the findings and  management plan with the patient and family. - Duane Manuel MD

## 2024-05-24 ENCOUNTER — MYC MEDICAL ADVICE (OUTPATIENT)
Dept: FAMILY MEDICINE | Facility: CLINIC | Age: 69
End: 2024-05-24

## 2024-05-24 DIAGNOSIS — I10 ESSENTIAL HYPERTENSION: ICD-10-CM

## 2024-05-24 DIAGNOSIS — I35.0 NONRHEUMATIC AORTIC VALVE STENOSIS: ICD-10-CM

## 2024-05-24 DIAGNOSIS — I71.21 ASCENDING AORTIC ANEURYSM (H): ICD-10-CM

## 2024-05-24 DIAGNOSIS — E11.9 TYPE 2 DIABETES MELLITUS WITHOUT COMPLICATION, WITHOUT LONG-TERM CURRENT USE OF INSULIN (H): ICD-10-CM

## 2024-05-24 DIAGNOSIS — J30.1 SEASONAL ALLERGIC RHINITIS DUE TO POLLEN: ICD-10-CM

## 2024-05-24 RX ORDER — DILTIAZEM HCL 60 MG
60 TABLET ORAL 2 TIMES DAILY
Qty: 200 TABLET | Refills: 2 | Status: ON HOLD | OUTPATIENT
Start: 2024-05-24 | End: 2024-06-17

## 2024-05-24 RX ORDER — SEMAGLUTIDE 1.34 MG/ML
INJECTION, SOLUTION SUBCUTANEOUS
Qty: 9 ML | Refills: 2 | Status: SHIPPED | OUTPATIENT
Start: 2024-05-24

## 2024-05-24 RX ORDER — MONTELUKAST SODIUM 10 MG/1
1 TABLET ORAL AT BEDTIME
Qty: 100 TABLET | Refills: 2 | Status: SHIPPED | OUTPATIENT
Start: 2024-05-24

## 2024-05-24 NOTE — TELEPHONE ENCOUNTER
Semaglutide (Ozempic) 4 mg/3 ml, Montelukast (Singulair) 10 mg + Diltiazem (Cardizem) 60 mg    Last Office Visit: 3/15/24  [facility] Appointments: None    Medication last refilled:   8/28/23 #200 with 2 refill(s) - Diltiazem  8/11/23 #100 with 2 refill(s) - Montelukast  3/12/24 #9 ml with 0 refill(s) - Semaglutide    Vital Signs Systolic Diastolic   5/16/24 113 56   8/21/23 123 77   10/24/22 118 76     Required labs per protocol:    LAB REF RANGE 8/20/23 5/2/24   Creatinine 0.67-1.17 mg/dL 0.87 0.73   GFR >60 mL/min/1.73m2  73 89   ALT 10-50 U/L 10 18   Hgb A1C 0.0 - 5.6 % 6.3 High 5.8 High     Prescription approved per [facility] Refill Protocol.    Maria L Samayoa, JORDIN, RN, CCM

## 2024-05-29 ENCOUNTER — PREP FOR PROCEDURE (OUTPATIENT)
Dept: CARDIOLOGY | Facility: CLINIC | Age: 69
End: 2024-05-29
Payer: COMMERCIAL

## 2024-05-29 ENCOUNTER — MYC MEDICAL ADVICE (OUTPATIENT)
Dept: FAMILY MEDICINE | Facility: CLINIC | Age: 69
End: 2024-05-29

## 2024-05-29 ENCOUNTER — TELEPHONE (OUTPATIENT)
Dept: CARDIOLOGY | Facility: CLINIC | Age: 69
End: 2024-05-29
Payer: COMMERCIAL

## 2024-05-29 DIAGNOSIS — I25.10 CAD (CORONARY ARTERY DISEASE): Primary | ICD-10-CM

## 2024-05-29 NOTE — TELEPHONE ENCOUNTER
Due to a emergency , pts 5/29 surgery with Dr. Nicole needs to be rescheduled. Talked with pt and rescheduled surgery for 6/12. Will call if anything changes

## 2024-05-30 ENCOUNTER — DOCUMENTATION ONLY (OUTPATIENT)
Dept: OTHER | Facility: CLINIC | Age: 69
End: 2024-05-30
Payer: COMMERCIAL

## 2024-05-30 NOTE — TELEPHONE ENCOUNTER
Alerted [employer] to the presence of the healthcare directive for review.    Ethel Reveles, LICSW

## 2024-05-31 DIAGNOSIS — I35.0 SEVERE AORTIC STENOSIS: ICD-10-CM

## 2024-05-31 DIAGNOSIS — I25.10 CORONARY ARTERY DISEASE DUE TO CALCIFIED CORONARY LESION: Primary | ICD-10-CM

## 2024-05-31 DIAGNOSIS — I25.84 CORONARY ARTERY DISEASE DUE TO CALCIFIED CORONARY LESION: Primary | ICD-10-CM

## 2024-05-31 RX ORDER — ASPIRIN 81 MG/1
162 TABLET, CHEWABLE ORAL
Status: CANCELLED | OUTPATIENT
Start: 2024-05-31

## 2024-05-31 RX ORDER — CEFAZOLIN SODIUM 2 G/50ML
2 SOLUTION INTRAVENOUS
Status: CANCELLED | OUTPATIENT
Start: 2024-05-31

## 2024-05-31 RX ORDER — LIDOCAINE 40 MG/G
CREAM TOPICAL
Status: CANCELLED | OUTPATIENT
Start: 2024-05-31

## 2024-05-31 RX ORDER — CEFAZOLIN SODIUM 2 G/50ML
2 SOLUTION INTRAVENOUS SEE ADMIN INSTRUCTIONS
Status: CANCELLED | OUTPATIENT
Start: 2024-05-31

## 2024-05-31 RX ORDER — DEXMEDETOMIDINE HYDROCHLORIDE 4 UG/ML
.2-1.2 INJECTION, SOLUTION INTRAVENOUS CONTINUOUS
Status: CANCELLED | OUTPATIENT
Start: 2024-06-12

## 2024-05-31 RX ORDER — GABAPENTIN 100 MG/1
100 CAPSULE ORAL
Status: CANCELLED | OUTPATIENT
Start: 2024-05-31

## 2024-05-31 RX ORDER — ASPIRIN 81 MG/1
81 TABLET, CHEWABLE ORAL
Status: CANCELLED | OUTPATIENT
Start: 2024-05-31

## 2024-05-31 RX ORDER — PHENYLEPHRINE HCL IN 0.9% NACL 50MG/250ML
.1-6 PLASTIC BAG, INJECTION (ML) INTRAVENOUS CONTINUOUS
Status: CANCELLED | OUTPATIENT
Start: 2024-06-12

## 2024-05-31 RX ORDER — ACETAMINOPHEN 325 MG/1
975 TABLET ORAL ONCE
Status: CANCELLED | OUTPATIENT
Start: 2024-05-31 | End: 2024-05-31

## 2024-05-31 RX ORDER — NOREPINEPHRINE BITARTRATE 0.06 MG/ML
.01-.1 INJECTION, SOLUTION INTRAVENOUS CONTINUOUS
Status: CANCELLED | OUTPATIENT
Start: 2024-06-12

## 2024-05-31 RX ORDER — CHLORHEXIDINE GLUCONATE ORAL RINSE 1.2 MG/ML
10 SOLUTION DENTAL ONCE
Status: CANCELLED | OUTPATIENT
Start: 2024-05-31 | End: 2024-05-31

## 2024-05-31 RX ORDER — FAMOTIDINE 20 MG/1
20 TABLET, FILM COATED ORAL
Status: CANCELLED | OUTPATIENT
Start: 2024-05-31

## 2024-06-10 ENCOUNTER — ANESTHESIA EVENT (OUTPATIENT)
Dept: SURGERY | Facility: CLINIC | Age: 69
DRG: 219 | End: 2024-06-10
Payer: COMMERCIAL

## 2024-06-10 NOTE — H&P
CV ICU H&P    ASSESSMENT: Won Urena is a 69 year old female with PMH of DM2, HTN, HLD, CAD, severe AS. Presents to [facility] for CABG and AVR by Dr. Nicole.    Signout: 2-vessel CABG and AVR 25 mm, LIMA to LAD, R saphenous to OM  - ETT 8.0 at 24cm, right radial A-line, RIJ MAC w/ HF, PIV 18g  - Fent 750 mcg, Versed 1 mg, Dilaudid 1 mg, reversed  - Arrives on prop 50, NE 0.07, Epi 0.03   - IVF 3.5L, 500ml Albumin, 397ml Cell Saver, UOP 600cc  - Coming off bypass no issues. AV gradient 11-16  - CVP last 14, very volume responsive, can tolerate lot of fluid, likely euvolemic  - CT x4 2 (2 Meds, 2 Pleurals)  - A & V wires, in NSR now. AAI at 50 bpm backup  - SBP , Fastrack    CO-MORBIDITIES:   Patient Active Problem List   Diagnosis    Type 2 diabetes mellitus without complication, without long-term current use of insulin (H)    Coronary artery calcification    Nonrheumatic aortic valve stenosis    Ascending aortic aneurysm (H24)    Hyperlipidemia LDL goal <100    Essential hypertension    Screening for colon cancer    Genitourinary syndrome of menopause    Environmental allergies    Seasonal allergic rhinitis due to pollen    Status post coronary angiogram    Coronary artery disease involving native heart without angina pectoris, unspecified vessel or lesion type    CAD (coronary artery disease)    Severe aortic stenosis    Coronary artery disease involving native coronary artery without angina pectoris    Coronary artery disease involving native coronary artery of native heart without angina pectoris    Coronary artery disease due to calcified coronary lesion         PLAN:  Neuro/ pain/ sedation:  - Monitor neurological status. Notify the MD for any acute changes in exam.  #Acute postoperative pain  - Scheduled: Tylenol  - PRN: Tylenol, oxycodone, Dilaudid  #Sedation  - Gtt: prop 50    Pulmonary care:   #Mechanical ventilation  Resp: 16     - Goal SpO2 > 92%  - Encourage IS q15-30 minutes when  awake.    Cardiovascular:    #Cardiogenic shock  #CAD  #Severe AS s/p AVR and CABG  Pre CPB: Normal BiV systolic function, EF 60%. Grade II DD. Severe AS. Mild MR. Mild TR.   Post CPB: Unchanged BiV function. New aortic bioprosthetic valve, no leak. Mild TR.  - Epi, NE, Vaso gtts for inotropic and pressor support, wean as able  - Goal MAP >65, SBP , monitor hemodynamics  - Hold PTA atorvastatin 40 mg, diltiazem 60 mg, fenofibrate 160 mg, losartan 50 mg  - Give ASA within 6 hours  - Start Coumadin when CTs come out    GI care / Nutrition:   - NPO, bedside swallow eval once extubated  - PPI  - Bowel regimen: MiraLAX, senna    Renal / Fluids / Electrolytes:   BL creat appears to be ~ 0.7  - Strict I/O, daily weights, avoid/limit nephrotoxins  - Replete lytes PRN per protocol    Endocrine:    #Stress hyperglycemia  #DM2  Preop A1c 5.8  - Insulin gtt  - Goal BG <180 for optimal healing  - Hold dapagliflozin 10 mg, semaglutide, metformin 500 mg    ID / Antibiotics:  #Stress induced leukocytosis  - To complete perioperative regimen  - Monitor fever curve, WBC, and inflammatory markers as appropriate    Heme:     #Acute blood loss anemia  #Acute blood loss thrombocytopenia  No s/sx active bleeding  - CBC   - Hgb goal > 7.0  - Hemoglobin stable 10.1    MSK / Skin:  #Sternotomy  #Surgical Incision  - Sternal precautions, postop incision management protocol  - PT/OT/CR    Prophylaxis:     - Mechanical DVT ppx  - Chemical DVT ppx: start SQH tomorrow  - PPI    Lines / Tubes / Drains:  - ETT  - RIJ CVC, PA catheter  - Arterial Line  - CTs x4 (2 Meds, 2 Pleurals)  - Trejo    Disposition:  - CVICU      Patient seen, findings and plan discussed with CVICU staff and cardiothoracic surgeons and fellow.    Ghanshyam Avila MD  Anesthesiology Resident, CA-2, PGY-3        ====================================    HPI:   Won Urena is a 69 year old female with PMH of DM2, HTN, HLD, CAD, severe AS. Presents to [facility] for CABG and  AVR by Dr. Nicole. Presents to CVICU intubated and sedated.      PAST MEDICAL HISTORY:   Past Medical History:   Diagnosis Date    Abnormal Pap smear of cervix     Aortic stenosis     Aortic valve stenosis     Benign colon polyp     CAD (coronary artery disease)     Dermatochalasis of both upper eyelids     Diabetes mellitus, type 2 (H)     Gestational diabetes     HTN (hypertension)     Hyperlipidemia LDL goal <100     Nonsenile cataract     Very beginnings    Post-menopausal atrophic vaginitis     Ptosis of both eyelids     Thickened endometrium        PAST SURGICAL HISTORY:   Past Surgical History:   Procedure Laterality Date     SECTION      COMBINED REPAIR PTOSIS WITH BLEPHAROPLASTY Bilateral 2023    Procedure: Both upper eyelid blepharoplasty and ptosis repair;  Surgeon: Shantelle Marti MD;  Location: [facility] OR    COSMETIC BLEPHAROPLASTY LOWER LIDS BILATERAL Bilateral 2023    Procedure: Both lower eyelid blepharoplasty;  Surgeon: Shantelle Marti MD;  Location: [facility] OR    CV CORONARY ANGIOGRAM N/A 2023    Procedure: Coronary Angiogram;  Surgeon: Geovanni Ibarra MD;  Location: [facility]    CV CORONARY ANGIOGRAM N/A 2024    Procedure: Coronary Angiogram;  Surgeon: Bassam Flores MD;  Location: [facility]    CV INSTANTANEOUS WAVE-FREE RATIO N/A 2023    Procedure: Instantaneous Wave-Free Ratio;  Surgeon: Geovanni Ibarra MD;  [facility]    CV RIGHT HEART CATH MEASUREMENTS RECORDED N/A 2024    Procedure: Right Heart Catheterization;  Surgeon: Bassam Flores MD;  [facility]       FAMILY HISTORY:   Family History   Problem Relation Age of Onset    Lung Cancer Mother         Small cell, did smoked    Diabetes Mother     Hypertension Mother     Goiter Mother     Mental Illness Mother         likely bipolar    Coronary Artery Disease Mother 42         MI, smoked    Hyperlipidemia Mother     Thyroid Disease Mother         Goiter    Obesity Mother         most of family is obese    Glaucoma Father     Atrial fibrillation Father     Hypertension Father     Parkinsonism Father     Hyperlipidemia Father     Diabetes Sister     Breast Cancer Sister 60    Hypertension Sister     Hypertension Brother     Cancer Maternal Grandmother         gynecologic cancer, unknown type    Uterine Cancer Maternal Grandmother     Chronic Obstructive Pulmonary Disease Paternal Grandmother     Uterine Cancer Paternal Grandmother     Stomach Cancer Paternal Grandfather     Colon Cancer Paternal Grandfather     Bipolar Disorder Son     Mental Illness Son     Depression Son     Macular Degeneration No family hx of     Anesthesia Reaction No family hx of     Venous thrombosis No family hx of        SOCIAL HISTORY:   Social History     Tobacco Use    Smoking status: Never     Passive exposure: Never    Smokeless tobacco: Never   Substance Use Topics    Alcohol use: Yes     Alcohol/week: 1.0 standard drink of alcohol     Types: 1 Standard drinks or equivalent per week     Comment: less than 2 drinks a week         OBJECTIVE:  1. VITAL SIGNS:   Temp:  [36.6  C (97.8  F)] 36.6  C (97.8  F)  Pulse:  [64] 64  Resp:  [16] 16  BP: (115)/(75) 115/75  SpO2:  [98 %] 98 %    Resp: 16        2. INTAKE/ OUTPUT:   I/O last 3 completed shifts:  In: 3597 [I.V.:2700; Other:397]  Out: 600 [Urine:600]      3. PHYSICAL EXAMINATION:   General: sedated  Neuro: sedated  Resp: intubated, ventilated  CV: S1, S2, RRR, no m/r/g   Abdomen: Soft, non-distended, non-tender  Incisions: c/d/i  Extremities: warm and well perfused  CT: To suction, serosang output, no airleak or crepitus      4. INVESTIGATIONS:   Arterial Blood Gases   Recent Labs   Lab 06/12/24  1509 06/12/24  1408 06/12/24  1350 06/12/24  1317   PH 7.29* 7.31* 7.25* 7.33*   PCO2 44 43 51* 41   PO2 134* 126* 171* 222*   HCO3 21 21 22 22     Complete Blood  Count   Recent Labs   Lab 06/12/24  1510 06/12/24  1408 06/12/24  1350 06/12/24  1317 06/12/24  1314   WBC 13.8*  --   --   --   --    HGB 10.1* 8.6* 9.4* 11.0*  --    *  --   --   --  137*     Basic Metabolic Panel  Recent Labs   Lab 06/12/24  1507 06/12/24  1408 06/12/24  1350 06/12/24  1317 06/12/24  1236   NA  --  143 143 143 143   POTASSIUM  --  4.0 3.2* 3.1* 4.2   * 128* 137* 170* 158*     Liver Function Tests  Recent Labs   Lab 06/12/24  1510 06/12/24  1314   INR 1.79* 1.74*     Pancreatic Enzymes  No lab results found in last 7 days.  Coagulation Profile  Recent Labs   Lab 06/12/24  1510 06/12/24  1314   INR 1.79* 1.74*   PTT 61* 26         5. RADIOLOGY:   Recent Results (from the past 24 hour(s))   WOODY with Report    Narrative    Jose Sun MD     6/12/2024  2:35 PM  Perioperative WOODY Procedure Note    Staff -        Anesthesiologist:  Jose Sun MD       Resident/Fellow: Blas Cherry MD       Performed By: fellow  Preanesthesia Checklist:  Patient identified, IV assessed, risks and   benefits discussed, monitors and equipment assessed, procedure being   performed at surgeon's request and anesthesia consent obtained.    WOODY Probe Insertion    Probe Status PRE Insertion: NO obvious damage  Probe type:  Adult 3D  Bite block used:   Soft  Insertion Technique: Easy, no oropharyngeal manipulation  Insertion complications: None obvious  Billing Report:WOODY report by Anesthesiologist (See Separate Report note)  Probe Status POST Removal: NO obvious damage    WOODY Report  General Procedure Information  Images for this study have been archived.  Modalities: 2D, 3D, CW Doppler, PW Doppler and Color flow mapping  Echocardiographic and Doppler Measurements  Right Ventricle:  Cavity size normal.    Hypertrophy not present.     Thrombus not present.    Global function normal.     Left Ventricle:  Cavity size normal.    Hypertrophy present.   Thrombus   not present.   Global Function normal.    Ejection Fraction 60%.      Ventricular Regional Function:  1- Basal Anteroseptal:  normal  2- Basal Anterior:  normal  3- Basal Anterolateral:  normal  4- Basal Inferolateral:  normal  5- Basal Inferior:  normal  6- Basal Inferoseptal:  normal  7- Mid Anteroseptal:  normal  8- Mid Anterior:  normal  9- Mid Anterolateral:  normal  10- Mid Inferolateral:  normal  11- Mid Inferior:  normal  12- Mid Inferoseptal:  normal  13- Apical Anterior:  normal  14- Apical Lateral:  normal  15- Apical Inferior:  normal  16- Apical Septal:  normal  17- [place]:  normal    Valves  Aortic Valve: Annulus calcified.  Stenosis severe.  Area: 0.9 cm .  Mean   Gradient: 31 mmHg.  Regurgitation absent.  Leaflets calcified.  Leaflet   motions restricted.  Specific leaflet segments with abnormal motions:    RCC, NCC and LCC  Mitral Valve: Annulus normal.  Stenosis not present.  Regurgitation +2.    Leaflets normal.  Leaflet motions normal.    Tricuspid Valve: Annulus normal.  Stenosis not present.  Regurgitation +1.    Leaflets normal.  Leaflet motions normal.    Pulmonic Valve: Annulus normal.  Stenosis not present.  Regurgitation   absent.      Aorta: Ascending Aorta: Size normal.  Diameter 3.51 cm.  Dissection not   present.  Plaque thickness less than 3 mm.  Mobile plaque not present.    Aortic Arch: Size normal.    Dissection not present.   Plaque thickness   less than 3 mm.   Mobile plaque not present.    Descending Aorta: Size normal.    Dissection not present.   Plaque   thickness less than 3 mm.   Mobile plaque not present.      Right Atrium:  Size normal.   Spontaneous echo contrast not present.     Thrombus not present.   Tumor not present.   Device not present.     Left Atrium: Size normal.  Spontaneous echo contrast not present.    Thrombus not present.  Tumor not present.  Device not present.    Left atrial appendage normal.   Other Atria Findings:  LEWIS velocity > 40 cm/s  Atrial Septum: Intra-atrial septal morphology normal.        Ventricular Septum: Intra-ventricular septum morphology normal.      Diastolic Function Measurements:  Diastolic Dysfunction Grade= II.  E=  ms.  A=  ms.  E/A Ratio= .  DT=  ms.    S/D= .  IVRT= ms.  Other Findings:   Pericardium:  normal. Pleural Effusion:  none. Pulmonary Arteries:    normal. Pulmonary Venous Flow:  blunted (decreased) systolic flow.   [place] sinus catheter present.    Post Intervention Findings  Procedure(s) performed:  CABG and Aortic Valve Repair/Replace. Global   function:  Unchanged. Regional wall motion: Unchanged. Surgeon(s) notified   of all postintervention findings: Yes (Notified in real time).   Aortic   Valve: Valve replaced with bioprosthetic valve. No paravalvular leak.                Echocardiogram Comments  Echocardiogram comments: PRE-CPB:  Normal biventricular systolic function w/ LVEF 60%. Grade II DD. Mild MR.   Mild TR. Trace PI. AV is tri-leaflet and calcified with restricted motion   on all cusps. HOWARD consistent with severe stenosis and estimated to be   0.9cm^2. Peak veolcity 3.8m/s. DVI 0.27. Mean PG 31 mmHg.  No AI. No   pericardial effusion. No aortic dissection. All findings communicated to   surgical team.    POST-CPB:  S/p AVR and CABG x2: Biventricular function is unchanged. No new RWMA.   There is a new bioprosthetic valve in the aortic position is well seated,   with no paravalvular leak and a mean PG 11-16 mmHg. The tricuspid   regurgitation is mild. The mitral and pulmonic valves are unchanged. There   is no echo evidence of aortic dissection. No pericardial effusion. All   findings communicated to surgical team..       =========================================

## 2024-06-11 ASSESSMENT — ENCOUNTER SYMPTOMS: SEIZURES: 0

## 2024-06-11 ASSESSMENT — LIFESTYLE VARIABLES: TOBACCO_USE: 0

## 2024-06-11 NOTE — ANESTHESIA PREPROCEDURE EVALUATION
Pre-Operative H & P     CC:  Preoperative exam to assess for increased cardiopulmonary risk while undergoing surgery and anesthesia.    Date of Encounter: 2024  Primary Care Physician:  Nannette Gallagher     Reason for visit:   No diagnosis found.      IRASEMA Urena is a 69 year old female who presents for pre-operative H & P in preparation for  Procedure Information       Case: [id number] Date/Time: 24 0730    Procedures:       CORONARY ARTERY BYPASS GRAFT AORTIC VALVE REPLACEMENT AND ANY ASSOCIATED PROCEDURES (Chest)      AND POSSIBLE AORTIC ROOT ENLARGEMENT (Chest)    Anesthesia type: General    Diagnosis: CAD (coronary artery disease) [I25.10]    Pre-op diagnosis: CAD (coronary artery disease) [I25.10]    Location:  OR  /  OR    Providers: Mitali Nicole MD          Patient is being evaluated for comorbid conditions of hypertension, dyslipidemia, diabetes    Ms. Urena has a history of CAD, aortic valve disease. She was seen by Dr. Nicole earlier today and is now scheduled for the above procedure.     Hx of abnormal bleeding or anti-platelet use: ASA    Menstrual history: No LMP recorded. Patient is postmenopausal.     Past Medical History  Past Medical History:   Diagnosis Date    Abnormal Pap smear of cervix     Aortic stenosis     Aortic valve stenosis     Benign colon polyp     CAD (coronary artery disease)     Dermatochalasis of both upper eyelids     Diabetes mellitus, type 2 (H)     Gestational diabetes     HTN (hypertension)     Hyperlipidemia LDL goal <100     Nonsenile cataract     Very beginnings    Post-menopausal atrophic vaginitis     Ptosis of both eyelids     Thickened endometrium        Past Surgical History  Past Surgical History:   Procedure Laterality Date     SECTION      COMBINED REPAIR PTOSIS WITH BLEPHAROPLASTY Bilateral 2023    Procedure: Both upper eyelid blepharoplasty and ptosis repair;  Surgeon: Shantelle Marti MD;  [place] [facility] [place]    COSMETIC  BLEPHAROPLASTY LOWER LIDS BILATERAL Bilateral 09/28/2023    Procedure: Both lower eyelid blepharoplasty;  Surgeon: Shantelle Marti MD;  Location: [facility] OR    CV CORONARY ANGIOGRAM N/A 06/09/2023    Procedure: Coronary Angiogram;  Surgeon: Geovanni Ibarra MD;  Location:  [facility] CATH LAB    CV CORONARY ANGIOGRAM N/A 5/2/2024    Procedure: Coronary Angiogram;  Surgeon: Bassam Flores MD;  Location:  [facility]ARD[facility] CATH LAB    CV INSTANTANEOUS WAVE-FREE RATIO N/A 06/09/2023    Procedure: Instantaneous Wave-Free Ratio;  Surgeon: Geovanni Ibarra MD;  [facility]    CV RIGHT HEART CATH MEASUREMENTS RECORDED N/A 5/2/2024    Procedure: Right Heart Catheterization;  Surgeon: Bassam Flores MD;  Location:  HE[facility] CARDI[facility] CATH LAB       Prior to Admission Medications  Current Outpatient Medications   Medication Sig Dispense Refill    ACCU-CHEK GUIDE test strip CHECK BLOOD SUGAR DAILY OR AS  DIRECTED 100 strip 2    aspirin 81 MG EC tablet Take 81 mg by mouth every evening      atorvastatin (LIPITOR) 40 MG tablet Take 1 tablet (40 mg) by mouth daily (Patient taking differently: Take 40 mg by mouth every evening) 90 tablet 3    Blood Glucose Monitoring Suppl (ACCU-CHEK GUIDE) w/Device KIT USE TO TEST BLOOD SUGAR ONCE  DAILY OR AS DIRECTED 1 kit 0    carboxymethylcellulose PF (REFRESH PLUS) 0.5 % ophthalmic solution Place 1 drop into both eyes 4 times daily 70 each 0    cyanocobalamin (VITAMIN B-12) 100 MCG tablet Take 100 mcg by mouth every evening      dapagliflozin (FARXIGA) 10 MG TABS tablet TAKE 1 TABLET BY MOUTH IN THE  MORNING 100 tablet 1    diltiazem (CARDIZEM) 60 MG tablet Take 1 tablet (60 mg) by mouth 2 times daily 200 tablet 2    estradiol (ESTRACE) 0.1 MG/GM vaginal cream INSERT 1/2 APPLICATORFUL  VAGINALLY TWICE WEEKLY 85 g 3    fenofibrate (TRIGLIDE/LOFIBRA) 160 MG tablet Take 1 tablet (160 mg) by mouth every evening 100 tablet 3     gabapentin (NEURONTIN) 100 MG capsule Take 2 capsules (200 mg) by mouth at bedtime 200 capsule 3    levocetirizine (XYZAL) 5 MG tablet Take 5 mg by mouth every evening      losartan (COZAAR) 50 MG tablet Take 1 tablet (50 mg) by mouth every morning 100 tablet 3    metFORMIN (GLUCOPHAGE) 500 MG tablet TAKE 2 TABLETS BY MOUTH  TWICE DAILY WITH MEALS 360 tablet 3    montelukast (SINGULAIR) 10 MG tablet Take 1 tablet (10 mg) by mouth at bedtime 100 tablet 2    multivitamin w/minerals (MULTI-VITAMIN) tablet Take 1 tablet by mouth every evening      Semaglutide, 1 MG/DOSE, (OZEMPIC, 1 MG/DOSE,) 4 MG/3ML pen INJECT SUBCUTANEOUSLY 1 MG  EVERY WEEK 9 mL 2    Vitamin D, Cholecalciferol, 10 MCG (400 UNIT) TABS Take by mouth every evening         Allergies  Allergies   Allergen Reactions    Avapro [Irbesartan] Cough    Fluticasone Other (See Comments)     Loss of sense of taste       Social History  Social History     Socioeconomic History    Marital status:      Spouse name: Not on file    Number of children: Not on file    Years of education: Not on file    Highest education level: Not on file   Occupational History    Not on file   Tobacco Use    Smoking status: Never     Passive exposure: Never    Smokeless tobacco: Never   Vaping Use    Vaping status: Never Used   Substance and Sexual Activity    Alcohol use: Yes     Alcohol/week: 1.0 standard drink of alcohol     Types: 1 Standard drinks or equivalent per week     Comment: less than 2 drinks a week    Drug use: Not Currently     Comment: gummy 1x/month    Sexual activity: Not Currently     Partners: Male     Birth control/protection: Post-menopausal   Other Topics Concern    Not on file   Social History Narrative    Lives with     Two son    One son, daughter-in-law, and grandson live in [place]    One son, daughter-in-law live in the same [facility]    Gan in [place], [place]     Social Determinants of Health     Financial Resource Strain: Low  Risk  (3/8/2024)    Financial Resource Strain     Within the past 12 months, have you or your family members you live with been unable to get utilities (heat, electricity) when it was really needed?: No   Food Insecurity: Low Risk  (3/8/2024)    Food Insecurity     Within the past 12 months, did you worry that your food would run out before you got money to buy more?: No     Within the past 12 months, did the food you bought just not last and you didn t have money to get more?: No   Transportation Needs: Low Risk  (3/8/2024)    Transportation Needs     Within the past 12 months, has lack of transportation kept you from medical appointments, getting your medicines, non-medical meetings or appointments, work, or from getting things that you need?: No   Physical Activity: Insufficiently Active (3/8/2024)    Exercise Vital Sign     Days of Exercise per Week: 4 days     Minutes of Exercise per Session: 30 min   Stress: Stress Concern Present (3/8/2024)    [employer] [place] [employer] - Occupational Stress Questionnaire     Feeling of Stress : To some extent   Social Connections: Unknown (3/8/2024)    Social Connection and Isolation Panel [NHANES]     Frequency of Communication with Friends and Family: Not on file     Frequency of Social Gatherings with Friends and Family: Twice a week     Attends Christian Services: Not on file     Active Member of Clubs or Organizations: Not on file     Attends Club or Organization Meetings: Not on file     Marital Status: Not on file   Interpersonal Safety: Low Risk  (3/15/2024)    Interpersonal Safety     Do you feel physically and emotionally safe where you currently live?: Yes     Within the past 12 months, have you been hit, slapped, kicked or otherwise physically hurt by someone?: No     Within the past 12 months, have you been humiliated or emotionally abused in other ways by your partner or ex-partner?: No   Housing Stability: High Risk (3/8/2024)    Housing  Stability     Do you have housing? : Yes     Are you worried about losing your housing?: Yes       Family History  Family History   Problem Relation Age of Onset    Lung Cancer Mother         Small cell, did smoked    Diabetes Mother     Hypertension Mother     Goiter Mother     Mental Illness Mother         likely bipolar    Coronary Artery Disease Mother 42        MI, smoked    Hyperlipidemia Mother     Thyroid Disease Mother         Goiter    Obesity Mother         most of family is obese    Glaucoma Father     Atrial fibrillation Father     Hypertension Father     Parkinsonism Father     Hyperlipidemia Father     Diabetes Sister     Breast Cancer Sister 60    Hypertension Sister     Hypertension Brother     Cancer Maternal Grandmother         gynecologic cancer, unknown type    Uterine Cancer Maternal Grandmother     Chronic Obstructive Pulmonary Disease Paternal Grandmother     Uterine Cancer Paternal Grandmother     Stomach Cancer Paternal Grandfather     Colon Cancer Paternal Grandfather     Bipolar Disorder Son     Mental Illness Son     Depression Son     Macular Degeneration No family hx of     Anesthesia Reaction No family hx of     Venous thrombosis No family hx of        Review of Systems  The complete review of systems is negative other than noted in the HPI or here.   Anesthesia Evaluation   Pt has had prior anesthetic.     No history of anesthetic complications       ROS/MED HX  ENT/Pulmonary:     (+)     EMILIANO risk factors,  hypertension,                              (-) tobacco use   Neurologic:  - neg neurologic ROS  (-) no seizures and no CVA   Cardiovascular:     (+) Dyslipidemia hypertension- -  CAD -  - -   Taking blood thinners                              Previous cardiac testing   Echo: Date: 4/2024 Results:  Interpretation Summary  Global and regional left ventricular function is hyperkinetic with an EF >70%.  Right ventricular function, chamber size, wall motion, and thickness  are  normal.  Severe aortic stenosis is present.  The peak aortic velocity is 4.4 m/sec.  The mean gradient across the aortic valve is 41 mmHg.  The aortic valve area is 0.98 cm^2, by the continuity equation.  SVI 54.2ml/m2. DI 0.27. AVAI 0.52cm2/m2.  Pulmonary artery systolic pressure is normal.  The inferior vena cava is normal.  No pericardial effusion is present.  Increase in severity of aortic stenosis since previous study.  ______________________________________________________________________________  Left Ventricle  Global and regional left ventricular function is hyperkinetic with an EF >70%.  Left ventricular wall thickness is normal. Left ventricular size is normal.  Diastolic function not assessed due to significant mitral annular  calcification. No regional wall motion abnormalities are seen.     Right Ventricle  Right ventricular function, chamber size, wall motion, and thickness are  normal.     Atria  The right atria appears normal. Moderate left atrial enlargement is present.  The atrial septum is intact as assessed by color Doppler .     Mitral Valve  Severe mitral annular calcification is present. Trace to mild mitral  insufficiency is present.     Aortic Valve  Severe aortic valve calcification is present. Trace aortic insufficiency is  present. Severe aortic stenosis is present. The peak aortic velocity is 4.4  m/sec. The mean gradient across the aortic valve is 41 mmHg. The aortic valve  area is 0.98 cm^2, by the continuity equation. SVI 54.2ml/m2. DI 0.27. AVAI  0.52cm2/m2.     Tricuspid Valve  The tricuspid valve is normal. Trace tricuspid insufficiency is present. The  right ventricular systolic pressure is approximated at 26.2 mmHg plus the  right atrial pressure. Pulmonary artery systolic pressure is normal.     Pulmonic Valve  The pulmonic valve is normal.     Vessels  The aorta root is normal. The thoracic aorta is normal. The pulmonary artery  is normal. The inferior vena cava is  normal.     Pericardium  No pericardial effusion is present.       Stress Test:  Date: Results:    ECG Reviewed:  Date: 5/2024 Results:  Sinus bradycardia  Left axis deviation  Right bundle branch block  Abnormal ECG  Cath:  Date: 5/2024 Results:  Diagnostic  Dominance: Right  Left Main  The vessel is moderate in size.    Left Anterior Descending  The vessel is moderate in size.  Prox LAD to Mid LAD lesion is 70% stenosed.    First Diagonal Branch  The vessel is large.  1st Diag lesion is 30% stenosed.    First Septal Branch  The vessel is small and is angiographically normal.    Second Septal Branch  The vessel is small and is angiographically normal.    Left Circumflex  Prox Cx to Dist Cx lesion is 60% stenosed.    First Obtuse Marginal Branch  The vessel is small. There is mild diffuse disease throughout the vessel.    Second Obtuse Marginal Branch  The vessel is small and is angiographically normal.    Third Obtuse Marginal Branch  The vessel is moderate in size.  3rd Mrg lesion is 30% stenosed.    Right Coronary Artery  The vessel is moderate in size.  Prox RCA to Dist RCA lesion is 40% stenosed.    Acute Marginal Branch  The vessel is small.    Right Ventricular Branch  The vessel is small.    Right Posterior Descending Artery  The vessel is small and is angiographically normal.    Right Posterior Atrioventricular Artery  The vessel is moderate in size. There is mild diffuse disease throughout the vessel.    First Right Posterolateral Branch  The vessel is small and is angiographically normal.    Second Right Posterolateral Branch  The vessel is small and is angiographically normal.        RHC:  Right Heart Catheterization:  RA --/6/3   RV 30/5  PA 30/10/15   PCW --/15/9   Smooth CO 5.18   Smooth CI 2.74   TD CO 5.87   TD CI 3.1   PA sat 66%   Hgb 12.8 g/dL         CO(Smooth) = BSA *CI   Normal = 4.0-8.0 L/min  CI(Smooth) = 125/(13.6*Hgb*(SaO2-SvO2)) Normal = 2.5-4.0 L/min/m2  Right sided filling pressures are normal.  Left sided filling pressures are normal. Normal PA pressures. Normal cardiac output level.         METS/Exercise Tolerance:  Comment: Limited activity (goal HR <110), limited to 10 pounds of weight lifting. Outside of those restrictions, doing regular exercise with some CLARKE   Hematologic:  - neg hematologic  ROS  (-) history of blood clots and history of blood transfusion   Musculoskeletal:   (+)  arthritis,             GI/Hepatic:  - neg GI/hepatic ROS  (-) GERD   Renal/Genitourinary:  - neg Renal ROS     Endo:     (+)  type II DM, Last HgA1c: 5.8, date: 3/15/24, Not using insulin,  Normal glucose range: fasting <110,  Diabetic complications: cardiac problems.          (-) chronic steroid usage   Psychiatric/Substance Use:  - neg psychiatric ROS     Infectious Disease:  - neg infectious disease ROS     Malignancy:  - neg malignancy ROS     Other:  - neg other ROS          There were no vitals taken for this visit.    Physical Exam   Constitutional: Awake, alert, cooperative, no apparent distress, and appears stated age.  Eyes: Pupils equal, round and reactive to light, extra ocular muscles intact, sclera clear, conjunctiva normal.  HENT: Normocephalic, oral pharynx with moist mucus membranes, good dentition.   Respiratory: Clear to auscultation bilaterally, no crackles or wheezing.  Cardiovascular: Regular rate and rhythm, systolic murmur present.  No edema. Palpable pulses to radial arteries.   GI: Normal bowel sounds, soft, non-distended, non-tender  Genitourinary:  deferred  Skin: Warm and dry.    Musculoskeletal: Full ROM of neck. There is no redness, warmth, or swelling of the exposed joints. Gross motor strength is normal.    Neurologic: Awake, alert, oriented to name, place and time. Cranial nerves II-XII are grossly intact.   Neuropsychiatric: Calm, cooperative. Normal affect.     Prior Labs/Diagnostic Studies   All labs and imaging personally reviewed     EKG/ stress test - if available please see in ROS  above   Echo result w/o MOPS: Interpretation SummaryGlobal and regional left ventricular function is hyperkinetic with an EF >70%.Right ventricular function, chamber size, wall motion, and thickness arenormal.Severe aortic stenosis is present.The peak aortic velocity is 4.4 m/sec.The mean gradient across the aortic valve is 41 mmHg.The aortic valve area is 0.98 cm^2, by the continuity equation.SVI 54.2ml/m2. DI 0.27. AVAI 0.52cm2/m2.Pulmonary artery systolic pressure is normal.The inferior vena cava is normal.No pericardial effusion is present.Increase in severity of aortic stenosis since previous study.           No data to display                  The patient's records and results personally reviewed by this provider.     Outside records reviewed from: Care Everywhere    LAB/DIAGNOSTIC STUDIES TODAY:  multiple labs    Assessment    Won Urena is a 69 year old female seen as a PAC referral for risk assessment and optimization for anesthesia.    Plan/Recommendations  Pt will be optimized for the proposed procedure.  See below for details on the assessment, risk, and preoperative recommendations    NEUROLOGY  - No history of TIA, CVA or seizure  -Post Op delirium risk factors:  No risk identified    ENT  - No current airway concerns.  Will need to be reassessed day of surgery.  Mallampati: II  TM: > 3    CARDIAC  -CAD, aortic valve disease. She is now scheduled for the above procedure.   -hypertension using losartan, will hold x2 days prior to surgery   -dyslipidemia using lipitor  -previous cardiac testing above     PULMONARY  EMILIANO Low Risk             Total Score: 2    EMILIANO: Hypertension    EMILIANO: Over 50 ys old      - Denies asthma or inhaler use  - Tobacco History    History   Smoking Status    Never   Smokeless Tobacco    Never       GI  PONV High Risk  Total Score: 3           1 AN PONV: Pt is Female    1 AN PONV: Patient is not a current smoker    1 AN PONV: Intended Post Op Opioids        /RENAL  -  "Baseline Creatinine WNL    ENDOCRINE    - BMI: Estimated body mass index is 26.16 kg/m  as calculated from the following:    Height as of 5/16/24: 1.702 m (5' 7\").    Weight as of 5/16/24: 75.8 kg (167 lb).  Overweight (BMI 25.0-29.9)  -last A1c 5.8    HEME  VTE Low Risk 0.26%             Total Score: 1    VTE: Greater than 59 yrs old      - Platelet disfunction second to Aspirin (Jinny, many others)  -T&S updated today     MSK  0/5 on frailty screening       Different anesthesia methods/types have been discussed with the patient, but they are aware that the final plan will be decided by the assigned anesthesia provider on the date of service.    The patient is optimized for their procedure. AVS with information on surgery time/arrival time, meds and NPO status given by nursing staff. No further diagnostic testing indicated.      On the day of service:     Prep time: 5 minutes  Visit time: 9 minutes  Documentation time: 12 minutes  ------------------------------------------  Total time: 26 minutes      Joceline El PA-C  Preoperative Assessment Center  [facility]  Phone: [phone]  Fax: [phone]    Physical Exam    Airway  airway exam normal      Mallampati: II   TM distance: > 3 FB   Neck ROM: full   Mouth opening: > 3 cm    Respiratory Devices and Support         Dental       (+) Minor Abnormalities - some fillings, tiny chips    B=Bridge, C=Chipped, L=Loose, M=Missing    Cardiovascular          Rhythm and rate: regular and normal   (+) murmur       Pulmonary   pulmonary exam normal        breath sounds clear to auscultation           Anesthesia Plan    ASA Status:  4    NPO Status:  NPO Appropriate    Anesthesia Type: General.     - Airway: ETT   Induction: Intravenous.   Maintenance: Balanced.   Techniques and Equipment:     - Lines/Monitors: Arterial Line, Central Line, PAC, CVP, BIS, NIRS, WOODY            WOODY Absolute Contra-indication: NONE            " WOODY Relative Contra-indication: NONE     - Blood: T&S, Cell Saver     - Drips/Meds: Epinephrine, Norepi     Consents    Anesthesia Plan(s) and associated risks, benefits, and realistic alternatives discussed. Questions answered and patient/representative(s) expressed understanding.     - Discussed:     - Discussed with:  Patient      - Extended Intubation/Ventilatory Support Discussed: Yes.      - Patient is DNR/DNI Status: No     Use of blood products discussed: Yes.     - Discussed with: Patient.     - Consented: consented to blood products     Postoperative Care    Pain management: Multi-modal analgesia.   PONV prophylaxis: Ondansetron (or other 5HT-3), Dexamethasone or Solumedrol     Comments:    Other Comments: 69 yoF w/ PMH of HTN, HLD, DM2, severe AS and CAD presents to the OR for AVR and CABG.    Plan for GETA w/ arterial line, WOODY and CVC +/- PAC.

## 2024-06-12 ENCOUNTER — APPOINTMENT (OUTPATIENT)
Dept: GENERAL RADIOLOGY | Facility: CLINIC | Age: 69
DRG: 219 | End: 2024-06-12
Attending: PHYSICIAN ASSISTANT
Payer: COMMERCIAL

## 2024-06-12 ENCOUNTER — HOSPITAL ENCOUNTER (INPATIENT)
Facility: CLINIC | Age: 69
LOS: 5 days | Discharge: HOME | DRG: 219 | End: 2024-06-17
Attending: STUDENT IN AN ORGANIZED HEALTH CARE EDUCATION/TRAINING PROGRAM | Admitting: STUDENT IN AN ORGANIZED HEALTH CARE EDUCATION/TRAINING PROGRAM
Payer: COMMERCIAL

## 2024-06-12 ENCOUNTER — APPOINTMENT (OUTPATIENT)
Dept: GENERAL RADIOLOGY | Facility: CLINIC | Age: 69
DRG: 219 | End: 2024-06-12
Attending: STUDENT IN AN ORGANIZED HEALTH CARE EDUCATION/TRAINING PROGRAM
Payer: COMMERCIAL

## 2024-06-12 ENCOUNTER — ANESTHESIA (OUTPATIENT)
Dept: SURGERY | Facility: CLINIC | Age: 69
DRG: 219 | End: 2024-06-12
Payer: COMMERCIAL

## 2024-06-12 DIAGNOSIS — I25.84 CORONARY ARTERY DISEASE DUE TO CALCIFIED CORONARY LESION: ICD-10-CM

## 2024-06-12 DIAGNOSIS — I35.0 SEVERE AORTIC STENOSIS: ICD-10-CM

## 2024-06-12 DIAGNOSIS — I25.10 CORONARY ARTERY DISEASE DUE TO CALCIFIED CORONARY LESION: ICD-10-CM

## 2024-06-12 DIAGNOSIS — I25.10 CORONARY ARTERY DISEASE INVOLVING NATIVE CORONARY ARTERY OF NATIVE HEART WITHOUT ANGINA PECTORIS: Primary | ICD-10-CM

## 2024-06-12 LAB
ABO/RH(D): NORMAL
ALBUMIN SERPL BCG-MCNC: 3.7 G/DL (ref 3.5–5.2)
ALBUMIN SERPL BCG-MCNC: 3.9 G/DL (ref 3.5–5.2)
ALLEN'S TEST: ABNORMAL
ALP SERPL-CCNC: 32 U/L (ref 40–150)
ALP SERPL-CCNC: 32 U/L (ref 40–150)
ALT SERPL W P-5'-P-CCNC: 12 U/L (ref 0–50)
ALT SERPL W P-5'-P-CCNC: 15 U/L (ref 0–50)
ANION GAP SERPL CALCULATED.3IONS-SCNC: 14 MMOL/L (ref 7–15)
ANION GAP SERPL CALCULATED.3IONS-SCNC: 9 MMOL/L (ref 7–15)
ANION GAP SERPL CALCULATED.3IONS-SCNC: 9 MMOL/L (ref 7–15)
ANTIBODY SCREEN: NEGATIVE
APTT PPP: 26 SECONDS (ref 22–38)
APTT PPP: 29 SECONDS (ref 22–38)
APTT PPP: 61 SECONDS (ref 22–38)
AST SERPL W P-5'-P-CCNC: 41 U/L (ref 0–45)
AST SERPL W P-5'-P-CCNC: 49 U/L (ref 0–45)
BASE EXCESS BLDA CALC-SCNC: -0.4 MMOL/L (ref -3–3)
BASE EXCESS BLDA CALC-SCNC: -0.4 MMOL/L (ref -3–3)
BASE EXCESS BLDA CALC-SCNC: -0.5 MMOL/L (ref -3–3)
BASE EXCESS BLDA CALC-SCNC: -2.1 MMOL/L (ref -3–3)
BASE EXCESS BLDA CALC-SCNC: -2.7 MMOL/L (ref -3–3)
BASE EXCESS BLDA CALC-SCNC: -3.9 MMOL/L (ref -3–3)
BASE EXCESS BLDA CALC-SCNC: -4.1 MMOL/L (ref -3–3)
BASE EXCESS BLDA CALC-SCNC: -4.5 MMOL/L (ref -3–3)
BASE EXCESS BLDA CALC-SCNC: -4.6 MMOL/L (ref -3–3)
BASE EXCESS BLDA CALC-SCNC: -4.9 MMOL/L (ref -3–3)
BASE EXCESS BLDA CALC-SCNC: -5.5 MMOL/L (ref -3–3)
BASE EXCESS BLDA CALC-SCNC: -6.8 MMOL/L (ref -3–3)
BASE EXCESS BLDA CALC-SCNC: 0.3 MMOL/L (ref -3–3)
BASE EXCESS BLDA CALC-SCNC: 1 MMOL/L (ref -3–3)
BILIRUB SERPL-MCNC: 0.6 MG/DL
BILIRUB SERPL-MCNC: 0.9 MG/DL
BLD PROD TYP BPU: NORMAL
BLD PROD TYP BPU: NORMAL
BLOOD COMPONENT TYPE: NORMAL
BLOOD COMPONENT TYPE: NORMAL
BUN SERPL-MCNC: 16.4 MG/DL (ref 8–23)
CA-I BLD-MCNC: 4.2 MG/DL (ref 4.4–5.2)
CA-I BLD-MCNC: 4.3 MG/DL (ref 4.4–5.2)
CA-I BLD-MCNC: 4.5 MG/DL (ref 4.4–5.2)
CA-I BLD-MCNC: 4.6 MG/DL (ref 4.4–5.2)
CA-I BLD-MCNC: 4.6 MG/DL (ref 4.4–5.2)
CA-I BLD-MCNC: 4.7 MG/DL (ref 4.4–5.2)
CA-I BLD-MCNC: 4.8 MG/DL (ref 4.4–5.2)
CA-I BLD-MCNC: 4.8 MG/DL (ref 4.4–5.2)
CA-I BLD-MCNC: 4.9 MG/DL (ref 4.4–5.2)
CA-I BLD-MCNC: 4.9 MG/DL (ref 4.4–5.2)
CA-I BLD-MCNC: 5.3 MG/DL (ref 4.4–5.2)
CA-I BLD-MCNC: 6.4 MG/DL (ref 4.4–5.2)
CALCIUM SERPL-MCNC: 8.5 MG/DL (ref 8.8–10.2)
CALCIUM SERPL-MCNC: 8.5 MG/DL (ref 8.8–10.2)
CALCIUM SERPL-MCNC: 8.7 MG/DL (ref 8.8–10.2)
CHLORIDE SERPL-SCNC: 110 MMOL/L (ref 98–107)
CLOT INIT KAOL IND TO POST HEP NEUT TRTO: 1.1 {RATIO}
CLOT INIT KAOL IND TO POST HEP NEUT TRTO: 1.1 {RATIO}
CLOT INIT KAOLIN IND BLD US: 111 SEC (ref 113–166)
CLOT INIT KAOLIN IND BLD US: 133 SEC (ref 113–166)
CLOT INIT KAOLIN IND P HEP NEUT BLD US: 104 SEC (ref 103–153)
CLOT INIT KAOLIN IND P HEP NEUT BLD US: 126 SEC (ref 103–153)
CLOT STIFF PLT CONT BLD CALC: 12.8 HPA (ref 11.9–29.8)
CLOT STIFF PLT CONT BLD CALC: 18.1 HPA (ref 11.9–29.8)
CLOT STIFF TF IND P HEP NEUT BLD US: 14.4 HPA (ref 13–33.2)
CLOT STIFF TF IND P HEP NEUT BLD US: 20 HPA (ref 13–33.2)
CLOT STIFF TF IND+IIB-IIIA INH P HEP NEU: 1.6 HPA (ref 1–3.7)
CLOT STIFF TF IND+IIB-IIIA INH P HEP NEU: 1.9 HPA (ref 1–3.7)
CODING SYSTEM: NORMAL
CODING SYSTEM: NORMAL
COHGB MFR BLD: 97.2 % (ref 95–96)
COHGB MFR BLD: 99.2 % (ref 95–96)
COHGB MFR BLD: 99.6 % (ref 95–96)
COHGB MFR BLD: >100 % (ref 95–96)
CREAT SERPL-MCNC: 0.55 MG/DL (ref 0.51–0.95)
CREAT SERPL-MCNC: 0.55 MG/DL (ref 0.51–0.95)
CREAT SERPL-MCNC: 0.63 MG/DL (ref 0.51–0.95)
DEPRECATED HCO3 PLAS-SCNC: 19 MMOL/L (ref 22–29)
DEPRECATED HCO3 PLAS-SCNC: 23 MMOL/L (ref 22–29)
DEPRECATED HCO3 PLAS-SCNC: 23 MMOL/L (ref 22–29)
EGFRCR SERPLBLD CKD-EPI 2021: >90 ML/MIN/1.73M2
ERYTHROCYTE [DISTWIDTH] IN BLOOD BY AUTOMATED COUNT: 13.5 % (ref 10–15)
ERYTHROCYTE [DISTWIDTH] IN BLOOD BY AUTOMATED COUNT: 13.6 % (ref 10–15)
FIBRINOGEN PPP-MCNC: 173 MG/DL (ref 170–490)
FIBRINOGEN PPP-MCNC: 210 MG/DL (ref 170–490)
GLUCOSE BLD-MCNC: 114 MG/DL (ref 70–99)
GLUCOSE BLD-MCNC: 128 MG/DL (ref 70–99)
GLUCOSE BLD-MCNC: 128 MG/DL (ref 70–99)
GLUCOSE BLD-MCNC: 133 MG/DL (ref 70–99)
GLUCOSE BLD-MCNC: 137 MG/DL (ref 70–99)
GLUCOSE BLD-MCNC: 149 MG/DL (ref 70–99)
GLUCOSE BLD-MCNC: 158 MG/DL (ref 70–99)
GLUCOSE BLD-MCNC: 170 MG/DL (ref 70–99)
GLUCOSE BLD-MCNC: 173 MG/DL (ref 70–99)
GLUCOSE BLD-MCNC: 182 MG/DL (ref 70–99)
GLUCOSE BLDC GLUCOMTR-MCNC: 119 MG/DL (ref 70–99)
GLUCOSE BLDC GLUCOMTR-MCNC: 136 MG/DL (ref 70–99)
GLUCOSE BLDC GLUCOMTR-MCNC: 142 MG/DL (ref 70–99)
GLUCOSE BLDC GLUCOMTR-MCNC: 164 MG/DL (ref 70–99)
GLUCOSE SERPL-MCNC: 163 MG/DL (ref 70–99)
GLUCOSE SERPL-MCNC: 163 MG/DL (ref 70–99)
GLUCOSE SERPL-MCNC: 176 MG/DL (ref 70–99)
HCO3 BLD-SCNC: 19 MMOL/L (ref 21–28)
HCO3 BLD-SCNC: 21 MMOL/L (ref 21–28)
HCO3 BLD-SCNC: 21 MMOL/L (ref 21–28)
HCO3 BLD-SCNC: 25 MMOL/L (ref 21–28)
HCO3 BLDA-SCNC: 21 MMOL/L (ref 21–28)
HCO3 BLDA-SCNC: 22 MMOL/L (ref 21–28)
HCO3 BLDA-SCNC: 22 MMOL/L (ref 21–28)
HCO3 BLDA-SCNC: 23 MMOL/L (ref 21–28)
HCO3 BLDA-SCNC: 24 MMOL/L (ref 21–28)
HCO3 BLDA-SCNC: 24 MMOL/L (ref 21–28)
HCO3 BLDA-SCNC: 25 MMOL/L (ref 21–28)
HCO3 BLDA-SCNC: 25 MMOL/L (ref 21–28)
HCO3 BLDA-SCNC: 26 MMOL/L (ref 21–28)
HCO3 BLDA-SCNC: 27 MMOL/L (ref 21–28)
HCT VFR BLD AUTO: 26.5 % (ref 35–47)
HCT VFR BLD AUTO: 29.7 % (ref 35–47)
HGB BLD-MCNC: 10.1 G/DL (ref 11.7–15.7)
HGB BLD-MCNC: 10.2 G/DL (ref 11.7–15.7)
HGB BLD-MCNC: 10.5 G/DL (ref 11.7–15.7)
HGB BLD-MCNC: 10.5 G/DL (ref 11.7–15.7)
HGB BLD-MCNC: 10.7 G/DL (ref 11.7–15.7)
HGB BLD-MCNC: 10.8 G/DL (ref 11.7–15.7)
HGB BLD-MCNC: 10.9 G/DL (ref 11.7–15.7)
HGB BLD-MCNC: 11 G/DL (ref 11.7–15.7)
HGB BLD-MCNC: 12.5 G/DL (ref 11.7–15.7)
HGB BLD-MCNC: 8.6 G/DL (ref 11.7–15.7)
HGB BLD-MCNC: 8.9 G/DL (ref 11.7–15.7)
HGB BLD-MCNC: 9.4 G/DL (ref 11.7–15.7)
INR PPP: 1.48 (ref 0.85–1.15)
INR PPP: 1.74 (ref 0.85–1.15)
INR PPP: 1.79 (ref 0.85–1.15)
ISSUE DATE AND TIME: NORMAL
ISSUE DATE AND TIME: NORMAL
LACTATE BLD-SCNC: 0.6 MMOL/L (ref 0.7–2)
LACTATE BLD-SCNC: 0.7 MMOL/L (ref 0.7–2)
LACTATE BLD-SCNC: 0.8 MMOL/L (ref 0.7–2)
LACTATE BLD-SCNC: 0.8 MMOL/L (ref 0.7–2)
LACTATE BLD-SCNC: 0.9 MMOL/L (ref 0.7–2)
LACTATE BLD-SCNC: 1 MMOL/L (ref 0.7–2)
LACTATE BLD-SCNC: 1.1 MMOL/L (ref 0.7–2)
LACTATE BLD-SCNC: 1.9 MMOL/L (ref 0.7–2)
LACTATE BLD-SCNC: 2 MMOL/L (ref 0.7–2)
LACTATE BLD-SCNC: 2.3 MMOL/L (ref 0.7–2)
LACTATE SERPL-SCNC: 0.9 MMOL/L (ref 0.7–2)
LACTATE SERPL-SCNC: 2.2 MMOL/L (ref 0.7–2)
LACTATE SERPL-SCNC: 3.3 MMOL/L (ref 0.7–2)
MAGNESIUM SERPL-MCNC: 2 MG/DL (ref 1.7–2.3)
MAGNESIUM SERPL-MCNC: 2.5 MG/DL (ref 1.7–2.3)
MCH RBC QN AUTO: 30 PG (ref 26.5–33)
MCH RBC QN AUTO: 30.6 PG (ref 26.5–33)
MCHC RBC AUTO-ENTMCNC: 33.6 G/DL (ref 31.5–36.5)
MCHC RBC AUTO-ENTMCNC: 34 G/DL (ref 31.5–36.5)
MCV RBC AUTO: 89 FL (ref 78–100)
MCV RBC AUTO: 90 FL (ref 78–100)
O2/TOTAL GAS SETTING VFR VENT: 30 %
O2/TOTAL GAS SETTING VFR VENT: 38 %
O2/TOTAL GAS SETTING VFR VENT: 40 %
O2/TOTAL GAS SETTING VFR VENT: 62 %
O2/TOTAL GAS SETTING VFR VENT: 62 %
O2/TOTAL GAS SETTING VFR VENT: 65 %
O2/TOTAL GAS SETTING VFR VENT: 78 %
O2/TOTAL GAS SETTING VFR VENT: 80 %
OXYHGB MFR BLDA: 77 % (ref 92–100)
OXYHGB MFR BLDA: 97 % (ref 92–100)
OXYHGB MFR BLDA: 98 % (ref 92–100)
OXYHGB MFR BLDA: 99 % (ref 92–100)
PCO2 BLD: 36 MM HG (ref 35–45)
PCO2 BLD: 39 MM HG (ref 35–45)
PCO2 BLD: 42 MM HG (ref 35–45)
PCO2 BLD: 44 MM HG (ref 35–45)
PCO2 BLDA: 40 MM HG (ref 35–45)
PCO2 BLDA: 41 MM HG (ref 35–45)
PCO2 BLDA: 42 MM HG (ref 35–45)
PCO2 BLDA: 43 MM HG (ref 35–45)
PCO2 BLDA: 43 MM HG (ref 35–45)
PCO2 BLDA: 47 MM HG (ref 35–45)
PCO2 BLDA: 48 MM HG (ref 35–45)
PCO2 BLDA: 49 MM HG (ref 35–45)
PCO2 BLDA: 51 MM HG (ref 35–45)
PCO2 BLDA: 51 MM HG (ref 35–45)
PEEP: 5 CM H2O
PH BLD: 7.29 [PH] (ref 7.35–7.45)
PH BLD: 7.32 [PH] (ref 7.35–7.45)
PH BLD: 7.34 [PH] (ref 7.35–7.45)
PH BLD: 7.38 [PH] (ref 7.35–7.45)
PH BLDA: 7.25 [PH] (ref 7.35–7.45)
PH BLDA: 7.27 [PH] (ref 7.35–7.45)
PH BLDA: 7.3 [PH] (ref 7.35–7.45)
PH BLDA: 7.31 [PH] (ref 7.35–7.45)
PH BLDA: 7.31 [PH] (ref 7.35–7.45)
PH BLDA: 7.33 [PH] (ref 7.35–7.45)
PH BLDA: 7.36 [PH] (ref 7.35–7.45)
PH BLDA: 7.38 [PH] (ref 7.35–7.45)
PH BLDA: 7.39 [PH] (ref 7.35–7.45)
PH BLDA: 7.4 [PH] (ref 7.35–7.45)
PHOSPHATE SERPL-MCNC: 3.8 MG/DL (ref 2.5–4.5)
PLATELET # BLD AUTO: 116 10E3/UL (ref 150–450)
PLATELET # BLD AUTO: 137 10E3/UL (ref 150–450)
PLATELET # BLD AUTO: 83 10E3/UL (ref 150–450)
PO2 BLD: 133 MM HG (ref 80–105)
PO2 BLD: 134 MM HG (ref 80–105)
PO2 BLD: 155 MM HG (ref 80–105)
PO2 BLD: 83 MM HG (ref 80–105)
PO2 BLDA: 126 MM HG (ref 80–105)
PO2 BLDA: 167 MM HG (ref 80–105)
PO2 BLDA: 171 MM HG (ref 80–105)
PO2 BLDA: 222 MM HG (ref 80–105)
PO2 BLDA: 249 MM HG (ref 80–105)
PO2 BLDA: 276 MM HG (ref 80–105)
PO2 BLDA: 318 MM HG (ref 80–105)
PO2 BLDA: 419 MM HG (ref 80–105)
PO2 BLDA: 44 MM HG (ref 80–105)
PO2 BLDA: 452 MM HG (ref 80–105)
POTASSIUM BLD-SCNC: 3.1 MMOL/L (ref 3.4–5.3)
POTASSIUM BLD-SCNC: 3.2 MMOL/L (ref 3.4–5.3)
POTASSIUM BLD-SCNC: 3.7 MMOL/L (ref 3.4–5.3)
POTASSIUM BLD-SCNC: 3.7 MMOL/L (ref 3.4–5.3)
POTASSIUM BLD-SCNC: 4 MMOL/L (ref 3.4–5.3)
POTASSIUM BLD-SCNC: 4 MMOL/L (ref 3.4–5.3)
POTASSIUM BLD-SCNC: 4.2 MMOL/L (ref 3.4–5.3)
POTASSIUM BLD-SCNC: 4.4 MMOL/L (ref 3.4–5.3)
POTASSIUM BLD-SCNC: 4.4 MMOL/L (ref 3.4–5.3)
POTASSIUM BLD-SCNC: 4.5 MMOL/L (ref 3.4–5.3)
POTASSIUM SERPL-SCNC: 4 MMOL/L (ref 3.4–5.3)
POTASSIUM SERPL-SCNC: 4.3 MMOL/L (ref 3.4–5.3)
POTASSIUM SERPL-SCNC: 4.3 MMOL/L (ref 3.4–5.3)
PROT SERPL-MCNC: 4.9 G/DL (ref 6.4–8.3)
PROT SERPL-MCNC: 5.1 G/DL (ref 6.4–8.3)
RBC # BLD AUTO: 2.97 10E6/UL (ref 3.8–5.2)
RBC # BLD AUTO: 3.3 10E6/UL (ref 3.8–5.2)
SAO2 % BLDA: 100 % (ref 95–96)
SAO2 % BLDA: 78 % (ref 95–96)
SAO2 % BLDA: 96 % (ref 92–100)
SAO2 % BLDA: 97 % (ref 92–100)
SAO2 % BLDA: 98 % (ref 92–100)
SAO2 % BLDA: 98 % (ref 92–100)
SAO2 % BLDA: 99 % (ref 95–96)
SAO2 % BLDA: >100 % (ref 95–96)
SAO2 % BLDA: >100 % (ref 95–96)
SODIUM BLD-SCNC: 140 MMOL/L (ref 135–145)
SODIUM BLD-SCNC: 140 MMOL/L (ref 135–145)
SODIUM BLD-SCNC: 141 MMOL/L (ref 135–145)
SODIUM BLD-SCNC: 143 MMOL/L (ref 135–145)
SODIUM SERPL-SCNC: 142 MMOL/L (ref 135–145)
SODIUM SERPL-SCNC: 142 MMOL/L (ref 135–145)
SODIUM SERPL-SCNC: 143 MMOL/L (ref 135–145)
SPECIMEN EXPIRATION DATE: NORMAL
SPECIMEN EXPIRATION DATE: NORMAL
UNIT ABO/RH: NORMAL
UNIT ABO/RH: NORMAL
UNIT NUMBER: NORMAL
UNIT NUMBER: NORMAL
UNIT STATUS: NORMAL
UNIT STATUS: NORMAL
UNIT TYPE ISBT: 6200
UNIT TYPE ISBT: 6200
WBC # BLD AUTO: 13.8 10E3/UL (ref 4–11)
WBC # BLD AUTO: 6.5 10E3/UL (ref 4–11)

## 2024-06-12 PROCEDURE — 33517 CABG ARTERY-VEIN SINGLE: CPT | Mod: GC | Performed by: STUDENT IN AN ORGANIZED HEALTH CARE EDUCATION/TRAINING PROGRAM

## 2024-06-12 PROCEDURE — 410N000003 HC PER-PERFUSION 1ST 30 MIN: Performed by: STUDENT IN AN ORGANIZED HEALTH CARE EDUCATION/TRAINING PROGRAM

## 2024-06-12 PROCEDURE — P9059 PLASMA, FRZ BETWEEN 8-24HOUR: HCPCS | Performed by: STUDENT IN AN ORGANIZED HEALTH CARE EDUCATION/TRAINING PROGRAM

## 2024-06-12 PROCEDURE — 360N000079 HC SURGERY LEVEL 6, PER MIN: Performed by: STUDENT IN AN ORGANIZED HEALTH CARE EDUCATION/TRAINING PROGRAM

## 2024-06-12 PROCEDURE — 250N000011 HC RX IP 250 OP 636: Mod: JZ | Performed by: PHYSICIAN ASSISTANT

## 2024-06-12 PROCEDURE — 85610 PROTHROMBIN TIME: CPT | Performed by: STUDENT IN AN ORGANIZED HEALTH CARE EDUCATION/TRAINING PROGRAM

## 2024-06-12 PROCEDURE — 999N000141 HC STATISTIC PRE-PROCEDURE NURSING ASSESSMENT: Performed by: STUDENT IN AN ORGANIZED HEALTH CARE EDUCATION/TRAINING PROGRAM

## 2024-06-12 PROCEDURE — 74018 RADEX ABDOMEN 1 VIEW: CPT | Mod: 26 | Performed by: RADIOLOGY

## 2024-06-12 PROCEDURE — 71045 X-RAY EXAM CHEST 1 VIEW: CPT | Mod: 26 | Performed by: RADIOLOGY

## 2024-06-12 PROCEDURE — 85384 FIBRINOGEN ACTIVITY: CPT | Performed by: STUDENT IN AN ORGANIZED HEALTH CARE EDUCATION/TRAINING PROGRAM

## 2024-06-12 PROCEDURE — 93005 ELECTROCARDIOGRAM TRACING: CPT

## 2024-06-12 PROCEDURE — 33508 ENDOSCOPIC VEIN HARVEST: CPT | Mod: XU | Performed by: STUDENT IN AN ORGANIZED HEALTH CARE EDUCATION/TRAINING PROGRAM

## 2024-06-12 PROCEDURE — 86850 RBC ANTIBODY SCREEN: CPT | Performed by: STUDENT IN AN ORGANIZED HEALTH CARE EDUCATION/TRAINING PROGRAM

## 2024-06-12 PROCEDURE — 84295 ASSAY OF SERUM SODIUM: CPT | Performed by: PHYSICIAN ASSISTANT

## 2024-06-12 PROCEDURE — 06BQ4ZZ EXCISION OF LEFT SAPHENOUS VEIN, PERCUTANEOUS ENDOSCOPIC APPROACH: ICD-10-PCS | Performed by: STUDENT IN AN ORGANIZED HEALTH CARE EDUCATION/TRAINING PROGRAM

## 2024-06-12 PROCEDURE — 250N000011 HC RX IP 250 OP 636: Mod: JZ

## 2024-06-12 PROCEDURE — 84100 ASSAY OF PHOSPHORUS: CPT | Performed by: PHYSICIAN ASSISTANT

## 2024-06-12 PROCEDURE — C1898 LEAD, PMKR, OTHER THAN TRANS: HCPCS | Performed by: STUDENT IN AN ORGANIZED HEALTH CARE EDUCATION/TRAINING PROGRAM

## 2024-06-12 PROCEDURE — 410N000004: Performed by: STUDENT IN AN ORGANIZED HEALTH CARE EDUCATION/TRAINING PROGRAM

## 2024-06-12 PROCEDURE — 999N000157 HC STATISTIC RCP TIME EA 10 MIN

## 2024-06-12 PROCEDURE — 250N000013 HC RX MED GY IP 250 OP 250 PS 637: Performed by: PHYSICIAN ASSISTANT

## 2024-06-12 PROCEDURE — 120N000002 HC R&B MED SURG/OB UMMC

## 2024-06-12 PROCEDURE — 250N000013 HC RX MED GY IP 250 OP 250 PS 637: Performed by: STUDENT IN AN ORGANIZED HEALTH CARE EDUCATION/TRAINING PROGRAM

## 2024-06-12 PROCEDURE — 85041 AUTOMATED RBC COUNT: CPT | Performed by: PHYSICIAN ASSISTANT

## 2024-06-12 PROCEDURE — 33405 REPLACEMENT AORTIC VALVE OPN: CPT | Performed by: STUDENT IN AN ORGANIZED HEALTH CARE EDUCATION/TRAINING PROGRAM

## 2024-06-12 PROCEDURE — 93010 ELECTROCARDIOGRAM REPORT: CPT | Performed by: INTERNAL MEDICINE

## 2024-06-12 PROCEDURE — 258N000003 HC RX IP 258 OP 636: Mod: JZ | Performed by: STUDENT IN AN ORGANIZED HEALTH CARE EDUCATION/TRAINING PROGRAM

## 2024-06-12 PROCEDURE — 88305 TISSUE EXAM BY PATHOLOGIST: CPT | Mod: 26 | Performed by: PATHOLOGY

## 2024-06-12 PROCEDURE — 83735 ASSAY OF MAGNESIUM: CPT | Performed by: PHYSICIAN ASSISTANT

## 2024-06-12 PROCEDURE — 82330 ASSAY OF CALCIUM: CPT

## 2024-06-12 PROCEDURE — 250N000012 HC RX MED GY IP 250 OP 636 PS 637: Performed by: STUDENT IN AN ORGANIZED HEALTH CARE EDUCATION/TRAINING PROGRAM

## 2024-06-12 PROCEDURE — 250N000011 HC RX IP 250 OP 636

## 2024-06-12 PROCEDURE — 999N000065 XR CHEST PORT 1 VIEW

## 2024-06-12 PROCEDURE — 5A1213Z PERFORMANCE OF CARDIAC PACING, INTERMITTENT: ICD-10-PCS | Performed by: STUDENT IN AN ORGANIZED HEALTH CARE EDUCATION/TRAINING PROGRAM

## 2024-06-12 PROCEDURE — 83605 ASSAY OF LACTIC ACID: CPT | Performed by: STUDENT IN AN ORGANIZED HEALTH CARE EDUCATION/TRAINING PROGRAM

## 2024-06-12 PROCEDURE — 85730 THROMBOPLASTIN TIME PARTIAL: CPT | Performed by: STUDENT IN AN ORGANIZED HEALTH CARE EDUCATION/TRAINING PROGRAM

## 2024-06-12 PROCEDURE — 84295 ASSAY OF SERUM SODIUM: CPT

## 2024-06-12 PROCEDURE — 88311 DECALCIFY TISSUE: CPT | Mod: TC | Performed by: STUDENT IN AN ORGANIZED HEALTH CARE EDUCATION/TRAINING PROGRAM

## 2024-06-12 PROCEDURE — 82330 ASSAY OF CALCIUM: CPT | Performed by: PHYSICIAN ASSISTANT

## 2024-06-12 PROCEDURE — 83735 ASSAY OF MAGNESIUM: CPT | Performed by: STUDENT IN AN ORGANIZED HEALTH CARE EDUCATION/TRAINING PROGRAM

## 2024-06-12 PROCEDURE — 272N000088 HC PUMP APP ADULT PERFUSION: Performed by: STUDENT IN AN ORGANIZED HEALTH CARE EDUCATION/TRAINING PROGRAM

## 2024-06-12 PROCEDURE — C1889 IMPLANT/INSERT DEVICE, NOC: HCPCS | Performed by: STUDENT IN AN ORGANIZED HEALTH CARE EDUCATION/TRAINING PROGRAM

## 2024-06-12 PROCEDURE — 33405 REPLACEMENT AORTIC VALVE OPN: CPT | Mod: GC | Performed by: STUDENT IN AN ORGANIZED HEALTH CARE EDUCATION/TRAINING PROGRAM

## 2024-06-12 PROCEDURE — 83605 ASSAY OF LACTIC ACID: CPT | Performed by: PHYSICIAN ASSISTANT

## 2024-06-12 PROCEDURE — 999N000065 XR ABDOMEN PORT 1 VIEW

## 2024-06-12 PROCEDURE — 250N000009 HC RX 250

## 2024-06-12 PROCEDURE — 250N000011 HC RX IP 250 OP 636: Mod: JZ | Performed by: STUDENT IN AN ORGANIZED HEALTH CARE EDUCATION/TRAINING PROGRAM

## 2024-06-12 PROCEDURE — 250N000009 HC RX 250: Performed by: STUDENT IN AN ORGANIZED HEALTH CARE EDUCATION/TRAINING PROGRAM

## 2024-06-12 PROCEDURE — P9037 PLATE PHERES LEUKOREDU IRRAD: HCPCS | Performed by: STUDENT IN AN ORGANIZED HEALTH CARE EDUCATION/TRAINING PROGRAM

## 2024-06-12 PROCEDURE — 76984 DX INTRAOP THORACIC AORTA US: CPT | Mod: 26 | Performed by: STUDENT IN AN ORGANIZED HEALTH CARE EDUCATION/TRAINING PROGRAM

## 2024-06-12 PROCEDURE — P9045 ALBUMIN (HUMAN), 5%, 250 ML: HCPCS | Mod: JZ

## 2024-06-12 PROCEDURE — 82805 BLOOD GASES W/O2 SATURATION: CPT | Performed by: STUDENT IN AN ORGANIZED HEALTH CARE EDUCATION/TRAINING PROGRAM

## 2024-06-12 PROCEDURE — 02100Z9 BYPASS CORONARY ARTERY, ONE ARTERY FROM LEFT INTERNAL MAMMARY, OPEN APPROACH: ICD-10-PCS | Performed by: STUDENT IN AN ORGANIZED HEALTH CARE EDUCATION/TRAINING PROGRAM

## 2024-06-12 PROCEDURE — 272N000085 HC PACK CELL SAVER CSP: Performed by: STUDENT IN AN ORGANIZED HEALTH CARE EDUCATION/TRAINING PROGRAM

## 2024-06-12 PROCEDURE — 88311 DECALCIFY TISSUE: CPT | Mod: 26 | Performed by: PATHOLOGY

## 2024-06-12 PROCEDURE — 999N000259 HC STATISTIC EXTUBATION

## 2024-06-12 PROCEDURE — 02RF08Z REPLACEMENT OF AORTIC VALVE WITH ZOOPLASTIC TISSUE, OPEN APPROACH: ICD-10-PCS | Performed by: STUDENT IN AN ORGANIZED HEALTH CARE EDUCATION/TRAINING PROGRAM

## 2024-06-12 PROCEDURE — 250N000009 HC RX 250: Performed by: PHYSICIAN ASSISTANT

## 2024-06-12 PROCEDURE — P9045 ALBUMIN (HUMAN), 5%, 250 ML: HCPCS

## 2024-06-12 PROCEDURE — 250N000011 HC RX IP 250 OP 636: Performed by: STUDENT IN AN ORGANIZED HEALTH CARE EDUCATION/TRAINING PROGRAM

## 2024-06-12 PROCEDURE — 85049 AUTOMATED PLATELET COUNT: CPT | Performed by: STUDENT IN AN ORGANIZED HEALTH CARE EDUCATION/TRAINING PROGRAM

## 2024-06-12 PROCEDURE — 370N000017 HC ANESTHESIA TECHNICAL FEE, PER MIN: Performed by: STUDENT IN AN ORGANIZED HEALTH CARE EDUCATION/TRAINING PROGRAM

## 2024-06-12 PROCEDURE — 85396 CLOTTING ASSAY WHOLE BLOOD: CPT

## 2024-06-12 PROCEDURE — 5A1221Z PERFORMANCE OF CARDIAC OUTPUT, CONTINUOUS: ICD-10-PCS | Performed by: STUDENT IN AN ORGANIZED HEALTH CARE EDUCATION/TRAINING PROGRAM

## 2024-06-12 PROCEDURE — 86900 BLOOD TYPING SEROLOGIC ABO: CPT | Performed by: STUDENT IN AN ORGANIZED HEALTH CARE EDUCATION/TRAINING PROGRAM

## 2024-06-12 PROCEDURE — 82330 ASSAY OF CALCIUM: CPT | Performed by: STUDENT IN AN ORGANIZED HEALTH CARE EDUCATION/TRAINING PROGRAM

## 2024-06-12 PROCEDURE — 258N000003 HC RX IP 258 OP 636: Mod: JZ

## 2024-06-12 PROCEDURE — 85610 PROTHROMBIN TIME: CPT | Performed by: PHYSICIAN ASSISTANT

## 2024-06-12 PROCEDURE — 94002 VENT MGMT INPAT INIT DAY: CPT

## 2024-06-12 PROCEDURE — 84132 ASSAY OF SERUM POTASSIUM: CPT | Performed by: STUDENT IN AN ORGANIZED HEALTH CARE EDUCATION/TRAINING PROGRAM

## 2024-06-12 PROCEDURE — 85730 THROMBOPLASTIN TIME PARTIAL: CPT | Performed by: PHYSICIAN ASSISTANT

## 2024-06-12 PROCEDURE — 33533 CABG ARTERIAL SINGLE: CPT | Mod: GC | Performed by: STUDENT IN AN ORGANIZED HEALTH CARE EDUCATION/TRAINING PROGRAM

## 2024-06-12 PROCEDURE — 021009W BYPASS CORONARY ARTERY, ONE ARTERY FROM AORTA WITH AUTOLOGOUS VENOUS TISSUE, OPEN APPROACH: ICD-10-PCS | Performed by: STUDENT IN AN ORGANIZED HEALTH CARE EDUCATION/TRAINING PROGRAM

## 2024-06-12 PROCEDURE — 272N000002 HC OR SUPPLY OTHER OPNP: Performed by: STUDENT IN AN ORGANIZED HEALTH CARE EDUCATION/TRAINING PROGRAM

## 2024-06-12 PROCEDURE — 250N000024 HC ISOFLURANE, PER MIN: Performed by: STUDENT IN AN ORGANIZED HEALTH CARE EDUCATION/TRAINING PROGRAM

## 2024-06-12 PROCEDURE — 272N000001 HC OR GENERAL SUPPLY STERILE: Performed by: STUDENT IN AN ORGANIZED HEALTH CARE EDUCATION/TRAINING PROGRAM

## 2024-06-12 DEVICE — VALVE AORTIC INSPIRIS RESILIA 11500A25 SZ 25MM: Type: IMPLANTABLE DEVICE | Site: HEART | Status: FUNCTIONAL

## 2024-06-12 RX ORDER — AMOXICILLIN 250 MG
1 CAPSULE ORAL 2 TIMES DAILY
Status: DISCONTINUED | OUTPATIENT
Start: 2024-06-12 | End: 2024-06-13

## 2024-06-12 RX ORDER — CALCIUM GLUCONATE 20 MG/ML
1 INJECTION, SOLUTION INTRAVENOUS
Status: DISCONTINUED | OUTPATIENT
Start: 2024-06-12 | End: 2024-06-15

## 2024-06-12 RX ORDER — PROPOFOL 10 MG/ML
5-75 INJECTION, EMULSION INTRAVENOUS CONTINUOUS
Status: DISCONTINUED | OUTPATIENT
Start: 2024-06-12 | End: 2024-06-13

## 2024-06-12 RX ORDER — LIDOCAINE 40 MG/G
CREAM TOPICAL
Status: DISCONTINUED | OUTPATIENT
Start: 2024-06-12 | End: 2024-06-17 | Stop reason: HOSPADM

## 2024-06-12 RX ORDER — KETOROLAC TROMETHAMINE 30 MG/ML
30 INJECTION, SOLUTION INTRAMUSCULAR; INTRAVENOUS ONCE
Status: COMPLETED | OUTPATIENT
Start: 2024-06-12 | End: 2024-06-12

## 2024-06-12 RX ORDER — HEPARIN SODIUM 1000 [USP'U]/ML
INJECTION, SOLUTION INTRAVENOUS; SUBCUTANEOUS PRN
Status: DISCONTINUED | OUTPATIENT
Start: 2024-06-12 | End: 2024-06-12

## 2024-06-12 RX ORDER — NOREPINEPHRINE BITARTRATE 0.06 MG/ML
.01-.6 INJECTION, SOLUTION INTRAVENOUS CONTINUOUS
Status: DISCONTINUED | OUTPATIENT
Start: 2024-06-12 | End: 2024-06-12

## 2024-06-12 RX ORDER — POLYETHYLENE GLYCOL 3350 17 G/17G
17 POWDER, FOR SOLUTION ORAL DAILY
Status: DISCONTINUED | OUTPATIENT
Start: 2024-06-13 | End: 2024-06-13

## 2024-06-12 RX ORDER — NALOXONE HYDROCHLORIDE 0.4 MG/ML
0.4 INJECTION, SOLUTION INTRAMUSCULAR; INTRAVENOUS; SUBCUTANEOUS
Status: DISCONTINUED | OUTPATIENT
Start: 2024-06-12 | End: 2024-06-17 | Stop reason: HOSPADM

## 2024-06-12 RX ORDER — FENTANYL CITRATE 50 UG/ML
INJECTION, SOLUTION INTRAMUSCULAR; INTRAVENOUS PRN
Status: DISCONTINUED | OUTPATIENT
Start: 2024-06-12 | End: 2024-06-12

## 2024-06-12 RX ORDER — ASPIRIN 81 MG/1
81 TABLET, CHEWABLE ORAL
Status: COMPLETED | OUTPATIENT
Start: 2024-06-12 | End: 2024-06-12

## 2024-06-12 RX ORDER — FAMOTIDINE 20 MG/1
20 TABLET, FILM COATED ORAL
Status: COMPLETED | OUTPATIENT
Start: 2024-06-12 | End: 2024-06-12

## 2024-06-12 RX ORDER — BISACODYL 10 MG
10 SUPPOSITORY, RECTAL RECTAL DAILY PRN
Status: DISCONTINUED | OUTPATIENT
Start: 2024-06-12 | End: 2024-06-17 | Stop reason: HOSPADM

## 2024-06-12 RX ORDER — CHLORHEXIDINE GLUCONATE ORAL RINSE 1.2 MG/ML
10 SOLUTION DENTAL ONCE
Status: COMPLETED | OUTPATIENT
Start: 2024-06-12 | End: 2024-06-12

## 2024-06-12 RX ORDER — NICOTINE POLACRILEX 4 MG
15-30 LOZENGE BUCCAL
Status: DISCONTINUED | OUTPATIENT
Start: 2024-06-12 | End: 2024-06-14

## 2024-06-12 RX ORDER — LIDOCAINE 40 MG/G
CREAM TOPICAL
Status: DISCONTINUED | OUTPATIENT
Start: 2024-06-12 | End: 2024-06-12 | Stop reason: HOSPADM

## 2024-06-12 RX ORDER — VANCOMYCIN HYDROCHLORIDE 1 G/200ML
1000 INJECTION, SOLUTION INTRAVENOUS
Status: COMPLETED | OUTPATIENT
Start: 2024-06-12 | End: 2024-06-12

## 2024-06-12 RX ORDER — ACETAMINOPHEN 325 MG/1
650 TABLET ORAL EVERY 4 HOURS PRN
Status: DISCONTINUED | OUTPATIENT
Start: 2024-06-15 | End: 2024-06-15

## 2024-06-12 RX ORDER — METHOCARBAMOL 500 MG/1
500 TABLET, FILM COATED ORAL EVERY 6 HOURS PRN
Status: DISCONTINUED | OUTPATIENT
Start: 2024-06-12 | End: 2024-06-13

## 2024-06-12 RX ORDER — CEFAZOLIN SODIUM/WATER 2 G/20 ML
2 SYRINGE (ML) INTRAVENOUS
Status: COMPLETED | OUTPATIENT
Start: 2024-06-12 | End: 2024-06-12

## 2024-06-12 RX ORDER — HYDROMORPHONE HYDROCHLORIDE 1 MG/ML
0.3 INJECTION, SOLUTION INTRAMUSCULAR; INTRAVENOUS; SUBCUTANEOUS ONCE
Status: COMPLETED | OUTPATIENT
Start: 2024-06-12 | End: 2024-06-12

## 2024-06-12 RX ORDER — PANTOPRAZOLE SODIUM 40 MG/1
40 TABLET, DELAYED RELEASE ORAL DAILY
Status: DISCONTINUED | OUTPATIENT
Start: 2024-06-13 | End: 2024-06-13

## 2024-06-12 RX ORDER — NALOXONE HYDROCHLORIDE 0.4 MG/ML
0.2 INJECTION, SOLUTION INTRAMUSCULAR; INTRAVENOUS; SUBCUTANEOUS
Status: DISCONTINUED | OUTPATIENT
Start: 2024-06-12 | End: 2024-06-17 | Stop reason: HOSPADM

## 2024-06-12 RX ORDER — PHENYLEPHRINE HCL IN 0.9% NACL 50MG/250ML
.1-6 PLASTIC BAG, INJECTION (ML) INTRAVENOUS CONTINUOUS
Status: DISCONTINUED | OUTPATIENT
Start: 2024-06-12 | End: 2024-06-12 | Stop reason: HOSPADM

## 2024-06-12 RX ORDER — CEFAZOLIN SODIUM/WATER 2 G/20 ML
2 SYRINGE (ML) INTRAVENOUS SEE ADMIN INSTRUCTIONS
Status: DISCONTINUED | OUTPATIENT
Start: 2024-06-12 | End: 2024-06-12 | Stop reason: HOSPADM

## 2024-06-12 RX ORDER — PROTAMINE SULFATE 10 MG/ML
INJECTION, SOLUTION INTRAVENOUS PRN
Status: DISCONTINUED | OUTPATIENT
Start: 2024-06-12 | End: 2024-06-12

## 2024-06-12 RX ORDER — DEXTROSE MONOHYDRATE 25 G/50ML
25-50 INJECTION, SOLUTION INTRAVENOUS
Status: DISCONTINUED | OUTPATIENT
Start: 2024-06-12 | End: 2024-06-14

## 2024-06-12 RX ORDER — OXYCODONE HYDROCHLORIDE 5 MG/1
5 TABLET ORAL EVERY 4 HOURS PRN
Status: DISCONTINUED | OUTPATIENT
Start: 2024-06-12 | End: 2024-06-15

## 2024-06-12 RX ORDER — PROPOFOL 10 MG/ML
INJECTION, EMULSION INTRAVENOUS CONTINUOUS PRN
Status: DISCONTINUED | OUTPATIENT
Start: 2024-06-12 | End: 2024-06-12

## 2024-06-12 RX ORDER — ACETAMINOPHEN 325 MG/1
975 TABLET ORAL ONCE
Status: COMPLETED | OUTPATIENT
Start: 2024-06-12 | End: 2024-06-12

## 2024-06-12 RX ORDER — SODIUM CHLORIDE, SODIUM GLUCONATE, SODIUM ACETATE, POTASSIUM CHLORIDE AND MAGNESIUM CHLORIDE 526; 502; 368; 37; 30 MG/100ML; MG/100ML; MG/100ML; MG/100ML; MG/100ML
INJECTION, SOLUTION INTRAVENOUS CONTINUOUS PRN
Status: DISCONTINUED | OUTPATIENT
Start: 2024-06-12 | End: 2024-06-12

## 2024-06-12 RX ORDER — CALCIUM GLUCONATE 20 MG/ML
2 INJECTION, SOLUTION INTRAVENOUS
Status: DISCONTINUED | OUTPATIENT
Start: 2024-06-12 | End: 2024-06-15

## 2024-06-12 RX ORDER — CEFAZOLIN SODIUM 1 G/3ML
1 INJECTION, POWDER, FOR SOLUTION INTRAMUSCULAR; INTRAVENOUS EVERY 8 HOURS
Qty: 15 ML | Refills: 0 | Status: COMPLETED | OUTPATIENT
Start: 2024-06-12 | End: 2024-06-13

## 2024-06-12 RX ORDER — NOREPINEPHRINE BITARTRATE 0.06 MG/ML
.01-.1 INJECTION, SOLUTION INTRAVENOUS CONTINUOUS
Status: DISCONTINUED | OUTPATIENT
Start: 2024-06-12 | End: 2024-06-12 | Stop reason: HOSPADM

## 2024-06-12 RX ORDER — LIDOCAINE HYDROCHLORIDE 20 MG/ML
INJECTION, SOLUTION INFILTRATION; PERINEURAL PRN
Status: DISCONTINUED | OUTPATIENT
Start: 2024-06-12 | End: 2024-06-12

## 2024-06-12 RX ORDER — HYDRALAZINE HYDROCHLORIDE 20 MG/ML
10 INJECTION INTRAMUSCULAR; INTRAVENOUS EVERY 30 MIN PRN
Status: DISCONTINUED | OUTPATIENT
Start: 2024-06-12 | End: 2024-06-15

## 2024-06-12 RX ORDER — VANCOMYCIN HYDROCHLORIDE 1 G/200ML
1000 INJECTION, SOLUTION INTRAVENOUS EVERY 12 HOURS
Qty: 400 ML | Refills: 0 | Status: COMPLETED | OUTPATIENT
Start: 2024-06-12 | End: 2024-06-13

## 2024-06-12 RX ORDER — DEXMEDETOMIDINE HYDROCHLORIDE 4 UG/ML
.2-.7 INJECTION, SOLUTION INTRAVENOUS CONTINUOUS
Status: DISCONTINUED | OUTPATIENT
Start: 2024-06-12 | End: 2024-06-13

## 2024-06-12 RX ORDER — CHLORHEXIDINE GLUCONATE ORAL RINSE 1.2 MG/ML
15 SOLUTION DENTAL EVERY 12 HOURS
Status: DISCONTINUED | OUTPATIENT
Start: 2024-06-12 | End: 2024-06-13

## 2024-06-12 RX ORDER — ACETAMINOPHEN 325 MG/1
975 TABLET ORAL EVERY 8 HOURS
Qty: 27 TABLET | Refills: 0 | Status: COMPLETED | OUTPATIENT
Start: 2024-06-12 | End: 2024-06-15

## 2024-06-12 RX ORDER — PROCHLORPERAZINE MALEATE 5 MG
5 TABLET ORAL EVERY 6 HOURS PRN
Status: DISCONTINUED | OUTPATIENT
Start: 2024-06-12 | End: 2024-06-15

## 2024-06-12 RX ORDER — HYDROMORPHONE HCL IN WATER/PF 6 MG/30 ML
0.4 PATIENT CONTROLLED ANALGESIA SYRINGE INTRAVENOUS
Status: DISCONTINUED | OUTPATIENT
Start: 2024-06-12 | End: 2024-06-15

## 2024-06-12 RX ORDER — DEXMEDETOMIDINE HYDROCHLORIDE 4 UG/ML
.2-1.2 INJECTION, SOLUTION INTRAVENOUS CONTINUOUS
Status: DISCONTINUED | OUTPATIENT
Start: 2024-06-12 | End: 2024-06-12 | Stop reason: HOSPADM

## 2024-06-12 RX ORDER — PROTAMINE SULFATE 10 MG/ML
25 INJECTION, SOLUTION INTRAVENOUS ONCE
Status: COMPLETED | OUTPATIENT
Start: 2024-06-12 | End: 2024-06-12

## 2024-06-12 RX ORDER — HEPARIN SODIUM 5000 [USP'U]/.5ML
5000 INJECTION, SOLUTION INTRAVENOUS; SUBCUTANEOUS EVERY 8 HOURS
Status: DISCONTINUED | OUTPATIENT
Start: 2024-06-13 | End: 2024-06-17 | Stop reason: HOSPADM

## 2024-06-12 RX ORDER — HYDROMORPHONE HCL IN WATER/PF 6 MG/30 ML
0.2 PATIENT CONTROLLED ANALGESIA SYRINGE INTRAVENOUS
Status: DISCONTINUED | OUTPATIENT
Start: 2024-06-12 | End: 2024-06-15

## 2024-06-12 RX ORDER — NOREPINEPHRINE BITARTRATE 0.06 MG/ML
.01-.4 INJECTION, SOLUTION INTRAVENOUS CONTINUOUS
Status: DISCONTINUED | OUTPATIENT
Start: 2024-06-12 | End: 2024-06-13

## 2024-06-12 RX ORDER — POTASSIUM CHLORIDE 29.8 MG/ML
INJECTION INTRAVENOUS PRN
Status: DISCONTINUED | OUTPATIENT
Start: 2024-06-12 | End: 2024-06-12

## 2024-06-12 RX ORDER — OXYCODONE HYDROCHLORIDE 10 MG/1
10 TABLET ORAL EVERY 4 HOURS PRN
Status: DISCONTINUED | OUTPATIENT
Start: 2024-06-12 | End: 2024-06-15

## 2024-06-12 RX ORDER — CALCIUM CHLORIDE 100 MG/ML
INJECTION INTRAVENOUS; INTRAVENTRICULAR PRN
Status: DISCONTINUED | OUTPATIENT
Start: 2024-06-12 | End: 2024-06-12

## 2024-06-12 RX ORDER — ASPIRIN 81 MG/1
162 TABLET, CHEWABLE ORAL DAILY
Status: DISCONTINUED | OUTPATIENT
Start: 2024-06-12 | End: 2024-06-15

## 2024-06-12 RX ORDER — GABAPENTIN 100 MG/1
100 CAPSULE ORAL
Status: COMPLETED | OUTPATIENT
Start: 2024-06-12 | End: 2024-06-12

## 2024-06-12 RX ORDER — PROPOFOL 10 MG/ML
INJECTION, EMULSION INTRAVENOUS PRN
Status: DISCONTINUED | OUTPATIENT
Start: 2024-06-12 | End: 2024-06-12

## 2024-06-12 RX ORDER — HYDROMORPHONE HCL IN WATER/PF 6 MG/30 ML
0.2 PATIENT CONTROLLED ANALGESIA SYRINGE INTRAVENOUS 2 TIMES DAILY PRN
Status: DISCONTINUED | OUTPATIENT
Start: 2024-06-12 | End: 2024-06-13

## 2024-06-12 RX ORDER — ONDANSETRON 2 MG/ML
4 INJECTION INTRAMUSCULAR; INTRAVENOUS EVERY 6 HOURS PRN
Status: DISCONTINUED | OUTPATIENT
Start: 2024-06-12 | End: 2024-06-17 | Stop reason: HOSPADM

## 2024-06-12 RX ORDER — ONDANSETRON 4 MG/1
4 TABLET, ORALLY DISINTEGRATING ORAL EVERY 6 HOURS PRN
Status: DISCONTINUED | OUTPATIENT
Start: 2024-06-12 | End: 2024-06-17 | Stop reason: HOSPADM

## 2024-06-12 RX ORDER — ACETAMINOPHEN 325 MG/1
975 TABLET ORAL ONCE
Status: DISCONTINUED | OUTPATIENT
Start: 2024-06-12 | End: 2024-06-12 | Stop reason: HOSPADM

## 2024-06-12 RX ORDER — SODIUM CHLORIDE, SODIUM LACTATE, POTASSIUM CHLORIDE, CALCIUM CHLORIDE 600; 310; 30; 20 MG/100ML; MG/100ML; MG/100ML; MG/100ML
INJECTION, SOLUTION INTRAVENOUS CONTINUOUS PRN
Status: DISCONTINUED | OUTPATIENT
Start: 2024-06-12 | End: 2024-06-12

## 2024-06-12 RX ORDER — ASPIRIN 81 MG/1
162 TABLET, CHEWABLE ORAL
Status: COMPLETED | OUTPATIENT
Start: 2024-06-12 | End: 2024-06-12

## 2024-06-12 RX ADMIN — Medication 50 MG: at 12:15

## 2024-06-12 RX ADMIN — CHLORHEXIDINE GLUCONATE 0.12% ORAL RINSE 10 ML: 1.2 LIQUID ORAL at 06:20

## 2024-06-12 RX ADMIN — ACETAMINOPHEN 975 MG: 325 TABLET, FILM COATED ORAL at 20:30

## 2024-06-12 RX ADMIN — Medication 2 G: at 12:20

## 2024-06-12 RX ADMIN — PROTAMINE SULFATE 150 MG: 10 INJECTION, SOLUTION INTRAVENOUS at 13:15

## 2024-06-12 RX ADMIN — LIDOCAINE HYDROCHLORIDE 100 MG: 20 INJECTION, SOLUTION INFILTRATION; PERINEURAL at 12:42

## 2024-06-12 RX ADMIN — Medication 12.5 MG: at 06:20

## 2024-06-12 RX ADMIN — HYDROMORPHONE HYDROCHLORIDE 0.3 MG: 1 INJECTION, SOLUTION INTRAMUSCULAR; INTRAVENOUS; SUBCUTANEOUS at 16:55

## 2024-06-12 RX ADMIN — SODIUM CHLORIDE, POTASSIUM CHLORIDE, SODIUM LACTATE AND CALCIUM CHLORIDE: 600; 310; 30; 20 INJECTION, SOLUTION INTRAVENOUS at 07:41

## 2024-06-12 RX ADMIN — CHLORHEXIDINE GLUCONATE 0.12% ORAL RINSE 15 ML: 1.2 LIQUID ORAL at 20:30

## 2024-06-12 RX ADMIN — SODIUM CHLORIDE, SODIUM GLUCONATE, SODIUM ACETATE, POTASSIUM CHLORIDE AND MAGNESIUM CHLORIDE: 526; 502; 368; 37; 30 INJECTION, SOLUTION INTRAVENOUS at 07:38

## 2024-06-12 RX ADMIN — CEFAZOLIN 1 G: 1 INJECTION, POWDER, FOR SOLUTION INTRAMUSCULAR; INTRAVENOUS at 20:31

## 2024-06-12 RX ADMIN — Medication 2 G: at 08:20

## 2024-06-12 RX ADMIN — INSULIN HUMAN 4 UNITS/HR: 1 INJECTION, SOLUTION INTRAVENOUS at 12:06

## 2024-06-12 RX ADMIN — HYDROMORPHONE HYDROCHLORIDE 0.2 MG: 0.2 INJECTION, SOLUTION INTRAMUSCULAR; INTRAVENOUS; SUBCUTANEOUS at 17:26

## 2024-06-12 RX ADMIN — HYDROMORPHONE HYDROCHLORIDE 0.2 MG: 0.2 INJECTION, SOLUTION INTRAMUSCULAR; INTRAVENOUS; SUBCUTANEOUS at 16:00

## 2024-06-12 RX ADMIN — DEXMEDETOMIDINE HYDROCHLORIDE 0.3 MCG/KG/HR: 400 INJECTION INTRAVENOUS at 17:44

## 2024-06-12 RX ADMIN — ASPIRIN 81 MG: 81 TABLET, COATED ORAL at 06:20

## 2024-06-12 RX ADMIN — ASPIRIN 81 MG CHEWABLE TABLET 162 MG: 81 TABLET CHEWABLE at 20:30

## 2024-06-12 RX ADMIN — FAMOTIDINE 20 MG: 20 TABLET ORAL at 06:23

## 2024-06-12 RX ADMIN — SODIUM CHLORIDE, POTASSIUM CHLORIDE, SODIUM LACTATE AND CALCIUM CHLORIDE 1000 ML: 600; 310; 30; 20 INJECTION, SOLUTION INTRAVENOUS at 17:22

## 2024-06-12 RX ADMIN — CALCIUM CHLORIDE INJECTION 1 G: 100 INJECTION, SOLUTION INTRAVENOUS at 12:40

## 2024-06-12 RX ADMIN — HEPARIN SODIUM 25000 UNITS: 1000 INJECTION INTRAVENOUS; SUBCUTANEOUS at 09:32

## 2024-06-12 RX ADMIN — FENTANYL CITRATE 250 MCG: 50 INJECTION INTRAMUSCULAR; INTRAVENOUS at 13:06

## 2024-06-12 RX ADMIN — ACETAMINOPHEN 975 MG: 325 TABLET, FILM COATED ORAL at 06:19

## 2024-06-12 RX ADMIN — PROPOFOL 40 MCG/KG/MIN: 10 INJECTION, EMULSION INTRAVENOUS at 16:02

## 2024-06-12 RX ADMIN — GABAPENTIN 100 MG: 100 CAPSULE ORAL at 06:20

## 2024-06-12 RX ADMIN — HYDROMORPHONE HYDROCHLORIDE 0.4 MG: 0.2 INJECTION, SOLUTION INTRAMUSCULAR; INTRAVENOUS; SUBCUTANEOUS at 20:31

## 2024-06-12 RX ADMIN — PROTAMINE SULFATE 25 MG: 10 INJECTION, SOLUTION INTRAVENOUS at 16:25

## 2024-06-12 RX ADMIN — VANCOMYCIN HYDROCHLORIDE 1000 MG: 1 INJECTION, SOLUTION INTRAVENOUS at 06:33

## 2024-06-12 RX ADMIN — ONDANSETRON 4 MG: 2 INJECTION INTRAMUSCULAR; INTRAVENOUS at 20:31

## 2024-06-12 RX ADMIN — MIDAZOLAM 1 MG: 1 INJECTION INTRAMUSCULAR; INTRAVENOUS at 07:47

## 2024-06-12 RX ADMIN — Medication 50 MG: at 12:24

## 2024-06-12 RX ADMIN — DOCUSATE SODIUM 50 MG AND SENNOSIDES 8.6 MG 1 TABLET: 8.6; 5 TABLET, FILM COATED ORAL at 20:30

## 2024-06-12 RX ADMIN — Medication 50 MG: at 08:54

## 2024-06-12 RX ADMIN — HYDROMORPHONE HYDROCHLORIDE 0.2 MG: 0.2 INJECTION, SOLUTION INTRAMUSCULAR; INTRAVENOUS; SUBCUTANEOUS at 15:49

## 2024-06-12 RX ADMIN — SODIUM CHLORIDE, POTASSIUM CHLORIDE, SODIUM LACTATE AND CALCIUM CHLORIDE 500 ML: 600; 310; 30; 20 INJECTION, SOLUTION INTRAVENOUS at 16:09

## 2024-06-12 RX ADMIN — SODIUM CHLORIDE 7.5 G: 900 INJECTION, SOLUTION INTRAVENOUS at 08:22

## 2024-06-12 RX ADMIN — NOREPINEPHRINE BITARTRATE 0.07 MCG/KG/MIN: 0.06 INJECTION, SOLUTION INTRAVENOUS at 15:22

## 2024-06-12 RX ADMIN — EPINEPHRINE 0.05 MCG/KG/MIN: 1 INJECTION INTRAMUSCULAR; INTRAVENOUS; SUBCUTANEOUS at 12:40

## 2024-06-12 RX ADMIN — POTASSIUM CHLORIDE 20 MEQ: 29.8 INJECTION, SOLUTION INTRAVENOUS at 13:57

## 2024-06-12 RX ADMIN — LIDOCAINE HYDROCHLORIDE 100 MG: 20 INJECTION, SOLUTION INFILTRATION; PERINEURAL at 07:47

## 2024-06-12 RX ADMIN — KETOROLAC TROMETHAMINE 30 MG: 30 INJECTION INTRAMUSCULAR; INTRAVENOUS at 17:25

## 2024-06-12 RX ADMIN — Medication 100 MG: at 07:49

## 2024-06-12 RX ADMIN — SUGAMMADEX 200 MG: 100 INJECTION, SOLUTION INTRAVENOUS at 14:50

## 2024-06-12 RX ADMIN — SODIUM CHLORIDE 1.25 G/HR: 900 INJECTION, SOLUTION INTRAVENOUS at 09:22

## 2024-06-12 RX ADMIN — ALBUMIN (HUMAN): 12.5 SOLUTION INTRAVENOUS at 13:55

## 2024-06-12 RX ADMIN — VANCOMYCIN HYDROCHLORIDE 1000 MG: 1 INJECTION, SOLUTION INTRAVENOUS at 17:49

## 2024-06-12 RX ADMIN — FENTANYL CITRATE 250 MCG: 50 INJECTION INTRAMUSCULAR; INTRAVENOUS at 07:47

## 2024-06-12 RX ADMIN — LIDOCAINE HYDROCHLORIDE 100 MG: 20 INJECTION, SOLUTION INFILTRATION; PERINEURAL at 12:40

## 2024-06-12 RX ADMIN — HYDROMORPHONE HYDROCHLORIDE 1 MG: 1 INJECTION, SOLUTION INTRAMUSCULAR; INTRAVENOUS; SUBCUTANEOUS at 15:21

## 2024-06-12 RX ADMIN — NOREPINEPHRINE BITARTRATE 0.05 MCG/KG/MIN: 0.06 INJECTION, SOLUTION INTRAVENOUS at 08:07

## 2024-06-12 RX ADMIN — PROPOFOL 50 MCG/KG/MIN: 10 INJECTION, EMULSION INTRAVENOUS at 14:18

## 2024-06-12 RX ADMIN — SODIUM CHLORIDE, POTASSIUM CHLORIDE, SODIUM LACTATE AND CALCIUM CHLORIDE 500 ML: 600; 310; 30; 20 INJECTION, SOLUTION INTRAVENOUS at 15:51

## 2024-06-12 RX ADMIN — FENTANYL CITRATE 250 MCG: 50 INJECTION INTRAMUSCULAR; INTRAVENOUS at 08:48

## 2024-06-12 RX ADMIN — PROPOFOL 30 MG: 10 INJECTION, EMULSION INTRAVENOUS at 07:49

## 2024-06-12 ASSESSMENT — ACTIVITIES OF DAILY LIVING (ADL)
ADLS_ACUITY_SCORE: 35
ADLS_ACUITY_SCORE: 33
ADLS_ACUITY_SCORE: 35

## 2024-06-12 NOTE — ANESTHESIA PROCEDURE NOTES
Perioperative WOODY Procedure Note    Staff -        Anesthesiologist:  Jose Sun MD       Resident/Fellow: Blas Cherry MD       Performed By: fellow  Preanesthesia Checklist:  Patient identified, IV assessed, risks and benefits discussed, monitors and equipment assessed, procedure being performed at surgeon's request and anesthesia consent obtained.    WOODY Probe Insertion    Probe Status PRE Insertion: NO obvious damage  Probe type:  Adult 3D  Bite block used:   Soft  Insertion Technique: Easy, no oropharyngeal manipulation  Insertion complications: None obvious  Billing Report:WOODY report by Anesthesiologist (See Separate Report note)  Probe Status POST Removal: NO obvious damage    WOODY Report  General Procedure Information  Images for this study have been archived.  Modalities: 2D, 3D, CW Doppler, PW Doppler and Color flow mapping  Echocardiographic and Doppler Measurements  Right Ventricle:  Cavity size normal.    Hypertrophy not present.   Thrombus not present.    Global function normal.     Left Ventricle:  Cavity size normal.    Hypertrophy present.   Thrombus not present.   Global Function normal.   Ejection Fraction 60%.      Ventricular Regional Function:  1- Basal Anteroseptal:  normal  2- Basal Anterior:  normal  3- Basal Anterolateral:  normal  4- Basal Inferolateral:  normal  5- Basal Inferior:  normal  6- Basal Inferoseptal:  normal  7- Mid Anteroseptal:  normal  8- Mid Anterior:  normal  9- Mid Anterolateral:  normal  10- Mid Inferolateral:  normal  11- Mid Inferior:  normal  12- Mid Inferoseptal:  normal  13- Apical Anterior:  normal  14- Apical Lateral:  normal  15- Apical Inferior:  normal  16- Apical Septal:  normal  17- [place]:  normal    Valves  Aortic Valve: Annulus calcified.  Stenosis severe.  Area: 0.9 cm .  Mean Gradient: 31 mmHg.  Regurgitation absent.  Leaflets calcified.  Leaflet motions restricted.  Specific leaflet segments with abnormal motions:  RCC, NCC and LCC  Mitral  Valve: Annulus normal.  Stenosis not present.  Regurgitation +2.  Leaflets normal.  Leaflet motions normal.    Tricuspid Valve: Annulus normal.  Stenosis not present.  Regurgitation +1.  Leaflets normal.  Leaflet motions normal.    Pulmonic Valve: Annulus normal.  Stenosis not present.  Regurgitation absent.      Aorta: Ascending Aorta: Size normal.  Diameter 3.51 cm.  Dissection not present.  Plaque thickness less than 3 mm.  Mobile plaque not present.    Aortic Arch: Size normal.    Dissection not present.   Plaque thickness less than 3 mm.   Mobile plaque not present.    Descending Aorta: Size normal.    Dissection not present.   Plaque thickness less than 3 mm.   Mobile plaque not present.      Right Atrium:  Size normal.   Spontaneous echo contrast not present.   Thrombus not present.   Tumor not present.   Device not present.     Left Atrium: Size normal.  Spontaneous echo contrast not present.  Thrombus not present.  Tumor not present.  Device not present.    Left atrial appendage normal.   Other Atria Findings:  LEWIS velocity > 40 cm/s  Atrial Septum: Intra-atrial septal morphology normal.       Ventricular Septum: Intra-ventricular septum morphology normal.      Diastolic Function Measurements:  Diastolic Dysfunction Grade= II.  E=  ms.  A=  ms.  E/A Ratio= .  DT=  ms.  S/D= .  IVRT= ms.  Other Findings:   Pericardium:  normal. Pleural Effusion:  none. Pulmonary Arteries:  normal. Pulmonary Venous Flow:  blunted (decreased) systolic flow. Cornoary sinus catheter present.    Post Intervention Findings  Procedure(s) performed:  CABG and Aortic Valve Repair/Replace. Global function:  Unchanged. Regional wall motion: Unchanged. Surgeon(s) notified of all postintervention findings: Yes (Notified in real time).   Aortic Valve: Valve replaced with bioprosthetic valve. No paravalvular leak.              Echocardiogram Comments  Echocardiogram comments: PRE-CPB:  Normal biventricular systolic function w/ LVEF 60%.  Grade II DD. Mild MR. Mild TR. Trace PI. AV is tri-leaflet and calcified with restricted motion on all cusps. HOWARD consistent with severe stenosis and estimated to be 0.9cm^2. Peak veolcity 3.8m/s. DVI 0.27. Mean PG 31 mmHg.  No AI. No pericardial effusion. No aortic dissection. All findings communicated to surgical team.    POST-CPB:  S/p AVR and CABG x2: Biventricular function is unchanged. No new RWMA. There is a new bioprosthetic valve in the aortic position is well seated, with no paravalvular leak and a mean PG 11-16 mmHg. The tricuspid regurgitation is mild. The mitral and pulmonic valves are unchanged. There is no echo evidence of aortic dissection. No pericardial effusion. All findings communicated to surgical team..

## 2024-06-12 NOTE — PROGRESS NOTES
Extubated to 2 lpm NC without issue. Good cough with small tanish secretions,SpO2 100%, BS present.     will continue to help with  deep exercising and coughing.

## 2024-06-12 NOTE — ANESTHESIA PROCEDURE NOTES
Airway       Patient location during procedure: OR       Procedure Start/Stop Times: 6/12/2024 7:53 AM  Staff -        Anesthesiologist:  Jose Sun MD       Resident/Fellow: Yosi Belcher MD       Performed By: resident  Consent for Airway        Urgency: elective  Indications and Patient Condition       Indications for airway management: erinn-procedural       Induction type:intravenous       Mask difficulty assessment: 1 - vent by mask    Final Airway Details       Final airway type: endotracheal airway       Successful airway: ETT - single  Endotracheal Airway Details        ETT size (mm): 8.0       Cuffed: yes       Successful intubation technique: direct laryngoscopy       DL Blade Type: MAC 4       Grade View of Cords: 2       Adjucts: stylet       Position: Center       Measured from: gums/teeth       Secured at (cm): 24       Bite block used: None    Post intubation assessment        Number of attempts at approach: 1       Number of other approaches attempted: 0       Secured with: tape       Ease of procedure: easy       Dentition: Intact and Unchanged    Medication(s) Administered   Medication Administration Time: 6/12/2024 7:53 AM    Additional Comments       Intubated by medical student

## 2024-06-12 NOTE — ANESTHESIA PROCEDURE NOTES
Central Line/PA Catheter Placement    Pre-Procedure   Staff -        Anesthesiologist:  Jose Sun MD       Resident/Fellow: Yosi Belcher MD       Performed By: resident       Location: OR       Pre-Anesthestic Checklist: patient identified, IV checked, site marked, risks and benefits discussed, informed consent, monitors and equipment checked, pre-op evaluation and at physician/surgeon's request  Timeout:       Correct Patient: Yes        Correct Procedure: Yes        Correct Site: Yes        Correct Position: Yes        Correct Laterality: Yes   Line Placement:   This line was placed Post Induction    Procedure   Procedure: central line       Laterality: right       Insertion Site: internal jugular.       Patient Position: Trendelenburg  Sterile Prep        All elements of maximal sterile barrier technique followed       Patient Prep/Sterile Barriers: draped, hand hygiene, gloves , hat , mask , draped, gown, sterile gel and probe cover       Skin prep: Chloraprep  Insertion/Injection        Technique: ultrasound guided and Seldinger Technique        1. Ultrasound was used to evaluate the access site.       2. Vein evaluated via ultrasound for patency/adequacy.       3. Using real-time ultrasound the needle/catheter was observed entering the artery/vein.       Introducer Type: 9 Fr, 2-lumen MAC        Type: Introducer  Narrative         Secured by: suture       Tegaderm and Biopatch dressing used.       Complications: None apparent,        blood aspirated from all lumens,        All lumens flushed: Yes       Verification method: Ultrasound, Placement to be verified post-op and WOODY

## 2024-06-12 NOTE — BRIEF OP NOTE
[facility]    Brief Operative Note    Pre-operative diagnosis: CAD (coronary artery disease) [I25.10]  Post-operative diagnosis Same as pre-operative diagnosis    Procedure: Median Sternotomy, Cardiopulmonary Bypass, Left Internal Mammary Vein [place], Endoscopic [place] of Left Greater Saphenous Vein, Coronary Bypass Graft x 2, Aortic Valve Replacement with Inspiris Resilia Aortic Valve size 25mm, Transesophageal Echocardiogram by Anesthesia, N/A - Chest    Surgeon: Surgeons and Role:     * Mitail Nicole MD - Primary     * Gallo Stauffer PA-C - Assisting     * Den Cheung MD - Resident - Assisting  Anesthesia: General   Estimated Blood Loss: 400 cc    Drains: R pleural osiel, L pleural osiel, mediastinal x2  Specimens:   ID Type Source Tests Collected by Time Destination   1 : Aortic Valve Leaflets Tissue Heart Valve, Aortic SURGICAL PATHOLOGY EXAM Mitali Nicole MD 6/12/2024 11:28 AM      Findings:   Cab x2 (LIMA - LAD, SV to OM), aortic valve replacement (bio)  Complications: None.  Implants:   Implant Name Type Inv. Item Serial No.  Lot No. LRB No. Used Action   VALVE AORTIC INSPIRIS RESILIA [id number] [id number] 25MM - [id number] Valve VALVE AORTIC INSPIRIS RESILIA [id number] SZ 25MM [id number] [employer]  N/A 1 Implanted

## 2024-06-12 NOTE — ANESTHESIA POSTPROCEDURE EVALUATION
Patient: Won Urena    Procedure: Procedure(s):  Median Sternotomy, Cardiopulmonary Bypass, Left Internal Mammary Vein [place], Endoscopic [place] of Left Greater Saphenous Vein, Coronary Bypass Graft x 2, Aortic Valve Replacement with Inspiris Resilia Aortic Valve size 25mm, Transesophageal Echocardiogram by Anesthesia       Anesthesia Type:  General    Note:  Disposition: ICU            ICU Sign Out: Anesthesiologist/ICU physician sign out WAS performed   Postop Pain Control: Uneventful            Sign Out: Well controlled pain   PONV: No   Neuro/Psych: Uneventful            Sign Out: PLANNED postop sedation   Airway/Respiratory: Uneventful            Sign Out: AIRWAY IN SITU/Resp. Support               Airway in situ/Resp. Support: ETT                 Reason: Planned Pre-op   CV/Hemodynamics: Uneventful            Sign Out: Acceptable CV status; No obvious hypovolemia; No obvious fluid overload   Other NRE: NONE   DID A NON-ROUTINE EVENT OCCUR? No    Event details/Postop Comments:  Patient transported from OR to ICU w/ O2, ambu, infusions, emergency medications and emergency airway equipment. VSS throughout transport and handoff.           Last vitals:  Vitals:    06/12/24 0620   BP: 115/75   Pulse: 64   Resp: 16   Temp: 36.6  C (97.8  F)   SpO2: 98%       Electronically Signed By: Jose Sun MD  June 12, 2024  3:22 PM

## 2024-06-12 NOTE — ANESTHESIA CARE TRANSFER NOTE
Patient: Won Urena    Procedure: Procedure(s):  Median Sternotomy, Cardiopulmonary Bypass, Left Internal Mammary Vein [place], Endoscopic [place] of Left Greater Saphenous Vein, Coronary Bypass Graft x 2, Aortic Valve Replacement with Inspiris Resilia Aortic Valve size 25mm, Transesophageal Echocardiogram by Anesthesia       Diagnosis: CAD (coronary artery disease) [I25.10]  Diagnosis Additional Information: No value filed.    Anesthesia Type:   General     Note:    Oropharynx: endotracheal tube in place and ventilatory support  Level of Consciousness: iatrogenic sedation      Independent Airway: airway patency not satisfactory and stable  Dentition: dentition unchanged  Vital Signs Stable: post-procedure vital signs reviewed and stable  Report to RN Given: handoff report given  Patient transferred to: ICU    ICU Handoff: Call for PAUSE to initiate/utilize ICU HANDOFF, Identified Patient, Identified Responsible Provider, Reviewed the Pertinent Medical History, Discussed Surgical Course, Reviewed Intra-OP Anesthesia Management and Issues during Anesthesia, Set Expectations for Post Procedure Period and Allowed Opportunity for Questions and Acknowledgement of Understanding      Vitals:  Vitals Value Taken Time   BP     Temp     Pulse 75 06/12/24 1521   Resp     SpO2 100 % 06/12/24 1521   Vitals shown include unfiled device data.    Electronically Signed By: Yosi Belcher MD  June 12, 2024  3:21 PM

## 2024-06-12 NOTE — ANESTHESIA PROCEDURE NOTES
Arterial Line Procedure Note    Pre-Procedure   Staff -        Anesthesiologist:  Jose Sun MD       Resident/Fellow: Yosi Belcher MD       Performed By: resident       Location: pre-op       Pre-Anesthestic Checklist: patient identified, IV checked, risks and benefits discussed, informed consent, monitors and equipment checked, pre-op evaluation and at physician/surgeon's request  Timeout:       Correct Patient: Yes        Correct Procedure: Yes        Correct Site: Yes        Correct Position: Yes   Line Placement:   This line was placed Pre Induction starting at 6/12/2024 6:45 AM and ending at 6/12/2024 7:15 AM  Procedure   Procedure: arterial line       Laterality: right       Insertion Site: radial.  Sterile Prep        Standard elements of sterile barrier followed       Skin prep: Chloraprep  Insertion/Injection        Technique: Seldinger Technique and ultrasound guided        1. Ultrasound was used to evaluate the access site.       2. Artery evaluated via ultrasound for patency/adequacy.       3. Using real-time ultrasound the needle/catheter was observed entering the artery/vein.       Catheter Type/Size: 20 G, 12 cm  Narrative         Secured by: suture       Tegaderm dressing used.       Complications: None apparent,        Arterial waveform: Yes        IBP within 10% of NIBP: Yes

## 2024-06-13 ENCOUNTER — APPOINTMENT (OUTPATIENT)
Dept: OCCUPATIONAL THERAPY | Facility: CLINIC | Age: 69
DRG: 219 | End: 2024-06-13
Attending: PHYSICIAN ASSISTANT
Payer: COMMERCIAL

## 2024-06-13 ENCOUNTER — APPOINTMENT (OUTPATIENT)
Dept: GENERAL RADIOLOGY | Facility: CLINIC | Age: 69
DRG: 219 | End: 2024-06-13
Attending: PHYSICIAN ASSISTANT
Payer: COMMERCIAL

## 2024-06-13 LAB
ALLEN'S TEST: ABNORMAL
ANION GAP SERPL CALCULATED.3IONS-SCNC: 10 MMOL/L (ref 7–15)
ANION GAP SERPL CALCULATED.3IONS-SCNC: 8 MMOL/L (ref 7–15)
ATRIAL RATE - MUSE: 74 BPM
ATRIAL RATE - MUSE: 79 BPM
ATRIAL RATE - MUSE: 81 BPM
BASE EXCESS BLDA CALC-SCNC: -0.1 MMOL/L (ref -3–3)
BUN SERPL-MCNC: 18.8 MG/DL (ref 8–23)
BUN SERPL-MCNC: 21.9 MG/DL (ref 8–23)
CA-I BLD-MCNC: 4.9 MG/DL (ref 4.4–5.2)
CA-I BLD-MCNC: 4.9 MG/DL (ref 4.4–5.2)
CALCIUM SERPL-MCNC: 8.5 MG/DL (ref 8.8–10.2)
CALCIUM SERPL-MCNC: 8.8 MG/DL (ref 8.8–10.2)
CHLORIDE SERPL-SCNC: 104 MMOL/L (ref 98–107)
CHLORIDE SERPL-SCNC: 108 MMOL/L (ref 98–107)
COHGB MFR BLD: 99 % (ref 95–96)
CREAT SERPL-MCNC: 0.54 MG/DL (ref 0.51–0.95)
CREAT SERPL-MCNC: 0.64 MG/DL (ref 0.51–0.95)
DEPRECATED HCO3 PLAS-SCNC: 23 MMOL/L (ref 22–29)
DEPRECATED HCO3 PLAS-SCNC: 25 MMOL/L (ref 22–29)
DIASTOLIC BLOOD PRESSURE - MUSE: NORMAL MMHG
EGFRCR SERPLBLD CKD-EPI 2021: >90 ML/MIN/1.73M2
EGFRCR SERPLBLD CKD-EPI 2021: >90 ML/MIN/1.73M2
ERYTHROCYTE [DISTWIDTH] IN BLOOD BY AUTOMATED COUNT: 13.8 % (ref 10–15)
GLUCOSE BLDC GLUCOMTR-MCNC: 115 MG/DL (ref 70–99)
GLUCOSE BLDC GLUCOMTR-MCNC: 136 MG/DL (ref 70–99)
GLUCOSE BLDC GLUCOMTR-MCNC: 182 MG/DL (ref 70–99)
GLUCOSE BLDC GLUCOMTR-MCNC: 194 MG/DL (ref 70–99)
GLUCOSE BLDC GLUCOMTR-MCNC: 212 MG/DL (ref 70–99)
GLUCOSE SERPL-MCNC: 129 MG/DL (ref 70–99)
GLUCOSE SERPL-MCNC: 195 MG/DL (ref 70–99)
HCO3 BLD-SCNC: 25 MMOL/L (ref 21–28)
HCT VFR BLD AUTO: 27.2 % (ref 35–47)
HGB BLD-MCNC: 9 G/DL (ref 11.7–15.7)
INTERPRETATION ECG - MUSE: NORMAL
LACTATE SERPL-SCNC: 0.7 MMOL/L (ref 0.7–2)
MAGNESIUM SERPL-MCNC: 1.9 MG/DL (ref 1.7–2.3)
MAGNESIUM SERPL-MCNC: 2.1 MG/DL (ref 1.7–2.3)
MCH RBC QN AUTO: 29.8 PG (ref 26.5–33)
MCHC RBC AUTO-ENTMCNC: 33.1 G/DL (ref 31.5–36.5)
MCV RBC AUTO: 90 FL (ref 78–100)
O2/TOTAL GAS SETTING VFR VENT: 30 %
P AXIS - MUSE: 40 DEGREES
P AXIS - MUSE: 96 DEGREES
P AXIS - MUSE: NORMAL DEGREES
PCO2 BLD: 40 MM HG (ref 35–45)
PH BLD: 7.4 [PH] (ref 7.35–7.45)
PHOSPHATE SERPL-MCNC: 3.3 MG/DL (ref 2.5–4.5)
PLATELET # BLD AUTO: 86 10E3/UL (ref 150–450)
PO2 BLD: 98 MM HG (ref 80–105)
POTASSIUM SERPL-SCNC: 4.2 MMOL/L (ref 3.4–5.3)
POTASSIUM SERPL-SCNC: 4.6 MMOL/L (ref 3.4–5.3)
PR INTERVAL - MUSE: 160 MS
PR INTERVAL - MUSE: 186 MS
PR INTERVAL - MUSE: 248 MS
QRS DURATION - MUSE: 146 MS
QRS DURATION - MUSE: 150 MS
QRS DURATION - MUSE: 150 MS
QT - MUSE: 426 MS
QT - MUSE: 446 MS
QT - MUSE: 478 MS
QTC - MUSE: 488 MS
QTC - MUSE: 518 MS
QTC - MUSE: 530 MS
R AXIS - MUSE: -69 DEGREES
R AXIS - MUSE: -75 DEGREES
R AXIS - MUSE: -76 DEGREES
RBC # BLD AUTO: 3.02 10E6/UL (ref 3.8–5.2)
SAO2 % BLDA: 98 % (ref 92–100)
SODIUM SERPL-SCNC: 137 MMOL/L (ref 135–145)
SODIUM SERPL-SCNC: 141 MMOL/L (ref 135–145)
SYSTOLIC BLOOD PRESSURE - MUSE: NORMAL MMHG
T AXIS - MUSE: 55 DEGREES
T AXIS - MUSE: 83 DEGREES
T AXIS - MUSE: 91 DEGREES
TROPONIN T SERPL HS-MCNC: 442 NG/L
TROPONIN T SERPL HS-MCNC: 515 NG/L
VENTRICULAR RATE- MUSE: 74 BPM
VENTRICULAR RATE- MUSE: 79 BPM
VENTRICULAR RATE- MUSE: 81 BPM
WBC # BLD AUTO: 6.2 10E3/UL (ref 4–11)

## 2024-06-13 PROCEDURE — 80048 BASIC METABOLIC PNL TOTAL CA: CPT | Performed by: STUDENT IN AN ORGANIZED HEALTH CARE EDUCATION/TRAINING PROGRAM

## 2024-06-13 PROCEDURE — 250N000011 HC RX IP 250 OP 636: Mod: JZ | Performed by: STUDENT IN AN ORGANIZED HEALTH CARE EDUCATION/TRAINING PROGRAM

## 2024-06-13 PROCEDURE — 71045 X-RAY EXAM CHEST 1 VIEW: CPT | Mod: 26 | Performed by: RADIOLOGY

## 2024-06-13 PROCEDURE — 120N000002 HC R&B MED SURG/OB UMMC

## 2024-06-13 PROCEDURE — 84484 ASSAY OF TROPONIN QUANT: CPT | Performed by: STUDENT IN AN ORGANIZED HEALTH CARE EDUCATION/TRAINING PROGRAM

## 2024-06-13 PROCEDURE — 93010 ELECTROCARDIOGRAM REPORT: CPT | Performed by: INTERNAL MEDICINE

## 2024-06-13 PROCEDURE — 83605 ASSAY OF LACTIC ACID: CPT | Performed by: STUDENT IN AN ORGANIZED HEALTH CARE EDUCATION/TRAINING PROGRAM

## 2024-06-13 PROCEDURE — 93005 ELECTROCARDIOGRAM TRACING: CPT

## 2024-06-13 PROCEDURE — 97535 SELF CARE MNGMENT TRAINING: CPT | Mod: GO

## 2024-06-13 PROCEDURE — 999N000128 HC STATISTIC PERIPHERAL IV START W/O US GUIDANCE

## 2024-06-13 PROCEDURE — 250N000013 HC RX MED GY IP 250 OP 250 PS 637: Performed by: PHYSICIAN ASSISTANT

## 2024-06-13 PROCEDURE — 83735 ASSAY OF MAGNESIUM: CPT | Performed by: STUDENT IN AN ORGANIZED HEALTH CARE EDUCATION/TRAINING PROGRAM

## 2024-06-13 PROCEDURE — 82805 BLOOD GASES W/O2 SATURATION: CPT | Performed by: STUDENT IN AN ORGANIZED HEALTH CARE EDUCATION/TRAINING PROGRAM

## 2024-06-13 PROCEDURE — 99207 PR NO BILLABLE SERVICE THIS VISIT: CPT | Performed by: SURGERY

## 2024-06-13 PROCEDURE — 82330 ASSAY OF CALCIUM: CPT | Performed by: PHYSICIAN ASSISTANT

## 2024-06-13 PROCEDURE — 250N000011 HC RX IP 250 OP 636: Mod: JZ | Performed by: PHYSICIAN ASSISTANT

## 2024-06-13 PROCEDURE — 99233 SBSQ HOSP IP/OBS HIGH 50: CPT | Mod: 24 | Performed by: SURGERY

## 2024-06-13 PROCEDURE — 97165 OT EVAL LOW COMPLEX 30 MIN: CPT | Mod: GO

## 2024-06-13 PROCEDURE — 250N000013 HC RX MED GY IP 250 OP 250 PS 637: Performed by: STUDENT IN AN ORGANIZED HEALTH CARE EDUCATION/TRAINING PROGRAM

## 2024-06-13 PROCEDURE — 82330 ASSAY OF CALCIUM: CPT | Performed by: STUDENT IN AN ORGANIZED HEALTH CARE EDUCATION/TRAINING PROGRAM

## 2024-06-13 PROCEDURE — 250N000012 HC RX MED GY IP 250 OP 636 PS 637: Performed by: STUDENT IN AN ORGANIZED HEALTH CARE EDUCATION/TRAINING PROGRAM

## 2024-06-13 PROCEDURE — 84100 ASSAY OF PHOSPHORUS: CPT | Performed by: STUDENT IN AN ORGANIZED HEALTH CARE EDUCATION/TRAINING PROGRAM

## 2024-06-13 PROCEDURE — 85027 COMPLETE CBC AUTOMATED: CPT | Performed by: PHYSICIAN ASSISTANT

## 2024-06-13 PROCEDURE — 71045 X-RAY EXAM CHEST 1 VIEW: CPT

## 2024-06-13 RX ORDER — AMOXICILLIN 250 MG
2 CAPSULE ORAL 2 TIMES DAILY
Status: DISCONTINUED | OUTPATIENT
Start: 2024-06-13 | End: 2024-06-17 | Stop reason: HOSPADM

## 2024-06-13 RX ORDER — PANTOPRAZOLE SODIUM 40 MG/1
40 TABLET, DELAYED RELEASE ORAL
Status: DISCONTINUED | OUTPATIENT
Start: 2024-06-14 | End: 2024-06-17 | Stop reason: HOSPADM

## 2024-06-13 RX ORDER — FUROSEMIDE 10 MG/ML
40 INJECTION INTRAMUSCULAR; INTRAVENOUS EVERY 6 HOURS
Status: COMPLETED | OUTPATIENT
Start: 2024-06-13 | End: 2024-06-13

## 2024-06-13 RX ORDER — POLYETHYLENE GLYCOL 3350 17 G/17G
17 POWDER, FOR SOLUTION ORAL 2 TIMES DAILY
Status: DISCONTINUED | OUTPATIENT
Start: 2024-06-13 | End: 2024-06-17 | Stop reason: HOSPADM

## 2024-06-13 RX ORDER — ATORVASTATIN CALCIUM 40 MG/1
40 TABLET, FILM COATED ORAL EVERY EVENING
Status: DISCONTINUED | OUTPATIENT
Start: 2024-06-13 | End: 2024-06-17 | Stop reason: HOSPADM

## 2024-06-13 RX ORDER — METHOCARBAMOL 750 MG/1
750 TABLET, FILM COATED ORAL EVERY 6 HOURS SCHEDULED
Status: DISCONTINUED | OUTPATIENT
Start: 2024-06-13 | End: 2024-06-17 | Stop reason: HOSPADM

## 2024-06-13 RX ORDER — MAGNESIUM SULFATE HEPTAHYDRATE 40 MG/ML
2 INJECTION, SOLUTION INTRAVENOUS ONCE
Status: COMPLETED | OUTPATIENT
Start: 2024-06-13 | End: 2024-06-13

## 2024-06-13 RX ORDER — GABAPENTIN 100 MG/1
200 CAPSULE ORAL AT BEDTIME
Status: DISCONTINUED | OUTPATIENT
Start: 2024-06-13 | End: 2024-06-14

## 2024-06-13 RX ADMIN — HYDROMORPHONE HYDROCHLORIDE 0.2 MG: 0.2 INJECTION, SOLUTION INTRAMUSCULAR; INTRAVENOUS; SUBCUTANEOUS at 17:20

## 2024-06-13 RX ADMIN — HEPARIN SODIUM 5000 UNITS: 5000 INJECTION, SOLUTION INTRAVENOUS; SUBCUTANEOUS at 19:52

## 2024-06-13 RX ADMIN — FUROSEMIDE 40 MG: 10 INJECTION, SOLUTION INTRAVENOUS at 09:52

## 2024-06-13 RX ADMIN — OXYCODONE HYDROCHLORIDE 10 MG: 10 TABLET ORAL at 05:46

## 2024-06-13 RX ADMIN — PANTOPRAZOLE SODIUM 40 MG: 40 TABLET, DELAYED RELEASE ORAL at 07:48

## 2024-06-13 RX ADMIN — INSULIN ASPART 2 UNITS: 100 INJECTION, SOLUTION INTRAVENOUS; SUBCUTANEOUS at 11:48

## 2024-06-13 RX ADMIN — VANCOMYCIN HYDROCHLORIDE 1000 MG: 1 INJECTION, SOLUTION INTRAVENOUS at 06:14

## 2024-06-13 RX ADMIN — ACETAMINOPHEN 975 MG: 325 TABLET, FILM COATED ORAL at 19:52

## 2024-06-13 RX ADMIN — ACETAMINOPHEN 975 MG: 325 TABLET, FILM COATED ORAL at 12:32

## 2024-06-13 RX ADMIN — HYDROMORPHONE HYDROCHLORIDE 0.4 MG: 0.2 INJECTION, SOLUTION INTRAMUSCULAR; INTRAVENOUS; SUBCUTANEOUS at 09:15

## 2024-06-13 RX ADMIN — OXYCODONE HYDROCHLORIDE 10 MG: 10 TABLET ORAL at 23:58

## 2024-06-13 RX ADMIN — METHOCARBAMOL 750 MG: 750 TABLET ORAL at 12:32

## 2024-06-13 RX ADMIN — GABAPENTIN 200 MG: 100 CAPSULE ORAL at 23:08

## 2024-06-13 RX ADMIN — HEPARIN SODIUM 5000 UNITS: 5000 INJECTION, SOLUTION INTRAVENOUS; SUBCUTANEOUS at 12:32

## 2024-06-13 RX ADMIN — OXYCODONE HYDROCHLORIDE 10 MG: 10 TABLET ORAL at 10:48

## 2024-06-13 RX ADMIN — POLYETHYLENE GLYCOL 3350 17 G: 17 POWDER, FOR SOLUTION ORAL at 07:48

## 2024-06-13 RX ADMIN — POLYETHYLENE GLYCOL 3350 17 G: 17 POWDER, FOR SOLUTION ORAL at 19:51

## 2024-06-13 RX ADMIN — DOCUSATE SODIUM 50 MG AND SENNOSIDES 8.6 MG 1 TABLET: 8.6; 5 TABLET, FILM COATED ORAL at 07:48

## 2024-06-13 RX ADMIN — OXYCODONE HYDROCHLORIDE 10 MG: 10 TABLET ORAL at 14:45

## 2024-06-13 RX ADMIN — HYDROMORPHONE HYDROCHLORIDE 0.4 MG: 0.2 INJECTION, SOLUTION INTRAMUSCULAR; INTRAVENOUS; SUBCUTANEOUS at 04:15

## 2024-06-13 RX ADMIN — HYDROMORPHONE HYDROCHLORIDE 0.4 MG: 0.2 INJECTION, SOLUTION INTRAMUSCULAR; INTRAVENOUS; SUBCUTANEOUS at 00:33

## 2024-06-13 RX ADMIN — METHOCARBAMOL 750 MG: 750 TABLET ORAL at 23:08

## 2024-06-13 RX ADMIN — DOCUSATE SODIUM 50 MG AND SENNOSIDES 8.6 MG 2 TABLET: 8.6; 5 TABLET, FILM COATED ORAL at 19:51

## 2024-06-13 RX ADMIN — CEFAZOLIN 1 G: 1 INJECTION, POWDER, FOR SOLUTION INTRAMUSCULAR; INTRAVENOUS at 12:32

## 2024-06-13 RX ADMIN — METHOCARBAMOL 500 MG: 500 TABLET ORAL at 07:48

## 2024-06-13 RX ADMIN — ATORVASTATIN CALCIUM 40 MG: 40 TABLET, FILM COATED ORAL at 19:52

## 2024-06-13 RX ADMIN — ACETAMINOPHEN 975 MG: 325 TABLET, FILM COATED ORAL at 04:15

## 2024-06-13 RX ADMIN — ASPIRIN 81 MG CHEWABLE TABLET 162 MG: 81 TABLET CHEWABLE at 07:48

## 2024-06-13 RX ADMIN — OXYCODONE HYDROCHLORIDE 5 MG: 5 TABLET ORAL at 19:52

## 2024-06-13 RX ADMIN — HYDROMORPHONE HYDROCHLORIDE 0.4 MG: 0.2 INJECTION, SOLUTION INTRAMUSCULAR; INTRAVENOUS; SUBCUTANEOUS at 20:59

## 2024-06-13 RX ADMIN — METHOCARBAMOL 750 MG: 750 TABLET ORAL at 18:44

## 2024-06-13 RX ADMIN — FUROSEMIDE 40 MG: 10 INJECTION, SOLUTION INTRAVENOUS at 15:47

## 2024-06-13 RX ADMIN — MAGNESIUM SULFATE HEPTAHYDRATE 2 G: 2 INJECTION, SOLUTION INTRAVENOUS at 05:45

## 2024-06-13 RX ADMIN — ONDANSETRON 4 MG: 2 INJECTION INTRAMUSCULAR; INTRAVENOUS at 04:16

## 2024-06-13 RX ADMIN — INSULIN ASPART 2 UNITS: 100 INJECTION, SOLUTION INTRAVENOUS; SUBCUTANEOUS at 17:17

## 2024-06-13 RX ADMIN — CEFAZOLIN 1 G: 1 INJECTION, POWDER, FOR SOLUTION INTRAMUSCULAR; INTRAVENOUS at 04:15

## 2024-06-13 ASSESSMENT — ACTIVITIES OF DAILY LIVING (ADL)
ADLS_ACUITY_SCORE: 42
ADLS_ACUITY_SCORE: 46
ADLS_ACUITY_SCORE: 35
ADLS_ACUITY_SCORE: 43
ADLS_ACUITY_SCORE: 35
ADLS_ACUITY_SCORE: 46
ADLS_ACUITY_SCORE: 43
ADLS_ACUITY_SCORE: 46
ADLS_ACUITY_SCORE: 46
ADLS_ACUITY_SCORE: 42
ADLS_ACUITY_SCORE: 46
ADLS_ACUITY_SCORE: 43
ADLS_ACUITY_SCORE: 46
ADLS_ACUITY_SCORE: 35
ADLS_ACUITY_SCORE: 46
ADLS_ACUITY_SCORE: 42
ADLS_ACUITY_SCORE: 46
ADLS_ACUITY_SCORE: 42
ADLS_ACUITY_SCORE: 42

## 2024-06-13 NOTE — PROGRESS NOTES
"CLINICAL NUTRITION SERVICES - BRIEF NOTE    Received provider consult for nutrition education with comments post op cardiovascular surgery (automatic consult on post-op order set). S/p CABGx2 and AVR on 6/12. Nutrition education to be completed as able/appropriate (as pt s/p CABG and/or initial VAD).    RD will follow per LOS protocol or if re-consulted.     Meg Schroeder, MS, RD, LD  [facility] on [facility] - \"4A Clinical Dietitian\"  Weekend/Holiday RD - \"Weekend Clinical Dietitian\"  "

## 2024-06-13 NOTE — PLAN OF CARE
Major Shift Events:  Pt A&Ox4, VSS, afebrile, having incisional pain, but it's improving with PRNs and scheduling robaxin. Weaned to RA this morning, O2 sats ~98%. Pacing wires capped; pt has been in a NSR with rates in the 70s. MAPs >65, sys goal <140. Art line removed. Min output from chest tubes with one larger output while standing this morning. UOP adequate; lasix 40mg IV x1. Removed otto this afternoon. Pt has orders to transfer out of the ICU when bed becomes available. Pt's family at bedside this afternoon and was updated by the team.      Plan: Continue to monitor and update as needed. Transfer to floor once room is available.     For vital signs and complete assessments, please see documentation flowsheets.

## 2024-06-13 NOTE — PROGRESS NOTES
CV ICU Progress Note  06/13/2024      ASSESSMENT: Won Urena is a 69 year old female with PMH of DM2, HTN, HLD, CAD, severe AS, who had a  CABG and AVR done by Dr. Nicole on 6/12/24.       CO-MORBIDITIES:   Patient Active Problem List   Diagnosis    Type 2 diabetes mellitus without complication, without long-term current use of insulin (H)    Coronary artery calcification    Nonrheumatic aortic valve stenosis    Ascending aortic aneurysm (H24)    Hyperlipidemia LDL goal <100    Essential hypertension    Screening for colon cancer    Genitourinary syndrome of menopause    Environmental allergies    Seasonal allergic rhinitis due to pollen    Status post coronary angiogram    Coronary artery disease involving native heart without angina pectoris, unspecified vessel or lesion type    CAD (coronary artery disease)    Severe aortic stenosis    Coronary artery disease involving native coronary artery without angina pectoris    Coronary artery disease involving native coronary artery of native heart without angina pectoris    Coronary artery disease due to calcified coronary lesion     MAJOR CHANGES/UPDATES  - Diuresed with Lasix 40 mg x2 with fluid goal of net -1.0 L  - Pt complained of new chest pressure with some shortness of breath; ECG obtained no acute changes. Troponin obtained 515, will check in q 6hr to make sure it's not uptrending  - Transitioned to medium sliding scale insulin   - Restarted home atorvastatin   - Remove A-line, CVC, Trejo       PLAN:  NEURO   - Monitor neurological status. Notify the MD for any acute changes in exam.    #Acute postoperative pain  - Scheduled: Tylenol, robaxin   - PRN: Tylenol, oxycodone, Dilaudid    PULM   No acute issues   - Pt is doing well on RA  - Goal SpO2 > 92%  - Encourage IS/flutter valve and early mobility     CV  #Cardiogenic shock- resolved   #CAD  #Severe AS s/p AVR and CABG  #Lactic acidosis- resolved  - Pt is currently off of all pressors   - Will compare  cuff BP to A-line BP and plan to remove A-line  - Goal MAP >65, SBP , monitor hemodynamics  - Restarted home atorvastatin 40 mg at bedtime   - Hold diltiazem 60 mg, fenofibrate 160 mg, losartan 50 mg  - Asa 162 mg every day   - Plan to start Coumadin once CTs are removed   - Pt's lactate normalized to 0.7 from 3.3 post-op   - Daily CXR while CTs in place  - CXR this morning, upon my review, with expected atelectatic findings b/l post-op     FEN/GI   - Pt passed bedside swallow  - Diet: Regular   - PPI   - Last BM: PTA Bowel regimen: increased MiraLAX, senna  - Replete lytes PRN per protocol    Renal / Fluids / Electrolytes:   #Fluid overload  BL creat appears to be ~ 0.7  - Pt's feet are edematous and CXR showing atelectasis plan to diurese lasix 40 mg x2 with goal of net -1.0 L   - Strict I/O, daily weights, avoid/limit nephrotoxins      Endocrine:    #Stress hyperglycemia  #DM2  - Preop A1c 5.8  - Goal BG <180 for optimal healing  - Discontinued gtt and transitioned to medium sliding scale insulin   - Hold PTA dapagliflozin 10 mg, semaglutide, metformin 500 mg    ID / Antibiotics:  - Completed perioperative regimen  - Monitor fever curve, WBC, and inflammatory markers as appropriate  - Pt has no leukocytosis with WBC of 6.2 and is afebrile with T max of 99.9 F    Heme:     #Acute blood loss anemia  #Acute blood loss thrombocytopenia  No s/sx active bleeding  - CBC   - Hgb goal > 7.0  - Hemoglobin stable 10.1    MSK / Skin:  #Sternotomy  #Surgical Incision  - Sternal precautions, postop incision management protocol  - PT/OT/CR    Prophylaxis:     - Mechanical DVT ppx  - Chemical DVT ppx: SQH, asa 161  - PPI    Lines / Tubes / Drains:  - ETT- removed  - RIJ CVC, PA catheter - remove  - Arterial Line- remove  - CTs x4 (2 Meds, 2 Pleurals)  - Trejo- remove    Disposition: Floor      Patient seen, findings and plan discussed with CVICU staff and cardiothoracic surgeons.     Total Critical Care time spent by me,  "excluding procedures, was 40 minutes.     Bhaskar Carey PA-C     Clinically Significant Risk Factors        # Hypokalemia: Lowest K = 3.1 mmol/L in last 2 days, will replace as needed   # Hypocalcemia: Lowest iCa = 4.2 mg/dL in last 2 days, will monitor and replace as appropriate  # Hypercalcemia: Highest iCa = 6.4 mg/dL in last 2 days, will monitor as appropriate     # Coagulation Defect: INR = 1.48 (Ref range: 0.85 - 1.15) and/or PTT = 29 Seconds (Ref range: 22 - 38 Seconds), will monitor for bleeding  # Thrombocytopenia: Lowest platelets = 83 in last 2 days, will monitor for bleeding   # Hypertension: Noted on problem list           #Precipitous drop in Hgb/Hct: Lowest Hgb this hospitalization: 8.6 g/dL. Will continue to monitor and treat/transfuse as appropriate.     # Overweight: Estimated body mass index is 26.35 kg/m  as calculated from the following:    Height as of this encounter: 1.702 m (5' 7\").    Weight as of this encounter: 76.3 kg (168 lb 3.4 oz)., PRESENT ON ADMISSION                     ====================================    SUBJECTIVE  Pt complained of new chest pressure this morning that made it difficult for her to take in deep breaths. She states she has had chest pressure prior to the procedure, but this morning it was different.       PAST MEDICAL HISTORY:   Past Medical History:   Diagnosis Date    Abnormal Pap smear of cervix     Aortic stenosis     Aortic valve stenosis     Benign colon polyp     CAD (coronary artery disease)     Dermatochalasis of both upper eyelids     Diabetes mellitus, type 2 (H)     Gestational diabetes     HTN (hypertension)     Hyperlipidemia LDL goal <100     Nonsenile cataract     Very beginnings    Post-menopausal atrophic vaginitis     Ptosis of both eyelids     Thickened endometrium        PAST SURGICAL HISTORY:   Past Surgical History:   Procedure Laterality Date     SECTION      COMBINED REPAIR PTOSIS WITH BLEPHAROPLASTY Bilateral 2023    " Procedure: Both upper eyelid blepharoplasty and ptosis repair;  Surgeon: Shantelle Marti MD;  Location: [facility] OR    COSMETIC BLEPHAROPLASTY LOWER LIDS BILATERAL Bilateral 09/28/2023    Procedure: Both lower eyelid blepharoplasty;  Surgeon: Shantelle Marti MD;  Location: [facility] OR    CV CORONARY ANGIOGRAM N/A 06/09/2023    Procedure: Coronary Angiogram;  Surgeon: Geovanni Ibarra MD;  Location: [facility]    CV CORONARY ANGIOGRAM N/A 5/2/2024    Procedure: Coronary Angiogram;  Surgeon: Bassam Flores MD;  Location: [facility]    CV INSTANTANEOUS WAVE-FREE RATIO N/A 06/09/2023    Procedure: Instantaneous Wave-Free Ratio;  Surgeon: Geovanni Ibarra MD;  Location: [facility]    CV RIGHT HEART CATH MEASUREMENTS RECORDED N/A 5/2/2024    Procedure: Right Heart Catheterization;  Surgeon: Bassam Flores MD;  Location: [facility]       FAMILY HISTORY:   Family History   Problem Relation Age of Onset    Lung Cancer Mother         Small cell, did smoked    Diabetes Mother     Hypertension Mother     Goiter Mother     Mental Illness Mother         likely bipolar    Coronary Artery Disease Mother 42        MI, smoked    Hyperlipidemia Mother     Thyroid Disease Mother         Goiter    Obesity Mother         most of family is obese    Glaucoma Father     Atrial fibrillation Father     Hypertension Father     Parkinsonism Father     Hyperlipidemia Father     Diabetes Sister     Breast Cancer Sister 60    Hypertension Sister     Hypertension Brother     Cancer Maternal Grandmother         gynecologic cancer, unknown type    Uterine Cancer Maternal Grandmother     Chronic Obstructive Pulmonary Disease Paternal Grandmother     Uterine Cancer Paternal Grandmother     Stomach Cancer Paternal Grandfather     Colon Cancer Paternal Grandfather     Bipolar Disorder Son     Mental Illness Son     Depression Son     Macular Degeneration  No family hx of     Anesthesia Reaction No family hx of     Venous thrombosis No family hx of        SOCIAL HISTORY:   Social History     Tobacco Use    Smoking status: Never     Passive exposure: Never    Smokeless tobacco: Never   Substance Use Topics    Alcohol use: Yes     Alcohol/week: 1.0 standard drink of alcohol     Types: 1 Standard drinks or equivalent per week     Comment: less than 2 drinks a week         OBJECTIVE:  1. VITAL SIGNS:   Temp:  [97.7  F (36.5  C)-100  F (37.8  C)] 97.7  F (36.5  C)  Pulse:  [70-84] 74  Resp:  [16-20] 16  BP: (91-93)/(55) 93/55  MAP:  [59 mmHg-101 mmHg] 78 mmHg  Arterial Line BP: ()/(42-73) 112/59  SpO2:  [94 %-100 %] 97 %    Vent Mode: (S) CPAP/PS  (Continuous positive airway pressure with Pressure Support)  Resp Rate (Set): 12 breaths/min  Tidal Volume (Set, mL): 450 mL  PEEP (cm H2O): 5 cmH2O  Pressure Support (cm H2O): 5 cmH2O  Resp: 16        2. INTAKE/ OUTPUT:   I/O last 3 completed shifts:  In: 7166.71 [P.O.:800; I.V.:3259.71; Other:397; IV Piggyback:2000]  Out: 2625 [Urine:1885; Chest Tube:740]      3. PHYSICAL EXAMINATION:   Neuro: Awake. Answers questions appropriately. Follows commands. NAD.   HEENT: Normocephalic, atraumatic. Nonicteric.   CV: RRR on monitor, S1S2, all extremities well perfused. B/l edematous feet   Respiratory:  Normal respiratory effort on RA, equal chest rise b/l   GI: Abdomen soft and non-tender to light palpation. Non-distended   : Voiding with Trejo   MSK: Moves all extremities well with normal appearing strength. Sensation intact in all upper/lower extremities.     Skin: Sternal incision in place, clean, dry, intact. No other rashes or skin lesions.   Psych: Cooperative, congruent mood and affect.       4. INVESTIGATIONS:   Arterial Blood Gases   Recent Labs   Lab 06/13/24  0420 06/12/24  2146 06/12/24  1818 06/12/24  1648   PH 7.40 7.38 7.34* 7.32*   PCO2 40 42 39 36   PO2 98 83 155* 133*   HCO3 25 25 21 19*     Complete Blood  Count   Recent Labs   Lab 06/13/24 0419 06/12/24 2145 06/12/24  1510 06/12/24  1408 06/12/24  1317 06/12/24  1314   WBC 6.2 6.5 13.8*  --   --   --    HGB 9.0* 8.9* 10.1* 8.6*   < >  --    PLT 86* 83* 116*  --   --  137*    < > = values in this interval not displayed.     Basic Metabolic Panel  Recent Labs   Lab 06/13/24  1148 06/13/24  0754 06/13/24  0431 06/13/24 0419 06/12/24  2154 06/12/24 2145 06/12/24  1616 06/12/24 1510 06/12/24  1507 06/12/24  1408   NA  --   --   --  141  --  142  142  --  143  --  143   POTASSIUM  --   --   --  4.2  --  4.3  4.3  --  4.0  --  4.0   CHLORIDE  --   --   --  108*  --  110*  110*  --  110*  --   --    CO2  --   --   --  23  --  23  23  --  19*  --   --    BUN  --   --   --  18.8  --  16.4  16.4  --  16.4  --   --    CR  --   --   --  0.54  --  0.55  0.55  --  0.63  --   --    * 136* 115* 129*   < > 163*  163*   < > 176*   < > 128*    < > = values in this interval not displayed.     Liver Function Tests  Recent Labs   Lab 06/12/24 2145 06/12/24 1510 06/12/24  1314   AST 49* 41  --    ALT 15 12  --    ALKPHOS 32* 32*  --    BILITOTAL 0.6 0.9  --    ALBUMIN 3.9 3.7  --    INR 1.48* 1.79* 1.74*     Pancreatic Enzymes  No lab results found in last 7 days.  Coagulation Profile  Recent Labs   Lab 06/12/24 2145 06/12/24 1510 06/12/24  1314   INR 1.48* 1.79* 1.74*   PTT 29 61* 26         5. RADIOLOGY:   Recent Results (from the past 24 hour(s))   XR Chest Port 1 View    Narrative    EXAM: XR CHEST PORT 1 VIEW 6/12/2024 3:30 PM    INDICATION: Post Op CVTS Surgery    COMPARISON: Chest radiograph 3/23/2024, CT 4/25/2024    TECHNIQUE: Single portable supine AP view of the chest.    FINDINGS:   Post surgical changes of coronary bypass grafting and aortic valve  replacement with intact midline sternotomy wires and aortic valve  hardware. The endotracheal tube tip is 3 cm above the fazal. Right IJ  central venous catheter with tip projecting over the proximal  right  clavicle. Bilateral apical chest tubes and 2 midline mediastinal  drains placed. Enteric tube tip at the level of the GE junction. Sharp  left costophrenic angle, slight blunting of the right costophrenic  angle. Aortic knob calcifications. Streaky perihilar and basilar  opacities.      Impression    IMPRESSION:   1. Postsurgical changes midline sternotomy for aortic valve  replacement and CABG with perihilar and bibasilar streaky opacities  seen with atelectasis/pulmonary edema. Possible trace right pleural  effusion versus atelectasis.  2. Enteric tube tip at the GE junction, recommend advancement into the  stomach.  3. Endotracheal tube tip 3 cm above the fazal.    I have personally reviewed the examination and initial interpretation  and I agree with the findings.    NAV IBARRA MD         SYSTEM ID:  [id number]   XR Abdomen Port 1 View    Narrative    EXAMINATION:  XR ABDOMEN PORT 1 VIEW 6/12/2024     COMPARISON: Same-day chest radiograph    HISTORY: s/p OGT advancement.    TECHNIQUE: One frontal supine view of the abdomen.    FINDINGS: Gastric tube tip projects over the stomach with sidehole in  the area of the gastroesophageal junction. No abnormally dilated  air-filled loops of bowel in the partially visualized upper abdomen.  Status post median sternotomy with stable support devices including  multiple chest drains and the partially visualized endotracheal tube.      Impression    IMPRESSION:   Gastric tube tip within the stomach and sidehole in the area of the  gastroesophageal junction. Recommend advancement.    I have personally reviewed the examination and initial interpretation  and I agree with the findings.    KEY JUDD DO         SYSTEM ID:  [id number]   XR Chest Port 1 View    Narrative    XR CHEST PORT 1 VIEW  6/13/2024 2:01 AM     HISTORY:  Post Op CVTS Surgery       COMPARISON:  6/12/2024    TECHNIQUE: Portable, semiupright, frontal projection radiograph of  the  chest.    FINDINGS:   Stable position of right IJ introducer, right chest tube, left apical  chest tube, mediastinal drains. Interval extubation and removal of  gastric tube. Aortic valve prosthesis. Trachea is midline. Stable  cardiomediastinal silhouette. Slightly increased retrocardiac opacity  silhouetting the medial left hemidiaphragm. Increased interstitial  opacities bilaterally. Shifting platelike atelectasis. No appreciable  pneumothorax.        Impression    IMPRESSION:  Interval extubation and removal of gastric tube. Otherwise, stable  support devices. Increased retrocardiac opacity, likely atelectasis  versus pneumonia. Increased interstitial edema and shifting  atelectasis bilaterally.    I have personally reviewed the examination and initial interpretation  and I agree with the findings.    KEY JUDD DO         SYSTEM ID:  [id number]       =========================================

## 2024-06-13 NOTE — PLAN OF CARE
Neuro: off sedation, PRN dilaudid and toradol for pain; ALARCON, AOx4  Cardio: AV wires, AAI @ 50, SR 70-80s with BBB, MAP >65 (SBP ) on levo, total of 2L LR given, CVP 10, lactic 3.3 team aware, weaned off epi  Respiratory: extubated @ 1845  GI/:otto good UO 125cc/h  Skin: 2med CTs (190cc output) and 2 pleural CTs(240cc output) -20 suction, blanchable redness on sacrum

## 2024-06-13 NOTE — PLAN OF CARE
"PMH of DM2, HTN, HLD, CAD, severe AS     6/12 CABG x2 with AVR, fast tracking extubated at 1845     Nights  NATANAEL    Shift summary:    Pt still has lots of pain in incision and chest tube areas on anterior trunk.  Ice and Dilaudid helpful. Pt unable to get out of bed due to pain.  \"I just need to rest\".  Swallows pill and water appropriately.  Pressors off. -130's Using IS.  Switching to po pain meds.  Slept well.   Neuro:    A/O/4 no signs of delirium. Perrla  Cardio:   AV wires, AAI @ 50, SR 70-80s with BBB, MAP >65 (SBP ) off pressors. CVP 8-10  Respiratory:   Clear diminished. 2L  O2 N/C abg normal.  GI/:  otto 30cc/hr.  No BM yet.  Pt tolerating clear liquids and ready to advance diet this am.  Labs:  M+ 1.9 and replaced.  Lactic normal/ABG normal. Hgb 9.  Skin:   2med (20cc/hr) and 2pleurals (10cc/hr) -20 suction. Chest tube sites changed covered.  Chest incision CDI.    Plan  Advance diet to solids, remove otto cath.  Out of bed to stand up.  Transfer to floor when bed available.        "

## 2024-06-13 NOTE — PROGRESS NOTES
06/13/24 1000   Appointment Info   Signing Clinician's Name / Credentials (OT) Dani Prado OTR/L   Rehab Comments (OT) Sternal precautions.   Living Environment   People in Home spouse   Current Living Arrangements condominium   Home Accessibility no concerns;wheelchair accessible   Transportation Anticipated car, drives self   Self-Care   Usual Activity Tolerance excellent   Current Activity Tolerance fair   Regular Exercise Yes   Activity/Exercise Type biking  ([employer])   Exercise Amount/Frequency 30 mins   Equipment Currently Used at Home none   Fall history within last six months no   General Information   Onset of Illness/Injury or Date of Surgery 06/12/24   Referring Physician Gallo Stauffer PA-C   Patient/Family Therapy Goal Statement (OT) Pt would like to return to independence.   Additional Occupational Profile Info/Pertinent History of Current Problem Won Urena is a 69 year old female with PMH of DM2, HTN, HLD, CAD, severe AS. Presents to [facility] for CABG and AVR by Dr. Nicole.   Existing Precautions/Restrictions cardiac;fall;sternal   Left Upper Extremity (Weight-bearing Status)   (10lbs)   Right Upper Extremity (Weight-bearing Status)   (10lbs)   Left Lower Extremity (Weight-bearing Status) full weight-bearing (FWB)   Right Lower Extremity (Weight-bearing Status) full weight-bearing (FWB)   Cognitive Status Examination   Orientation Status orientation to person, place and time   Visual Perception   Visual Impairment/Limitations WFL;corrective lenses full-time   Sensory   Sensory Quick Adds sensation intact   Pain Assessment   Patient Currently in Pain Yes, see Vital Sign flowsheet   Range of Motion Comprehensive   General Range of Motion bilateral upper extremity ROM WFL   Transfers   Transfer Comments Min A and VC's.   Activities of Daily Living   BADL Assessment/Intervention bathing;upper body dressing;lower body dressing;grooming;toileting   Bathing Assessment/Intervention   [place] Level  (Bathing) set up;verbal cues;moderate assist (50% patient effort)   Comment, (Bathing) Per clinical judgement.   Upper Body Dressing Assessment/Training   Comment, (Upper Body Dressing) Per clinical judgement.   [place] Level (Upper Body Dressing) set up;verbal cues;minimum assist (75% patient effort)   Lower Body Dressing Assessment/Training   Comment, (Lower Body Dressing) Per clinical judgement.   [place] Level (Lower Body Dressing) set up;verbal cues;moderate assist (50% patient effort)   Grooming Assessment/Training   [place] Level (Grooming) set up;verbal cues   Toileting   Comment, (Toileting) Per clinical judgement.   [place] Level (Toileting) set up;verbal cues;minimum assist (75% patient effort)   Clinical Impression   Criteria for Skilled Therapeutic Interventions Met (OT) Yes, treatment indicated   OT Diagnosis Decreased independence with functional transfers and ADLS.   OT Problem List-Impairments impacting ADL problems related to;activity tolerance impaired;fear & anxiety;flexibility;mobility;range of motion (ROM);strength;pain;post-surgical precautions;postural control   Assessment of Occupational Performance 3-5 Performance Deficits   Identified Performance Deficits Decreased independence with functional transfers and ADLS.   Planned Therapy Interventions (OT) ADL retraining;IADL retraining;bed mobility training;ROM;strengthening;stretching;transfer training;home program guidelines;progressive activity/exercise;risk factor education   Clinical Decision Making Complexity (OT) problem focused assessment/low complexity   Risk & Benefits of therapy have been explained evaluation/treatment results reviewed;care plan/treatment goals reviewed;risks/benefits reviewed;patient   OT Total Evaluation Time   OT Eval, Low Complexity Minutes (38213) 10   OT Goals   Therapy Frequency (OT) Daily   OT Predicted Duration/Target Date for Goal Attainment 06/20/24   OT Discharge Planning   OT Plan OT:  Functional transfers, ADLS, Ambulation, Precautions review. Monitor BP.   OT Discharge Recommendation (DC Rec) home with assist;home with outpatient cardiac rehab   OT Rationale for DC Rec Pt is currently below baseline function. Pt will likely progresss to return home with A and OP CR pending continued progress.   OT Brief overview of current status Min A And vc's STS.   Total Session Time   Total Session Time (sum of timed and untimed services) 10

## 2024-06-13 NOTE — CONSULTS
"See below for ESBL discontinuation criteria (snip via \"[employer] Isolation Discontinuation policy)      "

## 2024-06-14 ENCOUNTER — APPOINTMENT (OUTPATIENT)
Dept: GENERAL RADIOLOGY | Facility: CLINIC | Age: 69
DRG: 219 | End: 2024-06-14
Attending: STUDENT IN AN ORGANIZED HEALTH CARE EDUCATION/TRAINING PROGRAM
Payer: COMMERCIAL

## 2024-06-14 ENCOUNTER — APPOINTMENT (OUTPATIENT)
Dept: OCCUPATIONAL THERAPY | Facility: CLINIC | Age: 69
DRG: 219 | End: 2024-06-14
Attending: STUDENT IN AN ORGANIZED HEALTH CARE EDUCATION/TRAINING PROGRAM
Payer: COMMERCIAL

## 2024-06-14 DIAGNOSIS — Z95.2 S/P AVR (AORTIC VALVE REPLACEMENT): ICD-10-CM

## 2024-06-14 DIAGNOSIS — Z95.1 S/P CABG (CORONARY ARTERY BYPASS GRAFT): Primary | ICD-10-CM

## 2024-06-14 LAB
ANION GAP SERPL CALCULATED.3IONS-SCNC: 8 MMOL/L (ref 7–15)
ATRIAL RATE - MUSE: 72 BPM
ATRIAL RATE - MUSE: 73 BPM
BASOPHILS # BLD AUTO: 0 10E3/UL (ref 0–0.2)
BASOPHILS NFR BLD AUTO: 0 %
BUN SERPL-MCNC: 20.7 MG/DL (ref 8–23)
CA-I BLD-MCNC: 4.5 MG/DL (ref 4.4–5.2)
CALCIUM SERPL-MCNC: 8.5 MG/DL (ref 8.8–10.2)
CHLORIDE SERPL-SCNC: 100 MMOL/L (ref 98–107)
CREAT SERPL-MCNC: 0.56 MG/DL (ref 0.51–0.95)
DEPRECATED HCO3 PLAS-SCNC: 24 MMOL/L (ref 22–29)
DIASTOLIC BLOOD PRESSURE - MUSE: NORMAL MMHG
DIASTOLIC BLOOD PRESSURE - MUSE: NORMAL MMHG
EGFRCR SERPLBLD CKD-EPI 2021: >90 ML/MIN/1.73M2
EOSINOPHIL # BLD AUTO: 0.1 10E3/UL (ref 0–0.7)
EOSINOPHIL NFR BLD AUTO: 1 %
ERYTHROCYTE [DISTWIDTH] IN BLOOD BY AUTOMATED COUNT: 13.4 % (ref 10–15)
GLUCOSE BLDC GLUCOMTR-MCNC: 157 MG/DL (ref 70–99)
GLUCOSE BLDC GLUCOMTR-MCNC: 166 MG/DL (ref 70–99)
GLUCOSE BLDC GLUCOMTR-MCNC: 169 MG/DL (ref 70–99)
GLUCOSE BLDC GLUCOMTR-MCNC: 172 MG/DL (ref 70–99)
GLUCOSE SERPL-MCNC: 165 MG/DL (ref 70–99)
HCT VFR BLD AUTO: 28.9 % (ref 35–47)
HGB BLD-MCNC: 9.8 G/DL (ref 11.7–15.7)
IMM GRANULOCYTES # BLD: 0.1 10E3/UL
IMM GRANULOCYTES NFR BLD: 1 %
INTERPRETATION ECG - MUSE: NORMAL
INTERPRETATION ECG - MUSE: NORMAL
LYMPHOCYTES # BLD AUTO: 1.2 10E3/UL (ref 0.8–5.3)
LYMPHOCYTES NFR BLD AUTO: 14 %
MAGNESIUM SERPL-MCNC: 1.7 MG/DL (ref 1.7–2.3)
MCH RBC QN AUTO: 31 PG (ref 26.5–33)
MCHC RBC AUTO-ENTMCNC: 33.9 G/DL (ref 31.5–36.5)
MCV RBC AUTO: 92 FL (ref 78–100)
MONOCYTES # BLD AUTO: 0.5 10E3/UL (ref 0–1.3)
MONOCYTES NFR BLD AUTO: 6 %
NEUTROPHILS # BLD AUTO: 6.7 10E3/UL (ref 1.6–8.3)
NEUTROPHILS NFR BLD AUTO: 78 %
NRBC # BLD AUTO: 0 10E3/UL
NRBC BLD AUTO-RTO: 0 /100
P AXIS - MUSE: 26 DEGREES
P AXIS - MUSE: 6 DEGREES
PHOSPHATE SERPL-MCNC: 1.9 MG/DL (ref 2.5–4.5)
PHOSPHATE SERPL-MCNC: 2.4 MG/DL (ref 2.5–4.5)
PLATELET # BLD AUTO: 98 10E3/UL (ref 150–450)
POTASSIUM SERPL-SCNC: 4.2 MMOL/L (ref 3.4–5.3)
PR INTERVAL - MUSE: 144 MS
PR INTERVAL - MUSE: 154 MS
QRS DURATION - MUSE: 142 MS
QRS DURATION - MUSE: 142 MS
QT - MUSE: 416 MS
QT - MUSE: 442 MS
QTC - MUSE: 458 MS
QTC - MUSE: 483 MS
R AXIS - MUSE: -59 DEGREES
R AXIS - MUSE: -75 DEGREES
RBC # BLD AUTO: 3.16 10E6/UL (ref 3.8–5.2)
SODIUM SERPL-SCNC: 132 MMOL/L (ref 135–145)
SYSTOLIC BLOOD PRESSURE - MUSE: NORMAL MMHG
SYSTOLIC BLOOD PRESSURE - MUSE: NORMAL MMHG
T AXIS - MUSE: 61 DEGREES
T AXIS - MUSE: 84 DEGREES
VENTRICULAR RATE- MUSE: 72 BPM
VENTRICULAR RATE- MUSE: 73 BPM
WBC # BLD AUTO: 8.5 10E3/UL (ref 4–11)

## 2024-06-14 PROCEDURE — 250N000013 HC RX MED GY IP 250 OP 250 PS 637: Performed by: PHYSICIAN ASSISTANT

## 2024-06-14 PROCEDURE — 97530 THERAPEUTIC ACTIVITIES: CPT | Mod: GO

## 2024-06-14 PROCEDURE — 36415 COLL VENOUS BLD VENIPUNCTURE: CPT | Performed by: PHYSICIAN ASSISTANT

## 2024-06-14 PROCEDURE — 250N000011 HC RX IP 250 OP 636: Mod: JZ | Performed by: STUDENT IN AN ORGANIZED HEALTH CARE EDUCATION/TRAINING PROGRAM

## 2024-06-14 PROCEDURE — 97535 SELF CARE MNGMENT TRAINING: CPT | Mod: GO

## 2024-06-14 PROCEDURE — 250N000013 HC RX MED GY IP 250 OP 250 PS 637

## 2024-06-14 PROCEDURE — 250N000013 HC RX MED GY IP 250 OP 250 PS 637: Performed by: STUDENT IN AN ORGANIZED HEALTH CARE EDUCATION/TRAINING PROGRAM

## 2024-06-14 PROCEDURE — 250N000011 HC RX IP 250 OP 636: Performed by: PHYSICIAN ASSISTANT

## 2024-06-14 PROCEDURE — 84100 ASSAY OF PHOSPHORUS: CPT | Performed by: STUDENT IN AN ORGANIZED HEALTH CARE EDUCATION/TRAINING PROGRAM

## 2024-06-14 PROCEDURE — 71045 X-RAY EXAM CHEST 1 VIEW: CPT | Mod: 26 | Performed by: RADIOLOGY

## 2024-06-14 PROCEDURE — 36415 COLL VENOUS BLD VENIPUNCTURE: CPT

## 2024-06-14 PROCEDURE — 83735 ASSAY OF MAGNESIUM: CPT | Performed by: STUDENT IN AN ORGANIZED HEALTH CARE EDUCATION/TRAINING PROGRAM

## 2024-06-14 PROCEDURE — 99233 SBSQ HOSP IP/OBS HIGH 50: CPT | Mod: 24 | Performed by: SURGERY

## 2024-06-14 PROCEDURE — 36415 COLL VENOUS BLD VENIPUNCTURE: CPT | Performed by: STUDENT IN AN ORGANIZED HEALTH CARE EDUCATION/TRAINING PROGRAM

## 2024-06-14 PROCEDURE — 85025 COMPLETE CBC W/AUTO DIFF WBC: CPT

## 2024-06-14 PROCEDURE — 80048 BASIC METABOLIC PNL TOTAL CA: CPT | Performed by: STUDENT IN AN ORGANIZED HEALTH CARE EDUCATION/TRAINING PROGRAM

## 2024-06-14 PROCEDURE — 120N000005 HC R&B MS OVERFLOW UMMC

## 2024-06-14 PROCEDURE — 71045 X-RAY EXAM CHEST 1 VIEW: CPT

## 2024-06-14 PROCEDURE — 82330 ASSAY OF CALCIUM: CPT | Performed by: PHYSICIAN ASSISTANT

## 2024-06-14 RX ORDER — GABAPENTIN 100 MG/1
200 CAPSULE ORAL 3 TIMES DAILY
Status: DISCONTINUED | OUTPATIENT
Start: 2024-06-14 | End: 2024-06-17 | Stop reason: HOSPADM

## 2024-06-14 RX ORDER — MAGNESIUM OXIDE 400 MG/1
400 TABLET ORAL EVERY 4 HOURS
Status: COMPLETED | OUTPATIENT
Start: 2024-06-14 | End: 2024-06-14

## 2024-06-14 RX ORDER — NICOTINE POLACRILEX 4 MG
15-30 LOZENGE BUCCAL
Status: DISCONTINUED | OUTPATIENT
Start: 2024-06-14 | End: 2024-06-17 | Stop reason: HOSPADM

## 2024-06-14 RX ORDER — DEXTROSE MONOHYDRATE 25 G/50ML
25-50 INJECTION, SOLUTION INTRAVENOUS
Status: DISCONTINUED | OUTPATIENT
Start: 2024-06-14 | End: 2024-06-17 | Stop reason: HOSPADM

## 2024-06-14 RX ORDER — ATORVASTATIN CALCIUM 40 MG/1
40 TABLET, FILM COATED ORAL EVERY EVENING
COMMUNITY

## 2024-06-14 RX ORDER — LIDOCAINE 4 G/G
3 PATCH TOPICAL
Status: DISCONTINUED | OUTPATIENT
Start: 2024-06-14 | End: 2024-06-17 | Stop reason: HOSPADM

## 2024-06-14 RX ORDER — FUROSEMIDE 10 MG/ML
40 INJECTION INTRAMUSCULAR; INTRAVENOUS ONCE
Status: COMPLETED | OUTPATIENT
Start: 2024-06-14 | End: 2024-06-14

## 2024-06-14 RX ORDER — MAGNESIUM OXIDE 400 MG/1
400 TABLET ORAL EVERY 4 HOURS
Status: DISCONTINUED | OUTPATIENT
Start: 2024-06-14 | End: 2024-06-14

## 2024-06-14 RX ADMIN — HEPARIN SODIUM 5000 UNITS: 5000 INJECTION, SOLUTION INTRAVENOUS; SUBCUTANEOUS at 20:45

## 2024-06-14 RX ADMIN — Medication 12.5 MG: at 20:44

## 2024-06-14 RX ADMIN — Medication 12.5 MG: at 10:44

## 2024-06-14 RX ADMIN — INSULIN ASPART 1 UNITS: 100 INJECTION, SOLUTION INTRAVENOUS; SUBCUTANEOUS at 08:24

## 2024-06-14 RX ADMIN — OXYCODONE HYDROCHLORIDE 10 MG: 10 TABLET ORAL at 12:40

## 2024-06-14 RX ADMIN — POTASSIUM & SODIUM PHOSPHATES POWDER PACK 280-160-250 MG 2 PACKET: 280-160-250 PACK at 06:28

## 2024-06-14 RX ADMIN — GABAPENTIN 200 MG: 100 CAPSULE ORAL at 14:26

## 2024-06-14 RX ADMIN — HYDROMORPHONE HYDROCHLORIDE 0.2 MG: 0.2 INJECTION, SOLUTION INTRAMUSCULAR; INTRAVENOUS; SUBCUTANEOUS at 07:42

## 2024-06-14 RX ADMIN — PANTOPRAZOLE SODIUM 40 MG: 40 TABLET, DELAYED RELEASE ORAL at 06:30

## 2024-06-14 RX ADMIN — MAGNESIUM HYDROXIDE 30 ML: 400 SUSPENSION ORAL at 12:36

## 2024-06-14 RX ADMIN — POTASSIUM & SODIUM PHOSPHATES POWDER PACK 280-160-250 MG 1 PACKET: 280-160-250 PACK at 21:31

## 2024-06-14 RX ADMIN — MAGNESIUM OXIDE TAB 400 MG (241.3 MG ELEMENTAL MG) 400 MG: 400 (241.3 MG) TAB at 06:28

## 2024-06-14 RX ADMIN — GABAPENTIN 200 MG: 100 CAPSULE ORAL at 20:44

## 2024-06-14 RX ADMIN — ACETAMINOPHEN 975 MG: 325 TABLET, FILM COATED ORAL at 20:44

## 2024-06-14 RX ADMIN — METHOCARBAMOL 750 MG: 750 TABLET ORAL at 12:36

## 2024-06-14 RX ADMIN — ACETAMINOPHEN 975 MG: 325 TABLET, FILM COATED ORAL at 04:11

## 2024-06-14 RX ADMIN — METHOCARBAMOL 750 MG: 750 TABLET ORAL at 05:05

## 2024-06-14 RX ADMIN — OXYCODONE HYDROCHLORIDE 10 MG: 10 TABLET ORAL at 07:42

## 2024-06-14 RX ADMIN — OXYCODONE HYDROCHLORIDE 10 MG: 10 TABLET ORAL at 04:11

## 2024-06-14 RX ADMIN — ACETAMINOPHEN 975 MG: 325 TABLET, FILM COATED ORAL at 12:36

## 2024-06-14 RX ADMIN — FUROSEMIDE 40 MG: 10 INJECTION, SOLUTION INTRAVENOUS at 14:26

## 2024-06-14 RX ADMIN — HEPARIN SODIUM 5000 UNITS: 5000 INJECTION, SOLUTION INTRAVENOUS; SUBCUTANEOUS at 04:11

## 2024-06-14 RX ADMIN — HYDROMORPHONE HYDROCHLORIDE 0.2 MG: 0.2 INJECTION, SOLUTION INTRAMUSCULAR; INTRAVENOUS; SUBCUTANEOUS at 05:05

## 2024-06-14 RX ADMIN — OXYCODONE HYDROCHLORIDE 10 MG: 10 TABLET ORAL at 20:44

## 2024-06-14 RX ADMIN — METHOCARBAMOL 750 MG: 750 TABLET ORAL at 18:11

## 2024-06-14 RX ADMIN — HEPARIN SODIUM 5000 UNITS: 5000 INJECTION, SOLUTION INTRAVENOUS; SUBCUTANEOUS at 12:37

## 2024-06-14 RX ADMIN — DOCUSATE SODIUM 50 MG AND SENNOSIDES 8.6 MG 2 TABLET: 8.6; 5 TABLET, FILM COATED ORAL at 07:42

## 2024-06-14 RX ADMIN — LIDOCAINE 3 PATCH: 4 PATCH TOPICAL at 10:46

## 2024-06-14 RX ADMIN — MAGNESIUM OXIDE TAB 400 MG (241.3 MG ELEMENTAL MG) 400 MG: 400 (241.3 MG) TAB at 10:45

## 2024-06-14 RX ADMIN — POLYETHYLENE GLYCOL 3350 17 G: 17 POWDER, FOR SOLUTION ORAL at 07:42

## 2024-06-14 RX ADMIN — ASPIRIN 81 MG CHEWABLE TABLET 162 MG: 81 TABLET CHEWABLE at 07:42

## 2024-06-14 RX ADMIN — POTASSIUM & SODIUM PHOSPHATES POWDER PACK 280-160-250 MG 2 PACKET: 280-160-250 PACK at 10:44

## 2024-06-14 RX ADMIN — POTASSIUM & SODIUM PHOSPHATES POWDER PACK 280-160-250 MG 2 PACKET: 280-160-250 PACK at 15:21

## 2024-06-14 RX ADMIN — ATORVASTATIN CALCIUM 40 MG: 40 TABLET, FILM COATED ORAL at 20:45

## 2024-06-14 ASSESSMENT — ACTIVITIES OF DAILY LIVING (ADL)
ADLS_ACUITY_SCORE: 31
ADLS_ACUITY_SCORE: 46
ADLS_ACUITY_SCORE: 31
ADLS_ACUITY_SCORE: 31
ADLS_ACUITY_SCORE: 46
ADLS_ACUITY_SCORE: 31
ADLS_ACUITY_SCORE: 46
ADLS_ACUITY_SCORE: 46
ADLS_ACUITY_SCORE: 31
ADLS_ACUITY_SCORE: 46
ADLS_ACUITY_SCORE: 31
ADLS_ACUITY_SCORE: 46
ADLS_ACUITY_SCORE: 31
ADLS_ACUITY_SCORE: 46
ADLS_ACUITY_SCORE: 29
ADLS_ACUITY_SCORE: 46
ADLS_ACUITY_SCORE: 29
ADLS_ACUITY_SCORE: 31
ADLS_ACUITY_SCORE: 31
ADLS_ACUITY_SCORE: 29

## 2024-06-14 NOTE — PROGRESS NOTES
CV ICU Progress Note  06/14/2024      ASSESSMENT: Won Urena is a 69 year old female with PMH of DM2, HTN, HLD, CAD, severe AS, who had a  CABG x 2, and AVR 25 mm Inspiris resilia tissue valve done by Dr. Nicole on 6/12/24.     CO-MORBIDITIES:   Patient Active Problem List   Diagnosis    Type 2 diabetes mellitus without complication, without long-term current use of insulin (H)    Coronary artery calcification    Nonrheumatic aortic valve stenosis    Ascending aortic aneurysm (H24)    Hyperlipidemia LDL goal <100    Essential hypertension    Screening for colon cancer    Genitourinary syndrome of menopause    Environmental allergies    Seasonal allergic rhinitis due to pollen    Status post coronary angiogram    Coronary artery disease involving native heart without angina pectoris, unspecified vessel or lesion type    CAD (coronary artery disease)    Severe aortic stenosis    Coronary artery disease involving native coronary artery without angina pectoris    Coronary artery disease involving native coronary artery of native heart without angina pectoris    Coronary artery disease due to calcified coronary lesion     MAJOR CHANGES/UPDATES  - Lidocaine patches ordered  - Gabapentin for pain regimen optimization  - Start Metoprolol 12.5 mg BID  - FVG: net neg 1L  - Start HDSSI      PLAN:  NEURO   - Monitor neurological status. Notify the MD for any acute changes in exam.    #Acute postoperative pain  - Scheduled: Tylenol, robaxin, gabapentin 200 mg TID (optimize pain regimen), and lidocaine patches  - PRN: Tylenol, oxycodone, Dilaudid    PULM   No acute issues   - Pt is doing well on RA  - Goal SpO2 > 92%  - Encourage IS/flutter valve and early mobility     CV  #Severe AS s/p AVR and CABG  #CAD   #HTN & HLD  - Goal MAP >65, SBP , monitor hemodynamics  - Continue PTA atorvastatin 40 mg at bedtime   - Hold diltiazem 60 mg, fenofibrate 160 mg, losartan 50 mg  - Asa 162 mg every day  - Start metoprolol 12.5 mg  BId   - Plan to start Coumadin once CTs are removed   - Pt's lactate normalized to 0.7 from 3.3 post-op   - Capped pacer wires    GI:  - Pt passed bedside swallow  - Diet: Regular   - PPI   - Last BM: PTA Bowel regimen: increased MiraLAX, senna  - Replete lytes PRN per protocol    Renal / Fluids / Electrolytes:   #Fluid overload  BL creat appears to be ~ 0.7  - FVG: net neg 1L; currently net neg 700 mL will re-dose furosemide accordingly  - Strict I/O, daily weights, avoid/limit nephrotoxins  - Trejo removed    Endocrine:    #Stress hyperglycemia  #DM2  - Preop A1c 5.8  - Goal BG <180 for optimal healing  - MDSSI-->HDSSI  - Hold PTA dapagliflozin 10 mg, semaglutide, metformin 500 mg    ID / Antibiotics:  - Completed perioperative regimen  - Monitor fever curve, WBC, and inflammatory markers as appropriate  - Pt has no leukocytosis with WBC of 6.2 and is afebrile with T max of 99.9 F    Heme:     #Acute blood loss anemia  #Acute blood loss thrombocytopenia  No s/sx active bleeding  - CBC   - Hgb goal > 7.0  - Hemoglobin stable 10.1    MSK / Skin:  #Sternotomy  #Surgical Incision  - Sternal precautions, postop incision management protocol  - PT/OT/CR    Prophylaxis:     - Mechanical DVT ppx  - Chemical DVT ppx: SQH, asa 161  - PPI    Lines / Tubes / Drains:  - CTs x4 (2 Meds, 2 Pleurals)  - Trejo- removed  - PIV    Disposition: Transfer to floor     Patient seen, findings and plan discussed with CVICU staff and cardiothoracic surgeons.     Total time spent 40 minutes.     MIKAEL Foote CNP on 6/14/2024 at 12:28 PM      Clinically Significant Risk Factors        # Hypokalemia: Lowest K = 3.1 mmol/L in last 2 days, will replace as needed   # Hypocalcemia: Lowest iCa = 4.2 mg/dL in last 2 days, will monitor and replace as appropriate  # Hypercalcemia: Highest iCa = 6.4 mg/dL in last 2 days, will monitor as appropriate       # Coagulation Defect: INR = 1.48 (Ref range: 0.85 - 1.15) and/or PTT = 29 Seconds (Ref  "range: 22 - 38 Seconds), will monitor for bleeding  # Thrombocytopenia: Lowest platelets = 83 in last 2 days, will monitor for bleeding   # Hypertension: Noted on problem list           #Precipitous drop in Hgb/Hct: Lowest Hgb this hospitalization: 8.6 g/dL. Will continue to monitor and treat/transfuse as appropriate.     # Overweight: Estimated body mass index is 26.35 kg/m  as calculated from the following:    Height as of this encounter: 1.702 m (5' 7\").    Weight as of this encounter: 76.3 kg (168 lb 3.4 oz)., PRESENT ON ADMISSION      # History of CABG: noted on surgical history                ====================================    SUBJECTIVE  Complaining of incisional CP; workup unremarkable for ACS.       PAST MEDICAL HISTORY:   Past Medical History:   Diagnosis Date    Abnormal Pap smear of cervix     Aortic stenosis     Aortic valve stenosis     Benign colon polyp     CAD (coronary artery disease)     Dermatochalasis of both upper eyelids     Diabetes mellitus, type 2 (H)     Gestational diabetes     HTN (hypertension)     Hyperlipidemia LDL goal <100     Nonsenile cataract     Very beginnings    Post-menopausal atrophic vaginitis     Ptosis of both eyelids     Thickened endometrium        PAST SURGICAL HISTORY:   Past Surgical History:   Procedure Laterality Date    BYPASS GRAFT ARTERY CORONARY, REPAIR VALVE AORTIC, COMBINED N/A 2024    Procedure: Median Sternotomy, Cardiopulmonary Bypass, Left Internal Mammary Vein [place], Endoscopic [place] of Left Greater Saphenous Vein, Coronary Bypass Graft x 2, Aortic Valve Replacement with Inspiris Resilia Aortic Valve size 25mm, Transesophageal Echocardiogram by Anesthesia;  Surgeon: Mitali Nicole MD;  Location: [facility] OR     SECTION      COMBINED REPAIR PTOSIS WITH BLEPHAROPLASTY Bilateral 2023    Procedure: Both upper eyelid blepharoplasty and ptosis repair;  Surgeon: Shantelle Marti MD;  Location: [facility] OR    COSMETIC BLEPHAROPLASTY LOWER " LIDS BILATERAL Bilateral 09/28/2023    Procedure: Both lower eyelid blepharoplasty;  Surgeon: Shantelle Marti MD;  Location: [facility] OR    CV CORONARY ANGIOGRAM N/A 06/09/2023    Procedure: Coronary Angiogram;  Surgeon: Geovanni Ibarra MD;  [facility]    CV CORONARY ANGIOGRAM N/A 5/2/2024    Procedure: Coronary Angiogram;  Surgeon: Bassam Flores MD;  [facility]    CV INSTANTANEOUS WAVE-FREE RATIO N/A 06/09/2023    Procedure: Instantaneous Wave-Free Ratio;  Surgeon: Geovanni Ibarra MD;  [facility]    CV RIGHT HEART CATH MEASUREMENTS RECORDED N/A 5/2/2024    Procedure: Right Heart Catheterization;  Surgeon: Bassam Flores MD;  [facility]       FAMILY HISTORY:   Family History   Problem Relation Age of Onset    Lung Cancer Mother         Small cell, did smoked    Diabetes Mother     Hypertension Mother     Goiter Mother     Mental Illness Mother         likely bipolar    Coronary Artery Disease Mother 42        MI, smoked    Hyperlipidemia Mother     Thyroid Disease Mother         Goiter    Obesity Mother         most of family is obese    Glaucoma Father     Atrial fibrillation Father     Hypertension Father     Parkinsonism Father     Hyperlipidemia Father     Diabetes Sister     Breast Cancer Sister 60    Hypertension Sister     Hypertension Brother     Cancer Maternal Grandmother         gynecologic cancer, unknown type    Uterine Cancer Maternal Grandmother     Chronic Obstructive Pulmonary Disease Paternal Grandmother     Uterine Cancer Paternal Grandmother     Stomach Cancer Paternal Grandfather     Colon Cancer Paternal Grandfather     Bipolar Disorder Son     Mental Illness Son     Depression Son     Macular Degeneration No family hx of     Anesthesia Reaction No family hx of     Venous thrombosis No family hx of        SOCIAL HISTORY:   Social History     Tobacco  Use    Smoking status: Never     Passive exposure: Never    Smokeless tobacco: Never   Substance Use Topics    Alcohol use: Yes     Alcohol/week: 1.0 standard drink of alcohol     Types: 1 Standard drinks or equivalent per week     Comment: less than 2 drinks a week         OBJECTIVE:  1. VITAL SIGNS:   Temp:  [97.3  F (36.3  C)-99.3  F (37.4  C)] 97.3  F (36.3  C)  Pulse:  [67-81] 69  Resp:  [15-27] 16  BP: ()/(54-73) 107/56  Cuff Mean (mmHg):  [73-79] 73  MAP:  [63 mmHg-101 mmHg] 82 mmHg  Arterial Line BP: ()/(46-73) 125/62  SpO2:  [91 %-100 %] 95 %    Resp: 16        2. INTAKE/ OUTPUT:   I/O last 3 completed shifts:  In: 1910 [P.O.:1810; I.V.:100]  Out: 1815 [Urine:1185; Chest Tube:630]      3. PHYSICAL EXAMINATION:     GEN: not in distress  EYES: PERRL, Anicteric sclera.   HEENT:  Normocephalic, atraumatic, trachea midline, Pupils PERRLA  CV: RRR, no gallops, rubs, or murmurs  PULM/CHEST: Coarse breath sounds bilaterally lower lobes without wheeze, symmetric chest rise, incisional chest pain exacerbated with respiratory effort  GI: normal bowel sounds, soft, non-tender, no rebound tenderness or guarding, no masses  : Voids spontaneously  EXTREMITIES: No peripheral edema, moving all extremities, peripheral pulses intact  NEURO: Cranial nerves II-XII grossly intact, no motor-sensory deficits noted  SKIN: Incision well approximated, natural skin color, and no edema  PSYCH:  Affect: appropriate         4. INVESTIGATIONS:   Arterial Blood Gases   Recent Labs   Lab 06/13/24  0420 06/12/24  2146 06/12/24  1818 06/12/24  1648   PH 7.40 7.38 7.34* 7.32*   PCO2 40 42 39 36   PO2 98 83 155* 133*   HCO3 25 25 21 19*     Complete Blood Count   Recent Labs   Lab 06/13/24  0419 06/12/24  2145 06/12/24  1510 06/12/24  1408 06/12/24  1317 06/12/24  1314   WBC 6.2 6.5 13.8*  --   --   --    HGB 9.0* 8.9* 10.1* 8.6*   < >  --    PLT 86* 83* 116*  --   --  137*    < > = values in this interval not displayed.     Basic  Metabolic Panel  Recent Labs   Lab 06/14/24  0456 06/13/24  2312 06/13/24  1553 06/13/24  1452 06/13/24  0431 06/13/24  0419 06/12/24  2154 06/12/24  2145   *  --   --  137  --  141  --  142  142   POTASSIUM 4.2  --   --  4.6  --  4.2  --  4.3  4.3   CHLORIDE 100  --   --  104  --  108*  --  110*  110*   CO2 24  --   --  25  --  23  --  23  23   BUN 20.7  --   --  21.9  --  18.8  --  16.4  16.4   CR 0.56  --   --  0.64  --  0.54  --  0.55  0.55   * 182* 194* 195*   < > 129*   < > 163*  163*    < > = values in this interval not displayed.     Liver Function Tests  Recent Labs   Lab 06/12/24 2145 06/12/24  1510 06/12/24  1314   AST 49* 41  --    ALT 15 12  --    ALKPHOS 32* 32*  --    BILITOTAL 0.6 0.9  --    ALBUMIN 3.9 3.7  --    INR 1.48* 1.79* 1.74*     Pancreatic Enzymes  No lab results found in last 7 days.  Coagulation Profile  Recent Labs   Lab 06/12/24 2145 06/12/24  1510 06/12/24  1314   INR 1.48* 1.79* 1.74*   PTT 29 61* 26         5. RADIOLOGY:   Recent Results (from the past 24 hour(s))   XR Chest Port 1 View    Impression    RESIDENT PRELIMINARY INTERPRETATION  IMPRESSION:  Stable support devices. Decreased interstitial opacities on the right,  increased retrocardiac and patchy opacities on the left, likely  atelectasis but could also represent pneumonia.       =========================================

## 2024-06-14 NOTE — DISCHARGE INSTRUCTIONS
AFTER YOU GO HOME FROM YOUR HEART SURGERY  (S/p Coronary Bypass Graft x 2, Aortic Valve Replacement with Inspiris Resilia Aortic Valve size 25mm by Dr. Nicole on 6/12/24)    You had a sternotomy, avoid lifting anything greater than ten pounds for 8 weeks after surgery and then less than 20 pounds for an additional 4 weeks.   Do not reach backwards or use arms to push out of chair.   Do not let people pull on your arms to assist with standing.   Avoid twisting or reaching too far across your body.    Avoid strenuous activities such as bowling, vacuuming, raking, shoveling, golf or tennis for 12 weeks after your surgery.   It is okay to resume sex if you feel comfortable in doing so. You may have to try different positions with your partner.    Splint your chest incision by hugging a pillow or bringing your arms across your chest when coughing or sneezing.     No driving for 4 weeks after surgery or while on pain medication.    Shower or wash your incisions daily with soap and water (or as instructed), pat dry.   Keep wound clean and dry, showers are okay after discharge, but don't let spray hit directly on incision.   No baths or swimming for 1 month.   Cover chest tube sites with dry gauze until they stop draining, then leave open to air. It is not abnormal for chest tube sites to drain yellowish/clear fluid for up to 2-3 weeks after surgery.   Watch for signs of infection: increased redness, tenderness, warmth or any drainage that appears infected (pus like) or is persistent.  Also a temperature > 100.5 F or chills. Call your surgeon or primary care provider's office immediately.   Remove any skin glue left on incisions after 10-14 days. This will not affect your incision and can speed up healing.    Exercise is very important in your recovery. Please follow the guidelines set up for you in your cardiac rehab classes at the hospital. If outpatient cardiac rehab was ordered for you, we highly recommend you  "participate. If you have problems arranging your cardiac rehab, please call [phone] for all locations, with the exception of [place], please call [phone] and [place], please call [phone].    Avoid sitting for prolonged periods of time, try to walk every hour during the day. If you have a leg incision, elevate your leg often when you are not walking.    Check your weight when you get home from the hospital and continue to check it daily through your recovery for at least a month. If you notice a weight gain of 2-3 pounds in a week, notify your primary care physician, cardiologist or surgeon.    Bowel activity may be slow after surgery. If necessary, you may take an over the counter laxative such as Milk of Magnesia or Miralax. You may have stool softeners prescribed (docusate sodium, Senokot). We recommend using stool softeners while using narcotics for pain (oxycodone/percocet, hydrocodone/vicodin, hydromorphone/dilaudid).      Wean OFF of narcotics (oxycodone, dilaudid, hydrocodone) as soon as possible. You should continue taking acetaminophen as long as you have any surgical pain as the first choice for pain control and add narcotics as necessary for pain to be tolerable.   First wean off of oxycodone (narcotic), then wean off Robaxin (muscle relaxer).    DENTAL VISITS AFTER SURGERY  If you have had your heart valve repaired or replaced, we do not recommend having any dental work done for 6 months and you will need to take an antibiotic prior to dental visits from now on.  Please notify your dentist before any procedure for the proper treatment needed. The antibiotic is taken by mouth one hour prior to visit. This includes routine cleanings.  You can sometimes hear a mechanical valve \"clicking,\" this is normal and not a sign of something wrong.    DO NOT SMOKE.  IF YOU NEED HELP QUITTING, PLEASE TALK WITH YOUR CARDIOLOGIST OR PRIMARY DOCTOR.    You are on a blood thinner, follow the instructions " you were given in the hospital and DO NOT SKIP this medication. Try and take it the same time everyday. Your primary care physician or coumadin clinic will manage the dosing. INR goal is 2-3.    REGARDING PRESCRIPTION REFILLS.  If you need a refill on your pain medication contact us to discuss your pain and a possible one time refill.   All other medications will be adjusted, discontinued and re-filled by your primary care physician and/or your cardiologist as they were prior to your surgery. We have given you enough for one to three month with possibly one refill.    POST-OPERATIVE CLINIC VISITS  You will have a follow up visit in [facility] on at [facility] [place] [facility].  You will then return to the care of your primary provider and your cardiologist. Future medication refills should come from your PCP or Cardiologist.   You should see your primary care provider about 1-2 weeks after discharge.   It is important to see your cardiologist about 4 weeks after discharge.    If you do not hear from a  in 7 days, please call [phone] (choose option 1) and request to be seen with a general cardiologist or someone that you have seen in the past.   If there is a need to return to see CT Surgery please call our  at [phone].    SURGICAL QUESTIONS  Please call Adrian Mason, Christine Mccormack, Elisabeth Ramires or Shari Dinero with surgical recovery and medication questions, their phone numbers are listed below.  They will assist you with your needs and contact other surgery care team members as indicated.    On weekends or after hours, please call [phone] and ask the  to page the Cardiothoracic Surgery fellow on call.      Thank you,    Your Cardiothoracic Surgery Team   Adrian Mason RN Care Coordinator - [phone]  Christine Mccormack RN Care Coordinator - [phone]   Shari Dinero, RN Care Coordinator - [phone]   Elisabeth Ramires, JAZZY Care  [facility] - [phone]

## 2024-06-14 NOTE — PLAN OF CARE
Transfer note    Pt received from  around 11am. Report received from Lisa PASCUAL RN. Pt accompanied by significant other and RN. Pt AOx4, RA, SR with 1st degree AVB and BBB, and moves with assist of 1. 4 CT in place attached to suction, TPW capped, with temporary pacemaker at the bedside. Incision sites intact. 2 RN skin check completed with Aura PURCELL. Surgical sites WDL, some redness and blanchable to the coccyx with a mepilex in place.  Pt settled in room and with no other events at this time.

## 2024-06-14 NOTE — PHARMACY-ADMISSION MEDICATION HISTORY
Pharmacy Intern Admission Medication History    Admission medication history is complete. The information provided in this note is only as accurate as the sources available at the time of the update.    Information Source(s): Patient and CareEverywhere/SureScripts via in-person    Pertinent Information:     -The patient stated that she usually inserts her estradiol cream on Mondays and Thursdays each week.    - The patient stated that she usually injects her semaglutide pen on Sundays each week.       Changes made to PTA medication list:  Added: None  Deleted: None  Changed:   Atorvastatin changed from take daily ---> take every evening.      Allergies reviewed with patient and updates made in EHR: yes    Medication History Completed By: Robert Chaudhari 6/14/2024 12:37 PM    PTA Med List   Medication Sig Note Last Dose    aspirin 81 MG EC tablet Take 81 mg by mouth every evening  6/11/2024 at 2100    atorvastatin (LIPITOR) 40 MG tablet Take 40 mg by mouth every evening  6/11/2024 at 2100    carboxymethylcellulose PF (REFRESH PLUS) 0.5 % ophthalmic solution Place 1 drop into both eyes 4 times daily  6/11/2024 at 2100    cyanocobalamin (VITAMIN B-12) 100 MCG tablet Take 100 mcg by mouth every evening  Past Month    dapagliflozin (FARXIGA) 10 MG TABS tablet TAKE 1 TABLET BY MOUTH IN THE  MORNING  Past Week at 2100    diltiazem (CARDIZEM) 60 MG tablet Take 1 tablet (60 mg) by mouth 2 times daily  6/12/2024 at 0400    estradiol (ESTRACE) 0.1 MG/GM vaginal cream INSERT 1/2 APPLICATORFUL  VAGINALLY TWICE WEEKLY 6/14/2024: Usually inserts on Mondays and Thursdays each week. 6/10/2024    fenofibrate (TRIGLIDE/LOFIBRA) 160 MG tablet Take 1 tablet (160 mg) by mouth every evening  6/11/2024 at 2100    gabapentin (NEURONTIN) 100 MG capsule Take 2 capsules (200 mg) by mouth at bedtime  6/11/2024 at 2100    levocetirizine (XYZAL) 5 MG tablet Take 5 mg by mouth every evening  6/11/2024 at 2100    losartan (COZAAR) 50 MG tablet Take 1  tablet (50 mg) by mouth every morning  Past Week    metFORMIN (GLUCOPHAGE) 500 MG tablet TAKE 2 TABLETS BY MOUTH  TWICE DAILY WITH MEALS  6/11/2024 at 2100    montelukast (SINGULAIR) 10 MG tablet Take 1 tablet (10 mg) by mouth at bedtime  6/11/2024 at 2100    multivitamin w/minerals (MULTI-VITAMIN) tablet Take 1 tablet by mouth every evening  Past Month at 2100    Semaglutide, 1 MG/DOSE, (OZEMPIC, 1 MG/DOSE,) 4 MG/3ML pen INJECT SUBCUTANEOUSLY 1 MG  EVERY WEEK 6/14/2024: Usually injects on Sundays 6/9/2024    Vitamin D, Cholecalciferol, 10 MCG (400 UNIT) TABS Take 1 tablet by mouth every evening  Past Month

## 2024-06-14 NOTE — PLAN OF CARE
Major Shift Events: No major shift events     Neuro: A&Ox4.   Cardiac: SR HR 60- 70's. VSS.   Respiratory: Sating >92% on RA. CT to suction pleural x2 meds x2   GI/: Adequate urine output. No BM  Diet/appetite: Tolerating Reg diet. Eating well.  Activity:  Assist of 1, up to chair and in halls.  Pain: At acceptable level on current regimen. Oxy x3 IV dilaudid x2   Skin: No new deficits noted.  LDA's: PIV x2        Plan: Continue with POC. Notify primary team with changes.  For vital signs and complete assessments, please see documentation flowsheets.

## 2024-06-14 NOTE — PLAN OF CARE
See flowsheets for vital signs, hemodynamics, and detailed assessment.    Major Shift Updates/Changes: ao*4. SBA. Remains SR with first degree and BBB. Remains on room air. ACHS blood glucose, one unit given for AM meal. Reports incisional pain, managed with IV dilaudid,10 mg oxy and lidocaine patches on chest. Report given to Greg Gutierres rn   Shift cared for: 0700 - 1100

## 2024-06-14 NOTE — PLAN OF CARE
Pt is s/p AVR and CABG x2. PMHx of DM2, HTN, HLD, CAD, severe AS     Code status: Full Code   Team: [facility]     Neuro: AOx4, calm and cooperative. Can make needs known. Reinforce the use of the call light  Cardiac/Tele: SR with 1st degree AVB and BBB. Denies chest pain or palpitations, no cardiac events this shift. TPW capped and temporary pacemaker at the bedside. SBP run soft, some low 90's. Afebrile and other VSS  Respiratory: Room air, O2 sats > 92%, LS clear at top diminished at the bases. Some CLARKE noted but no SOB at rest.  GI/: Voids spontaneously. BM x1 this shift. Pt gets up to the bathroom  Diet/Appetite: Regular diet. Poor appetite  Skin: Sternal incision and harvest sites intact and BETTINA, Redness and blanchable to the coccyx mepilex in place. CTx4 to suction, sites covered CDI   Endocrine/Electrolytes: BG checks ACHS. Replace lytes per protocols  LDAs: PIV saline locked  Activity: Up with assist of 1  Pain: Endorses pain on her back and incisions. PRN Oxycodone given once. Dilaudid, tylenol and Robaxin available     Plan: Continue POC. Notify team of concerns

## 2024-06-15 ENCOUNTER — APPOINTMENT (OUTPATIENT)
Dept: GENERAL RADIOLOGY | Facility: CLINIC | Age: 69
DRG: 219 | End: 2024-06-15
Attending: STUDENT IN AN ORGANIZED HEALTH CARE EDUCATION/TRAINING PROGRAM
Payer: COMMERCIAL

## 2024-06-15 ENCOUNTER — APPOINTMENT (OUTPATIENT)
Dept: OCCUPATIONAL THERAPY | Facility: CLINIC | Age: 69
DRG: 219 | End: 2024-06-15
Attending: STUDENT IN AN ORGANIZED HEALTH CARE EDUCATION/TRAINING PROGRAM
Payer: COMMERCIAL

## 2024-06-15 ENCOUNTER — APPOINTMENT (OUTPATIENT)
Dept: GENERAL RADIOLOGY | Facility: CLINIC | Age: 69
DRG: 219 | End: 2024-06-15
Attending: SURGERY
Payer: COMMERCIAL

## 2024-06-15 LAB
ANION GAP SERPL CALCULATED.3IONS-SCNC: 7 MMOL/L (ref 7–15)
BASOPHILS # BLD AUTO: 0 10E3/UL (ref 0–0.2)
BASOPHILS NFR BLD AUTO: 0 %
BUN SERPL-MCNC: 13.9 MG/DL (ref 8–23)
CA-I BLD-MCNC: 4.6 MG/DL (ref 4.4–5.2)
CALCIUM SERPL-MCNC: 8.5 MG/DL (ref 8.8–10.2)
CHLORIDE SERPL-SCNC: 100 MMOL/L (ref 98–107)
CREAT SERPL-MCNC: 0.61 MG/DL (ref 0.51–0.95)
DEPRECATED HCO3 PLAS-SCNC: 27 MMOL/L (ref 22–29)
EGFRCR SERPLBLD CKD-EPI 2021: >90 ML/MIN/1.73M2
EOSINOPHIL # BLD AUTO: 0.1 10E3/UL (ref 0–0.7)
EOSINOPHIL NFR BLD AUTO: 2 %
ERYTHROCYTE [DISTWIDTH] IN BLOOD BY AUTOMATED COUNT: 13.3 % (ref 10–15)
GLUCOSE BLDC GLUCOMTR-MCNC: 137 MG/DL (ref 70–99)
GLUCOSE BLDC GLUCOMTR-MCNC: 153 MG/DL (ref 70–99)
GLUCOSE BLDC GLUCOMTR-MCNC: 164 MG/DL (ref 70–99)
GLUCOSE BLDC GLUCOMTR-MCNC: 188 MG/DL (ref 70–99)
GLUCOSE SERPL-MCNC: 139 MG/DL (ref 70–99)
HCT VFR BLD AUTO: 25.3 % (ref 35–47)
HGB BLD-MCNC: 8.4 G/DL (ref 11.7–15.7)
IMM GRANULOCYTES # BLD: 0 10E3/UL
IMM GRANULOCYTES NFR BLD: 0 %
LYMPHOCYTES # BLD AUTO: 1.2 10E3/UL (ref 0.8–5.3)
LYMPHOCYTES NFR BLD AUTO: 23 %
MAGNESIUM SERPL-MCNC: 1.8 MG/DL (ref 1.7–2.3)
MCH RBC QN AUTO: 29.7 PG (ref 26.5–33)
MCHC RBC AUTO-ENTMCNC: 33.2 G/DL (ref 31.5–36.5)
MCV RBC AUTO: 89 FL (ref 78–100)
MONOCYTES # BLD AUTO: 0.4 10E3/UL (ref 0–1.3)
MONOCYTES NFR BLD AUTO: 8 %
NEUTROPHILS # BLD AUTO: 3.5 10E3/UL (ref 1.6–8.3)
NEUTROPHILS NFR BLD AUTO: 67 %
NRBC # BLD AUTO: 0 10E3/UL
NRBC BLD AUTO-RTO: 0 /100
PHOSPHATE SERPL-MCNC: 2.4 MG/DL (ref 2.5–4.5)
PLATELET # BLD AUTO: 74 10E3/UL (ref 150–450)
POTASSIUM SERPL-SCNC: 4.3 MMOL/L (ref 3.4–5.3)
RBC # BLD AUTO: 2.83 10E6/UL (ref 3.8–5.2)
SODIUM SERPL-SCNC: 134 MMOL/L (ref 135–145)
WBC # BLD AUTO: 5.2 10E3/UL (ref 4–11)

## 2024-06-15 PROCEDURE — 36415 COLL VENOUS BLD VENIPUNCTURE: CPT | Performed by: PHYSICIAN ASSISTANT

## 2024-06-15 PROCEDURE — 250N000011 HC RX IP 250 OP 636: Mod: JZ | Performed by: SURGERY

## 2024-06-15 PROCEDURE — 97535 SELF CARE MNGMENT TRAINING: CPT | Mod: GO

## 2024-06-15 PROCEDURE — 250N000013 HC RX MED GY IP 250 OP 250 PS 637: Performed by: STUDENT IN AN ORGANIZED HEALTH CARE EDUCATION/TRAINING PROGRAM

## 2024-06-15 PROCEDURE — 71045 X-RAY EXAM CHEST 1 VIEW: CPT

## 2024-06-15 PROCEDURE — 85025 COMPLETE CBC W/AUTO DIFF WBC: CPT

## 2024-06-15 PROCEDURE — 71045 X-RAY EXAM CHEST 1 VIEW: CPT | Mod: 26 | Performed by: RADIOLOGY

## 2024-06-15 PROCEDURE — 83735 ASSAY OF MAGNESIUM: CPT | Performed by: STUDENT IN AN ORGANIZED HEALTH CARE EDUCATION/TRAINING PROGRAM

## 2024-06-15 PROCEDURE — 250N000013 HC RX MED GY IP 250 OP 250 PS 637: Performed by: SURGERY

## 2024-06-15 PROCEDURE — 250N000011 HC RX IP 250 OP 636: Mod: JZ | Performed by: STUDENT IN AN ORGANIZED HEALTH CARE EDUCATION/TRAINING PROGRAM

## 2024-06-15 PROCEDURE — 97530 THERAPEUTIC ACTIVITIES: CPT | Mod: GO

## 2024-06-15 PROCEDURE — 71045 X-RAY EXAM CHEST 1 VIEW: CPT | Mod: 77

## 2024-06-15 PROCEDURE — 97110 THERAPEUTIC EXERCISES: CPT | Mod: GO

## 2024-06-15 PROCEDURE — 250N000013 HC RX MED GY IP 250 OP 250 PS 637: Performed by: PHYSICIAN ASSISTANT

## 2024-06-15 PROCEDURE — 80048 BASIC METABOLIC PNL TOTAL CA: CPT | Performed by: STUDENT IN AN ORGANIZED HEALTH CARE EDUCATION/TRAINING PROGRAM

## 2024-06-15 PROCEDURE — 84100 ASSAY OF PHOSPHORUS: CPT | Performed by: STUDENT IN AN ORGANIZED HEALTH CARE EDUCATION/TRAINING PROGRAM

## 2024-06-15 PROCEDURE — 120N000005 HC R&B MS OVERFLOW UMMC

## 2024-06-15 PROCEDURE — 250N000011 HC RX IP 250 OP 636: Performed by: PHYSICIAN ASSISTANT

## 2024-06-15 PROCEDURE — 250N000013 HC RX MED GY IP 250 OP 250 PS 637

## 2024-06-15 PROCEDURE — 82330 ASSAY OF CALCIUM: CPT | Performed by: PHYSICIAN ASSISTANT

## 2024-06-15 RX ORDER — ACETAMINOPHEN 325 MG/1
975 TABLET ORAL 4 TIMES DAILY
Status: DISCONTINUED | OUTPATIENT
Start: 2024-06-15 | End: 2024-06-17 | Stop reason: HOSPADM

## 2024-06-15 RX ORDER — KETOROLAC TROMETHAMINE 30 MG/ML
15 INJECTION, SOLUTION INTRAMUSCULAR; INTRAVENOUS EVERY 8 HOURS PRN
Status: DISCONTINUED | OUTPATIENT
Start: 2024-06-15 | End: 2024-06-15

## 2024-06-15 RX ORDER — HYDROMORPHONE HCL IN WATER/PF 6 MG/30 ML
0.2 PATIENT CONTROLLED ANALGESIA SYRINGE INTRAVENOUS EVERY 4 HOURS PRN
Status: DISCONTINUED | OUTPATIENT
Start: 2024-06-15 | End: 2024-06-15

## 2024-06-15 RX ORDER — HYDRALAZINE HYDROCHLORIDE 20 MG/ML
10 INJECTION INTRAMUSCULAR; INTRAVENOUS EVERY 6 HOURS PRN
Status: DISCONTINUED | OUTPATIENT
Start: 2024-06-15 | End: 2024-06-17 | Stop reason: HOSPADM

## 2024-06-15 RX ORDER — ASPIRIN 81 MG/1
81 TABLET, CHEWABLE ORAL DAILY
Status: DISCONTINUED | OUTPATIENT
Start: 2024-06-16 | End: 2024-06-17 | Stop reason: HOSPADM

## 2024-06-15 RX ORDER — FUROSEMIDE 10 MG/ML
40 INJECTION INTRAMUSCULAR; INTRAVENOUS
Status: COMPLETED | OUTPATIENT
Start: 2024-06-15 | End: 2024-06-17

## 2024-06-15 RX ORDER — OXYCODONE HYDROCHLORIDE 5 MG/1
5 TABLET ORAL EVERY 4 HOURS PRN
Status: DISCONTINUED | OUTPATIENT
Start: 2024-06-15 | End: 2024-06-17 | Stop reason: HOSPADM

## 2024-06-15 RX ORDER — HYDROMORPHONE HCL IN WATER/PF 6 MG/30 ML
0.2 PATIENT CONTROLLED ANALGESIA SYRINGE INTRAVENOUS EVERY 4 HOURS PRN
Status: DISCONTINUED | OUTPATIENT
Start: 2024-06-15 | End: 2024-06-17 | Stop reason: HOSPADM

## 2024-06-15 RX ORDER — MAGNESIUM SULFATE HEPTAHYDRATE 40 MG/ML
2 INJECTION, SOLUTION INTRAVENOUS ONCE
Status: COMPLETED | OUTPATIENT
Start: 2024-06-15 | End: 2024-06-15

## 2024-06-15 RX ADMIN — MAGNESIUM SULFATE HEPTAHYDRATE 2 G: 2 INJECTION, SOLUTION INTRAVENOUS at 08:28

## 2024-06-15 RX ADMIN — METHOCARBAMOL 750 MG: 750 TABLET ORAL at 00:46

## 2024-06-15 RX ADMIN — METHOCARBAMOL 750 MG: 750 TABLET ORAL at 12:33

## 2024-06-15 RX ADMIN — FUROSEMIDE 40 MG: 10 INJECTION, SOLUTION INTRAVENOUS at 16:12

## 2024-06-15 RX ADMIN — GABAPENTIN 200 MG: 100 CAPSULE ORAL at 19:30

## 2024-06-15 RX ADMIN — POTASSIUM & SODIUM PHOSPHATES POWDER PACK 280-160-250 MG 1 PACKET: 280-160-250 PACK at 16:12

## 2024-06-15 RX ADMIN — HEPARIN SODIUM 5000 UNITS: 5000 INJECTION, SOLUTION INTRAVENOUS; SUBCUTANEOUS at 04:57

## 2024-06-15 RX ADMIN — HYDROMORPHONE HYDROCHLORIDE 0.2 MG: 0.2 INJECTION, SOLUTION INTRAMUSCULAR; INTRAVENOUS; SUBCUTANEOUS at 19:30

## 2024-06-15 RX ADMIN — METHOCARBAMOL 750 MG: 750 TABLET ORAL at 18:05

## 2024-06-15 RX ADMIN — ACETAMINOPHEN 975 MG: 325 TABLET, FILM COATED ORAL at 10:09

## 2024-06-15 RX ADMIN — ATORVASTATIN CALCIUM 40 MG: 40 TABLET, FILM COATED ORAL at 19:30

## 2024-06-15 RX ADMIN — LIDOCAINE 3 PATCH: 4 PATCH TOPICAL at 08:29

## 2024-06-15 RX ADMIN — ACETAMINOPHEN 975 MG: 325 TABLET, FILM COATED ORAL at 22:06

## 2024-06-15 RX ADMIN — OXYCODONE HYDROCHLORIDE 7.5 MG: 5 TABLET ORAL at 16:12

## 2024-06-15 RX ADMIN — Medication 12.5 MG: at 19:30

## 2024-06-15 RX ADMIN — PANTOPRAZOLE SODIUM 40 MG: 40 TABLET, DELAYED RELEASE ORAL at 08:28

## 2024-06-15 RX ADMIN — OXYCODONE HYDROCHLORIDE 7.5 MG: 5 TABLET ORAL at 22:06

## 2024-06-15 RX ADMIN — ACETAMINOPHEN 975 MG: 325 TABLET, FILM COATED ORAL at 16:12

## 2024-06-15 RX ADMIN — POTASSIUM & SODIUM PHOSPHATES POWDER PACK 280-160-250 MG 1 PACKET: 280-160-250 PACK at 04:57

## 2024-06-15 RX ADMIN — POTASSIUM & SODIUM PHOSPHATES POWDER PACK 280-160-250 MG 1 PACKET: 280-160-250 PACK at 00:46

## 2024-06-15 RX ADMIN — POTASSIUM & SODIUM PHOSPHATES POWDER PACK 280-160-250 MG 1 PACKET: 280-160-250 PACK at 08:32

## 2024-06-15 RX ADMIN — HYDROMORPHONE HYDROCHLORIDE 0.2 MG: 0.2 INJECTION, SOLUTION INTRAMUSCULAR; INTRAVENOUS; SUBCUTANEOUS at 10:37

## 2024-06-15 RX ADMIN — POTASSIUM & SODIUM PHOSPHATES POWDER PACK 280-160-250 MG 1 PACKET: 280-160-250 PACK at 11:26

## 2024-06-15 RX ADMIN — GABAPENTIN 200 MG: 100 CAPSULE ORAL at 08:28

## 2024-06-15 RX ADMIN — FUROSEMIDE 40 MG: 10 INJECTION, SOLUTION INTRAVENOUS at 10:32

## 2024-06-15 RX ADMIN — ACETAMINOPHEN 975 MG: 325 TABLET, FILM COATED ORAL at 04:57

## 2024-06-15 RX ADMIN — OXYCODONE HYDROCHLORIDE 10 MG: 10 TABLET ORAL at 00:46

## 2024-06-15 RX ADMIN — Medication 12.5 MG: at 08:28

## 2024-06-15 RX ADMIN — OXYCODONE HYDROCHLORIDE 7.5 MG: 5 TABLET ORAL at 10:09

## 2024-06-15 RX ADMIN — METHOCARBAMOL 750 MG: 750 TABLET ORAL at 06:06

## 2024-06-15 RX ADMIN — GABAPENTIN 200 MG: 100 CAPSULE ORAL at 14:14

## 2024-06-15 ASSESSMENT — ACTIVITIES OF DAILY LIVING (ADL)
ADLS_ACUITY_SCORE: 32
ADLS_ACUITY_SCORE: 29
ADLS_ACUITY_SCORE: 33
ADLS_ACUITY_SCORE: 29
ADLS_ACUITY_SCORE: 33
ADLS_ACUITY_SCORE: 32
ADLS_ACUITY_SCORE: 33
ADLS_ACUITY_SCORE: 29
ADLS_ACUITY_SCORE: 32
ADLS_ACUITY_SCORE: 29
ADLS_ACUITY_SCORE: 29
ADLS_ACUITY_SCORE: 32
ADLS_ACUITY_SCORE: 33
ADLS_ACUITY_SCORE: 29
ADLS_ACUITY_SCORE: 32
ADLS_ACUITY_SCORE: 33
ADLS_ACUITY_SCORE: 29

## 2024-06-15 NOTE — PLAN OF CARE
Shift 2454-6118  Temp: 98.8  F (37.1  C) BP: 135/71 Pulse: 74 Resp: 20 SpO2: 100 % O2 Device: None (Room air)    D: s/p CABGx2 w/ AVR 6/12          I/A: Pt is AxOx4. Tele in place, SR w/ BBB. VSS on RA. Left arm PIV in place saline locked. Moderate edema present bilateral upper extremities (since procedure per pt statement). All pulses palpable, no change to neuro. Chest incision BETTINA w/ liquid bandage. Edges approximated. CT sites covered. Ctx4 to -20 suction.     Pleural Cts output during shift: 140cc   Mediastinal Cts output during shift: 10cc    Phos replaced per protocol overnight; recheck in AM. Up with standby assist to restroom x2 without issues; reminding patient to use call light to manage chest tube atriums when ambulating. Bed alarms on. Pt had two bowel movements overnight; this morning has diarrhea. Standing weight this .5 lbs.     P: Continue to monitor and follow POC. Notify [facility] with changes      Goal Outcome Evaluation:      Plan of Care Reviewed With: patient  Overall Patient Progress: improving  Outcome Evaluation: VSS on RA. Pain well controlled with PRN oxy, scheduled tylenol and robaxin. CTs to suction. Ambulating to restroom with SBA    Navarro Barrow RN on 6/15/2024 at 6:36 AM

## 2024-06-15 NOTE — PROGRESS NOTES
CVTS BRIEF PROGRESS NOTE    Atrial TPW cut at the level of the skin after resistance encountered so retained metallic foreign body left in situ.  Patient made aware and history updated to reflect this.  Ventricular TPW removed without significant ectopy or sustained arrhythmia.    After a brief period of observation and no sudden increase in chest tube output or change in output character, mediastinal chest tubes x2 pulled without immediate complication.    Occlusive dressing must remain in place for 24 hrs; reinforce prn.  Post-pull CXR ordered; will follow for result.    Bertrand Villalta, CNP, ACNPC-AG  Cardiothoracic Surgery  Pager [phone]

## 2024-06-15 NOTE — PLAN OF CARE
Pt is s/p AVR and CABG x2. PMHx of DM2, HTN, HLD, CAD, severe AS     Code status: Full Code   Team: CVTS     Neuro: AOx4, calm and cooperative. Can make needs known. Reinforce the use of the call light. Turn bed alarm on at HS for safety  Cardiac/Tele: SR with 1st degree AVB and BBB. Denies chest pain or palpitations, no cardiac events this shift. Mediastinal CTx2 and TPW removed without complications. Occlusive dressing in place leave it on for 24hrs. Afebrile and other VSS  Respiratory: Room air, O2 sats > 92%, LS clear at top diminished at the bases. Some CLARKE noted but no SOB at rest.  GI/: Voids spontaneously. BM x2 this shift. Pt gets up to the bathroom  Diet/Appetite: Regular diet. Fair appetite  Skin: Sternal incision and harvest sites intact and BETTINA, Redness and blanchable to the coccyx mepilex in place. CTx2 to suction, sites covered CDI   Endocrine/Electrolytes: BG checks ACHS. Replace lytes per protocols  LDAs: PIV saline locked  Activity: Up with assist of 1  Pain: Endorses pain on her back and incisions. PRN Oxycodone given x2, IV Dilaudid x1 and scheduled tylenol and Robaxin.    Plan: Continue POC. Reinforce use of the call light. Notify team of concerns

## 2024-06-16 ENCOUNTER — APPOINTMENT (OUTPATIENT)
Dept: GENERAL RADIOLOGY | Facility: CLINIC | Age: 69
DRG: 219 | End: 2024-06-16
Attending: STUDENT IN AN ORGANIZED HEALTH CARE EDUCATION/TRAINING PROGRAM
Payer: COMMERCIAL

## 2024-06-16 ENCOUNTER — APPOINTMENT (OUTPATIENT)
Dept: OCCUPATIONAL THERAPY | Facility: CLINIC | Age: 69
DRG: 219 | End: 2024-06-16
Attending: STUDENT IN AN ORGANIZED HEALTH CARE EDUCATION/TRAINING PROGRAM
Payer: COMMERCIAL

## 2024-06-16 ENCOUNTER — APPOINTMENT (OUTPATIENT)
Dept: GENERAL RADIOLOGY | Facility: CLINIC | Age: 69
DRG: 219 | End: 2024-06-16
Attending: SURGERY
Payer: COMMERCIAL

## 2024-06-16 LAB
ANION GAP SERPL CALCULATED.3IONS-SCNC: 10 MMOL/L (ref 7–15)
BUN SERPL-MCNC: 13.3 MG/DL (ref 8–23)
CALCIUM SERPL-MCNC: 8.7 MG/DL (ref 8.8–10.2)
CHLORIDE SERPL-SCNC: 102 MMOL/L (ref 98–107)
CREAT SERPL-MCNC: 0.67 MG/DL (ref 0.51–0.95)
DEPRECATED HCO3 PLAS-SCNC: 28 MMOL/L (ref 22–29)
EGFRCR SERPLBLD CKD-EPI 2021: >90 ML/MIN/1.73M2
ERYTHROCYTE [DISTWIDTH] IN BLOOD BY AUTOMATED COUNT: 13.5 % (ref 10–15)
GLUCOSE BLDC GLUCOMTR-MCNC: 128 MG/DL (ref 70–99)
GLUCOSE BLDC GLUCOMTR-MCNC: 137 MG/DL (ref 70–99)
GLUCOSE SERPL-MCNC: 144 MG/DL (ref 70–99)
HCT VFR BLD AUTO: 26.2 % (ref 35–47)
HGB BLD-MCNC: 8.6 G/DL (ref 11.7–15.7)
INR PPP: 1.14 (ref 0.85–1.15)
MAGNESIUM SERPL-MCNC: 1.7 MG/DL (ref 1.7–2.3)
MCH RBC QN AUTO: 29.8 PG (ref 26.5–33)
MCHC RBC AUTO-ENTMCNC: 32.8 G/DL (ref 31.5–36.5)
MCV RBC AUTO: 91 FL (ref 78–100)
PHOSPHATE SERPL-MCNC: 2.9 MG/DL (ref 2.5–4.5)
PLATELET # BLD AUTO: 97 10E3/UL (ref 150–450)
POTASSIUM SERPL-SCNC: 3.6 MMOL/L (ref 3.4–5.3)
RBC # BLD AUTO: 2.89 10E6/UL (ref 3.8–5.2)
SODIUM SERPL-SCNC: 140 MMOL/L (ref 135–145)
WBC # BLD AUTO: 4.9 10E3/UL (ref 4–11)

## 2024-06-16 PROCEDURE — 84100 ASSAY OF PHOSPHORUS: CPT | Performed by: SURGERY

## 2024-06-16 PROCEDURE — 71045 X-RAY EXAM CHEST 1 VIEW: CPT | Mod: 77

## 2024-06-16 PROCEDURE — 71045 X-RAY EXAM CHEST 1 VIEW: CPT | Mod: 26 | Performed by: RADIOLOGY

## 2024-06-16 PROCEDURE — 250N000011 HC RX IP 250 OP 636: Mod: JZ | Performed by: SURGERY

## 2024-06-16 PROCEDURE — 36415 COLL VENOUS BLD VENIPUNCTURE: CPT | Performed by: SURGERY

## 2024-06-16 PROCEDURE — 120N000005 HC R&B MS OVERFLOW UMMC

## 2024-06-16 PROCEDURE — 80048 BASIC METABOLIC PNL TOTAL CA: CPT | Performed by: STUDENT IN AN ORGANIZED HEALTH CARE EDUCATION/TRAINING PROGRAM

## 2024-06-16 PROCEDURE — 36415 COLL VENOUS BLD VENIPUNCTURE: CPT | Performed by: STUDENT IN AN ORGANIZED HEALTH CARE EDUCATION/TRAINING PROGRAM

## 2024-06-16 PROCEDURE — 83735 ASSAY OF MAGNESIUM: CPT | Performed by: STUDENT IN AN ORGANIZED HEALTH CARE EDUCATION/TRAINING PROGRAM

## 2024-06-16 PROCEDURE — 250N000013 HC RX MED GY IP 250 OP 250 PS 637: Performed by: SURGERY

## 2024-06-16 PROCEDURE — 250N000013 HC RX MED GY IP 250 OP 250 PS 637: Performed by: STUDENT IN AN ORGANIZED HEALTH CARE EDUCATION/TRAINING PROGRAM

## 2024-06-16 PROCEDURE — 250N000011 HC RX IP 250 OP 636: Performed by: SURGERY

## 2024-06-16 PROCEDURE — 85610 PROTHROMBIN TIME: CPT | Performed by: SURGERY

## 2024-06-16 PROCEDURE — 97110 THERAPEUTIC EXERCISES: CPT | Mod: GO

## 2024-06-16 PROCEDURE — 97535 SELF CARE MNGMENT TRAINING: CPT | Mod: GO

## 2024-06-16 PROCEDURE — 85014 HEMATOCRIT: CPT | Performed by: SURGERY

## 2024-06-16 PROCEDURE — 250N000013 HC RX MED GY IP 250 OP 250 PS 637

## 2024-06-16 PROCEDURE — 97530 THERAPEUTIC ACTIVITIES: CPT | Mod: GO

## 2024-06-16 PROCEDURE — 71045 X-RAY EXAM CHEST 1 VIEW: CPT

## 2024-06-16 RX ORDER — POTASSIUM CHLORIDE 750 MG/1
40 TABLET, EXTENDED RELEASE ORAL ONCE
Status: COMPLETED | OUTPATIENT
Start: 2024-06-16 | End: 2024-06-16

## 2024-06-16 RX ORDER — MAGNESIUM OXIDE 400 MG/1
400 TABLET ORAL EVERY 4 HOURS
Status: COMPLETED | OUTPATIENT
Start: 2024-06-16 | End: 2024-06-16

## 2024-06-16 RX ORDER — WARFARIN SODIUM 5 MG/1
5 TABLET ORAL
Status: COMPLETED | OUTPATIENT
Start: 2024-06-16 | End: 2024-06-16

## 2024-06-16 RX ORDER — METOPROLOL TARTRATE 25 MG/1
25 TABLET, FILM COATED ORAL 2 TIMES DAILY
Status: DISCONTINUED | OUTPATIENT
Start: 2024-06-16 | End: 2024-06-17 | Stop reason: HOSPADM

## 2024-06-16 RX ADMIN — HEPARIN SODIUM 5000 UNITS: 5000 INJECTION, SOLUTION INTRAVENOUS; SUBCUTANEOUS at 20:46

## 2024-06-16 RX ADMIN — METHOCARBAMOL 750 MG: 750 TABLET ORAL at 08:26

## 2024-06-16 RX ADMIN — POTASSIUM CHLORIDE 40 MEQ: 750 TABLET, EXTENDED RELEASE ORAL at 08:23

## 2024-06-16 RX ADMIN — ACETAMINOPHEN 975 MG: 325 TABLET, FILM COATED ORAL at 16:24

## 2024-06-16 RX ADMIN — DOCUSATE SODIUM 50 MG AND SENNOSIDES 8.6 MG 2 TABLET: 8.6; 5 TABLET, FILM COATED ORAL at 20:46

## 2024-06-16 RX ADMIN — FUROSEMIDE 40 MG: 10 INJECTION, SOLUTION INTRAVENOUS at 08:24

## 2024-06-16 RX ADMIN — OXYCODONE HYDROCHLORIDE 7.5 MG: 5 TABLET ORAL at 20:44

## 2024-06-16 RX ADMIN — METHOCARBAMOL 750 MG: 750 TABLET ORAL at 12:12

## 2024-06-16 RX ADMIN — PANTOPRAZOLE SODIUM 40 MG: 40 TABLET, DELAYED RELEASE ORAL at 08:23

## 2024-06-16 RX ADMIN — Medication 400 MG: at 08:24

## 2024-06-16 RX ADMIN — LIDOCAINE 3 PATCH: 4 PATCH TOPICAL at 08:24

## 2024-06-16 RX ADMIN — METHOCARBAMOL 750 MG: 750 TABLET ORAL at 18:22

## 2024-06-16 RX ADMIN — ACETAMINOPHEN 975 MG: 325 TABLET, FILM COATED ORAL at 20:52

## 2024-06-16 RX ADMIN — ACETAMINOPHEN 975 MG: 325 TABLET, FILM COATED ORAL at 12:12

## 2024-06-16 RX ADMIN — WARFARIN SODIUM 5 MG: 5 TABLET ORAL at 18:22

## 2024-06-16 RX ADMIN — METFORMIN HYDROCHLORIDE 1000 MG: 500 TABLET ORAL at 18:22

## 2024-06-16 RX ADMIN — Medication 400 MG: at 11:31

## 2024-06-16 RX ADMIN — GABAPENTIN 200 MG: 100 CAPSULE ORAL at 08:24

## 2024-06-16 RX ADMIN — FUROSEMIDE 40 MG: 10 INJECTION, SOLUTION INTRAVENOUS at 16:24

## 2024-06-16 RX ADMIN — OXYCODONE HYDROCHLORIDE 7.5 MG: 5 TABLET ORAL at 12:12

## 2024-06-16 RX ADMIN — HEPARIN SODIUM 5000 UNITS: 5000 INJECTION, SOLUTION INTRAVENOUS; SUBCUTANEOUS at 12:12

## 2024-06-16 RX ADMIN — GABAPENTIN 200 MG: 100 CAPSULE ORAL at 20:44

## 2024-06-16 RX ADMIN — POLYETHYLENE GLYCOL 3350 17 G: 17 POWDER, FOR SOLUTION ORAL at 20:46

## 2024-06-16 RX ADMIN — METOPROLOL TARTRATE 25 MG: 25 TABLET, FILM COATED ORAL at 20:45

## 2024-06-16 RX ADMIN — OXYCODONE HYDROCHLORIDE 7.5 MG: 5 TABLET ORAL at 06:22

## 2024-06-16 RX ADMIN — ASPIRIN 81 MG CHEWABLE TABLET 81 MG: 81 TABLET CHEWABLE at 08:23

## 2024-06-16 RX ADMIN — Medication 12.5 MG: at 08:23

## 2024-06-16 RX ADMIN — ACETAMINOPHEN 975 MG: 325 TABLET, FILM COATED ORAL at 08:23

## 2024-06-16 RX ADMIN — GABAPENTIN 200 MG: 100 CAPSULE ORAL at 14:42

## 2024-06-16 RX ADMIN — DOCUSATE SODIUM 50 MG AND SENNOSIDES 8.6 MG 2 TABLET: 8.6; 5 TABLET, FILM COATED ORAL at 08:24

## 2024-06-16 RX ADMIN — METHOCARBAMOL 750 MG: 750 TABLET ORAL at 02:13

## 2024-06-16 RX ADMIN — ATORVASTATIN CALCIUM 40 MG: 40 TABLET, FILM COATED ORAL at 20:44

## 2024-06-16 ASSESSMENT — ACTIVITIES OF DAILY LIVING (ADL)
ADLS_ACUITY_SCORE: 31
ADLS_ACUITY_SCORE: 31
ADLS_ACUITY_SCORE: 32
ADLS_ACUITY_SCORE: 32
ADLS_ACUITY_SCORE: 31
ADLS_ACUITY_SCORE: 32
ADLS_ACUITY_SCORE: 32
ADLS_ACUITY_SCORE: 31
ADLS_ACUITY_SCORE: 31
ADLS_ACUITY_SCORE: 32
ADLS_ACUITY_SCORE: 31
ADLS_ACUITY_SCORE: 32
ADLS_ACUITY_SCORE: 31
ADLS_ACUITY_SCORE: 32
ADLS_ACUITY_SCORE: 31
ADLS_ACUITY_SCORE: 31
ADLS_ACUITY_SCORE: 32
ADLS_ACUITY_SCORE: 31

## 2024-06-16 NOTE — PLAN OF CARE
Pt is s/p AVR and CABG x2. PMHx of DM2, HTN, HLD, CAD, severe AS     Code status: Full Code   Team: CVTS     Neuro: AOx4, calm and cooperative. No neuro deficits  Cardiac/Tele: SR with 1st degree AVB and BBB. Denies chest pain or palpitations, no cardiac events this shift. Pleural CTx2 pulled without complications. Occlusive dressing in place leave it on for 24hrs. Afebrile and other VSS  Respiratory: Room air, O2 sats > 92%, LS clear at top diminished at the bases. No SOB at rest.  GI/: Voids spontaneously. BM's regular. Pt gets up to the bathroom independently  Diet/Appetite: Regular diet. Good appetite  Skin: Sternal incision and harvest sites intact and BETTINA, Redness and blanchable to the coccyx skin intact. CT sites covered CDI   Endocrine/Electrolytes: BG checks ACHS. Replace lytes per protocols  LDAs: PIV saline locked  Activity: Up independently. Reinforce sternal precautions  Pain: Endorses incisional pain. PRN Oxycodone given x1 with scheduled tylenol and Robaxin.    Plan: Possible discharge tomorrow pending team evaluation. Continue POC per team

## 2024-06-16 NOTE — PROGRESS NOTES
"  CVTS DAILY PROGRESS NOTE    Won Urena is a 69 year old female with PMH of CAD, HTN, HLD, severe aortic stenosis, and DM2 who presented on 6/12/2024 for elective bioprosthetic AVR and CABG x2 (LIMA-LAD, rSVG-OM).  She transferred out of the ICU on POD 2.    S:  BRUNOO, states she got behind on her pain control this morning.  Pain seems to be centered around chest tubes, worse with deep breathing.  Reports it's tough to take a deep breath and she's only able to pull ~750 ml on her IS.  Otherwise, denies dyspnea, CP, N/V/D/C though she awaits antegrade bowel function following surgery.  Appetite still a bit suppressed.  No sternal popping/clicking/snapping.    O:    /69 (BP Location: Left arm)   Pulse 80   Temp 98.8  F (37.1  C) (Oral)   Resp 19   Ht 1.702 m (5' 7\")   Wt 80.1 kg (176 lb 8 oz)   SpO2 98%   BMI 27.64 kg/m        Intake/Output Summary (Last 24 hours) at 6/16/2024 0119  Last data filed at 6/15/2024 2200  Gross per 24 hour   Intake 1200 ml   Output 2550 ml   Net -1350 ml       CBC  Recent Labs   Lab 06/15/24  0529 06/14/24  0954 06/13/24  0419 06/12/24  2145   WBC 5.2 8.5 6.2 6.5   RBC 2.83* 3.16* 3.02* 2.97*   HGB 8.4* 9.8* 9.0* 8.9*   HCT 25.3* 28.9* 27.2* 26.5*   MCV 89 92 90 89   MCH 29.7 31.0 29.8 30.0   MCHC 33.2 33.9 33.1 33.6   RDW 13.3 13.4 13.8 13.6   PLT 74* 98* 86* 83*     BMP  Recent Labs   Lab 06/15/24  2141 06/15/24  1803 06/15/24  1315 06/15/24  0803 06/15/24  0529 06/14/24  0742 06/14/24  0456 06/13/24  1553 06/13/24  1452 06/13/24  0431 06/13/24  0419   NA  --   --   --   --  134*  --  132*  --  137  --  141   POTASSIUM  --   --   --   --  4.3  --  4.2  --  4.6  --  4.2   CHLORIDE  --   --   --   --  100  --  100  --  104  --  108*   JOAQUINA  --   --   --   --  8.5*  --  8.5*  --  8.8  --  8.5*   CO2  --   --   --   --  27  --  24  --  25  --  23   BUN  --   --   --   --  13.9  --  20.7  --  21.9  --  18.8   CR  --   --   --   --  0.61  --  0.56  --  0.64  --  0.54   GLC " 188* 164* 153* 137* 139*   < > 165*   < > 195*   < > 129*    < > = values in this interval not displayed.       IMAGING:  Recent Results (from the past 24 hour(s))   XR Chest Port 1 View    Narrative    Exam: XR CHEST PORT 1 VIEW, 6/15/2024 10:23 AM    Indication: Postop pneumonia, chest tubes    Comparison: Chest 6/14/2024.    Findings:   Upright chest AP portable. Stable position of left apical chest tube,  right basilar chest tube, mediastinal drains. Prosthetic heart valve.  Intact midline sternotomy wires.    Trachea is midline. Stable cardiac mediastinal silhouette.  Atherosclerosis of the aortic arch. Stable retrocardiac opacity  silhouetting the left hemidiaphragm. Improved patchy opacities in the  left lung base. Improved interstitial opacities on the right.  Persistent platelike opacity in the right midlung field. No  pneumothorax. Possible trace left pleural effusion. Upper abdomen  unremarkable.      Impression    Impression:   1.  Stable postsurgical changes and support devices.  2.  Retrocardiac and patchy opacities at the left base similar to  improved.  3.  Platelike atelectasis in the right mid lung.  4.  Possible trace left pleural effusion.    ZULY MEADOWS MD         SYSTEM ID:  [id number]   XR Chest Port 1 View    Narrative    Exam: XR CHEST PORT 1 VIEW, 6/15/2024 1:47 PM    Indication: s/p mediastinal chest tube removal    Comparison: Same date radiograph    Findings:   Single portable AP view of the chest. Interval removal of mediastinal  drains. Additional chest tubes are in place. Unchanged postoperative  changes. No substantial interval change in basilar opacities and  linear atelectasis of the right mid lung. Perhaps trace right  pneumothorax along the right apical lung with chest tube in place.      Impression    Impression:   1. Interval removal of mediastinal drains, chest tubes in place with  perhaps trace right apical pneumothorax.  2. Otherwise stable appearance of the chest with  unchanged pulmonary  opacities and surgical changes.    I have personally reviewed the examination and initial interpretation  and I agree with the findings.    MARTINE SY MD         SYSTEM ID:  [id number]       EXAM:  Gen:  NAD, resting in bed, visiting with family, calm, pleasant, conversant, cooperative on exam  Neuro:  A&O, CN II-VII grossly intact, no focal deficits, face symmetric, speech clear, ALARCON  Resp:  unlabored breathing on RA  CV:  WWP, RRR - SR on monitor, mild LE edema  GI:  SNTND, no guarding or peritoneal signs  MSK:  ALARCON, no gross deformities  Int:  incision C/D/I without surrounding erythema or induration, skin glue intact      A/P:  Won Urena is a 69 year old female who is s/p bioprosthetic AVR and CABG x2.    VS, I/O, wt, pertinent studies, and nursing notes reviewed from the past 24 hrs.     - Multimodal analgesia for acute post-op pain, including resumption of PTA gabapentin   - Will remove TPW and mediastinal CT today   - Encourage pulm toilet   - Diuresis with 40mg IV Lasix BID   - Post-CABG medical therapy:  ASA dose reduced to 81 mg daily today d/t thrombocytopenia, per Dr. Nicole; metoprolol tartrate BID, statin   - Will need warfarin x3 months for AVR per surgeon preference (to be started after chest tubes removed)   - Hold PTA losartan, diltiazem, fenofibrate   - Combination cardiac/mod consistent carb diet; bowel regimen   - SSI to maintain BG<180 for optimal wound healing; hold PTA Farxiga, metformin, semaglutide for now   - PPX:  PPI, SCD/OOB/activity - SQ heparin held per Dr. Nicole d/t thrombocytopenia   - PT/OT/CR consults; maintain sternal precautions    Discussed with Dr. Nicole.    Bertrand Villalta, CNP, [facility]-  Cardiothoracic Surgery  Pager [phone]

## 2024-06-16 NOTE — PROGRESS NOTES
Cardiovascular Surgery Progress Note  06/16/2024         Assessment and Plan:     Won Urena is a 69 year old female with PMH of CAD, HTN, HLD, severe aortic stenosis, and DM2 who presented on 6/12/2024 for elective bioprosthetic AVR and CABG x2 (LIMA-LAD, rSVG-OM).  She transferred out of the ICU on POD 2.     Cardiovascular:   CAD s/p CABG x2  Severe aortic stenosis s/p bioprosthetic AVR  Hypertension  Hypervolemia  No arrhythmia, HD stable.  Intra-op echo showed LV EF 60%  Will need post-op baseline echo in AM (ordered)   - ASA 81 mg - reduced dose in s/o thrombocytopenia and now initiation of systemic anticoagulation   - Increase metoprolol tartrate to 25mg BID with hold parameters   - Hold PTA losartan, diltiazem, fenofibrate, Farxiga    - PTA atorvastatin resumed   - Diuresis as below   - AC as below per surgeon preference for bioprosthetic valve    Chest tubes: Mediastinal chest tubes removed 6/15, plan to remove pleural chest tubes today  TPW: removed 6/15    Pulmonary:  - Saturating well on RA   - Supplemental O2 PRN to keep sats > 92%  - Pulm toilet, IS, OOB/activity and deep breathing    Neurology / MSK:  Acute post-operative pain  - Multimodal analgesia with acetaminophen, oxycodone PRN, IV dilaudid PRN, Robaxin, lidocaine patches    S/p sternotomy   - PT/OT/CR consults   - Sternal precautions     / Renal / FE:  - Creatinine WNL  - Diuresis: 40mg IV Lasix BID  - Replace electrolytes prn per protocol  - Strict I/O, daily standing weights per protocol    GI / Nutrition:   - Combination cardiac/consistent carb diet, continue bowel regimen    Endocrine:  Stress-induced hyperglycemia, resolved  Prediabetes   - Initially managed on insulin drip postop, transitioned to sliding scale; will discontinue insulin in favor of resuming PTA metformin this evening   - PTA Farxiga, semaglutide all held for now    Infectious Disease:  Stress-induced leukocytosis, resolved  - WBC WNL, afebrile, no signs or  "symptoms of infection  - Completed perioperative antibiotics    Hematology:   Acute blood loss anemia  Thrombocytopenia, improving  Warfarin anticoagulation  Hgb grossly stable; Plt recovering, no signs or symptoms of active bleeding    Anticoagulation:   - ASA 81 mg  - Coumadin x3 months for bioprosthetic valve per surgeon preference, INR goal 2-3    - No heparin bridging; per Dr. Nicole, OK to discharge before INR within goal range    Prophylaxis:   - Stress ulcer prophylaxis: Pantoprazole 40 mg daily for 30 days post-operatively  - DVT prophylaxis: Subcutaneous heparin, SCD, OOB/activity    Disposition:   - Transferred to  on 6/14  - Therapies recommending discharge to home with assistance once medically stable  - Medically Ready for Discharge: Anticipated in 2-4 Days      Discussed with Dr. Nicole.      Bertrand Villalta, CNP, [facility]-AG  Cardiothoracic Surgery  [employer] Pager [phone]        Interval History:     No overnight events.  States pain is well controlled. Up and about in room with minimal assistance for lines/tethers  Tolerating diet, + BM. No nausea or vomiting.  Denies dyspnea.  Denies chest pain, dizziness/lightheadedness, and sternal popping/clicking/snapping.         Physical Exam:   Blood pressure 134/81, pulse 74, temperature 97.4  F (36.3  C), temperature source Oral, resp. rate 16, height 1.702 m (5' 7\"), weight 77.1 kg (170 lb), SpO2 96%, not currently breastfeeding.  Vitals:    06/13/24 0600 06/15/24 0329 06/16/24 0354   Weight: 76.3 kg (168 lb 3.4 oz) 80.1 kg (176 lb 8 oz) 77.1 kg (170 lb)      Admission weight: 74.7 kg  24 hr Fluid status:  -1650 mL    Gen: NAD, resting in bed watching TV  Neuro: A&Ox4, no focal deficits, face symmetric, speech clear, ALARCON  CV: RRR - SR on monitor, WWP, mild LE edema  Pulm: unlabored breathing on RA, no cough  Abd: SNDNT  Ext: mild peripheral edema, RLE EVH incisions C/D/I  Incision: clean, dry, intact, no erythema or induration, sternum " "stable  Tubes/drain sites: small amount of old blood on dressing, serous output from pleural tubes, no air leak       Clinically Significant Risk Factors                # Thrombocytopenia: Lowest platelets = 74 in last 2 days, will monitor for bleeding   # Hypertension: Noted on problem list           #Precipitous drop in Hgb/Hct: Lowest Hgb this hospitalization: 8.4 g/dL. Will continue to monitor and treat/transfuse as appropriate.     # Overweight: Estimated body mass index is 26.63 kg/m  as calculated from the following:    Height as of this encounter: 1.702 m (5' 7\").    Weight as of this encounter: 77.1 kg (170 lb).       # History of CABG: noted on surgical history            Data:    Imaging:  reviewed recent imaging    Recent Results (from the past 24 hour(s))   XR Chest Port 1 View    Narrative    Exam: XR CHEST PORT 1 VIEW, 6/15/2024 10:23 AM    Indication: Postop pneumonia, chest tubes    Comparison: Chest 6/14/2024.    Findings:   Upright chest AP portable. Stable position of left apical chest tube,  right basilar chest tube, mediastinal drains. Prosthetic heart valve.  Intact midline sternotomy wires.    Trachea is midline. Stable cardiac mediastinal silhouette.  Atherosclerosis of the aortic arch. Stable retrocardiac opacity  silhouetting the left hemidiaphragm. Improved patchy opacities in the  left lung base. Improved interstitial opacities on the right.  Persistent platelike opacity in the right midlung field. No  pneumothorax. Possible trace left pleural effusion. Upper abdomen  unremarkable.      Impression    Impression:   1.  Stable postsurgical changes and support devices.  2.  Retrocardiac and patchy opacities at the left base similar to  improved.  3.  Platelike atelectasis in the right mid lung.  4.  Possible trace left pleural effusion.    ZULY MEADOWS MD         SYSTEM ID:  [id number]   XR Chest Port 1 View    Narrative    Exam: XR CHEST PORT 1 VIEW, 6/15/2024 1:47 PM    Indication: " s/p mediastinal chest tube removal    Comparison: Same date radiograph    Findings:   Single portable AP view of the chest. Interval removal of mediastinal  drains. Additional chest tubes are in place. Unchanged postoperative  changes. No substantial interval change in basilar opacities and  linear atelectasis of the right mid lung. Perhaps trace right  pneumothorax along the right apical lung with chest tube in place.      Impression    Impression:   1. Interval removal of mediastinal drains, chest tubes in place with  perhaps trace right apical pneumothorax.  2. Otherwise stable appearance of the chest with unchanged pulmonary  opacities and surgical changes.    I have personally reviewed the examination and initial interpretation  and I agree with the findings.    MARTINE SY MD         SYSTEM ID:  [id number]       Labs:  BMP  Recent Labs   Lab 06/16/24  0840 06/16/24  0613 06/15/24  2141 06/15/24  1803 06/15/24  0803 06/15/24  0529 06/14/24  0742 06/14/24  0456 06/13/24  1553 06/13/24  1452   NA  --  140  --   --   --  134*  --  132*  --  137   POTASSIUM  --  3.6  --   --   --  4.3  --  4.2  --  4.6   CHLORIDE  --  102  --   --   --  100  --  100  --  104   JOAQUINA  --  8.7*  --   --   --  8.5*  --  8.5*  --  8.8   CO2  --  28  --   --   --  27  --  24  --  25   BUN  --  13.3  --   --   --  13.9  --  20.7  --  21.9   CR  --  0.67  --   --   --  0.61  --  0.56  --  0.64   * 144* 188* 164*   < > 139*   < > 165*   < > 195*    < > = values in this interval not displayed.     CBC  Recent Labs   Lab 06/16/24  0613 06/15/24  0529 06/14/24  0954 06/13/24  0419   WBC 4.9 5.2 8.5 6.2   RBC 2.89* 2.83* 3.16* 3.02*   HGB 8.6* 8.4* 9.8* 9.0*   HCT 26.2* 25.3* 28.9* 27.2*   MCV 91 89 92 90   MCH 29.8 29.7 31.0 29.8   MCHC 32.8 33.2 33.9 33.1   RDW 13.5 13.3 13.4 13.8   PLT 97* 74* 98* 86*     INR  Recent Labs   Lab 06/12/24  2145 06/12/24  1510 06/12/24  1314   INR 1.48* 1.79* 1.74*      Hepatic Panel  Recent Labs   Lab  06/12/24  2145 06/12/24  1510   AST 49* 41   ALT 15 12   ALKPHOS 32* 32*   BILITOTAL 0.6 0.9   ALBUMIN 3.9 3.7     GLUCOSE:   Recent Labs   Lab 06/16/24  0840 06/16/24  0613 06/15/24  2141 06/15/24  1803 06/15/24  1315 06/15/24  0803   * 144* 188* 164* 153* 137*

## 2024-06-16 NOTE — PHARMACY-ANTICOAGULATION SERVICE
Clinical Pharmacy - Warfarin Dosing Consult     Pharmacy has been consulted to manage this patient s warfarin therapy.  Indication: Bioprosthetic Heart Valve (Warfarin x 3 months for BIO AVR per surgeon preference)  Therapy Goal: INR 2-3  Provider/Team: CVTS  Warfarin Prior to Admission: No  Significant drug interactions: Concomitant use with aspirin and SQ heparin can increase the risk of bleeding but will not impact INR  Recent documented change in oral intake/nutrition: No    INR   Date Value Ref Range Status   06/16/2024 1.14 0.85 - 1.15 Final   06/12/2024 1.48 (H) 0.85 - 1.15 Final       Recommend warfarin 5 mg today.  Pharmacy will monitor Won Urena daily and order warfarin doses to achieve specified goal.      Please contact pharmacy as soon as possible if the warfarin needs to be held for a procedure or if the warfarin goals change.      Gertrude Aguiar, PharmD, BCCP, BCPS

## 2024-06-16 NOTE — PROGRESS NOTES
CVTS BRIEF PROGRESS NOTE    Bilateral pleural chest tube pulled without immediate complication.    Occlusive dressing must remain in place for 24 hrs; reinforce prn.  Post-pull CXR ordered; will follow for result.    Bertrand Villalta CNP, [facility]-  Cardiothoracic Surgery  Pager [phone]

## 2024-06-16 NOTE — OP NOTE
DATE OF PROCEDURE: 6/12/2024    PREOPERATIVE DIAGNOSIS: Severe aortic stenosis, severe multivessel coronary artery disease    POSTOPERATIVE DIAGNOSIS: Same    ATTENDING SURGEON:Ziggy Nicole MD    FELLOW SURGEON: Den Cheung MD    SURGICAL ASSISTANT: Gallo Stauffer PA-C    PROCEDURES PERFORMED:    1) Median sternotomy  2) Left internal mammary artery harvest  3) Endoscopic harvest of left saphenous vein   4) Placement on central cardiopulmonary bypass  5) Coronary artery bypass grafting x2 (in-situ left internal mammary to left anterior descending and reverse saphenous vein graft to obtuse marginal)  6) Aortic valve replacement with 25mm Inspiris bioprosthetic valve  7) Placement of temporary atrial and ventricular pacing wires  8) Transesophageal echocardiography    INDICATION:   Won Urena is a 69-year-old woman with a history of CAD, SVT, DM2 (A1c 5.8), HLD, and HTN who presents for evaluation of severe, symptomatic aortic stenosis and severe, multivessel coronary artery disease. The remainder of the pre-operative workup was obtained without contraindication to surgery. The patient elected to proceed coronary artery bypass grafting and aortic valve replacement with a tissue valve. Informed consent was obtained.    FINDINGS:  Severely calcified trileaflet aortic valve, replaced with a 25mm Inspiris bioprosthetic valve. Intraoperative WOODY noted thickened subvalvar LVOT septum that may result in elevation of post-AVR gradient, but not significant enough to require further intervention. Preserved biventricular function before and after procedure.    DESCRIPTION OF OPERATION:  A timeout procedure was performed confirming the correct patient, surgical site, and procedure. The patient underwent uneventful general endotracheal anesthesia and invasive hemodynamic monitoring lines were placed. The neck, chest, abdomen, groins, and legs were prepped and draped in the usual sterile fashion.  Pre-procedure transesophageal  echocardiography revealed preserved biventricular function, mild mitral regurgitation, mild tricuspid regurgitation, severe aortic stenosis, and thickened subvalvar LVOT septum that would likely result in elevation of post-AVR gradient, but not significant enough to require further intervention.    A median sternotomy was performed and the left internal mammary artery was harvested in a pedicled fashion while the left greater saphenous vein was harvested endoscopically. 300 units per kilogram of sodium heparin was administered. A sternal retractor was inserted and a pericardial well was created. Epiaortic ultrasound was utilized to evaluate the aorta for calcification and guide placement of the aortic cannulation site and cross-clamp. The patient was cannulated for cardiopulmonary bypass using the high ascending aorta for infusion and venous drainage was accomplished via a two-stage cannula in the right atrial appendage. A retrograde cardioplegic catheter was placed in the coronary sinus. A left ventricular vent was placed in the right superior pulmonary vein. An antegrade cardioplegic catheter was placed in the mid ascending aorta. When a satisfactory activating clotting time was confirmed, cardiopulmonary bypass was initiated, and the patient was cooled to 32 degrees. The aortic cross-clamp was applied and cardioplegic arrest was initiated with 1 liter of cold blood cardioplegia delivered antegrade and 300 milliliters delivered retrograde. Satisfactory diastolic arrest was achieved. Ice slush was used for topical hypothermia. Cardioplegia was re-dosed throughout the case with both antegrade and retrograde cardioplegia with the addition of ostial hand-held cardioplegia cannulas.     The obtuse marginal coronary artery was then identified and incised. The vessel was approximately 1.6 millimeters in size and of good quality. A reverse saphenous vein graft was anastomosed in a running, end-to-side fashion with 7-0  Prolene suture. The graft was gently distended and found to be widely patent and hemostatic. Cold blood cardioplegia was then delivered in a retrograde fashion.     The left anterior descending coronary artery was then identified in the middle third and was approximately 1.6 millimeters in size and of good quality. The left internal mammary artery was brought into the field and spatulated. It had excellent flow. The anastomosis was performed in a running, end-to-side fashion with 7-0 Prolene suture. The temporary clamp on the internal mammary artery was released and excellent flow could be seen distally and the anastomosis appeared hemostatic.     The aorta was incised transversely approximately 1cm above the STJ. The aortic valve was examined and was a calcified, tricuspid valve with heavy annular and subvalvar calcification.?The valve was resected?and the calcium was debrided from the annulus. The LVOT was then irrigated copiously with saline. The native annulus was sized and accommodated a 25mm valve, which was more than adequate for this patient's size. Multiple?non-pledgeted?2-0?Ethibond sutures were placed around?the aortic?annulus in a horizontal mattress fashion. A 25mm Inspiris valve was selected and brought onto the field. The annular sutures were passed through the sewing cuff of the valve.?The valve was secured to the annulus using Cor-Knot.?The annulus was carefully inspected and there were?no visible?gaps?between the annulus and the sewing ring. Both?coronary ostia were open and there was?no obstruction. The aortotomy was then closed in two layers with running 4-0 prolene.    A 4 millimeter punch was created in the ascending aorta and the proximal anastomosis of the saphenous vein graft to the obtuse marginal coronary artery was performed in a running fashion with 6-0 Prolene suture while delivering continuous retrograde cardioplegia.    A terminal dose of warm cardioplegia was delivered in retrograde  fashion, de-airing maneurvers were performed, and the aortic cross-clamp was removed. The heart resumed normal sinus rhythm after defibrillation. Atrial and ventricular pacing wires were placed. Ventilation was resumed, the patient was systemically warmed and he weaned from cardiopulmonary bypass on the first attempt. Post-procedure transesophageal echocardiography revealed a well-seated aortic valve with no aortic regurgitation or paravalvular leaks, no new valvular abnormalities, and preserved biventricular function. Protamine was administered to reverse the anticoagulation and all cannulae for bypass were removed. Hemostasis was satisfactory. The fat within the superior mediastinum was closed over the aorta. Two mediastinal drains and bilateral pleural osiel drains were placed. The sternum was closed with stainless steel wires and the remainder of the closure was routine.     The needles, instruments, and sponges were counted and correct at the end of the case. Patient was transferred back to ICU in stable condition.    The assistance of Galloailyn Stauffer PA-C was required in this case due to their specialized skill set and standard of care. Gallo assisted by performing endoscopic saphenous vein harvest and first-assisted throughout the case.     Mitali Nicole MD

## 2024-06-16 NOTE — PLAN OF CARE
Shift 9516-9501  Temp: 98.4  F (36.9  C) BP: 116/65 Pulse: 72 Resp: 16 SpO2: 97 % O2 Device: None (Room air)    D: s/p elective bioprosthetic AVR and CABG x2 (LIMA-LAD, rSVG-OM) 6/12     I/A: Pt is AxOx4. Tele in place, SR w/ BBB. VSS on RA; HR 60s-70s NOC. Pt rating incisional pain 2-4 on pain scale, PRN oxy 7.5 mg and dilaudid 0.2 mg given during shift, last dose oxy given at 2030. Pt states she was more painful yesterday due to increased activity; reporting pain tolerable this morning. Left arm PIV in place saline locked. +1-2 edema present bilateral upper extremities. All pulses palpable, no change to neuro. Chest incision BETTINA w/ liquid bandage. Edges approximated. CT sites covered. Pleural Ctx2 to -20 suction. Mediastinal CT removed 6/15. Weight 170 lbs this AM.     Pleural Cts output during shift: 120cc     Up with standby assist to restroom multiple times NOC; Bed alarms on.     P: Continue to monitor and follow POC. Bed alarms on / reminding pt to use call light to ambulate to bathroom while chest tube still in place. Notify CVTS with changes         Goal Outcome Evaluation:      Plan of Care Reviewed With: patient  Overall Patient Progress: improving  Outcome Evaluation: Mediastinal chest tubes removed. VSS on RA. Ambulating with SBA. PRN Oxy 7.5mg and dilaudid 0.2mg for breakthrough pain today. Increased appetite compared to yesterday.    Navarro Barrow RN on 6/16/2024 at 6:41 AM

## 2024-06-17 ENCOUNTER — APPOINTMENT (OUTPATIENT)
Dept: OCCUPATIONAL THERAPY | Facility: CLINIC | Age: 69
DRG: 219 | End: 2024-06-17
Attending: STUDENT IN AN ORGANIZED HEALTH CARE EDUCATION/TRAINING PROGRAM
Payer: COMMERCIAL

## 2024-06-17 ENCOUNTER — DOCUMENTATION ONLY (OUTPATIENT)
Dept: ANTICOAGULATION | Facility: CLINIC | Age: 69
End: 2024-06-17

## 2024-06-17 ENCOUNTER — APPOINTMENT (OUTPATIENT)
Dept: CARDIOLOGY | Facility: CLINIC | Age: 69
DRG: 219 | End: 2024-06-17
Attending: SURGERY
Payer: COMMERCIAL

## 2024-06-17 ENCOUNTER — APPOINTMENT (OUTPATIENT)
Dept: GENERAL RADIOLOGY | Facility: CLINIC | Age: 69
DRG: 219 | End: 2024-06-17
Attending: STUDENT IN AN ORGANIZED HEALTH CARE EDUCATION/TRAINING PROGRAM
Payer: COMMERCIAL

## 2024-06-17 VITALS
HEIGHT: 67 IN | WEIGHT: 167.6 LBS | HEART RATE: 67 BPM | OXYGEN SATURATION: 97 % | SYSTOLIC BLOOD PRESSURE: 108 MMHG | BODY MASS INDEX: 26.3 KG/M2 | DIASTOLIC BLOOD PRESSURE: 58 MMHG | TEMPERATURE: 98.4 F | RESPIRATION RATE: 18 BRPM

## 2024-06-17 DIAGNOSIS — I35.0 SEVERE AORTIC STENOSIS: Primary | ICD-10-CM

## 2024-06-17 DIAGNOSIS — I25.84 CORONARY ARTERY DISEASE DUE TO CALCIFIED CORONARY LESION: ICD-10-CM

## 2024-06-17 DIAGNOSIS — I25.10 CORONARY ARTERY DISEASE DUE TO CALCIFIED CORONARY LESION: ICD-10-CM

## 2024-06-17 LAB
ANION GAP SERPL CALCULATED.3IONS-SCNC: 10 MMOL/L (ref 7–15)
BUN SERPL-MCNC: 11.1 MG/DL (ref 8–23)
CALCIUM SERPL-MCNC: 9.1 MG/DL (ref 8.8–10.2)
CHLORIDE SERPL-SCNC: 103 MMOL/L (ref 98–107)
CREAT SERPL-MCNC: 0.53 MG/DL (ref 0.51–0.95)
DEPRECATED HCO3 PLAS-SCNC: 26 MMOL/L (ref 22–29)
EGFRCR SERPLBLD CKD-EPI 2021: >90 ML/MIN/1.73M2
ERYTHROCYTE [DISTWIDTH] IN BLOOD BY AUTOMATED COUNT: 13.7 % (ref 10–15)
GLUCOSE BLDC GLUCOMTR-MCNC: 145 MG/DL (ref 70–99)
GLUCOSE BLDC GLUCOMTR-MCNC: 147 MG/DL (ref 70–99)
GLUCOSE BLDC GLUCOMTR-MCNC: 147 MG/DL (ref 70–99)
GLUCOSE SERPL-MCNC: 131 MG/DL (ref 70–99)
HCT VFR BLD AUTO: 29.9 % (ref 35–47)
HGB BLD-MCNC: 9.8 G/DL (ref 11.7–15.7)
INR PPP: 1.37 (ref 0.85–1.15)
LVEF ECHO: NORMAL
MAGNESIUM SERPL-MCNC: 1.5 MG/DL (ref 1.7–2.3)
MAGNESIUM SERPL-MCNC: 2.2 MG/DL (ref 1.7–2.3)
MCH RBC QN AUTO: 30 PG (ref 26.5–33)
MCHC RBC AUTO-ENTMCNC: 32.8 G/DL (ref 31.5–36.5)
MCV RBC AUTO: 91 FL (ref 78–100)
PATH REPORT.COMMENTS IMP SPEC: NORMAL
PATH REPORT.COMMENTS IMP SPEC: NORMAL
PATH REPORT.FINAL DX SPEC: NORMAL
PATH REPORT.GROSS SPEC: NORMAL
PATH REPORT.MICROSCOPIC SPEC OTHER STN: NORMAL
PATH REPORT.RELEVANT HX SPEC: NORMAL
PHOSPHATE SERPL-MCNC: 3.6 MG/DL (ref 2.5–4.5)
PHOTO IMAGE: NORMAL
PLATELET # BLD AUTO: 176 10E3/UL (ref 150–450)
POTASSIUM SERPL-SCNC: 3.7 MMOL/L (ref 3.4–5.3)
RBC # BLD AUTO: 3.27 10E6/UL (ref 3.8–5.2)
SODIUM SERPL-SCNC: 139 MMOL/L (ref 135–145)
WBC # BLD AUTO: 5.3 10E3/UL (ref 4–11)

## 2024-06-17 PROCEDURE — 250N000013 HC RX MED GY IP 250 OP 250 PS 637: Performed by: STUDENT IN AN ORGANIZED HEALTH CARE EDUCATION/TRAINING PROGRAM

## 2024-06-17 PROCEDURE — 97530 THERAPEUTIC ACTIVITIES: CPT | Mod: GO

## 2024-06-17 PROCEDURE — 93321 DOPPLER ECHO F-UP/LMTD STD: CPT | Mod: 26 | Performed by: INTERNAL MEDICINE

## 2024-06-17 PROCEDURE — 250N000013 HC RX MED GY IP 250 OP 250 PS 637

## 2024-06-17 PROCEDURE — 250N000011 HC RX IP 250 OP 636: Performed by: SURGERY

## 2024-06-17 PROCEDURE — 250N000011 HC RX IP 250 OP 636: Mod: JZ | Performed by: SURGERY

## 2024-06-17 PROCEDURE — 36415 COLL VENOUS BLD VENIPUNCTURE: CPT | Performed by: STUDENT IN AN ORGANIZED HEALTH CARE EDUCATION/TRAINING PROGRAM

## 2024-06-17 PROCEDURE — 71045 X-RAY EXAM CHEST 1 VIEW: CPT

## 2024-06-17 PROCEDURE — 85027 COMPLETE CBC AUTOMATED: CPT | Performed by: STUDENT IN AN ORGANIZED HEALTH CARE EDUCATION/TRAINING PROGRAM

## 2024-06-17 PROCEDURE — 250N000011 HC RX IP 250 OP 636: Mod: JZ | Performed by: STUDENT IN AN ORGANIZED HEALTH CARE EDUCATION/TRAINING PROGRAM

## 2024-06-17 PROCEDURE — 93325 DOPPLER ECHO COLOR FLOW MAPG: CPT

## 2024-06-17 PROCEDURE — 80048 BASIC METABOLIC PNL TOTAL CA: CPT | Performed by: STUDENT IN AN ORGANIZED HEALTH CARE EDUCATION/TRAINING PROGRAM

## 2024-06-17 PROCEDURE — 250N000013 HC RX MED GY IP 250 OP 250 PS 637: Performed by: SURGERY

## 2024-06-17 PROCEDURE — 84100 ASSAY OF PHOSPHORUS: CPT | Performed by: STUDENT IN AN ORGANIZED HEALTH CARE EDUCATION/TRAINING PROGRAM

## 2024-06-17 PROCEDURE — 83735 ASSAY OF MAGNESIUM: CPT | Performed by: STUDENT IN AN ORGANIZED HEALTH CARE EDUCATION/TRAINING PROGRAM

## 2024-06-17 PROCEDURE — 71045 X-RAY EXAM CHEST 1 VIEW: CPT | Mod: 26 | Performed by: RADIOLOGY

## 2024-06-17 PROCEDURE — 85610 PROTHROMBIN TIME: CPT | Performed by: SURGERY

## 2024-06-17 PROCEDURE — 93325 DOPPLER ECHO COLOR FLOW MAPG: CPT | Mod: 26 | Performed by: INTERNAL MEDICINE

## 2024-06-17 PROCEDURE — 93308 TTE F-UP OR LMTD: CPT | Mod: 26 | Performed by: INTERNAL MEDICINE

## 2024-06-17 RX ORDER — FUROSEMIDE 40 MG
40 TABLET ORAL
Status: DISCONTINUED | OUTPATIENT
Start: 2024-06-17 | End: 2024-06-17 | Stop reason: HOSPADM

## 2024-06-17 RX ORDER — ACETAMINOPHEN 325 MG/1
975 TABLET ORAL EVERY 6 HOURS PRN
Qty: 100 TABLET | Refills: 0 | Status: SHIPPED | OUTPATIENT
Start: 2024-06-17

## 2024-06-17 RX ORDER — WARFARIN SODIUM 2 MG/1
4 TABLET ORAL
Status: DISCONTINUED | OUTPATIENT
Start: 2024-06-17 | End: 2024-06-17 | Stop reason: HOSPADM

## 2024-06-17 RX ORDER — FUROSEMIDE 40 MG
40 TABLET ORAL 2 TIMES DAILY
Qty: 14 TABLET | Refills: 0 | Status: SHIPPED | OUTPATIENT
Start: 2024-06-17 | End: 2024-06-20

## 2024-06-17 RX ORDER — METOPROLOL TARTRATE 25 MG/1
25 TABLET, FILM COATED ORAL 2 TIMES DAILY
Qty: 90 TABLET | Refills: 0 | Status: SHIPPED | OUTPATIENT
Start: 2024-06-17 | End: 2024-06-20

## 2024-06-17 RX ORDER — METHOCARBAMOL 750 MG/1
750 TABLET, FILM COATED ORAL EVERY 6 HOURS
Qty: 28 TABLET | Refills: 0 | Status: SHIPPED | OUTPATIENT
Start: 2024-06-17 | End: 2024-06-20

## 2024-06-17 RX ORDER — AMOXICILLIN 250 MG
2 CAPSULE ORAL 2 TIMES DAILY PRN
Qty: 14 TABLET | Refills: 0 | Status: SHIPPED | OUTPATIENT
Start: 2024-06-17 | End: 2024-06-28

## 2024-06-17 RX ORDER — WARFARIN SODIUM 3 MG/1
3 TABLET ORAL DAILY
Qty: 10 TABLET | Refills: 0 | Status: ACTIVE | OUTPATIENT
Start: 2024-06-17 | End: 2024-10-01

## 2024-06-17 RX ORDER — MAGNESIUM SULFATE HEPTAHYDRATE 40 MG/ML
4 INJECTION, SOLUTION INTRAVENOUS ONCE
Status: COMPLETED | OUTPATIENT
Start: 2024-06-17 | End: 2024-06-17

## 2024-06-17 RX ORDER — DAPAGLIFLOZIN 10 MG/1
10 TABLET, FILM COATED ORAL EVERY MORNING
Status: DISCONTINUED | OUTPATIENT
Start: 2024-06-17 | End: 2024-06-17 | Stop reason: HOSPADM

## 2024-06-17 RX ORDER — POTASSIUM CHLORIDE 750 MG/1
20 TABLET, EXTENDED RELEASE ORAL ONCE
Status: COMPLETED | OUTPATIENT
Start: 2024-06-17 | End: 2024-06-17

## 2024-06-17 RX ORDER — OXYCODONE HYDROCHLORIDE 5 MG/1
5 TABLET ORAL EVERY 6 HOURS PRN
Qty: 24 TABLET | Refills: 0 | Status: SHIPPED | OUTPATIENT
Start: 2024-06-17 | End: 2024-06-20

## 2024-06-17 RX ADMIN — OXYCODONE HYDROCHLORIDE 7.5 MG: 5 TABLET ORAL at 04:44

## 2024-06-17 RX ADMIN — POTASSIUM CHLORIDE 20 MEQ: 750 TABLET, EXTENDED RELEASE ORAL at 07:55

## 2024-06-17 RX ADMIN — METOPROLOL TARTRATE 25 MG: 25 TABLET, FILM COATED ORAL at 07:55

## 2024-06-17 RX ADMIN — ACETAMINOPHEN 975 MG: 325 TABLET, FILM COATED ORAL at 12:36

## 2024-06-17 RX ADMIN — METFORMIN HYDROCHLORIDE 1000 MG: 500 TABLET ORAL at 07:54

## 2024-06-17 RX ADMIN — FUROSEMIDE 40 MG: 10 INJECTION, SOLUTION INTRAVENOUS at 07:58

## 2024-06-17 RX ADMIN — METHOCARBAMOL 750 MG: 750 TABLET ORAL at 11:36

## 2024-06-17 RX ADMIN — HEPARIN SODIUM 5000 UNITS: 5000 INJECTION, SOLUTION INTRAVENOUS; SUBCUTANEOUS at 11:36

## 2024-06-17 RX ADMIN — METHOCARBAMOL 750 MG: 750 TABLET ORAL at 05:14

## 2024-06-17 RX ADMIN — HEPARIN SODIUM 5000 UNITS: 5000 INJECTION, SOLUTION INTRAVENOUS; SUBCUTANEOUS at 04:44

## 2024-06-17 RX ADMIN — ACETAMINOPHEN 975 MG: 325 TABLET, FILM COATED ORAL at 07:54

## 2024-06-17 RX ADMIN — PANTOPRAZOLE SODIUM 40 MG: 40 TABLET, DELAYED RELEASE ORAL at 07:54

## 2024-06-17 RX ADMIN — POLYETHYLENE GLYCOL 3350 17 G: 17 POWDER, FOR SOLUTION ORAL at 07:57

## 2024-06-17 RX ADMIN — GABAPENTIN 200 MG: 100 CAPSULE ORAL at 14:33

## 2024-06-17 RX ADMIN — ASPIRIN 81 MG CHEWABLE TABLET 81 MG: 81 TABLET CHEWABLE at 07:58

## 2024-06-17 RX ADMIN — GABAPENTIN 200 MG: 100 CAPSULE ORAL at 07:55

## 2024-06-17 RX ADMIN — DAPAGLIFLOZIN 10 MG: 10 TABLET, FILM COATED ORAL at 13:54

## 2024-06-17 RX ADMIN — METHOCARBAMOL 750 MG: 750 TABLET ORAL at 00:12

## 2024-06-17 RX ADMIN — DOCUSATE SODIUM 50 MG AND SENNOSIDES 8.6 MG 2 TABLET: 8.6; 5 TABLET, FILM COATED ORAL at 07:56

## 2024-06-17 RX ADMIN — MAGNESIUM SULFATE IN WATER 4 G: 40 INJECTION, SOLUTION INTRAVENOUS at 09:20

## 2024-06-17 ASSESSMENT — ACTIVITIES OF DAILY LIVING (ADL)
ADLS_ACUITY_SCORE: 31
ADLS_ACUITY_SCORE: 30
ADLS_ACUITY_SCORE: 30
ADLS_ACUITY_SCORE: 31
ADLS_ACUITY_SCORE: 30
ADLS_ACUITY_SCORE: 31
DEPENDENT_IADLS:: INDEPENDENT
ADLS_ACUITY_SCORE: 30
ADLS_ACUITY_SCORE: 31

## 2024-06-17 NOTE — CONSULTS
Care Management Initial Consult    General Information  Assessment completed with: Patient,    Type of CM/SW Visit: Initial Assessment    Primary Care Provider verified and updated as needed: Yes   Readmission within the last 30 days: no previous admission in last 30 days      Reason for Consult: discharge planning  Advance Care Planning: Advance Care Planning Reviewed: no concerns identified          Communication Assessment  Patient's communication style: spoken language (English or Bilingual)    Hearing Difficulty or Deaf: no   Wear Glasses or Blind: yes    Cognitive  Cognitive/Neuro/Behavioral: WDL                      Living Environment:   People in home: spouse     Current living Arrangements: apartment      Able to return to prior arrangements: yes       Family/Social Support:  Care provided by: self  Provides care for:    Marital Status:              Description of Support System:           Current Resources:   Patient receiving home care services: No     Community Resources: None  Equipment currently used at home: none  Supplies currently used at home: None    Employment/Financial:  Employment Status:          Financial Concerns:             Does the patient's insurance plan have a 3 day qualifying hospital stay waiver?  Yes     Which insurance plan 3 day waiver is available? Alternative insurance waiver    Will the waiver be used for post-acute placement? No    Lifestyle & Psychosocial Needs:  Social Determinants of Health     Food Insecurity: Low Risk  (3/8/2024)    Food Insecurity     Within the past 12 months, did you worry that your food would run out before you got money to buy more?: No     Within the past 12 months, did the food you bought just not last and you didn t have money to get more?: No   Depression: Not at risk (5/14/2024)    PHQ-2     PHQ-2 Score: 0   Housing Stability: High Risk (3/8/2024)    Housing Stability     Do you have housing? : Yes     Are you worried about losing your  housing?: Yes   Tobacco Use: Low Risk  (5/22/2024)    Patient History     Smoking Tobacco Use: Never     Smokeless Tobacco Use: Never     Passive Exposure: Never   Financial Resource Strain: Low Risk  (3/8/2024)    Financial Resource Strain     Within the past 12 months, have you or your family members you live with been unable to get utilities (heat, electricity) when it was really needed?: No   Alcohol Use: Not on file   Transportation Needs: Low Risk  (3/8/2024)    Transportation Needs     Within the past 12 months, has lack of transportation kept you from medical appointments, getting your medicines, non-medical meetings or appointments, work, or from getting things that you need?: No   Physical Activity: Insufficiently Active (3/8/2024)    Exercise Vital Sign     Days of Exercise per Week: 4 days     Minutes of Exercise per Session: 30 min   Interpersonal Safety: Low Risk  (3/15/2024)    Interpersonal Safety     Do you feel physically and emotionally safe where you currently live?: Yes     Within the past 12 months, have you been hit, slapped, kicked or otherwise physically hurt by someone?: No     Within the past 12 months, have you been humiliated or emotionally abused in other ways by your partner or ex-partner?: No   Stress: Stress Concern Present (3/8/2024)    [employer] [place] [employer] - Occupational Stress Questionnaire     Feeling of Stress : To some extent   Social Connections: Unknown (3/8/2024)    Social Connection and Isolation Panel [NHANES]     Frequency of Communication with Friends and Family: Not on file     Frequency of Social Gatherings with Friends and Family: Twice a week     Attends [employer] Services: Not on file     Active Member of Clubs or Organizations: Not on file     Attends Club or Organization Meetings: Not on file     Marital Status: Not on file   Health Literacy: Not on file       Functional Status:  Prior to admission patient needed assistance:   Dependent ADLs::  Independent  Dependent IADLs:: Independent       Mental Health Status:          Chemical Dependency Status:                Values/Beliefs:  Spiritual, Cultural Beliefs, Sabianist Practices, Values that affect care:                 Additional Information:  RNCC met with patient bedside for discharge planning, case management assessment. Patient comes from home with spouse, an apartment in [place], patient is independent baseline,  uses home equipment, patient responsible for own rides. Patient met with for need outpatient anticoagulation/INR clinic set up. Patient is agreeable to [facility], which writer has called, placed referral and set up INR lab appointment for Wednesday at 10:30 a.m., patient aware appointment will be on AVS.       RNCC went over IMM discharge with patient, left copy bedside, faxed signed copy to [facility] ([phone]).      JORDI LackeyN, BA, RN, CMSRN, RNCC  [facility]  Covering Units 6C Beds 8069-8311/OBS  Phone: [phone]  Available on [employer] search 6C 6502-14 RNCC or OBS RNCC  After Hours [phone]     6C Beds 6792-4518  Ph: [phone]     6B/CRNCC Weekend/holiday on Voc[employer] or [phone]     [facility] RNCC ED/5 Ortho/5 Med/Surg [phone]     [facility] RNCC 6 Med/Surg 8A, [phone]     Observation SW and weekend/after hours phone: [phone]

## 2024-06-17 NOTE — PLAN OF CARE
Neuro: A&O x 4. Afebrile. Pleasant affect. Calls appropriately. Slept well overnight.   Cardiac: SR w/ BBB 60s to 80s. MAPs 78-91.  Respiratory: Sating well on RA. LS clear. CT removed 6/16 @ 1300 - dressing in place.  GI/: Up to bathroom. LBM 6/15/24 per patient.  Endocrine: ACHS but insulin discontinued and metformin resumed 6/16/24.  Diet/Appetite: Cardiac diet, good appetite.   Skin: Sternal incision BETTINA w/ mild erythema. Groin/knee sites BETTINA and WNL.  LDA: L PIV, SL.  Activity: Ad mela  Pain: 4-5/10 - PRN oxy 7.5mg x 2  Labs: K, Ph, Mg replacement protocols.     Plan: Continue to monitor and alert team with any changes or concerns.

## 2024-06-17 NOTE — PROGRESS NOTES
"CLINICAL NUTRITION SERVICES    Reason for Assessment:  Heart-healthy nutrition education, received consult.    Diet History:  Pt reports no history of formally receiving heart-healthy nutrition education in the past. She's very motivated to make changes. Her  is at risk for bowel obstructions so she helps him make pureed foods.  Pt saw RD for weight management on 3/18 per chart review.    Nutrition Diagnosis:  Food- and nutrition-related knowledge deficit r/t no previous knowledge of heart-healthy diet AEB pt report of no previous formal heart-healthy nutrition education.    Nutrition Prescription/Recs:  Continue heart-healthy diet.      Interventions:  Nutrition Education  1. Provided verbal instruction on a heart-healthy diet.  2. Provided handouts: Heart Healthy Nutrition Therapy    Goals:    1. Pt will verbalize at least four foods high in saturated fat and five foods high in sodium.    2. Pt will list at least two interventions to make current meal plan more heart-healthy.     Follow-up:   Patient to ask any further nutrition-related questions before discharge. In addition, pt may request outpatient RD appointment.    Juan Umana RD, LD  Available on [place]  Weekend/Holiday [employer] - \"Weekend Clinical Dietitian\"  No longer available by paging   "

## 2024-06-17 NOTE — DISCHARGE SUMMARY
[facility], [place]   Cardiothoracic Surgery Hospital Discharge Summary     Won Urena MRN# [id number]   Age: 69 year old YOB: 1955     Admitting Physician:  Mitali Nicole MD  Discharge Physician:  Shona Loyola PA-C  Primary Care Physician:        Nannette Gallagher     DATE OF ADMISSION: 6/12/2024      DATE OF DISCHARGE: June 17, 2024     Admit Wt: 164 lbs  Discharge Wt: 167 lbs          Primary Diagnoses:   CAD - s/p CABG x2 (LIMA-LAD, SVG-OM)  Severe aortic stenosis - s/p bioprosthetic AVR (25 mm Inspiris Resilia Aortic Valve)          Secondary Diagnoses:   Acute postoperative pain, improving  Acute blood loss anemia, stable  Acute blood loss thrombocytopenia, resolved  HTN  HLD  Right bundle branch block  T2DM    PROCEDURES PERFORMED:   Date: 6/12/2024.  Surgeon: Dr. Mitali Nicole  1) Median sternotomy  2) Left internal mammary artery harvest  3) Endoscopic harvest of left saphenous vein   4) Placement on central cardiopulmonary bypass  5) Coronary artery bypass grafting x2 (in-situ left internal mammary to left anterior descending and reverse saphenous vein graft to obtuse marginal)  6) Aortic valve replacement with 25mm Inspiris bioprosthetic valve  7) Placement of temporary atrial and ventricular pacing wires  8) Transesophageal echocardiography    INTRAOPERATIVE FINDINGS:    Severely calcified trileaflet aortic valve, replaced with a 25mm Inspiris bioprosthetic valve. Intraoperative WOODY noted thickened subvalvar LVOT septum that may result in elevation of post-AVR gradient, but not significant enough to require further intervention. Preserved biventricular function before and after procedure.    PATHOLOGY RESULTS:    Aortic valve leaflet pathology pending at time of discharge    CULTURE RESULTS:    none    CONSULTS:    PT/OT  Intensivist  Social work    BRIEF HISTORY OF ILLNESS:  Won Urena is a 69 year old female with a history of CAD, HTN, HLD, severe  aortic stenosis, and DM2.     HOSPITAL COURSE: Won Urena is a 69 year old female who on 6/12/2024 underwent the above-named procedures and tolerated the operation well.     Immediately postoperatively the patient was admitted to the CVICU. Patient was extubated on POD #0. Blood pressure and cardiac index were managed with vasopressors and inotropic agents which were continuously weaned until no longer needed. Patient was subsequently transferred to the surgical telemetry floor on POD 2.    The patient continued to progress well. Chest tubes and temporary pacemaker wires were removed as appropriate.     The patient was fluid overloaded and treated with diuretics. she remains up about 3 lbs from preoperative weight and will discharge with 7 days of diuretic therapy; will re-evaluate need in clinic follow-up.     Patient was started on warfarin for a 3 month course until her new bioprosthetic valve endothelializes. INR goal is 2-3. Follow up has been arranged for 6/19 and referred to outpatient anticoagulation clinic.     Patient was transiently hyperglycemic and treated with insulin infusion then transitioned to sliding scale insulin per protocol. Blood sugars remained stable. She was transitioned back to her home regimen of metformin and Farxiga. Patient has been on Ozempic. Discussed need for calories and protein for healing after surgery, patient agreeable to halving her dose, will discuss with her endocrinologist as well.     Prior to discharge, her pain was controlled well, she was working well with therapies, able to perform most ADLs, ambulate without difficulty, and had full return of bowel and bladder function. On June 17, 2024, she was discharged to home in stable condition. Follow up with cardiology and cardiac surgery will be arranged with CVTS care coordinators. Pt encouraged to follow up with PCP and cardiac rehab upon discharge.    Patient discharged on aspirin:  Yes 81 mg  Patient discharged on  "beta blocker: yes    Patient discharged on ACE Inhibitor/ARB: no     Patient discharged on statin: yes   Patient discharged on anticoagulation: yes warfarin x3 months         Discharge Disposition:     Discharged to home            Condition on Discharge:     Discharge condition: Good   Discharge vitals: Blood pressure 108/58, pulse 67, temperature 98.4  F (36.9  C), temperature source Oral, resp. rate 18, height 1.702 m (5' 7\"), weight 76 kg (167 lb 9.6 oz), SpO2 97%, not currently breastfeeding.   Code status on discharge: Full Code     Vitals:    06/15/24 0329 06/16/24 0354 06/17/24 0444   Weight: 80.1 kg (176 lb 8 oz) 77.1 kg (170 lb) 76 kg (167 lb 9.6 oz)       DAY OF DISCHARGE PHYSICAL EXAM:    Gen: A&Ox4, NAD  Neuro: no focal deficits   CV: RRR, normal S1S2, no murmurs, rubs or gallops  Pulm: CTA bilaterally, no wheezing or rhonchi, unlabored work of breathing on RA  Abd: soft, non-tender, non-distended, BS present  Ext: trace peripheral edema, no pitting  Incision: sternal incision c/d/i and well-approximated, no erythema, sternum stable. left lower extremity GSV harvest site incision c/d/i and well-approximated  Tubes/drain sites: dressing clean and dry without shadowing    LABS  Most Recent 3 CBC's:  Recent Labs   Lab Test 06/16/24  0613 06/15/24  0529 06/14/24  0954   WBC 4.9 5.2 8.5   HGB 8.6* 8.4* 9.8*   MCV 91 89 92   PLT 97* 74* 98*      Most Recent 3 BMP's:  Recent Labs   Lab Test 06/17/24  1126 06/17/24  0745 06/17/24  0602 06/16/24  0840 06/16/24  0613 06/15/24  0803 06/15/24  0529   NA  --   --  139  --  140  --  134*   POTASSIUM  --   --  3.7  --  3.6  --  4.3   CHLORIDE  --   --  103  --  102  --  100   CO2  --   --  26  --  28  --  27   BUN  --   --  11.1  --  13.3  --  13.9   CR  --   --  0.53  --  0.67  --  0.61   ANIONGAP  --   --  10  --  10  --  7   JOAQUINA  --   --  9.1  --  8.7*  --  8.5*   * 145* 131*   < > 144*   < > 139*    < > = values in this interval not displayed.     Most " Recent 2 LFT's:  Recent Labs   Lab Test 06/12/24  2145 06/12/24  1510   AST 49* 41   ALT 15 12   ALKPHOS 32* 32*   BILITOTAL 0.6 0.9     Most Recent INR's and Anticoagulation Dosing History:  Anticoagulation Dose History          Latest Ref Rng & Units 6/9/2023 8/20/2023 5/2/2024 6/12/2024 6/16/2024 6/17/2024   Recent Dosing and Labs   warfarin ANTICOAGULANT (COUMADIN) 5 MG tablet - - - - - 5 mg, $Given -   INR 0.85 - 1.15 1.14  1.12  1.20  1.48  1.79  1.74  1.14  1.37      Most Recent Cholesterol Panel:  Recent Labs   Lab Test 05/16/24  1132   CHOL 137   LDL 71   HDL 53   TRIG 67     Most Recent 6 Bacteria Isolates From Any Culture (See EPIC Reports for Culture Details):No lab results found.  Most Recent TSH, T4 and A1c Labs:  Recent Labs   Lab Test 03/23/24  1317 03/15/24  0846   TSH 0.43  --    A1C  --  5.8*      Recent Labs   Lab 06/17/24  1126 06/17/24  0745 06/17/24  0602 06/17/24  0027 06/16/24  1228 06/16/24  0840   * 145* 131* 147* 128* 137*       Imaging:  Results for orders placed or performed during the hospital encounter of 06/12/24   XR Chest Port 1 View    Narrative    EXAM: XR CHEST PORT 1 VIEW 6/12/2024 3:30 PM    INDICATION: Post Op CVTS Surgery    COMPARISON: Chest radiograph 3/23/2024, CT 4/25/2024    TECHNIQUE: Single portable supine AP view of the chest.    FINDINGS:   Post surgical changes of coronary bypass grafting and aortic valve  replacement with intact midline sternotomy wires and aortic valve  hardware. The endotracheal tube tip is 3 cm above the fazal. Right IJ  central venous catheter with tip projecting over the proximal right  clavicle. Bilateral apical chest tubes and 2 midline mediastinal  drains placed. Enteric tube tip at the level of the GE junction. Sharp  left costophrenic angle, slight blunting of the right costophrenic  angle. Aortic knob calcifications. Streaky perihilar and basilar  opacities.      Impression    IMPRESSION:   1. Postsurgical changes midline  sternotomy for aortic valve  replacement and CABG with perihilar and bibasilar streaky opacities  seen with atelectasis/pulmonary edema. Possible trace right pleural  effusion versus atelectasis.  2. Enteric tube tip at the GE junction, recommend advancement into the  stomach.  3. Endotracheal tube tip 3 cm above the fazal.    I have personally reviewed the examination and initial interpretation  and I agree with the findings.    NAV IBARRA MD         SYSTEM ID:  [id number]   XR Chest Port 1 View    Narrative    XR CHEST PORT 1 VIEW  6/13/2024 2:01 AM     HISTORY:  Post Op CVTS Surgery       COMPARISON:  6/12/2024    TECHNIQUE: Portable, semiupright, frontal projection radiograph of the  chest.    FINDINGS:   Stable position of right IJ introducer, right chest tube, left apical  chest tube, mediastinal drains. Interval extubation and removal of  gastric tube. Aortic valve prosthesis. Trachea is midline. Stable  cardiomediastinal silhouette. Slightly increased retrocardiac opacity  silhouetting the medial left hemidiaphragm. Increased interstitial  opacities bilaterally. Shifting platelike atelectasis. No appreciable  pneumothorax.        Impression    IMPRESSION:  Interval extubation and removal of gastric tube. Otherwise, stable  support devices. Increased retrocardiac opacity, likely atelectasis  versus pneumonia. Increased interstitial edema and shifting  atelectasis bilaterally.    I have personally reviewed the examination and initial interpretation  and I agree with the findings.    KEY JUDD DO         SYSTEM ID:  [id number]   XR Abdomen Port 1 View    Narrative    EXAMINATION:  XR ABDOMEN PORT 1 VIEW 6/12/2024     COMPARISON: Same-day chest radiograph    HISTORY: s/p OGT advancement.    TECHNIQUE: One frontal supine view of the abdomen.    FINDINGS: Gastric tube tip projects over the stomach with sidehole in  the area of the gastroesophageal junction. No abnormally dilated  air-filled loops of  bowel in the partially visualized upper abdomen.  Status post median sternotomy with stable support devices including  multiple chest drains and the partially visualized endotracheal tube.      Impression    IMPRESSION:   Gastric tube tip within the stomach and sidehole in the area of the  gastroesophageal junction. Recommend advancement.    I have personally reviewed the examination and initial interpretation  and I agree with the findings.    KEY JUDD DO         SYSTEM ID:  [id number]   XR Chest Port 1 View    Narrative    XR CHEST PORT 1 VIEW  6/14/2024 5:40 AM     HISTORY:  Postop pneumonia, chest tubes       COMPARISON:  6/13/2024    TECHNIQUE: Portable, semiupright, frontal projection radiograph of the  chest.    FINDINGS:   Stable position of left apical chest tube, right basilar chest tube,  mediastinal drains.    Trachea is midline. Stable cardiac mediastinal silhouette. Increased  retrocardiac opacity silhouetting the left hemidiaphragm. Increased  patchy opacities in the left lung base. Decreased interstitial  opacities on the right. Continued platelike opacity in the right  midlung field. No pneumothorax.        Impression    IMPRESSION:  Stable support devices. Decreased interstitial opacities on the right,  increased retrocardiac and patchy opacities on the left, likely  atelectasis but could also represent pneumonia.    I have personally reviewed the examination and initial interpretation  and I agree with the findings.    MINOR JIMÉNEZ MD         SYSTEM ID:  [id number]   XR Chest Port 1 View    Narrative    Exam: XR CHEST PORT 1 VIEW, 6/15/2024 10:23 AM    Indication: Postop pneumonia, chest tubes    Comparison: Chest 6/14/2024.    Findings:   Upright chest AP portable. Stable position of left apical chest tube,  right basilar chest tube, mediastinal drains. Prosthetic heart valve.  Intact midline sternotomy wires.    Trachea is midline. Stable cardiac mediastinal silhouette.  Atherosclerosis of  the aortic arch. Stable retrocardiac opacity  silhouetting the left hemidiaphragm. Improved patchy opacities in the  left lung base. Improved interstitial opacities on the right.  Persistent platelike opacity in the right midlung field. No  pneumothorax. Possible trace left pleural effusion. Upper abdomen  unremarkable.      Impression    Impression:   1.  Stable postsurgical changes and support devices.  2.  Retrocardiac and patchy opacities at the left base similar to  improved.  3.  Platelike atelectasis in the right mid lung.  4.  Possible trace left pleural effusion.    ZULY MEADOWS MD         SYSTEM ID:  [id number]   XR Chest Port 1 View    Narrative    Exam: XR CHEST PORT 1 VIEW, 6/15/2024 1:47 PM    Indication: s/p mediastinal chest tube removal    Comparison: Same date radiograph    Findings:   Single portable AP view of the chest. Interval removal of mediastinal  drains. Additional chest tubes are in place. Unchanged postoperative  changes. No substantial interval change in basilar opacities and  linear atelectasis of the right mid lung. Perhaps trace right  pneumothorax along the right apical lung with chest tube in place.      Impression    Impression:   1. Interval removal of mediastinal drains, chest tubes in place with  perhaps trace right apical pneumothorax.  2. Otherwise stable appearance of the chest with unchanged pulmonary  opacities and surgical changes.    I have personally reviewed the examination and initial interpretation  and I agree with the findings.    MARTINE SY MD         SYSTEM ID:  [id number]   XR Chest Port 1 View    Narrative    Exam: XR CHEST PORT 1 VIEW, 6/16/2024 10:35 AM    Indication: Postop pneumonia, chest tubes    Comparison: Chest 6/15/2024.    Findings:   Chest AP portable. Bilateral chest tubes are in place. Unchanged  postoperative changes with intact sternotomy wires and prosthetic  heart valve noted. No substantial interval change in basilar opacities  and  linear atelectasis of the right mid lung. No definite pneumothorax  identified. Trace left pleural effusion. Clips overlying the GE  junction. Osseous structures are stable. No acute changes in the upper  abdomen.      Impression    Impression: Stable post surgical changes with bilateral chest tubes.  No definite pneumothorax identified. Unchanged pulmonary opacities.  Suspect trace left pleural effusion.    ZULY MEADOWS MD         SYSTEM ID:  [id number]   XR Chest Port 1 View    Narrative    XR CHEST PORT 1 VIEW  6/16/2024 3:30 PM     HISTORY:  s/p pleural chest tube removal       COMPARISON:  6/16/2024 chest radiograph    TECHNIQUE: Portable, semiupright, frontal projection radiograph of the  chest.    FINDINGS:   Interval removal of bilateral chest tubes. Stable median sternotomy  wires. Prosthetic aortic valve in stable position. Surgical clips  project over the gastroesophageal junction.    Cardiomediastinal silhouette is within normal limits. Trachea is  midline. Trace right pneumothorax. Blunting of the left costophrenic  angle is stable. Stable linear atelectasis of the right mid lung with  basilar opacities. No focal pulmonary consolidations. No acute osseous  abnormalities. Atherosclerotic calcifications of the aortic arch.  Visualized upper abdomen appears normal.      Impression    IMPRESSION:  1. Stable postsurgical changes with interval removal of bilateral  chest tubes. Trace right pneumothorax.  2. Unchanged pulmonary opacities are favored to represent atelectasis  and/or edema.  3. Unchanged trace left pleural effusion.    I have personally reviewed the examination and initial interpretation  and I agree with the findings.    MARTINE SY MD         SYSTEM ID:  [id number]   XR Chest Port 1 View    Narrative    Exam: XR CHEST PORT 1 VIEW , 6/17/2024 6:22 AM    Comparison: 6/16/2024    History: Postop pneumonia, chest tubes    Findings:  Portable AP view of the chest. Median sternotomy wires.  Stable cardiac  silhouette. Decreased/resolved trace right pneumothorax. Bibasilar  streaky opacities.       Impression    Impression: Decreased/resolved trace right pneumothorax status post  removal of chest tubes. Similar bibasilar atelectasis.     I have personally reviewed the examination and initial interpretation  and I agree with the findings.    ZULY MEADOWS MD         SYSTEM ID:  [id number]   Echocardiogram Limited     Value    LVEF  > 65%    Narrative    [id number]  [id number]  [id number]  [id number]^CHARI^LUCAS^HUSSEIN     [facility],[place]  Echocardiography Laboratory  [address] [place] [id number]     Name: NOAH RIVERO  MRN: [id number]  : 1955  Study Date: 2024 08:43 AM  Age: 69 yrs  Gender: Female  Patient Location: [facility] [place]  Reason For Study: Aortic Valve Replacement  Ordering Physician: LUCAS MARCELINO  Performed By: Mich Su RDCS     BSA: 1.9 m2  Height: 67 in  Weight: 170 lb  HR: 70  BP: 137/74 mmHg  ______________________________________________________________________________  Procedure  Limited Portable Echo Adult.  ______________________________________________________________________________  Interpretation Summary  Left ventricular size, wall motion and function are normal. The ejection  fraction is > 65%.  Right ventricular function, chamber size, wall motion, and thickness are  normal.  Coronary Bypass Graft x 2, Aortic Valve Replacement with Inspiris Resilia  Aortic Valve size 25mm. The mean gradient across the valve is 9mmHg  IVC diameter and respiratory changes fall into an intermediate range  suggesting an RA pressure of 8 mmHg. No pericardial effusion is present.     Compared to prior study patient is s/p AV replacement.  ______________________________________________________________________________  Left Ventricle  Left ventricular size, wall motion and function are normal. The ejection  fraction is > 65%. Left ventricular  diastolic function is indeterminate. No  regional wall motion abnormalities are seen.     Right Ventricle  Right ventricular function, chamber size, wall motion, and thickness are  normal.     Atria  Both atria appear normal.     Mitral Valve  Mild mitral annular calcification is present.     Aortic Valve  Coronary Bypass Graft x 2, Aortic Valve Replacement with Inspiris Resilia  Aortic Valve size 25mm. The mean gradient across the valve is 9mmHg.     Tricuspid Valve  The tricuspid valve is normal. Trace tricuspid insufficiency is present. The  peak velocity of the tricuspid regurgitant jet is not obtainable.     Pulmonic Valve  The pulmonic valve is normal. Trace pulmonic insufficiency is present.     Vessels  The aorta root is normal. IVC diameter and respiratory changes fall into an  intermediate range suggesting an RA pressure of 8 mmHg.     Pericardium  No pericardial effusion is present.     Compared to Previous Study  Compared to prior study patient is s/p AV replacement.  ______________________________________________________________________________  MMode/2D Measurements & Calculations     IVSd: 1.2 cm  LVIDd: 4.2 cm  LVIDs: 2.5 cm  LVPWd: 1.4 cm  FS: 40.7 %  LV mass(C)d: 199.8 grams  LV mass(C)dI: 105.9 grams/m2  RWT: 0.66     Doppler Measurements & Calculations  MV max P.8 mmHg  MV mean PG: 3.7 mmHg  MV V2 VTI: 39.3 cm  Ao V2 max: 221.6 cm/sec  Ao max P.6 mmHg  Ao V2 mean: 135.0 cm/sec  Ao mean P.8 mmHg  Ao V2 VTI: 35.1 cm     ______________________________________________________________________________  Report approved by: Rosa Isela SUAREZ 2024 09:41 AM              PRE-ADMISSION MEDICATIONS:  Medications Prior to Admission   Medication Sig Dispense Refill Last Dose    aspirin 81 MG EC tablet Take 81 mg by mouth every evening   2024 at 2100    atorvastatin (LIPITOR) 40 MG tablet Take 40 mg by mouth every evening   2024 at 2100    carboxymethylcellulose PF (REFRESH PLUS) 0.5  % ophthalmic solution Place 1 drop into both eyes 4 times daily 70 each 0 6/11/2024 at 2100    cyanocobalamin (VITAMIN B-12) 100 MCG tablet Take 100 mcg by mouth every evening   Past Month    dapagliflozin (FARXIGA) 10 MG TABS tablet TAKE 1 TABLET BY MOUTH IN THE  MORNING 100 tablet 1 Past Week at 2100    estradiol (ESTRACE) 0.1 MG/GM vaginal cream INSERT 1/2 APPLICATORFUL  VAGINALLY TWICE WEEKLY 85 g 3 6/10/2024    fenofibrate (TRIGLIDE/LOFIBRA) 160 MG tablet Take 1 tablet (160 mg) by mouth every evening 100 tablet 3 6/11/2024 at 2100    gabapentin (NEURONTIN) 100 MG capsule Take 2 capsules (200 mg) by mouth at bedtime 200 capsule 3 6/11/2024 at 2100    levocetirizine (XYZAL) 5 MG tablet Take 5 mg by mouth every evening   6/11/2024 at 2100    metFORMIN (GLUCOPHAGE) 500 MG tablet TAKE 2 TABLETS BY MOUTH  TWICE DAILY WITH MEALS 360 tablet 3 6/11/2024 at 2100    montelukast (SINGULAIR) 10 MG tablet Take 1 tablet (10 mg) by mouth at bedtime 100 tablet 2 6/11/2024 at 2100    multivitamin w/minerals (MULTI-VITAMIN) tablet Take 1 tablet by mouth every evening   Past Month at 2100    Semaglutide, 1 MG/DOSE, (OZEMPIC, 1 MG/DOSE,) 4 MG/3ML pen INJECT SUBCUTANEOUSLY 1 MG  EVERY WEEK 9 mL 2 6/9/2024    Vitamin D, Cholecalciferol, 10 MCG (400 UNIT) TABS Take 1 tablet by mouth every evening   Past Month    [DISCONTINUED] diltiazem (CARDIZEM) 60 MG tablet Take 1 tablet (60 mg) by mouth 2 times daily 200 tablet 2 6/12/2024 at 0400    [DISCONTINUED] losartan (COZAAR) 50 MG tablet Take 1 tablet (50 mg) by mouth every morning 100 tablet 3 Past Week       DISCHARGE MEDICATIONS:   Current Discharge Medication List        START taking these medications    Details   acetaminophen (TYLENOL) 325 MG tablet Take 3 tablets (975 mg) by mouth every 6 hours as needed for pain (mild to moderate pain)  Qty: 100 tablet, Refills: 0    Associated Diagnoses: Severe aortic stenosis; Coronary artery disease involving native coronary artery of native  heart without angina pectoris      furosemide (LASIX) 40 MG tablet Take 1 tablet (40 mg) by mouth 2 times daily for 7 days  Qty: 14 tablet, Refills: 0    Associated Diagnoses: Severe aortic stenosis; Coronary artery disease involving native coronary artery of native heart without angina pectoris      methocarbamol (ROBAXIN) 750 MG tablet 1 tablet (750 mg) by Oral or Feeding Tube route every 6 hours  Qty: 28 tablet, Refills: 0    Associated Diagnoses: Severe aortic stenosis; Coronary artery disease involving native coronary artery of native heart without angina pectoris      metoprolol tartrate (LOPRESSOR) 25 MG tablet Take 1 tablet (25 mg) by mouth 2 times daily  Qty: 90 tablet, Refills: 0    Associated Diagnoses: Severe aortic stenosis; Coronary artery disease involving native coronary artery of native heart without angina pectoris      oxyCODONE (ROXICODONE) 5 MG tablet Take 1 tablet (5 mg) by mouth every 6 hours as needed for moderate to severe pain  Qty: 24 tablet, Refills: 0    Associated Diagnoses: Severe aortic stenosis; Coronary artery disease involving native coronary artery of native heart without angina pectoris      senna-docusate (SENOKOT-S/PERICOLACE) 8.6-50 MG tablet 2 tablets by Oral or Feeding Tube route 2 times daily as needed for constipation  Qty: 14 tablet, Refills: 0    Associated Diagnoses: Severe aortic stenosis; Coronary artery disease involving native coronary artery of native heart without angina pectoris      warfarin ANTICOAGULANT (COUMADIN) 3 MG tablet Take 1 tablet (3 mg) by mouth daily Take every evening at the same time  Qty: 10 tablet, Refills: 0    Associated Diagnoses: Severe aortic stenosis; Coronary artery disease involving native coronary artery of native heart without angina pectoris           CONTINUE these medications which have NOT CHANGED    Details   aspirin 81 MG EC tablet Take 81 mg by mouth every evening      atorvastatin (LIPITOR) 40 MG tablet Take 40 mg by mouth  every evening      carboxymethylcellulose PF (REFRESH PLUS) 0.5 % ophthalmic solution Place 1 drop into both eyes 4 times daily  Qty: 70 each, Refills: 0      cyanocobalamin (VITAMIN B-12) 100 MCG tablet Take 100 mcg by mouth every evening      dapagliflozin (FARXIGA) 10 MG TABS tablet TAKE 1 TABLET BY MOUTH IN THE  MORNING  Qty: 100 tablet, Refills: 1    Comments: Please send a replace/new response with 100-Day Supply if appropriate to maximize member benefit. Requesting 1 year supply.  Associated Diagnoses: Type 2 diabetes mellitus without complication, without long-term current use of insulin (H)      estradiol (ESTRACE) 0.1 MG/GM vaginal cream INSERT 1/2 APPLICATORFUL  VAGINALLY TWICE WEEKLY  Qty: 85 g, Refills: 3    Comments: Please send a replace/new response with 100-Day Supply if appropriate to maximize member benefit. Requesting 1 year supply.  Associated Diagnoses: Genitourinary syndrome of menopause      fenofibrate (TRIGLIDE/LOFIBRA) 160 MG tablet Take 1 tablet (160 mg) by mouth every evening  Qty: 100 tablet, Refills: 3    Comments: Please send a replace/new response with 100-Day Supply if appropriate to maximize member benefit. Requesting 1 year supply.  Associated Diagnoses: Coronary artery calcification; Hyperlipidemia LDL goal <100      gabapentin (NEURONTIN) 100 MG capsule Take 2 capsules (200 mg) by mouth at bedtime  Qty: 200 capsule, Refills: 3    Comments: Please send a replace/new response with 100-Day Supply if appropriate to maximize member benefit. Requesting 1 year supply.  Associated Diagnoses: Genitourinary syndrome of menopause      levocetirizine (XYZAL) 5 MG tablet Take 5 mg by mouth every evening      metFORMIN (GLUCOPHAGE) 500 MG tablet TAKE 2 TABLETS BY MOUTH  TWICE DAILY WITH MEALS  Qty: 360 tablet, Refills: 3    Comments: Please send a replace/new response with 100-Day Supply if appropriate to maximize member benefit. Requesting 1 year supply.  Associated Diagnoses: Type 2  diabetes mellitus without complication, without long-term current use of insulin (H)      montelukast (SINGULAIR) 10 MG tablet Take 1 tablet (10 mg) by mouth at bedtime  Qty: 100 tablet, Refills: 2    Associated Diagnoses: Seasonal allergic rhinitis due to pollen      multivitamin w/minerals (MULTI-VITAMIN) tablet Take 1 tablet by mouth every evening      Semaglutide, 1 MG/DOSE, (OZEMPIC, 1 MG/DOSE,) 4 MG/3ML pen INJECT SUBCUTANEOUSLY 1 MG  EVERY WEEK  Qty: 9 mL, Refills: 2    Associated Diagnoses: Type 2 diabetes mellitus without complication, without long-term current use of insulin (H)      Vitamin D, Cholecalciferol, 10 MCG (400 UNIT) TABS Take 1 tablet by mouth every evening           STOP taking these medications       diltiazem (CARDIZEM) 60 MG tablet Comments:   Reason for Stopping:         losartan (COZAAR) 50 MG tablet Comments:   Reason for Stopping:                CC:Nannette Garcia,  Shona Loyola PA-C    [facility]   Cardiothoracic Surgery  Office phone: [phone]  Office fax: [phone]

## 2024-06-17 NOTE — PROGRESS NOTES
Dr. Gallagher,    Your patient, Won Urena, is newly referred to [facility] at hospital discharge by the inpatient team. Anticoagulation clinic needing a new referring provider that will follow patient in ambulatory setting.     Indication(s):  severe aortic stenosis   Goal Range: 2.0-3.0  Duration: 3 months     Anticoagulation clinic requires a referral from one of Won's [facility] ambulatory provider (PCP or specialist) in order to provide anticoagulation management. The referring provider should anticipate seeing the patient on an ongoing basis, will have INRs and warfarin Rx ordered under their name per protocol, and will be point of contact for ACC questions on patient's anticoagulation care (procedural holds, critical results, etc).     Please review and sign the pended referral order for Won Urena if you are willing to oversee anticoagulation care.         Thank you,  Juan Carlos Alves, RN  Anticoagulation clinic

## 2024-06-17 NOTE — PROGRESS NOTES
DISCHARGE   Discharged to: Home @ 1555  Via: Automobile  Accompanied by: Family  Discharge Instructions: diet, activity, medications, follow up appointments, when to call the MD, and what to watchout for (i.e. s/s of infection, increasing SOB, palpitations, chest pain,)  Prescriptions: To be filled by [place] discharge pharmacy per pt's request; medication list reviewed & sent with pt  Follow Up Appointments: arranged; information given  Belongings: All sent with pt  IV: PIV removed  Telemetry: off  Pt exhibits understanding of above discharge instructions; all questions answered.  Discharge Paperwork: faxed      Summary:  Neuro: A&Ox4. Able to make needs known.  Cardiac: Afebrile, VSS. SR with BBB   Respiratory: RA, denies SOB  GI/: Voiding spontaneously. No BM this shift.   Diet/appetite: Tolerating cardiac diet. Denies nausea   Activity: Up ad mela    Pain: C/o incisional pain rating 2/10, managed with scheduled tylenol.   Skin: No new deficits noted. Sternal incision BETTINA, Chest tube sites dressing changed  Replacement: K and Mg replaced

## 2024-06-17 NOTE — PHARMACY-ANTICOAGULATION SERVICE
Clinical Pharmacy- Warfarin Discharge Note  This patient was started on warfarin this admission for the treatment of Bioprosthetic heart valve (AVR).  INR Goal= 2-3  Expected length of therapy  3 months .           Anticoagulation Dose History          Latest Ref Rng & Units 6/9/2023 8/20/2023 5/2/2024 6/12/2024 6/16/2024 6/17/2024   Recent Dosing and Labs   warfarin ANTICOAGULANT (COUMADIN) 5 MG tablet - - - - - 5 mg, $Given -   INR 0.85 - 1.15 1.14  1.12  1.20  1.48  1.79  1.74  1.14  1.37        Vitamin K doses administered during the last 7 days: No  FFP administered during the last 7 days: No  Recommend discharging the patient on a warfarin regimen of 3 mg daily with a prescription for warfarin 3mg tablets (will dose more conservative outpatient given initial INR bump 12 hrs after 5 mg dose given indication, low platelet count, and anemia).      The patient should have an INR checked in 2-4 days.      Geovanni Escalante, PharmD, BCPS  [phone]

## 2024-06-18 ENCOUNTER — TELEPHONE (OUTPATIENT)
Dept: ANTICOAGULATION | Facility: CLINIC | Age: 69
End: 2024-06-18
Payer: COMMERCIAL

## 2024-06-18 ENCOUNTER — HOSPITAL ENCOUNTER (OUTPATIENT)
Dept: CARDIAC REHAB | Facility: CLINIC | Age: 69
Discharge: HOME | End: 2024-06-18
Attending: STUDENT IN AN ORGANIZED HEALTH CARE EDUCATION/TRAINING PROGRAM
Payer: COMMERCIAL

## 2024-06-18 DIAGNOSIS — Z95.2 S/P AVR (AORTIC VALVE REPLACEMENT): ICD-10-CM

## 2024-06-18 DIAGNOSIS — I35.0 SEVERE AORTIC STENOSIS: Primary | ICD-10-CM

## 2024-06-18 DIAGNOSIS — I25.84 CORONARY ARTERY DISEASE DUE TO CALCIFIED CORONARY LESION: ICD-10-CM

## 2024-06-18 DIAGNOSIS — Z95.1 S/P CABG (CORONARY ARTERY BYPASS GRAFT): ICD-10-CM

## 2024-06-18 DIAGNOSIS — I25.10 CORONARY ARTERY DISEASE DUE TO CALCIFIED CORONARY LESION: ICD-10-CM

## 2024-06-18 PROCEDURE — 93797 PHYS/QHP OP CAR RHAB WO ECG: CPT

## 2024-06-18 PROCEDURE — 93798 PHYS/QHP OP CAR RHAB W/ECG: CPT

## 2024-06-18 NOTE — TELEPHONE ENCOUNTER
"ANTICOAGULATION  MANAGEMENT: NEW REFERRAL      SUBJECTIVE/OBJECTIVE     Won Urena, a 69 year old female  is newly referred to [facility].    Anticoagulation:    Previously on warfarin: No, new to anticoagulation  Warfarin initiation date (approximate): 6/16/24   Indication(s): -s/p bioprosthetic AVR   You had a coronary artery bypass graft and aortic valve  replacement   Goal Range: 2.0-3.0   Anticoagulation Bridge/Overlap: No   Referring provider: from PCP    General Dietary/Social Hx:    Typical vitamin K intake: low; consistent     Other dietary considerations: None     Social History:   Social History     Tobacco Use    Smoking status: Never     Passive exposure: Never    Smokeless tobacco: Never   Vaping Use    Vaping status: Never Used   Substance Use Topics    Alcohol use: Yes     Alcohol/week: 1.0 standard drink of alcohol     Types: 1 Standard drinks or equivalent per week     Comment: less than 2 drinks a week    Drug use: Not Currently     Comment: gummy 1x/month       In the past 2 weeks, patient estimates taking medications as instructed % of time: 100    Results:        Recent labs: (last 7 days)     06/17/24  0602   INR 1.37*       Wt Readings from Last 2 Encounters:   06/17/24 76 kg (167 lb 9.6 oz)   05/16/24 75.8 kg (167 lb)      Estimated body mass index is 26.25 kg/m  as calculated from the following:    Height as of 6/12/24: 1.702 m (5' 7\").    Weight as of 6/17/24: 76 kg (167 lb 9.6 oz).  Lab Results   Component Value Date    AST 49 (H) 06/12/2024    ALT 15 06/12/2024    ALBUMIN 3.9 06/12/2024     Lab Results   Component Value Date    CR 0.53 06/17/2024     Estimated Creatinine Clearance: 108.1 mL/min (based on SCr of 0.53 mg/dL).    ASSESSMENT     Goal INR 2-3, standard for indication(s) above  Establishing initial warfarin maintenance dose (on warfarin < 30 days)   Factors that may increase sensitivity to warfarin: Female gender  Factors that may reduce " sensitivity to warfarin: No factors  Potential significant drug interactions with home medications: None noted  Starting warfarin dose adjustment recommended 3mg /day  START taking:  acetaminophen (TYLENOL)  furosemide (LASIX)  methocarbamol (ROBAXIN)  metoprolol tartrate (LOPRESSOR)  oxyCODONE (ROXICODONE)  senna-docusate (SENOKOT-S/PERICOLACE)  warfarin ANTICOAGULANT (COUMADIN)  STOP taking:  diltiazem 60 MG tablet (CARDIZEM)  losartan 50 MG tablet (COZAAR)    PLAN     Dosing Instructions: Start warfarin with INR in 1 day       Summary  As of 6/18/2024      Full warfarin instructions:  3 mg every day   Next INR check:  6/19/2024               Education provided:   Please call back if any changes to your diet, medications or how you've been taking warfarin  Taking warfarin: purpose of warfarin and how it works, take warfarin at same time each day; preferably in the evening, prescribed tablet strength and color, importance of following ACC instructions vs instructions on the prescription bottle, and Importance of taking warfarin as instructed  Goal range and lab monitoring: goal range and significance of current result, Importance of therapeutic range, and Importance of following up at instructed interval  Dietary considerations: importance of consistent vitamin K intake, impact of vitamin K foods on INR, vitamin K content of foods, Impact of protein intake on INR , and importance of notifying ACC to changes in diet  Healthy lifestyle considerations: potential interaction between warfarin and alcohol, limit alcohol intake to no more than 1 to 2 drinks in 24 hours if you choose to drink alcohol, impact of CBD, marijuana, or medical marijuana on INR, and avoid activities that have a high risk for falling or injury such as contact sports  Symptom monitoring: monitoring for bleeding signs and symptoms, monitoring for clotting signs and symptoms, monitoring for stroke signs and symptoms, when to seek medical  attention/emergency care, and if you hit your head or have a bad fall seek emergency care  Importance of notifying anticoagulation clinic for: changes in medications; a sooner lab recheck maybe needed, diarrhea, nausea/vomiting, reduced intake, cold/flu, and/or infections; a sooner lab recheck maybe needed, and upcoming surgeries and procedures 2 weeks in advance  Written instructions provided  Contact [phone] with any changes, questions or concerns.     Education still needed:   Taking warfarin: purpose of warfarin and how it works and prescribed tablet strength and color  Importance of notifying anticoagulation clinic for: changes in medications; a sooner lab recheck maybe needed, diarrhea, nausea/vomiting, reduced intake, cold/flu, and/or infections; a sooner lab recheck maybe needed, upcoming surgeries and procedures 2 weeks in advance, and if you did not receive dosing instructions on the same day as your labs were checked  Contact [phone]  with any changes, questions or concerns.       Telephone call with Won who verbalizes understanding and agrees to plan  Sent Givit message with dosing and follow up instructions    Lab visit scheduled    Standing orders placed in Epic: Point of Care INR (Lab 5000) and Venous INR  (Lab 3572)    Plan made with [facility] Pharmacist Juan Carlos Ferrara, RN  Anticoagulation Clinic  6/18/2024

## 2024-06-18 NOTE — PLAN OF CARE
Occupational Therapy Discharge Summary    Reason for therapy discharge:    Discharged to home with outpatient therapy.    Progress towards therapy goal(s). See goals on Care Plan in [facility] electronic health record for goal details.  Goals partially met.  Barriers to achieving goals:   discharge from facility.    Therapy recommendation(s):    Continued therapy is recommended.  Rationale/Recommendations:  Recommending home with assist and OP CR to progress ADL IND/safety, mobility, cardiovascular endurance.

## 2024-06-19 ENCOUNTER — LAB (OUTPATIENT)
Dept: LAB | Facility: CLINIC | Age: 69
End: 2024-06-19
Payer: COMMERCIAL

## 2024-06-19 ENCOUNTER — ANTICOAGULATION THERAPY VISIT (OUTPATIENT)
Dept: ANTICOAGULATION | Facility: CLINIC | Age: 69
End: 2024-06-19

## 2024-06-19 ENCOUNTER — PATIENT OUTREACH (OUTPATIENT)
Dept: CARE COORDINATION | Facility: CLINIC | Age: 69
End: 2024-06-19

## 2024-06-19 DIAGNOSIS — I25.84 CORONARY ARTERY DISEASE DUE TO CALCIFIED CORONARY LESION: ICD-10-CM

## 2024-06-19 DIAGNOSIS — R30.0 DYSURIA: ICD-10-CM

## 2024-06-19 DIAGNOSIS — E78.5 HYPERLIPIDEMIA LDL GOAL <100: Chronic | ICD-10-CM

## 2024-06-19 DIAGNOSIS — I35.0 SEVERE AORTIC STENOSIS: Primary | ICD-10-CM

## 2024-06-19 DIAGNOSIS — I25.10 CORONARY ARTERY DISEASE DUE TO CALCIFIED CORONARY LESION: ICD-10-CM

## 2024-06-19 DIAGNOSIS — I71.21 ANEURYSM OF ASCENDING AORTA WITHOUT RUPTURE (H): Chronic | ICD-10-CM

## 2024-06-19 DIAGNOSIS — I35.0 SEVERE AORTIC STENOSIS: ICD-10-CM

## 2024-06-19 DIAGNOSIS — Z95.3 S/P AORTIC VALVE REPLACEMENT WITH BIOPROSTHETIC VALVE: ICD-10-CM

## 2024-06-19 DIAGNOSIS — I10 ESSENTIAL HYPERTENSION: ICD-10-CM

## 2024-06-19 DIAGNOSIS — I25.10 CORONARY ARTERY DISEASE INVOLVING NATIVE HEART WITHOUT ANGINA PECTORIS, UNSPECIFIED VESSEL OR LESION TYPE: ICD-10-CM

## 2024-06-19 LAB
ALBUMIN SERPL BCG-MCNC: 4.2 G/DL (ref 3.5–5.2)
ALP SERPL-CCNC: 118 U/L (ref 40–150)
ALT SERPL W P-5'-P-CCNC: 27 U/L (ref 0–50)
ANION GAP SERPL CALCULATED.3IONS-SCNC: 14 MMOL/L (ref 7–15)
AST SERPL W P-5'-P-CCNC: 38 U/L (ref 0–45)
BILIRUB SERPL-MCNC: 0.5 MG/DL
BUN SERPL-MCNC: 17 MG/DL (ref 8–23)
CALCIUM SERPL-MCNC: 9.6 MG/DL (ref 8.8–10.2)
CHLORIDE SERPL-SCNC: 101 MMOL/L (ref 98–107)
CREAT SERPL-MCNC: 0.74 MG/DL (ref 0.51–0.95)
DEPRECATED HCO3 PLAS-SCNC: 26 MMOL/L (ref 22–29)
EGFRCR SERPLBLD CKD-EPI 2021: 88 ML/MIN/1.73M2
ERYTHROCYTE [DISTWIDTH] IN BLOOD BY AUTOMATED COUNT: 13.6 % (ref 10–15)
GLUCOSE SERPL-MCNC: 164 MG/DL (ref 70–99)
HCT VFR BLD AUTO: 34.9 % (ref 35–47)
HGB BLD-MCNC: 11.8 G/DL (ref 11.7–15.7)
INR PPP: 1.42 (ref 0.85–1.15)
MCH RBC QN AUTO: 30 PG (ref 26.5–33)
MCHC RBC AUTO-ENTMCNC: 33.8 G/DL (ref 31.5–36.5)
MCV RBC AUTO: 89 FL (ref 78–100)
PLATELET # BLD AUTO: 211 10E3/UL (ref 150–450)
POTASSIUM SERPL-SCNC: 3.6 MMOL/L (ref 3.4–5.3)
PROT SERPL-MCNC: 7 G/DL (ref 6.4–8.3)
RBC # BLD AUTO: 3.93 10E6/UL (ref 3.8–5.2)
SODIUM SERPL-SCNC: 141 MMOL/L (ref 135–145)
WBC # BLD AUTO: 5.1 10E3/UL (ref 4–11)

## 2024-06-19 PROCEDURE — 85027 COMPLETE CBC AUTOMATED: CPT | Performed by: PATHOLOGY

## 2024-06-19 PROCEDURE — 85610 PROTHROMBIN TIME: CPT | Performed by: PATHOLOGY

## 2024-06-19 PROCEDURE — 36415 COLL VENOUS BLD VENIPUNCTURE: CPT | Performed by: PATHOLOGY

## 2024-06-19 PROCEDURE — 80053 COMPREHEN METABOLIC PANEL: CPT | Performed by: PATHOLOGY

## 2024-06-19 RX ORDER — WARFARIN SODIUM 3 MG/1
TABLET ORAL
Qty: 130 TABLET | Refills: 1 | Status: SHIPPED | OUTPATIENT
Start: 2024-06-19 | End: 2024-10-01

## 2024-06-19 NOTE — PROGRESS NOTES
"Clinic Care Coordination Contact  Transitions of Care Outreach  Chief Complaint   Patient presents with    Clinic Care Coordination - Post Hospital       Most Recent Admission Date: 6/12/2024   Most Recent Admission Diagnosis: CAD (coronary artery disease) - I25.10     Most Recent Discharge Date: 6/17/2024   Most Recent Discharge Diagnosis: Coronary artery disease due to calcified coronary lesion - I25.10, I25.84  Severe aortic stenosis - I35.0  Coronary artery disease involving native coronary artery of native heart without angina pectoris - I25.10     Transitions of Care Assessment    Discharge Assessment  How are you doing now that you are home?: \" Think things are going well \"  How are your symptoms? (Red Flag symptoms escalate to triage hotline per guidelines): Improved  Do you know how to contact your clinic care team if you have future questions or changes to your health status? : Yes  Does the patient have their discharge instructions? : Yes  Does the patient have questions regarding their discharge instructions? : No  Were you started on any new medications or were there changes to any of your previous medications? : Yes  Does the patient have all of their medications?: Yes  Do you have questions regarding any of your medications? : No  Do you have all of your needed medical supplies or equipment (DME)?  (i.e. oxygen tank, CPAP, cane, etc.): Yes    Post-op ([facility] CTA Only)  If the patient had a surgery or procedure, do they have any questions for a nurse?: No         [facility] Explained and offered Care Coordination support to eligible patients: Yes    Patient accepted? No    Follow up Plan     Discharge Follow-Up  Discharge follow up appointment scheduled in alignment with recommended follow up timeframe or Transitions of Risk Category? (Low = within 30 days; Moderate= within 14 days; High= within 7 days): Yes  Discharge Follow Up Appointment Date: 06/19/24  Discharge Follow Up Appointment Scheduled with?: " Specialty Care Provider    Future Appointments   Date Time Provider Department Center   6/19/2024 10:30 AM UC LAB UCLABR [facility]   6/20/2024  8:40 AM Nannette Gallagher MD [facility]   6/21/2024  1:00 PM 1, Ur Cardiac Rehab [facility] [place]   6/24/2024  1:00 PM 1, Ur Cardiac Rehab [facility]   6/26/2024  1:00 PM 1, Ur Cardiac Rehab [facility]   6/28/2024  1:00 PM 1, Ur Cardiac Rehab [facility]   6/28/2024  2:15 PM UC CVTS UCCTS [facility]   7/1/2024  1:00 PM 1, Ur Cardiac Rehab [facility]   7/3/2024  1:00 PM 1, Ur Cardiac Rehab [facility]   7/5/2024  1:00 PM 1, Ur Cardiac Rehab [facility]   7/8/2024  1:00 PM 1, Ur Cardiac Rehab [facility]   7/10/2024  1:00 PM 1, Ur Cardiac Rehab [facility]   7/11/2024 11:30 AM [email or web address] UCCDEXA [facility]   7/11/2024 12:00 PM UCSCMA1 UCBTC [facility]   7/12/2024  1:00 PM 1, Ur Cardiac Rehab [facility] [place]   7/15/2024  9:30 AM Niharika Olivo APRN [facility]   7/15/2024  1:00 PM 1, Ur Cardiac Rehab [facility] [place]   7/17/2024  1:00 PM 1, Ur Cardiac Rehab [facility]   7/19/2024  1:00 PM 1, Ur Cardiac Rehab [facility]   7/22/2024  1:00 PM 1, Ur Cardiac Rehab [facility]   7/22/2024  2:00 PM 1, Ur Cardiac Rehab [facility]   7/24/2024  1:00 PM 1, Ur Cardiac Rehab [facility]   7/26/2024  1:00 PM 1, Ur Cardiac Rehab [facility]   7/29/2024  1:00 PM 1, Ur Cardiac Rehab [facility]   7/31/2024  1:00 PM 1, Ur Cardiac Rehab [facility]   8/2/2024  1:00 PM 1, Ur Cardiac Rehab [facility]   8/5/2024  1:00 PM 1, Ur Cardiac Rehab [facility]   8/7/2024  1:00 PM 1, Ur Cardiac Rehab [facility]   8/9/2024  1:00 PM 1, Ur Cardiac Rehab [facility]   5/28/2025  7:45 AM Duane Manuel MD [facility]       Outpatient Plan as outlined on AVS reviewed with patient.    For any urgent concerns, please contact our 24 hour nurse triage line: [phone] ([phone])       Diana Moody MA

## 2024-06-20 ENCOUNTER — OFFICE VISIT (OUTPATIENT)
Dept: FAMILY MEDICINE | Facility: CLINIC | Age: 69
End: 2024-06-20
Payer: COMMERCIAL

## 2024-06-20 ENCOUNTER — TELEPHONE (OUTPATIENT)
Dept: CARDIOLOGY | Facility: CLINIC | Age: 69
End: 2024-06-20
Payer: COMMERCIAL

## 2024-06-20 VITALS
DIASTOLIC BLOOD PRESSURE: 70 MMHG | TEMPERATURE: 97.4 F | OXYGEN SATURATION: 97 % | WEIGHT: 162.8 LBS | RESPIRATION RATE: 20 BRPM | SYSTOLIC BLOOD PRESSURE: 109 MMHG | HEART RATE: 76 BPM | BODY MASS INDEX: 25.5 KG/M2

## 2024-06-20 DIAGNOSIS — H69.91 DYSFUNCTION OF RIGHT EUSTACHIAN TUBE: ICD-10-CM

## 2024-06-20 DIAGNOSIS — I25.10 CORONARY ARTERY DISEASE INVOLVING NATIVE CORONARY ARTERY OF NATIVE HEART WITHOUT ANGINA PECTORIS: ICD-10-CM

## 2024-06-20 DIAGNOSIS — I35.0 SEVERE AORTIC STENOSIS: ICD-10-CM

## 2024-06-20 DIAGNOSIS — Z09 HOSPITAL DISCHARGE FOLLOW-UP: Primary | ICD-10-CM

## 2024-06-20 RX ORDER — METHOCARBAMOL 750 MG/1
750 TABLET, FILM COATED ORAL EVERY 6 HOURS
Qty: 90 TABLET | Refills: 0 | Status: SHIPPED | OUTPATIENT
Start: 2024-06-20 | End: 2024-08-16

## 2024-06-20 RX ORDER — FUROSEMIDE 40 MG
40 TABLET ORAL 2 TIMES DAILY
Qty: 14 TABLET | Refills: 0 | Status: CANCELLED | OUTPATIENT
Start: 2024-06-20

## 2024-06-20 RX ORDER — MOMETASONE FUROATE MONOHYDRATE 50 UG/1
2 SPRAY, METERED NASAL DAILY
Qty: 17 G | Refills: 0 | Status: SHIPPED | OUTPATIENT
Start: 2024-06-20

## 2024-06-20 RX ORDER — OXYCODONE HYDROCHLORIDE 5 MG/1
5 TABLET ORAL EVERY 6 HOURS PRN
Qty: 24 TABLET | Refills: 0 | Status: SHIPPED | OUTPATIENT
Start: 2024-06-20 | End: 2024-06-28

## 2024-06-20 RX ORDER — METOPROLOL TARTRATE 25 MG/1
25 TABLET, FILM COATED ORAL 2 TIMES DAILY
Qty: 180 TABLET | Refills: 1 | Status: SHIPPED | OUTPATIENT
Start: 2024-06-20 | End: 2024-06-28

## 2024-06-20 ASSESSMENT — PAIN SCALES - GENERAL: PAINLEVEL: MODERATE PAIN (5)

## 2024-06-20 NOTE — PATIENT INSTRUCTIONS
Good to see you today!    STOP furosemide  If weight increases by more than 2 lb in a single day or your gain weight for 3 consecutive days, contact my office    Continue other medications unchanged  Continue to hold Ozempic, contact  me if blood sugars start to increase and we can restart at a low dose.    Keep cardiac rehab and post-op appointments.

## 2024-06-20 NOTE — PROGRESS NOTES
Assessment & Plan     Won was seen today for hospital f/u.    Diagnoses and all orders for this visit:    Hospital discharge follow-up    Severe aortic stenosis  -     methocarbamol (ROBAXIN) 750 MG tablet; Take 1 tablet (750 mg) by mouth every 6 hours  -     oxyCODONE (ROXICODONE) 5 MG tablet; Take 1 tablet (5 mg) by mouth every 6 hours as needed for moderate to severe pain  -     metoprolol tartrate (LOPRESSOR) 25 MG tablet; Take 1 tablet (25 mg) by mouth 2 times daily    Coronary artery disease involving native coronary artery of native heart without angina pectoris  -     methocarbamol (ROBAXIN) 750 MG tablet; Take 1 tablet (750 mg) by mouth every 6 hours  -     oxyCODONE (ROXICODONE) 5 MG tablet; Take 1 tablet (5 mg) by mouth every 6 hours as needed for moderate to severe pain  -     metoprolol tartrate (LOPRESSOR) 25 MG tablet; Take 1 tablet (25 mg) by mouth 2 times daily    Dysfunction of right eustachian tube  -     mometasone (NASONEX) 50 MCG/ACT nasal spray; Spray 2 sprays into both nostrils daily      STOP furosemide today, patient is below pre-operative weight  Will continue to hold Ozempic - will contact our office if blood sugars start to increase  OK for fill of oxycodone today, should discuss further pain management with surgical team  Eustachian tube dysfunction - possibly related to intubation. Trial of nasal steroid.        MED REC REQUIRED  Post Medication Reconciliation Status:  Discharge medications reconciled and changed, see notes/orders    Total time spent with patient 34 minutes, including review of cart, care of patient, coordination of care and communication of care plan.     Subjective   Won is a 69 year old, presenting for the following health issues:  Hospital F/U (Reason for hospital visit: open heart surgery)    HPI        Hospital Follow-up Visit:    Hospital/Nursing Home/IP Rehab Facility: [facility]  Date of Admission:  6/12/2024  Date of Discharge: 6/17/2024  Reason(s) for Admission: coronary artery disease, severe aortic stenosis  Was the patient in the ICU or did the patient experience delirium during hospitalization?  Yes   Do you have any other stressors you would like to discuss with your provider? No    Problems taking medications regularly:  None  Medication changes since discharge: oxycocone, metoprolol, furosemide, robaxin  Problems adhering to non-medication therapy:  None    Summary of hospitalization:  [facility] hospital discharge summary reviewed  Diagnostic Tests/Treatments reviewed.  Follow up needed: none  Other Healthcare Providers Involved in Patient s Care:         Specialist appointment - cardiology 7/15 (will try to move up), Surgical follow-up appointment - 6/28, and cardiac rehab (scheduled)  Update since discharge: improved.     Resting heart rate has increased from baseline of 50-60 to up to 80-90. Feels chest pressure with this, resolves with oxycodone.  Doing meditation, yoga to help with stress and pain management    Miralax - was on a high dose post discharge and wound up with diarrhea    Has been taking Lasix 80 mg daily since hospital discharge  Has started cardiac rehab.  Weight is back to pre-operative weight (even below)    R ear is plugged - has been trying to Valsalva and can't get it to resolve  No ear pain associated    Has been set up with anticoagulation clinic for warfarin management  Next INR draw Monday    Has been off of Ozempic since the hospital stay  Has been increasing protein in the diet  Blood sugars have been fine    6/28 - sees cardiothoracic surgery team  7/15 - cardiology appointment. Will call to have this moved up        Objective    /70 (BP Location: Left arm, Patient Position: Sitting, Cuff Size: Adult Regular)   Pulse 76   Temp 97.4  F (36.3  C) (Temporal)   Resp 20   Wt 73.8 kg (162 lb 12.8 oz)   SpO2 97%   BMI 25.50 kg/m    Body mass index is 25.5  kg/m .  Physical Exam   GENERAL: alert and no distress  EYES: Eyes grossly normal to inspection, PERRL and conjunctivae and sclerae normal  HENT: ear canal clear with serous effusion on R. Nose and mouth without ulcers or lesions  NECK: no adenopathy, no asymmetry, masses, or scars  RESP: lungs clear to auscultation - no rales, rhonchi or wheezes  CV: regular rate and rhythm, normal S1 S2, no S3 or S4. Surgical scars c/d/i  ABDOMEN: soft, nontender, no hepatosplenomegaly, no masses and bowel sounds normal  MS: no gross musculoskeletal defects noted, no edema  SKIN: no suspicious lesions or rashes  NEURO: Normal strength and tone, mentation intact and speech normal  PSYCH: mentation appears normal, affect normal/bright      Signed Electronically by: Nannette Gallagher MD

## 2024-06-20 NOTE — TELEPHONE ENCOUNTER
Cardiovascular Surgery  [facility]    DISCHARGE FOLLOW UP PHONE CALL      POST OP MONITORING  How is your pain on a 0-10 scale, how are you managing your pain? Pain is being managed with tylenol, oxycodone, and robaxin as advised.    ACTIVITY  How is your activity tolerance? Up and walking.  Are you still doing sternal precautions? Yes.  Do you hear any clicking when you are moving or taking a deep breath? No.    Are you weighing yourself daily? Yes, pt is 159 lbs.    SIGNS AND SYMPTOMS OF INFECTION  INCREASE IN PAIN - No  FEVER - No  DRAINAGE - No If so, color: NA  REDNESS - No  SWELLING - No    ASSISTANCE  Do you have someone at home to assist you with your daily activities? Spouse is helping pt at home.    MEDICATIONS  Is someone helping you to set up your medications? No.  Do you have any questions about your medications? No.    Are you on a blood thinner? Warfarin.  Who is managing your INRs? [facility] [place] [facility].    FOLLOW UP  You are scheduled to see your primary care physician on 6/20.  You are scheduled to see our surgery advanced practive provider for post operative follow up on 6/28.    You are scheduled for cardiac rehab on 6/18.  You are scheduled to see your cardiologist on 7/15.    CONTACT INFORMATION  Please feel free to call us with any questions or symptoms that are concerning for you at [phone]. If it is after 4:00 in the afternoon, or a weekend please call [phone] and ask for the on call specialist. We want to do everything we can to help prevent you needing to return to the ED, so please do not hesitate to call us.    Shari Dinero, RNCC  Cardiovascular Surgery  [phone]  June 20, 2024 11:46 AM      ----- Message from Shari GUZMAN sent at 6/18/2024 10:11 AM CDT -----  POC 6/19  ----- Message -----  From: Shona Loyola PA-C  Sent: 6/17/2024   1:34 PM CDT  To: Cvts Discharge [place]    Hello, this patient is discharging today to home.    Surgeon: Dr. 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 Nicole. She was referred to [facility] and has INR check on 6/19.    Please schedule:   [facility] clinic follow up on 6/28  Primary Care Provider follow up in 2-3 weeks.  Cardiologist follow up in 4-6 weeks       Thank you!  Shona

## 2024-06-21 ENCOUNTER — HOSPITAL ENCOUNTER (OUTPATIENT)
Dept: CARDIAC REHAB | Facility: CLINIC | Age: 69
Discharge: HOME | End: 2024-06-21
Attending: STUDENT IN AN ORGANIZED HEALTH CARE EDUCATION/TRAINING PROGRAM
Payer: COMMERCIAL

## 2024-06-21 PROCEDURE — 93798 PHYS/QHP OP CAR RHAB W/ECG: CPT

## 2024-06-24 ENCOUNTER — LAB (OUTPATIENT)
Dept: LAB | Facility: CLINIC | Age: 69
End: 2024-06-24
Payer: COMMERCIAL

## 2024-06-24 ENCOUNTER — ANTICOAGULATION THERAPY VISIT (OUTPATIENT)
Dept: ANTICOAGULATION | Facility: CLINIC | Age: 69
End: 2024-06-24

## 2024-06-24 ENCOUNTER — HOSPITAL ENCOUNTER (OUTPATIENT)
Dept: CARDIAC REHAB | Facility: CLINIC | Age: 69
Discharge: HOME | End: 2024-06-24
Attending: STUDENT IN AN ORGANIZED HEALTH CARE EDUCATION/TRAINING PROGRAM
Payer: COMMERCIAL

## 2024-06-24 DIAGNOSIS — I35.0 SEVERE AORTIC STENOSIS: ICD-10-CM

## 2024-06-24 DIAGNOSIS — I25.84 CORONARY ARTERY DISEASE DUE TO CALCIFIED CORONARY LESION: ICD-10-CM

## 2024-06-24 DIAGNOSIS — I25.10 CORONARY ARTERY DISEASE DUE TO CALCIFIED CORONARY LESION: ICD-10-CM

## 2024-06-24 DIAGNOSIS — R30.0 DYSURIA: ICD-10-CM

## 2024-06-24 DIAGNOSIS — I35.0 SEVERE AORTIC STENOSIS: Primary | ICD-10-CM

## 2024-06-24 LAB
ALBUMIN UR-MCNC: NEGATIVE MG/DL
APPEARANCE UR: ABNORMAL
BILIRUB UR QL STRIP: NEGATIVE
COLOR UR AUTO: YELLOW
GLUCOSE UR STRIP-MCNC: >=1000 MG/DL
HGB UR QL STRIP: NEGATIVE
INR PPP: 1.67 (ref 0.85–1.15)
KETONES UR STRIP-MCNC: NEGATIVE MG/DL
LEUKOCYTE ESTERASE UR QL STRIP: NEGATIVE
MUCOUS THREADS #/AREA URNS LPF: PRESENT /LPF
NITRATE UR QL: NEGATIVE
PH UR STRIP: 6 [PH] (ref 5–7)
RBC URINE: 1 /HPF
SP GR UR STRIP: >1.035 (ref 1–1.03)
SQUAMOUS EPITHELIAL: 13 /HPF
TRANSITIONAL EPI: 1 /HPF
UROBILINOGEN UR STRIP-MCNC: NORMAL MG/DL
WBC URINE: 6 /HPF

## 2024-06-24 PROCEDURE — 81001 URINALYSIS AUTO W/SCOPE: CPT | Performed by: PATHOLOGY

## 2024-06-24 PROCEDURE — 36415 COLL VENOUS BLD VENIPUNCTURE: CPT | Performed by: PATHOLOGY

## 2024-06-24 PROCEDURE — 93798 PHYS/QHP OP CAR RHAB W/ECG: CPT

## 2024-06-24 PROCEDURE — 85610 PROTHROMBIN TIME: CPT | Performed by: PATHOLOGY

## 2024-06-24 NOTE — PROGRESS NOTES
ANTICOAGULATION MANAGEMENT     Won Urena 69 year old female is on warfarin with subtherapeutic INR result. (Goal INR 2.0-3.0)    Recent labs: (last 7 days)     06/24/24  1220   INR 1.67*       ASSESSMENT     Warfarin Lab Questionnaire    Warfarin Doses Last 7 Days      6/23/2024    10:29 AM   Dose in Tablet or Mg   TAB or MG? milligram (mg)     Pt Rptd Dose SUNDAY MONDAY TUESDAY WED THURS FRIDAY SATURDAY 6/23/2024  10:29 AM 4.5 3 3 3 4.5 3 3         6/23/2024   Warfarin Lab Questionnaire   Missed doses within past 14 days? No   Changes in diet or alcohol within past 14 days? No   Medication changes since last result? No   Injuries or illness since last result? No   New shortness of breath, severe headaches or sudden changes in vision since last result? No   Abnormal bleeding since last result? No   Upcoming surgery, procedure? No   Best number to call with results? [phone]        Previous result: Subtherapeutic  Additional findings: Recent heart valve replacement in last 10 days       PLAN     Recommended plan for ongoing change(s) affecting INR     Dosing Instructions: Increase your warfarin dose (18% change) with next INR in 4 days       Summary  As of 6/24/2024      Full warfarin instructions:  3 mg every Sun, Thu; 4.5 mg all other days   Next INR check:  6/28/2024               Detailed voice message left for Won with dosing instructions and follow up date.     Contact [phone]  to schedule and with any changes, questions or concerns.     Education provided:   Please call back if any changes to your diet, medications or how you've been taking warfarin  Contact [phone]  with any changes, questions or concerns.     Plan made with [facility] Pharmacist Alyssa Stubbs, RN  Anticoagulation Clinic  6/24/2024    _______________________________________________________________________     Anticoagulation Episode Summary       Current INR goal:  2.0-3.0   TTR:  --   Target end date:   9/19/2024   Send INR reminders to:  [facility]    Indications    Severe aortic stenosis [I35.0]  Coronary artery disease due to calcified coronary lesion [I25.10  I25.84]             Comments:               Anticoagulation Care Providers       Provider Role Specialty Phone number    Mitali Nicole MD Referring Thoracic Surgery [phone]    Nannette Gallagher MD Referring Family Medicine [phone]

## 2024-06-25 ENCOUNTER — TELEPHONE (OUTPATIENT)
Dept: FAMILY MEDICINE | Facility: CLINIC | Age: 69
End: 2024-06-25

## 2024-06-25 ENCOUNTER — HOSPITAL ENCOUNTER (EMERGENCY)
Facility: CLINIC | Age: 69
Discharge: HOME | End: 2024-06-25
Attending: INTERNAL MEDICINE | Admitting: INTERNAL MEDICINE
Payer: COMMERCIAL

## 2024-06-25 ENCOUNTER — TELEPHONE (OUTPATIENT)
Dept: CARDIOLOGY | Facility: CLINIC | Age: 69
End: 2024-06-25
Payer: COMMERCIAL

## 2024-06-25 VITALS
TEMPERATURE: 97.7 F | HEART RATE: 99 BPM | SYSTOLIC BLOOD PRESSURE: 139 MMHG | RESPIRATION RATE: 18 BRPM | DIASTOLIC BLOOD PRESSURE: 89 MMHG | OXYGEN SATURATION: 98 %

## 2024-06-25 DIAGNOSIS — I48.0 PAROXYSMAL ATRIAL FIBRILLATION (H): ICD-10-CM

## 2024-06-25 DIAGNOSIS — Z79.01 CHRONIC ANTICOAGULATION: ICD-10-CM

## 2024-06-25 DIAGNOSIS — Z95.2 S/P AVR: ICD-10-CM

## 2024-06-25 LAB
ALBUMIN SERPL BCG-MCNC: 4.2 G/DL (ref 3.5–5.2)
ALP SERPL-CCNC: 85 U/L (ref 40–150)
ALT SERPL W P-5'-P-CCNC: 16 U/L (ref 0–50)
ANION GAP SERPL CALCULATED.3IONS-SCNC: 12 MMOL/L (ref 7–15)
AST SERPL W P-5'-P-CCNC: 23 U/L (ref 0–45)
ATRIAL RATE - MUSE: 92 BPM
BASOPHILS # BLD AUTO: 0.1 10E3/UL (ref 0–0.2)
BASOPHILS NFR BLD AUTO: 1 %
BILIRUB SERPL-MCNC: 0.3 MG/DL
BUN SERPL-MCNC: 18.3 MG/DL (ref 8–23)
CALCIUM SERPL-MCNC: 9.6 MG/DL (ref 8.8–10.2)
CHLORIDE SERPL-SCNC: 105 MMOL/L (ref 98–107)
CREAT SERPL-MCNC: 0.63 MG/DL (ref 0.51–0.95)
DEPRECATED HCO3 PLAS-SCNC: 24 MMOL/L (ref 22–29)
DIASTOLIC BLOOD PRESSURE - MUSE: NORMAL MMHG
EGFRCR SERPLBLD CKD-EPI 2021: >90 ML/MIN/1.73M2
EOSINOPHIL # BLD AUTO: 0.2 10E3/UL (ref 0–0.7)
EOSINOPHIL NFR BLD AUTO: 3 %
ERYTHROCYTE [DISTWIDTH] IN BLOOD BY AUTOMATED COUNT: 14.3 % (ref 10–15)
GLUCOSE SERPL-MCNC: 116 MG/DL (ref 70–99)
HCT VFR BLD AUTO: 33.5 % (ref 35–47)
HGB BLD-MCNC: 11 G/DL (ref 11.7–15.7)
IMM GRANULOCYTES # BLD: 0 10E3/UL
IMM GRANULOCYTES NFR BLD: 0 %
INR PPP: 2.28 (ref 0.85–1.15)
INTERPRETATION ECG - MUSE: NORMAL
LYMPHOCYTES # BLD AUTO: 1.7 10E3/UL (ref 0.8–5.3)
LYMPHOCYTES NFR BLD AUTO: 25 %
MAGNESIUM SERPL-MCNC: 1.9 MG/DL (ref 1.7–2.3)
MCH RBC QN AUTO: 30.1 PG (ref 26.5–33)
MCHC RBC AUTO-ENTMCNC: 32.8 G/DL (ref 31.5–36.5)
MCV RBC AUTO: 92 FL (ref 78–100)
MONOCYTES # BLD AUTO: 0.4 10E3/UL (ref 0–1.3)
MONOCYTES NFR BLD AUTO: 6 %
NEUTROPHILS # BLD AUTO: 4.4 10E3/UL (ref 1.6–8.3)
NEUTROPHILS NFR BLD AUTO: 65 %
NRBC # BLD AUTO: 0 10E3/UL
NRBC BLD AUTO-RTO: 0 /100
P AXIS - MUSE: 70 DEGREES
PLATELET # BLD AUTO: 336 10E3/UL (ref 150–450)
POTASSIUM SERPL-SCNC: 4.1 MMOL/L (ref 3.4–5.3)
PR INTERVAL - MUSE: 180 MS
PROT SERPL-MCNC: 6.7 G/DL (ref 6.4–8.3)
QRS DURATION - MUSE: 142 MS
QT - MUSE: 420 MS
QTC - MUSE: 519 MS
R AXIS - MUSE: -84 DEGREES
RBC # BLD AUTO: 3.66 10E6/UL (ref 3.8–5.2)
SODIUM SERPL-SCNC: 141 MMOL/L (ref 135–145)
SYSTOLIC BLOOD PRESSURE - MUSE: NORMAL MMHG
T AXIS - MUSE: 67 DEGREES
TROPONIN T SERPL HS-MCNC: 57 NG/L
VENTRICULAR RATE- MUSE: 92 BPM
WBC # BLD AUTO: 6.8 10E3/UL (ref 4–11)

## 2024-06-25 PROCEDURE — 99285 EMERGENCY DEPT VISIT HI MDM: CPT | Mod: 25 | Performed by: INTERNAL MEDICINE

## 2024-06-25 PROCEDURE — 84484 ASSAY OF TROPONIN QUANT: CPT | Performed by: INTERNAL MEDICINE

## 2024-06-25 PROCEDURE — 80053 COMPREHEN METABOLIC PANEL: CPT | Performed by: INTERNAL MEDICINE

## 2024-06-25 PROCEDURE — 83735 ASSAY OF MAGNESIUM: CPT | Performed by: INTERNAL MEDICINE

## 2024-06-25 PROCEDURE — 99285 EMERGENCY DEPT VISIT HI MDM: CPT | Performed by: INTERNAL MEDICINE

## 2024-06-25 PROCEDURE — 93308 TTE F-UP OR LMTD: CPT | Performed by: INTERNAL MEDICINE

## 2024-06-25 PROCEDURE — 36415 COLL VENOUS BLD VENIPUNCTURE: CPT | Performed by: INTERNAL MEDICINE

## 2024-06-25 PROCEDURE — 93005 ELECTROCARDIOGRAM TRACING: CPT | Mod: 59 | Performed by: INTERNAL MEDICINE

## 2024-06-25 PROCEDURE — 93308 TTE F-UP OR LMTD: CPT | Mod: 26 | Performed by: INTERNAL MEDICINE

## 2024-06-25 PROCEDURE — 85610 PROTHROMBIN TIME: CPT | Performed by: INTERNAL MEDICINE

## 2024-06-25 PROCEDURE — 93010 ELECTROCARDIOGRAM REPORT: CPT | Mod: 59 | Performed by: INTERNAL MEDICINE

## 2024-06-25 PROCEDURE — 85004 AUTOMATED DIFF WBC COUNT: CPT | Performed by: INTERNAL MEDICINE

## 2024-06-25 ASSESSMENT — ACTIVITIES OF DAILY LIVING (ADL)
ADLS_ACUITY_SCORE: 38

## 2024-06-25 ASSESSMENT — COLUMBIA-SUICIDE SEVERITY RATING SCALE - C-SSRS
1. IN THE PAST MONTH, HAVE YOU WISHED YOU WERE DEAD OR WISHED YOU COULD GO TO SLEEP AND NOT WAKE UP?: NO
6. HAVE YOU EVER DONE ANYTHING, STARTED TO DO ANYTHING, OR PREPARED TO DO ANYTHING TO END YOUR LIFE?: NO
2. HAVE YOU ACTUALLY HAD ANY THOUGHTS OF KILLING YOURSELF IN THE PAST MONTH?: NO

## 2024-06-25 NOTE — ED TRIAGE NOTES
Pt had cardiac surgery on the 12th, now thinks she has been in a-fib since last night w a high BP. Tachy at 99 bpm in triage, BP normal.

## 2024-06-25 NOTE — TELEPHONE ENCOUNTER
Symptoms (route to triage team)    Who is calling - Won  What is the symptom/s being reported - Heart racing last night according to apple watch it said she had atrial fib, also mentioned loss of balance at some point today, pt thinks its from her open heart surgery   When did the symptom/s begin - Last night   Additional comments/details - Pt said she will leave detailed message on mychart   Same day office visit offered? No  Advised patient that RN will call back within 3 hours? Yes  Ok to leave a message on VM? Yes

## 2024-06-25 NOTE — TELEPHONE ENCOUNTER
Pt states she experienced chest pressure last night and became diaphoretic. She called in to the on call number, and by the time she got a call back the episode had passed. Pt is taking her medications as prescribed.    She will follow up with her PCP to see if any other medication should be added for her. Pt was advised to go to her nearest ED if an she experiences an episode that lasts longer than 60 minutes.

## 2024-06-25 NOTE — ED PROVIDER NOTES
[place] EMERGENCY DEPARTMENT ([facility])    6/25/24       ED PROVIDER NOTE  Vertical Triage A 4:22 PM   History     Chief Complaint   Patient presents with    Irregular Heart Beat     The history is provided by the patient and medical records.     Won Urena is a 69 year old female with a past medical history of type 2 diabetes, hyperlipidemia, hypertension, coronary artery disease, severe aortic stenosis status post recent open heart surgery with CABG x 2 and aortic valve replacement on 6/12/24 who presents to the ED with recurrent episodes of chest pressure, heart racing as well as findings of atrial fibrillation on her Apple watch.  Patient states that she had 1 episode of heart racing in February and was found to be in SVT.  This led to her cardiac workup that showed changes to her previously mild to moderate aortic stenosis and coronary artery disease and so she underwent open heart surgery, aortic valve replacement and CABG x 2 arteries.  Patient noticed that ever since this procedure she has had chest pressure on a daily basis and her heart rate would rise to the 90s, even while she was inpatient.  She states that the only thing that would manage her pain was 5 mg oxycodone.  Last night she woke up with heart racing. She checked her Apple watch which read atrial fibrillation with rate of 132. The heart racing went away after 20 minutes. This morning she had 2 more episodes of tachycardia with heart rate over 130, and from 1:30-at 3:30 PM her watch indicated that she was in atrial fibrillation. She notes her resting heart rate prior to surgery was 50-60, and after surgery her heart rate has been in the 80s resting with chest pressure sensation. Patient doesn't know what is going on and so presents for evaluation. She is on warfarin but at subtherapeutic levels, last INR was 1.6. her warfarin dose was increased by 50% to 4.5 mg.  Patient is also on metoprolol 25 mg by mouth twice  daily.    Past Medical History  Past Medical History:   Diagnosis Date    Abnormal Pap smear of cervix     Aortic stenosis     Aortic valve stenosis     Benign colon polyp     CAD (coronary artery disease)     Dermatochalasis of both upper eyelids     Diabetes mellitus, type 2 (H)     Gestational diabetes     HTN (hypertension)     Hyperlipidemia LDL goal <100     Metal foreign body in chest     Retained atrial temporary pacing wire    Nonsenile cataract     Very beginnings    Post-menopausal atrophic vaginitis     Ptosis of both eyelids     Thickened endometrium      Past Surgical History:   Procedure Laterality Date    BYPASS GRAFT ARTERY CORONARY, REPAIR VALVE AORTIC, COMBINED N/A 2024    Procedure: Median Sternotomy, Cardiopulmonary Bypass, Left Internal Mammary Vein Hamilton, Endoscopic Hamilton of Left Greater Saphenous Vein, Coronary Bypass Graft x 2, Aortic Valve Replacement with Inspiris Resilia Aortic Valve size 25mm, Transesophageal Echocardiogram by Anesthesia;  Surgeon: Mitali Nicole MD;  Location: [facility] OR     SECTION      COMBINED REPAIR PTOSIS WITH BLEPHAROPLASTY Bilateral 2023    Procedure: Both upper eyelid blepharoplasty and ptosis repair;  Surgeon: Shantelle Marti MD;  [facility] [facility]    COSMETIC BLEPHAROPLASTY LOWER LIDS BILATERAL Bilateral 2023    Procedure: Both lower eyelid blepharoplasty;  Surgeon: Shantelle Marti MD;  [facility] [facility]    CV CORONARY ANGIOGRAM N/A 2023    Procedure: Coronary Angiogram;  Surgeon: Geovanni Ibarra MD;  Location:  HEART CARDIAC CATH LAB    CV CORONARY ANGIOGRAM N/A 2024    Procedure: Coronary Angiogram;  Surgeon: Bassam Flores MD;  Location:  HEART CARDIAC CATH LAB    CV INSTANTANEOUS WAVE-FREE RATIO N/A 2023    Procedure: Instantaneous Wave-Free Ratio;  Surgeon: Geovanni Ibarra MD;  Location:  HEART CARDIAC CATH LAB    CV RIGHT HEART CATH MEASUREMENTS RECORDED N/A 2024     Procedure: Right Heart Catheterization;  Surgeon: Bassam Flores MD;  Location:  HEART CARDIAC CATH LAB     acetaminophen (TYLENOL) 325 MG tablet  aspirin 81 MG EC tablet  atorvastatin (LIPITOR) 40 MG tablet  carboxymethylcellulose PF (REFRESH PLUS) 0.5 % ophthalmic solution  cyanocobalamin (VITAMIN B-12) 100 MCG tablet  dapagliflozin (FARXIGA) 10 MG TABS tablet  estradiol (ESTRACE) 0.1 MG/GM vaginal cream  fenofibrate (TRIGLIDE/LOFIBRA) 160 MG tablet  gabapentin (NEURONTIN) 100 MG capsule  levocetirizine (XYZAL) 5 MG tablet  metFORMIN (GLUCOPHAGE) 500 MG tablet  methocarbamol (ROBAXIN) 750 MG tablet  metoprolol tartrate (LOPRESSOR) 25 MG tablet  mometasone (NASONEX) 50 MCG/ACT nasal spray  montelukast (SINGULAIR) 10 MG tablet  multivitamin w/minerals (MULTI-VITAMIN) tablet  oxyCODONE (ROXICODONE) 5 MG tablet  Semaglutide, 1 MG/DOSE, (OZEMPIC, 1 MG/DOSE,) 4 MG/3ML pen  senna-docusate (SENOKOT-S/PERICOLACE) 8.6-50 MG tablet  Vitamin D, Cholecalciferol, 10 MCG (400 UNIT) TABS  warfarin ANTICOAGULANT (COUMADIN) 3 MG tablet  warfarin ANTICOAGULANT (COUMADIN) 3 MG tablet      Allergies   Allergen Reactions    Avapro [Irbesartan] Cough    Fluticasone Other (See Comments)     Loss of sense of taste     Family History  Family History   Problem Relation Age of Onset    Lung Cancer Mother         Small cell, did smoked    Diabetes Mother     Hypertension Mother     Goiter Mother     Mental Illness Mother         likely bipolar    Coronary Artery Disease Mother 42        MI, smoked    Hyperlipidemia Mother     Thyroid Disease Mother         Goiter    Obesity Mother         most of family is obese    Glaucoma Father     Atrial fibrillation Father     Hypertension Father     Parkinsonism Father     Hyperlipidemia Father     Diabetes Sister     Breast Cancer Sister 60    Hypertension Sister     Hypertension Brother     Cancer Maternal Grandmother         gynecologic cancer, unknown type    Uterine Cancer  Maternal Grandmother     Chronic Obstructive Pulmonary Disease Paternal Grandmother     Uterine Cancer Paternal Grandmother     Stomach Cancer Paternal Grandfather     Colon Cancer Paternal Grandfather     Bipolar Disorder Son     Mental Illness Son     Depression Son     Macular Degeneration No family hx of     Anesthesia Reaction No family hx of     Venous thrombosis No family hx of      Social History   Social History     Tobacco Use    Smoking status: Never     Passive exposure: Never    Smokeless tobacco: Never   Vaping Use    Vaping status: Never Used   Substance Use Topics    Alcohol use: Yes     Alcohol/week: 1.0 standard drink of alcohol     Types: 1 Standard drinks or equivalent per week     Comment: less than 2 drinks a week    Drug use: Not Currently     Comment: gummy 1x/month      A complete review of systems was performed with pertinent positives and negatives noted in the HPI, and all other systems negative.    Physical Exam   BP: 139/89  Pulse: 99  Temp: 97.7  F (36.5  C)  Resp: 18  SpO2: 98 %  Physical Exam  Vitals and nursing note reviewed.   Constitutional:       General: She is not in acute distress.     Appearance: Normal appearance. She is not diaphoretic.   HENT:      Head: Normocephalic and atraumatic.      Mouth/Throat:      Mouth: Mucous membranes are moist.   Eyes:      General: No scleral icterus.     Extraocular Movements: Extraocular movements intact.      Conjunctiva/sclera: Conjunctivae normal.   Cardiovascular:      Rate and Rhythm: Normal rate and regular rhythm.      Heart sounds: Normal heart sounds. No murmur heard.     No friction rub. No gallop.   Pulmonary:      Effort: Pulmonary effort is normal. No respiratory distress.      Breath sounds: Normal breath sounds. No stridor. No wheezing, rhonchi or rales.   Chest:      Chest wall: No tenderness.   Abdominal:      General: Abdomen is flat. Bowel sounds are normal. There is no distension.      Palpations: Abdomen is soft. There  is no mass.      Tenderness: There is no abdominal tenderness. There is no right CVA tenderness, left CVA tenderness, guarding or rebound.      Hernia: No hernia is present.   Musculoskeletal:         General: No tenderness. Normal range of motion.      Cervical back: Normal range of motion and neck supple.   Skin:     General: Skin is warm.      Findings: No rash.   Neurological:      General: No focal deficit present.      Mental Status: She is alert.      Cranial Nerves: No cranial nerve deficit.           ED Course, Procedures, & Data      Procedures  Results for orders placed during the hospital encounter of 06/25/24    POC US ECHO LIMITED    Impression  Limited Bedside Cardiac Ultrasound, performed and interpreted by me.  Indication: palpitations 2 weeks s/p AVR.  Parasternal long axis, parasternal short axis, apical 4 chamber, and subcostal views were acquired.  Image quality was satisfactory.    Findings:  Global left ventricular function appears intact.  Chambers do not appear dilated.  There is no evidence of free fluid within the pericardium.    IMPRESSION: Grossly normal left ventricular function and chamber size.  No pericardial effusion..            EKG Interpretation:      Interpreted by José Miguel Noonan MD, MD  Time reviewed: on arrival  Symptoms at time of EKG: None   Rhythm: normal sinus   Rate: Normal  Axis: Right Axis Deviation  Ectopy: none  Conduction: right bundle branch block (complete) and left anterior fasciclar block  ST Segments/ T Waves: No ST-T wave changes  Q Waves: none  Comparison to prior: Unchanged from 6/13/2024    Clinical Impression: no acute changes                  Results for orders placed or performed during the hospital encounter of 06/25/24   POC US ECHO LIMITED     Status: None    Impression    Limited Bedside Cardiac Ultrasound, performed and interpreted by me.   Indication: palpitations 2 weeks s/p AVR.  Parasternal long axis, parasternal short axis, apical 4 chamber, and  subcostal views were acquired.   Image quality was satisfactory.    Findings:    Global left ventricular function appears intact.  Chambers do not appear dilated.  There is no evidence of free fluid within the pericardium.    IMPRESSION: Grossly normal left ventricular function and chamber size.  No pericardial effusion..      INR     Status: Abnormal   Result Value Ref Range    INR 2.28 (H) 0.85 - 1.15   Comprehensive metabolic panel     Status: Abnormal   Result Value Ref Range    Sodium 141 135 - 145 mmol/L    Potassium 4.1 3.4 - 5.3 mmol/L    Carbon Dioxide (CO2) 24 22 - 29 mmol/L    Anion Gap 12 7 - 15 mmol/L    Urea Nitrogen 18.3 8.0 - 23.0 mg/dL    Creatinine 0.63 0.51 - 0.95 mg/dL    GFR Estimate >90 >60 mL/min/1.73m2    Calcium 9.6 8.8 - 10.2 mg/dL    Chloride 105 98 - 107 mmol/L    Glucose 116 (H) 70 - 99 mg/dL    Alkaline Phosphatase 85 40 - 150 U/L    AST 23 0 - 45 U/L    ALT 16 0 - 50 U/L    Protein Total 6.7 6.4 - 8.3 g/dL    Albumin 4.2 3.5 - 5.2 g/dL    Bilirubin Total 0.3 <=1.2 mg/dL   Troponin T, High Sensitivity     Status: Abnormal   Result Value Ref Range    Troponin T, High Sensitivity 57 (H) <=14 ng/L   Magnesium     Status: Normal   Result Value Ref Range    Magnesium 1.9 1.7 - 2.3 mg/dL   CBC with platelets and differential     Status: Abnormal   Result Value Ref Range    WBC Count 6.8 4.0 - 11.0 10e3/uL    RBC Count 3.66 (L) 3.80 - 5.20 10e6/uL    Hemoglobin 11.0 (L) 11.7 - 15.7 g/dL    Hematocrit 33.5 (L) 35.0 - 47.0 %    MCV 92 78 - 100 fL    MCH 30.1 26.5 - 33.0 pg    MCHC 32.8 31.5 - 36.5 g/dL    RDW 14.3 10.0 - 15.0 %    Platelet Count 336 150 - 450 10e3/uL    % Neutrophils 65 %    % Lymphocytes 25 %    % Monocytes 6 %    % Eosinophils 3 %    % Basophils 1 %    % Immature Granulocytes 0 %    NRBCs per 100 WBC 0 <1 /100    Absolute Neutrophils 4.4 1.6 - 8.3 10e3/uL    Absolute Lymphocytes 1.7 0.8 - 5.3 10e3/uL    Absolute Monocytes 0.4 0.0 - 1.3 10e3/uL    Absolute Eosinophils 0.2 0.0 -  0.7 10e3/uL    Absolute Basophils 0.1 0.0 - 0.2 10e3/uL    Absolute Immature Granulocytes 0.0 <=0.4 10e3/uL    Absolute NRBCs 0.0 10e3/uL   EKG 12 lead     Status: None   Result Value Ref Range    Systolic Blood Pressure  mmHg    Diastolic Blood Pressure  mmHg    Ventricular Rate 92 BPM    Atrial Rate 92 BPM    NV Interval 180 ms    QRS Duration 142 ms     ms    QTc 519 ms    P Axis 70 degrees    R AXIS -84 degrees    T Axis 67 degrees    Interpretation ECG       Sinus rhythm  Possible Left atrial enlargement  Right bundle branch block  Left anterior fascicular block  ** Bifascicular block **  Abnormal ECG  Unconfirmed report - interpretation of this ECG is computer generated - see medical record for final interpretation  Confirmed by - EMERGENCY ROOM, PHYSICIAN ([id number]),  FAROOQ ERAZO ([id number]) on 6/25/2024 4:12:42 PM     CBC with platelets differential     Status: Abnormal    Narrative    The following orders were created for panel order CBC with platelets differential.  Procedure                               Abnormality         Status                     ---------                               -----------         ------                     CBC with platelets and d...[[id number]]  Abnormal            Final result                 Please view results for these tests on the individual orders.     Medications - No data to display  Labs Ordered and Resulted from Time of ED Arrival to Time of ED Departure   INR - Abnormal       Result Value    INR 2.28 (*)    COMPREHENSIVE METABOLIC PANEL - Abnormal    Sodium 141      Potassium 4.1      Carbon Dioxide (CO2) 24      Anion Gap 12      Urea Nitrogen 18.3      Creatinine 0.63      GFR Estimate >90      Calcium 9.6      Chloride 105      Glucose 116 (*)     Alkaline Phosphatase 85      AST 23      ALT 16      Protein Total 6.7      Albumin 4.2      Bilirubin Total 0.3     TROPONIN T, HIGH SENSITIVITY - Abnormal    Troponin T, High Sensitivity 57 (*)    CBC WITH  PLATELETS AND DIFFERENTIAL - Abnormal    WBC Count 6.8      RBC Count 3.66 (*)     Hemoglobin 11.0 (*)     Hematocrit 33.5 (*)     MCV 92      MCH 30.1      MCHC 32.8      RDW 14.3      Platelet Count 336      % Neutrophils 65      % Lymphocytes 25      % Monocytes 6      % Eosinophils 3      % Basophils 1      % Immature Granulocytes 0      NRBCs per 100 WBC 0      Absolute Neutrophils 4.4      Absolute Lymphocytes 1.7      Absolute Monocytes 0.4      Absolute Eosinophils 0.2      Absolute Basophils 0.1      Absolute Immature Granulocytes 0.0      Absolute NRBCs 0.0     MAGNESIUM - Normal    Magnesium 1.9       POC US ECHO LIMITED   Final Result   Limited Bedside Cardiac Ultrasound, performed and interpreted by me.    Indication: palpitations 2 weeks s/p AVR.   Parasternal long axis, parasternal short axis, apical 4 chamber, and subcostal views were acquired.    Image quality was satisfactory.      Findings:     Global left ventricular function appears intact.   Chambers do not appear dilated.   There is no evidence of free fluid within the pericardium.      IMPRESSION: Grossly normal left ventricular function and chamber size.  No pericardial effusion..                 Critical care was not performed.     Medical Decision Making  The patient's presentation was of high complexity (an acute health issue posing potential threat to life or bodily function).    The patient's evaluation involved:  ordering and/or review of 3+ test(s) in this encounter (see separate area of note for details)    The patient's management necessitated moderate risk (prescription drug management including medications given in the ED).    Assessment & Plan    Paroxysmal a fib noted by her Apple Watch multiple times up to rate of 130's in the pt with s/p AVR and CABG 2 weeks ago, on coumadine and has not missed the dose, POCUS without pericardial effusion or any acute abnormalities, labs ok, toprol 25 bid currently, D/W Cards-increase dose of  toprol to 50 bid then follow up with Cards in one week, pt was informed and ok with the plan.    I have reviewed the nursing notes. I have reviewed the findings, diagnosis, plan and need for follow up with the patient.    Discharge Medication List as of 6/25/2024  7:31 PM          Final diagnoses:   Paroxysmal atrial fibrillation (H)   Chronic anticoagulation   S/P AVR       I, Devon Irving, am serving as a trained medical scribe to document services personally performed by José Miguel Noonan MD based on the provider's statements to me on June 25, 2024.  This document has been checked and approved by the attending provider.    IJosé Miguel MD, was physically present and have reviewed and verified the accuracy of this note documented by Devon Irving, medical scribe.      José Miguel Noonan MD  [facility] EMERGENCY DEPARTMENT  6/25/2024        José Miguel Noonan MD  06/25/24 [id number]

## 2024-06-26 ENCOUNTER — ANTICOAGULATION THERAPY VISIT (OUTPATIENT)
Dept: ANTICOAGULATION | Facility: CLINIC | Age: 69
End: 2024-06-26
Payer: COMMERCIAL

## 2024-06-26 ENCOUNTER — HOSPITAL ENCOUNTER (OUTPATIENT)
Dept: CARDIAC REHAB | Facility: CLINIC | Age: 69
Discharge: HOME | End: 2024-06-26
Attending: STUDENT IN AN ORGANIZED HEALTH CARE EDUCATION/TRAINING PROGRAM
Payer: COMMERCIAL

## 2024-06-26 DIAGNOSIS — I25.10 CORONARY ARTERY DISEASE DUE TO CALCIFIED CORONARY LESION: ICD-10-CM

## 2024-06-26 DIAGNOSIS — I35.0 SEVERE AORTIC STENOSIS: Primary | ICD-10-CM

## 2024-06-26 DIAGNOSIS — I25.84 CORONARY ARTERY DISEASE DUE TO CALCIFIED CORONARY LESION: ICD-10-CM

## 2024-06-26 PROCEDURE — 93798 PHYS/QHP OP CAR RHAB W/ECG: CPT

## 2024-06-26 NOTE — PROGRESS NOTES
CARDIOTHORACIC SURGERY FOLLOW-UP VISIT     Won Urena   1955   [id number]      Reason for visit: Post-op clinic visit    ASSESSMENT/PLAN:  Won Urena is a 69 year old female with PMH of CAD, HTN, HLD, severe aortic stenosis, and DM2 who presented on 6/12/2024 for elective bioprosthetic AVR and CABG x2 (LIMA-LAD, rSVG-OM).  Her post-operative course was relatively uncomplicated. She discharged 6/17/2024. She presented to the Emergency Department 6/25/24 with concerns for atrial fibrillation and her beta blocker dose was increased.      Surgically doing well. Pain is overall controlled and improving. Midline sternal incision healing well without signs of infection. Increasing activity and strength.  Sounds to be in a  regular rhythm with HR <100 bpm.  Appears  euvolemic.    Possible atrial fibrillation plan: Apple watch data is a helpful starting point but not reliable enough to make medication changes off of, especially as patient has no formally documented atrial fibrillation on official monitoring (EKG, inpatient telemetry, Zio patch, etc). Patient has a regular rate and rhythm on thorough auscultation today, so EKG was deferred. If lower heart rate is truly 40 BPM, increasing metoprolol may not be a safe option for improved rate control. 7-day Zio patch monitor (mail out) prescribed today in clinic. Plan for follow up with general cardiology as scheduled and EP referral may be made if indicated. Patient is on anticoagulation as below.  Patient instructions for possible afib: If you have a recurrent episode of a rapid heart heart (>130 sustained for >30 minutes), or new symptoms including new onset chest pain, new shortness of breath, persistent dizziness, sweating that will not resolve, or other new symptoms - you should present for emergency care.   Continue to follow the instructions of your anticoagulation clinic. From a surgical standpoint, plan has been to continue anticoagulation for 3  months following surgery. However, if patient truly has afib and that is ongoing issue at that time, duration of anticoagulation may be need to extended.   Hypertensive in clinic today. However, this is not consistent with home readings nor readings at cardiac rehab. Continue to take daily blood pressure measurements until you see cardiology and keep a log. If persistently high, would recommend resuming losartan (patient had been on prior to surgery).  Medications reviewed and no changes were needed today. Updated chart to reflect metoprolol dose change from ED.  Ordered chest xray for after clinic. If concerning findings, we will reach out. Continue to monitor cough. If worsening cough, productive cough, fevers, or worsening shortness of breath occur, contact us or your PCP.   No indication for further diuretics.  Cardiology follow up scheduled on 7/10/2024.  Continue Outpatient Cardiac Rehab until completed.   Continue sternal precautions for 12 weeks from surgery date.   No driving for 4 weeks from surgery date.     Postoperative restrictions have been reviewed and all of the patient's questions were answered. Our contact information was given to the patient if he should have any further questions/concerns. No further follow up is needed with us.  The total time spent with the patient was 50 minutes, > 50% of which was spent in counseling and coordination of care.    Abril Gilbert PA-C  Cardiothoracic Surgery  __________________________________________________________________________________    HPI:  Won Urena is a 69 year old female with PMH of CAD, HTN, HLD, severe aortic stenosis, and DM2 who presented on 6/12/2024 for elective bioprosthetic AVR and CABG x2 (LIMA-LAD, rSVG-OM). Post-operative course was relatively unremarkable.  She discharged 6/17/2024. She presented to the Emergency Department 6/25/24 with concerns for atrial fibrillation based on her Apple Watch readings and ultimately her beta blocker  dose was increased.      Since her ED visit, she continues to feel an irregular heartbeat. Her spouse is a prior respiratory therapist and agrees with this on home assessment. Her heart rate is less than 100 bpm for the majority of the day, and maximum has been ~120 since increase of metoprolol. She does have readings on her Apple Watch that range from 40-170s but she is not sure how reliable these are. The increased metoprolol has helped with her symptoms of palpitations and anxiety. Her readings at cardiac rehab are noted to be normal sinus rhythm with PACs.  She feels pain is generally well-managed. She was initially needing oxycodone for pain management but she has now weaned off oxycodone.   Sleep is adequate. Participating in therapy.  Breathing has been stable/improving. Today Won feels she is somewhat short of breath with some wheezing. This is a new symptom and relatively mild. She has a new dry cough that is dry. Continues to work on [employer], able to reach 1250 on IS. Denies SOB at rest, PND, orthopnea.  Patient denies any fever, chills, edema, lightheadedness, nausea, and vomiting.    Patient is on warfarin. Her last INR was therapeutic.   Blood glucose levels have been under good control.  Has had a adequate appetite. Having a regular bowel movements and voiding without difficulty.   Denies  sternal popping or clicking or incisional drainage/erythema.   No psychiatric concerns.    ROS:  Review of symptoms otherwise negative unless commented about in HPI.     LABS:    CBC RESULTS:   Recent Labs   Lab Test 06/25/24  1652 06/19/24  1039 06/17/24  1203   WBC 6.8 5.1 5.3   HGB 11.0* 11.8 9.8*   HCT 33.5* 34.9* 29.9*    211 176       CMP RESULTS:  Recent Labs   Lab Test 06/25/24  1652 06/19/24  1039 06/17/24  1126 06/17/24  0745 06/17/24  0602 06/12/24  2154 06/12/24  2145    141  --   --  139   < > 142  142   POTASSIUM 4.1 3.6  --   --  3.7   < > 4.3  4.3   CHLORIDE 105 101  --   --  103   < >  110*  110*   CO2 24 26  --   --  26   < > 23  23   ANIONGAP 12 14  --   --  10   < > 9  9   * 164* 147*   < > 131*   < > 163*  163*   BUN 18.3 17.0  --   --  11.1   < > 16.4  16.4   CR 0.63 0.74  --   --  0.53   < > 0.55  0.55   GFRESTIMATED >90 88  --   --  >90   < > >90  >90   JOAQUINA 9.6 9.6  --   --  9.1   < > 8.5*  8.5*   BILITOTAL 0.3 0.5  --   --   --   --  0.6   ALKPHOS 85 118  --   --   --   --  32*   ALT 16 27  --   --   --   --  15   AST 23 38  --   --   --   --  49*    < > = values in this interval not displayed.     Recent Labs   Lab Test 06/25/24  1652 06/24/24  1220 06/19/24  1039 06/17/24  0602 06/16/24  1018   INR 2.28* 1.67* 1.42* 1.37* 1.14       IMAGING: Plan for 2 view CXR after clinic    PHYSICAL EXAM:   BP (!) 150/65 (BP Location: Right arm, Patient Position: Sitting, Cuff Size: Adult Regular)   Pulse 76   Wt 75.9 kg (167 lb 6.4 oz)   SpO2 97%   BMI 26.22 kg/m      General: alert and oriented x 3, pleasant, no acute distress, normal mood and affect  Neuro: no focal deficits   CV: S1 S2, regular rate and rhythm on thorough auscultation, no murmurs, rubs or gallops,  no peripheral edema  Pulm: bilateral breath sounds, clear to auscultation, unlabored breathing on room air  Incision: incisions clean dry and intact without erythema, swelling or drainage    Discharge weight: 164 lbs  Clinic visit weight: 167 lbs    PROCEDURES: None       CURRENT MEDICATIONS:   Current Outpatient Medications   Medication Sig Dispense Refill    acetaminophen (TYLENOL) 325 MG tablet Take 3 tablets (975 mg) by mouth every 6 hours as needed for pain (mild to moderate pain) 100 tablet 0    aspirin 81 MG EC tablet Take 81 mg by mouth every evening      atorvastatin (LIPITOR) 40 MG tablet Take 40 mg by mouth every evening      carboxymethylcellulose PF (REFRESH PLUS) 0.5 % ophthalmic solution Place 1 drop into both eyes 4 times daily 70 each 0    cyanocobalamin (VITAMIN B-12) 100 MCG tablet Take 100 mcg by  mouth every evening      dapagliflozin (FARXIGA) 10 MG TABS tablet TAKE 1 TABLET BY MOUTH IN THE  MORNING 100 tablet 1    estradiol (ESTRACE) 0.1 MG/GM vaginal cream INSERT 1/2 APPLICATORFUL  VAGINALLY TWICE WEEKLY 85 g 3    fenofibrate (TRIGLIDE/LOFIBRA) 160 MG tablet Take 1 tablet (160 mg) by mouth every evening 100 tablet 3    gabapentin (NEURONTIN) 100 MG capsule Take 2 capsules (200 mg) by mouth at bedtime 200 capsule 3    levocetirizine (XYZAL) 5 MG tablet Take 5 mg by mouth every evening      metFORMIN (GLUCOPHAGE) 500 MG tablet TAKE 2 TABLETS BY MOUTH  TWICE DAILY WITH MEALS 360 tablet 3    methocarbamol (ROBAXIN) 750 MG tablet Take 1 tablet (750 mg) by mouth every 6 hours 90 tablet 0    metoprolol tartrate (LOPRESSOR) 25 MG tablet Take 1 tablet (25 mg) by mouth 2 times daily 180 tablet 1    mometasone (NASONEX) 50 MCG/ACT nasal spray Spray 2 sprays into both nostrils daily 17 g 0    montelukast (SINGULAIR) 10 MG tablet Take 1 tablet (10 mg) by mouth at bedtime 100 tablet 2    multivitamin w/minerals (MULTI-VITAMIN) tablet Take 1 tablet by mouth every evening      oxyCODONE (ROXICODONE) 5 MG tablet Take 1 tablet (5 mg) by mouth every 6 hours as needed for moderate to severe pain 24 tablet 0    Semaglutide, 1 MG/DOSE, (OZEMPIC, 1 MG/DOSE,) 4 MG/3ML pen INJECT SUBCUTANEOUSLY 1 MG  EVERY WEEK 9 mL 2    senna-docusate (SENOKOT-S/PERICOLACE) 8.6-50 MG tablet 2 tablets by Oral or Feeding Tube route 2 times daily as needed for constipation 14 tablet 0    Vitamin D, Cholecalciferol, 10 MCG (400 UNIT) TABS Take 1 tablet by mouth every evening      warfarin ANTICOAGULANT (COUMADIN) 3 MG tablet Take 1 tablet (3 mg) by mouth daily Take every evening at the same time 10 tablet 0    warfarin ANTICOAGULANT (COUMADIN) 3 MG tablet Take 1 tablet (3 mg) to 1.5 tablets (4.5 mg) daily as directed by anticoagulation clinic. (Patient not taking: Reported on 6/20/2024) 130 tablet 1     No current facility-administered medications  for this visit.       CC  Nannette Gallagher

## 2024-06-26 NOTE — PROGRESS NOTES
ANTICOAGULATION MANAGEMENT     Won Urena 69 year old female is on warfarin with therapeutic INR result. (Goal INR 2.0-3.0)    Recent labs: (last 7 days)     06/25/24  1652   INR 2.28*       ASSESSMENT   Patient was in the ER on 6/25/24 for symptoms of afib.  Toprol dose was increased to 50mg BID.  Patient was discharged to home.    INR was up to 2.28 on 6/25/24 from 1.67 on 6/24/24.  Writer discusses plan with Liliana Pereira from pharmacy.         Additional findings: Recent heart valve replacement in last 10 weeks       PLAN     Recommended plan for ongoing change(s) affecting INR     Dosing Instructions: decrease your warfarin dose (5% change) with next INR in 2 days       Summary  As of 6/26/2024      Full warfarin instructions:  3 mg every Sun, Wed, Fri; 4.5 mg all other days   Next INR check:  6/28/2024               Detailed voice message left for Won with dosing instructions and follow up date.   Sent [employer] message with dosing and follow up instructions    Lab visit scheduled    Education provided:   Please call back if any changes to your diet, medications or how you've been taking warfarin  Contact [phone]  with any changes, questions or concerns.     Plan made per [facility] anticoagulation protocol and with Liliana Pereira Pharm D    Rina Stubbs RN  Anticoagulation Clinic  6/26/2024    _______________________________________________________________________     Anticoagulation Episode Summary       Current INR goal:  2.0-3.0   TTR:  --   Target end date:  9/19/2024   Send INR reminders to:  [facility]    Indications    Severe aortic stenosis [I35.0]  Coronary artery disease due to calcified coronary lesion [I25.10  I25.84]             Comments:               Anticoagulation Care Providers       Provider Role Specialty Phone number    Mitali Nicole MD Referring Thoracic Surgery [phone]    Nannette Gallagher MD Referring Family Medicine [phone]

## 2024-06-26 NOTE — DISCHARGE INSTRUCTIONS
Please increase the dose of toprol to 25 mg two tabs twice a day from one tab twice a day and make an appointment to follow up with Cardiology Clinic (phone: [phone]) in 7-10 days even if entirely better.

## 2024-06-28 ENCOUNTER — ANCILLARY PROCEDURE (OUTPATIENT)
Dept: GENERAL RADIOLOGY | Facility: CLINIC | Age: 69
End: 2024-06-28
Payer: COMMERCIAL

## 2024-06-28 ENCOUNTER — ORDERS ONLY (AUTO-RELEASED) (OUTPATIENT)
Dept: CARDIOLOGY | Facility: CLINIC | Age: 69
End: 2024-06-28
Payer: COMMERCIAL

## 2024-06-28 ENCOUNTER — OFFICE VISIT (OUTPATIENT)
Dept: CARDIOLOGY | Facility: CLINIC | Age: 69
End: 2024-06-28
Attending: STUDENT IN AN ORGANIZED HEALTH CARE EDUCATION/TRAINING PROGRAM
Payer: COMMERCIAL

## 2024-06-28 ENCOUNTER — LAB (OUTPATIENT)
Dept: LAB | Facility: CLINIC | Age: 69
End: 2024-06-28
Payer: COMMERCIAL

## 2024-06-28 ENCOUNTER — ANTICOAGULATION THERAPY VISIT (OUTPATIENT)
Dept: ANTICOAGULATION | Facility: CLINIC | Age: 69
End: 2024-06-28

## 2024-06-28 ENCOUNTER — HOSPITAL ENCOUNTER (OUTPATIENT)
Dept: CARDIAC REHAB | Facility: CLINIC | Age: 69
Discharge: HOME | End: 2024-06-28
Attending: STUDENT IN AN ORGANIZED HEALTH CARE EDUCATION/TRAINING PROGRAM
Payer: COMMERCIAL

## 2024-06-28 VITALS
HEART RATE: 76 BPM | DIASTOLIC BLOOD PRESSURE: 65 MMHG | SYSTOLIC BLOOD PRESSURE: 150 MMHG | BODY MASS INDEX: 26.22 KG/M2 | OXYGEN SATURATION: 97 % | WEIGHT: 167.4 LBS

## 2024-06-28 DIAGNOSIS — Z95.1 S/P CABG (CORONARY ARTERY BYPASS GRAFT): ICD-10-CM

## 2024-06-28 DIAGNOSIS — I35.0 SEVERE AORTIC STENOSIS: ICD-10-CM

## 2024-06-28 DIAGNOSIS — I25.10 CORONARY ARTERY DISEASE DUE TO CALCIFIED CORONARY LESION: ICD-10-CM

## 2024-06-28 DIAGNOSIS — I25.84 CORONARY ARTERY DISEASE DUE TO CALCIFIED CORONARY LESION: ICD-10-CM

## 2024-06-28 DIAGNOSIS — I35.0 SEVERE AORTIC STENOSIS: Primary | ICD-10-CM

## 2024-06-28 DIAGNOSIS — I25.10 CORONARY ARTERY DISEASE INVOLVING NATIVE CORONARY ARTERY OF NATIVE HEART WITHOUT ANGINA PECTORIS: ICD-10-CM

## 2024-06-28 DIAGNOSIS — R30.0 DYSURIA: ICD-10-CM

## 2024-06-28 DIAGNOSIS — Z95.1 S/P CABG (CORONARY ARTERY BYPASS GRAFT): Primary | ICD-10-CM

## 2024-06-28 LAB — INR PPP: 2.28 (ref 0.85–1.15)

## 2024-06-28 PROCEDURE — 71046 X-RAY EXAM CHEST 2 VIEWS: CPT | Performed by: RADIOLOGY

## 2024-06-28 PROCEDURE — 93798 PHYS/QHP OP CAR RHAB W/ECG: CPT

## 2024-06-28 PROCEDURE — 99024 POSTOP FOLLOW-UP VISIT: CPT

## 2024-06-28 PROCEDURE — 36415 COLL VENOUS BLD VENIPUNCTURE: CPT | Performed by: PATHOLOGY

## 2024-06-28 PROCEDURE — G0463 HOSPITAL OUTPT CLINIC VISIT: HCPCS

## 2024-06-28 PROCEDURE — 85610 PROTHROMBIN TIME: CPT | Performed by: PATHOLOGY

## 2024-06-28 RX ORDER — METOPROLOL TARTRATE 25 MG/1
50 TABLET, FILM COATED ORAL 2 TIMES DAILY
Qty: 180 TABLET | Refills: 1 | Status: SHIPPED | OUTPATIENT
Start: 2024-06-28 | End: 2024-07-25

## 2024-06-28 ASSESSMENT — PAIN SCALES - GENERAL: PAINLEVEL: MODERATE PAIN (5)

## 2024-06-28 NOTE — LETTER
6/28/2024      RE: Won Urena  [address]  [address]  [facility] [address]       Dear Colleague,    Thank you for the opportunity to participate in the care of your patient, Won Urena, at the [facility] [place] [facility]. Please see a copy of my visit note below.    CARDIOTHORACIC SURGERY FOLLOW-UP VISIT     Won Urena   1955   [id number]      Reason for visit: Post-op clinic visit    ASSESSMENT/PLAN:  Won Urena is a 69 year old female with PMH of CAD, HTN, HLD, severe aortic stenosis, and DM2 who presented on 6/12/2024 for elective bioprosthetic AVR and CABG x2 (LIMA-LAD, rSVG-OM).  Her post-operative course was relatively uncomplicated. She discharged 6/17/2024. She presented to the Emergency Department 6/25/24 with concerns for atrial fibrillation and her beta blocker dose was increased.      Surgically doing well. Pain is overall controlled and improving. Midline sternal incision healing well without signs of infection. Increasing activity and strength.  Sounds to be in a  regular rhythm with HR <100 bpm.  Appears  euvolemic.    Possible atrial fibrillation plan: Apple watch data is a helpful starting point but not reliable enough to make medication changes off of, especially as patient has no formally documented atrial fibrillation on official monitoring (EKG, inpatient telemetry, Zio patch, etc). Patient has a regular rate and rhythm on thorough auscultation today, so EKG was deferred. If lower heart rate is truly 40 BPM, increasing metoprolol may not be a safe option for improved rate control. 7-day Zio patch monitor (mail out) prescribed today in clinic. Plan for follow up with general cardiology as scheduled and EP referral may be made if indicated. Patient is on anticoagulation as below.  Patient instructions for possible afib: If you have a recurrent episode of a rapid heart heart (>130 sustained for >30  minutes), or new symptoms including new onset chest pain, new shortness of breath, persistent dizziness, sweating that will not resolve, or other new symptoms - you should present for emergency care.   Continue to follow the instructions of your anticoagulation clinic. From a surgical standpoint, plan has been to continue anticoagulation for 3 months following surgery. However, if patient truly has afib and that is ongoing issue at that time, duration of anticoagulation may be need to extended.   Hypertensive in clinic today. However, this is not consistent with home readings nor readings at cardiac rehab. Continue to take daily blood pressure measurements until you see cardiology and keep a log. If persistently high, would recommend resuming losartan (patient had been on prior to surgery).  Medications reviewed and no changes were needed today. Updated chart to reflect metoprolol dose change from ED.  Ordered chest xray for after clinic. If concerning findings, we will reach out. Continue to monitor cough. If worsening cough, productive cough, fevers, or worsening shortness of breath occur, contact us or your PCP.   No indication for further diuretics.  Cardiology follow up scheduled on 7/10/2024.  Continue Outpatient Cardiac Rehab until completed.   Continue sternal precautions for 12 weeks from surgery date.   No driving for 4 weeks from surgery date.     Postoperative restrictions have been reviewed and all of the patient's questions were answered. Our contact information was given to the patient if he should have any further questions/concerns. No further follow up is needed with us.  The total time spent with the patient was 50 minutes, > 50% of which was spent in counseling and coordination of care.    Abril Gilbert PA-C  Cardiothoracic Surgery  __________________________________________________________________________________    HPI:  Won Urena is a 69 year old female with PMH of CAD, HTN, HLD, severe  aortic stenosis, and DM2 who presented on 6/12/2024 for elective bioprosthetic AVR and CABG x2 (LIMA-LAD, rSVG-OM). Post-operative course was relatively unremarkable.  She discharged 6/17/2024. She presented to the Emergency Department 6/25/24 with concerns for atrial fibrillation based on her Apple Watch readings and ultimately her beta blocker dose was increased.      Since her ED visit, she continues to feel an irregular heartbeat. Her spouse is a prior respiratory therapist and agrees with this on home assessment. Her heart rate is less than 100 bpm for the majority of the day, and maximum has been ~120 since increase of metoprolol. She does have readings on her Apple Watch that range from 40-170s but she is not sure how reliable these are. The increased metoprolol has helped with her symptoms of palpitations and anxiety. Her readings at cardiac rehab are noted to be normal sinus rhythm with PACs.  She feels pain is generally well-managed. She was initially needing oxycodone for pain management but she has now weaned off oxycodone.   Sleep is adequate. Participating in therapy.  Breathing has been stable/improving. Today Won feels she is somewhat short of breath with some wheezing. This is a new symptom and relatively mild. She has a new dry cough that is dry. Continues to work on [employer], able to reach 1250 on IS. Denies SOB at rest, PND, orthopnea.  Patient denies any fever, chills, edema, lightheadedness, nausea, and vomiting.    Patient is on warfarin. Her last INR was therapeutic.   Blood glucose levels have been under good control.  Has had a adequate appetite. Having a regular bowel movements and voiding without difficulty.   Denies  sternal popping or clicking or incisional drainage/erythema.   No psychiatric concerns.    ROS:  Review of symptoms otherwise negative unless commented about in HPI.     LABS:    CBC RESULTS:   Recent Labs   Lab Test 06/25/24  1652 06/19/24  1039 06/17/24  1203   WBC 6.8 5.1  5.3   HGB 11.0* 11.8 9.8*   HCT 33.5* 34.9* 29.9*    211 176       CMP RESULTS:  Recent Labs   Lab Test 06/25/24  1652 06/19/24  1039 06/17/24  1126 06/17/24  0745 06/17/24  0602 06/12/24  2154 06/12/24  2145    141  --   --  139   < > 142  142   POTASSIUM 4.1 3.6  --   --  3.7   < > 4.3  4.3   CHLORIDE 105 101  --   --  103   < > 110*  110*   CO2 24 26  --   --  26   < > 23  23   ANIONGAP 12 14  --   --  10   < > 9  9   * 164* 147*   < > 131*   < > 163*  163*   BUN 18.3 17.0  --   --  11.1   < > 16.4  16.4   CR 0.63 0.74  --   --  0.53   < > 0.55  0.55   GFRESTIMATED >90 88  --   --  >90   < > >90  >90   JOAQUINA 9.6 9.6  --   --  9.1   < > 8.5*  8.5*   BILITOTAL 0.3 0.5  --   --   --   --  0.6   ALKPHOS 85 118  --   --   --   --  32*   ALT 16 27  --   --   --   --  15   AST 23 38  --   --   --   --  49*    < > = values in this interval not displayed.     Recent Labs   Lab Test 06/25/24  1652 06/24/24  1220 06/19/24  1039 06/17/24  0602 06/16/24  1018   INR 2.28* 1.67* 1.42* 1.37* 1.14       IMAGING: Plan for 2 view CXR after clinic    PHYSICAL EXAM:   BP (!) 150/65 (BP Location: Right arm, Patient Position: Sitting, Cuff Size: Adult Regular)   Pulse 76   Wt 75.9 kg (167 lb 6.4 oz)   SpO2 97%   BMI 26.22 kg/m      General: alert and oriented x 3, pleasant, no acute distress, normal mood and affect  Neuro: no focal deficits   CV: S1 S2, regular rate and rhythm on thorough auscultation, no murmurs, rubs or gallops,  no peripheral edema  Pulm: bilateral breath sounds, clear to auscultation, unlabored breathing on room air  Incision: incisions clean dry and intact without erythema, swelling or drainage    Discharge weight: 164 lbs  Clinic visit weight: 167 lbs    PROCEDURES: None       CURRENT MEDICATIONS:   Current Outpatient Medications   Medication Sig Dispense Refill    acetaminophen (TYLENOL) 325 MG tablet Take 3 tablets (975 mg) by mouth every 6 hours as needed for pain (mild to  moderate pain) 100 tablet 0    aspirin 81 MG EC tablet Take 81 mg by mouth every evening      atorvastatin (LIPITOR) 40 MG tablet Take 40 mg by mouth every evening      carboxymethylcellulose PF (REFRESH PLUS) 0.5 % ophthalmic solution Place 1 drop into both eyes 4 times daily 70 each 0    cyanocobalamin (VITAMIN B-12) 100 MCG tablet Take 100 mcg by mouth every evening      dapagliflozin (FARXIGA) 10 MG TABS tablet TAKE 1 TABLET BY MOUTH IN THE  MORNING 100 tablet 1    estradiol (ESTRACE) 0.1 MG/GM vaginal cream INSERT 1/2 APPLICATORFUL  VAGINALLY TWICE WEEKLY 85 g 3    fenofibrate (TRIGLIDE/LOFIBRA) 160 MG tablet Take 1 tablet (160 mg) by mouth every evening 100 tablet 3    gabapentin (NEURONTIN) 100 MG capsule Take 2 capsules (200 mg) by mouth at bedtime 200 capsule 3    levocetirizine (XYZAL) 5 MG tablet Take 5 mg by mouth every evening      metFORMIN (GLUCOPHAGE) 500 MG tablet TAKE 2 TABLETS BY MOUTH  TWICE DAILY WITH MEALS 360 tablet 3    methocarbamol (ROBAXIN) 750 MG tablet Take 1 tablet (750 mg) by mouth every 6 hours 90 tablet 0    metoprolol tartrate (LOPRESSOR) 25 MG tablet Take 1 tablet (25 mg) by mouth 2 times daily 180 tablet 1    mometasone (NASONEX) 50 MCG/ACT nasal spray Spray 2 sprays into both nostrils daily 17 g 0    montelukast (SINGULAIR) 10 MG tablet Take 1 tablet (10 mg) by mouth at bedtime 100 tablet 2    multivitamin w/minerals (MULTI-VITAMIN) tablet Take 1 tablet by mouth every evening      oxyCODONE (ROXICODONE) 5 MG tablet Take 1 tablet (5 mg) by mouth every 6 hours as needed for moderate to severe pain 24 tablet 0    Semaglutide, 1 MG/DOSE, (OZEMPIC, 1 MG/DOSE,) 4 MG/3ML pen INJECT SUBCUTANEOUSLY 1 MG  EVERY WEEK 9 mL 2    senna-docusate (SENOKOT-S/PERICOLACE) 8.6-50 MG tablet 2 tablets by Oral or Feeding Tube route 2 times daily as needed for constipation 14 tablet 0    Vitamin D, Cholecalciferol, 10 MCG (400 UNIT) TABS Take 1 tablet by mouth every evening      warfarin ANTICOAGULANT  (COUMADIN) 3 MG tablet Take 1 tablet (3 mg) by mouth daily Take every evening at the same time 10 tablet 0    warfarin ANTICOAGULANT (COUMADIN) 3 MG tablet Take 1 tablet (3 mg) to 1.5 tablets (4.5 mg) daily as directed by anticoagulation clinic. (Patient not taking: Reported on 6/20/2024) 130 tablet 1     No current facility-administered medications for this visit.       IRA Gallagher

## 2024-06-28 NOTE — PATIENT INSTRUCTIONS
Possible atrial fibrillation plan: Apple watch data is a helpful starting point but not reliable enough to make medication changes off of. 7-day Zio patch monitor (mail out) prescribed today in clinic. If you have a recurrent episode of a rapid heart heart (>130 sustained for >30 minutes), or new symptoms including new onset chest pain, new shortness of breath, persistent dizziness, sweating that will not resolve, or other new symptoms - you should present for emergency care.   Continue to follow the instructions of your anticoagulation clinic. From a surgical standpoint, plan has been to continue anticoagulation for 3 months following surgery. However, if patient truly has afib and that is ongoing issue at that time, duration of anticoagulation may be need to extended.   Hypertensive in clinic today. However, this is not consistent with home readings nor readings at cardiac rehab. Continue to take daily blood pressure measurements until you see cardiology and keep a log. If persistently high, would recommend resuming losartan (patient had been on prior to surgery).  Medications reviewed and no changes were needed today. Updated chart to reflect metoprolol dose change from ED.  Ordered chest xray for after clinic. If concerning findings, we will reach out to you.   No indication for further diuretics.  Cardiology follow up scheduled on 7/10/2024.  Continue Outpatient Cardiac Rehab until completed.   Continue sternal precautions for 12 weeks from surgery date.   No driving for 4 weeks from surgery date.

## 2024-06-28 NOTE — PROGRESS NOTES
ANTICOAGULATION MANAGEMENT     Won Urena 69 year old female is on warfarin with therapeutic INR result. (Goal INR 2.0-3.0)    Recent labs: (last 7 days)     06/28/24  1527   INR 2.28*       ASSESSMENT     Warfarin Lab Questionnaire    Warfarin Doses Last 7 Days      6/27/2024     8:44 PM   Dose in Tablet or Mg   TAB or MG? milligram (mg)     Pt Rptd Dose SUNDAY MONDAY TUESDAY WED THURS FRIDAY SATURDAY 6/27/2024   8:44 PM 4.5 3 4.5 3 4.5 3 3         6/27/2024   Warfarin Lab Questionnaire   Missed doses within past 14 days? No   Changes in diet or alcohol within past 14 days? No   Medication changes since last result? Yes   Please list: Incrrased metoprolol from 25mg bid to 50 mg bid   Injuries or illness since last result? Yes   If yes, please explain: Attial fib and irregular heart rate   New shortness of breath, severe headaches or sudden changes in vision since last result? No   Abnormal bleeding since last result? No   Upcoming surgery, procedure? No   Best number to call with results? [phone]        Previous result: Therapeutic last visit; previously outside of goal range  Additional findings: None       PLAN     Recommended plan for no diet, medication or health factor changes affecting INR     Dosing Instructions: Increase your warfarin dose (5.6% change) with next INR in 4 days       Summary  As of 6/28/2024      Full warfarin instructions:  3 mg every Sun, Wed; 4.5 mg all other days   Next INR check:  7/2/2024               Telephone call with Won who verbalizes understanding and agrees to plan and who agrees to plan and repeated back plan correctly  Sent [employer] message with dosing and follow up instructions    Patient offered & declined to schedule next visit    Education provided: Contact [phone] with any changes, questions or concerns.     Plan made per [facility] anticoagulation protocol    Nidhi Hong RN  Anticoagulation  Clinic  6/28/2024    _______________________________________________________________________     Anticoagulation Episode Summary       Current INR goal:  2.0-3.0   TTR:  100.0% (1 d)   Target end date:  9/19/2024   Send INR reminders to:  [facility]    Indications    Severe aortic stenosis [I35.0]  Coronary artery disease due to calcified coronary lesion [I25.10  I25.84]             Comments:               Anticoagulation Care Providers       Provider Role Specialty Phone number    Mitali Nicole MD Referring Thoracic Surgery [phone]    Nannette Gallagher MD Referring Family Medicine [phone]

## 2024-07-01 ENCOUNTER — HOSPITAL ENCOUNTER (OUTPATIENT)
Dept: CARDIAC REHAB | Facility: CLINIC | Age: 69
Discharge: HOME | End: 2024-07-01
Attending: STUDENT IN AN ORGANIZED HEALTH CARE EDUCATION/TRAINING PROGRAM
Payer: COMMERCIAL

## 2024-07-01 PROCEDURE — 93798 PHYS/QHP OP CAR RHAB W/ECG: CPT

## 2024-07-02 ENCOUNTER — ANTICOAGULATION THERAPY VISIT (OUTPATIENT)
Dept: ANTICOAGULATION | Facility: CLINIC | Age: 69
End: 2024-07-02

## 2024-07-02 ENCOUNTER — LAB (OUTPATIENT)
Dept: LAB | Facility: CLINIC | Age: 69
End: 2024-07-02
Payer: COMMERCIAL

## 2024-07-02 DIAGNOSIS — I25.84 CORONARY ARTERY DISEASE DUE TO CALCIFIED CORONARY LESION: ICD-10-CM

## 2024-07-02 DIAGNOSIS — I35.0 SEVERE AORTIC STENOSIS: ICD-10-CM

## 2024-07-02 DIAGNOSIS — I25.10 CORONARY ARTERY DISEASE DUE TO CALCIFIED CORONARY LESION: ICD-10-CM

## 2024-07-02 DIAGNOSIS — I35.0 SEVERE AORTIC STENOSIS: Primary | ICD-10-CM

## 2024-07-02 DIAGNOSIS — R30.0 DYSURIA: ICD-10-CM

## 2024-07-02 LAB — INR PPP: 2.1 (ref 0.85–1.15)

## 2024-07-02 PROCEDURE — 36415 COLL VENOUS BLD VENIPUNCTURE: CPT | Performed by: PATHOLOGY

## 2024-07-02 PROCEDURE — 85610 PROTHROMBIN TIME: CPT | Performed by: PATHOLOGY

## 2024-07-02 NOTE — PROGRESS NOTES
ANTICOAGULATION MANAGEMENT     Won Urena 69 year old female is on warfarin with therapeutic INR result. (Goal INR 2.0-3.0)    Recent labs: (last 7 days)     07/02/24  1259   INR 2.10*       ASSESSMENT     Warfarin Lab Questionnaire    Warfarin Doses Last 7 Days      7/1/2024     5:23 PM   Dose in Tablet or Mg   TAB or MG? milligram (mg)     Pt Rptd Dose SUNDAY MONDAY TUESDAY WED THURS FRIDAY SATURDAY 7/1/2024   5:23 PM 3 4.5 4.5 3 4.5 4.5 4.5         7/1/2024   Warfarin Lab Questionnaire   Missed doses within past 14 days? No   Changes in diet or alcohol within past 14 days? No   Medication changes since last result? No   Injuries or illness since last result? No   New shortness of breath, severe headaches or sudden changes in vision since last result? No   Abnormal bleeding since last result? No   Upcoming surgery, procedure? No   Best number to call with results? [phone]        Previous result: Therapeutic last 2(+) visits  Additional findings: None       PLAN     Recommended plan for no diet, medication or health factor changes affecting INR     Dosing Instructions: Continue your current warfarin dose with next INR in 1 week       Summary  As of 7/2/2024      Full warfarin instructions:  3 mg every Sun, Wed; 4.5 mg all other days   Next INR check:  7/9/2024               Telephone call with Won who verbalizes understanding and agrees to plan and who agrees to plan and repeated back plan correctly    Lab visit scheduled    Education provided: Taking warfarin: Importance of taking warfarin as instructed  Goal range and lab monitoring: goal range and significance of current result and Importance of therapeutic range    Plan made per [facility] anticoagulation protocol    Rina Stubbs, RN  Anticoagulation Clinic  7/2/2024    _______________________________________________________________________     Anticoagulation Episode Summary       Current INR goal:  2.0-3.0   TTR:  100.0% (5 d)   Target end date:   9/19/2024   Send INR reminders to:  [facility]    Indications    Severe aortic stenosis [I35.0]  Coronary artery disease due to calcified coronary lesion [I25.10  I25.84]             Comments:               Anticoagulation Care Providers       Provider Role Specialty Phone number    Mitali Nicole MD Referring Thoracic Surgery [phone]    Nannette Gallagher MD Referring Family Medicine [phone]

## 2024-07-03 ENCOUNTER — HOSPITAL ENCOUNTER (OUTPATIENT)
Dept: CARDIAC REHAB | Facility: CLINIC | Age: 69
Discharge: HOME | End: 2024-07-03
Attending: STUDENT IN AN ORGANIZED HEALTH CARE EDUCATION/TRAINING PROGRAM
Payer: COMMERCIAL

## 2024-07-03 PROCEDURE — 93798 PHYS/QHP OP CAR RHAB W/ECG: CPT

## 2024-07-05 ENCOUNTER — HOSPITAL ENCOUNTER (OUTPATIENT)
Dept: CARDIAC REHAB | Facility: CLINIC | Age: 69
Discharge: HOME | End: 2024-07-05
Attending: STUDENT IN AN ORGANIZED HEALTH CARE EDUCATION/TRAINING PROGRAM
Payer: COMMERCIAL

## 2024-07-05 PROCEDURE — 93798 PHYS/QHP OP CAR RHAB W/ECG: CPT

## 2024-07-08 ENCOUNTER — ANTICOAGULATION THERAPY VISIT (OUTPATIENT)
Dept: ANTICOAGULATION | Facility: CLINIC | Age: 69
End: 2024-07-08

## 2024-07-08 ENCOUNTER — HOSPITAL ENCOUNTER (OUTPATIENT)
Dept: CARDIAC REHAB | Facility: CLINIC | Age: 69
Discharge: HOME | End: 2024-07-08
Attending: STUDENT IN AN ORGANIZED HEALTH CARE EDUCATION/TRAINING PROGRAM
Payer: COMMERCIAL

## 2024-07-08 ENCOUNTER — LAB (OUTPATIENT)
Dept: LAB | Facility: CLINIC | Age: 69
End: 2024-07-08
Payer: COMMERCIAL

## 2024-07-08 DIAGNOSIS — I25.10 CORONARY ARTERY DISEASE DUE TO CALCIFIED CORONARY LESION: ICD-10-CM

## 2024-07-08 DIAGNOSIS — I25.84 CORONARY ARTERY DISEASE DUE TO CALCIFIED CORONARY LESION: ICD-10-CM

## 2024-07-08 DIAGNOSIS — I35.0 SEVERE AORTIC STENOSIS: ICD-10-CM

## 2024-07-08 DIAGNOSIS — I35.0 SEVERE AORTIC STENOSIS: Primary | ICD-10-CM

## 2024-07-08 LAB — INR PPP: 3.16 (ref 0.85–1.15)

## 2024-07-08 PROCEDURE — 93798 PHYS/QHP OP CAR RHAB W/ECG: CPT

## 2024-07-08 PROCEDURE — 36415 COLL VENOUS BLD VENIPUNCTURE: CPT

## 2024-07-08 PROCEDURE — 85610 PROTHROMBIN TIME: CPT

## 2024-07-08 NOTE — PROGRESS NOTES
ANTICOAGULATION MANAGEMENT     Won Urena 69 year old female is on warfarin with supratherapeutic INR result. (Goal INR 2.0-3.0)    Recent labs: (last 7 days)     07/08/24  1408   INR 3.16*       ASSESSMENT     Warfarin Lab Questionnaire    Warfarin Doses Last 7 Days    Pt Rptd Dose SUNDAY MONDAY TUESDAY WED THURS FRIDAY SATURDAY 7/7/2024   9:48 AM 3 4.5 4.5 4.5 3 4.5 4.5         7/7/2024   Warfarin Lab Questionnaire   Missed doses within past 14 days? No   Changes in diet or alcohol within past 14 days? No   Medication changes since last result? No   Injuries or illness since last result? No   New shortness of breath, severe headaches or sudden changes in vision since last result? No   Abnormal bleeding since last result? No   Upcoming surgery, procedure? No   Best number to call with results? [phone]        Previous result: Therapeutic last 2(+) visits  Additional findings: Recent heart valve replacement in last 10 weeks       PLAN     Recommended plan for no diet, medication or health factor changes and recent heart valve replacement last 10 weeks affecting INR     Dosing Instructions: Continue your current warfarin dose, with dose adjusted so gets smaller dose today, with next INR in 1 week       Summary  As of 7/8/2024      Full warfarin instructions:  3 mg every Mon, Thu; 4.5 mg all other days   Next INR check:  7/15/2024               Telephone call with Won who agrees to plan and repeated back plan correctly    Lab visit scheduled    Education provided: Goal range and lab monitoring: goal range and significance of current result and Importance of therapeutic range    Plan made per [facility] anticoagulation protocol    Kristen Restrepo RN  Anticoagulation Clinic  7/8/2024    _______________________________________________________________________     Anticoagulation Episode Summary       Current INR goal:  2.0-3.0   TTR:  92.1% (1.6 wk)   Target end date:  9/19/2024   Send INR reminders to:  [facility]    Indications    Severe aortic stenosis [I35.0]  Coronary artery disease due to calcified coronary lesion [I25.10  I25.84]             Comments:               Anticoagulation Care Providers       Provider Role Specialty Phone number    Mitali Nicole MD Referring Thoracic Surgery [phone]    Nannette Gallagher MD Referring Family Medicine [phone]

## 2024-07-09 NOTE — PROGRESS NOTES
[facility] Cardiology - [facility] [place]   Cardiology Clinic Note      HPI:   Ms. Won Urena is a 69 year old female with medical history pertinent for multivessel CAD s/p CABG x2 (LIMA-LAD, SVG-OM, 6/12/24), aortic stenosis s/p AVR (6/2024), HLD, HTN, ascending aortic aneurysm, and T2DM. She presents to cardiology clinic for follow up.    Interval History 4/10/24:  Recent ED visit for SVT, resolved with valsava maneuver. Troponin slightly elevated by flat 25 ->20. Won has started exercising more, goes to the gym with a , does [employer], [place] classes, etc. She feels occasionally SOB with exertion, not at rest. She has to work hard to get heart rate above 143. Denies dizziness or syncope. Home /80s.    Interval History 07/10/24  Since our last clinic visit, Won underwent the above named surgeries with uneventful post-op course. She presented to [facility] ED on 6/25 with racing heart and Apple watch alert for AF. EKG in ED showed NSR with bifascicular block, echo unremarkable, her metoprolol dose was increased to 50mg BID. She was seen by CVTS in clinic and a Ziopatch was ordered, results pending.  Today in clinic she reports feeling well. She has been working with cardiac rehab. She still notes occasional palpitations and feeling her heart beat, and is anxious about potential AF. Denies chaitanya CP, shortness of breath, or LE edema.  Home -120/60s      PAST MEDICAL HISTORY:  Past Medical History:   Diagnosis Date    Abnormal Pap smear of cervix     Aortic stenosis     Aortic valve stenosis     Benign colon polyp     CAD (coronary artery disease)     Dermatochalasis of both upper eyelids     Diabetes mellitus, type 2 (H)     Gestational diabetes     HTN (hypertension)     Hyperlipidemia LDL goal <100     Metal foreign body in chest     Retained atrial temporary pacing wire    Nonsenile cataract 2022    Very beginnings    Post-menopausal atrophic vaginitis     Ptosis of both eyelids     Thickened  endometrium        FAMILY HISTORY:  Family History   Problem Relation Age of Onset    Lung Cancer Mother         Small cell, did smoked    Diabetes Mother     Hypertension Mother     Goiter Mother     Mental Illness Mother         likely bipolar    Coronary Artery Disease Mother 42        MI, smoked    Hyperlipidemia Mother     Thyroid Disease Mother         Goiter    Obesity Mother         most of family is obese    Glaucoma Father     Atrial fibrillation Father     Hypertension Father     Parkinsonism Father     Hyperlipidemia Father     Diabetes Sister     Breast Cancer Sister 60    Hypertension Sister     Hypertension Brother     Cancer Maternal Grandmother         gynecologic cancer, unknown type    Uterine Cancer Maternal Grandmother     Chronic Obstructive Pulmonary Disease Paternal Grandmother     Uterine Cancer Paternal Grandmother     Stomach Cancer Paternal Grandfather     Colon Cancer Paternal Grandfather     Bipolar Disorder Son     Mental Illness Son     Depression Son     Macular Degeneration No family hx of     Anesthesia Reaction No family hx of     Venous thrombosis No family hx of        SOCIAL HISTORY:  Social History     Socioeconomic History    Marital status:    Tobacco Use    Smoking status: Never    Smokeless tobacco: Never   Vaping Use    Vaping Use: Never used   Substance and Sexual Activity    Alcohol use: Yes     Alcohol/week: 1.0 standard drink of alcohol     Types: 1 Standard drinks or equivalent per week     Comment: less than 2 drinks a week    Drug use: Never    Sexual activity: Not Currently     Partners: Male     Birth control/protection: Post-menopausal   Social History Narrative    Lives with     Two son    One son, daughter-in-law, and grandson live in [place]    One son, daughter-in-law live in the same [facility]    Gan in [place], [place]       CURRENT MEDICATIONS:  Current Outpatient Medications   Medication Sig Dispense Refill    acetaminophen  (TYLENOL) 325 MG tablet Take 3 tablets (975 mg) by mouth every 6 hours as needed for pain (mild to moderate pain) 100 tablet 0    aspirin 81 MG EC tablet Take 81 mg by mouth every evening      atorvastatin (LIPITOR) 40 MG tablet Take 40 mg by mouth every evening      carboxymethylcellulose PF (REFRESH PLUS) 0.5 % ophthalmic solution Place 1 drop into both eyes 4 times daily 70 each 0    cyanocobalamin (VITAMIN B-12) 100 MCG tablet Take 100 mcg by mouth every evening      dapagliflozin (FARXIGA) 10 MG TABS tablet TAKE 1 TABLET BY MOUTH IN THE  MORNING 100 tablet 1    estradiol (ESTRACE) 0.1 MG/GM vaginal cream INSERT 1/2 APPLICATORFUL  VAGINALLY TWICE WEEKLY 85 g 3    fenofibrate (TRIGLIDE/LOFIBRA) 160 MG tablet Take 1 tablet (160 mg) by mouth every evening 100 tablet 3    gabapentin (NEURONTIN) 100 MG capsule Take 2 capsules (200 mg) by mouth at bedtime 200 capsule 3    levocetirizine (XYZAL) 5 MG tablet Take 5 mg by mouth every evening      metFORMIN (GLUCOPHAGE) 500 MG tablet TAKE 2 TABLETS BY MOUTH  TWICE DAILY WITH MEALS 360 tablet 3    methocarbamol (ROBAXIN) 750 MG tablet Take 1 tablet (750 mg) by mouth every 6 hours 90 tablet 0    metoprolol tartrate (LOPRESSOR) 25 MG tablet Take 2 tablets (50 mg) by mouth 2 times daily 180 tablet 1    mometasone (NASONEX) 50 MCG/ACT nasal spray Spray 2 sprays into both nostrils daily 17 g 0    montelukast (SINGULAIR) 10 MG tablet Take 1 tablet (10 mg) by mouth at bedtime 100 tablet 2    multivitamin w/minerals (MULTI-VITAMIN) tablet Take 1 tablet by mouth every evening      Semaglutide, 1 MG/DOSE, (OZEMPIC, 1 MG/DOSE,) 4 MG/3ML pen INJECT SUBCUTANEOUSLY 1 MG  EVERY WEEK 9 mL 2    Vitamin D, Cholecalciferol, 10 MCG (400 UNIT) TABS Take 1 tablet by mouth every evening      warfarin ANTICOAGULANT (COUMADIN) 3 MG tablet Take 1 tablet (3 mg) to 1.5 tablets (4.5 mg) daily as directed by anticoagulation clinic. (Patient not taking: Reported on 6/20/2024) 130 tablet 1    warfarin  ANTICOAGULANT (COUMADIN) 3 MG tablet Take 1 tablet (3 mg) by mouth daily Take every evening at the same time 10 tablet 0     No current facility-administered medications for this visit.       ROS:   Refer to HPI    EXAM:  /80 (BP Location: Right arm, Patient Position: Chair, Cuff Size: Adult Regular)   Pulse 59   Wt 74.8 kg (165 lb)   SpO2 98%   BMI 25.84 kg/m    GENERAL: Appears comfortable, in no acute distress.   HEENT: Eye symmetrical, no discharge or icterus bilaterally. Mucous membranes moist and without lesions.  CV: RRR, +S1S2, holosystolic murmur. JVP not elevated   RESPIRATORY: Respirations regular, even, and unlabored. Lungs CTA throughout.   GI: Soft and non distended with normoactive bowel sounds present in all quadrants. No tenderness, rebound, guarding.  EXTREMITIES: No peripheral edema. 2+ bilateral pedal pulses.   NEUROLOGIC: Alert and oriented x 3. No focal deficits.   MUSCULOSKELETAL: No joint swelling or tenderness.   SKIN: mid-sternal incision well-healed. No jaundice. No rashes or lesions.     Labs, reviewed with patient in clinic today:  CBC RESULTS:  Lab Results   Component Value Date    WBC 6.8 06/25/2024    RBC 3.66 (L) 06/25/2024    HGB 11.0 (L) 06/25/2024    HCT 33.5 (L) 06/25/2024    MCV 92 06/25/2024    MCH 30.1 06/25/2024    MCHC 32.8 06/25/2024    RDW 14.3 06/25/2024     06/25/2024       CMP RESULTS:  Lab Results   Component Value Date     06/25/2024    POTASSIUM 4.1 06/25/2024    POTASSIUM 4.0 06/12/2024    CHLORIDE 105 06/25/2024    CO2 24 06/25/2024    ANIONGAP 12 06/25/2024     (H) 06/25/2024     (H) 06/17/2024    BUN 18.3 06/25/2024    CR 0.63 06/25/2024    GFRESTIMATED >90 06/25/2024    JOAQUINA 9.6 06/25/2024    BILITOTAL 0.3 06/25/2024    ALBUMIN 4.2 06/25/2024    ALKPHOS 85 06/25/2024    ALT 16 06/25/2024    AST 23 06/25/2024        INR RESULTS:  Lab Results   Component Value Date    INR 3.16 (H) 07/08/2024       Lab Results   Component Value  "Date    MAG 1.9 06/25/2024     No results found for: \"NTBNPI\"  Lab Results   Component Value Date    NTBNP 198 04/25/2024       EKG 3/2024:      Echocardiogram 4/24/23:  Procedure  Echocardiogram with two-dimensional, color and spectral Doppler performed.  ______________________________________________________________________________  Interpretation Summary  Global and regional left ventricular function is hyperkinetic with an EF >70%.  Right ventricular function, chamber size, wall motion, and thickness are  normal.  Moderate aortic stenosis is present.  Pulmonary artery systolic pressure is normal.  Ascending aorta 3.9 cm.  Mild dilatation of the aorta is present.  The inferior vena cava is normal.  No pericardial effusion is present.  ______________________________________________________________________________  Left Ventricle  Global and regional left ventricular function is hyperkinetic with an EF >70%.  Left ventricular size is normal. Mild concentric wall thickening consistent  with left ventricular hypertrophy is present. Left ventricular diastolic  function is indeterminate. Diastolic Doppler findings (E/E' ratio and/or other  parameters) suggest left ventricular filling pressures are increased. No  regional wall motion abnormalities are seen.     Right Ventricle  Right ventricular function, chamber size, wall motion, and thickness are  normal.     Atria  Both atria appear normal.     Mitral Valve  Mild to moderate mitral annular calcification is present. Mild mitral  insufficiency is present.     Aortic Valve  Moderate aortic valve calcification is present. Trace aortic insufficiency is  present. The mean AoV pressure gradient is 29.2 mmHg. The calculated aortic  valve are is 1.2 cm^2. Moderate aortic stenosis is present.     Tricuspid Valve  The tricuspid valve is normal. Trace tricuspid insufficiency is present. The  right ventricular systolic pressure is approximated at 20.6 mmHg plus the  right atrial " pressure. Pulmonary artery systolic pressure is normal.     Pulmonic Valve  The pulmonic valve is normal.     Vessels  The pulmonary artery and bifurcation cannot be assessed. The inferior vena  cava is normal. Ascending aorta 3.9 cm. Mild dilatation of the aorta is  present.     Pericardium  No pericardial effusion is present.     ______________________________________________________________________________  MMode/2D Measurements & Calculations  IVSd: 1.3 cm  LVIDd: 4.3 cm  LVIDs: 2.5 cm  LVPWd: 1.3 cm  FS: 41.4 %  LV mass(C)d: 205.6 grams  LV mass(C)dI: 108.2 grams/m2     Ao root diam: 3.4 cm  asc Aorta Diam: 3.9 cm  LVOT diam: 1.8 cm  LVOT area: 2.7 cm2  LA Volume (BP): 59.6 ml  LA Volume Index (BP): 31.4 ml/m2  RV Base: 3.4 cm  RWT: 0.60  TAPSE: 2.6 cm     Doppler Measurements & Calculations  MV E max kevin: 105.0 cm/sec  MV A max kevin: 156.1 cm/sec  MV E/A: 0.67  MV dec slope: 369.1 cm/sec2  MV dec time: 0.29 sec  Ao V2 max: 372.8 cm/sec  Ao max P.6 mmHg  Ao V2 mean: 250.7 cm/sec  Ao mean P.2 mmHg  Ao V2 VTI: 83.0 cm  HOWARD(I,D): 1.2 cm2  HOWARD(V,D): 1.1 cm2  LV V1 max P.5 mmHg  LV V1 max: 154.2 cm/sec  LV V1 VTI: 36.3 cm  SV(LVOT): 97.6 ml  SI(LVOT): 51.3 ml/m2  PA acc time: 0.13 sec  TR max kevin: 227.2 cm/sec  TR max P.6 mmHg  AV Kevin Ratio (DI): 0.41  HOWARD Index (cm2/m2): 0.62  E/E' av.3  Lateral E/e': 12.5  Medial E/e': 16.1  RV S Kevin: 13.9 cm/sec    CT Angiogram 23:    IMPRESSION:  1.  Limited luminal assessment of the mid LAD mid LCx due to densely  calcified plaque but likely representing severe stenosis in the mid  LAD and mid LCx territories. Consider coronary angiogram if clinically  appropriate for further evaluation.   2.  Hemodynamically significant coronary stenosis in the mid to distal  LAD and distal LCx based on FFR-CT.  3.  Total Agatston score 2720 placing the patient in the 99th  percentile when compared to age and gender matched control group.  4.  Please review the  separate Radiology report for incidental  noncardiac findings. This report will follow separately.     FINDINGS:     CORONARY CALCIUM SCORE     Total Agatston calcium score: 2720   Left main: 0  Left anterior descendin  Left circumflex: 1197  Right coronary artery: 341   This places the patient in the 99th  percentile when compared to age  and gender matched control group.     CORONARY ANGIOGRAPHY     DOMINANCE: Right dominant system.   Normal coronary origins and course.     LEFT MAIN:   The left main arises normally from the left coronary cusp and has a  mild (25-49%) stenosis composed of calcified plaque.     LEFT ANTERIOR DESCENDING:      Proximal LAD: Mild (25-49%) stenosis composed of calcified plaque.  Mid LAD: Severe (>70%) stenosis composed of calcified plaque. Limited  luminal assessment due to densely calcified plaque.   Distal LAD: Mild (25-49%) stenosis composed of mixed plaque.  First diagonal: Moderate (50-69%) stenosis composed of calcified  plaque.     LEFT CIRCUMFLEX:      Proximal LCX:Moderate (50-69%) stenosis composed of calcified plaque.  Mid LCX: Severe (>70%) stenosis composed of calcified plaque. Non  diagnostic images due to dense calcifications  Distal LCX: Severe (>70%) stenosis composed of noncalcified plaque.     RIGHT CORONARY ARTERY:      Proximal RCA: Mild (25-49%) stenosis composed of calcified plaque.  Mid RCA: Mild (25-49%) stenosis composed of noncalcified plaque.  Distal RCA: Mild (25-49%) stenosis composed of noncalcified plaque.  The remainder of the right coronary and the posterior descending  artery are patent with minimal luminal irregularities.     TECHNIQUE:  Fractional Flow Reserve (FFR) CT was performed offline by [employer] on  the original CTA dataset. Diagrammatic representation of the FFR-CT  analysis is provided in a separate PDF document in PACS/EPIC. See  coronary CT results for detailed anatomic assessment.     IMPRESSION:  Hemodynamically significant  "coronary stenosis in the mid to distal LAD  and distal LCx.     FINDINGS:  1.  LAD: 0.63  2.  LCX: 0.51  3.  RCA: 0.88     Normal FFR range is >0.8.    Coronary Angiogram 6/9/23:    Pre Procedure Diagnosis    stable known CAD       Conclusion         3rd Mrg lesion is 30% stenosed.    1st Diag lesion is 50% stenosed.    Prox RCA to Dist RCA lesion is 40% stenosed.    Prox LAD to Mid LAD lesion is 60% stenosed.    Prox Cx to Dist Cx lesion is 60% stenosed.     Moderate diffuse disease of the LAD and Lcx (Borderline Pd/Pa 0.92, dPR 0.88 for both lesions). Plan for medical management given lack of clinical symptoms.  The lesion are long, moderate and calcified.  Mild non-obstructive RCA disease.         Plan     Follow bedrest per protocol   Continued medical management and lifestyle modifications for cardiovascular risk factor optimizations.   Discharge today per protocol     Recommendations    General Recommendations:  - Patient given specific instructions regarding care of arteriotomy site, activity restrictions, signs and symptoms of cardiac or vascular complications and to seek immediate medical evaluation should they occur.   - Recommended follow up with doctor Dr. Matson.   - Arterial sheath removed from radial artery with TR Band placement.       Medications:  - Continue high dose statin therapy indefinitely.   - Risk factor management for atherosclerosis.     Coronary Findings    Diagnostic  Dominance: Right  Left Main   The vessel was visualized by selective angiography, is moderate in size and is angiographically normal.      Left Anterior Descending   The vessel is moderate in size.   Prox LAD to Mid LAD lesion is 60% stenosed. 60% diffuse LAD disease was visualized on diagnostic angiography. A 6 Fr XB 3.5 GC was used to engage the LM. Heparin was given to keep ACT >250. A 0.014\" Opsens was normalized in the LM and wired past the disease into the distal LAD. Pd/Pa was 0.92 and iFR was 0.88; given the " "diffuse nature of the disease and lack of clinical symptoms, no intervention was performed. Wire was removed and final angiography showed TESSY 3 flow and no evidence of dissection or perforation.      First Diagonal Branch   The vessel is large.   1st Diag lesion is 50% stenosed.      First Septal Branch   The vessel is small and is angiographically normal.      Second Septal Branch   The vessel is small and is angiographically normal.      Left Circumflex   Prox Cx to Dist Cx lesion is 60% stenosed. 60% diffuse LCx disease was visualized on diagnostic angiography. A 6 Fr XB 3.5 GC was used to engage the LM. Heparin was given to keep ACT >250. A 0.014\" Opsens was normalized in the LM and wired pastthe lesion into the distal LCx. Pd/Pa was 0.92 and iFR was 0.88; given the diffuse nature of the disease and lack of clinical symptoms, no intervention was performed. Wire was removed and final angiography showed TESSY 3 flow and no evidence ofdissection or perforation.      First Obtuse Marginal Branch   The vessel is small. There is mild diffuse disease throughout the vessel.      Second Obtuse Marginal Branch   The vessel is small and is angiographically normal.      Third Obtuse Marginal Branch   The vessel is moderate in size.   3rd Mrg lesion is 30% stenosed.      Right Coronary Artery   The vessel was visualized by selective angiography and is moderate in size.   Prox RCA to Dist RCA lesion is 40% stenosed.      Acute Marginal Branch   The vessel is small.      Right Ventricular Branch   The vessel is small.      Right Posterior Descending Artery   The vessel is small and is angiographically normal.      Right Posterior Atrioventricular Artery   The vessel is moderate in size. There is mild diffuse disease throughout the vessel.      First Right Posterolateral Branch   The vessel is small and is angiographically normal.      Second Right Posterolateral Branch   The vessel is small and is angiographically normal.    "      Intervention     No interventions have been documented.         Assessment and Plan:   Ms. Urena is a 69 year old female with a PMH of multivessel CAD s/p CABG x2 (LIMA-LAD, SVG-OM, 6/12/24), aortic stenosis s/p AVR (6/2024), HLD, HTN, ascending aortic aneurysm, and T2DM.    # Multivessel CAD s/p CABG x2 (LIMA-LAD, SVG-OM, 6/12/24)  # HLD  Recent angiogram showed moderate diffuse disease of the LAD and Lcx (Borderline Pd/Pa 0.92, dPR 0.88 for both lesions), and mild non-obstructive RCA disease. She underwent CABG x2 with Dr. Nicole on 6/12, post-op course uneventful.   - Continue atorvastatin 40mg daily  - Continue aspirin 81mg daily  - fenofibrate 160mg     # Aortic valve stenosis s/p AVR (6/12/24)  Echo 4/4/24 shows worsening aortic valve stenosis, with peak velocity 4.4 m/sec, MG 41mmHg, and HOWARD 0.98 cm^2, severity has worsened from echo 1 year prior. Now s/p AVR with 25mm Inspiris bioprosthetic valve   - warfarin x3 months per CVTS   Continue Outpatient Cardiac Rehab until completed.   Continue sternal precautions for 12 weeks from surgery date.   No driving for 4 weeks from surgery date.     # NSVT  # Paroxysmal atrial fibrillation  Recent ED visit for [place] alert for AF with HR in 130s, had resolved by the time she got to ED. Tele strips from rehab show PVCs and pAF   - ziopatch results pending  - Lopresor 50mg BID    # Ascending aortic aneurysm  Stable at 3.7 cm on recent echo, prior echo showed 3.9cm.  - annaul echo  - tight BP control <130/90    # HTN, well controlled   Home /80s  - lopressor 50mg BID     # DM2  Most recent A1c 5.8%  - Farxiga 10mg daily  - Metformin 1000mg daily  - Ozempic 0.5mg subcutaneous weekly    Follow up: 3 months  Chart review time today: 10 minutes  Visit time today: 25 minutes  Total time spent today: 35 minutes        MIKAEL JULES CNP  General Cardiology   07/10/24

## 2024-07-10 ENCOUNTER — HOSPITAL ENCOUNTER (OUTPATIENT)
Dept: CARDIAC REHAB | Facility: CLINIC | Age: 69
Discharge: HOME | End: 2024-07-10
Attending: STUDENT IN AN ORGANIZED HEALTH CARE EDUCATION/TRAINING PROGRAM
Payer: COMMERCIAL

## 2024-07-10 ENCOUNTER — OFFICE VISIT (OUTPATIENT)
Dept: CARDIOLOGY | Facility: CLINIC | Age: 69
End: 2024-07-10
Attending: CASE MANAGER/CARE COORDINATOR
Payer: COMMERCIAL

## 2024-07-10 VITALS
DIASTOLIC BLOOD PRESSURE: 80 MMHG | SYSTOLIC BLOOD PRESSURE: 126 MMHG | OXYGEN SATURATION: 98 % | WEIGHT: 165 LBS | HEART RATE: 59 BPM | BODY MASS INDEX: 25.84 KG/M2

## 2024-07-10 DIAGNOSIS — I48.0 PAROXYSMAL ATRIAL FIBRILLATION (H): ICD-10-CM

## 2024-07-10 DIAGNOSIS — Z95.2 S/P AVR: ICD-10-CM

## 2024-07-10 DIAGNOSIS — I10 ESSENTIAL HYPERTENSION: ICD-10-CM

## 2024-07-10 DIAGNOSIS — I25.810 CORONARY ARTERY DISEASE INVOLVING AUTOLOGOUS ARTERY CORONARY BYPASS GRAFT WITHOUT ANGINA PECTORIS: Primary | ICD-10-CM

## 2024-07-10 DIAGNOSIS — Z95.1 S/P CABG (CORONARY ARTERY BYPASS GRAFT): ICD-10-CM

## 2024-07-10 DIAGNOSIS — I35.0 SEVERE AORTIC STENOSIS: ICD-10-CM

## 2024-07-10 PROCEDURE — 99214 OFFICE O/P EST MOD 30 MIN: CPT | Performed by: CASE MANAGER/CARE COORDINATOR

## 2024-07-10 PROCEDURE — 93798 PHYS/QHP OP CAR RHAB W/ECG: CPT

## 2024-07-10 PROCEDURE — G0463 HOSPITAL OUTPT CLINIC VISIT: HCPCS | Performed by: CASE MANAGER/CARE COORDINATOR

## 2024-07-10 ASSESSMENT — PAIN SCALES - GENERAL: PAINLEVEL: MILD PAIN (3)

## 2024-07-10 NOTE — PATIENT INSTRUCTIONS
You were seen today in the Cardiovascular Clinic at the [facility] by:       MIKAEL SHOOK CNP    Your visit summary and instructions are as follows:    No medication changes  We will follow up with results of Ziopatch       Return to cardiology clinic in 3 months     Thank you for your visit today!     Please MyChart message me or call my nurse if you have any questions or concerns.      During Business Hours:  [phone], option # 1 (University) then option # 4 (medical questions) and ask to speak with my nurse.     After hours, weekends or holidays:   [phone], Option #4  Ask to speak to the On-Call Cardiologist. Inform them you are a cardiology patient at the [facility].

## 2024-07-10 NOTE — NURSING NOTE
Chief Complaint   Patient presents with    Follow Up     S/P AVR repair & CABG, AF       Vitals were taken, medications reconciled.    Paige Thurner, Facilitator   10:23 AM

## 2024-07-10 NOTE — LETTER
7/10/2024      RE: Won Urena  [address]  [address]  [facility] [address]       Dear Colleague,    Thank you for the opportunity to participate in the care of your patient, Won Urena, at the [facility] [place] at [facility]. Please see a copy of my visit note below.      [facility] [place] [facility] [place]      HPI:   Ms. Won Urena is a 69 year old female with medical history pertinent for multivessel CAD s/p CABG x2 (LIMA-LAD, SVG-OM, 6/12/24), aortic stenosis s/p AVR (6/2024), HLD, HTN, ascending aortic aneurysm, and T2DM. She presents to cardiology clinic for follow up.    Interval History 4/10/24:  Recent ED visit for SVT, resolved with valsava maneuver. Troponin slightly elevated by flat 25 ->20. Won has started exercising more, goes to the gym with a , does Pel[employer], [employer] classes, etc. She feels occasionally SOB with exertion, not at rest. She has to work hard to get heart rate above 143. Denies dizziness or syncope. Home /80s.    Interval History 07/10/24  Since our last clinic visit, Won underwent the above named surgeries with uneventful post-op course. She presented to [facility] ED on 6/25 with racing heart and Apple watch alert for AF. EKG in ED showed NSR with bifascicular block, echo unremarkable, her metoprolol dose was increased to 50mg BID. She was seen by CVTS in clinic and a Ziopatch was ordered, results pending.  Today in clinic she reports feeling well. She has been working with cardiac rehab. She still notes occasional palpitations and feeling her heart beat, and is anxious about potential AF. Denies chaitanya CP, shortness of breath, or LE edema.  Home -120/60s      PAST MEDICAL HISTORY:  Past Medical History:   Diagnosis Date     Abnormal Pap smear of cervix      Aortic stenosis      Aortic valve stenosis      Benign colon polyp      CAD (coronary artery disease)       Dermatochalasis of both upper eyelids      Diabetes mellitus, type 2 (H)      Gestational diabetes      HTN (hypertension)      Hyperlipidemia LDL goal <100      Metal foreign body in chest     Retained atrial temporary pacing wire     Nonsenile cataract 2022    Very beginnings     Post-menopausal atrophic vaginitis      Ptosis of both eyelids      Thickened endometrium        FAMILY HISTORY:  Family History   Problem Relation Age of Onset     Lung Cancer Mother         Small cell, did smoked     Diabetes Mother      Hypertension Mother      Goiter Mother      Mental Illness Mother         likely bipolar     Coronary Artery Disease Mother 42        MI, smoked     Hyperlipidemia Mother      Thyroid Disease Mother         Goiter     Obesity Mother         most of family is obese     Glaucoma Father      Atrial fibrillation Father      Hypertension Father      Parkinsonism Father      Hyperlipidemia Father      Diabetes Sister      Breast Cancer Sister 60     Hypertension Sister      Hypertension Brother      Cancer Maternal Grandmother         gynecologic cancer, unknown type     Uterine Cancer Maternal Grandmother      Chronic Obstructive Pulmonary Disease Paternal Grandmother      Uterine Cancer Paternal Grandmother      Stomach Cancer Paternal Grandfather      Colon Cancer Paternal Grandfather      Bipolar Disorder Son      Mental Illness Son      Depression Son      Macular Degeneration No family hx of      Anesthesia Reaction No family hx of      Venous thrombosis No family hx of        SOCIAL HISTORY:  Social History     Socioeconomic History     Marital status:    Tobacco Use     Smoking status: Never     Smokeless tobacco: Never   Vaping Use     Vaping Use: Never used   Substance and Sexual Activity     Alcohol use: Yes     Alcohol/week: 1.0 standard drink of alcohol     Types: 1 Standard drinks or equivalent per week     Comment: less than 2 drinks a week     Drug use: Never     Sexual activity: Not  Currently     Partners: Male     Birth control/protection: Post-menopausal   Social History Narrative    Lives with     Two son    One son, daughter-in-law, and grandson live in [place]    One son, daughter-in-law live in the same [facility]    Gan in [place], [place]       CURRENT MEDICATIONS:  Current Outpatient Medications   Medication Sig Dispense Refill     acetaminophen (TYLENOL) 325 MG tablet Take 3 tablets (975 mg) by mouth every 6 hours as needed for pain (mild to moderate pain) 100 tablet 0     aspirin 81 MG EC tablet Take 81 mg by mouth every evening       atorvastatin (LIPITOR) 40 MG tablet Take 40 mg by mouth every evening       carboxymethylcellulose PF (REFRESH PLUS) 0.5 % ophthalmic solution Place 1 drop into both eyes 4 times daily 70 each 0     cyanocobalamin (VITAMIN B-12) 100 MCG tablet Take 100 mcg by mouth every evening       dapagliflozin (FARXIGA) 10 MG TABS tablet TAKE 1 TABLET BY MOUTH IN THE  MORNING 100 tablet 1     estradiol (ESTRACE) 0.1 MG/GM vaginal cream INSERT 1/2 APPLICATORFUL  VAGINALLY TWICE WEEKLY 85 g 3     fenofibrate (TRIGLIDE/LOFIBRA) 160 MG tablet Take 1 tablet (160 mg) by mouth every evening 100 tablet 3     gabapentin (NEURONTIN) 100 MG capsule Take 2 capsules (200 mg) by mouth at bedtime 200 capsule 3     levocetirizine (XYZAL) 5 MG tablet Take 5 mg by mouth every evening       metFORMIN (GLUCOPHAGE) 500 MG tablet TAKE 2 TABLETS BY MOUTH  TWICE DAILY WITH MEALS 360 tablet 3     methocarbamol (ROBAXIN) 750 MG tablet Take 1 tablet (750 mg) by mouth every 6 hours 90 tablet 0     metoprolol tartrate (LOPRESSOR) 25 MG tablet Take 2 tablets (50 mg) by mouth 2 times daily 180 tablet 1     mometasone (NASONEX) 50 MCG/ACT nasal spray Spray 2 sprays into both nostrils daily 17 g 0     montelukast (SINGULAIR) 10 MG tablet Take 1 tablet (10 mg) by mouth at bedtime 100 tablet 2     multivitamin w/minerals (MULTI-VITAMIN) tablet Take 1 tablet by mouth every evening        Semaglutide, 1 MG/DOSE, (OZEMPIC, 1 MG/DOSE,) 4 MG/3ML pen INJECT SUBCUTANEOUSLY 1 MG  EVERY WEEK 9 mL 2     Vitamin D, Cholecalciferol, 10 MCG (400 UNIT) TABS Take 1 tablet by mouth every evening       warfarin ANTICOAGULANT (COUMADIN) 3 MG tablet Take 1 tablet (3 mg) to 1.5 tablets (4.5 mg) daily as directed by anticoagulation clinic. (Patient not taking: Reported on 6/20/2024) 130 tablet 1     warfarin ANTICOAGULANT (COUMADIN) 3 MG tablet Take 1 tablet (3 mg) by mouth daily Take every evening at the same time 10 tablet 0     No current facility-administered medications for this visit.       ROS:   Refer to HPI    EXAM:  /80 (BP Location: Right arm, Patient Position: Chair, Cuff Size: Adult Regular)   Pulse 59   Wt 74.8 kg (165 lb)   SpO2 98%   BMI 25.84 kg/m    GENERAL: Appears comfortable, in no acute distress.   HEENT: Eye symmetrical, no discharge or icterus bilaterally. Mucous membranes moist and without lesions.  CV: RRR, +S1S2, holosystolic murmur. JVP not elevated   RESPIRATORY: Respirations regular, even, and unlabored. Lungs CTA throughout.   GI: Soft and non distended with normoactive bowel sounds present in all quadrants. No tenderness, rebound, guarding.  EXTREMITIES: No peripheral edema. 2+ bilateral pedal pulses.   NEUROLOGIC: Alert and oriented x 3. No focal deficits.   MUSCULOSKELETAL: No joint swelling or tenderness.   SKIN: mid-sternal incision well-healed. No jaundice. No rashes or lesions.     Labs, reviewed with patient in clinic today:  CBC RESULTS:  Lab Results   Component Value Date    WBC 6.8 06/25/2024    RBC 3.66 (L) 06/25/2024    HGB 11.0 (L) 06/25/2024    HCT 33.5 (L) 06/25/2024    MCV 92 06/25/2024    MCH 30.1 06/25/2024    MCHC 32.8 06/25/2024    RDW 14.3 06/25/2024     06/25/2024       CMP RESULTS:  Lab Results   Component Value Date     06/25/2024    POTASSIUM 4.1 06/25/2024    POTASSIUM 4.0 06/12/2024    CHLORIDE 105 06/25/2024    CO2 24 06/25/2024     "ANIONGAP 12 06/25/2024     (H) 06/25/2024     (H) 06/17/2024    BUN 18.3 06/25/2024    CR 0.63 06/25/2024    GFRESTIMATED >90 06/25/2024    JOAQUINA 9.6 06/25/2024    BILITOTAL 0.3 06/25/2024    ALBUMIN 4.2 06/25/2024    ALKPHOS 85 06/25/2024    ALT 16 06/25/2024    AST 23 06/25/2024        INR RESULTS:  Lab Results   Component Value Date    INR 3.16 (H) 07/08/2024       Lab Results   Component Value Date    MAG 1.9 06/25/2024     No results found for: \"NTBNPI\"  Lab Results   Component Value Date    NTBNP 198 04/25/2024       EKG 3/2024:      Echocardiogram 4/24/23:  Procedure  Echocardiogram with two-dimensional, color and spectral Doppler performed.  ______________________________________________________________________________  Interpretation Summary  Global and regional left ventricular function is hyperkinetic with an EF >70%.  Right ventricular function, chamber size, wall motion, and thickness are  normal.  Moderate aortic stenosis is present.  Pulmonary artery systolic pressure is normal.  Ascending aorta 3.9 cm.  Mild dilatation of the aorta is present.  The inferior vena cava is normal.  No pericardial effusion is present.  ______________________________________________________________________________  Left Ventricle  Global and regional left ventricular function is hyperkinetic with an EF >70%.  Left ventricular size is normal. Mild concentric wall thickening consistent  with left ventricular hypertrophy is present. Left ventricular diastolic  function is indeterminate. Diastolic Doppler findings (E/E' ratio and/or other  parameters) suggest left ventricular filling pressures are increased. No  regional wall motion abnormalities are seen.     Right Ventricle  Right ventricular function, chamber size, wall motion, and thickness are  normal.     Atria  Both atria appear normal.     Mitral Valve  Mild to moderate mitral annular calcification is present. Mild mitral  insufficiency is present.   "   Aortic Valve  Moderate aortic valve calcification is present. Trace aortic insufficiency is  present. The mean AoV pressure gradient is 29.2 mmHg. The calculated aortic  valve are is 1.2 cm^2. Moderate aortic stenosis is present.     Tricuspid Valve  The tricuspid valve is normal. Trace tricuspid insufficiency is present. The  right ventricular systolic pressure is approximated at 20.6 mmHg plus the  right atrial pressure. Pulmonary artery systolic pressure is normal.     Pulmonic Valve  The pulmonic valve is normal.     Vessels  The pulmonary artery and bifurcation cannot be assessed. The inferior vena  cava is normal. Ascending aorta 3.9 cm. Mild dilatation of the aorta is  present.     Pericardium  No pericardial effusion is present.     ______________________________________________________________________________  MMode/2D Measurements & Calculations  IVSd: 1.3 cm  LVIDd: 4.3 cm  LVIDs: 2.5 cm  LVPWd: 1.3 cm  FS: 41.4 %  LV mass(C)d: 205.6 grams  LV mass(C)dI: 108.2 grams/m2     Ao root diam: 3.4 cm  asc Aorta Diam: 3.9 cm  LVOT diam: 1.8 cm  LVOT area: 2.7 cm2  LA Volume (BP): 59.6 ml  LA Volume Index (BP): 31.4 ml/m2  RV Base: 3.4 cm  RWT: 0.60  TAPSE: 2.6 cm     Doppler Measurements & Calculations  MV E max kevin: 105.0 cm/sec  MV A max kevin: 156.1 cm/sec  MV E/A: 0.67  MV dec slope: 369.1 cm/sec2  MV dec time: 0.29 sec  Ao V2 max: 372.8 cm/sec  Ao max P.6 mmHg  Ao V2 mean: 250.7 cm/sec  Ao mean P.2 mmHg  Ao V2 VTI: 83.0 cm  HOWARD(I,D): 1.2 cm2  HOWARD(V,D): 1.1 cm2  LV V1 max P.5 mmHg  LV V1 max: 154.2 cm/sec  LV V1 VTI: 36.3 cm  SV(LVOT): 97.6 ml  SI(LVOT): 51.3 ml/m2  PA acc time: 0.13 sec  TR max kevin: 227.2 cm/sec  TR max P.6 mmHg  AV Kevin Ratio (DI): 0.41  HOWARD Index (cm2/m2): 0.62  E/E' av.3  Lateral E/e': 12.5  Medial E/e': 16.1  RV S Kevin: 13.9 cm/sec    CT Angiogram 23:    IMPRESSION:  1.  Limited luminal assessment of the mid LAD mid LCx due to densely  calcified plaque but  likely representing severe stenosis in the mid  LAD and mid LCx territories. Consider coronary angiogram if clinically  appropriate for further evaluation.   2.  Hemodynamically significant coronary stenosis in the mid to distal  LAD and distal LCx based on FFR-CT.  3.  Total Agatston score 2720 placing the patient in the 99th  percentile when compared to age and gender matched control group.  4.  Please review the separate Radiology report for incidental  noncardiac findings. This report will follow separately.     FINDINGS:     CORONARY CALCIUM SCORE     Total Agatston calcium score: 2720   Left main: 0  Left anterior descendin  Left circumflex: 1197  Right coronary artery: 341   This places the patient in the 99th  percentile when compared to age  and gender matched control group.     CORONARY ANGIOGRAPHY     DOMINANCE: Right dominant system.   Normal coronary origins and course.     LEFT MAIN:   The left main arises normally from the left coronary cusp and has a  mild (25-49%) stenosis composed of calcified plaque.     LEFT ANTERIOR DESCENDING:      Proximal LAD: Mild (25-49%) stenosis composed of calcified plaque.  Mid LAD: Severe (>70%) stenosis composed of calcified plaque. Limited  luminal assessment due to densely calcified plaque.   Distal LAD: Mild (25-49%) stenosis composed of mixed plaque.  First diagonal: Moderate (50-69%) stenosis composed of calcified  plaque.     LEFT CIRCUMFLEX:      Proximal LCX:Moderate (50-69%) stenosis composed of calcified plaque.  Mid LCX: Severe (>70%) stenosis composed of calcified plaque. Non  diagnostic images due to dense calcifications  Distal LCX: Severe (>70%) stenosis composed of noncalcified plaque.     RIGHT CORONARY ARTERY:      Proximal RCA: Mild (25-49%) stenosis composed of calcified plaque.  Mid RCA: Mild (25-49%) stenosis composed of noncalcified plaque.  Distal RCA: Mild (25-49%) stenosis composed of noncalcified plaque.  The remainder of the right  coronary and the posterior descending  artery are patent with minimal luminal irregularities.     TECHNIQUE:  Fractional Flow Reserve (FFR) CT was performed offline by [employer] on  the original CTA dataset. Diagrammatic representation of the FFR-CT  analysis is provided in a separate PDF document in PACS/EPIC. See  coronary CT results for detailed anatomic assessment.     IMPRESSION:  Hemodynamically significant coronary stenosis in the mid to distal LAD  and distal LCx.     FINDINGS:  1.  LAD: 0.63  2.  LCX: 0.51  3.  RCA: 0.88     Normal FFR range is >0.8.    Coronary Angiogram 6/9/23:    Pre Procedure Diagnosis    stable known CAD       Conclusion          3rd Mrg lesion is 30% stenosed.     1st Diag lesion is 50% stenosed.     Prox RCA to Dist RCA lesion is 40% stenosed.     Prox LAD to Mid LAD lesion is 60% stenosed.     Prox Cx to Dist Cx lesion is 60% stenosed.     Moderate diffuse disease of the LAD and Lcx (Borderline Pd/Pa 0.92, dPR 0.88 for both lesions). Plan for medical management given lack of clinical symptoms.  The lesion are long, moderate and calcified.  Mild non-obstructive RCA disease.         Plan      Follow bedrest per protocol    Continued medical management and lifestyle modifications for cardiovascular risk factor optimizations.    Discharge today per protocol     Recommendations    General Recommendations:  - Patient given specific instructions regarding care of arteriotomy site, activity restrictions, signs and symptoms of cardiac or vascular complications and to seek immediate medical evaluation should they occur.   - Recommended follow up with doctor Dr. Matson.   - Arterial sheath removed from radial artery with TR Band placement.       Medications:  - Continue high dose statin therapy indefinitely.   - Risk factor management for atherosclerosis.     Coronary Findings    Diagnostic  Dominance: Right  Left Main   The vessel was visualized by selective angiography, is moderate in size  "and is angiographically normal.      Left Anterior Descending   The vessel is moderate in size.   Prox LAD to Mid LAD lesion is 60% stenosed. 60% diffuse LAD disease was visualized on diagnostic angiography. A 6 Fr XB 3.5 GC was used to engage the LM. Heparin was given to keep ACT >250. A 0.014\" Opsens was normalized in the LM and wired past the disease into the distal LAD. Pd/Pa was 0.92 and iFR was 0.88; given the diffuse nature of the disease and lack of clinical symptoms, no intervention was performed. Wire was removed and final angiography showed TESSY 3 flow and no evidence of dissection or perforation.      First Diagonal Branch   The vessel is large.   1st Diag lesion is 50% stenosed.      First Septal Branch   The vessel is small and is angiographically normal.      Second Septal Branch   The vessel is small and is angiographically normal.      Left Circumflex   Prox Cx to Dist Cx lesion is 60% stenosed. 60% diffuse LCx disease was visualized on diagnostic angiography. A 6 Fr XB 3.5 GC was used to engage the LM. Heparin was given to keep ACT >250. A 0.014\" Opsens was normalized in the LM and wired pastthe lesion into the distal LCx. Pd/Pa was 0.92 and iFR was 0.88; given the diffuse nature of the disease and lack of clinical symptoms, no intervention was performed. Wire was removed and final angiography showed TESSY 3 flow and no evidence ofdissection or perforation.      First Obtuse Marginal Branch   The vessel is small. There is mild diffuse disease throughout the vessel.      Second Obtuse Marginal Branch   The vessel is small and is angiographically normal.      Third Obtuse Marginal Branch   The vessel is moderate in size.   3rd Mrg lesion is 30% stenosed.      Right Coronary Artery   The vessel was visualized by selective angiography and is moderate in size.   Prox RCA to Dist RCA lesion is 40% stenosed.      Acute Marginal Branch   The vessel is small.      Right Ventricular Branch   The vessel is " small.      Right Posterior Descending Artery   The vessel is small and is angiographically normal.      Right Posterior Atrioventricular Artery   The vessel is moderate in size. There is mild diffuse disease throughout the vessel.      First Right Posterolateral Branch   The vessel is small and is angiographically normal.      Second Right Posterolateral Branch   The vessel is small and is angiographically normal.         Intervention     No interventions have been documented.         Assessment and Plan:   Ms. Urena is a 69 year old female with a PMH of multivessel CAD s/p CABG x2 (LIMA-LAD, SVG-OM, 6/12/24), aortic stenosis s/p AVR (6/2024), HLD, HTN, ascending aortic aneurysm, and T2DM.    # Multivessel CAD s/p CABG x2 (LIMA-LAD, SVG-OM, 6/12/24)  # HLD  Recent angiogram showed moderate diffuse disease of the LAD and Lcx (Borderline Pd/Pa 0.92, dPR 0.88 for both lesions), and mild non-obstructive RCA disease. She underwent CABG x2 with Dr. Nicole on 6/12, post-op course uneventful.   - Continue atorvastatin 40mg daily  - Continue aspirin 81mg daily  - fenofibrate 160mg     # Aortic valve stenosis s/p AVR (6/12/24)  Echo 4/4/24 shows worsening aortic valve stenosis, with peak velocity 4.4 m/sec, MG 41mmHg, and HOWARD 0.98 cm^2, severity has worsened from echo 1 year prior. Now s/p AVR with 25mm Inspiris bioprosthetic valve   - warfarin x3 months per CVTS   Continue Outpatient Cardiac Rehab until completed.   Continue sternal precautions for 12 weeks from surgery date.   No driving for 4 weeks from surgery date.     # NSVT  # Paroxysmal atrial fibrillation  Recent ED visit for [employer] alert for AF with HR in 130s, had resolved by the time she got to ED. Tele strips from rehab show PVCs and pAF   - ziopatch results pending  - Lopresor 50mg BID    # Ascending aortic aneurysm  Stable at 3.7 cm on recent echo, prior echo showed 3.9cm.  - annaul echo  - tight BP control <130/90    # HTN, well controlled   Home BP  120/80s  - lopressor 50mg BID     # DM2  Most recent A1c 5.8%  - Farxiga 10mg daily  - Metformin 1000mg daily  - Ozempic 0.5mg subcutaneous weekly    Follow up: 3 months  Chart review time today: 10 minutes  Visit time today: 25 minutes  Total time spent today: 35 minutes        MIKAEL JULES CNP  General Cardiology   07/10/24      Please do not hesitate to contact me if you have any questions/concerns.     Sincerely,     MIKAEL JULES CNP

## 2024-07-11 ENCOUNTER — ANCILLARY PROCEDURE (OUTPATIENT)
Dept: MAMMOGRAPHY | Facility: CLINIC | Age: 69
End: 2024-07-11
Attending: FAMILY MEDICINE
Payer: COMMERCIAL

## 2024-07-11 ENCOUNTER — ANCILLARY PROCEDURE (OUTPATIENT)
Dept: BONE DENSITY | Facility: CLINIC | Age: 69
End: 2024-07-11
Attending: FAMILY MEDICINE
Payer: COMMERCIAL

## 2024-07-11 DIAGNOSIS — I25.10 CORONARY ARTERY DISEASE INVOLVING NATIVE CORONARY ARTERY OF NATIVE HEART WITHOUT ANGINA PECTORIS: ICD-10-CM

## 2024-07-11 DIAGNOSIS — Z12.31 VISIT FOR SCREENING MAMMOGRAM: ICD-10-CM

## 2024-07-11 DIAGNOSIS — I35.0 SEVERE AORTIC STENOSIS: ICD-10-CM

## 2024-07-11 DIAGNOSIS — Z78.0 ASYMPTOMATIC POSTMENOPAUSAL STATUS: ICD-10-CM

## 2024-07-11 PROCEDURE — 77063 BREAST TOMOSYNTHESIS BI: CPT | Performed by: STUDENT IN AN ORGANIZED HEALTH CARE EDUCATION/TRAINING PROGRAM

## 2024-07-11 PROCEDURE — 77067 SCR MAMMO BI INCL CAD: CPT | Performed by: STUDENT IN AN ORGANIZED HEALTH CARE EDUCATION/TRAINING PROGRAM

## 2024-07-11 PROCEDURE — 77080 DXA BONE DENSITY AXIAL: CPT | Performed by: INTERNAL MEDICINE

## 2024-07-11 RX ORDER — METOPROLOL TARTRATE 25 MG/1
50 TABLET, FILM COATED ORAL 2 TIMES DAILY
Qty: 180 TABLET | Refills: 1 | OUTPATIENT
Start: 2024-07-11

## 2024-07-12 ENCOUNTER — HOSPITAL ENCOUNTER (OUTPATIENT)
Dept: CARDIAC REHAB | Facility: CLINIC | Age: 69
Discharge: HOME | End: 2024-07-12
Attending: STUDENT IN AN ORGANIZED HEALTH CARE EDUCATION/TRAINING PROGRAM
Payer: COMMERCIAL

## 2024-07-12 PROCEDURE — 93798 PHYS/QHP OP CAR RHAB W/ECG: CPT

## 2024-07-15 ENCOUNTER — LAB (OUTPATIENT)
Dept: LAB | Facility: CLINIC | Age: 69
End: 2024-07-15
Payer: COMMERCIAL

## 2024-07-15 ENCOUNTER — HOSPITAL ENCOUNTER (OUTPATIENT)
Dept: CARDIAC REHAB | Facility: CLINIC | Age: 69
Discharge: HOME | End: 2024-07-15
Attending: STUDENT IN AN ORGANIZED HEALTH CARE EDUCATION/TRAINING PROGRAM
Payer: COMMERCIAL

## 2024-07-15 ENCOUNTER — ANTICOAGULATION THERAPY VISIT (OUTPATIENT)
Dept: ANTICOAGULATION | Facility: CLINIC | Age: 69
End: 2024-07-15

## 2024-07-15 DIAGNOSIS — I35.0 SEVERE AORTIC STENOSIS: Primary | ICD-10-CM

## 2024-07-15 DIAGNOSIS — I25.10 CORONARY ARTERY DISEASE DUE TO CALCIFIED CORONARY LESION: ICD-10-CM

## 2024-07-15 DIAGNOSIS — I25.84 CORONARY ARTERY DISEASE DUE TO CALCIFIED CORONARY LESION: ICD-10-CM

## 2024-07-15 DIAGNOSIS — I35.0 SEVERE AORTIC STENOSIS: ICD-10-CM

## 2024-07-15 LAB — INR PPP: 1.86 (ref 0.85–1.15)

## 2024-07-15 PROCEDURE — 36415 COLL VENOUS BLD VENIPUNCTURE: CPT

## 2024-07-15 PROCEDURE — 93798 PHYS/QHP OP CAR RHAB W/ECG: CPT

## 2024-07-15 PROCEDURE — 85610 PROTHROMBIN TIME: CPT

## 2024-07-15 NOTE — PROGRESS NOTES
ANTICOAGULATION MANAGEMENT     Won Urena 69 year old female is on warfarin with subtherapeutic INR result. (Goal INR 2.0-3.0)    Recent labs: (last 7 days)     07/15/24  0949   INR 1.86*         ASSESSMENT     Warfarin Lab Questionnaire    Warfarin Doses Last 7 Days      7/14/2024    11:36 AM   Dose in Tablet or Mg   TAB or MG? milligram (mg)     Pt Rptd Dose SUNDAY MONDAY TUESDAY WED THURS FRIDAY SATURDAY 7/14/2024  11:36 AM 4.5 3 4.5 4.5 3 4.5 4.5         7/14/2024   Warfarin Lab Questionnaire   Missed doses within past 14 days? No   Changes in diet or alcohol within past 14 days? No   Medication changes since last result? No   Injuries or illness since last result? No   New shortness of breath, severe headaches or sudden changes in vision since last result? No   Abnormal bleeding since last result? No   Upcoming surgery, procedure? No   Best number to call with results? [phone]        Previous result: Supratherapeutic  Additional findings:  is eating more protein and will continue to do so       PLAN     Recommended plan for ongoing change(s) affecting INR     Dosing Instructions: Increase your warfarin dose (10.5% change) with next INR in 1 week       Summary  As of 7/15/2024      Full warfarin instructions:  4.5 mg every day   Next INR check:  7/22/2024               Telephone call with Won who verbalizes understanding and agrees to plan    Lab visit scheduled    Education provided: Please call back if any changes to your diet, medications or how you've been taking warfarin  Goal range and lab monitoring: goal range and significance of current result, Importance of therapeutic range, and Importance of following up at instructed interval  Dietary considerations: Impact of protein intake on INR     Plan made with [facility] Pharmacist Liliana Keyes, JAZZY  Anticoagulation Clinic  7/15/2024    _______________________________________________________________________     Anticoagulation Episode  Summary       Current INR goal:  2.0-3.0   TTR:  86.5% (2.6 wk)   Target end date:  9/19/2024   Send INR reminders to:  [facility]    Indications    Severe aortic stenosis [I35.0]  Coronary artery disease due to calcified coronary lesion [I25.10  I25.84]             Comments:               Anticoagulation Care Providers       Provider Role Specialty Phone number    Mitali Nicole MD Referring Thoracic Surgery [phone]    Nannette Gallagher MD Referring Family Medicine [phone]

## 2024-07-16 ENCOUNTER — MYC MEDICAL ADVICE (OUTPATIENT)
Dept: FAMILY MEDICINE | Facility: CLINIC | Age: 69
End: 2024-07-16

## 2024-07-16 DIAGNOSIS — L98.9 SKIN LESION: Primary | ICD-10-CM

## 2024-07-16 NOTE — TELEPHONE ENCOUNTER
Placing dermatology referral per pt request to Dr. Ana Rogers at:    [facility] [address], [place] [address]  P: [phone]  F: [phone]    JORDI HardinN, RN  07/16/24, 2:54 PM

## 2024-07-17 ENCOUNTER — HOSPITAL ENCOUNTER (OUTPATIENT)
Dept: CARDIAC REHAB | Facility: CLINIC | Age: 69
Discharge: HOME | End: 2024-07-17
Attending: STUDENT IN AN ORGANIZED HEALTH CARE EDUCATION/TRAINING PROGRAM
Payer: COMMERCIAL

## 2024-07-17 PROCEDURE — 93798 PHYS/QHP OP CAR RHAB W/ECG: CPT

## 2024-07-18 PROCEDURE — 93244 EXT ECG>48HR<7D REV&INTERPJ: CPT | Performed by: INTERNAL MEDICINE

## 2024-07-19 ENCOUNTER — HOSPITAL ENCOUNTER (OUTPATIENT)
Dept: CARDIAC REHAB | Facility: CLINIC | Age: 69
Discharge: HOME | End: 2024-07-19
Attending: STUDENT IN AN ORGANIZED HEALTH CARE EDUCATION/TRAINING PROGRAM
Payer: COMMERCIAL

## 2024-07-19 ENCOUNTER — LAB (OUTPATIENT)
Dept: LAB | Facility: CLINIC | Age: 69
End: 2024-07-19
Payer: COMMERCIAL

## 2024-07-19 ENCOUNTER — ANTICOAGULATION THERAPY VISIT (OUTPATIENT)
Dept: ANTICOAGULATION | Facility: CLINIC | Age: 69
End: 2024-07-19

## 2024-07-19 ENCOUNTER — TELEPHONE (OUTPATIENT)
Dept: FAMILY MEDICINE | Facility: CLINIC | Age: 69
End: 2024-07-19

## 2024-07-19 DIAGNOSIS — I35.0 SEVERE AORTIC STENOSIS: Primary | ICD-10-CM

## 2024-07-19 DIAGNOSIS — I25.10 CORONARY ARTERY DISEASE DUE TO CALCIFIED CORONARY LESION: ICD-10-CM

## 2024-07-19 DIAGNOSIS — I48.0 PAROXYSMAL ATRIAL FIBRILLATION (H): Primary | ICD-10-CM

## 2024-07-19 DIAGNOSIS — I35.0 SEVERE AORTIC STENOSIS: ICD-10-CM

## 2024-07-19 DIAGNOSIS — I25.84 CORONARY ARTERY DISEASE DUE TO CALCIFIED CORONARY LESION: ICD-10-CM

## 2024-07-19 LAB — INR PPP: 1.72 (ref 0.85–1.15)

## 2024-07-19 PROCEDURE — 93798 PHYS/QHP OP CAR RHAB W/ECG: CPT | Performed by: OCCUPATIONAL THERAPIST

## 2024-07-19 PROCEDURE — 85610 PROTHROMBIN TIME: CPT

## 2024-07-19 PROCEDURE — 36415 COLL VENOUS BLD VENIPUNCTURE: CPT

## 2024-07-19 NOTE — TELEPHONE ENCOUNTER
General Call      Reason for Call:  Patient calling in to let [facility] team know that she will be out of town on Monday 7/22/24 when she is due for her INR Draw so she would like to come in today, 7/19/24 for her INR and would like to make sure that is okay. Additionally, she will be gone until 7/29/24 and would like the ok to get her following INR draw on Tuesday 7/30/24      Could we [facility] this information to you in [facility] or would you prefer to receive a phone call?:   Patient would prefer a phone call   Okay to leave a detailed message?: Yes at Cell number on file:    Telephone Information:   Mobile [phone]

## 2024-07-19 NOTE — PROGRESS NOTES
ANTICOAGULATION MANAGEMENT     Won Urena 69 year old female is on warfarin with subtherapeutic INR result. (Goal INR 2.0-3.0)    Recent labs: (last 7 days)     07/19/24  1226   INR 1.72*       ASSESSMENT     Source(s): Chart Review and Patient/Caregiver Call     Warfarin doses taken: Warfarin taken as instructed  Diet:  Increased protein diet  Medication/supplement changes: None noted  New illness, injury, or hospitalization: No  Signs or symptoms of bleeding or clotting: No  Previous result: Subtherapeutic  Additional findings: Recent heart valve replacement in last 10 weeks       PLAN     Recommended plan for ongoing change(s) affecting INR     Dosing Instructions: Increase your warfarin dose (14% change) with next INR in 10 days   Patient is going out of town and won't be back until 7/30/24    Summary  As of 7/19/2024      Full warfarin instructions:  6 mg every Mon, Wed, Fri; 4.5 mg all other days   Next INR check:  7/30/2024               Telephone call with Won who verbalizes understanding and agrees to plan and who agrees to plan and repeated back plan correctly    Lab visit scheduled    Education provided: Taking warfarin: Importance of taking warfarin as instructed  Goal range and lab monitoring: goal range and significance of current result and Importance of therapeutic range    Plan made per [facility] anticoagulation protocol    Rina Stubbs RN  Anticoagulation Clinic  7/19/2024    _______________________________________________________________________     Anticoagulation Episode Summary       Current INR goal:  2.0-3.0   TTR:  70.7% (3.1 wk)   Target end date:  9/19/2024   Send INR reminders to:  [facility]    Indications    Severe aortic stenosis [I35.0]  Coronary artery disease due to calcified coronary lesion [I25.10  I25.84]             Comments:               Anticoagulation Care Providers       Provider Role Specialty Phone number    Mitali Nicole MD Referring Thoracic Surgery  [phone]    Nannette Gallagher MD [facility] Medicine [phone]

## 2024-07-22 ENCOUNTER — TELEPHONE (OUTPATIENT)
Dept: ANTICOAGULATION | Facility: CLINIC | Age: 69
End: 2024-07-22

## 2024-07-22 NOTE — TELEPHONE ENCOUNTER
Patient left a voice mail message on the [facility] care line requesting a call back or a my chart message to confirm warfarin dosing.  She did not have a pen and paper when Rina gave dosing and cannot remember.  Patient states a my chart message can be sent.     Routing to [facility] clinic.    JORDI MillanN, RN  Anticoagulation Clinic

## 2024-07-24 ENCOUNTER — MYC MEDICAL ADVICE (OUTPATIENT)
Dept: CARDIOLOGY | Facility: CLINIC | Age: 69
End: 2024-07-24
Payer: COMMERCIAL

## 2024-07-25 DIAGNOSIS — I25.10 CORONARY ARTERY DISEASE INVOLVING NATIVE CORONARY ARTERY OF NATIVE HEART WITHOUT ANGINA PECTORIS: ICD-10-CM

## 2024-07-25 DIAGNOSIS — I35.0 SEVERE AORTIC STENOSIS: ICD-10-CM

## 2024-07-25 RX ORDER — METOPROLOL TARTRATE 25 MG/1
50 TABLET, FILM COATED ORAL 2 TIMES DAILY
Qty: 180 TABLET | Refills: 0 | Status: SHIPPED | OUTPATIENT
Start: 2024-07-25 | End: 2024-08-16

## 2024-07-25 NOTE — TELEPHONE ENCOUNTER
Metoprolol (Lopressor) 25 mg    Last Office Visit: 6/20/24  Future [facility] Appointments: None  Medication last refilled: 6/28/24 #180 with 1 refill(s)    Vital Signs Systolic Diastolic Pulse   7/10/24 126 80 59   6/9/23 121 66 67   9/16/22 108 73 83     Medication is being filled for 1 time refill only due to:  Patient needs to be seen because due for diabetes & CAD in September.    Monogram message sent to patient to call and schedule appointment.    Maria L Samayoa, JORDIN, RN, CCM

## 2024-07-26 ENCOUNTER — TELEPHONE (OUTPATIENT)
Dept: DERMATOLOGY | Facility: CLINIC | Age: 69
End: 2024-07-26
Payer: COMMERCIAL

## 2024-07-26 DIAGNOSIS — S31.809A WOUND OF BUTTOCK: Primary | ICD-10-CM

## 2024-07-26 NOTE — PROGRESS NOTES
[facility] Dermatology Note  Encounter Date: Jul 29, 2024  Office Visit     Reviewed patients past medical history and pertinent chart review prior to patients visit today.     Dermatology Problem List:  Pressure ulcer     ____________________________________________    Assessment & Plan:     #Pressure ulcer, buttock  - Reviewed importance of offloading weight at least every 2 hours.  Patient should also start sitting with a donut pillow.  Patient also indicated that she is a back sleeper.  I encouraged her to sleep with a pillow or something under the gluteal/lumbar back area to help offload pressure while she is sleeping.  Patient can also try barrier cream such as Desitin or A&E ointment to help with any skin on skin friction that may be contributing to this.    Follow up: As needed for new or changing lesions    Priya Culver PA-C  [facility]  Dermatology    _______________________________________    CC: No chief complaint on file.    HPI:  Ms. Won Urena is a(n) 69 year old female who presents today as a new patient for evaluation of a rash of concern involving the gluteal cleft area.  This rash has been present off and on for the past year and a half.  It will wax and wane in severity.  It can become ulcerated.  It is only symptomatic when it is ulcerated at which time it can be painful.  Patient does report that she is very sedentary and will sit for prolonged periods of time.  She recently had open heart surgery and has been more sedentary as well secondary to this.    Patient is otherwise feeling well, without additional skin concerns.      Physical Exam:  SKIN: Focused examination of lumbar back/gluteal cleft area was performed.  -Erythema involving the lumbar back/gluteal cleft area    - No other lesions of concern on areas examined.     Medications:  Current Outpatient Medications   Medication Sig Dispense Refill    acetaminophen (TYLENOL) 325 MG tablet Take 3 tablets  (975 mg) by mouth every 6 hours as needed for pain (mild to moderate pain) 100 tablet 0    aspirin 81 MG EC tablet Take 81 mg by mouth every evening      atorvastatin (LIPITOR) 40 MG tablet Take 40 mg by mouth every evening      carboxymethylcellulose PF (REFRESH PLUS) 0.5 % ophthalmic solution Place 1 drop into both eyes 4 times daily 70 each 0    cyanocobalamin (VITAMIN B-12) 100 MCG tablet Take 100 mcg by mouth every evening      dapagliflozin (FARXIGA) 10 MG TABS tablet TAKE 1 TABLET BY MOUTH IN THE  MORNING 100 tablet 1    estradiol (ESTRACE) 0.1 MG/GM vaginal cream INSERT 1/2 APPLICATORFUL  VAGINALLY TWICE WEEKLY 85 g 3    fenofibrate (TRIGLIDE/LOFIBRA) 160 MG tablet Take 1 tablet (160 mg) by mouth every evening 100 tablet 3    gabapentin (NEURONTIN) 100 MG capsule Take 2 capsules (200 mg) by mouth at bedtime 200 capsule 3    levocetirizine (XYZAL) 5 MG tablet Take 5 mg by mouth every evening      metFORMIN (GLUCOPHAGE) 500 MG tablet TAKE 2 TABLETS BY MOUTH  TWICE DAILY WITH MEALS 360 tablet 3    methocarbamol (ROBAXIN) 750 MG tablet Take 1 tablet (750 mg) by mouth every 6 hours 90 tablet 0    metoprolol tartrate (LOPRESSOR) 25 MG tablet Take 2 tablets (50 mg) by mouth 2 times daily 180 tablet 0    mometasone (NASONEX) 50 MCG/ACT nasal spray Spray 2 sprays into both nostrils daily 17 g 0    montelukast (SINGULAIR) 10 MG tablet Take 1 tablet (10 mg) by mouth at bedtime 100 tablet 2    multivitamin w/minerals (MULTI-VITAMIN) tablet Take 1 tablet by mouth every evening      Semaglutide, 1 MG/DOSE, (OZEMPIC, 1 MG/DOSE,) 4 MG/3ML pen INJECT SUBCUTANEOUSLY 1 MG  EVERY WEEK 9 mL 2    Vitamin D, Cholecalciferol, 10 MCG (400 UNIT) TABS Take 1 tablet by mouth every evening      warfarin ANTICOAGULANT (COUMADIN) 3 MG tablet Take 1 tablet (3 mg) to 1.5 tablets (4.5 mg) daily as directed by anticoagulation clinic. 130 tablet 1    warfarin ANTICOAGULANT (COUMADIN) 3 MG tablet Take 1 tablet (3 mg) by mouth daily Take every  evening at the same time 10 tablet 0     No current facility-administered medications for this visit.      Past Medical History:   Patient Active Problem List   Diagnosis    Type 2 diabetes mellitus without complication, without long-term current use of insulin (H)    Coronary artery calcification    Nonrheumatic aortic valve stenosis    Ascending aortic aneurysm (H24)    Hyperlipidemia LDL goal <100    Essential hypertension    Screening for colon cancer    Genitourinary syndrome of menopause    Environmental allergies    Seasonal allergic rhinitis due to pollen    Status post coronary angiogram    Coronary artery disease involving native heart without angina pectoris, unspecified vessel or lesion type    CAD (coronary artery disease)    Severe aortic stenosis    Coronary artery disease involving native coronary artery without angina pectoris    Coronary artery disease involving native coronary artery of native heart without angina pectoris    Coronary artery disease due to calcified coronary lesion     Past Medical History:   Diagnosis Date    Abnormal Pap smear of cervix     Aortic stenosis     Aortic valve stenosis     Benign colon polyp     CAD (coronary artery disease)     Dermatochalasis of both upper eyelids     Diabetes mellitus, type 2 (H)     Gestational diabetes     HTN (hypertension)     Hyperlipidemia LDL goal <100     Metal foreign body in chest     Retained atrial temporary pacing wire    Nonsenile cataract 2022    Very beginnings    Post-menopausal atrophic vaginitis     Ptosis of both eyelids     Thickened endometrium        CC Referred Self, MD  No address on file on close of this encounter.

## 2024-07-26 NOTE — TELEPHONE ENCOUNTER
Called and talked with patient in regards to her appointment with Priya Culver.     She was originally scheduled for 08/15/2024 in [place].     After reviewing her referral, notes, and media, it appears to be more of an open wound on her coccyx and not a rash.     Asked patient if she would be able to be seen sooner. Rescheduled her appointment for Monday July 29th at 4:15 pm in [place] with Palomo.     Told patient that there would be a potential for wound care referral but we would discuss that at her appointment when we can physically see it.     Patient understands and states she looks forward to her appointment. No questions at this time.       Will place referral for [facility] but Palomo will see her on Monday as well.    Meg Carrizales RN on 7/26/2024 at 9:27 AM

## 2024-07-26 NOTE — PATIENT INSTRUCTIONS
The ABCDEs of Melanoma    Skin cancer can develop anywhere on the skin. Ask someone for help when checking your skin, especially in hard to see places. If you notice a mole different from others, or that changes, enlarges, itches, or bleeds (even if it is small), you should see a dermatologist.

## 2024-07-28 ENCOUNTER — HEALTH MAINTENANCE LETTER (OUTPATIENT)
Age: 69
End: 2024-07-28

## 2024-07-29 ENCOUNTER — OFFICE VISIT (OUTPATIENT)
Dept: DERMATOLOGY | Facility: CLINIC | Age: 69
End: 2024-07-29
Attending: FAMILY MEDICINE
Payer: COMMERCIAL

## 2024-07-29 ENCOUNTER — TELEPHONE (OUTPATIENT)
Dept: WOUND CARE | Facility: CLINIC | Age: 69
End: 2024-07-29

## 2024-07-29 DIAGNOSIS — L89.301 PRESSURE INJURY OF BUTTOCK, STAGE 1, UNSPECIFIED LATERALITY: ICD-10-CM

## 2024-07-29 DIAGNOSIS — E11.9 TYPE 2 DIABETES MELLITUS WITHOUT COMPLICATION, WITHOUT LONG-TERM CURRENT USE OF INSULIN (H): ICD-10-CM

## 2024-07-29 PROCEDURE — 99212 OFFICE O/P EST SF 10 MIN: CPT | Performed by: STUDENT IN AN ORGANIZED HEALTH CARE EDUCATION/TRAINING PROGRAM

## 2024-07-29 ASSESSMENT — PAIN SCALES - GENERAL: PAINLEVEL: NO PAIN (0)

## 2024-07-29 NOTE — TELEPHONE ENCOUNTER
Consult received via [employer] from Priya Culver PA-C in [facility]  for wound of the buttocks.    Does the patient have an open and draining wound? Yes    Please schedule with Liseth Lopez PA-C, Marry Simmons NP, Chavez Valencia M.D., Jayson Hernandez, TAMMY, or Leobardo Calhoun M.D. at [facility] [place] for next available appointment.    **For all providers, Lisa Rudolph PA-C, Dr. Miller, Marry Simmons NP or Dr. Calhoun, please schedule a follow up 4 weeks after initial appointment.**  --If unable to schedule within 2-3 weeks then please place on cancellation list--    Is the patient able to make their own medical decisions? Yes    Can the patient be scheduled on a Wednesday (Unique) or Thursday ([place])? Yes if the patient is ok with being seen in a chair laid flat     Is patient a JAMES lift? PLEASE INQUIRE WHEN MAKING THE APPOINTMENT AND PUT IN APPOINTMENT NOTES    Routing to  Wound Healing Scheduling.

## 2024-07-29 NOTE — LETTER
7/29/2024      Won Urena  [address]  [address]  [place] [address]      Dear Colleague,    Thank you for referring your patient, Won Urena, to the [facility] [place]. Please see a copy of my visit note below.    [facility] Dermatology Note  Encounter Date: Jul 29, 2024  Office Visit     Reviewed patients past medical history and pertinent chart review prior to patients visit today.     Dermatology Problem List:  Pressure ulcer     ____________________________________________    Assessment & Plan:     #Pressure ulcer, buttock  - Reviewed importance of offloading weight at least every 2 hours.  Patient should also start sitting with a donut pillow.  Patient also indicated that she is a back sleeper.  I encouraged her to sleep with a pillow or something under the gluteal/lumbar back area to help offload pressure while she is sleeping.  Patient can also try barrier cream such as Desitin or A&E ointment to help with any skin on skin friction that may be contributing to this.    Follow up: As needed for new or changing lesions    Priya Culver PA-C  [facility]  Dermatology    _______________________________________    CC: No chief complaint on file.    HPI:  Ms. Won Urena is a(n) 69 year old female who presents today as a new patient for evaluation of a rash of concern involving the gluteal cleft area.  This rash has been present off and on for the past year and a half.  It will wax and wane in severity.  It can become ulcerated.  It is only symptomatic when it is ulcerated at which time it can be painful.  Patient does report that she is very sedentary and will sit for prolonged periods of time.  She recently had open heart surgery and has been more sedentary as well secondary to this.    Patient is otherwise feeling well, without additional skin concerns.      Physical Exam:  SKIN: Focused examination of lumbar back/gluteal cleft area was  performed.  -Erythema involving the lumbar back/gluteal cleft area    - No other lesions of concern on areas examined.     Medications:  Current Outpatient Medications   Medication Sig Dispense Refill     acetaminophen (TYLENOL) 325 MG tablet Take 3 tablets (975 mg) by mouth every 6 hours as needed for pain (mild to moderate pain) 100 tablet 0     aspirin 81 MG EC tablet Take 81 mg by mouth every evening       atorvastatin (LIPITOR) 40 MG tablet Take 40 mg by mouth every evening       carboxymethylcellulose PF (REFRESH PLUS) 0.5 % ophthalmic solution Place 1 drop into both eyes 4 times daily 70 each 0     cyanocobalamin (VITAMIN B-12) 100 MCG tablet Take 100 mcg by mouth every evening       dapagliflozin (FARXIGA) 10 MG TABS tablet TAKE 1 TABLET BY MOUTH IN THE  MORNING 100 tablet 1     estradiol (ESTRACE) 0.1 MG/GM vaginal cream INSERT 1/2 APPLICATORFUL  VAGINALLY TWICE WEEKLY 85 g 3     fenofibrate (TRIGLIDE/LOFIBRA) 160 MG tablet Take 1 tablet (160 mg) by mouth every evening 100 tablet 3     gabapentin (NEURONTIN) 100 MG capsule Take 2 capsules (200 mg) by mouth at bedtime 200 capsule 3     levocetirizine (XYZAL) 5 MG tablet Take 5 mg by mouth every evening       metFORMIN (GLUCOPHAGE) 500 MG tablet TAKE 2 TABLETS BY MOUTH  TWICE DAILY WITH MEALS 360 tablet 3     methocarbamol (ROBAXIN) 750 MG tablet Take 1 tablet (750 mg) by mouth every 6 hours 90 tablet 0     metoprolol tartrate (LOPRESSOR) 25 MG tablet Take 2 tablets (50 mg) by mouth 2 times daily 180 tablet 0     mometasone (NASONEX) 50 MCG/ACT nasal spray Spray 2 sprays into both nostrils daily 17 g 0     montelukast (SINGULAIR) 10 MG tablet Take 1 tablet (10 mg) by mouth at bedtime 100 tablet 2     multivitamin w/minerals (MULTI-VITAMIN) tablet Take 1 tablet by mouth every evening       Semaglutide, 1 MG/DOSE, (OZEMPIC, 1 MG/DOSE,) 4 MG/3ML pen INJECT SUBCUTANEOUSLY 1 MG  EVERY WEEK 9 mL 2     Vitamin D, Cholecalciferol, 10 MCG (400 UNIT) TABS Take 1 tablet  by mouth every evening       warfarin ANTICOAGULANT (COUMADIN) 3 MG tablet Take 1 tablet (3 mg) to 1.5 tablets (4.5 mg) daily as directed by anticoagulation clinic. 130 tablet 1     warfarin ANTICOAGULANT (COUMADIN) 3 MG tablet Take 1 tablet (3 mg) by mouth daily Take every evening at the same time 10 tablet 0     No current facility-administered medications for this visit.      Past Medical History:   Patient Active Problem List   Diagnosis     Type 2 diabetes mellitus without complication, without long-term current use of insulin (H)     Coronary artery calcification     Nonrheumatic aortic valve stenosis     Ascending aortic aneurysm (H24)     Hyperlipidemia LDL goal <100     Essential hypertension     Screening for colon cancer     Genitourinary syndrome of menopause     Environmental allergies     Seasonal allergic rhinitis due to pollen     Status post coronary angiogram     Coronary artery disease involving native heart without angina pectoris, unspecified vessel or lesion type     CAD (coronary artery disease)     Severe aortic stenosis     Coronary artery disease involving native coronary artery without angina pectoris     Coronary artery disease involving native coronary artery of native heart without angina pectoris     Coronary artery disease due to calcified coronary lesion     Past Medical History:   Diagnosis Date     Abnormal Pap smear of cervix      Aortic stenosis      Aortic valve stenosis      Benign colon polyp      CAD (coronary artery disease)      Dermatochalasis of both upper eyelids      Diabetes mellitus, type 2 (H)      Gestational diabetes      HTN (hypertension)      Hyperlipidemia LDL goal <100      Metal foreign body in chest     Retained atrial temporary pacing wire     Nonsenile cataract 2022    Very beginnings     Post-menopausal atrophic vaginitis      Ptosis of both eyelids      Thickened endometrium        CC Referred Self, MD  No address on file on close of this encounter.        Again, thank you for allowing me to participate in the care of your patient.        Sincerely,        Priya Culver PA-C

## 2024-07-29 NOTE — NURSING NOTE
Won Urena's chief complaint for this visit includes:  Chief Complaint   Patient presents with    Skin Check     [facility] - will be an open sore that will resolve and then will return.     PCP: Nannette Gallagher    Referring Provider:  Referred Self, MD  No address on file    There were no vitals taken for this visit.  No Pain (0)        Allergies   Allergen Reactions    Avapro [Irbesartan] Cough    Fluticasone Other (See Comments)     Loss of sense of taste         Do you need any medication refills at today's visit? No    Lu Cordon, [facility]

## 2024-07-30 ENCOUNTER — ANTICOAGULATION THERAPY VISIT (OUTPATIENT)
Dept: ANTICOAGULATION | Facility: CLINIC | Age: 69
End: 2024-07-30

## 2024-07-30 ENCOUNTER — LAB (OUTPATIENT)
Dept: LAB | Facility: CLINIC | Age: 69
End: 2024-07-30
Payer: COMMERCIAL

## 2024-07-30 DIAGNOSIS — I25.84 CORONARY ARTERY DISEASE DUE TO CALCIFIED CORONARY LESION: ICD-10-CM

## 2024-07-30 DIAGNOSIS — I35.0 SEVERE AORTIC STENOSIS: Primary | ICD-10-CM

## 2024-07-30 DIAGNOSIS — I25.10 CORONARY ARTERY DISEASE DUE TO CALCIFIED CORONARY LESION: ICD-10-CM

## 2024-07-30 DIAGNOSIS — I35.0 SEVERE AORTIC STENOSIS: ICD-10-CM

## 2024-07-30 LAB — INR PPP: 2.05 (ref 0.85–1.15)

## 2024-07-30 PROCEDURE — 85610 PROTHROMBIN TIME: CPT

## 2024-07-30 PROCEDURE — 36415 COLL VENOUS BLD VENIPUNCTURE: CPT

## 2024-07-30 NOTE — PROGRESS NOTES
ANTICOAGULATION MANAGEMENT     Won Urena 69 year old female is on warfarin with therapeutic INR result. (Goal INR 2.0-3.0)    Recent labs: (last 7 days)     07/30/24  1239   INR 2.05*       ASSESSMENT     Warfarin Lab Questionnaire    Warfarin Doses Last 7 Days    Pt Rptd Dose SUNDAY MONDAY TUESDAY WED THURS FRIDAY SATURDAY 7/29/2024   7:23 PM 4.5 6 4.5 6 4.5 6 4.5         7/29/2024   Warfarin Lab Questionnaire   Missed doses within past 14 days? No   Changes in diet or alcohol within past 14 days? No   Medication changes since last result? No   Injuries or illness since last result? No   New shortness of breath, severe headaches or sudden changes in vision since last result? No   Abnormal bleeding since last result? No   Upcoming surgery, procedure? No   Best number to call with results? [phone]        Previous result: Subtherapeutic  Additional findings: AVR 7 weeks ago tomorrow. S/P Bioprostetic AVR on 6/12.     Patient states she would like to add more greens back into her diet and plans to continue this. Says she has been avoiding the farmers market and summer veggies but would like to start eating them again. Additonally she says she normally - prior to heart surgery- was having alcohol 2-4x per week and has since cut it down to about 1x week. Because of that will increase MD a little more aggressively -4% vs 8%       PLAN     Recommended plan for no diet, medication or health factor changes and recent heart valve replacement last 10 weeks affecting INR     Dosing Instructions: Increase your warfarin dose (8.3% change) with next INR in 2 weeks       Summary  As of 7/30/2024      Full warfarin instructions:  4.5 mg every Mon, Thu; 6 mg all other days   Next INR check:  8/12/2024               Telephone call with Won who verbalizes understanding and agrees to plan and who agrees to plan and repeated back plan correctly    Lab visit scheduled    Education provided: Please call back if any  changes to your diet, medications or how you've been taking warfarin    Plan made per [facility] anticoagulation protocol    Melanie Rodney RN  Anticoagulation Clinic  7/30/2024    _______________________________________________________________________     Anticoagulation Episode Summary       Current INR goal:  2.0-3.0   TTR:  52.5% (1.1 mo)   Target end date:  9/19/2024   Send INR reminders to:  [facility]    Indications    Severe aortic stenosis [I35.0]  Coronary artery disease due to calcified coronary lesion [I25.10  I25.84]             Comments:               Anticoagulation Care Providers       Provider Role Specialty Phone number    Mitali Nicole MD Referring Thoracic Surgery [phone]    Nannette Gallagher MD Referring Family Medicine [phone]

## 2024-07-31 ENCOUNTER — HOSPITAL ENCOUNTER (OUTPATIENT)
Dept: CARDIAC REHAB | Facility: CLINIC | Age: 69
Discharge: HOME | End: 2024-07-31
Attending: STUDENT IN AN ORGANIZED HEALTH CARE EDUCATION/TRAINING PROGRAM
Payer: COMMERCIAL

## 2024-07-31 PROCEDURE — 93798 PHYS/QHP OP CAR RHAB W/ECG: CPT

## 2024-07-31 RX ORDER — DAPAGLIFLOZIN 10 MG/1
10 TABLET, FILM COATED ORAL EVERY MORNING
Qty: 100 TABLET | Refills: 1 | Status: SHIPPED | OUTPATIENT
Start: 2024-07-31

## 2024-07-31 NOTE — TELEPHONE ENCOUNTER
Medication requested: dapagliflozin (FARXIGA) 10 MG TABS tablet   Last office visit: 6/20/2024  [facility] appointments: 9/10/2024  Medication last refilled: 2/26/2024; 100 tabs + 1 refill  Last qualifying labs:       Component      Latest Ref Rng 6/25/2024  4:52 PM   Potassium      3.4 - 5.3 mmol/L 4.1        Component      Latest Ref Rng 6/25/2024  4:52 PM   GFR Estimate      >60 mL/min/1.73m2 >90        Component      Latest Ref Rng 3/15/2024  8:46 AM   Hemoglobin A1C      0.0 - 5.6 % 5.8 (H)       Prescription approved per [facility] Refill Protocol.    MAC Hardin, RN  07/31/24, 10:35 AM

## 2024-08-01 NOTE — TELEPHONE ENCOUNTER
Spoke with patient and she stated that currently the wound is healed and she has received instructions from her PA to care for the pressure site and prevention. She has our phone number and will call in the future if something were to open.

## 2024-08-02 ENCOUNTER — HOSPITAL ENCOUNTER (OUTPATIENT)
Dept: CARDIAC REHAB | Facility: CLINIC | Age: 69
Discharge: HOME | End: 2024-08-02
Attending: STUDENT IN AN ORGANIZED HEALTH CARE EDUCATION/TRAINING PROGRAM
Payer: COMMERCIAL

## 2024-08-02 PROCEDURE — 93798 PHYS/QHP OP CAR RHAB W/ECG: CPT

## 2024-08-07 ENCOUNTER — HOSPITAL ENCOUNTER (OUTPATIENT)
Dept: CARDIAC REHAB | Facility: CLINIC | Age: 69
Discharge: HOME | End: 2024-08-07
Attending: STUDENT IN AN ORGANIZED HEALTH CARE EDUCATION/TRAINING PROGRAM
Payer: COMMERCIAL

## 2024-08-07 PROCEDURE — 93798 PHYS/QHP OP CAR RHAB W/ECG: CPT

## 2024-08-09 NOTE — PROGRESS NOTES
HEART CARE ENCOUNTER CONSULTATON NOTE      [facility]  [phone]      Assessment/Recommendations   Assessment/Plan:    Won Urena is a very pleasant 69 year old female with PMH of CAD s/p CABG, aortoic stenosis s/p bioprosthetic AVR, HTN HLPD, DM, paroxysmal atrial fibrillation who presents today to the EP clinic.    Paroxysmal atrial fibrillation  - We reviewed the pathophysiology of atrial fibrillation and management considerations including stroke risk and anticoagulation, rate control, cardioversion, antiarrhythmic drug therapy, and catheter ablation. We discussed atrial fibrillation ablation procedures, anticipated success rates, the potential need for re-do ablation vs addition of anti-arrhythmic drugs, procedural risks (including groin bleeding, tamponade, phrenic or esophageal injury, stroke, pulmonary vein stenosis) and recovery expectations.  - short duration of AF, will continue metoprolol at this time  - follow up in 6 months and consider ablation if AF progresses  - we discussed the ongoing importance of lifestyle modification (maintaining a healthy weight, sleep apnea diagnosis and management, alcohol avoidance) as part of a long term strategy for atrial fibrillation management    2. Anticoagulation  - CHADSVASC score 4, recommend continuing anticoagulation after she finishes warfarin treatment for her AVR  - on warfarin, will consider NOACs  - If she is continuing warfarin goal INR would be 2-3    3. CAD s/p CABG  - stable, no symptoms  - continue current meds    4. Aortic stenosis  - s/p AVR      5. HTN  - well controlled    Time spent: 60 minutes spent on the date of the encounter doing chart review, history and exam, documentation and further activities as noted above.    The longitudinal plan of care for the condition(s) below were addressed during this visit. Due to the added complexity in care, I will continue support in the subsequent management of this  condition(s) and with the ongoing continuity of care of this condition(s).            History of Present Illness/Subjective    HPI: Won Urena is a very pleasant 69 year old female with PMH of CAD s/p CABG, aortoic stenosis s/p bioprosthetic AVR, HTN HLPD, DM, paroxysmal atrial fibrillation who presents today to the EP clinic.    Won underwent CABG and aortic valve surgery on 12 June 2024.  She presented to the hospital on 25 June 2024 after her Apple Watch reported atrial fibrillation with symptoms of palpitations which lasted for about 2 hours.  Since then she has had a few short lasting episodes of palpitations.    Review of Zio patch shows predominantly sinus rhythm with heart rate ranging 49 to 93 bpm and an average heart rate of 68 bpm.  She is found to have frequent PACs with a burden of around 11%.  Occasional PVCs approximately 3% burden.  Maiden of atrial fibrillation was less than 1% with the longest episode lasting approximately 4 minutes-although review of rhythm strips shows what was read as atrial fibrillation by the monitor is likely short runs of atrial tachycardia.  Review of some of the strips however does show short episodes of atrial fibrillation.      Much of today's visit was spent in discussing pathophysiology of atrial fibrillation and long-term treatment options.  We discussed continuing medical therapy with metoprolol for now given her episodes are short lasting.  We also discussed her need to be on anticoagulation after 3-month treatment for her AVR.  We discussed that she could explore NOACs for anticoagulation as well.      Recent Echocardiogram Results (personally reviewed):    Interpretation Summary  Left ventricular size, wall motion and function are normal. The ejection  fraction is > 65%.  Right ventricular function, chamber size, wall motion, and thickness are  normal.  Coronary Bypass Graft x 2, Aortic Valve Replacement with Inspiris Resilia  Aortic Valve size 25mm. The  mean gradient across the valve is 9mmHg  IVC diameter and respiratory changes fall into an intermediate range  suggesting an RA pressure of 8 mmHg. No pericardial effusion is present.    Labs below reviewed personally     Physical Examination  Review of Systems   Vitals: /72 (BP Location: Right arm, Patient Position: Sitting, Cuff Size: Adult Regular)   Pulse 64   Resp 14   Wt 75.8 kg (167 lb)   BMI 26.16 kg/m    BMI= Body mass index is 26.16 kg/m .  Wt Readings from Last 3 Encounters:   08/12/24 75.8 kg (167 lb)   07/10/24 74.8 kg (165 lb)   06/28/24 75.9 kg (167 lb 6.4 oz)       General Appearance:   no distress, normal body habitus   ENT/Mouth: membranes moist, no oral lesions or bleeding gums.      EYES:  no scleral icterus, normal conjunctivae   Neck: no carotid bruits or thyromegaly   Chest/Lungs:   lungs are clear to auscultation, no rales or wheezing, + sternal scar, equal chest wall expansion    Cardiovascular:   Regular. Normal first and second heart sounds with systolic murmur, rubs, or gallops; the carotid, radial and posterior tibial pulses are intact, no edema bilaterally    Abdomen:  no organomegaly, masses, bruits, or tenderness; bowel sounds are present   Extremities: no cyanosis or clubbing   Skin: no xanthelasma, warm.    Neurologic: normal  bilateral, no tremors     Psychiatric: alert and oriented x3, calm        Please refer above for cardiac ROS details.        Medical History  Surgical History Family History Social History   Past Medical History:   Diagnosis Date    Abnormal Pap smear of cervix     Aortic stenosis     Aortic valve stenosis     Benign colon polyp     CAD (coronary artery disease)     Dermatochalasis of both upper eyelids     Diabetes mellitus, type 2 (H)     Gestational diabetes     HTN (hypertension)     Hyperlipidemia LDL goal <100     Metal foreign body in chest     Retained atrial temporary pacing wire    Nonsenile cataract 2022    Very beginnings     Post-menopausal atrophic vaginitis     Ptosis of both eyelids     Thickened endometrium      Past Surgical History:   Procedure Laterality Date    BYPASS GRAFT ARTERY CORONARY, REPAIR VALVE AORTIC, COMBINED N/A 2024    Procedure: Median Sternotomy, Cardiopulmonary Bypass, Left Internal Mammary Vein Baldwin, Endoscopic Baldwin of Left Greater Saphenous Vein, Coronary Bypass Graft x 2, Aortic Valve Replacement with Inspiris Resilia Aortic Valve size 25mm, Transesophageal Echocardiogram by Anesthesia;  Surgeon: Mitali Nicole MD;  Location: [facility] OR     SECTION      COMBINED REPAIR PTOSIS WITH BLEPHAROPLASTY Bilateral 2023    Procedure: Both upper eyelid blepharoplasty and ptosis repair;  Surgeon: Shantelle Marti MD;  Location: [facility] OR    COSMETIC BLEPHAROPLASTY LOWER LIDS BILATERAL Bilateral 2023    Procedure: Both lower eyelid blepharoplasty;  Surgeon: Shantelle Marti MD;  Location: [facility] OR    CV CORONARY ANGIOGRAM N/A 2023    Procedure: Coronary Angiogram;  Surgeon: Geovanni Ibarra MD;  Location:  HEART CARDIAC CATH LAB    CV CORONARY ANGIOGRAM N/A 2024    Procedure: Coronary Angiogram;  Surgeon: Bassam Flores MD;  Location:  HEART CARDIAC CATH LAB    CV INSTANTANEOUS WAVE-FREE RATIO N/A 2023    Procedure: Instantaneous Wave-Free Ratio;  Surgeon: Geovanni Ibarra MD;  Location:  HEART CARDIAC CATH LAB    CV RIGHT HEART CATH MEASUREMENTS RECORDED N/A 2024    Procedure: Right Heart Catheterization;  Surgeon: Bassam Flores MD;  Location:  HEART CARDIAC CATH LAB     Family History   Problem Relation Age of Onset    Lung Cancer Mother         Small cell, did smoked    Diabetes Mother     Hypertension Mother     Goiter Mother     Mental Illness Mother         likely bipolar    Coronary Artery Disease Mother 42        MI, smoked    Hyperlipidemia Mother     Thyroid Disease Mother         Goiter    Obesity Mother          most of family is obese    Glaucoma Father     Atrial fibrillation Father     Hypertension Father     Parkinsonism Father     Hyperlipidemia Father     Diabetes Sister     Breast Cancer Sister 60    Hypertension Sister     Hypertension Brother     Cancer Maternal Grandmother         gynecologic cancer, unknown type    Uterine Cancer Maternal Grandmother     Chronic Obstructive Pulmonary Disease Paternal Grandmother     Uterine Cancer Paternal Grandmother     Stomach Cancer Paternal Grandfather     Colon Cancer Paternal Grandfather     Bipolar Disorder Son     Mental Illness Son     Depression Son     Macular Degeneration No family hx of     Anesthesia Reaction No family hx of     Venous thrombosis No family hx of         Social History     Socioeconomic History    Marital status:      Spouse name: Not on file    Number of children: Not on file    Years of education: Not on file    Highest education level: Not on file   Occupational History    Not on file   Tobacco Use    Smoking status: Never     Passive exposure: Never    Smokeless tobacco: Never   Vaping Use    Vaping status: Never Used   Substance and Sexual Activity    Alcohol use: Yes     Alcohol/week: 1.0 standard drink of alcohol     Types: 1 Standard drinks or equivalent per week     Comment: less than 2 drinks a week    Drug use: Not Currently     Comment: gummy 1x/month    Sexual activity: Not Currently     Partners: Male     Birth control/protection: Post-menopausal   Other Topics Concern    Not on file   Social History Narrative    Lives with     Two son    One son, daughter-in-law, and grandson live in [place]    One son, daughter-in-law live in the same [facility] in [place], [place]     Social Determinants of Health     Financial Resource Strain: Low Risk  (3/8/2024)    Financial Resource Strain     Within the past 12 months, have you or your family members you live with been unable to get utilities (heat, electricity)  when it was really needed?: No   Food Insecurity: Low Risk  (3/8/2024)    Food Insecurity     Within the past 12 months, did you worry that your food would run out before you got money to buy more?: No     Within the past 12 months, did the food you bought just not last and you didn t have money to get more?: No   Transportation Needs: Low Risk  (3/8/2024)    Transportation Needs     Within the past 12 months, has lack of transportation kept you from medical appointments, getting your medicines, non-medical meetings or appointments, work, or from getting things that you need?: No   Physical Activity: Insufficiently Active (3/8/2024)    Exercise Vital Sign     Days of Exercise per Week: 4 days     Minutes of Exercise per Session: 30 min   Stress: Stress Concern Present (3/8/2024)    [employer] - Occupational Stress Questionnaire     Feeling of Stress : To some extent   Social Connections: Unknown (3/8/2024)    Social Connection and Isolation Panel [NHANES]     Frequency of Communication with Friends and Family: Not on file     Frequency of Social Gatherings with Friends and Family: Twice a week     Attends Caodaism Services: Not on file     Active Member of Clubs or Organizations: Not on file     Attends Club or Organization Meetings: Not on file     Marital Status: Not on file   Interpersonal Safety: Low Risk  (3/15/2024)    Interpersonal Safety     Do you feel physically and emotionally safe where you currently live?: Yes     Within the past 12 months, have you been hit, slapped, kicked or otherwise physically hurt by someone?: No     Within the past 12 months, have you been humiliated or emotionally abused in other ways by your partner or ex-partner?: No   Housing Stability: High Risk (3/8/2024)    Housing Stability     Do you have housing? : Yes     Are you worried about losing your housing?: Yes           Medications  Allergies   Current Outpatient Medications   Medication Sig  Dispense Refill    acetaminophen (TYLENOL) 325 MG tablet Take 3 tablets (975 mg) by mouth every 6 hours as needed for pain (mild to moderate pain) 100 tablet 0    aspirin 81 MG EC tablet Take 81 mg by mouth every evening      atorvastatin (LIPITOR) 40 MG tablet Take 40 mg by mouth every evening      carboxymethylcellulose PF (REFRESH PLUS) 0.5 % ophthalmic solution Place 1 drop into both eyes 4 times daily 70 each 0    cyanocobalamin (VITAMIN B-12) 100 MCG tablet Take 100 mcg by mouth every evening      dapagliflozin (FARXIGA) 10 MG TABS tablet Take 1 tablet (10 mg) by mouth every morning 100 tablet 1    estradiol (ESTRACE) 0.1 MG/GM vaginal cream INSERT 1/2 APPLICATORFUL  VAGINALLY TWICE WEEKLY 85 g 3    fenofibrate (TRIGLIDE/LOFIBRA) 160 MG tablet Take 1 tablet (160 mg) by mouth every evening 100 tablet 3    gabapentin (NEURONTIN) 100 MG capsule Take 2 capsules (200 mg) by mouth at bedtime 200 capsule 3    levocetirizine (XYZAL) 5 MG tablet Take 5 mg by mouth every evening      metFORMIN (GLUCOPHAGE) 500 MG tablet TAKE 2 TABLETS BY MOUTH  TWICE DAILY WITH MEALS 360 tablet 3    metoprolol tartrate (LOPRESSOR) 25 MG tablet Take 2 tablets (50 mg) by mouth 2 times daily 180 tablet 0    mometasone (NASONEX) 50 MCG/ACT nasal spray Spray 2 sprays into both nostrils daily 17 g 0    montelukast (SINGULAIR) 10 MG tablet Take 1 tablet (10 mg) by mouth at bedtime 100 tablet 2    multivitamin w/minerals (MULTI-VITAMIN) tablet Take 1 tablet by mouth every evening      Semaglutide, 1 MG/DOSE, (OZEMPIC, 1 MG/DOSE,) 4 MG/3ML pen INJECT SUBCUTANEOUSLY 1 MG  EVERY WEEK 9 mL 2    Vitamin D, Cholecalciferol, 10 MCG (400 UNIT) TABS Take 1 tablet by mouth every evening      warfarin ANTICOAGULANT (COUMADIN) 3 MG tablet Take 1 tablet (3 mg) to 1.5 tablets (4.5 mg) daily as directed by anticoagulation clinic. 130 tablet 1    warfarin ANTICOAGULANT (COUMADIN) 3 MG tablet Take 1 tablet (3 mg) by mouth daily Take every evening at the same  "time 10 tablet 0    methocarbamol (ROBAXIN) 750 MG tablet Take 1 tablet (750 mg) by mouth every 6 hours (Patient not taking: Reported on 7/29/2024) 90 tablet 0       Allergies   Allergen Reactions    Avapro [Irbesartan] Cough    Fluticasone Other (See Comments)     Loss of sense of taste          Lab Results    Chemistry/lipid CBC Cardiac Enzymes/BNP/TSH/INR   Recent Labs   Lab Test 05/16/24  1132   CHOL 137   HDL 53   LDL 71   TRIG 67     Recent Labs   Lab Test 05/16/24  1132 01/13/24  0826   LDL 71 59  64     Recent Labs   Lab Test 06/25/24  1652      POTASSIUM 4.1   CHLORIDE 105   CO2 24   *   BUN 18.3   CR 0.63   GFRESTIMATED >90   JOAQUINA 9.6     Recent Labs   Lab Test 06/25/24  1652 06/19/24  1039 06/17/24  0602   CR 0.63 0.74 0.53     Recent Labs   Lab Test 03/15/24  0846 09/15/23  1457 04/24/23  0829   A1C 5.8* 6.3* 5.7*          Recent Labs   Lab Test 06/25/24  1652   WBC 6.8   HGB 11.0*   HCT 33.5*   MCV 92        Recent Labs   Lab Test 06/25/24  1652 06/19/24  1039 06/17/24  1203   HGB 11.0* 11.8 9.8*    No results for input(s): \"TROPONINI\" in the last 03902 hours.  Recent Labs   Lab Test 04/25/24  1126   NTBNP 198     Recent Labs   Lab Test 03/23/24  1317   TSH 0.43     Recent Labs   Lab Test 07/30/24  1239 07/19/24  1226 07/15/24  0949   INR 2.05* 1.72* 1.86*        Driss Garza MD                                      "

## 2024-08-12 ENCOUNTER — OFFICE VISIT (OUTPATIENT)
Dept: CARDIOLOGY | Facility: CLINIC | Age: 69
End: 2024-08-12
Attending: CASE MANAGER/CARE COORDINATOR
Payer: COMMERCIAL

## 2024-08-12 ENCOUNTER — DOCUMENTATION ONLY (OUTPATIENT)
Dept: ANTICOAGULATION | Facility: CLINIC | Age: 69
End: 2024-08-12

## 2024-08-12 ENCOUNTER — LAB (OUTPATIENT)
Dept: CARDIOLOGY | Facility: CLINIC | Age: 69
End: 2024-08-12
Payer: COMMERCIAL

## 2024-08-12 ENCOUNTER — HOSPITAL ENCOUNTER (OUTPATIENT)
Dept: CARDIAC REHAB | Facility: CLINIC | Age: 69
Discharge: HOME | End: 2024-08-12
Attending: STUDENT IN AN ORGANIZED HEALTH CARE EDUCATION/TRAINING PROGRAM
Payer: COMMERCIAL

## 2024-08-12 ENCOUNTER — ANTICOAGULATION THERAPY VISIT (OUTPATIENT)
Dept: ANTICOAGULATION | Facility: CLINIC | Age: 69
End: 2024-08-12

## 2024-08-12 VITALS
DIASTOLIC BLOOD PRESSURE: 72 MMHG | RESPIRATION RATE: 14 BRPM | WEIGHT: 167 LBS | BODY MASS INDEX: 26.16 KG/M2 | HEART RATE: 64 BPM | SYSTOLIC BLOOD PRESSURE: 126 MMHG

## 2024-08-12 DIAGNOSIS — I48.0 PAROXYSMAL ATRIAL FIBRILLATION (H): ICD-10-CM

## 2024-08-12 DIAGNOSIS — Z79.01 LONG TERM CURRENT USE OF ANTICOAGULANT THERAPY: Primary | ICD-10-CM

## 2024-08-12 DIAGNOSIS — I25.10 CORONARY ARTERY DISEASE DUE TO CALCIFIED CORONARY LESION: ICD-10-CM

## 2024-08-12 DIAGNOSIS — I35.0 SEVERE AORTIC STENOSIS: Primary | ICD-10-CM

## 2024-08-12 DIAGNOSIS — I35.0 SEVERE AORTIC STENOSIS: ICD-10-CM

## 2024-08-12 DIAGNOSIS — I25.84 CORONARY ARTERY DISEASE DUE TO CALCIFIED CORONARY LESION: ICD-10-CM

## 2024-08-12 DIAGNOSIS — I25.10 CORONARY ARTERY CALCIFICATION: Primary | Chronic | ICD-10-CM

## 2024-08-12 LAB — INR POINT OF CARE: 2.3 (ref 0.9–1.1)

## 2024-08-12 PROCEDURE — 85610 PROTHROMBIN TIME: CPT | Mod: QW

## 2024-08-12 PROCEDURE — 36416 COLLJ CAPILLARY BLOOD SPEC: CPT

## 2024-08-12 PROCEDURE — 99205 OFFICE O/P NEW HI 60 MIN: CPT | Performed by: INTERNAL MEDICINE

## 2024-08-12 PROCEDURE — G2211 COMPLEX E/M VISIT ADD ON: HCPCS | Performed by: INTERNAL MEDICINE

## 2024-08-12 PROCEDURE — 93798 PHYS/QHP OP CAR RHAB W/ECG: CPT

## 2024-08-12 NOTE — PATIENT INSTRUCTIONS
[facility]  Cardiac Electrophysiology  [address] [place] [address]   Office: [phone]  Fax: [phone]       Thank you for seeing us in clinic today - it is a pleasure to be a part of your care team.  Below is a summary of our plan from today's visit.      Continue current meds  Please let us know of you would like to switch to Eliquis or Xarelto  Follow up in 6 months    Please do not hesitate to be in touch with our office at [phone] with any questions that may arise.      Thank you for trusting us with your care,    Driss Garza MD  Clinical Cardiac Electrophysiology  [address]  Cardiac Electrophysiology  [address] [place] [address]   Office: [phone]  Fax: [phone]       Thank you for seeing us in clinic today - it is a pleasure to be a part of your care team.  Below is a summary of our plan from today's visit.       You have paroxysmal atrial fibrillation, presently being managed with Metoprolol  We reviewed physiology and management options including antiarrhythmic drug therapy, catheter ablation and lifestyle modification.  We will plan for the following:    - continue Metoprolol for now  - continue warfarin.     Please do not hesitate to be in touch with our office at [phone] with any questions that may arise.       Thank you for trusting us with your care,    Driss Garza MD  Clinical Cardiac [place] [address]   Office: [phone]  Fax: [phone]                 ATRIAL FIBRILLATION: Patient Information     What is atrial fibrillation?  Atrial fibrillation (AF, A-fib) is a common heart rhythm problem (arrhythmia) occurring within the upper chambers of the heart (the atria).  In normal rhythm, the upper and  lower chambers of the heart are electrically driven to contract in a coordinated sequence.  In atrial fibrillation, the atria lose their ability to contract because of rapid and chaotic electrical activity.  The lower chambers of the heart (the ventricles) continue to pump blood throughout the body, though with irregular and often faster rate due to the chaotic activity within the atria.          How do I know if I have atrial fibrillation?   Some people may feel their heart beating faster, harder, or irregularly while in atrial fibrillation.  Others may be lightheaded, fatigued, feel weak or tired or become more short of breath especially with activities.  Some patients have no symptoms at all.  Atrial fibrillation may be found due to an irregular pulse or on an electrocardiogram (ECG). Atrial fibrillation can start and stop on its own, and episodes can last from seconds to several months.       How common is atrial fibrillation?   An estimated 3-6 million people in the [place] have atrial fibrillation.  Atrial fibrillation is a common heart rhythm problem for older persons, affecting as estimated 12-15% of people over the age of 65 years of age.     What causes atrial fibrillation?   Age is the most important risk factor for atrial fibrillation.  Atrial fibrillation is more common in people with other heart disease, high blood pressure, diabetes, obesity, sleep apnea and in people who regularly consume alcohol.  Surgery, lung disease, or thyroid problems can lead to atrial fibrillation.  Atrial fibrillation has multiple possible causes, and in most cases a single cause cannot be found.  Atrial fibrillation is a progressive condition, usually starting with at an early stage with short and infrequent episodes.  In later stages of disease, more frequent and longer lasting episodes of atrial fibrillation occur, ultimately culminating in episodes which do not spontaneously terminate.  Generally, more enlargement  and scarring within the upper chambers of the heart is observed as atrial fibrillation progresses from early to late-stage disease.     How is atrial fibrillation diagnosed and evaluated?    Because of its start-stop nature, atrial fibrillation can be challenging to diagnose.  Atrial fibrillation is most commonly diagnosed via cardiac rhythm recordings - either an ECG or wearable cardiac rhythm monitor.  For patients with pacemakers, defibrillators or implantable loop recorders, atrial fibrillation may be recorded via these devices.  Recently, commercially available devices (eg. Apple Watch, [employer] device, certain FitBit devices, others) can allow patients to take 30 second cardiac rhythm recordings which may document atrial fibrillation.  Once atrial fibrillation is diagnosed, additional tests include blood tests and an echocardiogram.  The echocardiogram uses ultrasound to look at your heart to assess your cardiac function and evaluate for other heart disease.  Additional evaluation may include CT or MRI studies.     Is atrial fibrillation dangerous?   Atrial fibrillation is not usually a life-threatening arrhythmia.  The most serious consequences of atrial fibrillation including stroke and worsening of overall cardiac function.  While in atrial fibrillation, the upper cardiac chambers do not contract normally, resulting in slower blood flow and increased risk of clot formation.  If this blood clot becomes detached from the heart a stroke can occur.  Unfortunately, stroke can be the first sign of atrial fibrillation for some people.  With a stroke, you may notice abnormal sensation, weakness on one side of the body or face, changes in your vision or speech.  If you have any of these signs, you should contact EMS and be evaluated in an emergency room as soon as possible.       How is atrial fibrillation treated?     Several treatment options exist for suppressing atrial fibrillation - however, it is not an easily  curable arrhythmia.  The first goal in managing atrial fibrillation is to minimize stroke risk.  The second goal is to improve symptoms associated with atrial fibrillation.  Finally, in patients with reduced cardiac function, maintaining normal rhythm can help improve cardiac function.       Blood thinners are used to reduce the risk of stroke in patients with high estimated stroke risk related to atrial fibrillation.  For patients at higher risk of bleeding related to blood thinner use, implantable devices may be an option to reduce stroke risk without the need for long term blood thinner use.       Atrial fibrillation can be managed via two strategies: rate control and rhythm control.  In a rate control strategy, continued atrial fibrillation is accepted and medications (eg. beta-blockers or calcium channel blockers) are used to control the lower chamber rate.  In a rhythm control strategy, anti-arrhythmic medications or catheter ablation are used to maintain normal cardiac rhythm and slow disease progression by suppressing atrial fibrillation.  A procedure called a cardioversion, in which an electric shock is delivered through patches placed on the chest wall while under deep sedation, can be performed to temporarily restore normal cardiac rhythm, though does not address the chance of atrial fibrillation recurrence.  Treatments are more effective for earlier rather than later stage atrial fibrillation.  Lifestyle modifications (maintaining a healthy weight, aerobic exercise, diagnosing and treating sleep apnea, and minimizing alcohol intake) are important elements of atrial fibrillation rhythm control.      What is catheter ablation for atrial fibrillation?  Cardiac catheter ablation is a commonly performed, minimally invasive procedure performed by a cardiac electrophysiologist to treat many different cardiac rhythm abnormalities.  During catheter ablation, long, thin, flexible tubes are advanced into the heart  via small sheaths inserted into the femoral veins and thermal energy (either heating or cooling) is applied within the heart to disrupt abnormal electrical activity.  Atrial fibrillation ablation is performed under general anesthesia, with procedures generally taking approximately 2-3 hours.  Patients are typically observed for 3-5 hours after the ablation, and in most cases can be discharged home the same day.  Atrial fibrillation ablation is associated with better outcomes (mortality, cardiovascular hospitalizations, atrial arrhythmia recurrences) compared to antiarrhythmic drug therapy.  However, atrial fibrillation recurrences are not uncommon, and repeat catheter ablation may be offered.  Your electrophysiology team can review atrial fibrillation ablation, anticipated success rates, risks, and recovery expectations with you.     What are anti-arrhythmic medications?  Anti-arrhythmic medications are specialized drugs which alter cardiac electrical functioning to suppress arrhythmias.  There are several anti-arrhythmic medications available, each with its own success rate and side effects.  Some anti-arrhythmic medications are less effective though safer to use, others are more effective though have serious potential toxicities.  Atrial fibrillation recurrences are common and may require dose adjustment or change in antiarrhythmic therapy.  Your electrophysiology team will carefully consider which medication would be the best and safest for your particular case.       Can I live a normal life?    The goal of atrial fibrillation management is for patients to live normal lives without being limited by symptoms related to atrial fibrillation.     Are any additional educational resources available?  There are a number of excellent atrial fibrillation education resources available to you online.  A few options you may wish to review  include:  hrsonline.org/guide-atrial-fibrillation  afibmatters.org  getsmartaboutafib.com  stopaf.com     What comes next?    Consider your management options and let us know how we can help in your decision process.  Please take medications as they have been prescribed.  You should also get any tests that may have been ordered for you.       When to Call Your Doctor or seek emergency care:  Call your doctor or seek emergency care if you have any significant changes with the following:  Weakness  Dizziness  Fainting  Fatigue  Shortness of breath  Chest pain with increased activity  If you are concerned that your heart rate is too fast or too slow  Bleeding that does not stop in 10 minutes  Coughing or throwing up blood  Bloody diarrhea or bleeding hemorrhoids  Dark-colored urine or black stool  Allergic reactions:  Rash  Itching  Swelling  Trouble breathing or swallowing        Please call the [facility] at [phone] if you have concerns about your symptoms, your medicines, or your follow-up appointments.

## 2024-08-12 NOTE — LETTER
8/12/2024    Nannette Gallagher MD  [address], [place]  [facility] [address]    RE: Won Urena       Dear Colleague,     I had the pleasure of seeing Won Urena in the [facility].    HEART CARE ENCOUNTER CONSULTATON NOTE      [facility]  [phone]      Assessment/Recommendations   Assessment/Plan:    Won Urena is a very pleasant 69 year old female with PMH of CAD s/p CABG, aortoic stenosis s/p bioprosthetic AVR, HTN HLPD, DM, paroxysmal atrial fibrillation who presents today to the EP clinic.    Paroxysmal atrial fibrillation  - We reviewed the pathophysiology of atrial fibrillation and management considerations including stroke risk and anticoagulation, rate control, cardioversion, antiarrhythmic drug therapy, and catheter ablation. We discussed atrial fibrillation ablation procedures, anticipated success rates, the potential need for re-do ablation vs addition of anti-arrhythmic drugs, procedural risks (including groin bleeding, tamponade, phrenic or esophageal injury, stroke, pulmonary vein stenosis) and recovery expectations.  - short duration of AF, will continue metoprolol at this time  - follow up in 6 months and consider ablation if AF progresses  - we discussed the ongoing importance of lifestyle modification (maintaining a healthy weight, sleep apnea diagnosis and management, alcohol avoidance) as part of a long term strategy for atrial fibrillation management    2. Anticoagulation  - CHADSVASC score 4, recommend continuing anticoagulation after she finishes warfarin treatment for her AVR  - on warfarin, will consider NOACs  - If she is continuing warfarin goal INR would be 2-3    3. CAD s/p CABG  - stable, no symptoms  - continue current meds    4. Aortic stenosis  - s/p AVR      5. HTN  - well controlled    Time spent: 60 minutes spent on the date of the encounter doing chart review, history and exam, documentation and further activities as  noted above.    The longitudinal plan of care for the condition(s) below were addressed during this visit. Due to the added complexity in care, I will continue support in the subsequent management of this condition(s) and with the ongoing continuity of care of this condition(s).            History of Present Illness/Subjective    HPI: Won Urena is a very pleasant 69 year old female with PMH of CAD s/p CABG, aortoic stenosis s/p bioprosthetic AVR, HTN HLPD, DM, paroxysmal atrial fibrillation who presents today to the EP clinic.    Won underwent CABG and aortic valve surgery on 12 June 2024.  She presented to the hospital on 25 June 2024 after her Apple Watch reported atrial fibrillation with symptoms of palpitations which lasted for about 2 hours.  Since then she has had a few short lasting episodes of palpitations.    Review of Zio patch shows predominantly sinus rhythm with heart rate ranging 49 to 93 bpm and an average heart rate of 68 bpm.  She is found to have frequent PACs with a burden of around 11%.  Occasional PVCs approximately 3% burden.  [place] of atrial fibrillation was less than 1% with the longest episode lasting approximately 4 minutes-although review of rhythm strips shows what was read as atrial fibrillation by the monitor is likely short runs of atrial tachycardia.  Review of some of the strips however does show short episodes of atrial fibrillation.      Much of today's visit was spent in discussing pathophysiology of atrial fibrillation and long-term treatment options.  We discussed continuing medical therapy with metoprolol for now given her episodes are short lasting.  We also discussed her need to be on anticoagulation after 3-month treatment for her AVR.  We discussed that she could explore NOACs for anticoagulation as well.      Recent Echocardiogram Results (personally reviewed):    Interpretation Summary  Left ventricular size, wall motion and function are normal. The  ejection  fraction is > 65%.  Right ventricular function, chamber size, wall motion, and thickness are  normal.  Coronary Bypass Graft x 2, Aortic Valve Replacement with Inspiris Resilia  Aortic Valve size 25mm. The mean gradient across the valve is 9mmHg  IVC diameter and respiratory changes fall into an intermediate range  suggesting an RA pressure of 8 mmHg. No pericardial effusion is present.    Labs below reviewed personally     Physical Examination  Review of Systems   Vitals: /72 (BP Location: Right arm, Patient Position: Sitting, Cuff Size: Adult Regular)   Pulse 64   Resp 14   Wt 75.8 kg (167 lb)   BMI 26.16 kg/m    BMI= Body mass index is 26.16 kg/m .  Wt Readings from Last 3 Encounters:   08/12/24 75.8 kg (167 lb)   07/10/24 74.8 kg (165 lb)   06/28/24 75.9 kg (167 lb 6.4 oz)       General Appearance:   no distress, normal body habitus   ENT/Mouth: membranes moist, no oral lesions or bleeding gums.      EYES:  no scleral icterus, normal conjunctivae   Neck: no carotid bruits or thyromegaly   Chest/Lungs:   lungs are clear to auscultation, no rales or wheezing, + sternal scar, equal chest wall expansion    Cardiovascular:   Regular. Normal first and second heart sounds with systolic murmur, rubs, or gallops; the carotid, radial and posterior tibial pulses are intact, no edema bilaterally    Abdomen:  no organomegaly, masses, bruits, or tenderness; bowel sounds are present   Extremities: no cyanosis or clubbing   Skin: no xanthelasma, warm.    Neurologic: normal  bilateral, no tremors     Psychiatric: alert and oriented x3, calm        Please refer above for cardiac ROS details.        Medical History  Surgical History Family History Social History   Past Medical History:   Diagnosis Date     Abnormal Pap smear of cervix      Aortic stenosis      Aortic valve stenosis      Benign colon polyp      CAD (coronary artery disease)      Dermatochalasis of both upper eyelids      Diabetes mellitus,  type 2 (H)      Gestational diabetes      HTN (hypertension)      Hyperlipidemia LDL goal <100      Metal foreign body in chest     Retained atrial temporary pacing wire     Nonsenile cataract     Very beginnings     Post-menopausal atrophic vaginitis      Ptosis of both eyelids      Thickened endometrium      Past Surgical History:   Procedure Laterality Date     BYPASS GRAFT ARTERY CORONARY, REPAIR VALVE AORTIC, COMBINED N/A 2024    Procedure: Median Sternotomy, Cardiopulmonary Bypass, Left Internal Mammary Vein [place], Endoscopic [place] of Left Greater Saphenous Vein, Coronary Bypass Graft x 2, Aortic Valve Replacement with Inspiris Resilia Aortic Valve size 25mm, Transesophageal Echocardiogram by Anesthesia;  Surgeon: Mitali Nicole MD;  Location: [facility] OR      SECTION       COMBINED REPAIR PTOSIS WITH BLEPHAROPLASTY Bilateral 2023    Procedure: Both upper eyelid blepharoplasty and ptosis repair;  Surgeon: Shantelle Marti MD;  Location: [facility] OR     COSMETIC BLEPHAROPLASTY LOWER LIDS BILATERAL Bilateral 2023    Procedure: Both lower eyelid blepharoplasty;  Surgeon: Shantelle Marti MD;  Location: [facility] OR     CV CORONARY ANGIOGRAM N/A 2023    Procedure: Coronary Angiogram;  Surgeon: Geovanni Ibarra MD;  Location:  HEART CARDIAC CATH LAB     CV CORONARY ANGIOGRAM N/A 2024    Procedure: Coronary Angiogram;  Surgeon: Bassam Flores MD;  Location:  HEART CARDIAC CATH LAB     CV INSTANTANEOUS WAVE-FREE RATIO N/A 2023    Procedure: Instantaneous Wave-Free Ratio;  Surgeon: Geovanni Ibarra MD;  Location:  HEART CARDIAC CATH LAB     CV RIGHT HEART CATH MEASUREMENTS RECORDED N/A 2024    Procedure: Right Heart Catheterization;  Surgeon: Bassam Flores MD;  Location:  HEART CARDIAC CATH LAB     Family History   Problem Relation Age of Onset     Lung Cancer Mother         Small cell, did smoked     Diabetes Mother       Hypertension Mother      Goiter Mother      Mental Illness Mother         likely bipolar     Coronary Artery Disease Mother 42        MI, smoked     Hyperlipidemia Mother      Thyroid Disease Mother         Goiter     Obesity Mother         most of family is obese     Glaucoma Father      Atrial fibrillation Father      Hypertension Father      Parkinsonism Father      Hyperlipidemia Father      Diabetes Sister      Breast Cancer Sister 60     Hypertension Sister      Hypertension Brother      Cancer Maternal Grandmother         gynecologic cancer, unknown type     Uterine Cancer Maternal Grandmother      Chronic Obstructive Pulmonary Disease Paternal Grandmother      Uterine Cancer Paternal Grandmother      Stomach Cancer Paternal Grandfather      Colon Cancer Paternal Grandfather      Bipolar Disorder Son      Mental Illness Son      Depression Son      Macular Degeneration No family hx of      Anesthesia Reaction No family hx of      Venous thrombosis No family hx of         Social History     Socioeconomic History     Marital status:      Spouse name: Not on file     Number of children: Not on file     Years of education: Not on file     Highest education level: Not on file   Occupational History     Not on file   Tobacco Use     Smoking status: Never     Passive exposure: Never     Smokeless tobacco: Never   Vaping Use     Vaping status: Never Used   Substance and Sexual Activity     Alcohol use: Yes     Alcohol/week: 1.0 standard drink of alcohol     Types: 1 Standard drinks or equivalent per week     Comment: less than 2 drinks a week     Drug use: Not Currently     Comment: gummy 1x/month     Sexual activity: Not Currently     Partners: Male     Birth control/protection: Post-menopausal   Other Topics Concern     Not on file   Social History Narrative    Lives with     Two son    One son, daughter-in-law, and grandson live in [place]    One son, daughter-in-law live in the same [facility]  building    Gan in [place], [place]     Social Determinants of Health     Financial Resource Strain: Low Risk  (3/8/2024)    Financial Resource Strain      Within the past 12 months, have you or your family members you live with been unable to get utilities (heat, electricity) when it was really needed?: No   Food Insecurity: Low Risk  (3/8/2024)    Food Insecurity      Within the past 12 months, did you worry that your food would run out before you got money to buy more?: No      Within the past 12 months, did the food you bought just not last and you didn t have money to get more?: No   Transportation Needs: Low Risk  (3/8/2024)    Transportation Needs      Within the past 12 months, has lack of transportation kept you from medical appointments, getting your medicines, non-medical meetings or appointments, work, or from getting things that you need?: No   Physical Activity: Insufficiently Active (3/8/2024)    Exercise Vital Sign      Days of Exercise per Week: 4 days      Minutes of Exercise per Session: 30 min   Stress: Stress Concern Present (3/8/2024)    [employer] [employer] - Occupational Stress Questionnaire      Feeling of Stress : To some extent   Social Connections: Unknown (3/8/2024)    Social Connection and Isolation Panel [NHANES]      Frequency of Communication with Friends and Family: Not on file      Frequency of Social Gatherings with Friends and Family: Twice a week      Attends [employer]al Services: Not on file      Active Member of Clubs or Organizations: Not on file      Attends Club or Organization Meetings: Not on file      Marital Status: Not on file   Interpersonal Safety: Low Risk  (3/15/2024)    Interpersonal Safety      Do you feel physically and emotionally safe where you currently live?: Yes      Within the past 12 months, have you been hit, slapped, kicked or otherwise physically hurt by someone?: No      Within the past 12 months, have you been humiliated or  emotionally abused in other ways by your partner or ex-partner?: No   Housing Stability: High Risk (3/8/2024)    Housing Stability      Do you have housing? : Yes      Are you worried about losing your housing?: Yes           Medications  Allergies   Current Outpatient Medications   Medication Sig Dispense Refill     acetaminophen (TYLENOL) 325 MG tablet Take 3 tablets (975 mg) by mouth every 6 hours as needed for pain (mild to moderate pain) 100 tablet 0     aspirin 81 MG EC tablet Take 81 mg by mouth every evening       atorvastatin (LIPITOR) 40 MG tablet Take 40 mg by mouth every evening       carboxymethylcellulose PF (REFRESH PLUS) 0.5 % ophthalmic solution Place 1 drop into both eyes 4 times daily 70 each 0     cyanocobalamin (VITAMIN B-12) 100 MCG tablet Take 100 mcg by mouth every evening       dapagliflozin (FARXIGA) 10 MG TABS tablet Take 1 tablet (10 mg) by mouth every morning 100 tablet 1     estradiol (ESTRACE) 0.1 MG/GM vaginal cream INSERT 1/2 APPLICATORFUL  VAGINALLY TWICE WEEKLY 85 g 3     fenofibrate (TRIGLIDE/LOFIBRA) 160 MG tablet Take 1 tablet (160 mg) by mouth every evening 100 tablet 3     gabapentin (NEURONTIN) 100 MG capsule Take 2 capsules (200 mg) by mouth at bedtime 200 capsule 3     levocetirizine (XYZAL) 5 MG tablet Take 5 mg by mouth every evening       metFORMIN (GLUCOPHAGE) 500 MG tablet TAKE 2 TABLETS BY MOUTH  TWICE DAILY WITH MEALS 360 tablet 3     metoprolol tartrate (LOPRESSOR) 25 MG tablet Take 2 tablets (50 mg) by mouth 2 times daily 180 tablet 0     mometasone (NASONEX) 50 MCG/ACT nasal spray Spray 2 sprays into both nostrils daily 17 g 0     montelukast (SINGULAIR) 10 MG tablet Take 1 tablet (10 mg) by mouth at bedtime 100 tablet 2     multivitamin w/minerals (MULTI-VITAMIN) tablet Take 1 tablet by mouth every evening       Semaglutide, 1 MG/DOSE, (OZEMPIC, 1 MG/DOSE,) 4 MG/3ML pen INJECT SUBCUTANEOUSLY 1 MG  EVERY WEEK 9 mL 2     Vitamin D, Cholecalciferol, 10 MCG (400  "UNIT) TABS Take 1 tablet by mouth every evening       warfarin ANTICOAGULANT (COUMADIN) 3 MG tablet Take 1 tablet (3 mg) to 1.5 tablets (4.5 mg) daily as directed by anticoagulation clinic. 130 tablet 1     warfarin ANTICOAGULANT (COUMADIN) 3 MG tablet Take 1 tablet (3 mg) by mouth daily Take every evening at the same time 10 tablet 0     methocarbamol (ROBAXIN) 750 MG tablet Take 1 tablet (750 mg) by mouth every 6 hours (Patient not taking: Reported on 7/29/2024) 90 tablet 0       Allergies   Allergen Reactions     Avapro [Irbesartan] Cough     Fluticasone Other (See Comments)     Loss of sense of taste          Lab Results    Chemistry/lipid CBC Cardiac Enzymes/BNP/TSH/INR   Recent Labs   Lab Test 05/16/24  1132   CHOL 137   HDL 53   LDL 71   TRIG 67     Recent Labs   Lab Test 05/16/24  1132 01/13/24  0826   LDL 71 59  64     Recent Labs   Lab Test 06/25/24  1652      POTASSIUM 4.1   CHLORIDE 105   CO2 24   *   BUN 18.3   CR 0.63   GFRESTIMATED >90   JOAQUINA 9.6     Recent Labs   Lab Test 06/25/24  1652 06/19/24  1039 06/17/24  0602   CR 0.63 0.74 0.53     Recent Labs   Lab Test 03/15/24  0846 09/15/23  1457 04/24/23  0829   A1C 5.8* 6.3* 5.7*          Recent Labs   Lab Test 06/25/24  1652   WBC 6.8   HGB 11.0*   HCT 33.5*   MCV 92        Recent Labs   Lab Test 06/25/24  1652 06/19/24  1039 06/17/24  1203   HGB 11.0* 11.8 9.8*    No results for input(s): \"TROPONINI\" in the last 28656 hours.  Recent Labs   Lab Test 04/25/24  1126   NTBNP 198     Recent Labs   Lab Test 03/23/24  1317   TSH 0.43     Recent Labs   Lab Test 07/30/24  1239 07/19/24  1226 07/15/24  0949   INR 2.05* 1.72* 1.86*        Driss Garza MD                                        Thank you for allowing me to participate in the care of your patient.      Sincerely,     Driss Garza MD     [facility]  cc:   Niharika Olivo, MIKAEL CNP  [address] [facility],  " [place] [address]

## 2024-08-12 NOTE — PROGRESS NOTES
ANTICOAGULATION MANAGEMENT     Won Urena 69 year old female is on warfarin with therapeutic INR result. (Goal INR 2.0-3.0)    Recent labs: (last 7 days)     08/12/24  0845   INR 2.3*       ASSESSMENT     Warfarin Lab Questionnaire    Warfarin Doses Last 7 Days      8/11/2024     2:30 PM   Dose in Tablet or Mg   TAB or MG? milligram (mg)     Pt Rptd Dose SUNDAY MONDAY TUESDAY WED THURS FRIDAY SATURDAY 8/11/2024   2:30 PM 6 4.5 6 6 4.5 6 6         8/11/2024   Warfarin Lab Questionnaire   Missed doses within past 14 days? No   Changes in diet or alcohol within past 14 days? No   Medication changes since last result? Yes   Please list: Added calcium m   Injuries or illness since last result? No   New shortness of breath, severe headaches or sudden changes in vision since last result? No   Abnormal bleeding since last result? No   Upcoming surgery, procedure? No        Previous result: Therapeutic last visit; previously outside of goal range  Additional findings: Recent heart valve replacement in last 10 weeks   S/P bioprostetic AVR on 6/12/24       AVR 9 weeks ago    Won saw EP provider today and was told she should stay on anticoag therapy long term due to a fib.  They discussed warfarin and DOACs; Won is planning to stay on warfarin at this time.  Updated referral sent to Dr. Gallagher       PLAN     Recommended plan for no diet, medication or health factor changes affecting INR     Dosing Instructions: Increase your warfarin dose (3.8% change) with next INR in 2 weeks.  Dose increase per protocol for bioprosthetic heart valve replacement in the past 10 weeks       Summary  As of 8/12/2024      Full warfarin instructions:  4.5 mg every Thu; 6 mg all other days   Next INR check:  8/26/2024               Telephone call with Won who verbalizes understanding and agrees to plan and who agrees to plan and repeated back plan correctly    Uses a walk in lab    Education provided: Contact [phone] with  any changes, questions or concerns.     Plan made per [facility] anticoagulation protocol    Nidhi Hong RN  Anticoagulation Clinic  8/12/2024    _______________________________________________________________________     Anticoagulation Episode Summary       Current INR goal:  2.0-3.0   TTR:  65.6% (1.5 mo)   Target end date:  9/19/2024   Send INR reminders to:  [facility]    Indications    Severe aortic stenosis [I35.0]  Coronary artery disease due to calcified coronary lesion [I25.10  I25.84]             Comments:               Anticoagulation Care Providers       Provider Role Specialty Phone number    Mitali Nicole MD Referring Thoracic Surgery [phone]    Nannette Gallagher MD Referring Family Medicine [phone]

## 2024-08-12 NOTE — PROGRESS NOTES
ANTICOAGULATION CLINIC REFERRAL RENEWAL REQUEST     Won Alegria was seen in EP Clinic today--will continue on anticoagulation for a fib. She is currently on warfarin after having a bioprosthetic AVR on 6/12/24.  I updated the anticoagulation referral with the atrial fib indication and Indefinite duration on warfarin.    An annual renewal order is required for all patients referred to [facility] Antico[facility].?  Please review and sign the pended referral order for Won Urena.       ANTICOAGULATION SUMMARY      Warfarin indication(s)   Atrial Fibrillation    Mechanical heart valve present?  NO       Current goal range   INR: 2.0-3.0    6/12/24: Bioprosthetic AVR     Goal appropriate for indication? Goal INR 2-3, standard for indication(s) above     Time in Therapeutic Range (TTR)  (Goal > 60%) 65.5%       Office visit with referring provider's group within last year yes on 6/20/24       Nidhi Hong RN  [facility] Anticoagulation [facility]

## 2024-08-14 ENCOUNTER — HOSPITAL ENCOUNTER (OUTPATIENT)
Dept: CARDIAC REHAB | Facility: CLINIC | Age: 69
Discharge: HOME | End: 2024-08-14
Attending: STUDENT IN AN ORGANIZED HEALTH CARE EDUCATION/TRAINING PROGRAM
Payer: COMMERCIAL

## 2024-08-14 PROCEDURE — 93798 PHYS/QHP OP CAR RHAB W/ECG: CPT

## 2024-08-16 ENCOUNTER — MYC MEDICAL ADVICE (OUTPATIENT)
Dept: CARDIOLOGY | Facility: CLINIC | Age: 69
End: 2024-08-16
Payer: COMMERCIAL

## 2024-08-16 ENCOUNTER — HOSPITAL ENCOUNTER (OUTPATIENT)
Dept: CARDIAC REHAB | Facility: CLINIC | Age: 69
Discharge: HOME | End: 2024-08-16
Attending: STUDENT IN AN ORGANIZED HEALTH CARE EDUCATION/TRAINING PROGRAM
Payer: COMMERCIAL

## 2024-08-16 DIAGNOSIS — I48.0 PAROXYSMAL ATRIAL FIBRILLATION (H): Primary | ICD-10-CM

## 2024-08-16 PROCEDURE — 93798 PHYS/QHP OP CAR RHAB W/ECG: CPT

## 2024-08-19 ENCOUNTER — HOSPITAL ENCOUNTER (OUTPATIENT)
Dept: CARDIAC REHAB | Facility: CLINIC | Age: 69
Discharge: HOME | End: 2024-08-19
Attending: STUDENT IN AN ORGANIZED HEALTH CARE EDUCATION/TRAINING PROGRAM
Payer: COMMERCIAL

## 2024-08-19 ENCOUNTER — TELEPHONE (OUTPATIENT)
Dept: ANTICOAGULATION | Facility: CLINIC | Age: 69
End: 2024-08-19
Payer: COMMERCIAL

## 2024-08-19 PROCEDURE — 93798 PHYS/QHP OP CAR RHAB W/ECG: CPT

## 2024-08-19 NOTE — TELEPHONE ENCOUNTER
Message sent to anticoagulation team to assist with transfer from Warfarin to Eliquis.     Order for Eliquis placed.    Josephine Leiva RN

## 2024-08-19 NOTE — TELEPHONE ENCOUNTER
Addendum: Apixaban will be delivered by mail order next Wednesday.     [employer] message sent to Won to notify [facility] once medication has arrived for further instructions for transition/[facility] discharge.     JENNI FITZGERALD RN  Anticoagulation Clinic  [phone]

## 2024-08-19 NOTE — TELEPHONE ENCOUNTER
Received the following message: Pt will be switching form Warfarin to Eliquis can you please assist her with this transfer related to lab levels safe to switch?     apixaban ANTICOAGULANT (ELIQUIS) 5 MG tablet prescribed by Cardiology with instructions to:  Take 1 tablet (5 mg) by mouth 2 times daily.     [facility] protocol: verify medication in home.    -Stop warfarin    -if INR is 2-4 (last INR 2.3 one week ago), begin apixaban on Day 3 in AM.     [employer] message sent to Won to let [facility] know when Apixaban has arrived at home ([facility]) and [facility] will provide directions for transition of medications.     JENNI FITZGERALD RN  Anticoagulation Clinic  [phone]

## 2024-08-21 ENCOUNTER — HOSPITAL ENCOUNTER (OUTPATIENT)
Dept: CARDIAC REHAB | Facility: CLINIC | Age: 69
Discharge: HOME | End: 2024-08-21
Attending: STUDENT IN AN ORGANIZED HEALTH CARE EDUCATION/TRAINING PROGRAM
Payer: COMMERCIAL

## 2024-08-21 PROCEDURE — 93798 PHYS/QHP OP CAR RHAB W/ECG: CPT

## 2024-08-22 ENCOUNTER — ANTICOAGULATION THERAPY VISIT (OUTPATIENT)
Dept: ANTICOAGULATION | Facility: CLINIC | Age: 69
End: 2024-08-22

## 2024-08-22 ENCOUNTER — LAB (OUTPATIENT)
Dept: LAB | Facility: CLINIC | Age: 69
End: 2024-08-22
Payer: COMMERCIAL

## 2024-08-22 DIAGNOSIS — I25.10 CORONARY ARTERY DISEASE DUE TO CALCIFIED CORONARY LESION: ICD-10-CM

## 2024-08-22 DIAGNOSIS — I25.10 CORONARY ARTERY CALCIFICATION: Chronic | ICD-10-CM

## 2024-08-22 DIAGNOSIS — I25.84 CORONARY ARTERY DISEASE DUE TO CALCIFIED CORONARY LESION: ICD-10-CM

## 2024-08-22 DIAGNOSIS — I35.0 SEVERE AORTIC STENOSIS: Primary | ICD-10-CM

## 2024-08-22 DIAGNOSIS — I48.0 PAROXYSMAL ATRIAL FIBRILLATION (H): ICD-10-CM

## 2024-08-22 DIAGNOSIS — I35.0 SEVERE AORTIC STENOSIS: ICD-10-CM

## 2024-08-22 LAB — INR PPP: 2.29 (ref 0.85–1.15)

## 2024-08-22 PROCEDURE — 85610 PROTHROMBIN TIME: CPT

## 2024-08-22 PROCEDURE — 36415 COLL VENOUS BLD VENIPUNCTURE: CPT

## 2024-08-22 NOTE — PROGRESS NOTES
ANTICOAGULATION MANAGEMENT     Won Urena 69 year old female is on warfarin with therapeutic INR result. (Goal INR 2.0-3.0)    Recent labs: (last 7 days)     08/22/24  1042   INR 2.29*       ASSESSMENT     Source(s): Chart Review and Patient/Caregiver Call     Warfarin doses taken: Warfarin taken as instructed  Diet: No new diet changes identified  Medication/supplement changes: None noted  New illness, injury, or hospitalization: No  Signs or symptoms of bleeding or clotting: Yes: reported bruising is very small and resolving per [place]-unknown injury-she will continue to monitor and seek medical attention if necessary.  Previous result: Therapeutic last 2(+) visits  Additional findings: patient will be changing to a DOAC as soon as it arrives in the mail-anticipating arrival Wednesday 8/28. She will contact [facility] when she received for transition.        PLAN     Recommended plan for temporary change(s) affecting INR     Dosing Instructions: Continue your current warfarin dose with follow up next week to transition to DOAC    Summary  As of 8/22/2024      Full warfarin instructions:  4.5 mg every Thu; 6 mg all other days   Next INR check:  8/28/2024               Telephone call with Won who agrees to plan and repeated back plan correctly    No INR required-within goal range today to start transition to DOAC next week    Education provided: Goal range and lab monitoring: goal range and significance of current result and Importance of following up at instructed interval  Symptom monitoring: monitoring for bleeding signs and symptoms and when to seek medical attention/emergency care  Contact [phone] with any changes, questions or concerns.     Plan made per [facility] anticoagulation protocol    JENNI FITZGERALD RN  Anticoagulation Clinic  8/22/2024    _______________________________________________________________________     Anticoagulation Episode Summary       Current INR goal:  2.0-3.0   TTR:  71.8% (1.9 mo)    Target end date:  9/19/2024   Send INR reminders to:  [facility]    Indications    Severe aortic stenosis [I35.0]  Coronary artery disease due to calcified coronary lesion [I25.10  I25.84]  Paroxysmal atrial fibrillation (H) [I48.0]  Coronary artery calcification [I25.10  I25.84]             Comments:               Anticoagulation Care Providers       Provider Role Specialty Phone number    Mitali Nicole MD Referring Thoracic Surgery [phone]    Nannette Gallagher MD Referring Family Medicine [phone]

## 2024-08-23 ENCOUNTER — TELEPHONE (OUTPATIENT)
Dept: ANTICOAGULATION | Facility: CLINIC | Age: 69
End: 2024-08-23
Payer: COMMERCIAL

## 2024-08-23 ENCOUNTER — HOSPITAL ENCOUNTER (OUTPATIENT)
Dept: CARDIAC REHAB | Facility: CLINIC | Age: 69
Discharge: HOME | End: 2024-08-23
Attending: STUDENT IN AN ORGANIZED HEALTH CARE EDUCATION/TRAINING PROGRAM
Payer: COMMERCIAL

## 2024-08-23 ENCOUNTER — TELEPHONE (OUTPATIENT)
Dept: FAMILY MEDICINE | Facility: CLINIC | Age: 69
End: 2024-08-23

## 2024-08-23 DIAGNOSIS — I35.0 SEVERE AORTIC STENOSIS: Primary | ICD-10-CM

## 2024-08-23 DIAGNOSIS — I25.84 CORONARY ARTERY DISEASE DUE TO CALCIFIED CORONARY LESION: ICD-10-CM

## 2024-08-23 DIAGNOSIS — I25.10 CORONARY ARTERY DISEASE DUE TO CALCIFIED CORONARY LESION: ICD-10-CM

## 2024-08-23 DIAGNOSIS — I25.10 CORONARY ARTERY CALCIFICATION: Chronic | ICD-10-CM

## 2024-08-23 DIAGNOSIS — I48.0 PAROXYSMAL ATRIAL FIBRILLATION (H): ICD-10-CM

## 2024-08-23 PROCEDURE — 93798 PHYS/QHP OP CAR RHAB W/ECG: CPT

## 2024-08-23 NOTE — TELEPHONE ENCOUNTER
ANTICOAGULATION  MANAGEMENT    Won Urena is being discharged from the [facility]tico[facility] Program ([facility]).    Reason for discharge: warfarin replaced by alternate therapy, apixaban ANTICOAGULANT (ELIQUIS)    Anticoagulation episode resolved, [facility] referral closed, and Standing order discontinued    If patient needs warfarin management in the future, please send a new referral    JENNI FITZGERALD RN

## 2024-08-23 NOTE — TELEPHONE ENCOUNTER
General Call    Contacts       Contact Date/Time Type Contact Phone/Fax    08/23/2024 10:16 AM CDT Phone (Incoming) Won Urena (Self) [phone] (M)          Reason for Call:  Patient is calling stating she needs dosing instruction for the medication Eliquis that she can start today if you want her to.    Date of last appointment with provider: 08/22/23    Could we [place] this information [place] you [place] or would you prefer to receive a phone call?:   Patient would prefer a phone call   Okay to leave a detailed message?: Yes at Cell number on file:    Telephone Information:   Mobile [phone]     Diana Galloway   [facility]  Central Scheduler

## 2024-08-23 NOTE — TELEPHONE ENCOUNTER
Received message that Won is able to start Eliquis today if ok with [facility].     Return call, unable to reach. Left detailed VM with the following directions for transition.  -Stop warfarin                        -if INR is 2-4 (last INR 2.29, drawn yesterday), begin apixaban on Day 3 in AM.     apixaban ANTICOAGULANT (ELIQUIS) 5 MG tablet -Take 1 tablet (5 mg) by mouth 2 times daily.    JENNI FITZGERALD RN  Anticoagulation Clinic  [phone]

## 2024-08-26 ENCOUNTER — HOSPITAL ENCOUNTER (OUTPATIENT)
Dept: CARDIAC REHAB | Facility: CLINIC | Age: 69
Discharge: HOME | End: 2024-08-26
Attending: STUDENT IN AN ORGANIZED HEALTH CARE EDUCATION/TRAINING PROGRAM
Payer: COMMERCIAL

## 2024-08-26 PROCEDURE — 93798 PHYS/QHP OP CAR RHAB W/ECG: CPT

## 2024-08-28 ENCOUNTER — HOSPITAL ENCOUNTER (OUTPATIENT)
Dept: CARDIAC REHAB | Facility: CLINIC | Age: 69
Discharge: HOME | End: 2024-08-28
Attending: STUDENT IN AN ORGANIZED HEALTH CARE EDUCATION/TRAINING PROGRAM
Payer: COMMERCIAL

## 2024-08-28 PROCEDURE — 93798 PHYS/QHP OP CAR RHAB W/ECG: CPT

## 2024-09-13 ENCOUNTER — OFFICE VISIT (OUTPATIENT)
Dept: OPHTHALMOLOGY | Facility: CLINIC | Age: 69
End: 2024-09-13
Payer: COMMERCIAL

## 2024-09-13 ENCOUNTER — MYC MEDICAL ADVICE (OUTPATIENT)
Dept: OPHTHALMOLOGY | Facility: CLINIC | Age: 69
End: 2024-09-13
Payer: COMMERCIAL

## 2024-09-13 DIAGNOSIS — E11.9 TYPE 2 DIABETES MELLITUS WITHOUT RETINOPATHY (H): ICD-10-CM

## 2024-09-13 DIAGNOSIS — H33.322 RETINAL ROUND HOLE WITHOUT DETACHMENT, LEFT: ICD-10-CM

## 2024-09-13 DIAGNOSIS — H52.203 HYPEROPIA OF BOTH EYES WITH ASTIGMATISM AND PRESBYOPIA: ICD-10-CM

## 2024-09-13 DIAGNOSIS — H40.003 GLAUCOMA SUSPECT OF BOTH EYES: ICD-10-CM

## 2024-09-13 DIAGNOSIS — H53.19 OTHER SUBJECTIVE VISUAL DISTURBANCES: Primary | ICD-10-CM

## 2024-09-13 DIAGNOSIS — H52.4 HYPEROPIA OF BOTH EYES WITH ASTIGMATISM AND PRESBYOPIA: ICD-10-CM

## 2024-09-13 DIAGNOSIS — H52.03 HYPEROPIA OF BOTH EYES WITH ASTIGMATISM AND PRESBYOPIA: ICD-10-CM

## 2024-09-13 DIAGNOSIS — H25.813 COMBINED FORMS OF AGE-RELATED CATARACT OF BOTH EYES: ICD-10-CM

## 2024-09-13 PROCEDURE — G0463 HOSPITAL OUTPT CLINIC VISIT: HCPCS

## 2024-09-13 PROCEDURE — 99213 OFFICE O/P EST LOW 20 MIN: CPT

## 2024-09-13 PROCEDURE — 92250 FUNDUS PHOTOGRAPHY W/I&R: CPT

## 2024-09-13 ASSESSMENT — REFRACTION_WEARINGRX
OS_AXIS: 175
OS_SPHERE: +0.25
OS_ADD: +2.75
OS_AXIS: 175
OS_SPHERE: +0.50
OS_ADD: +2.75
OD_SPHERE: +0.25
OS_CYLINDER: +0.75
OD_SPHERE: +0.50
OD_ADD: +2.75
OD_ADD: +2.75
OS_CYLINDER: +1.00
OD_CYLINDER: SPHERE

## 2024-09-13 ASSESSMENT — SLIT LAMP EXAM - LIDS
COMMENTS: NORMAL
COMMENTS: NORMAL

## 2024-09-13 ASSESSMENT — CUP TO DISC RATIO
OD_RATIO: 0.65
OS_RATIO: 0.5

## 2024-09-13 ASSESSMENT — CONF VISUAL FIELD
OD_SUPERIOR_TEMPORAL_RESTRICTION: 0
OS_INFERIOR_NASAL_RESTRICTION: 0
OS_SUPERIOR_TEMPORAL_RESTRICTION: 0
OD_SUPERIOR_NASAL_RESTRICTION: 0
OS_SUPERIOR_NASAL_RESTRICTION: 0
OD_INFERIOR_NASAL_RESTRICTION: 0
OS_NORMAL: 1
OD_INFERIOR_TEMPORAL_RESTRICTION: 0
OD_NORMAL: 1
OS_INFERIOR_TEMPORAL_RESTRICTION: 0

## 2024-09-13 ASSESSMENT — VISUAL ACUITY
CORRECTION_TYPE: GLASSES
OD_CC+: +3
METHOD: SNELLEN - LINEAR
OD_CC: 20/25
OS_CC: 20/20

## 2024-09-13 ASSESSMENT — EXTERNAL EXAM - LEFT EYE: OS_EXAM: NORMAL

## 2024-09-13 ASSESSMENT — TONOMETRY
IOP_METHOD: TONOPEN
OD_IOP_MMHG: 09
OS_IOP_MMHG: 07

## 2024-09-13 ASSESSMENT — EXTERNAL EXAM - RIGHT EYE: OD_EXAM: NORMAL

## 2024-09-13 NOTE — NURSING NOTE
"Chief Complaints and History of Present Illnesses   Patient presents with    Vision Changes Ou     Chief Complaint(s) and History of Present Illness(es)       Vision Changes Ou              Laterality: both eyes    Associated symptoms: Negative for dryness, eye pain, flashes, floaters and discharge    Treatments tried: artificial tears    Pain scale: 0/10              Comments    Patient reports \"white bar\" out of peripheral left eye, and sometimes out of peripheral right eye rarely.  Pt reports increase in prominence in past 2 weeks, and worse in the evening in dusk or dark.  Onset 2 months ago.  Pt reports 1 moment of black spider web upon waking this morning but otherwise denies new flashes/floaters.    Request print of MRX from last visit.     AT's PRN     Lab Results       Component                Value               Date                       A1C                      5.8                 03/15/2024                 A1C                      6.3                 09/15/2023                 A1C                      5.7                 04/24/2023              Kiera Lund OA 1:39 PM September 13, 2024                      "

## 2024-09-13 NOTE — TELEPHONE ENCOUNTER
Pt reporting increase in frequency for flashing in peripheral vision more in dim light and with eye movement.    No new floaters    Reviewed s/s of vitreous changes and offered appt today/Friday and pt accepts.    Scheduled at 1340 today with Dr. Peck at [facility] location-- pt aware will be very likely dilated.    Javier De La Cruz RN 12:31 PM 09/13/24

## 2024-09-13 NOTE — PROGRESS NOTES
"History  HPI       Vision Changes Ou    In both eyes.  Associated symptoms include Negative for dryness, eye pain, flashes, floaters and discharge.  Treatments tried include artificial tears.  Pain was noted as 0/10.             Comments    Patient reports \"white bar\" out of peripheral left eye, and sometimes out of peripheral right eye rarely.  Pt reports increase in prominence in past 2 weeks, and worse in the evening in dusk or dark.  Onset 2 months ago.  Pt reports 1 moment of black spider web upon waking this morning but otherwise denies new flashes/floaters.    Request print of MRX from last visit.     AT's PRN     Lab Results       Component                Value               Date                       A1C                      5.8                 03/15/2024                 A1C                      6.3                 09/15/2023                 A1C                      5.7                 04/24/2023              Kiera Lund OA 1:39 PM September 13, 2024             Last edited by Kiera Lund on 9/13/2024  1:39 PM.          Assessment/Plan  (H53.19) Other subjective visual disturbances  (primary encounter diagnosis)  Plan:   -Patient educated on today's findings.  -Patient only notices beam of light in the morning and late at night  -DFE Same as previous exams.  -Reviewed signs and symptoms of RD.  -No symptoms throughout the day.  -Discussed that it could be linked to glasses reflection.  -Asked to get new Rx.    (H25.813) Combined forms of age-related cataract of both eyes  Plan:   -Discussed signs and symptoms of cataracts.   -Monitor.      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 Manuel    (E11.9) Type 2 diabetes mellitus without retinopathy (H)  Plan:   -Monitored by Dr. 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    Return to clinic for yearly CEE or new symptoms arise.    Complete documentation of historical and exam elements from today's encounter can be found in the full encounter summary report (not reduplicated in this progress note). I personally obtained the chief complaint(s) and history of present illness. I confirmed and edited as necessary the review of systems, past medical/surgical history, family history, social history, and examination findings as document by others; and I examined the patient myself. I personally reviewed the relevant tests, images, and reports as documented above. I formulated and edited as necessary the assessment and plan and discussed the findings and management plan with the patient and family.    Evan Peck OD

## 2024-09-15 ENCOUNTER — MYC MEDICAL ADVICE (OUTPATIENT)
Dept: CARDIOLOGY | Facility: CLINIC | Age: 69
End: 2024-09-15
Payer: COMMERCIAL

## 2024-09-16 ENCOUNTER — HOSPITAL ENCOUNTER (OUTPATIENT)
Dept: CARDIAC REHAB | Facility: CLINIC | Age: 69
Discharge: HOME | End: 2024-09-16
Attending: STUDENT IN AN ORGANIZED HEALTH CARE EDUCATION/TRAINING PROGRAM
Payer: COMMERCIAL

## 2024-09-16 PROCEDURE — 93798 PHYS/QHP OP CAR RHAB W/ECG: CPT

## 2024-09-18 ENCOUNTER — HOSPITAL ENCOUNTER (OUTPATIENT)
Dept: CARDIAC REHAB | Facility: CLINIC | Age: 69
Discharge: HOME | End: 2024-09-18
Attending: STUDENT IN AN ORGANIZED HEALTH CARE EDUCATION/TRAINING PROGRAM
Payer: COMMERCIAL

## 2024-09-18 PROCEDURE — 93798 PHYS/QHP OP CAR RHAB W/ECG: CPT

## 2024-09-20 ENCOUNTER — HOSPITAL ENCOUNTER (OUTPATIENT)
Dept: CARDIAC REHAB | Facility: CLINIC | Age: 69
Discharge: HOME | End: 2024-09-20
Attending: STUDENT IN AN ORGANIZED HEALTH CARE EDUCATION/TRAINING PROGRAM
Payer: COMMERCIAL

## 2024-09-20 PROCEDURE — 93798 PHYS/QHP OP CAR RHAB W/ECG: CPT

## 2024-09-25 ENCOUNTER — HOSPITAL ENCOUNTER (OUTPATIENT)
Dept: CARDIAC REHAB | Facility: CLINIC | Age: 69
Discharge: HOME | End: 2024-09-25
Attending: STUDENT IN AN ORGANIZED HEALTH CARE EDUCATION/TRAINING PROGRAM
Payer: COMMERCIAL

## 2024-09-25 PROCEDURE — 93798 PHYS/QHP OP CAR RHAB W/ECG: CPT

## 2024-09-27 ENCOUNTER — HOSPITAL ENCOUNTER (OUTPATIENT)
Dept: CARDIAC REHAB | Facility: CLINIC | Age: 69
Discharge: HOME | End: 2024-09-27
Attending: STUDENT IN AN ORGANIZED HEALTH CARE EDUCATION/TRAINING PROGRAM
Payer: COMMERCIAL

## 2024-09-27 PROCEDURE — 93798 PHYS/QHP OP CAR RHAB W/ECG: CPT

## 2024-09-30 ENCOUNTER — HOSPITAL ENCOUNTER (OUTPATIENT)
Dept: CARDIAC REHAB | Facility: CLINIC | Age: 69
Discharge: HOME | End: 2024-09-30
Attending: STUDENT IN AN ORGANIZED HEALTH CARE EDUCATION/TRAINING PROGRAM
Payer: COMMERCIAL

## 2024-09-30 PROCEDURE — 93798 PHYS/QHP OP CAR RHAB W/ECG: CPT

## 2024-10-01 ENCOUNTER — OFFICE VISIT (OUTPATIENT)
Dept: FAMILY MEDICINE | Facility: CLINIC | Age: 69
End: 2024-10-01
Payer: COMMERCIAL

## 2024-10-01 VITALS
TEMPERATURE: 98.1 F | SYSTOLIC BLOOD PRESSURE: 125 MMHG | BODY MASS INDEX: 26.63 KG/M2 | OXYGEN SATURATION: 96 % | HEART RATE: 62 BPM | WEIGHT: 170 LBS | DIASTOLIC BLOOD PRESSURE: 79 MMHG | RESPIRATION RATE: 16 BRPM

## 2024-10-01 DIAGNOSIS — Z23 ENCOUNTER FOR IMMUNIZATION: ICD-10-CM

## 2024-10-01 DIAGNOSIS — N95.8 GENITOURINARY SYNDROME OF MENOPAUSE: ICD-10-CM

## 2024-10-01 DIAGNOSIS — M85.89 OSTEOPENIA OF MULTIPLE SITES: ICD-10-CM

## 2024-10-01 DIAGNOSIS — Z86.39 HX OF NON ANEMIC VITAMIN B12 DEFICIENCY: ICD-10-CM

## 2024-10-01 DIAGNOSIS — E11.9 TYPE 2 DIABETES MELLITUS WITHOUT COMPLICATION, WITHOUT LONG-TERM CURRENT USE OF INSULIN (H): Primary | ICD-10-CM

## 2024-10-01 PROBLEM — Z95.2 S/P AORTIC VALVE REPLACEMENT: Status: ACTIVE | Noted: 2024-06-12

## 2024-10-01 PROBLEM — Z95.1 S/P CABG X 2: Status: ACTIVE | Noted: 2024-06-12

## 2024-10-01 LAB
EST. AVERAGE GLUCOSE BLD GHB EST-MCNC: 120 MG/DL
HBA1C MFR BLD: 5.8 % (ref 0–5.6)

## 2024-10-01 PROCEDURE — 82043 UR ALBUMIN QUANTITATIVE: CPT | Mod: ORL | Performed by: FAMILY MEDICINE

## 2024-10-01 PROCEDURE — 82306 VITAMIN D 25 HYDROXY: CPT | Mod: ORL | Performed by: FAMILY MEDICINE

## 2024-10-01 PROCEDURE — 82607 VITAMIN B-12: CPT | Mod: ORL | Performed by: FAMILY MEDICINE

## 2024-10-01 RX ORDER — ESTRADIOL 0.1 MG/G
CREAM VAGINAL
Qty: 85 G | Refills: 3 | Status: SHIPPED | OUTPATIENT
Start: 2024-10-03

## 2024-10-01 RX ORDER — GABAPENTIN 100 MG/1
100 CAPSULE ORAL AT BEDTIME
Qty: 200 CAPSULE | Refills: 3 | Status: SHIPPED | OUTPATIENT
Start: 2024-10-01

## 2024-10-01 NOTE — NURSING NOTE
69 year old  Chief Complaint   Patient presents with    Follow Up     S/p open heart surgery    Recheck Medication    Immunization       Blood pressure 125/79, pulse 62, temperature 98.1  F (36.7  C), temperature source Temporal, resp. rate 16, weight 77.1 kg (170 lb), SpO2 96%, not currently breastfeeding. Body mass index is 26.63 kg/m .  Patient Active Problem List   Diagnosis    Type 2 diabetes mellitus without complication, without long-term current use of insulin (H)    Coronary artery calcification    Nonrheumatic aortic valve stenosis    Ascending aortic aneurysm (H)    Hyperlipidemia LDL goal <100    Essential hypertension    Screening for colon cancer    Genitourinary syndrome of menopause    Environmental allergies    Seasonal allergic rhinitis due to pollen    Status post coronary angiogram    Coronary artery disease involving native heart without angina pectoris, unspecified vessel or lesion type    CAD (coronary artery disease)    Severe aortic stenosis    Coronary artery disease involving native coronary artery without angina pectoris    Coronary artery disease involving native coronary artery of native heart without angina pectoris    Coronary artery disease due to calcified coronary lesion    Paroxysmal atrial fibrillation (H)       Wt Readings from Last 2 Encounters:   10/01/24 77.1 kg (170 lb)   08/12/24 75.8 kg (167 lb)     BP Readings from Last 3 Encounters:   10/01/24 125/79   08/12/24 126/72   07/10/24 126/80         Current Outpatient Medications   Medication Sig Dispense Refill    acetaminophen (TYLENOL) 325 MG tablet Take 3 tablets (975 mg) by mouth every 6 hours as needed for pain (mild to moderate pain) 100 tablet 0    apixaban ANTICOAGULANT (ELIQUIS) 5 MG tablet Take 1 tablet (5 mg) by mouth 2 times daily 180 tablet 3    aspirin 81 MG EC tablet Take 81 mg by mouth every evening      atorvastatin (LIPITOR) 40 MG tablet Take 40 mg by mouth every evening      carboxymethylcellulose PF  (REFRESH PLUS) 0.5 % ophthalmic solution Place 1 drop into both eyes 4 times daily 70 each 0    cyanocobalamin (VITAMIN B-12) 100 MCG tablet Take 100 mcg by mouth every evening      dapagliflozin (FARXIGA) 10 MG TABS tablet Take 1 tablet (10 mg) by mouth every morning 100 tablet 1    estradiol (ESTRACE) 0.1 MG/GM vaginal cream INSERT 1/2 APPLICATORFUL  VAGINALLY TWICE WEEKLY 85 g 3    fenofibrate (TRIGLIDE/LOFIBRA) 160 MG tablet Take 1 tablet (160 mg) by mouth every evening 100 tablet 3    gabapentin (NEURONTIN) 100 MG capsule Take 2 capsules (200 mg) by mouth at bedtime 200 capsule 3    levocetirizine (XYZAL) 5 MG tablet Take 5 mg by mouth every evening      metFORMIN (GLUCOPHAGE) 500 MG tablet TAKE 2 TABLETS BY MOUTH  TWICE DAILY WITH MEALS 360 tablet 3    metoprolol tartrate (LOPRESSOR) 25 MG tablet Take 2 tablets (50 mg) by mouth 2 times daily 360 tablet 1    mometasone (NASONEX) 50 MCG/ACT nasal spray Spray 2 sprays into both nostrils daily 17 g 0    montelukast (SINGULAIR) 10 MG tablet Take 1 tablet (10 mg) by mouth at bedtime 100 tablet 2    multivitamin w/minerals (MULTI-VITAMIN) tablet Take 1 tablet by mouth every evening      Semaglutide, 1 MG/DOSE, (OZEMPIC, 1 MG/DOSE,) 4 MG/3ML pen INJECT SUBCUTANEOUSLY 1 MG  EVERY WEEK 9 mL 2    Vitamin D, Cholecalciferol, 10 MCG (400 UNIT) TABS Take 1 tablet by mouth every evening      warfarin ANTICOAGULANT (COUMADIN) 3 MG tablet Take 1 tablet (3 mg) to 1.5 tablets (4.5 mg) daily as directed by anticoagulation clinic. 130 tablet 1    warfarin ANTICOAGULANT (COUMADIN) 3 MG tablet Take 1 tablet (3 mg) by mouth daily Take every evening at the same time 10 tablet 0     No current facility-administered medications for this visit.       Social History     Tobacco Use    Smoking status: Never     Passive exposure: Never    Smokeless tobacco: Never   Vaping Use    Vaping status: Never Used   Substance Use Topics    Alcohol use: Yes     Alcohol/week: 1.0 standard drink of  "alcohol     Types: 1 Standard drinks or equivalent per week     Comment: less than 2 drinks a week    Drug use: Not Currently     Comment: gummy 1x/month       Health Maintenance Due   Topic Date Due    HF ACTION PLAN  Never done    DTAP/TDAP/TD IMMUNIZATION (1 - Tdap) 12/13/2019    A1C  06/15/2024    INFLUENZA VACCINE (1) 09/01/2024    COVID-19 Vaccine (8 - 2024-25 season) 09/01/2024       No results found for: \"PAP\"      October 1, 2024 1:33 PM    Prior to immunization administration, verified patients identity using patient s name and date of birth. Please see Immunization Activity for additional information.     Screening Questionnaire for Adult Immunization    Are you sick today?   No   Do you have allergies to medications, food, a vaccine component or latex?   No   Have you ever had a serious reaction after receiving a vaccination?   No   Do you have a long-term health problem with heart, lung, kidney, or metabolic disease (e.g., diabetes), asthma, a blood disorder, no spleen, complement component deficiency, a cochlear implant, or a spinal fluid leak?  Are you on long-term aspirin therapy?   No   Do you have cancer, leukemia, HIV/AIDS, or any other immune system problem?   No   Do you have a parent, brother, or sister with an immune system problem?   No   In the past 3 months, have you taken medications that affect  your immune system, such as prednisone, other steroids, or anticancer drugs; drugs for the treatment of rheumatoid arthritis, Crohn s disease, or psoriasis; or have you had radiation treatments?   No   Have you had a seizure, or a brain or other nervous system problem?   No   During the past year, have you received a transfusion of blood or blood    products, or been given immune (gamma) globulin or antiviral drug?   No   For women: Are you pregnant or is there a chance you could become       pregnant during the next month?   No   Have you received any vaccinations in the past 4 weeks?   No "     Immunization questionnaire answers were all negative.      Patient instructed to remain in clinic for 15 minutes afterwards, and to report any adverse reactions.     Screening performed by Kelsy Yap LPN on 10/1/2024 at 1:37 PM.

## 2024-10-01 NOTE — Clinical Note
MARIA TERESA - I know you know Won but she is planning to schedule with you. They had a recent death in the family and Hossein is dealing with some serious health issues (possible amputation of the foot) that are causing a lot of stress. She is also recovering from major cardiac surgery. She has found your appointments really helpful in the past and wants to start there before considering meds for her depression and anxiety symptoms, although her family is pushing her towards meds. Let me know with questions! Thanks, Nannette

## 2024-10-01 NOTE — PATIENT INSTRUCTIONS
Good to see you today!    Refills to your pharmacy.    We will draw lab tests today. I will contact you in the next 2-3 business days with the results of these labs.     Follow-up in 6 months, sooner with concerns.

## 2024-10-01 NOTE — PROGRESS NOTES
Assessment & Plan     Won was seen today for follow up, recheck medication and immunization.    Diagnoses and all orders for this visit:    Type 2 diabetes mellitus without complication, without long-term current use of insulin (H)  -     metFORMIN (GLUCOPHAGE) 500 MG tablet; Take 2 tablets (1,000 mg) by mouth 2 times daily (with meals).  -     Hemoglobin A1c; Future  -     blood glucose (NO BRAND SPECIFIED) lancets standard; Use to test blood sugar 1-3 times daily or as directed.  -     Albumin Random Urine Quantitative with Creat Ratio; Future    Encounter for immunization  -     COVID-19 12+ (MODERNA)  -     INFLUENZA HIGH DOSE, TRIVALENT, PF (FLUZONE)    Genitourinary syndrome of menopause  -     estradiol (ESTRACE) 0.1 MG/GM vaginal cream; Place vaginally twice a week. 1/2 applicator twice weekly into the vagina  -     gabapentin (NEURONTIN) 100 MG capsule; Take 1 capsule (100 mg) by mouth at bedtime.    Hx of non anemic vitamin B12 deficiency  -     Vitamin B12; Future    Osteopenia of multiple sites  -     Vitamin D deficiency screening; Future      Update labs today  Recent microalbumin positive but in the setting of acute UTI  Patient to resume weekly semaglutide.    Patient will schedule with Shai for therapy. Will follow-up with me to discuss medication management if desired.    Keep wound care appointment - bring documentation/photos to visit.    Follow-up in 6 months for medication check.      Subjective   Won is a 69 year old, presenting for the following health issues:  Follow Up (S/p open heart surgery), Recheck Medication, and Immunization    HPI   Post-surgery recovery:  - Reports feeling good post-surgery  - Graduated from rehab this week and is within 20% of back to normal  - Feels occasional PVCs, which are uncomfortable but known to be normal  - Plans to discuss anxiety with Den    Medication changes:  - Transitioned from Warfarin to Eliquis, which has been going well and is  preferred due to dietary restrictions with Warfarin  - Questions about continuing Metoprolol and Eliquis to be discussed with cardiologist  - Metoprolol keeps heart rate down but limits exercise capacity  - No recent afib warnings from watch    Other medications:  - Continues to take baby aspirin, atorvastatin, fibrate, Nasonex (as needed), vaginal estrogen, gabapentin (for vulvar irritation), Singulair, Farxiga, Metformin, and levocetirizine  - Occasionally takes Sudafed and Benadryl  - Uses eye drops as needed  - Takes B12 supplement due to past low levels  - Takes vitamin D supplement due to past low levels and osteopenia    Diabetes management:  - Checks blood sugars about once a week, generally fasting under 100  - Improved diet for better diabetes and osteopenia management    Other concerns:  - Reports a sore on tailbone, which has been seen by a dermatologist and referred to a wound clinic  - Experiencing some memory issues, which are believed to be normal age-related changes but are causing some concern  - Reports some vision changes, but recent ophthalmology appointment did not raise any concerns  - Reports some anxiety, particularly about eyes and diabetes    Misc:  - Reports some stress and grief due to recent death of a close family member  - Reports some stress due to 's health issues (foot surgery not healing, potential amputation, shoulder issues)  - Reports some stress due to potential move to assisted living      Objective    /79 (BP Location: Left arm, Patient Position: Sitting, Cuff Size: Adult Large)   Pulse 62   Temp 98.1  F (36.7  C) (Temporal)   Resp 16   Wt 77.1 kg (170 lb)   SpO2 96%   BMI 26.63 kg/m    Body mass index is 26.63 kg/m .  Physical Exam   General: Well-appearing in NAD   HEENT: Normocephalic, atraumatic. Mucus membranes moist.   Resp: No respiratory distress   MSK: No peripheral edema.   Skin: There is skin breakdown with two upper sores at the superior aspect  of the gluteal cleft. Some skin maceration distal to this as well. Healing per patient report  Psych: Appropriate affect. Mood is good         Signed Electronically by: Nannette Gallagher MD

## 2024-10-02 LAB
CREAT UR-MCNC: 54.6 MG/DL
MICROALBUMIN UR-MCNC: <12 MG/L
MICROALBUMIN/CREAT UR: NORMAL MG/G{CREAT}
VIT B12 SERPL-MCNC: 1443 PG/ML (ref 232–1245)
VIT D+METAB SERPL-MCNC: 59 NG/ML (ref 20–50)

## 2024-10-03 ENCOUNTER — TELEPHONE (OUTPATIENT)
Dept: CARDIOLOGY | Facility: CLINIC | Age: 69
End: 2024-10-03
Payer: COMMERCIAL

## 2024-10-03 NOTE — TELEPHONE ENCOUNTER
Health Call Center    Phone Message    May a detailed message be left on voicemail: yes     Reason for Call: Other: Consuelo with [facility] called needing clearance for patient to have dental cleaning. Patient had an appointment today, but they were not aware of patient having open heart surgery just a few months ago. Please call back at [phone] or fax information providing clearance to [phone].     Action Taken: Other: Cardiology    Travel Screening: Not Applicable     Thank you!  [facility]

## 2024-10-04 ENCOUNTER — MYC MEDICAL ADVICE (OUTPATIENT)
Dept: FAMILY MEDICINE | Facility: CLINIC | Age: 69
End: 2024-10-04

## 2024-10-04 ENCOUNTER — HOSPITAL ENCOUNTER (OUTPATIENT)
Dept: CARDIAC REHAB | Facility: CLINIC | Age: 69
Discharge: HOME | End: 2024-10-04
Attending: STUDENT IN AN ORGANIZED HEALTH CARE EDUCATION/TRAINING PROGRAM
Payer: COMMERCIAL

## 2024-10-04 DIAGNOSIS — Z95.2 H/O PROSTHETIC HEART VALVE: Primary | ICD-10-CM

## 2024-10-04 PROCEDURE — 93798 PHYS/QHP OP CAR RHAB W/ECG: CPT

## 2024-10-04 RX ORDER — AMOXICILLIN 500 MG/1
2000 CAPSULE ORAL ONCE
Qty: 4 CAPSULE | Refills: 5 | Status: SHIPPED | OUTPATIENT
Start: 2024-10-04 | End: 2024-10-04

## 2024-10-07 ENCOUNTER — HOSPITAL ENCOUNTER (OUTPATIENT)
Dept: CARDIAC REHAB | Facility: CLINIC | Age: 69
Discharge: HOME | End: 2024-10-07
Attending: STUDENT IN AN ORGANIZED HEALTH CARE EDUCATION/TRAINING PROGRAM
Payer: COMMERCIAL

## 2024-10-07 ENCOUNTER — MYC MEDICAL ADVICE (OUTPATIENT)
Dept: FAMILY MEDICINE | Facility: CLINIC | Age: 69
End: 2024-10-07

## 2024-10-07 DIAGNOSIS — E11.9 TYPE 2 DIABETES MELLITUS WITHOUT COMPLICATION, WITHOUT LONG-TERM CURRENT USE OF INSULIN (H): ICD-10-CM

## 2024-10-07 PROCEDURE — 93798 PHYS/QHP OP CAR RHAB W/ECG: CPT

## 2024-10-08 ENCOUNTER — HOSPITAL ENCOUNTER (OUTPATIENT)
Dept: CARDIAC REHAB | Facility: CLINIC | Age: 69
Discharge: HOME | End: 2024-10-08
Attending: STUDENT IN AN ORGANIZED HEALTH CARE EDUCATION/TRAINING PROGRAM
Payer: COMMERCIAL

## 2024-10-08 PROCEDURE — 93798 PHYS/QHP OP CAR RHAB W/ECG: CPT

## 2024-10-11 ENCOUNTER — HOSPITAL ENCOUNTER (OUTPATIENT)
Dept: WOUND CARE | Facility: CLINIC | Age: 69
Discharge: HOME | End: 2024-10-11
Payer: COMMERCIAL

## 2024-10-11 VITALS
TEMPERATURE: 98.2 F | HEART RATE: 58 BPM | WEIGHT: 170 LBS | DIASTOLIC BLOOD PRESSURE: 87 MMHG | BODY MASS INDEX: 26.68 KG/M2 | HEIGHT: 67 IN | SYSTOLIC BLOOD PRESSURE: 145 MMHG

## 2024-10-11 DIAGNOSIS — L89.151 PRESSURE INJURY OF SACRAL REGION, STAGE 1: Primary | ICD-10-CM

## 2024-10-11 PROCEDURE — G0463 HOSPITAL OUTPT CLINIC VISIT: HCPCS

## 2024-10-11 PROCEDURE — 99203 OFFICE O/P NEW LOW 30 MIN: CPT

## 2024-10-11 NOTE — DISCHARGE INSTRUCTIONS
10/11/2024   Kurtkarin Urena   1955    A DME order was not completed because supplies were not needed    Plan 10/11/2024   -get rid of your donut cushion and try a Roho cushion. Information listed below.    Skin Care to Sacrum  -Keep area clean and dry and monitor the area. If area opens you can put a basic band-aid on to help protect the area.   -Apply a ceramide based lotion (Cera-Ve, Vanicream, Cetaphil) if area gets dry and scaly  -Shift your weight when sitting every hour and try to get up every half hour if you are sitting for a long period of time  -Utilize a roho cushion whenever you sit and can be transferred from chair to chair  -Follow up with us as needed    It is recommended that you obtain a Roho Mosaic Cushion. It can be purchased online on Amazon, or through your local DME (Durable Medical Equipment) store. They come in a variety of sizes - you can choose which size works best for your chair.   You only need to purchase one as it can be transferred between chairs.         A diet high in protein is important for wound healing, we recommend getting 90 grams of protein per day. Taking protein shakes or bars are a good way to get extra protein in your diet.     Good sources of protein:  Pork 26g per 3 oz  Whey protein powder - 24g per scoop (on average)  Greek yogurt - 23g per 8oz   Chicken or Turkey - 23g per 3oz  Fish - 20-25g per 3oz  Beef - 18-23g per 3oz  Tofu - 10g per 1/2 cup  Navy beans - 20g per cup  Cottage cheese - 14g per 1/2 cup   Lentils - 13g per 1/4 cup  Beef jerky 13g per 1oz  2% milk - 8g per cup  Peanut butter - 8g per 2 tablespoons  Eggs - 6g per egg  Mixed nuts - 6g per 2oz     Main Provider: Marry Simmons NP October 11, 2024    Call us at [phone] if you have any questions about your wounds, if you have redness or swelling around your wound, have a fever of 101 degrees Fahrenheit or greater or if you have any other problems or concerns. We answer the phone Monday through  Friday 8 am to 4 pm, please leave a message as we check the voicemail frequently throughout the day. If you have a concern over the weekend, please leave a message and we will return your call Monday. If the need is urgent, go to the ER or urgent care.    If you had a positive experience please indicate that on your patient satisfaction survey form that [facility] will be sending you.    It was a pleasure meeting with you today.  Thank you for allowing me and my team the privilege of caring for you today.  YOU are the reason we are here, and I truly hope we provided you with the excellent service you deserve.  Please let us know if there is anything else we can do for you so that we can be sure you are leaving completely satisfied with your care experience.      If you have any billing related questions please call the [facility] Business office at [phone]. The clinic staff does not handle billing related matters.    If you are scheduled to have a follow up appointment, you will receive a reminder call the day before your visit. On the appointment day please arrive 15 minutes prior to your appointment time. If you are unable to keep that appointment, please call the clinic to cancel or reschedule. If you are more than 10 minutes late or greater for your scheduled appointment time, the clinic policy is that you may be asked to reschedule.

## 2024-10-11 NOTE — PROGRESS NOTES
[facility]    ASSESSMENT:   Pressure injury of sacral region, stage 1 [L89.151]  (primary encounter diagnosis)    PLAN/DISCUSSION:   Shift and frequent repositioning, recommend cushion.   Please stand up every half hour or so if sitting for long periods of time  Monitor closely  Dietary recommendations discussed, see AVS       HISTORY OF PRESENT ILLNESS:   Won Urena is a 69 year old female with a history of a CABG in July of 2024 with a significant weight loss in the past several years(intentional). She has had a stage I pressure injury off and on that intermittently progresses to a stage II. Received a donut cushion 3 months ago. Denies fevers, chills.   Patient Active Problem List   Diagnosis    Type 2 diabetes mellitus without complication, without long-term current use of insulin (H)    Coronary artery calcification    Nonrheumatic aortic valve stenosis    Ascending aortic aneurysm (H)    Hyperlipidemia LDL goal <100    Essential hypertension    Screening for colon cancer    Genitourinary syndrome of menopause    Environmental allergies    Seasonal allergic rhinitis due to pollen    Status post coronary angiogram    Coronary artery disease involving native heart without angina pectoris, unspecified vessel or lesion type    CAD (coronary artery disease)    Severe aortic stenosis    Coronary artery disease involving native coronary artery without angina pectoris    Coronary artery disease involving native coronary artery of native heart without angina pectoris    Coronary artery disease due to calcified coronary lesion    Paroxysmal atrial fibrillation (H)    S/P CABG x 2    S/P aortic valve replacement       TREATMENT COURSE:  10/11/2024 : Presents for initial visit at our clinic.       Current Outpatient Medications   Medication Sig Dispense Refill    acetaminophen (TYLENOL) 325 MG tablet Take 3 tablets (975 mg) by mouth every 6 hours as needed for pain (mild to moderate pain) 100  tablet 0    apixaban ANTICOAGULANT (ELIQUIS) 5 MG tablet Take 1 tablet (5 mg) by mouth 2 times daily 180 tablet 3    aspirin 81 MG EC tablet Take 81 mg by mouth every evening      atorvastatin (LIPITOR) 40 MG tablet Take 40 mg by mouth every evening      blood glucose (NO BRAND SPECIFIED) lancets standard Use to test blood sugar 1-3 times daily or as directed. 100 each 11    carboxymethylcellulose PF (REFRESH PLUS) 0.5 % ophthalmic solution Place 1 drop into both eyes 4 times daily 70 each 0    cyanocobalamin (VITAMIN B-12) 100 MCG tablet Take 100 mcg by mouth every evening      dapagliflozin (FARXIGA) 10 MG TABS tablet Take 1 tablet (10 mg) by mouth every morning 100 tablet 1    estradiol (ESTRACE) 0.1 MG/GM vaginal cream Place vaginally twice a week. 1/2 applicator twice weekly into the vagina 85 g 3    fenofibrate (TRIGLIDE/LOFIBRA) 160 MG tablet Take 1 tablet (160 mg) by mouth every evening 100 tablet 3    gabapentin (NEURONTIN) 100 MG capsule Take 1 capsule (100 mg) by mouth at bedtime. 200 capsule 3    levocetirizine (XYZAL) 5 MG tablet Take 5 mg by mouth every evening      metFORMIN (GLUCOPHAGE) 500 MG tablet Take 2 tablets (1,000 mg) by mouth 2 times daily (with meals). 360 tablet 3    metoprolol tartrate (LOPRESSOR) 25 MG tablet Take 2 tablets (50 mg) by mouth 2 times daily 360 tablet 1    mometasone (NASONEX) 50 MCG/ACT nasal spray Spray 2 sprays into both nostrils daily 17 g 0    montelukast (SINGULAIR) 10 MG tablet Take 1 tablet (10 mg) by mouth at bedtime 100 tablet 2    multivitamin w/minerals (MULTI-VITAMIN) tablet Take 1 tablet by mouth every evening      Semaglutide, 1 MG/DOSE, (OZEMPIC, 1 MG/DOSE,) 4 MG/3ML pen INJECT SUBCUTANEOUSLY 1 MG  EVERY WEEK 9 mL 2    Vitamin D, Cholecalciferol, 10 MCG (400 UNIT) TABS Take 1 tablet by mouth every evening       No current facility-administered medications for this encounter.       VITALS: BP (!) 145/87 (BP Location: Left arm, Patient Position: Sitting)    "Pulse 58   Temp 98.2  F (36.8  C) (Temporal)   Ht 1.702 m (5' 7\")   Wt 77.1 kg (170 lb)   BMI 26.63 kg/m       PHYSICAL EXAM:  GENERAL: Patient is alert and oriented and in no acute distress  INTEGUMENTARY:   Wound (used by OP [facility] only) 10/11/24 0819 sacral pressure injury (Active)   Thickness/Stage Stage 1 10/11/24 0800   Base pink;epithelialization 10/11/24 0800   Periwound intact 10/11/24 0800   Periwound Temperature warm 10/11/24 0800   Periwound Skin Turgor soft 10/11/24 0800   Edges rolled/closed 10/11/24 0800   Length (cm) 0 10/11/24 0800   Width (cm) 0 10/11/24 0800   Depth (cm) 0 10/11/24 0800   Wound (cm^2) 0 cm^2 10/11/24 0800   Wound Volume (cm^3) 0 cm^3 10/11/24 0800   Drainage Amount none 10/11/24 0800       Incision/Surgical Site 09/28/23 Bilateral Eye (Active)       Incision/Surgical Site 06/12/24 Medial Sternum (Active)       Incision/Surgical Site 06/12/24 Inner;Right Knee (Active)       Incision/Surgical Site 06/12/24 Inner;Right;Distal Calf (Active)       Incision/Surgical Site 06/12/24 Medial;Right Groin (Active)             MDM: 30 minutes were spent on the date of the visit reviewing previous chart notes, evaluating patient and developing the treatment plan, this excludes any time spent on procedures    PATIENT INSTRUCTIONS      Further instructions from your care team         10/11/2024   Won Urena   1955    A DME order was not completed because supplies were not needed    Plan 10/11/2024   -get rid of your donut cushion and try a Roho cushion. Information listed below.    Skin Care to Sacrum  -Keep area clean and dry and monitor the area. If area opens you can put a basic band-aid on to help protect the area.   -Apply a ceramide based lotion (Cera-Ve, Vanicream, Cetaphil) if area gets dry and scaly  -Shift your weight when sitting every hour and try to get up every half hour if you are sitting for a long period of time  -Utilize a roho cushion whenever you sit and can be " transferred from chair to chair  -Follow up with us as needed    It is recommended that you obtain a Shaio Mosaic Cushion. It can be purchased online on [employer], or through your local DME (Durable Medical Equipment) store. They come in a variety of sizes - you can choose which size works best for your chair.   You only need to purchase one as it can be transferred between chairs.         A diet high in protein is important for wound healing, we recommend getting 90 grams of protein per day. Taking protein shakes or bars are a good way to get extra protein in your diet.     Good sources of protein:  Pork 26g per 3 oz  Whey protein powder - 24g per scoop (on average)  Greek yogurt - 23g per 8oz   Chicken or Turkey - 23g per 3oz  Fish - 20-25g per 3oz  Beef - 18-23g per 3oz  Tofu - 10g per 1/2 cup  Navy beans - 20g per cup  Cottage cheese - 14g per 1/2 cup   Lentils - 13g per 1/4 cup  Beef jerky 13g per 1oz  2% milk - 8g per cup  Peanut butter - 8g per 2 tablespoons  Eggs - 6g per egg  Mixed nuts - 6g per 2oz     Main Provider: Marry Simmons NP October 11, 2024    Call us at [phone] if you have any questions about your wounds, if you have redness or swelling around your wound, have a fever of 101 degrees Fahrenheit or greater or if you have any other problems or concerns. We answer the phone Monday through Friday 8 am to 4 pm, please leave a message as we check the voicemail frequently throughout the day. If you have a concern over the weekend, please leave a message and we will return your call Monday. If the need is urgent, go to the ER or urgent care.    If you had a positive experience please indicate that on your patient satisfaction survey form that [facility] will be sending you.    It was a pleasure meeting with you today.  Thank you for allowing me and my team the privilege of caring for you today.  YOU are the reason we are here, and I truly hope we provided you with the excellent service you  deserve.  Please let us know if there is anything else we can do for you so that we can be sure you are leaving completely satisfied with your care experience.      If you have any billing related questions please call the [facility] Business office at [phone]. The clinic staff does not handle billing related matters.    If you are scheduled to have a follow up appointment, you will receive a reminder call the day before your visit. On the appointment day please arrive 15 minutes prior to your appointment time. If you are unable to keep that appointment, please call the clinic to cancel or reschedule. If you are more than 10 minutes late or greater for your scheduled appointment time, the clinic policy is that you may be asked to reschedule.          Electronically signed by Marry Simmons CNP on October 11, 2024

## 2024-10-11 NOTE — PROGRESS NOTES
Patient arrived for wound care visit. Certified Wound Care Nurse time spent evaluating patient record, completed a full evaluation and documented wound(s) & erinn-wound skin; provided recommendation based on treatment plan. Applied dressing, reviewed discharge instructions, patient education, and discussed plan of care with appropriate medical team staff members and patient and/or family members.

## 2024-10-14 NOTE — TELEPHONE ENCOUNTER
Medication requested: ACCU-CHEK GUIDE test strip   Last office visit: 10/1/2024  [facility] appointments: 3/18/2025  Medication last refilled: 1/22/2024; 100 strips + 2 refills  Last qualifying labs: n/a    Prescription approved per [facility] Refill Protocol.    MAC Hardin, RN  10/14/24, 10:22 AM

## 2024-10-15 ENCOUNTER — TELEPHONE (OUTPATIENT)
Dept: FAMILY MEDICINE | Facility: CLINIC | Age: 69
End: 2024-10-15

## 2024-10-15 DIAGNOSIS — E11.9 TYPE 2 DIABETES MELLITUS WITHOUT COMPLICATION, WITHOUT LONG-TERM CURRENT USE OF INSULIN (H): Primary | ICD-10-CM

## 2024-10-15 RX ORDER — LANCETS
EACH MISCELLANEOUS
Qty: 100 EACH | Refills: 3 | Status: SHIPPED | OUTPATIENT
Start: 2024-10-15 | End: 2024-10-18

## 2024-10-16 ENCOUNTER — OFFICE VISIT (OUTPATIENT)
Dept: CARDIOLOGY | Facility: CLINIC | Age: 69
End: 2024-10-16
Attending: CASE MANAGER/CARE COORDINATOR
Payer: COMMERCIAL

## 2024-10-16 VITALS
HEART RATE: 66 BPM | OXYGEN SATURATION: 98 % | DIASTOLIC BLOOD PRESSURE: 77 MMHG | WEIGHT: 173.8 LBS | BODY MASS INDEX: 27.22 KG/M2 | SYSTOLIC BLOOD PRESSURE: 124 MMHG

## 2024-10-16 DIAGNOSIS — I35.0 SEVERE AORTIC STENOSIS: ICD-10-CM

## 2024-10-16 DIAGNOSIS — I48.0 PAROXYSMAL ATRIAL FIBRILLATION (H): ICD-10-CM

## 2024-10-16 DIAGNOSIS — Z95.1 S/P CABG (CORONARY ARTERY BYPASS GRAFT): ICD-10-CM

## 2024-10-16 DIAGNOSIS — Z95.2 S/P AVR: Primary | ICD-10-CM

## 2024-10-16 DIAGNOSIS — I25.810 CORONARY ARTERY DISEASE INVOLVING AUTOLOGOUS ARTERY CORONARY BYPASS GRAFT WITHOUT ANGINA PECTORIS: ICD-10-CM

## 2024-10-16 PROCEDURE — 99214 OFFICE O/P EST MOD 30 MIN: CPT | Performed by: CASE MANAGER/CARE COORDINATOR

## 2024-10-16 PROCEDURE — G0463 HOSPITAL OUTPT CLINIC VISIT: HCPCS | Performed by: CASE MANAGER/CARE COORDINATOR

## 2024-10-16 ASSESSMENT — PAIN SCALES - GENERAL: PAINLEVEL: NO PAIN (0)

## 2024-10-16 NOTE — NURSING NOTE
Chief Complaint   Patient presents with    Follow Up     RETURN CARDIOLOGY     Vitals were taken and medications reconciled.    Vadim José, EMT  10:19 AM

## 2024-10-16 NOTE — PROGRESS NOTES
[facility] - [facility] [place]   Cardiology Clinic Note      HPI:   Ms. Won Urena is a 69 year old female with medical history pertinent for multivessel CAD s/p CABG x2 (LIMA-LAD, SVG-OM, 6/12/24), aortic stenosis s/p AVR (6/2024), HLD, HTN, ascending aortic aneurysm, and T2DM. She presents to cardiology clinic for follow up.    Interval History 4/10/24:  Recent ED visit for SVT, resolved with valsava maneuver. Troponin slightly elevated by flat 25 ->20. Won has started exercising more, goes to the gym with a , does [employer], [employer] classes, etc. She feels occasionally SOB with exertion, not at rest. She has to work hard to get heart rate above 143. Denies dizziness or syncope. Home /80s.    Interval History 07/10/24  Since our last clinic visit, Won underwent the above named surgeries with uneventful post-op course. She presented to [facility] ED on 6/25 with racing heart and Apple watch alert for AF. EKG in ED showed NSR with bifascicular block, echo unremarkable, her metoprolol dose was increased to 50mg BID. She was seen by CVTS in clinic and a Ziopatch was ordered, results pending.  Today in clinic she reports feeling well. She has been working with cardiac rehab. She still notes occasional palpitations and feeling her heart beat, and is anxious about potential AF. Denies chaitanya CP, shortness of breath, or LE edema.  Home -120/60s    Interval History 10/16/24  Won saw Dr. Garza in EP clinic, agreed to continue AF treatment with beta blocker and followup in 6 months with consideration for ablation in AF progresses. Today she reports feeling well overall. If she exercises soon after taking her metoprolol her heart rate is blunted. She rarely feels palpitations/AF/PVCs.       PAST MEDICAL HISTORY:  Past Medical History:   Diagnosis Date    Abnormal Pap smear of cervix     Aortic stenosis     Aortic valve stenosis     Benign colon polyp     CAD (coronary artery disease)      Dermatochalasis of both upper eyelids     Diabetes mellitus, type 2 (H)     Gestational diabetes     HTN (hypertension)     Hyperlipidemia LDL goal <100     Metal foreign body in chest     Retained atrial temporary pacing wire    Nonsenile cataract 2022    Very beginnings    Post-menopausal atrophic vaginitis     Ptosis of both eyelids     Thickened endometrium        FAMILY HISTORY:  Family History   Problem Relation Age of Onset    Lung Cancer Mother         Small cell, did smoked    Diabetes Mother     Hypertension Mother     Goiter Mother     Mental Illness Mother         likely bipolar    Coronary Artery Disease Mother 42        MI, smoked    Hyperlipidemia Mother     Thyroid Disease Mother         Goiter    Obesity Mother         most of family is obese    Glaucoma Father     Atrial fibrillation Father     Hypertension Father     Parkinsonism Father     Hyperlipidemia Father     Diabetes Sister     Breast Cancer Sister 60    Hypertension Sister     Hypertension Brother     Cancer Maternal Grandmother         gynecologic cancer, unknown type    Uterine Cancer Maternal Grandmother     Chronic Obstructive Pulmonary Disease Paternal Grandmother     Uterine Cancer Paternal Grandmother     Stomach Cancer Paternal Grandfather     Colon Cancer Paternal Grandfather     Bipolar Disorder Son     Mental Illness Son     Depression Son     Macular Degeneration No family hx of     Anesthesia Reaction No family hx of     Venous thrombosis No family hx of        SOCIAL HISTORY:  Social History     Socioeconomic History    Marital status:    Tobacco Use    Smoking status: Never    Smokeless tobacco: Never   Vaping Use    Vaping Use: Never used   Substance and Sexual Activity    Alcohol use: Yes     Alcohol/week: 1.0 standard drink of alcohol     Types: 1 Standard drinks or equivalent per week     Comment: less than 2 drinks a week    Drug use: Never    Sexual activity: Not Currently     Partners: Male     Birth  control/protection: Post-menopausal   Social History Narrative    Lives with     Two son    One son, daughter-in-law, and grandson live in [place]    One son, daughter-in-law live in the same [facility]    [place] in [place], [place]       CURRENT MEDICATIONS:  Current Outpatient Medications   Medication Sig Dispense Refill    acetaminophen (TYLENOL) 325 MG tablet Take 3 tablets (975 mg) by mouth every 6 hours as needed for pain (mild to moderate pain) 100 tablet 0    apixaban ANTICOAGULANT (ELIQUIS) 5 MG tablet Take 1 tablet (5 mg) by mouth 2 times daily 180 tablet 3    aspirin 81 MG EC tablet Take 81 mg by mouth every evening      atorvastatin (LIPITOR) 40 MG tablet Take 40 mg by mouth every evening      blood glucose (ACCU-CHEK GUIDE) test strip Use to test blood sugar one time daily or as directed. 100 strip 11    blood glucose monitoring (SOFTCLIX) lancets Use to test blood sugar 1-3 times daily or as directed. 100 each 3    carboxymethylcellulose PF (REFRESH PLUS) 0.5 % ophthalmic solution Place 1 drop into both eyes 4 times daily 70 each 0    cyanocobalamin (VITAMIN B-12) 100 MCG tablet Take 100 mcg by mouth every evening      dapagliflozin (FARXIGA) 10 MG TABS tablet Take 1 tablet (10 mg) by mouth every morning 100 tablet 1    estradiol (ESTRACE) 0.1 MG/GM vaginal cream Place vaginally twice a week. 1/2 applicator twice weekly into the vagina 85 g 3    fenofibrate (TRIGLIDE/LOFIBRA) 160 MG tablet Take 1 tablet (160 mg) by mouth every evening 100 tablet 3    gabapentin (NEURONTIN) 100 MG capsule Take 1 capsule (100 mg) by mouth at bedtime. 200 capsule 3    levocetirizine (XYZAL) 5 MG tablet Take 5 mg by mouth every evening      metFORMIN (GLUCOPHAGE) 500 MG tablet Take 2 tablets (1,000 mg) by mouth 2 times daily (with meals). 360 tablet 3    metoprolol tartrate (LOPRESSOR) 25 MG tablet Take 2 tablets (50 mg) by mouth 2 times daily 360 tablet 1    mometasone (NASONEX) 50 MCG/ACT nasal spray  Spray 2 sprays into both nostrils daily 17 g 0    montelukast (SINGULAIR) 10 MG tablet Take 1 tablet (10 mg) by mouth at bedtime 100 tablet 2    multivitamin w/minerals (MULTI-VITAMIN) tablet Take 1 tablet by mouth every evening      Semaglutide, 1 MG/DOSE, (OZEMPIC, 1 MG/DOSE,) 4 MG/3ML pen INJECT SUBCUTANEOUSLY 1 MG  EVERY WEEK 9 mL 2    Vitamin D, Cholecalciferol, 10 MCG (400 UNIT) TABS Take 1 tablet by mouth every evening       No current facility-administered medications for this visit.       ROS:   Refer to HPI    EXAM:  /77 (BP Location: Right arm, Patient Position: Chair, Cuff Size: Adult Regular)   Pulse 66   Wt 78.8 kg (173 lb 12.8 oz)   SpO2 98%   BMI 27.22 kg/m    GENERAL: Appears comfortable, in no acute distress.   HEENT: Eye symmetrical, no discharge or icterus bilaterally. Mucous membranes moist and without lesions.  CV: RRR, +S1S2,   RESPIRATORY: Respirations regular, even, and unlabored. Lungs CTA throughout.   GI: Soft and non distended with normoactive bowel sounds present in all quadrants. No tenderness, rebound, guarding.  EXTREMITIES: No peripheral edema. 2+ bilateral pedal pulses.   NEUROLOGIC: Alert and oriented x 3. No focal deficits.   MUSCULOSKELETAL: No joint swelling or tenderness.   SKIN: mid-sternal incision well-healed. No jaundice. No rashes or lesions.     Labs, reviewed with patient in clinic today:  CBC RESULTS:  Lab Results   Component Value Date    WBC 6.8 06/25/2024    RBC 3.66 (L) 06/25/2024    HGB 11.0 (L) 06/25/2024    HCT 33.5 (L) 06/25/2024    MCV 92 06/25/2024    MCH 30.1 06/25/2024    MCHC 32.8 06/25/2024    RDW 14.3 06/25/2024     06/25/2024       CMP RESULTS:  Lab Results   Component Value Date     06/25/2024    POTASSIUM 4.1 06/25/2024    POTASSIUM 4.0 06/12/2024    CHLORIDE 105 06/25/2024    CO2 24 06/25/2024    ANIONGAP 12 06/25/2024     (H) 06/25/2024     (H) 06/17/2024    BUN 18.3 06/25/2024    CR 0.63 06/25/2024     "GFRESTIMATED >90 2024    JOAQUINA 9.6 2024    BILITOTAL 0.3 2024    ALBUMIN 4.2 2024    ALKPHOS 85 2024    ALT 16 2024    AST 23 2024        INR RESULTS:  Lab Results   Component Value Date    INR 2.29 (H) 2024    INR 2.3 (A) 2024       Lab Results   Component Value Date    MAG 1.9 2024     No results found for: \"NTBNPI\"  Lab Results   Component Value Date    NTBNP 198 2024       EKG 3/2024:      Echocardiogram 2024:  Interpretation Summary  Left ventricular size, wall motion and function are normal. The ejection  fraction is > 65%.  Right ventricular function, chamber size, wall motion, and thickness are  normal.  Coronary Bypass Graft x 2, Aortic Valve Replacement with Inspiris Resilia  Aortic Valve size 25mm. The mean gradient across the valve is 9mmHg  IVC diameter and respiratory changes fall into an intermediate range  suggesting an RA pressure of 8 mmHg. No pericardial effusion is present.     Compared to prior study patient is s/p AV replacement.    CT Angiogram 23:    IMPRESSION:  1.  Limited luminal assessment of the mid LAD mid LCx due to densely  calcified plaque but likely representing severe stenosis in the mid  LAD and mid LCx territories. Consider coronary angiogram if clinically  appropriate for further evaluation.   2.  Hemodynamically significant coronary stenosis in the mid to distal  LAD and distal LCx based on FFR-CT.  3.  Total Agatston score 2720 placing the patient in the 99th  percentile when compared to age and gender matched control group.  4.  Please review the separate Radiology report for incidental  noncardiac findings. This report will follow separately.     FINDINGS:     CORONARY CALCIUM SCORE     Total Agatston calcium score: 2720   Left main: 0  Left anterior descendin  Left circumflex: 1197  Right coronary artery: 341   This places the patient in the 99th  percentile when compared to age  and gender " matched control group.     CORONARY ANGIOGRAPHY     DOMINANCE: Right dominant system.   Normal coronary origins and course.     LEFT MAIN:   The left main arises normally from the left coronary cusp and has a  mild (25-49%) stenosis composed of calcified plaque.     LEFT ANTERIOR DESCENDING:      Proximal LAD: Mild (25-49%) stenosis composed of calcified plaque.  Mid LAD: Severe (>70%) stenosis composed of calcified plaque. Limited  luminal assessment due to densely calcified plaque.   Distal LAD: Mild (25-49%) stenosis composed of mixed plaque.  First diagonal: Moderate (50-69%) stenosis composed of calcified  plaque.     LEFT CIRCUMFLEX:      Proximal LCX:Moderate (50-69%) stenosis composed of calcified plaque.  Mid LCX: Severe (>70%) stenosis composed of calcified plaque. Non  diagnostic images due to dense calcifications  Distal LCX: Severe (>70%) stenosis composed of noncalcified plaque.     RIGHT CORONARY ARTERY:      Proximal RCA: Mild (25-49%) stenosis composed of calcified plaque.  Mid RCA: Mild (25-49%) stenosis composed of noncalcified plaque.  Distal RCA: Mild (25-49%) stenosis composed of noncalcified plaque.  The remainder of the right coronary and the posterior descending  artery are patent with minimal luminal irregularities.     TECHNIQUE:  Fractional Flow Reserve (FFR) CT was performed offline by [employer] on  the original CTA dataset. Diagrammatic representation of the FFR-CT  analysis is provided in a separate PDF document in PACS/EPIC. See  coronary CT results for detailed anatomic assessment.     IMPRESSION:  Hemodynamically significant coronary stenosis in the mid to distal LAD  and distal LCx.     FINDINGS:  1.  LAD: 0.63  2.  LCX: 0.51  3.  RCA: 0.88     Normal FFR range is >0.8.    Coronary Angiogram 6/9/23:    Pre Procedure Diagnosis    stable known CAD       Conclusion         3rd Mrg lesion is 30% stenosed.    1st Diag lesion is 50% stenosed.    Prox RCA to Dist RCA lesion is 40%  "stenosed.    Prox LAD to Mid LAD lesion is 60% stenosed.    Prox Cx to Dist Cx lesion is 60% stenosed.     Moderate diffuse disease of the LAD and Lcx (Borderline Pd/Pa 0.92, dPR 0.88 for both lesions). Plan for medical management given lack of clinical symptoms.  The lesion are long, moderate and calcified.  Mild non-obstructive RCA disease.         Plan     Follow bedrest per protocol   Continued medical management and lifestyle modifications for cardiovascular risk factor optimizations.   Discharge today per protocol     Recommendations    General Recommendations:  - Patient given specific instructions regarding care of arteriotomy site, activity restrictions, signs and symptoms of cardiac or vascular complications and to seek immediate medical evaluation should they occur.   - Recommended follow up with doctor Dr. Matson.   - Arterial sheath removed from radial artery with TR Band placement.       Medications:  - Continue high dose statin therapy indefinitely.   - Risk factor management for atherosclerosis.     Coronary Findings    Diagnostic  Dominance: Right  Left Main   The vessel was visualized by selective angiography, is moderate in size and is angiographically normal.      Left Anterior Descending   The vessel is moderate in size.   Prox LAD to Mid LAD lesion is 60% stenosed. 60% diffuse LAD disease was visualized on diagnostic angiography. A 6 Fr XB 3.5 GC was used to engage the LM. Heparin was given to keep ACT >250. A 0.014\" Opsens was normalized in the LM and wired past the disease into the distal LAD. Pd/Pa was 0.92 and iFR was 0.88; given the diffuse nature of the disease and lack of clinical symptoms, no intervention was performed. Wire was removed and final angiography showed TESSY 3 flow and no evidence of dissection or perforation.      First Diagonal Branch   The vessel is large.   1st Diag lesion is 50% stenosed.      First Septal Branch   The vessel is small and is angiographically normal.    " "  Second Septal Branch   The vessel is small and is angiographically normal.      Left Circumflex   Prox Cx to Dist Cx lesion is 60% stenosed. 60% diffuse LCx disease was visualized on diagnostic angiography. A 6 Fr XB 3.5 GC was used to engage the LM. Heparin was given to keep ACT >250. A 0.014\" Opsens was normalized in the LM and wired pastthe lesion into the distal LCx. Pd/Pa was 0.92 and iFR was 0.88; given the diffuse nature of the disease and lack of clinical symptoms, no intervention was performed. Wire was removed and final angiography showed TESSY 3 flow and no evidence ofdissection or perforation.      First Obtuse Marginal Branch   The vessel is small. There is mild diffuse disease throughout the vessel.      Second Obtuse Marginal Branch   The vessel is small and is angiographically normal.      Third Obtuse Marginal Branch   The vessel is moderate in size.   3rd Mrg lesion is 30% stenosed.      Right Coronary Artery   The vessel was visualized by selective angiography and is moderate in size.   Prox RCA to Dist RCA lesion is 40% stenosed.      Acute Marginal Branch   The vessel is small.      Right Ventricular Branch   The vessel is small.      Right Posterior Descending Artery   The vessel is small and is angiographically normal.      Right Posterior Atrioventricular Artery   The vessel is moderate in size. There is mild diffuse disease throughout the vessel.      First Right Posterolateral Branch   The vessel is small and is angiographically normal.      Second Right Posterolateral Branch   The vessel is small and is angiographically normal.         Intervention     No interventions have been documented.         Assessment and Plan:   Ms. Urena is a 69 year old female with a PMH of multivessel CAD s/p CABG x2 (LIMA-LAD, SVG-OM, 6/12/24), aortic stenosis s/p AVR (6/2024), HLD, HTN, ascending aortic aneurysm, and T2DM.    # Multivessel CAD s/p CABG x2 (LIMA-LAD, SVG-OM, 6/12/24)  # HLD  Recent angiogram " showed moderate diffuse disease of the LAD and Lcx (Borderline Pd/Pa 0.92, dPR 0.88 for both lesions), and mild non-obstructive RCA disease. She underwent CABG x2 with Dr. Nicole on 6/12, post-op course uneventful.   - Continue atorvastatin 40mg daily  - Continue aspirin 81mg daily  - fenofibrate 160mg     # Aortic valve stenosis s/p AVR (6/12/24)  - aspirin 81mg daily    # pSVT  # Paroxysmal atrial fibrillation  Recent ED visit for Optimal+ alert for AF with HR in 130s, had resolved by the time she got to ED. Tele strips from rehab show PVCs and pAF. Ziopatch shows predominately SR with frequent PACs (11%) burden, PCVs (3% burden).   [id number]= 4  - Lopressor 50mg BID - okay to take in later AM if she wants to work out in early AM  - apixaban 5mg BID  - follow-up with EP as scheduled     # Ascending aortic aneurysm  Stable at 3.7 cm on recent echo, prior echo showed 3.9cm.  - annaul echo  - tight BP control <130/90    # HTN, well controlled   Home /80s  - lopressor 50mg BID     # DM2  Most recent A1c 5.8%  - Farxiga 10mg daily  - Metformin 1000mg daily  - Ozempic 0.5mg subcutaneous weekly    Follow up: 1 year  Chart review time today: 5 minutes  Visit time today: 13 minutes  Total time spent today: 18 minutes        Niharika Olivo CNP  General Cardiology   10/16/24

## 2024-10-16 NOTE — LETTER
10/16/2024      RE: Won Urena  [address]  [address]  [facility] [address]       Dear Colleague,    Thank you for the opportunity to participate in the care of your patient, Won Urena, at the [facility] [place] at [facility]. Please see a copy of my visit note below.      [facility] - [facility]   Cardiology Clinic Note      HPI:   Ms. Won Urena is a 69 year old female with medical history pertinent for multivessel CAD s/p CABG x2 (LIMA-LAD, SVG-OM, 6/12/24), aortic stenosis s/p AVR (6/2024), HLD, HTN, ascending aortic aneurysm, and T2DM. She presents to cardiology clinic for follow up.    Interval History 4/10/24:  Recent ED visit for SVT, resolved with valsava maneuver. Troponin slightly elevated by flat 25 ->20. Won has started exercising more, goes to the gym with a , does [employer], [place] classes, etc. She feels occasionally SOB with exertion, not at rest. She has to work hard to get heart rate above 143. Denies dizziness or syncope. Home /80s.    Interval History 07/10/24  Since our last clinic visit, Won underwent the above named surgeries with uneventful post-op course. She presented to [facility] ED on 6/25 with racing heart and Apple watch alert for AF. EKG in ED showed NSR with bifascicular block, echo unremarkable, her metoprolol dose was increased to 50mg BID. She was seen by CVTS in clinic and a Ziopatch was ordered, results pending.  Today in clinic she reports feeling well. She has been working with cardiac rehab. She still notes occasional palpitations and feeling her heart beat, and is anxious about potential AF. Denies chaitanya CP, shortness of breath, or LE edema.  Home -120/60s    Interval History 10/16/24  Won saw Dr. Garza in EP clinic, agreed to continue AF treatment with beta blocker and followup in 6 months with consideration for ablation in AF progresses. Today she reports  feeling well overall. If she exercises soon after taking her metoprolol her heart rate is blunted. She rarely feels palpitations/AF/PVCs.       PAST MEDICAL HISTORY:  Past Medical History:   Diagnosis Date     Abnormal Pap smear of cervix      Aortic stenosis      Aortic valve stenosis      Benign colon polyp      CAD (coronary artery disease)      Dermatochalasis of both upper eyelids      Diabetes mellitus, type 2 (H)      Gestational diabetes      HTN (hypertension)      Hyperlipidemia LDL goal <100      Metal foreign body in chest     Retained atrial temporary pacing wire     Nonsenile cataract 2022    Very beginnings     Post-menopausal atrophic vaginitis      Ptosis of both eyelids      Thickened endometrium        FAMILY HISTORY:  Family History   Problem Relation Age of Onset     Lung Cancer Mother         Small cell, did smoked     Diabetes Mother      Hypertension Mother      Goiter Mother      Mental Illness Mother         likely bipolar     Coronary Artery Disease Mother 42        MI, smoked     Hyperlipidemia Mother      Thyroid Disease Mother         Goiter     Obesity Mother         most of family is obese     Glaucoma Father      Atrial fibrillation Father      Hypertension Father      Parkinsonism Father      Hyperlipidemia Father      Diabetes Sister      Breast Cancer Sister 60     Hypertension Sister      Hypertension Brother      Cancer Maternal Grandmother         gynecologic cancer, unknown type     Uterine Cancer Maternal Grandmother      Chronic Obstructive Pulmonary Disease Paternal Grandmother      Uterine Cancer Paternal Grandmother      Stomach Cancer Paternal Grandfather      Colon Cancer Paternal Grandfather      Bipolar Disorder Son      Mental Illness Son      Depression Son      Macular Degeneration No family hx of      Anesthesia Reaction No family hx of      Venous thrombosis No family hx of        SOCIAL HISTORY:  Social History     Socioeconomic History     Marital status:     Tobacco Use     Smoking status: Never     Smokeless tobacco: Never   Vaping Use     Vaping Use: Never used   Substance and Sexual Activity     Alcohol use: Yes     Alcohol/week: 1.0 standard drink of alcohol     Types: 1 Standard drinks or equivalent per week     Comment: less than 2 drinks a week     Drug use: Never     Sexual activity: Not Currently     Partners: Male     Birth control/protection: Post-menopausal   Social History Narrative    Lives with     Two son    One son, daughter-in-law, and grandson live in [place]    One son, daughter-in-law live in the same [facility] in [place], [place]       CURRENT MEDICATIONS:  Current Outpatient Medications   Medication Sig Dispense Refill     acetaminophen (TYLENOL) 325 MG tablet Take 3 tablets (975 mg) by mouth every 6 hours as needed for pain (mild to moderate pain) 100 tablet 0     apixaban ANTICOAGULANT (ELIQUIS) 5 MG tablet Take 1 tablet (5 mg) by mouth 2 times daily 180 tablet 3     aspirin 81 MG EC tablet Take 81 mg by mouth every evening       atorvastatin (LIPITOR) 40 MG tablet Take 40 mg by mouth every evening       blood glucose (ACCU-CHEK GUIDE) test strip Use to test blood sugar one time daily or as directed. 100 strip 11     blood glucose monitoring (SOFTCLIX) lancets Use to test blood sugar 1-3 times daily or as directed. 100 each 3     carboxymethylcellulose PF (REFRESH PLUS) 0.5 % ophthalmic solution Place 1 drop into both eyes 4 times daily 70 each 0     cyanocobalamin (VITAMIN B-12) 100 MCG tablet Take 100 mcg by mouth every evening       dapagliflozin (FARXIGA) 10 MG TABS tablet Take 1 tablet (10 mg) by mouth every morning 100 tablet 1     estradiol (ESTRACE) 0.1 MG/GM vaginal cream Place vaginally twice a week. 1/2 applicator twice weekly into the vagina 85 g 3     fenofibrate (TRIGLIDE/LOFIBRA) 160 MG tablet Take 1 tablet (160 mg) by mouth every evening 100 tablet 3     gabapentin (NEURONTIN) 100 MG capsule  Take 1 capsule (100 mg) by mouth at bedtime. 200 capsule 3     levocetirizine (XYZAL) 5 MG tablet Take 5 mg by mouth every evening       metFORMIN (GLUCOPHAGE) 500 MG tablet Take 2 tablets (1,000 mg) by mouth 2 times daily (with meals). 360 tablet 3     metoprolol tartrate (LOPRESSOR) 25 MG tablet Take 2 tablets (50 mg) by mouth 2 times daily 360 tablet 1     mometasone (NASONEX) 50 MCG/ACT nasal spray Spray 2 sprays into both nostrils daily 17 g 0     montelukast (SINGULAIR) 10 MG tablet Take 1 tablet (10 mg) by mouth at bedtime 100 tablet 2     multivitamin w/minerals (MULTI-VITAMIN) tablet Take 1 tablet by mouth every evening       Semaglutide, 1 MG/DOSE, (OZEMPIC, 1 MG/DOSE,) 4 MG/3ML pen INJECT SUBCUTANEOUSLY 1 MG  EVERY WEEK 9 mL 2     Vitamin D, Cholecalciferol, 10 MCG (400 UNIT) TABS Take 1 tablet by mouth every evening       No current facility-administered medications for this visit.       ROS:   Refer to HPI    EXAM:  /77 (BP Location: Right arm, Patient Position: Chair, Cuff Size: Adult Regular)   Pulse 66   Wt 78.8 kg (173 lb 12.8 oz)   SpO2 98%   BMI 27.22 kg/m    GENERAL: Appears comfortable, in no acute distress.   HEENT: Eye symmetrical, no discharge or icterus bilaterally. Mucous membranes moist and without lesions.  CV: RRR, +S1S2,   RESPIRATORY: Respirations regular, even, and unlabored. Lungs CTA throughout.   GI: Soft and non distended with normoactive bowel sounds present in all quadrants. No tenderness, rebound, guarding.  EXTREMITIES: No peripheral edema. 2+ bilateral pedal pulses.   NEUROLOGIC: Alert and oriented x 3. No focal deficits.   MUSCULOSKELETAL: No joint swelling or tenderness.   SKIN: mid-sternal incision well-healed. No jaundice. No rashes or lesions.     Labs, reviewed with patient in clinic today:  CBC RESULTS:  Lab Results   Component Value Date    WBC 6.8 06/25/2024    RBC 3.66 (L) 06/25/2024    HGB 11.0 (L) 06/25/2024    HCT 33.5 (L) 06/25/2024    MCV 92  "06/25/2024    MCH 30.1 06/25/2024    MCHC 32.8 06/25/2024    RDW 14.3 06/25/2024     06/25/2024       CMP RESULTS:  Lab Results   Component Value Date     06/25/2024    POTASSIUM 4.1 06/25/2024    POTASSIUM 4.0 06/12/2024    CHLORIDE 105 06/25/2024    CO2 24 06/25/2024    ANIONGAP 12 06/25/2024     (H) 06/25/2024     (H) 06/17/2024    BUN 18.3 06/25/2024    CR 0.63 06/25/2024    GFRESTIMATED >90 06/25/2024    JOAQUINA 9.6 06/25/2024    BILITOTAL 0.3 06/25/2024    ALBUMIN 4.2 06/25/2024    ALKPHOS 85 06/25/2024    ALT 16 06/25/2024    AST 23 06/25/2024        INR RESULTS:  Lab Results   Component Value Date    INR 2.29 (H) 08/22/2024    INR 2.3 (A) 08/12/2024       Lab Results   Component Value Date    MAG 1.9 06/25/2024     No results found for: \"NTBNPI\"  Lab Results   Component Value Date    NTBNP 198 04/25/2024       EKG 3/2024:      Echocardiogram 6/2024:  Interpretation Summary  Left ventricular size, wall motion and function are normal. The ejection  fraction is > 65%.  Right ventricular function, chamber size, wall motion, and thickness are  normal.  Coronary Bypass Graft x 2, Aortic Valve Replacement with Inspiris Resilia  Aortic Valve size 25mm. The mean gradient across the valve is 9mmHg  IVC diameter and respiratory changes fall into an intermediate range  suggesting an RA pressure of 8 mmHg. No pericardial effusion is present.     Compared to prior study patient is s/p AV replacement.    CT Angiogram 5/19/23:    IMPRESSION:  1.  Limited luminal assessment of the mid LAD mid LCx due to densely  calcified plaque but likely representing severe stenosis in the mid  LAD and mid LCx territories. Consider coronary angiogram if clinically  appropriate for further evaluation.   2.  Hemodynamically significant coronary stenosis in the mid to distal  LAD and distal LCx based on FFR-CT.  3.  Total Agatston score 2720 placing the patient in the 99th  percentile when compared to age and gender " matched control group.  4.  Please review the separate Radiology report for incidental  noncardiac findings. This report will follow separately.     FINDINGS:     CORONARY CALCIUM SCORE     Total Agatston calcium score: 2720   Left main: 0  Left anterior descendin  Left circumflex: 1197  Right coronary artery: 341   This places the patient in the 99th  percentile when compared to age  and gender matched control group.     CORONARY ANGIOGRAPHY     DOMINANCE: Right dominant system.   Normal coronary origins and course.     LEFT MAIN:   The left main arises normally from the left coronary cusp and has a  mild (25-49%) stenosis composed of calcified plaque.     LEFT ANTERIOR DESCENDING:      Proximal LAD: Mild (25-49%) stenosis composed of calcified plaque.  Mid LAD: Severe (>70%) stenosis composed of calcified plaque. Limited  luminal assessment due to densely calcified plaque.   Distal LAD: Mild (25-49%) stenosis composed of mixed plaque.  First diagonal: Moderate (50-69%) stenosis composed of calcified  plaque.     LEFT CIRCUMFLEX:      Proximal LCX:Moderate (50-69%) stenosis composed of calcified plaque.  Mid LCX: Severe (>70%) stenosis composed of calcified plaque. Non  diagnostic images due to dense calcifications  Distal LCX: Severe (>70%) stenosis composed of noncalcified plaque.     RIGHT CORONARY ARTERY:      Proximal RCA: Mild (25-49%) stenosis composed of calcified plaque.  Mid RCA: Mild (25-49%) stenosis composed of noncalcified plaque.  Distal RCA: Mild (25-49%) stenosis composed of noncalcified plaque.  The remainder of the right coronary and the posterior descending  artery are patent with minimal luminal irregularities.     TECHNIQUE:  Fractional Flow Reserve (FFR) CT was performed offline by [employer] on  the original CTA dataset. Diagrammatic representation of the FFR-CT  analysis is provided in a separate PDF document in PACS/EPIC. See  coronary CT results for detailed anatomic assessment.    "  IMPRESSION:  Hemodynamically significant coronary stenosis in the mid to distal LAD  and distal LCx.     FINDINGS:  1.  LAD: 0.63  2.  LCX: 0.51  3.  RCA: 0.88     Normal FFR range is >0.8.    Coronary Angiogram 6/9/23:    Pre Procedure Diagnosis    stable known CAD       Conclusion          3rd Mrg lesion is 30% stenosed.     1st Diag lesion is 50% stenosed.     Prox RCA to Dist RCA lesion is 40% stenosed.     Prox LAD to Mid LAD lesion is 60% stenosed.     Prox Cx to Dist Cx lesion is 60% stenosed.     Moderate diffuse disease of the LAD and Lcx (Borderline Pd/Pa 0.92, dPR 0.88 for both lesions). Plan for medical management given lack of clinical symptoms.  The lesion are long, moderate and calcified.  Mild non-obstructive RCA disease.         Plan      Follow bedrest per protocol    Continued medical management and lifestyle modifications for cardiovascular risk factor optimizations.    Discharge today per protocol     Recommendations    General Recommendations:  - Patient given specific instructions regarding care of arteriotomy site, activity restrictions, signs and symptoms of cardiac or vascular complications and to seek immediate medical evaluation should they occur.   - Recommended follow up with doctor Dr. Matson.   - Arterial sheath removed from radial artery with TR Band placement.       Medications:  - Continue high dose statin therapy indefinitely.   - Risk factor management for atherosclerosis.     Coronary Findings    Diagnostic  Dominance: Right  Left Main   The vessel was visualized by selective angiography, is moderate in size and is angiographically normal.      Left Anterior Descending   The vessel is moderate in size.   Prox LAD to Mid LAD lesion is 60% stenosed. 60% diffuse LAD disease was visualized on diagnostic angiography. A 6 Fr XB 3.5 GC was used to engage the LM. Heparin was given to keep ACT >250. A 0.014\" Opsens was normalized in the LM and wired past the disease into the distal " "LAD. Pd/Pa was 0.92 and iFR was 0.88; given the diffuse nature of the disease and lack of clinical symptoms, no intervention was performed. Wire was removed and final angiography showed TESSY 3 flow and no evidence of dissection or perforation.      First Diagonal Branch   The vessel is large.   1st Diag lesion is 50% stenosed.      First Septal Branch   The vessel is small and is angiographically normal.      Second Septal Branch   The vessel is small and is angiographically normal.      Left Circumflex   Prox Cx to Dist Cx lesion is 60% stenosed. 60% diffuse LCx disease was visualized on diagnostic angiography. A 6 Fr XB 3.5 GC was used to engage the LM. Heparin was given to keep ACT >250. A 0.014\" Opsens was normalized in the LM and wired pastthe lesion into the distal LCx. Pd/Pa was 0.92 and iFR was 0.88; given the diffuse nature of the disease and lack of clinical symptoms, no intervention was performed. Wire was removed and final angiography showed TESSY 3 flow and no evidence ofdissection or perforation.      First Obtuse Marginal Branch   The vessel is small. There is mild diffuse disease throughout the vessel.      Second Obtuse Marginal Branch   The vessel is small and is angiographically normal.      Third Obtuse Marginal Branch   The vessel is moderate in size.   3rd Mrg lesion is 30% stenosed.      Right Coronary Artery   The vessel was visualized by selective angiography and is moderate in size.   Prox RCA to Dist RCA lesion is 40% stenosed.      Acute Marginal Branch   The vessel is small.      Right Ventricular Branch   The vessel is small.      Right Posterior Descending Artery   The vessel is small and is angiographically normal.      Right Posterior Atrioventricular Artery   The vessel is moderate in size. There is mild diffuse disease throughout the vessel.      First Right Posterolateral Branch   The vessel is small and is angiographically normal.      Second Right Posterolateral Branch   The " vessel is small and is angiographically normal.         Intervention     No interventions have been documented.         Assessment and Plan:   Ms. Urena is a 69 year old female with a PMH of multivessel CAD s/p CABG x2 (LIMA-LAD, SVG-OM, 6/12/24), aortic stenosis s/p AVR (6/2024), HLD, HTN, ascending aortic aneurysm, and T2DM.    # Multivessel CAD s/p CABG x2 (LIMA-LAD, SVG-OM, 6/12/24)  # HLD  Recent angiogram showed moderate diffuse disease of the LAD and Lcx (Borderline Pd/Pa 0.92, dPR 0.88 for both lesions), and mild non-obstructive RCA disease. She underwent CABG x2 with Dr. Nicole on 6/12, post-op course uneventful.   - Continue atorvastatin 40mg daily  - Continue aspirin 81mg daily  - fenofibrate 160mg     # Aortic valve stenosis s/p AVR (6/12/24)  - aspirin 81mg daily    # pSVT  # Paroxysmal atrial fibrillation  Recent ED visit for [employer] alert for AF with HR in 130s, had resolved by the time she got to ED. Tele strips from rehab show PVCs and pAF. Ziopatch shows predominately SR with frequent PACs (11%) burden, PCVs (3% burden).   [id number]= [id number]  - Lopressor 50mg BID - okay to take in later AM if she wants to work out in early AM  - apixaban 5mg BID  - follow-up with EP as scheduled     # Ascending aortic aneurysm  Stable at 3.7 cm on recent echo, prior echo showed 3.9cm.  - annaul echo  - tight BP control <130/90    # HTN, well controlled   Home /80s  - lopressor 50mg BID     # DM2  Most recent A1c 5.8%  - Farxiga 10mg daily  - Metformin 1000mg daily  - Ozempic 0.5mg subcutaneous weekly    Follow up: 1 year  Chart review time today: 5 minutes  Visit time today: 13 minutes  Total time spent today: 18 minutes        Niharika Olivo CNP  General Cardiology   10/16/24        Please do not hesitate to contact me if you have any questions/concerns.     Sincerely,     MIKAEL JULES CNP

## 2024-10-18 DIAGNOSIS — E11.9 TYPE 2 DIABETES MELLITUS WITHOUT COMPLICATION, WITHOUT LONG-TERM CURRENT USE OF INSULIN (H): ICD-10-CM

## 2024-10-18 RX ORDER — LANCETS
EACH MISCELLANEOUS
Qty: 100 EACH | Refills: 3 | Status: SHIPPED | OUTPATIENT
Start: 2024-10-18

## 2024-10-25 ENCOUNTER — OFFICE VISIT (OUTPATIENT)
Dept: BEHAVIORAL HEALTH | Facility: CLINIC | Age: 69
End: 2024-10-25
Payer: COMMERCIAL

## 2024-10-25 DIAGNOSIS — F43.21 ADJUSTMENT DISORDER WITH DEPRESSED MOOD: ICD-10-CM

## 2024-10-25 DIAGNOSIS — F41.1 GAD (GENERALIZED ANXIETY DISORDER): Primary | ICD-10-CM

## 2024-10-25 ASSESSMENT — ANXIETY QUESTIONNAIRES
GAD7 TOTAL SCORE: 2
7. FEELING AFRAID AS IF SOMETHING AWFUL MIGHT HAPPEN: NOT AT ALL
4. TROUBLE RELAXING: NOT AT ALL
1. FEELING NERVOUS, ANXIOUS, OR ON EDGE: SEVERAL DAYS
GAD7 TOTAL SCORE: 2
3. WORRYING TOO MUCH ABOUT DIFFERENT THINGS: NOT AT ALL
IF YOU CHECKED OFF ANY PROBLEMS ON THIS QUESTIONNAIRE, HOW DIFFICULT HAVE THESE PROBLEMS MADE IT FOR YOU TO DO YOUR WORK, TAKE CARE OF THINGS AT HOME, OR GET ALONG WITH OTHER PEOPLE: NOT DIFFICULT AT ALL
7. FEELING AFRAID AS IF SOMETHING AWFUL MIGHT HAPPEN: NOT AT ALL
6. BECOMING EASILY ANNOYED OR IRRITABLE: SEVERAL DAYS
8. IF YOU CHECKED OFF ANY PROBLEMS, HOW DIFFICULT HAVE THESE MADE IT FOR YOU TO DO YOUR WORK, TAKE CARE OF THINGS AT HOME, OR GET ALONG WITH OTHER PEOPLE?: NOT DIFFICULT AT ALL
GAD7 TOTAL SCORE: 2
2. NOT BEING ABLE TO STOP OR CONTROL WORRYING: NOT AT ALL
5. BEING SO RESTLESS THAT IT IS HARD TO SIT STILL: NOT AT ALL

## 2024-10-25 ASSESSMENT — PATIENT HEALTH QUESTIONNAIRE - PHQ9
10. IF YOU CHECKED OFF ANY PROBLEMS, HOW DIFFICULT HAVE THESE PROBLEMS MADE IT FOR YOU TO DO YOUR WORK, TAKE CARE OF THINGS AT HOME, OR GET ALONG WITH OTHER PEOPLE: NOT DIFFICULT AT ALL
SUM OF ALL RESPONSES TO PHQ QUESTIONS 1-9: 2
SUM OF ALL RESPONSES TO PHQ QUESTIONS 1-9: 2

## 2024-10-25 NOTE — PROGRESS NOTES
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 heart)\".  Won noted both her and  are not as active and are more isolated, stating, \"I need to work on being with other people\".   Minh reported feeling \"depressed\" earlier this week and while she's been reluctant in the past, she is now willing to consider psychotropic medications.  Won reported her  and sons have are encouraging her to engage in therapy services and consider psychotropic medication.    Won described herself as life long worrier and \"worried well\", and previous diagnosis of NICOLETTE.  Won first engaged in psychotherapy services over 40 years ago, and has participated in both individual and couples therapy;  approximately 10 episodes of therapy throughout her life. Won reported being prescribed \"12 tablets\" of Valium in  and only took two tabs total.  Except for Won's experience with valium, she has never been prescribed, nor taken, any psychotropic medication  Won denied past/current SI, SIB, SA, psychiatric hospitalizations or safety concerns.  Won denied " past/current problematic alcohol or substance use.     Due to history and current symptoms, Won meet full criteria for NICOLETTE; while Won is also experiencing symptoms of depressed mood, these symptoms are occurring within the context of recent heart surgery, therefore currently diagnosing with Adjustment Disorder with depressed mood; recommending outpatient therapy services and psychotropic medication evaluation (start with PCP).  Won was receptive to psychotherapy services and psychotropic medication evaluation.     7. NINI:    NINI:  Discussed the general effects of drugs and alcohol on health and well-being. Provider educated patient  about the effects of chemical use on their health and well being. Recommendations:  None .     8. Records:   These were reviewed at time of assessment.   Information in this assessment was obtained from the medical record and  provided by patient who is a good historian.    Patient will have open access to their mental health medical record.    9.   Interactive Complexity: No    10. Safety Plan:  Patient denied any current/recent/lifetime history of suicidal ideation and/or behaviors.  No safety plan indicated at this time.     Provider Name/ Credentials:  Shawn Ullrich LICSW, [facility]  October 25, 2024

## 2024-11-02 ASSESSMENT — PATIENT HEALTH QUESTIONNAIRE - PHQ9
SUM OF ALL RESPONSES TO PHQ QUESTIONS 1-9: 3
SUM OF ALL RESPONSES TO PHQ QUESTIONS 1-9: 3
10. IF YOU CHECKED OFF ANY PROBLEMS, HOW DIFFICULT HAVE THESE PROBLEMS MADE IT FOR YOU TO DO YOUR WORK, TAKE CARE OF THINGS AT HOME, OR GET ALONG WITH OTHER PEOPLE: NOT DIFFICULT AT ALL

## 2024-11-07 ENCOUNTER — VIRTUAL VISIT (OUTPATIENT)
Dept: FAMILY MEDICINE | Facility: CLINIC | Age: 69
End: 2024-11-07
Payer: COMMERCIAL

## 2024-11-07 DIAGNOSIS — F43.21 ADJUSTMENT DISORDER WITH DEPRESSED MOOD: ICD-10-CM

## 2024-11-07 DIAGNOSIS — F41.1 GENERALIZED ANXIETY DISORDER: Primary | ICD-10-CM

## 2024-11-07 RX ORDER — FLUOXETINE 10 MG/1
10 CAPSULE ORAL DAILY
Qty: 14 CAPSULE | Refills: 0 | Status: SHIPPED | OUTPATIENT
Start: 2024-11-07

## 2024-11-07 NOTE — PROGRESS NOTES
Won is a 69 year old who is being evaluated via a billable video visit.    How would you like to obtain your AVS? MyChart  If the video visit is dropped, the invitation should be resent by: Text to cell phone: [phone]  Will anyone else be joining your video visit? No      Assessment & Plan     Won was seen today for recheck medication.    Diagnoses and all orders for this visit:    Generalized anxiety disorder  -     FLUoxetine (PROZAC) 10 MG capsule; Take 1 capsule (10 mg) by mouth daily.  -     FLUoxetine (PROZAC) 20 MG capsule; Take 1 capsule (20 mg) by mouth daily.    Adjustment disorder with depressed mood  -     FLUoxetine (PROZAC) 10 MG capsule; Take 1 capsule (10 mg) by mouth daily.  -     FLUoxetine (PROZAC) 20 MG capsule; Take 1 capsule (20 mg) by mouth daily.      After a discussion of risks and benefits, patient agreeable to a trial of fluoxetine for depression/anxiety. Common side effects were discussed and the patient was notified it takes 4-6 weeks to see full impact of this medication. Self cares and the importance of therapy/counseling were also discussed. Patient will follow-up in 4 weeks for a medication check, sooner with any questions, concerns or problems with the medication.     The longitudinal plan of care for the diagnosis(es)/condition(s) as documented were addressed during this visit. Due to the added complexity in care, I will continue to support Won in the subsequent management and with ongoing continuity of care.       Daniele Upton is a 69 year old, presenting for the following health issues:  Recheck Medication (Discuss starting an antidepressant)    Video Start Time: 2:46 PM    HPI     Depression/anxiety intake:  Past history of depression or anxiety:   Retired in February, had heart surgery shortly after this  Struggling to find how to fill her time  Worried about her  and his health  Working on downsizing their household, learning to cook  "again  Notices more depressive symptoms and irritability in the winter    Family noticing she is on edge and not herself    Psychotherapy history: seeing Shai  Previous medication therapy: got 10 Valium many years ago, only took 2    Symptoms of todd: no history    Sleep: \"wonderful\"  Nutrition: Eating consistently, currently on Ozempic. Watching calcium, protein  Exercise: , working out 5x/week. Doing some dance  Stressors: see above  Relationships: son and wife (live across the [place]), son and DIL (live 15 min away), . Has good friends  Employment: retired,  of health insurance companies  Family history:  son - didn't do well on zoloft (sweating). Takes Prozac. Other son is bipolar     Substance use:  Alcohol use: < 2/week  Drug use: none  Tobacco use: none          5/13/2024     3:08 PM 10/25/2024     2:58 PM 11/2/2024     6:44 PM   PHQ   PHQ-9 Total Score 1 2  3    Q9: Thoughts of better off dead/self-harm past 2 weeks Not at all  Not at all  Not at all        Patient-reported           4/18/2024    10:17 AM 5/13/2024     3:09 PM 10/25/2024     3:00 PM   NICOLETTE-7 SCORE   Total Score 3 (minimal anxiety) 4 (minimal anxiety) 2 (minimal anxiety)   Total Score 3 4 2        Patient-reported           Objective           Vitals:  No vitals were obtained today due to virtual visit.    Physical Exam   GENERAL: alert and no distress  EYES: Eyes grossly normal to inspection.  No discharge or erythema, or obvious scleral/conjunctival abnormalities.  RESP: No audible wheeze, cough, or visible cyanosis.    SKIN: Visible skin clear. No significant rash, abnormal pigmentation or lesions.  NEURO: Cranial nerves grossly intact.  Mentation and speech appropriate for age.  PSYCH: Appropriate affect, tone, and pace of words        Video-Visit Details    Type of service:  Video Visit   Video End Time:3:14 PM  Originating Location (pt. Location): Home  Distant Location (provider location):  On-site  Platform " used for Video Visit: Maritza  Signed Electronically by: Nannette Gallagher MD

## 2024-11-07 NOTE — PATIENT INSTRUCTIONS
Start taking fluoxetine 10 mg (1 tablet) every day. Take this daily for 14 days. If you are not experiencing any side effects, increase your dose to 20 mg per day (second prescription).    Follow-up for an in person or telephone visit in 4 weeks for a medication check.

## 2024-11-11 ENCOUNTER — MYC MEDICAL ADVICE (OUTPATIENT)
Dept: FAMILY MEDICINE | Facility: CLINIC | Age: 69
End: 2024-11-11

## 2024-11-11 DIAGNOSIS — M21.611 BILATERAL BUNIONS: Primary | ICD-10-CM

## 2024-11-11 DIAGNOSIS — M21.612 BILATERAL BUNIONS: Primary | ICD-10-CM

## 2024-11-12 ASSESSMENT — ANXIETY QUESTIONNAIRES
2. NOT BEING ABLE TO STOP OR CONTROL WORRYING: SEVERAL DAYS
4. TROUBLE RELAXING: NOT AT ALL
7. FEELING AFRAID AS IF SOMETHING AWFUL MIGHT HAPPEN: NOT AT ALL
5. BEING SO RESTLESS THAT IT IS HARD TO SIT STILL: NOT AT ALL
GAD7 TOTAL SCORE: 4
GAD7 TOTAL SCORE: 4
6. BECOMING EASILY ANNOYED OR IRRITABLE: SEVERAL DAYS
3. WORRYING TOO MUCH ABOUT DIFFERENT THINGS: SEVERAL DAYS
8. IF YOU CHECKED OFF ANY PROBLEMS, HOW DIFFICULT HAVE THESE MADE IT FOR YOU TO DO YOUR WORK, TAKE CARE OF THINGS AT HOME, OR GET ALONG WITH OTHER PEOPLE?: SOMEWHAT DIFFICULT
GAD7 TOTAL SCORE: 4
7. FEELING AFRAID AS IF SOMETHING AWFUL MIGHT HAPPEN: NOT AT ALL
1. FEELING NERVOUS, ANXIOUS, OR ON EDGE: SEVERAL DAYS
IF YOU CHECKED OFF ANY PROBLEMS ON THIS QUESTIONNAIRE, HOW DIFFICULT HAVE THESE PROBLEMS MADE IT FOR YOU TO DO YOUR WORK, TAKE CARE OF THINGS AT HOME, OR GET ALONG WITH OTHER PEOPLE: SOMEWHAT DIFFICULT

## 2024-11-12 ASSESSMENT — PATIENT HEALTH QUESTIONNAIRE - PHQ9
SUM OF ALL RESPONSES TO PHQ QUESTIONS 1-9: 3
10. IF YOU CHECKED OFF ANY PROBLEMS, HOW DIFFICULT HAVE THESE PROBLEMS MADE IT FOR YOU TO DO YOUR WORK, TAKE CARE OF THINGS AT HOME, OR GET ALONG WITH OTHER PEOPLE: NOT DIFFICULT AT ALL
SUM OF ALL RESPONSES TO PHQ QUESTIONS 1-9: 3

## 2024-11-13 ENCOUNTER — OFFICE VISIT (OUTPATIENT)
Dept: BEHAVIORAL HEALTH | Facility: CLINIC | Age: 69
End: 2024-11-13
Payer: COMMERCIAL

## 2024-11-13 ENCOUNTER — PATIENT OUTREACH (OUTPATIENT)
Dept: CARE COORDINATION | Facility: CLINIC | Age: 69
End: 2024-11-13
Payer: COMMERCIAL

## 2024-11-13 DIAGNOSIS — F43.21 ADJUSTMENT DISORDER WITH DEPRESSED MOOD: ICD-10-CM

## 2024-11-13 DIAGNOSIS — F41.1 GAD (GENERALIZED ANXIETY DISORDER): Primary | ICD-10-CM

## 2024-11-13 NOTE — PROGRESS NOTES
[facility]      PATIENT'S NAME: Won Urena  PREFERRED NAME: Won  PRONOUNS:  she/her     MRN: [id number]  : 1955  ADDRESS: [address] [place] [id number]  ACCT. NUMBER:  [id number]  DATE OF SERVICE: 24  START TIME: 2:34 pm   END TIME:  3:06pm  PREFERRED PHONE: [phone]  May we leave a program related message: Yes  EMERGENCY CONTACT: was obtained : Cristhian Urena .  SERVICE MODALITY:  In-person      Behavioral Health Clinician Progress Note   Mental Health & Addiction Services        Patient Name: Won Urena       Service Type:  Individual   Service Location:  Face to Face in Clinic   Session Length: 16 - 37    Attendees: Patient   Service Modality: In-person     [facility] Visit Activities (Refresh list every visit): [facility] Only     Diagnostic Assessment Date: 10/25/24   Treatment Plan Review Date: 25    CGI Review Date: 25  Promis 10 Review Date: 25    Clinical Global Impressions  First:  Considering your total clinical experience with this particular patient population, how severe are the patient's symptoms at this time?: 4 (2024  3:16 PM)    Most recent:  Compared to the patient's condition at the START of treatment, this patient's condition is: 4 (2024  3:16 PM)        DATA:    Extended Session (60+ minutes): No   Interactive Complexity: No   Crisis: No   [facility] Patient: No     Assessments completed prior to visit:   PHQ9:       3/23/2024     3:27 PM 2024    11:22 AM 2024    10:16 AM 2024     3:08 PM 10/25/2024     2:58 PM 2024     6:44 PM 2024     3:44 PM   PHQ-9 SCORE   PHQ-9 Total Score MyChart 6 (Mild depression) 3 (Minimal depression) 2 (Minimal depression) 1 (Minimal depression) 2 (Minimal depression) 3 (Minimal depression) 3 (Minimal depression)   PHQ-9 Total Score 6 3 2 1 2  3  3        Patient-reported     GAD7:       10/21/2022     8:25 AM 3/12/2023     9:48 AM 3/23/2024     3:28 PM  "4/18/2024    10:17 AM 5/13/2024     3:09 PM 10/25/2024     3:00 PM 11/12/2024     3:45 PM   NICOLETTE-7 SCORE   Total Score 0 (minimal anxiety) 2 (minimal anxiety) 6 (mild anxiety) 3 (minimal anxiety) 4 (minimal anxiety) 2 (minimal anxiety) 4 (minimal anxiety)   Total Score 0 2 6 3 4 2  4        Patient-reported     PROMIS 10-Global Health (only subscores and total score):       3/20/2024    10:32 AM 11/13/2024     1:51 PM   PROMIS-10 Scores Only   Global Mental Health Score 15 14    Global Physical Health Score 18 15    PROMIS TOTAL - SUBSCORES 33 29        Patient-reported     \"     Reason for Visit/Presenting Concern:  Anxiety and depressed mood    Current Stressors / Issues:   Won reported continued low mood, anhedonia, decreased concentration, fatigue, decreased motivation, excessive worry, irritability, and \"chest pressure\" (I've had chest pressure since 2015,,,,if I meditate it goes away). Won noted irritability is most prominent at home with her , stating, \"I get annoyed with Hossein and he can't say the right thing\".   Won reported, \"I saw dr Gallagher, she prescribed prozac, and I just got it in the mail\".  Won reported she started the medication this morning.  When inquiring about changes since previous appt,  Won stated she hadn't done a thing and expressed disappointment in herself.  [facility] used reflection to develop discrepancy, by identifying how meeting with PCP, talking about medication, and starting medication is both new and different.  Won responded with change talk, affirming that talking to her doctor and stating medication is new, and stated, \"I feel good that I made the step\".  [facility] continued to use MI interventions to elicit change talk, identify goals, and create treatment plan, Won reported the following:      Goal/sign of success  \"Hossein and my relationship will be better\"  -irritability would decrease and patience would increase  -increased self-motivation for " "exercise:   Current: 30 mins per day, 6 days per week;  weekly  -goals is to maintain current regimen and decrease  to once per month  -\"get it locked in\" and maintain it  -Journal   -\"a little, not a lot\"  -mornings  -reflect on one thing and how to put it into practice  -\"I feel like I need to get organized\"      Therapeutic Interventions:  Motivational Interviewing (MI): Validated patient's thoughts, feelings and experience. Asked open-ended questions to invite patient's self-reflection and self-direction around change and what is important for them in working towards their goals.  Affirmed patient's strengths and abilities. Elicited change talk. Explored discrepancy between patient's values and current state.        Response to treatment interventions:   Patient was receptive to interventions utilized.  Patient was engaged in the therapy process.   Patient's motivation increased.   Patient was able to effectively identify next steps in his/her change process.       Progress on Treatment Objective(s) / Homework:   New Objective established this session - PREPARATION (Decided to change - considering how); Intervened by negotiating a change plan and determining options / strategies for behavior change, identifying triggers, exploring social supports, and working towards setting a date to begin behavior change     Medication Review:   Changes to psychiatric medications, see updated Medication List in EPIC.  ; started medication this morning    Medication Compliance:   Yes     Chemical Use Review:  Substance Use: Chemical use reviewed, no active concerns identified      Tobacco Use: No current tobacco use.       Assessment: Current Emotional / Mental Status (status of significant symptoms):    Risk status (Self / Other harm or suicidal ideation)   Patient denies a history of suicidal ideation, suicide attempts, self-injurious behavior, homicidal ideation, homicidal behavior, and and other safety " concerns   Patient denies current fears or concerns for personal safety.   Patient denies current or recent suicidal ideation or behaviors.   Patient denies current or recent homicidal ideation or behaviors.   Patient denies current or recent self injurious behavior or ideation.   Patient denies other safety concerns.   Recommended that patient call 911 or go to the local ED should there be a change in any of these risk factors      ASSESSMENT:   Mental Status:     Appearance:   Appropriate    Eye Contact:   Good    Psychomotor Behavior: Normal    Attitude:   Cooperative    Orientation:   All   Speech Rate / Production: Normal/ Responsive Normal    Volume:   Normal    Mood:    Anxious  Depressed    Affect:    Worrisome    Thought Content:  Clear    Thought Form:  Coherent  Logical    Insight:    Good          Diagnoses:      NICOLETTE (generalized anxiety disorder)  Adjustment disorder with depressed mood    Collateral Reports Completed:   Routed note to PCP       Plan: (Homework, other):   Patient was provided No indications of CD issues  Patient was given information about behavioral services and encouraged to schedule a follow up appointment with the clinic [facility] in 3 weeks.         Shawn Ullrich LIC, [facility]      _____________________________________________________________________________________________________________________________________        Integrated Primary Care Behavioral Health Treatment Plan    Patient's Name: Won Urena  YOB: 1955    Date of Creation: 11/13/24  Date Treatment Plan Last Reviewed/Revised: 11/13/24    DSM5 Diagnoses: 300.02 (F41.1) Generalized Anxiety Disorder or Adjustment Disorders  309.0 (F43.21) With depressed mood  Psychosocial / Contextual Factors: , , heterosexual female; 2 adult,  sons, grandchild;  has significant health issues  PROMIS (reviewed every 90 days): pt completed on 11/13/24    Cultural Influences that will aid in the  "patient's recovery and enhance resiliency: son with mh issues;  has physical health issues; 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    Anticipated number of session for this episode of care: 8  Anticipation frequency of session: Every other week  Anticipated Duration of each session: 38-52 minutes  Treatment plan will be reviewed in 90 days or when goals have been changed.       MeasurableTreatment Goal(s) related to diagnosis / functional impairment(s)  Goal 1: Patient will reduce anxiety and depressive symptoms by effectively implementing 3 coping skills/strategies.       I will know I've met my goal when: \"I want to do things for myself and not feel bad about it\", be present for her family        Objective #A (Patient Action)    Patient will  exercise 6 days per week    Status: New - Date: 11/13/24      Intervention(s)  Therapist will assign homework to schedule weekly exercise times    Objective #B  Patient will  journal daily  Status: New - Date: 11/13/24      Intervention(s)  Therapist will assign homework to identify and share benefits of journaling .      Objective #C  Patient will  take medications as directed .  Status: New - Date: 11/13/24      Intervention(s)  Therapist will  communicate and collaborate with PCP .        Patient has reviewed and agreed to the above plan.      Shawn G. Ullrich, [facility], [facility] November 13, 2024             "

## 2024-11-18 DIAGNOSIS — M79.672 PAIN IN BOTH FEET: Primary | ICD-10-CM

## 2024-11-18 DIAGNOSIS — M79.671 PAIN IN BOTH FEET: Primary | ICD-10-CM

## 2024-11-18 NOTE — TELEPHONE ENCOUNTER
DIAGNOSIS:   Bilateral bunions [M21.611, M21.612]  - Primary   APPOINTMENT DATE: 11/19/2024   NOTES STATUS DETAILS   OFFICE NOTE from referring provider Internal 11/11/2024 - MY C MESSAGE - Nannette Gallagher MD - [facility]

## 2024-11-19 ENCOUNTER — OFFICE VISIT (OUTPATIENT)
Dept: ORTHOPEDICS | Facility: CLINIC | Age: 69
End: 2024-11-19
Attending: FAMILY MEDICINE
Payer: COMMERCIAL

## 2024-11-19 ENCOUNTER — PRE VISIT (OUTPATIENT)
Dept: ORTHOPEDICS | Facility: CLINIC | Age: 69
End: 2024-11-19

## 2024-11-19 ENCOUNTER — ANCILLARY PROCEDURE (OUTPATIENT)
Dept: GENERAL RADIOLOGY | Facility: CLINIC | Age: 69
End: 2024-11-19
Attending: ORTHOPAEDIC SURGERY
Payer: COMMERCIAL

## 2024-11-19 VITALS — WEIGHT: 173 LBS | BODY MASS INDEX: 27.15 KG/M2 | HEIGHT: 67 IN

## 2024-11-19 DIAGNOSIS — M79.671 PAIN IN BOTH FEET: ICD-10-CM

## 2024-11-19 DIAGNOSIS — R20.2 NUMBNESS AND TINGLING: ICD-10-CM

## 2024-11-19 DIAGNOSIS — M21.611 BILATERAL BUNIONS: ICD-10-CM

## 2024-11-19 DIAGNOSIS — M79.672 PAIN IN BOTH FEET: ICD-10-CM

## 2024-11-19 DIAGNOSIS — R20.0 NUMBNESS AND TINGLING: ICD-10-CM

## 2024-11-19 DIAGNOSIS — M21.612 BILATERAL BUNIONS: ICD-10-CM

## 2024-11-19 DIAGNOSIS — M25.572 PAIN IN JOINT, ANKLE AND FOOT, LEFT: ICD-10-CM

## 2024-11-19 DIAGNOSIS — M25.571 PAIN IN JOINT, ANKLE AND FOOT, RIGHT: Primary | ICD-10-CM

## 2024-11-19 PROCEDURE — 99203 OFFICE O/P NEW LOW 30 MIN: CPT | Performed by: ORTHOPAEDIC SURGERY

## 2024-11-19 NOTE — NURSING NOTE
"Reason For Visit:   Chief Complaint   Patient presents with    Consult     Bilateral bunions referred by Dr. Nannette Gallagher. Pt notes that she uses orthotic for right foot         69 year old  1955      Primary MD: Nannette Gallagher  Ref. MD: Nannette Gallagher      Ht 1.71 m (5' 7.32\")   Wt 78.5 kg (173 lb)   BMI 26.84 kg/m      Pain Assessment  Patient Currently in Pain: Yes  0-10 Pain Scale: 4  Primary Pain Location: Foot (bilateral)  Pain Descriptors: Numbness, Tingling, Shooting            Miles Liliana, ATC    "

## 2024-11-19 NOTE — LETTER
11/19/2024      Won Urena  [address]  [address]  [facility] [address]      Dear Colleague,    Thank you for referring your patient, Won Urena, to the [facility] [place]. Please see a copy of my visit note below.    Chief complaint: Bilateral bunion deformities.  Bilateral foot numbness    Patient is a 69-year-old female who presents today for evaluation of both of her feet.  Patient reports to have some bunion deformities on both of her feet which she is fully aware however the reason for her to visit with us that is the presence of some numbness and tingling.  Patient reports to have a  who has peripheral neuropathy who now is unable to do any ambulation and she is very much concerned about marching in the same direction.  She would like to do what ever he takes to improve the numbness across both of her feet.    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    Today patient presents today for discussion of treatment options and prognosis of her foot numbness.    We reviewed today her past medical and surgical history current medications under allergies    On today's visit he presents as a pleasant female in no apparent distress with a weight of 173 pounds with a BMI of 26.8    On today's exam she presents today with full range of motion of the ankle hindfoot and midfoot joints remains intact skin is intact there is quite limited motion across the first MTP joint without very much acceptable alignment.  Is present on both of her feet.  There are palpable pulses.    Plain x-rays of her feet were reviewed today which are significant for showing a fair amount of osteoarthritis across the first MTP joint with some dorsal osteophyte formation for both of her feet.  Patient does not present with much of a bunion deformity.    Assessment: Bilateral first MTP joint arthritis.  Bilateral  foot numbness    Plan: I discussed with the patient that at this point I would like to proceed with an EMG of bilateral lower extremities to assess for peripheral neuropathy.    On further interview patient also reports to have some numbness along the anterior portion of the lower shin on the right lower leg.    Patient will be contacted via phone with regards to the results.  In the meantime she has no restrictions.  All questions were answered.    TT 30 minutes      Again, thank you for allowing me to participate in the care of your patient.        Sincerely,        Topher Jean MD

## 2024-11-20 NOTE — PROGRESS NOTES
Chief complaint: Bilateral bunion deformities.  Bilateral foot numbness    Patient is a 69-year-old female who presents today for evaluation of both of her feet.  Patient reports to have some bunion deformities on both of her feet which she is fully aware however the reason for her to visit with us that is the presence of some numbness and tingling.  Patient reports to have a  who has peripheral neuropathy who now is unable to do any ambulation and she is very much concerned about marching in the same direction.  She would like to do what ever he takes to improve the numbness across both of her feet.    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    Today patient presents today for discussion of treatment options and prognosis of her foot numbness.    We reviewed today her past medical and surgical history current medications under allergies    On today's visit he presents as a pleasant female in no apparent distress with a weight of 173 pounds with a BMI of 26.8    On today's exam she presents today with full range of motion of the ankle hindfoot and midfoot joints remains intact skin is intact there is quite limited motion across the first MTP joint without very much acceptable alignment.  Is present on both of her feet.  There are palpable pulses.    Plain x-rays of her feet were reviewed today which are significant for showing a fair amount of osteoarthritis across the first MTP joint with some dorsal osteophyte formation for both of her feet.  Patient does not present with much of a bunion deformity.    Assessment: Bilateral first MTP joint arthritis.  Bilateral foot numbness    Plan: I discussed with the patient that at this point I would like to proceed with an EMG of bilateral lower extremities to assess for peripheral neuropathy.    On further interview patient also reports to have some numbness along the anterior  portion of the lower shin on the right lower leg.    Patient will be contacted via phone with regards to the results.  In the meantime she has no restrictions.  All questions were answered.    TT 30 minutes

## 2024-12-11 ENCOUNTER — OFFICE VISIT (OUTPATIENT)
Dept: CARDIOLOGY | Facility: CLINIC | Age: 69
End: 2024-12-11
Payer: COMMERCIAL

## 2024-12-11 VITALS
DIASTOLIC BLOOD PRESSURE: 70 MMHG | SYSTOLIC BLOOD PRESSURE: 124 MMHG | RESPIRATION RATE: 16 BRPM | WEIGHT: 171 LBS | HEIGHT: 67 IN | BODY MASS INDEX: 26.84 KG/M2 | HEART RATE: 53 BPM

## 2024-12-11 DIAGNOSIS — I48.0 PAROXYSMAL ATRIAL FIBRILLATION (H): ICD-10-CM

## 2024-12-11 PROCEDURE — 99214 OFFICE O/P EST MOD 30 MIN: CPT | Performed by: INTERNAL MEDICINE

## 2024-12-11 RX ORDER — AMOXICILLIN 500 MG/1
CAPSULE ORAL
COMMUNITY
Start: 2024-11-21

## 2024-12-11 NOTE — LETTER
12/11/2024    Nannette Gallagher MD  [address], [place]  [facility] [address]    RE: Won Urena       Dear Colleague,     I had the pleasure of seeing Won Urena in the [facility].    HEART CARE ENCOUNTER CONSULTATON NOTE      [facility]  [phone]      Assessment/Recommendations   Assessment/Plan:    Won Urena is a very pleasant 69 year old female with PMH of CAD s/p CABG, aortoic stenosis s/p bioprosthetic AVR, HTN HLPD, DM, paroxysmal atrial fibrillation who presents today to the EP clinic.    Paroxysmal atrial fibrillation  - We reviewed the pathophysiology of atrial fibrillation and management considerations including stroke risk and anticoagulation, rate control, cardioversion, antiarrhythmic drug therapy, and catheter ablation. We discussed atrial fibrillation ablation procedures, anticipated success rates, the potential need for re-do ablation vs addition of anti-arrhythmic drugs, procedural risks (including groin bleeding, tamponade, phrenic or esophageal injury, stroke, pulmonary vein stenosis) and recovery expectations.  - given her symptoms of a stunted heart rate, we will taper and stop metoprolol  - we discussed the ongoing importance of lifestyle modification (maintaining a healthy weight, sleep apnea diagnosis and management, alcohol avoidance) as part of a long term strategy for atrial fibrillation management    2. Anticoagulation  - given no AF since last clinic visit and the fact that she is diligent with her heart rate and rhythm surveillance we will stop her Eliquis today.  - continue to monitor for AF in the future and if she has recurrence we discussed she will have to restart Eliquis again    3. CAD s/p CABG  - stable, no symptoms  - continue current meds    4. Aortic stenosis  - s/p AVR(25mm Inspiris bioprosthetic valve)    5. HTN  - well controlled    Follow up with EP on an as needed basis    Time spent: 30 minutes spent on the  date of the encounter doing chart review, history and exam, documentation and further activities as noted above.    The longitudinal plan of care for the condition(s) below were addressed during this visit. Due to the added complexity in care, I will continue support in the subsequent management of this condition(s) and with the ongoing continuity of care of this condition(s).            History of Present Illness/Subjective    HPI: Won Urena is a very pleasant 69 year old female with PMH of CAD s/p CABG, aortoic stenosis s/p bioprosthetic AVR, HTN HLPD, DM, paroxysmal atrial fibrillation who presents today to the EP clinic.    Won underwent CABG and aortic valve surgery on 12 June 2024.  She presented to the hospital on 25 June 2024 after her Apple Watch reported atrial fibrillation with symptoms of palpitations which lasted for about 2 hours.  Since then she has had a few short lasting episodes of palpitations.    Review of Zio patch shows predominantly sinus rhythm with heart rate ranging 49 to 93 bpm and an average heart rate of 68 bpm.  She is found to have frequent PACs with a burden of around 11%.  Occasional PVCs approximately 3% burden.  Cameron of atrial fibrillation was less than 1% with the longest episode lasting approximately 4 minutes-although review of rhythm strips shows what was read as atrial fibrillation by the monitor is likely short runs of atrial tachycardia.  Review of some of the strips however does show short episodes of atrial fibrillation.      Much of today's visit was spent in discussing pathophysiology of atrial fibrillation and long-term treatment options.  We discussed continuing medical therapy with metoprolol for now given her episodes are short lasting.  We also discussed her need to be on anticoagulation after 3-month treatment for her AVR.  We discussed that she could explore NOACs for anticoagulation as well.    December 2024    Won says she works out daily for  "about 30 minutes but is not able to get her heart rate above 80 when she is on metoprolol. When she skips metoprolol her HR does increase upto 110-120. She has not had any AF since the last clinic visit. She monitors her heart rate via the Apple watch.     Recent Echocardiogram Results (personally reviewed):    Interpretation Summary  Left ventricular size, wall motion and function are normal. The ejection  fraction is > 65%.  Right ventricular function, chamber size, wall motion, and thickness are  normal.  Coronary Bypass Graft x 2, Aortic Valve Replacement with Inspiris Resilia  Aortic Valve size 25mm. The mean gradient across the valve is 9mmHg  IVC diameter and respiratory changes fall into an intermediate range  suggesting an RA pressure of 8 mmHg. No pericardial effusion is present.    Labs below reviewed personally     Physical Examination  Review of Systems   Vitals: /70 (BP Location: Left arm, Patient Position: Sitting, Cuff Size: Adult Regular)   Pulse 53   Resp 16   Ht 1.71 m (5' 7.32\")   Wt 77.6 kg (171 lb)   BMI 26.53 kg/m    BMI= Body mass index is 26.53 kg/m .  Wt Readings from Last 3 Encounters:   12/11/24 77.6 kg (171 lb)   11/19/24 78.5 kg (173 lb)   10/16/24 78.8 kg (173 lb 12.8 oz)       General Appearance:   no distress, normal body habitus   ENT/Mouth: membranes moist, no oral lesions or bleeding gums.      EYES:  no scleral icterus, normal conjunctivae   Neck: no carotid bruits or thyromegaly   Chest/Lungs:   lungs are clear to auscultation, no rales or wheezing, + sternal scar, equal chest wall expansion    Cardiovascular:   Regular. Normal first and second heart sounds with systolic murmur, rubs, or gallops; the carotid, radial and posterior tibial pulses are intact, no edema bilaterally    Abdomen:  no organomegaly, masses, bruits, or tenderness; bowel sounds are present   Extremities: no cyanosis or clubbing   Skin: no xanthelasma, warm.    Neurologic: normal  bilateral, no " tremors     Psychiatric: alert and oriented x3, calm        Please refer above for cardiac ROS details.        Medical History  Surgical History Family History Social History   Past Medical History:   Diagnosis Date     Abnormal Pap smear of cervix      Aortic stenosis      Aortic valve stenosis      Benign colon polyp      CAD (coronary artery disease)      Dermatochalasis of both upper eyelids      Diabetes mellitus, type 2 (H)      Gestational diabetes      HTN (hypertension)      Hyperlipidemia LDL goal <100      Metal foreign body in chest     Retained atrial temporary pacing wire     Nonsenile cataract     Very beginnings     Post-menopausal atrophic vaginitis      Ptosis of both eyelids      Thickened endometrium      Past Surgical History:   Procedure Laterality Date     BYPASS GRAFT ARTERY CORONARY, REPAIR VALVE AORTIC, COMBINED N/A 2024    Procedure: Median Sternotomy, Cardiopulmonary Bypass, Left Internal Mammary Vein Rosine, Endoscopic Rosine of Left Greater Saphenous Vein, Coronary Bypass Graft x 2, Aortic Valve Replacement with Inspiris Resilia Aortic Valve size 25mm, Transesophageal Echocardiogram by Anesthesia;  Surgeon: Mitali Nicole MD;  Location: [facility] OR      SECTION       COMBINED REPAIR PTOSIS WITH BLEPHAROPLASTY Bilateral 2023    Procedure: Both upper eyelid blepharoplasty and ptosis repair;  Surgeon: Shantelle Marti MD;  [place] [facility] [place]     COSMETIC BLEPHAROPLASTY LOWER LIDS BILATERAL Bilateral 2023    Procedure: Both lower eyelid blepharoplasty;  Surgeon: Shantelle Marti MD;  [place] [facility] [place] [place]     CV CORONARY ANGIOGRAM N/A 2023    Procedure: Coronary Angiogram;  Surgeon: Geovanni Ibarra MD;  Location:  HEART CARDIAC CATH LAB     CV CORONARY ANGIOGRAM N/A 2024    Procedure: Coronary Angiogram;  Surgeon: Bassam Flores MD;  Location:  HEART CARDIAC CATH LAB     CV INSTANTANEOUS WAVE-FREE RATIO N/A 2023    Procedure:  Instantaneous Wave-Free Ratio;  Surgeon: Geovanni Ibarra MD;  [facility]     CV RIGHT HEART CATH MEASUREMENTS RECORDED N/A 5/2/2024    Procedure: Right Heart Catheterization;  Surgeon: Bassam Flores MD;  Location: [facility]     Family History   Problem Relation Age of Onset     Lung Cancer Mother         Small cell, did smoked     Diabetes Mother      Hypertension Mother      Goiter Mother      Mental Illness Mother         likely bipolar     Coronary Artery Disease Mother 42        MI, smoked     Hyperlipidemia Mother      Thyroid Disease Mother         Goiter     Obesity Mother         most of family is obese     Glaucoma Father      Atrial fibrillation Father      Hypertension Father      Parkinsonism Father      Hyperlipidemia Father      Diabetes Sister      Breast Cancer Sister 60     Hypertension Sister      Hypertension Brother      Cancer Maternal Grandmother         gynecologic cancer, unknown type     Uterine Cancer Maternal Grandmother      Chronic Obstructive Pulmonary Disease Paternal Grandmother      Uterine Cancer Paternal Grandmother      Stomach Cancer Paternal Grandfather      Colon Cancer Paternal Grandfather      Bipolar Disorder Son      Mental Illness Son      Depression Son      Macular Degeneration No family hx of      Anesthesia Reaction No family hx of      Venous thrombosis No family hx of         Social History     Socioeconomic History     Marital status:      Spouse name: Not on file     Number of children: Not on file     Years of education: Not on file     Highest education level: Not on file   Occupational History     Not on file   Tobacco Use     Smoking status: Never     Passive exposure: Never     Smokeless tobacco: Never   Vaping Use     Vaping status: Never Used   Substance and Sexual Activity     Alcohol use: Yes     Alcohol/week: 1.0 standard drink of alcohol     Types: 1 Standard drinks or equivalent per  week     Comment: less than 2 drinks a week     Drug use: Not Currently     Comment: gummy 1x/month     Sexual activity: Not Currently     Partners: Male     Birth control/protection: Post-menopausal   Other Topics Concern     Not on file   Social History Narrative    Lives with     Two son    One son, daughter-in-law, and grandson live in [place]    One son, daughter-in-law live in the same [facility]    Gan in [place], [place]     Social Drivers of Health     Financial Resource Strain: Low Risk  (9/27/2024)    Financial Resource Strain      Within the past 12 months, have you or your family members you live with been unable to get utilities (heat, electricity) when it was really needed?: No   Food Insecurity: Low Risk  (9/27/2024)    Food Insecurity      Within the past 12 months, did you worry that your food would run out before you got money to buy more?: No      Within the past 12 months, did the food you bought just not last and you didn t have money to get more?: No   Transportation Needs: Low Risk  (9/27/2024)    Transportation Needs      Within the past 12 months, has lack of transportation kept you from medical appointments, getting your medicines, non-medical meetings or appointments, work, or from getting things that you need?: No   Physical Activity: Insufficiently Active (3/8/2024)    Exercise Vital Sign      Days of Exercise per Week: 4 days      Minutes of Exercise per Session: 30 min   Stress: Stress Concern Present (3/8/2024)    [employer] - Occupational Stress Questionnaire      Feeling of Stress : To some extent   Social Connections: Unknown (3/8/2024)    Social Connection and Isolation Panel [NHANES]      Frequency of Communication with Friends and Family: Not on file      Frequency of Social Gatherings with Friends and Family: Twice a week      Attends [employer] Services: Not on file      Active Member of Clubs or Organizations: Not on file       Attends Club or Organization Meetings: Not on file      Marital Status: Not on file   Interpersonal Safety: Low Risk  (10/11/2024)    Interpersonal Safety      Do you feel physically and emotionally safe where you currently live?: Yes      Within the past 12 months, have you been hit, slapped, kicked or otherwise physically hurt by someone?: No      Within the past 12 months, have you been humiliated or emotionally abused in other ways by your partner or ex-partner?: No   Housing Stability: Low Risk  (9/27/2024)    Housing Stability      Do you have housing? : Yes      Are you worried about losing your housing?: No           Medications  Allergies   Current Outpatient Medications   Medication Sig Dispense Refill     acetaminophen (TYLENOL) 325 MG tablet Take 3 tablets (975 mg) by mouth every 6 hours as needed for pain (mild to moderate pain) 100 tablet 0     amoxicillin (AMOXIL) 500 MG capsule TAKE 4 CAPSULES (2,000 MG) BY MOUTH ONCE FOR 1 DOSE. PRIOR TO DENTAL PROCEDURES       aspirin 81 MG EC tablet Take 81 mg by mouth every evening       atorvastatin (LIPITOR) 40 MG tablet Take 40 mg by mouth every evening       blood glucose (ACCU-CHEK GUIDE) test strip Use to test blood sugar one time daily or as directed. 100 strip 11     blood glucose monitoring (SOFTCLIX) lancets Use to test blood sugar 1 time daily or as directed. 100 each 3     carboxymethylcellulose PF (REFRESH PLUS) 0.5 % ophthalmic solution Place 1 drop into both eyes 4 times daily 70 each 0     cyanocobalamin (VITAMIN B-12) 100 MCG tablet Take 100 mcg by mouth every evening       dapagliflozin (FARXIGA) 10 MG TABS tablet Take 1 tablet (10 mg) by mouth every morning 100 tablet 1     estradiol (ESTRACE) 0.1 MG/GM vaginal cream Place vaginally twice a week. 1/2 applicator twice weekly into the vagina 85 g 3     fenofibrate (TRIGLIDE/LOFIBRA) 160 MG tablet Take 1 tablet (160 mg) by mouth every evening 100 tablet 3     FLUoxetine (PROZAC) 20 MG capsule Take  "1 capsule (20 mg) by mouth daily. 30 capsule 0     gabapentin (NEURONTIN) 100 MG capsule Take 1 capsule (100 mg) by mouth at bedtime. 200 capsule 3     levocetirizine (XYZAL) 5 MG tablet Take 5 mg by mouth every evening       metFORMIN (GLUCOPHAGE) 500 MG tablet Take 2 tablets (1,000 mg) by mouth 2 times daily (with meals). 360 tablet 3     metoprolol tartrate (LOPRESSOR) 25 MG tablet Take 2 tablets (50 mg) by mouth 2 times daily 360 tablet 1     mometasone (NASONEX) 50 MCG/ACT nasal spray Spray 2 sprays into both nostrils daily 17 g 0     montelukast (SINGULAIR) 10 MG tablet Take 1 tablet (10 mg) by mouth at bedtime 100 tablet 2     multivitamin w/minerals (MULTI-VITAMIN) tablet Take 1 tablet by mouth every evening       Semaglutide, 1 MG/DOSE, (OZEMPIC, 1 MG/DOSE,) 4 MG/3ML pen INJECT SUBCUTANEOUSLY 1 MG  EVERY WEEK 9 mL 2     Vitamin D, Cholecalciferol, 10 MCG (400 UNIT) TABS Take 1 tablet by mouth every evening         Allergies   Allergen Reactions     Avapro [Irbesartan] Cough     Fluticasone Other (See Comments)     Loss of sense of taste          Lab Results    Chemistry/lipid CBC Cardiac Enzymes/BNP/TSH/INR   Recent Labs   Lab Test 05/16/24  1132   CHOL 137   HDL 53   LDL 71   TRIG 67     Recent Labs   Lab Test 05/16/24  1132 01/13/24  0826   LDL 71 59  64     Recent Labs   Lab Test 06/25/24  1652      POTASSIUM 4.1   CHLORIDE 105   CO2 24   *   BUN 18.3   CR 0.63   GFRESTIMATED >90   JOAQUINA 9.6     Recent Labs   Lab Test 06/25/24  1652 06/19/24  1039 06/17/24  0602   CR 0.63 0.74 0.53     Recent Labs   Lab Test 03/15/24  0846 09/15/23  1457 04/24/23  0829   A1C 5.8* 6.3* 5.7*          Recent Labs   Lab Test 06/25/24  1652   WBC 6.8   HGB 11.0*   HCT 33.5*   MCV 92        Recent Labs   Lab Test 06/25/24  1652 06/19/24  1039 06/17/24  1203   HGB 11.0* 11.8 9.8*    No results for input(s): \"TROPONINI\" in the last 90108 hours.  Recent Labs   Lab Test 04/25/24  1126   NTBNP 198     Recent Labs "   Lab Test 03/23/24  1317   TSH 0.43     Recent Labs   Lab Test 07/30/24  1239 07/19/24  1226 07/15/24  0949   INR 2.05* 1.72* 1.86*        Driss Garza MD                                        Thank you for allowing me to participate in the care of your patient.      Sincerely,     Driss Garza MD     [facility]  cc:   Driss Garza MD  [address] [place] [id number]

## 2024-12-11 NOTE — PATIENT INSTRUCTIONS
[facility]  Cardiac Electrophysiology  [address]  [place] [address]   Office: [phone]  Fax: [phone]       Thank you for seeing us in clinic today - it is a pleasure to be a part of your care team.  Below is a summary of our plan from today's visit.      Take metoprolol 25 mg twice daily for 2 weeks  Then take 12.5 mg twice daily for 2 weeks and then stop  Stop Eliquis  Follow up with EP on an as needed basis    Please do not hesitate to be in touch with our office at [phone] with any questions that may arise.      Thank you for trusting us with your care,    Driss Garza MD  Clinical Cardiac Electrophysiology  [facility]  [address]  [place] [address]   Office: [phone]  Fax: [phone]

## 2024-12-11 NOTE — PROGRESS NOTES
HEART CARE ENCOUNTER CONSULTATON NOTE      [facility]  [phone]      Assessment/Recommendations   Assessment/Plan:    Won Urena is a very pleasant 69 year old female with PMH of CAD s/p CABG, aortoic stenosis s/p bioprosthetic AVR, HTN HLPD, DM, paroxysmal atrial fibrillation who presents today to the EP clinic.    Paroxysmal atrial fibrillation  - We reviewed the pathophysiology of atrial fibrillation and management considerations including stroke risk and anticoagulation, rate control, cardioversion, antiarrhythmic drug therapy, and catheter ablation. We discussed atrial fibrillation ablation procedures, anticipated success rates, the potential need for re-do ablation vs addition of anti-arrhythmic drugs, procedural risks (including groin bleeding, tamponade, phrenic or esophageal injury, stroke, pulmonary vein stenosis) and recovery expectations.  - given her symptoms of a stunted heart rate, we will taper and stop metoprolol  - we discussed the ongoing importance of lifestyle modification (maintaining a healthy weight, sleep apnea diagnosis and management, alcohol avoidance) as part of a long term strategy for atrial fibrillation management    2. Anticoagulation  - given no AF since last clinic visit and the fact that she is diligent with her heart rate and rhythm surveillance we will stop her Eliquis today.  - continue to monitor for AF in the future and if she has recurrence we discussed she will have to restart Eliquis again    3. CAD s/p CABG  - stable, no symptoms  - continue current meds    4. Aortic stenosis  - s/p AVR(25mm Inspiris bioprosthetic valve)    5. HTN  - well controlled    Follow up with EP on an as needed basis    Time spent: 30 minutes spent on the date of the encounter doing chart review, history and exam, documentation and further activities as noted above.    The longitudinal plan of care for the condition(s) below were addressed during this visit. Due to  the added complexity in care, I will continue support in the subsequent management of this condition(s) and with the ongoing continuity of care of this condition(s).            History of Present Illness/Subjective    HPI: Won Urena is a very pleasant 69 year old female with PMH of CAD s/p CABG, aortoic stenosis s/p bioprosthetic AVR, HTN HLPD, DM, paroxysmal atrial fibrillation who presents today to the EP clinic.    Won underwent CABG and aortic valve surgery on 12 June 2024.  She presented to the hospital on 25 June 2024 after her Apple Watch reported atrial fibrillation with symptoms of palpitations which lasted for about 2 hours.  Since then she has had a few short lasting episodes of palpitations.    Review of Zio patch shows predominantly sinus rhythm with heart rate ranging 49 to 93 bpm and an average heart rate of 68 bpm.  She is found to have frequent PACs with a burden of around 11%.  Occasional PVCs approximately 3% burden.  [place]argo of atrial fibrillation was less than 1% with the longest episode lasting approximately 4 minutes-although review of rhythm strips shows what was read as atrial fibrillation by the monitor is likely short runs of atrial tachycardia.  Review of some of the strips however does show short episodes of atrial fibrillation.      Much of today's visit was spent in discussing pathophysiology of atrial fibrillation and long-term treatment options.  We discussed continuing medical therapy with metoprolol for now given her episodes are short lasting.  We also discussed her need to be on anticoagulation after 3-month treatment for her AVR.  We discussed that she could explore NOACs for anticoagulation as well.    December 2024    Won says she works out daily for about 30 minutes but is not able to get her heart rate above 80 when she is on metoprolol. When she skips metoprolol her HR does increase upto 110-120. She has not had any AF since the last clinic visit. She monitors  "her heart rate via the Apple watch.     Recent Echocardiogram Results (personally reviewed):    Interpretation Summary  Left ventricular size, wall motion and function are normal. The ejection  fraction is > 65%.  Right ventricular function, chamber size, wall motion, and thickness are  normal.  Coronary Bypass Graft x 2, Aortic Valve Replacement with Inspiris Resilia  Aortic Valve size 25mm. The mean gradient across the valve is 9mmHg  IVC diameter and respiratory changes fall into an intermediate range  suggesting an RA pressure of 8 mmHg. No pericardial effusion is present.    Labs below reviewed personally     Physical Examination  Review of Systems   Vitals: /70 (BP Location: Left arm, Patient Position: Sitting, Cuff Size: Adult Regular)   Pulse 53   Resp 16   Ht 1.71 m (5' 7.32\")   Wt 77.6 kg (171 lb)   BMI 26.53 kg/m    BMI= Body mass index is 26.53 kg/m .  Wt Readings from Last 3 Encounters:   12/11/24 77.6 kg (171 lb)   11/19/24 78.5 kg (173 lb)   10/16/24 78.8 kg (173 lb 12.8 oz)       General Appearance:   no distress, normal body habitus   ENT/Mouth: membranes moist, no oral lesions or bleeding gums.      EYES:  no scleral icterus, normal conjunctivae   Neck: no carotid bruits or thyromegaly   Chest/Lungs:   lungs are clear to auscultation, no rales or wheezing, + sternal scar, equal chest wall expansion    Cardiovascular:   Regular. Normal first and second heart sounds with systolic murmur, rubs, or gallops; the carotid, radial and posterior tibial pulses are intact, no edema bilaterally    Abdomen:  no organomegaly, masses, bruits, or tenderness; bowel sounds are present   Extremities: no cyanosis or clubbing   Skin: no xanthelasma, warm.    Neurologic: normal  bilateral, no tremors     Psychiatric: alert and oriented x3, calm        Please refer above for cardiac ROS details.        Medical History  Surgical History Family History Social History   Past Medical History:   Diagnosis Date "    Abnormal Pap smear of cervix     Aortic stenosis     Aortic valve stenosis     Benign colon polyp     CAD (coronary artery disease)     Dermatochalasis of both upper eyelids     Diabetes mellitus, type 2 (H)     Gestational diabetes     HTN (hypertension)     Hyperlipidemia LDL goal <100     Metal foreign body in chest     Retained atrial temporary pacing wire    Nonsenile cataract     Very beginnings    Post-menopausal atrophic vaginitis     Ptosis of both eyelids     Thickened endometrium      Past Surgical History:   Procedure Laterality Date    BYPASS GRAFT ARTERY CORONARY, REPAIR VALVE AORTIC, COMBINED N/A 2024    Procedure: Median Sternotomy, Cardiopulmonary Bypass, Left Internal Mammary Vein Pierce, Endoscopic Pierce of Left Greater Saphenous Vein, Coronary Bypass Graft x 2, Aortic Valve Replacement with Inspiris Resilia Aortic Valve size 25mm, Transesophageal Echocardiogram by Anesthesia;  Surgeon: Mitali Nicole MD;  Location: [facility] OR     SECTION      COMBINED REPAIR PTOSIS WITH BLEPHAROPLASTY Bilateral 2023    Procedure: Both upper eyelid blepharoplasty and ptosis repair;  Surgeon: Shantelle Marti MD;  Location: [facility] OR    COSMETIC BLEPHAROPLASTY LOWER LIDS BILATERAL Bilateral 2023    Procedure: Both lower eyelid blepharoplasty;  Surgeon: Shantelle Marti MD;  Location: [facility] OR    CV CORONARY ANGIOGRAM N/A 2023    Procedure: Coronary Angiogram;  Surgeon: Geovanni Ibarra MD;  Location:  HEART CARDIAC CATH LAB    CV CORONARY ANGIOGRAM N/A 2024    Procedure: Coronary Angiogram;  Surgeon: Bassam Flores MD;  Location:  HEART CARDIAC CATH LAB    CV INSTANTANEOUS WAVE-FREE RATIO N/A 2023    Procedure: Instantaneous Wave-Free Ratio;  Surgeon: Geovanni Ibarra MD;  Location:  HEART CARDIAC CATH LAB    CV RIGHT HEART CATH MEASUREMENTS RECORDED N/A 2024    Procedure: Right Heart Catheterization;  Surgeon: Bassam Flores  MD Maggie;  Location:  HEART CARDIAC CATH LAB     Family History   Problem Relation Age of Onset    Lung Cancer Mother         Small cell, did smoked    Diabetes Mother     Hypertension Mother     Goiter Mother     Mental Illness Mother         likely bipolar    Coronary Artery Disease Mother 42        MI, smoked    Hyperlipidemia Mother     Thyroid Disease Mother         Goiter    Obesity Mother         most of family is obese    Glaucoma Father     Atrial fibrillation Father     Hypertension Father     Parkinsonism Father     Hyperlipidemia Father     Diabetes Sister     Breast Cancer Sister 60    Hypertension Sister     Hypertension Brother     Cancer Maternal Grandmother         gynecologic cancer, unknown type    Uterine Cancer Maternal Grandmother     Chronic Obstructive Pulmonary Disease Paternal Grandmother     Uterine Cancer Paternal Grandmother     Stomach Cancer Paternal Grandfather     Colon Cancer Paternal Grandfather     Bipolar Disorder Son     Mental Illness Son     Depression Son     Macular Degeneration No family hx of     Anesthesia Reaction No family hx of     Venous thrombosis No family hx of         Social History     Socioeconomic History    Marital status:      Spouse name: Not on file    Number of children: Not on file    Years of education: Not on file    Highest education level: Not on file   Occupational History    Not on file   Tobacco Use    Smoking status: Never     Passive exposure: Never    Smokeless tobacco: Never   Vaping Use    Vaping status: Never Used   Substance and Sexual Activity    Alcohol use: Yes     Alcohol/week: 1.0 standard drink of alcohol     Types: 1 Standard drinks or equivalent per week     Comment: less than 2 drinks a week    Drug use: Not Currently     Comment: gummy 1x/month    Sexual activity: Not Currently     Partners: Male     Birth control/protection: Post-menopausal   Other Topics Concern    Not on file   Social History Narrative    Lives  with     Two son    One son, daughter-in-law, and grandson live in [place]    One son, daughter-in-law live in the same [facility]    [place] in [place], [place]     Social Drivers of Health     Financial Resource Strain: Low Risk  (9/27/2024)    Financial Resource Strain     Within the past 12 months, have you or your family members you live with been unable to get utilities (heat, electricity) when it was really needed?: No   Food Insecurity: Low Risk  (9/27/2024)    Food Insecurity     Within the past 12 months, did you worry that your food would run out before you got money to buy more?: No     Within the past 12 months, did the food you bought just not last and you didn t have money to get more?: No   Transportation Needs: Low Risk  (9/27/2024)    Transportation Needs     Within the past 12 months, has lack of transportation kept you from medical appointments, getting your medicines, non-medical meetings or appointments, work, or from getting things that you need?: No   Physical Activity: Insufficiently Active (3/8/2024)    Exercise Vital Sign     Days of Exercise per Week: 4 days     Minutes of Exercise per Session: 30 min   Stress: Stress Concern Present (3/8/2024)    [employer] - Occupational Stress Questionnaire     Feeling of Stress : To some extent   Social Connections: Unknown (3/8/2024)    Social Connection and Isolation Panel [NHANES]     Frequency of Communication with Friends and Family: Not on file     Frequency of Social Gatherings with Friends and Family: Twice a week     Attends [employer] Services: Not on file     Active Member of Clubs or Organizations: Not on file     Attends Club or Organization Meetings: Not on file     Marital Status: Not on file   Interpersonal Safety: Low Risk  (10/11/2024)    Interpersonal Safety     Do you feel physically and emotionally safe where you currently live?: Yes     Within the past 12 months, have you been hit,  slapped, kicked or otherwise physically hurt by someone?: No     Within the past 12 months, have you been humiliated or emotionally abused in other ways by your partner or ex-partner?: No   Housing Stability: Low Risk  (9/27/2024)    Housing Stability     Do you have housing? : Yes     Are you worried about losing your housing?: No           Medications  Allergies   Current Outpatient Medications   Medication Sig Dispense Refill    acetaminophen (TYLENOL) 325 MG tablet Take 3 tablets (975 mg) by mouth every 6 hours as needed for pain (mild to moderate pain) 100 tablet 0    amoxicillin (AMOXIL) 500 MG capsule TAKE 4 CAPSULES (2,000 MG) BY MOUTH ONCE FOR 1 DOSE. PRIOR TO DENTAL PROCEDURES      aspirin 81 MG EC tablet Take 81 mg by mouth every evening      atorvastatin (LIPITOR) 40 MG tablet Take 40 mg by mouth every evening      blood glucose (ACCU-CHEK GUIDE) test strip Use to test blood sugar one time daily or as directed. 100 strip 11    blood glucose monitoring (SOFTCLIX) lancets Use to test blood sugar 1 time daily or as directed. 100 each 3    carboxymethylcellulose PF (REFRESH PLUS) 0.5 % ophthalmic solution Place 1 drop into both eyes 4 times daily 70 each 0    cyanocobalamin (VITAMIN B-12) 100 MCG tablet Take 100 mcg by mouth every evening      dapagliflozin (FARXIGA) 10 MG TABS tablet Take 1 tablet (10 mg) by mouth every morning 100 tablet 1    estradiol (ESTRACE) 0.1 MG/GM vaginal cream Place vaginally twice a week. 1/2 applicator twice weekly into the vagina 85 g 3    fenofibrate (TRIGLIDE/LOFIBRA) 160 MG tablet Take 1 tablet (160 mg) by mouth every evening 100 tablet 3    FLUoxetine (PROZAC) 20 MG capsule Take 1 capsule (20 mg) by mouth daily. 30 capsule 0    gabapentin (NEURONTIN) 100 MG capsule Take 1 capsule (100 mg) by mouth at bedtime. 200 capsule 3    levocetirizine (XYZAL) 5 MG tablet Take 5 mg by mouth every evening      metFORMIN (GLUCOPHAGE) 500 MG tablet Take 2 tablets (1,000 mg) by mouth 2  "times daily (with meals). 360 tablet 3    metoprolol tartrate (LOPRESSOR) 25 MG tablet Take 2 tablets (50 mg) by mouth 2 times daily 360 tablet 1    mometasone (NASONEX) 50 MCG/ACT nasal spray Spray 2 sprays into both nostrils daily 17 g 0    montelukast (SINGULAIR) 10 MG tablet Take 1 tablet (10 mg) by mouth at bedtime 100 tablet 2    multivitamin w/minerals (MULTI-VITAMIN) tablet Take 1 tablet by mouth every evening      Semaglutide, 1 MG/DOSE, (OZEMPIC, 1 MG/DOSE,) 4 MG/3ML pen INJECT SUBCUTANEOUSLY 1 MG  EVERY WEEK 9 mL 2    Vitamin D, Cholecalciferol, 10 MCG (400 UNIT) TABS Take 1 tablet by mouth every evening         Allergies   Allergen Reactions    Avapro [Irbesartan] Cough    Fluticasone Other (See Comments)     Loss of sense of taste          Lab Results    Chemistry/lipid CBC Cardiac Enzymes/BNP/TSH/INR   Recent Labs   Lab Test 05/16/24  1132   CHOL 137   HDL 53   LDL 71   TRIG 67     Recent Labs   Lab Test 05/16/24  1132 01/13/24  0826   LDL 71 59  64     Recent Labs   Lab Test 06/25/24  1652      POTASSIUM 4.1   CHLORIDE 105   CO2 24   *   BUN 18.3   CR 0.63   GFRESTIMATED >90   JOAQUINA 9.6     Recent Labs   Lab Test 06/25/24  1652 06/19/24  1039 06/17/24  0602   CR 0.63 0.74 0.53     Recent Labs   Lab Test 03/15/24  0846 09/15/23  1457 04/24/23  0829   A1C 5.8* 6.3* 5.7*          Recent Labs   Lab Test 06/25/24  1652   WBC 6.8   HGB 11.0*   HCT 33.5*   MCV 92        Recent Labs   Lab Test 06/25/24  1652 06/19/24  1039 06/17/24  1203   HGB 11.0* 11.8 9.8*    No results for input(s): \"TROPONINI\" in the last 95252 hours.  Recent Labs   Lab Test 04/25/24  1126   NTBNP 198     Recent Labs   Lab Test 03/23/24  1317   TSH 0.43     Recent Labs   Lab Test 07/30/24  1239 07/19/24  1226 07/15/24  0949   INR 2.05* 1.72* 1.86*        Driss Garza MD                                      "

## 2024-12-14 ENCOUNTER — MYC MEDICAL ADVICE (OUTPATIENT)
Dept: CARDIOLOGY | Facility: CLINIC | Age: 69
End: 2024-12-14
Payer: COMMERCIAL

## 2024-12-24 DIAGNOSIS — E11.9 TYPE 2 DIABETES MELLITUS WITHOUT COMPLICATION, WITHOUT LONG-TERM CURRENT USE OF INSULIN (H): ICD-10-CM

## 2024-12-30 ENCOUNTER — TELEPHONE (OUTPATIENT)
Dept: NEUROLOGY | Facility: CLINIC | Age: 69
End: 2024-12-30
Payer: COMMERCIAL

## 2024-12-30 NOTE — TELEPHONE ENCOUNTER
Patient confirmed scheduled appointment:  Date: 1/9/2025  Time: 9:30 am  Visit type: EMG  Provider: Maximiliano  Location: [facility]  Testing/imaging: NA  Additional notes: Pt on waitlist     Lisa Vu on 12/30/2024 at 10:13 AM

## 2025-01-03 NOTE — PROGRESS NOTES
[facility]      PATIENT'S NAME: Won Urena  PREFERRED NAME: Won  PRONOUNS:  she/her     MRN: [id number]  : 1955  ADDRESS: [address] [place] [id number]  ACCT. NUMBER:  [id number]  DATE OF SERVICE: 25  PREFERRED PHONE: [phone]  May we leave a program related message: Yes  EMERGENCY CONTACT: was obtained : Cristhian Urena   SERVICE MODALITY:  In-person    Behavioral Health Clinician Progress Note       Service Type:  Individual   Service Location:  Face to Face in Clinic   START TIME:   11:35 am   END TIME:    12:05 pm  Session Length: 16 - 37    Attendees: Patient   Service Modality: In-person     [facility] Visit Activities (Refresh list every visit): [facility] Only     Diagnostic Assessment Date: 10/25/24   Treatment Plan Review Date: 25    CGI Review Date: 25  Promis 10 Review Date: 25    Clinical Global Impressions  First:  Considering your total clinical experience with this particular patient population, how severe are the patient's symptoms at this time?: 4 (2024  3:16 PM)    Most recent:  Compared to the patient's condition at the START of treatment, this patient's condition is: 4 (2024  3:16 PM)        DATA:    Extended Session (60+ minutes): No   Interactive Complexity: No   Crisis: No   [facility] Patient: No     Assessments completed prior to visit:   PHQ9:       2024    10:16 AM 2024     3:08 PM 10/25/2024     2:58 PM 2024     6:44 PM 2024     3:44 PM 2024     2:20 PM 2025     2:10 PM   PHQ-9 SCORE   PHQ-9 Total Score MyChart 2 (Minimal depression) 1 (Minimal depression) 2 (Minimal depression) 3 (Minimal depression) 3 (Minimal depression) 3 (Minimal depression) 3 (Minimal depression)   PHQ-9 Total Score 2 1 2  3  3  3  3        Patient-reported     GAD7:       3/23/2024     3:28 PM 2024    10:17 AM 2024     3:09 PM 10/25/2024     3:00 PM 2024     3:45 PM 2024     2:22  "PM 1/6/2025     2:11 PM   NICOLETTE-7 SCORE   Total Score 6 (mild anxiety) 3 (minimal anxiety) 4 (minimal anxiety) 2 (minimal anxiety) 4 (minimal anxiety) 1 (minimal anxiety) 3 (minimal anxiety)   Total Score 6 3 4 2  4  1  3        Patient-reported     PROMIS 10-Global Health (only subscores and total score):       3/20/2024    10:32 AM 11/13/2024     1:51 PM   PROMIS-10 Scores Only   Global Mental Health Score 15 14    Global Physical Health Score 18 15    PROMIS TOTAL - SUBSCORES 33 29        Patient-reported     \"     Reason for Visit/Presenting Concern:  Anxiety and depressed mood    Current Stressors / Issues:     Won reported both the holidays and 's recent surgery went well.  Won also reported medial providers switched 's pain medication from fetanyl patches to buprenorphine (still prescribed \"oxy\"), which has led to her  feeling \"more clear headed and more enthused\".   [facility] used MI and Solution Focused interventions to reflect positive changes, elicit change talk, and prompt identification of coping skills she's used that have contributed to improved mood/reduced symptoms.  Won responded, \"I'm exercising\", still meeting with , and taking medication as prescribed.  Won stated, \"I certainly think its (the medication) been a big help\" and its helping with the \"steadiness\" and \"irritability\".  Won reported the positive strategies and developments above have resulted in \"I'm doing the things we need to do\", more engagement and connection with her , she's doing a better job keeping the home picked up, and she's more optimistic about the future (ie travel and 's health).    Due to progress made, Won declined to schedule another [facility] appt at this time, but will return to [facility] services in the future if needed.         Therapeutic Interventions:  Motivational Interviewing (MI): Validated patient's thoughts, feelings and experience. Asked open-ended questions to " invite patient's self-reflection and self-direction around change and what is important for them in working towards their goals.  Affirmed patient's strengths and abilities. Elicited change talk.    Solution-Focused Therapy  Explore patterns in patient's behaviors and relationships and discuss options for new behaviors  Explore new options for problem-solving, communication, managing stress, etc.    Response to treatment interventions:   Patient was receptive to interventions utilized.  Patient was engaged in the therapy process.   Patient's motivation increased.   Patient was able to effectively identify next steps in his/her change process.       Progress on Treatment Objective(s) / Homework:   Satisfactory progress - ACTION (Actively working towards change); Intervened by reinforcing change plan / affirming steps taken     Medication Review:   No changes to current psychiatric medication(s) ;    Medication Compliance:   Yes     Chemical Use Review:  Substance Use: Chemical use reviewed, no active concerns identified      Tobacco Use: No current tobacco use.       Assessment: Current Emotional / Mental Status (status of significant symptoms):    Risk status (Self / Other harm or suicidal ideation)   Patient denies a history of suicidal ideation, suicide attempts, self-injurious behavior, homicidal ideation, homicidal behavior, and and other safety concerns   Patient denies current fears or concerns for personal safety.   Patient denies current or recent suicidal ideation or behaviors.   Patient denies current or recent homicidal ideation or behaviors.   Patient denies current or recent self injurious behavior or ideation.   Patient denies other safety concerns.   Recommended that patient call 911 or go to the local ED should there be a change in any of these risk factors      ASSESSMENT:   Mental Status:     Appearance:   Appropriate    Eye Contact:   Good    Psychomotor Behavior: Normal    Attitude:   Cooperative     Orientation:   All   Speech Rate / Production: Normal/ Responsive Normal    Volume:   Normal    Mood:    Anxious    Affect:    Congruent with mood    Thought Content:  Clear    Thought Form:  Coherent  Logical    Insight:    Good          Diagnoses:   NICOLETTE (generalized anxiety disorder)    Collateral Reports Completed:   Routed note to PCP       Plan: (Homework, other):   Patient was provided No indications of CD issues  Patient was given information about behavioral services and encouraged to schedule a follow up appointment with the clinic [facility] as needed.         Shawn Ullrich [facility], [facility]      _____________________________________________________________________________________________________________________________________        Integrated Primary Care Behavioral Health Treatment Plan    Patient's Name: Won Urena  YOB: 1955    Date of Creation: 11/13/24  Date Treatment Plan Last Reviewed/Revised: 11/13/24    DSM5 Diagnoses: 300.02 (F41.1) Generalized Anxiety Disorder or Adjustment Disorders  309.0 (F43.21) With depressed mood  Psychosocial / Contextual Factors: , , heterosexual female; 2 adult,  sons, grandchild;  has significant health issues  PROMIS (reviewed every 90 days): pt completed on 11/13/24    Cultural Influences that will aid in the patient's recovery and enhance resiliency: son with mh issues;  has physical health issues; 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    Anticipated number of session for this episode of care: 8  Anticipation frequency of session: Every other week  Anticipated Duration of each session: 38-52 minutes  Treatment plan will be reviewed in 90 days or when goals have been changed.       MeasurableTreatment Goal(s) related to diagnosis / functional impairment(s)  Goal 1: Patient will reduce anxiety and depressive symptoms by effectively implementing 3 coping skills/strategies.       I will know I've met my goal when: \"I want to do things for myself and not feel bad about it\", be present for her family        Objective #A (Patient Action)    Patient will  exercise 6 days per week    Status: New - Date: 11/13/24      Intervention(s)  Therapist will assign homework to schedule weekly exercise times    Objective #B  Patient will  journal daily  Status: New - Date: 11/13/24      Intervention(s)  Therapist will assign homework to identify and share benefits of journaling .      Objective #C  Patient will  take medications as directed .  Status: New - Date: 11/13/24      Intervention(s)  Therapist will  communicate and collaborate with PCP .        Patient has reviewed and agreed to the above plan.      Shawn G. Ullrich, [facility], [facility] November 13, 2024             "

## 2025-01-06 ENCOUNTER — MYC REFILL (OUTPATIENT)
Dept: FAMILY MEDICINE | Facility: CLINIC | Age: 70
End: 2025-01-06

## 2025-01-06 DIAGNOSIS — E11.9 TYPE 2 DIABETES MELLITUS WITHOUT COMPLICATION, WITHOUT LONG-TERM CURRENT USE OF INSULIN (H): ICD-10-CM

## 2025-01-06 DIAGNOSIS — F43.21 ADJUSTMENT DISORDER WITH DEPRESSED MOOD: ICD-10-CM

## 2025-01-06 DIAGNOSIS — Z78.0 ASYMPTOMATIC POSTMENOPAUSAL STATUS: Primary | ICD-10-CM

## 2025-01-06 DIAGNOSIS — J30.1 SEASONAL ALLERGIC RHINITIS DUE TO POLLEN: Primary | ICD-10-CM

## 2025-01-06 DIAGNOSIS — N95.8 GENITOURINARY SYNDROME OF MENOPAUSE: ICD-10-CM

## 2025-01-06 DIAGNOSIS — F41.1 GENERALIZED ANXIETY DISORDER: ICD-10-CM

## 2025-01-06 RX ORDER — ESTRADIOL 0.1 MG/G
CREAM VAGINAL
Qty: 85 G | Refills: 3 | Status: CANCELLED | OUTPATIENT
Start: 2025-01-06

## 2025-01-06 ASSESSMENT — ANXIETY QUESTIONNAIRES
GAD7 TOTAL SCORE: 3
8. IF YOU CHECKED OFF ANY PROBLEMS, HOW DIFFICULT HAVE THESE MADE IT FOR YOU TO DO YOUR WORK, TAKE CARE OF THINGS AT HOME, OR GET ALONG WITH OTHER PEOPLE?: NOT DIFFICULT AT ALL
6. BECOMING EASILY ANNOYED OR IRRITABLE: NOT AT ALL
1. FEELING NERVOUS, ANXIOUS, OR ON EDGE: SEVERAL DAYS
7. FEELING AFRAID AS IF SOMETHING AWFUL MIGHT HAPPEN: SEVERAL DAYS
7. FEELING AFRAID AS IF SOMETHING AWFUL MIGHT HAPPEN: SEVERAL DAYS
IF YOU CHECKED OFF ANY PROBLEMS ON THIS QUESTIONNAIRE, HOW DIFFICULT HAVE THESE PROBLEMS MADE IT FOR YOU TO DO YOUR WORK, TAKE CARE OF THINGS AT HOME, OR GET ALONG WITH OTHER PEOPLE: NOT DIFFICULT AT ALL
5. BEING SO RESTLESS THAT IT IS HARD TO SIT STILL: NOT AT ALL
3. WORRYING TOO MUCH ABOUT DIFFERENT THINGS: SEVERAL DAYS
GAD7 TOTAL SCORE: 3
2. NOT BEING ABLE TO STOP OR CONTROL WORRYING: NOT AT ALL
GAD7 TOTAL SCORE: 3
4. TROUBLE RELAXING: NOT AT ALL

## 2025-01-06 ASSESSMENT — PATIENT HEALTH QUESTIONNAIRE - PHQ9
SUM OF ALL RESPONSES TO PHQ QUESTIONS 1-9: 3
SUM OF ALL RESPONSES TO PHQ QUESTIONS 1-9: 3
10. IF YOU CHECKED OFF ANY PROBLEMS, HOW DIFFICULT HAVE THESE PROBLEMS MADE IT FOR YOU TO DO YOUR WORK, TAKE CARE OF THINGS AT HOME, OR GET ALONG WITH OTHER PEOPLE: SOMEWHAT DIFFICULT

## 2025-01-07 ENCOUNTER — OFFICE VISIT (OUTPATIENT)
Dept: BEHAVIORAL HEALTH | Facility: CLINIC | Age: 70
End: 2025-01-07
Payer: COMMERCIAL

## 2025-01-07 ENCOUNTER — MYC MEDICAL ADVICE (OUTPATIENT)
Dept: FAMILY MEDICINE | Facility: CLINIC | Age: 70
End: 2025-01-07

## 2025-01-07 DIAGNOSIS — F41.1 GAD (GENERALIZED ANXIETY DISORDER): Primary | ICD-10-CM

## 2025-01-07 DIAGNOSIS — E11.9 TYPE 2 DIABETES MELLITUS WITHOUT COMPLICATION, WITHOUT LONG-TERM CURRENT USE OF INSULIN (H): ICD-10-CM

## 2025-01-07 RX ORDER — DAPAGLIFLOZIN 10 MG/1
10 TABLET, FILM COATED ORAL EVERY MORNING
Qty: 100 TABLET | Refills: 0 | Status: SHIPPED | OUTPATIENT
Start: 2025-01-07

## 2025-01-07 RX ORDER — SEMAGLUTIDE 1.34 MG/ML
INJECTION, SOLUTION SUBCUTANEOUS
Qty: 9 ML | Refills: 2 | Status: SHIPPED | OUTPATIENT
Start: 2025-01-07

## 2025-01-07 RX ORDER — LEVOCETIRIZINE DIHYDROCHLORIDE 5 MG/1
5 TABLET, FILM COATED ORAL EVERY EVENING
Qty: 90 TABLET | Refills: 3 | Status: SHIPPED | OUTPATIENT
Start: 2025-01-07

## 2025-01-07 RX ORDER — ESTRADIOL 0.1 MG/G
CREAM VAGINAL
Qty: 85 G | Refills: 3 | Status: SHIPPED | OUTPATIENT
Start: 2025-01-09

## 2025-01-07 RX ORDER — GABAPENTIN 100 MG/1
100 CAPSULE ORAL AT BEDTIME
Qty: 200 CAPSULE | Refills: 2 | Status: SHIPPED | OUTPATIENT
Start: 2025-01-07 | End: 2025-01-08

## 2025-01-07 RX ORDER — ATORVASTATIN CALCIUM 40 MG/1
40 TABLET, FILM COATED ORAL EVERY EVENING
OUTPATIENT
Start: 2025-01-07

## 2025-01-07 RX ORDER — DAPAGLIFLOZIN 10 MG/1
10 TABLET, FILM COATED ORAL EVERY MORNING
Qty: 100 TABLET | Refills: 0 | Status: SHIPPED | OUTPATIENT
Start: 2025-01-07 | End: 2025-01-07

## 2025-01-07 RX ORDER — ERGOCALCIFEROL (VITAMIN D2) 10 MCG
1 TABLET ORAL EVERY EVENING
Qty: 90 TABLET | Refills: 3 | Status: SHIPPED | OUTPATIENT
Start: 2025-01-07 | End: 2025-01-08

## 2025-01-07 RX ORDER — UBIDECARENONE 75 MG
100 CAPSULE ORAL EVERY EVENING
Qty: 90 TABLET | Refills: 3 | Status: SHIPPED | OUTPATIENT
Start: 2025-01-07 | End: 2025-01-08

## 2025-01-07 RX ORDER — MONTELUKAST SODIUM 10 MG/1
1 TABLET ORAL AT BEDTIME
Qty: 100 TABLET | Refills: 2 | Status: SHIPPED | OUTPATIENT
Start: 2025-01-07

## 2025-01-07 NOTE — TELEPHONE ENCOUNTER
Levocetirizine (Xyzal) 5 mg, Montelukast (Singulair) 10 mg, Semaglutide (Ozempic) 1 mg/dose, Estradiol (Estrace) 0.1 mg/gm cream, Gabapentin (Neurontin) 100 mg + Fluoxetine (Prozac) 20 mg    Last Office Visit: 12/20/24  Future [facility] Appointments: 3/18/25  Medication last refilled:     Medication last refilled:   Historical - Levocetirizine  5/24/24 #100 with 2 refill(s) - Montelukast  5/24/24 #9 ml with 2 refill(s) - Semaglutide  10/3/24 #85 g with 3 refill(s) - Estradiol  12/20/24 #90 with 1 refill(s) - Fluoxetine  10/1/24 #200 with 3 refill(s) - Gabapentin     verified - last fill date: 12/13/24 #100    CSA on File - None    Vital Signs Systolic Diastolic   12/11/24 124 70   7/10/24 126 80   9/28/23 114 68     PHQ-9 / NICOLETTE-7 Scores 3/23/24 10/25/24 1/6/25   NICOLETTE-7 Score DocFlow 6 2 3   PHQ-9 Score DocFlow 6 2 3     No ACT on file    Routing refill request to provider for review/approval because:  Drug not on the [facility] refill protocol     JORDI BarrettN, RN, CCM

## 2025-01-07 NOTE — TELEPHONE ENCOUNTER
Medication requested:   cyanocobalamin (VITAMIN B-12) 100 MCG tablet   Vitamin D, Cholecalciferol, 10 MCG (400 UNIT) TABS   Last office visit: 1/220/2024  [facility] appointments: 3/18/2025  Medication last refilled: Historical  Last qualifying labs:     Medication requested: dapagliflozin (FARXIGA) 10 MG TABS tablet   Medication last refilled: 7/31/2024; 100 tabs + 1 refill  Last qualifying labs:     Component      Latest Ref Rng 10/1/2024  2:15 PM   Hemoglobin A1C      0.0 - 5.6 % 5.8 (H)       Component      Latest Ref Rng 6/25/2024  4:52 PM   GFR Estimate      >60 mL/min/1.73m2 >90      Medication is being filled for 1 time refill only due to:   pt has upcoming annual physical.  Routing refill request to provider for review/approval because:  Medication is reported/historical: Vitamins B12 & Vitamin D, Cholecalciferol    MAC Hardin, RN  01/07/25, 11:29 AM

## 2025-01-07 NOTE — TELEPHONE ENCOUNTER
Transferring dapagliflozin (FARXIGA) 10 MG & metFORMIN (GLUCOPHAGE) 500 MG prescriptions to [facility] per pt request.    MAC Hardin, RN  01/07/25, 2:47 PM

## 2025-01-09 ENCOUNTER — OFFICE VISIT (OUTPATIENT)
Dept: NEUROLOGY | Facility: CLINIC | Age: 70
End: 2025-01-09
Attending: ORTHOPAEDIC SURGERY
Payer: COMMERCIAL

## 2025-01-09 DIAGNOSIS — R20.0 NUMBNESS AND TINGLING: ICD-10-CM

## 2025-01-09 DIAGNOSIS — R20.2 NUMBNESS AND TINGLING: ICD-10-CM

## 2025-01-09 DIAGNOSIS — M21.612 BILATERAL BUNIONS: ICD-10-CM

## 2025-01-09 DIAGNOSIS — M25.571 PAIN IN JOINT, ANKLE AND FOOT, RIGHT: ICD-10-CM

## 2025-01-09 DIAGNOSIS — M21.611 BILATERAL BUNIONS: ICD-10-CM

## 2025-01-09 DIAGNOSIS — M54.16 LUMBAR RADICULOPATHY: Primary | ICD-10-CM

## 2025-01-09 NOTE — PROCEDURES
[facility]  Electrodiagnostic Laboratory                 Department of Neurology                                                                                                         Test Date:  2025    Patient: Won Urena : 1955 Physician: Yumi Viera MD   Sex: Female AGE: 69 year Ref Phys: Topher Jean MD   ID#: [id number]   Technician: Riddhi Barrera     History and Examination:  69-year-old female presenting with numbness in the anterior half of both feet, right more than left, that started a couple of years ago but became more noticeable recently.  She also reports history of bulging disks with radiating pain down the right leg many years ago.  This study was performed to assess for peripheral neuropathy and lumbosacral radiculopathy.    Techniques:  Motor and sensory conduction studies were done with surface recording electrodes. Temperature was monitored and recorded throughout the study. Upper extremities were maintained at a temperature of 32 degrees Centigrade or higher.  EMG was done with a concentric needle electrode.     Results:  Nerve conduction studies of bilateral lower limbs were normal.    Needle EMG showed reduced recruitment of long-duration and high-amplitude motor unit potentials in several bilateral (right worse than left) L5 innervated muscles and right lumbar paraspinal muscle.  Fibrillation potentials were noted in the right tibialis anterior muscle.    Interpretation:  This is an abnormal study.  There is electrophysiologic evidence of chronic bilateral (right worse than left) L5 radiculopathy with incomplete reinnervation in the right tibialis anterior muscle.  There is no electrophysiologic evidence of a large-fiber peripheral neuropathy in the current study.    Yumi Viera MD  Department of Neurology        Nerve Conduction Studies  Motor Sites      Latency Neg. Amp Neg. Amp Diff Segment Distance Velocity Neg. Dur  Neg Area Diff Temperature Comment   Site (ms) Norm (mV) Norm (%)  cm m/s Norm (ms) (%) ( C)    Left Fibular (EDB) Motor   Ankle 4.4  < 6.0 3.5 -  Ankle-EDB 8   5.3  32.5    Right Fibular (EDB) Motor   Ankle 4.4  < 6.0 2.3 -  Ankle-EDB 8   6.1  31.4    Bel Fibular Head 10.5 - 2.1 - -9 Bel Fibular Head-Ankle 30 49  > 38 6.4 4 31.8    Pop Fossa 12.6 - 2.1 - 0 Pop Fossa-Bel Fibular Head 9.7 46  > 38 6.1 -8 31.9    Left Tibial (AHB) Motor   Ankle 4.1  < 6.5 6.1  > 5.0  Ankle-AH 6   5.8  32.7 moved G1   Right Tibial (AHB) Motor   Ankle 3.2  < 6.5 9.0  > 5.0  Ankle-AH 7   6.4  33.7    Knee 12.2 - 6.9 - -23 Knee-Ankle 36.4 40  > 38 6.4 -10 33.9      F-Wave Sites      Min F-Lat Max-Min F-Lat Mean F-Lat   Site (ms) (ms) (ms)   Right Fibular F-Wave   Ankle 48.5 2.7 -   Right Tibial F-Wave   Ankle 50.8 2.7 -     Sensory Sites      Onset Lat Peak Lat Amp (O-P) Amp (P-P) Segment Distance Velocity Temperature Comment   Site ms (ms)  V Norm ( V)  cm m/s Norm ( C)    Left Superficial Fibular Sensory   Lower Leg-Ankle 3.0 3.7 5  > 3 5 Lower Leg-Ankle 12.5 42 - 32.7    Right Superficial Fibular Sensory   Lower Leg-Ankle 3.0 3.9 8  > 3 8 Lower Leg-Ankle 14 47 - 32.6    Left Sural Sensory   Calf-Lat Malleolus 2.8 3.5 13  > 5 12 Calf-Lat Malleolus 14 50  > 38 32.7    Right Sural Sensory   Calf-Lat Malleolus 2.7 3.5 10  > 5 11 Calf-Lat Malleolus 14 52  > 38 32.8        Electromyography     Side Muscle Ins Act Fibs/PSW Fasc HF Amp Dur Poly Recrt Int Pat   Right Tib ant Nml 1+ Nml 0 2+ 2+ 0 ModRed Nml   Right Gastroc MH Nml None Nml 0 Nml Nml 0 Nml Nml   Right Vastus med Nml None Nml 0 Nml Nml 0 Nml Nml   Right Tens fasc lat Nml None Nml 0 2+ 1+ 0 Saurabh Nml   Right Lumbar PSP Nml None Nml 0 1+ 1+ 0 Saurabh Nml   Left Tib ant Nml None Nml 0 Nml Nml 0 Nml Nml   Left Tens fasc lat Nml None Nml 0 Nml 1+ 0 Saurabh Nml   Left Tib post Nml None Nml 0 1+ 1+ 0 Saurabh Nml       NCS Waveforms:    Motor                Sensory                F-Wave

## 2025-01-09 NOTE — LETTER
1/9/2025       RE: Won Urena  [address]  [address]  [facility] [address]     Dear Colleague,    Thank you for referring your patient, Won Urena, to the [facility]. Please see a copy of my visit note below.    Please see procedure note.       Again, thank you for allowing me to participate in the care of your patient.      Sincerely,    Yumi Viera MD

## 2025-01-13 ENCOUNTER — MYC MEDICAL ADVICE (OUTPATIENT)
Dept: ORTHOPEDICS | Facility: CLINIC | Age: 70
End: 2025-01-13
Payer: COMMERCIAL

## 2025-01-13 DIAGNOSIS — R20.2 NUMBNESS AND TINGLING: Primary | ICD-10-CM

## 2025-01-13 DIAGNOSIS — R20.0 NUMBNESS AND TINGLING: Primary | ICD-10-CM

## 2025-01-14 ENCOUNTER — TELEPHONE (OUTPATIENT)
Dept: ORTHOPEDICS | Facility: CLINIC | Age: 70
End: 2025-01-14
Payer: COMMERCIAL

## 2025-01-14 NOTE — TELEPHONE ENCOUNTER
Patient confirmed scheduled appointment:  Date: 2/3/2025  Time: 3:30pm  Visit type: Video Visit New  Provider: Kay Phillips  Location: [facility]  Testing/imaging: MRI scheduled on 1/29/25

## 2025-01-19 ENCOUNTER — HEALTH MAINTENANCE LETTER (OUTPATIENT)
Age: 70
End: 2025-01-19

## 2025-02-05 DIAGNOSIS — E78.5 HYPERLIPIDEMIA LDL GOAL <100: ICD-10-CM

## 2025-02-05 DIAGNOSIS — I25.10 CORONARY ARTERY CALCIFICATION: ICD-10-CM

## 2025-02-05 RX ORDER — FENOFIBRATE 160 MG/1
160 TABLET ORAL EVERY EVENING
Qty: 90 TABLET | Refills: 0 | Status: SHIPPED | OUTPATIENT
Start: 2025-02-05

## 2025-02-05 NOTE — TELEPHONE ENCOUNTER
Fenofibrate (Triglide/Lofibra) 160 mg    Last Office Visit: 12/20/24  Future [facility] Appointments: 3/18/25  Medication last refilled: 3/18/24 #100 with 3 refill(s)    Required labs per protocol:    LAB REF RANGE 1/13/24 5/16/24   LDL < 100 mg/dL 59 71     Medication is being filled for 1 time refill only due to:  due for appointment which is scheduled for 3/18/25.    Maria L Samayoa, JORDIN, RN, CCM

## 2025-02-24 ENCOUNTER — TELEPHONE (OUTPATIENT)
Dept: CARDIOLOGY | Facility: CLINIC | Age: 70
End: 2025-02-24
Payer: COMMERCIAL

## 2025-02-27 NOTE — TELEPHONE ENCOUNTER
DIAGNOSIS: LUMBAR SPINE   APPOINTMENT DATE: 03/03/2025   NOTES STATUS DETAILS   OFFICE NOTE from referring provider Internal 11/19/2024 - Topher Jean MD - [facility] Orthopaedic Surgery   OFFICE NOTE from other specialist Internal 01/09/2025 - Yumi Viera MD - [facility] Neurology   EMG (for Spine) Internal 01/09/2025   DEXA PACS Internal:  07/11/2024   MRI PACS Internal:  01/29/2025 - [facility]   XRAYS (IMAGES & REPORTS) PACS Internal:  06/28/2024 - Chest  06/12/2024 - Abdomen

## 2025-03-03 ENCOUNTER — VIRTUAL VISIT (OUTPATIENT)
Dept: ORTHOPEDICS | Facility: CLINIC | Age: 70
End: 2025-03-03
Payer: COMMERCIAL

## 2025-03-03 ENCOUNTER — PRE VISIT (OUTPATIENT)
Dept: ORTHOPEDICS | Facility: CLINIC | Age: 70
End: 2025-03-03

## 2025-03-03 VITALS
SYSTOLIC BLOOD PRESSURE: 131 MMHG | DIASTOLIC BLOOD PRESSURE: 83 MMHG | HEART RATE: 74 BPM | BODY MASS INDEX: 26.68 KG/M2 | WEIGHT: 170 LBS | HEIGHT: 67 IN

## 2025-03-03 DIAGNOSIS — M43.10 SPONDYLOLISTHESIS, GRADE 1: ICD-10-CM

## 2025-03-03 DIAGNOSIS — G57.02 PIRIFORMIS SYNDROME, LEFT: ICD-10-CM

## 2025-03-03 DIAGNOSIS — R20.2 PARESTHESIA OF BOTH FEET: ICD-10-CM

## 2025-03-03 DIAGNOSIS — M54.16 LUMBAR RADICULOPATHY: Primary | ICD-10-CM

## 2025-03-03 PROCEDURE — 98005 SYNCH AUDIO-VIDEO EST LOW 20: CPT | Performed by: PHYSICIAN ASSISTANT

## 2025-03-03 PROCEDURE — 3075F SYST BP GE 130 - 139MM HG: CPT | Performed by: PHYSICIAN ASSISTANT

## 2025-03-03 PROCEDURE — 3079F DIAST BP 80-89 MM HG: CPT | Performed by: PHYSICIAN ASSISTANT

## 2025-03-03 PROCEDURE — 1125F AMNT PAIN NOTED PAIN PRSNT: CPT | Performed by: PHYSICIAN ASSISTANT

## 2025-03-03 ASSESSMENT — PAIN SCALES - GENERAL: PAINLEVEL_OUTOF10: MILD PAIN (1)

## 2025-03-03 NOTE — LETTER
"3/3/2025      Won Urena  [address]  [address]  [facility] [address]      Dear Colleague,    Thank you for referring your patient, Won Urena, to the [facility]. Please see a copy of my visit note below.    Virtual Visit Details    Type of service:  Video Visit   Video Start Time: 3:34 PM  Video End Time:3:52 PM    Originating Location (pt. Location): Home    Distant Location (provider location):  Off-site  Platform used for Video Visit: Kb Urena is a 70 year old female who is being evaluated via a billable video visit.      The patient has been notified of following:      \"This video visit will be conducted via a call between you and your physician/provider. We have found that certain health care needs can be provided without the need for an in-person physical exam.  This service lets us provide the care you need with a video conversation.  If a prescription is necessary we can send it directly to your pharmacy.  If lab work is needed we can place an order for that and you can then stop by our lab to have the test done at a later time.     Video visits are billed at different rates depending on your insurance coverage.  Please reach out to your insurance provider with any questions.     If during the course of the call the physician/provider feels a video visit is not appropriate, you will not be charged for this service.\"    Won Urena complains of  No chief complaint on file.      I have reviewed and updated the patient's Past Medical History, Social History, Family History and Medication List.    ALLERGIES  Avapro [irbesartan] and Fluticasone    Spine Surgery Consultation    REFERRING PHYSICIAN: Topher Jean   PRIMARY CARE PHYSICIAN: Nannette Gallagher           Chief Complaint:   No chief complaint on file.      History of Present Illness:  Symptom Profile Including: location of symptoms, onset, severity, exacerbating/alleviating " factors, previous treatments:        Won Urena is a 70 year old female who presents today for evaluation of lumbar radiculopathy.     Won states she has R>L foot pain, tingling, paresthesias into the great-3rd toe. Symptoms alleviated with orthotics. She went to Dr. Jean thinking this was a bunion issue. She saw Dr. Jean 11/19/24 for bilateral foot numbness who then ordered EMG and MRI.     10 years ago starting having back and leg pain, has had steroid injections in the past. Her symptoms got better and now she has a mild degree L buttock/pirifomis/hip pain. Occasionally the pain radiates down her left leg. She has been weight lifting which has been helping her gaining strength but can exacerbate buttock pain. With massage or taking rest days, she can get the pain to resolve. She limites her lifting to 20 pounds. She has mild R drop foot that she notices when she is tired. This has been present for many years.     Saw Dr. Viera in Neurology 1/9/25, note reviewed. EMG showed:  This is an abnormal study.  There is electrophysiologic evidence of chronic bilateral (right worse than left) L5 radiculopathy with incomplete reinnervation in the right tibialis anterior muscle.  There is no electrophysiologic evidence of a large-fiber peripheral neuropathy in the current study.     Past treatments tried:  - Physical therapy: saw PT many years ago  - Injections: has had EB in the past which helped  - Medications: gabapentin 100 mg daily- using for vulva paresthesias. Was previously on a higher dose but titrated down when her symptoms improved  - working with a , does weight lifting    Oswestry (LO) Questionnaire        2/26/2025     9:56 AM   OSWESTRY DISABILITY INDEX   Count 10    Sum 4    Oswestry Score (%) 8 %        Patient-reported     PMH:  Past Medical History:   Diagnosis Date     Abnormal Pap smear of cervix      Aortic stenosis      Aortic valve stenosis      Benign colon polyp      CAD  (coronary artery disease)      Dermatochalasis of both upper eyelids      Diabetes mellitus, type 2 (H)      Gestational diabetes      HTN (hypertension)      Hyperlipidemia LDL goal <100      Metal foreign body in chest     Retained atrial temporary pacing wire     Nonsenile cataract 2022    Very beginnings     Post-menopausal atrophic vaginitis      Ptosis of both eyelids      Thickened endometrium          Social:  Social History     Socioeconomic History     Marital status:      Spouse name: Not on file     Number of children: Not on file     Years of education: Not on file     Highest education level: Not on file   Occupational History     Not on file   Tobacco Use     Smoking status: Never     Passive exposure: Never     Smokeless tobacco: Never   Vaping Use     Vaping status: Never Used   Substance and Sexual Activity     Alcohol use: Yes     Alcohol/week: 1.0 standard drink of alcohol     Types: 1 Standard drinks or equivalent per week     Comment: less than 2 drinks a week     Drug use: Not Currently     Comment: gummy 1x/month     Sexual activity: Not Currently     Partners: Male     Birth control/protection: Post-menopausal   Other Topics Concern     Not on file   Social History Narrative    Lives with     Two son    One son, daughter-in-law, and grandson live in [place]    One son, daughter-in-law live in the same [facility] in [place], [place]     Social Drivers of Health     Financial Resource Strain: Low Risk  (9/27/2024)    Financial Resource Strain      Within the past 12 months, have you or your family members you live with been unable to get utilities (heat, electricity) when it was really needed?: No   Food Insecurity: Low Risk  (9/27/2024)    Food Insecurity      Within the past 12 months, did you worry that your food would run out before you got money to buy more?: No      Within the past 12 months, did the food you bought just not last and you didn t have money  to get more?: No   Transportation Needs: Low Risk  (9/27/2024)    Transportation Needs      Within the past 12 months, has lack of transportation kept you from medical appointments, getting your medicines, non-medical meetings or appointments, work, or from getting things that you need?: No   Physical Activity: Insufficiently Active (3/8/2024)    Exercise Vital Sign      Days of Exercise per Week: 4 days      Minutes of Exercise per Session: 30 min   Stress: Stress Concern Present (3/8/2024)    [place] [employer]ational [employer] - Occupational Stress Questionnaire      Feeling of Stress : To some extent   Social Connections: Unknown (3/8/2024)    Social Connection and Isolation Panel [NHANES]      Frequency of Communication with Friends and Family: Not on file      Frequency of Social Gatherings with Friends and Family: Twice a week      Attends Roman Catholic Services: Not on file      Active Member of Clubs or Organizations: Not on file      Attends Club or Organization Meetings: Not on file      Marital Status: Not on file   Interpersonal Safety: Low Risk  (12/20/2024)    Interpersonal Safety      Do you feel physically and emotionally safe where you currently live?: Yes      Within the past 12 months, have you been hit, slapped, kicked or otherwise physically hurt by someone?: No      Within the past 12 months, have you been humiliated or emotionally abused in other ways by your partner or ex-partner?: No   Housing Stability: Low Risk  (9/27/2024)    Housing Stability      Do you have housing? : Yes      Are you worried about losing your housing?: No         LO Scores:  Oswestry (LO) Questionnaire        2/26/2025     9:56 AM   OSWESTRY DISABILITY INDEX   Count 10    Sum 4    Oswestry Score (%) 8 %        Patient-reported          PROMIS-10 Scores:  Global Mental Health Score: (Patient-Rptd) (P) 15  Global Physical Health Score: (Patient-Rptd) (P) 15  PROMIS TOTAL - SUBSCORES: (Patient-Rptd) (P) 30          Physical Exam:   This was a video visit, so very limited exam could be performed.  On video, patient appeared alert, oriented x 3, cooperative, with coherent speech, and not in cardiorespiratory distress.  Able to respond to questions appropriately and follow instructions.           Imaging:   I ordered and independently reviewed new radiographs and/ or advanced imaging at this clinic visit. The results were discussed with the patient.  Findings include:    MRI lumbar spine 1/29/25  Counting differs from radiology read  L4-L5 grade I degenerative spondylolisthesis with mild/moderate foraminal narrowing and moderate central stenosis.  L5-S1 mild left eccentric disc bulge             Assessment and Plan:   Assessment:  70 year old female with L4-L5 spondylolisthesis, documented L5 radiculopathy on EMG, chronic R mild foot drop, L piriformis pain     Plan:  PT referral   Titrate gabapentin to 100 mg TID. If tolerating but still has symptoms, can switch to 300 mg tabs and work up to TID. She can request refill through [facility]  Can let us know if she would like to try EB in the future. Her symptoms are currently mild.   Advised to let us know if foot drop worsens.   Follow up as needed.     23 minutes spent on the date of the encounter doing chart review, review of outside records, review of test results, my own interpretation of tests, patient history, physical exam & discussion of plan with patient and family.      Respectfully,  Kay Phillips PA-C , [facility]  Orthopaedic Spine Surgery            Again, thank you for allowing me to participate in the care of your patient.        Sincerely,        Kay Phillips PA-C    Electronically signed

## 2025-03-03 NOTE — PROGRESS NOTES
"Virtual Visit Details    Type of service:  Video Visit   Video Start Time: 3:34 PM  Video End Time:3:52 PM    Originating Location (pt. Location): Home    Distant Location (provider location):  Off-site  Platform used for Video Visit: Kb Urena is a 70 year old female who is being evaluated via a billable video visit.      The patient has been notified of following:      \"This video visit will be conducted via a call between you and your physician/provider. We have found that certain health care needs can be provided without the need for an in-person physical exam.  This service lets us provide the care you need with a video conversation.  If a prescription is necessary we can send it directly to your pharmacy.  If lab work is needed we can place an order for that and you can then stop by our lab to have the test done at a later time.     Video visits are billed at different rates depending on your insurance coverage.  Please reach out to your insurance provider with any questions.     If during the course of the call the physician/provider feels a video visit is not appropriate, you will not be charged for this service.\"    Won Urena complains of  No chief complaint on file.      I have reviewed and updated the patient's Past Medical History, Social History, Family History and Medication List.    ALLERGIES  Avapro [irbesartan] and Fluticasone    Spine Surgery Consultation    REFERRING PHYSICIAN: Topher Jean   PRIMARY CARE PHYSICIAN: Nannette Gallagher           Chief Complaint:   No chief complaint on file.      History of Present Illness:  Symptom Profile Including: location of symptoms, onset, severity, exacerbating/alleviating factors, previous treatments:        Won Urena is a 70 year old female who presents today for evaluation of lumbar radiculopathy.     Won states she has R>L foot pain, tingling, paresthesias into the great-3rd toe. Symptoms alleviated with orthotics. " She went to Dr. Jean thinking this was a bunion issue. She saw Dr. Jean 11/19/24 for bilateral foot numbness who then ordered EMG and MRI.     10 years ago starting having back and leg pain, has had steroid injections in the past. Her symptoms got better and now she has a mild degree L buttock/pirifomis/hip pain. Occasionally the pain radiates down her left leg. She has been weight lifting which has been helping her gaining strength but can exacerbate buttock pain. With massage or taking rest days, she can get the pain to resolve. She limites her lifting to 20 pounds. She has mild R drop foot that she notices when she is tired. This has been present for many years.     Saw Dr. Viera in Neurology 1/9/25, note reviewed. EMG showed:  This is an abnormal study.  There is electrophysiologic evidence of chronic bilateral (right worse than left) L5 radiculopathy with incomplete reinnervation in the right tibialis anterior muscle.  There is no electrophysiologic evidence of a large-fiber peripheral neuropathy in the current study.     Past treatments tried:  - Physical therapy: saw PT many years ago  - Injections: has had EB in the past which helped  - Medications: gabapentin 100 mg daily- using for vulva paresthesias. Was previously on a higher dose but titrated down when her symptoms improved  - working with a , does weight lifting    Oswestry (LO) Questionnaire        2/26/2025     9:56 AM   OSWESTRY DISABILITY INDEX   Count 10    Sum 4    Oswestry Score (%) 8 %        Patient-reported     PMH:  Past Medical History:   Diagnosis Date    Abnormal Pap smear of cervix     Aortic stenosis     Aortic valve stenosis     Benign colon polyp     CAD (coronary artery disease)     Dermatochalasis of both upper eyelids     Diabetes mellitus, type 2 (H)     Gestational diabetes     HTN (hypertension)     Hyperlipidemia LDL goal <100     Metal foreign body in chest     Retained atrial temporary pacing wire    Nonsenile  cataract 2022    Very beginnings    Post-menopausal atrophic vaginitis     Ptosis of both eyelids     Thickened endometrium          Social:  Social History     Socioeconomic History    Marital status:      Spouse name: Not on file    Number of children: Not on file    Years of education: Not on file    Highest education level: Not on file   Occupational History    Not on file   Tobacco Use    Smoking status: Never     Passive exposure: Never    Smokeless tobacco: Never   Vaping Use    Vaping status: Never Used   Substance and Sexual Activity    Alcohol use: Yes     Alcohol/week: 1.0 standard drink of alcohol     Types: 1 Standard drinks or equivalent per week     Comment: less than 2 drinks a week    Drug use: Not Currently     Comment: gummy 1x/month    Sexual activity: Not Currently     Partners: Male     Birth control/protection: Post-menopausal   Other Topics Concern    Not on file   Social History Narrative    Lives with     Two son    One son, daughter-in-law, and grandson live in [place]    One son, daughter-in-law live in the same [facility]    [place] in [place], [place]     Social Drivers of Health     Financial Resource Strain: Low Risk  (9/27/2024)    Financial Resource Strain     Within the past 12 months, have you or your family members you live with been unable to get utilities (heat, electricity) when it was really needed?: No   Food Insecurity: Low Risk  (9/27/2024)    Food Insecurity     Within the past 12 months, did you worry that your food would run out before you got money to buy more?: No     Within the past 12 months, did the food you bought just not last and you didn t have money to get more?: No   Transportation Needs: Low Risk  (9/27/2024)    Transportation Needs     Within the past 12 months, has lack of transportation kept you from medical appointments, getting your medicines, non-medical meetings or appointments, work, or from getting things that you need?: No    Physical Activity: Insufficiently Active (3/8/2024)    Exercise Vital Sign     Days of Exercise per Week: 4 days     Minutes of Exercise per Session: 30 min   Stress: Stress Concern Present (3/8/2024)    [place] of Occupational Health - Occupational Stress Questionnaire     Feeling of Stress : To some extent   Social Connections: Unknown (3/8/2024)    Social Connection and Isolation Panel [NHANES]     Frequency of Communication with Friends and Family: Not on file     Frequency of Social Gatherings with Friends and Family: Twice a week     Attends Protestant Services: Not on file     Active Member of Clubs or Organizations: Not on file     Attends Club or Organization Meetings: Not on file     Marital Status: Not on file   Interpersonal Safety: Low Risk  (12/20/2024)    Interpersonal Safety     Do you feel physically and emotionally safe where you currently live?: Yes     Within the past 12 months, have you been hit, slapped, kicked or otherwise physically hurt by someone?: No     Within the past 12 months, have you been humiliated or emotionally abused in other ways by your partner or ex-partner?: No   Housing Stability: Low Risk  (9/27/2024)    Housing Stability     Do you have housing? : Yes     Are you worried about losing your housing?: No         LO Scores:  Oswestry (LO) Questionnaire        2/26/2025     9:56 AM   OSWESTRY DISABILITY INDEX   Count 10    Sum 4    Oswestry Score (%) 8 %        Patient-reported          PROMIS-10 Scores:  Global Mental Health Score: (Patient-Rptd) (P) 15  Global Physical Health Score: (Patient-Rptd) (P) 15  PROMIS TOTAL - SUBSCORES: (Patient-Rptd) (P) 30         Physical Exam:   This was a video visit, so very limited exam could be performed.  On video, patient appeared alert, oriented x 3, cooperative, with coherent speech, and not in cardiorespiratory distress.  Able to respond to questions appropriately and follow instructions.           Imaging:   I ordered and  independently reviewed new radiographs and/ or advanced imaging at this clinic visit. The results were discussed with the patient.  Findings include:    MRI lumbar spine 1/29/25  Counting differs from radiology read  L4-L5 grade I degenerative spondylolisthesis with mild/moderate foraminal narrowing and moderate central stenosis.  L5-S1 mild left eccentric disc bulge             Assessment and Plan:   Assessment:  70 year old female with L4-L5 spondylolisthesis, documented L5 radiculopathy on EMG, chronic R mild foot drop, L piriformis pain     Plan:  PT referral   Titrate gabapentin to 100 mg TID. If tolerating but still has symptoms, can switch to 300 mg tabs and work up to TID. She can request refill through [facility]  Can let us know if she would like to try EB in the future. Her symptoms are currently mild.   Advised to let us know if foot drop worsens.   Follow up as needed.     23 minutes spent on the date of the encounter doing chart review, review of outside records, review of test results, my own interpretation of tests, patient history, physical exam & discussion of plan with patient and family.      Respectfully,  Kay Phillips PA-C , [facility]  Orthopaedic Spine Surgery

## 2025-03-03 NOTE — NURSING NOTE
Current patient location: [address] [place] [address] [address]  [facility] [address]    Is the patient currently in the state of [place]? YES    Visit mode: VIDEO    If the visit is dropped, the patient can be reconnected by:VIDEO VISIT: Text to cell phone:   Telephone Information:   Mobile [phone]       Will anyone else be joining the visit? NO  (If patient encounters technical issues they should call [phone])    Are changes needed to the allergy or medication list? No    Are refills needed on medications prescribed by this physician? NO    Rooming Documentation:  Questionnaire(s) completed    Reason for visit: Consult    Bushra WELSHF

## 2025-03-17 ENCOUNTER — MYC MEDICAL ADVICE (OUTPATIENT)
Dept: CARDIOLOGY | Facility: CLINIC | Age: 70
End: 2025-03-17
Payer: COMMERCIAL

## 2025-03-17 DIAGNOSIS — I10 ESSENTIAL HYPERTENSION: Primary | ICD-10-CM

## 2025-03-18 RX ORDER — DILTIAZEM HCL 60 MG
60 TABLET ORAL 2 TIMES DAILY
Qty: 180 TABLET | Refills: 1 | Status: SHIPPED | OUTPATIENT
Start: 2025-03-18

## 2025-03-24 ENCOUNTER — HOSPITAL ENCOUNTER (OUTPATIENT)
Dept: CARDIOLOGY | Facility: CLINIC | Age: 70
Discharge: HOME | End: 2025-03-24
Attending: CASE MANAGER/CARE COORDINATOR | Admitting: CASE MANAGER/CARE COORDINATOR
Payer: COMMERCIAL

## 2025-03-24 DIAGNOSIS — I25.810 CORONARY ARTERY DISEASE INVOLVING AUTOLOGOUS ARTERY CORONARY BYPASS GRAFT WITHOUT ANGINA PECTORIS: ICD-10-CM

## 2025-03-24 DIAGNOSIS — I35.0 SEVERE AORTIC STENOSIS: ICD-10-CM

## 2025-03-24 LAB — LVEF ECHO: NORMAL

## 2025-03-24 PROCEDURE — 93306 TTE W/DOPPLER COMPLETE: CPT | Mod: 26 | Performed by: INTERNAL MEDICINE

## 2025-03-24 PROCEDURE — 93306 TTE W/DOPPLER COMPLETE: CPT

## 2025-03-26 ENCOUNTER — LAB (OUTPATIENT)
Dept: LAB | Facility: CLINIC | Age: 70
End: 2025-03-26
Payer: COMMERCIAL

## 2025-03-26 DIAGNOSIS — I10 ESSENTIAL HYPERTENSION: ICD-10-CM

## 2025-03-26 DIAGNOSIS — Z13.1 SCREENING FOR DIABETES MELLITUS (DM): ICD-10-CM

## 2025-03-26 LAB
ANION GAP SERPL CALCULATED.3IONS-SCNC: 11 MMOL/L (ref 7–15)
BUN SERPL-MCNC: 26.5 MG/DL (ref 8–23)
CALCIUM SERPL-MCNC: 9.8 MG/DL (ref 8.8–10.4)
CHLORIDE SERPL-SCNC: 103 MMOL/L (ref 98–107)
CHOLEST SERPL-MCNC: 140 MG/DL
CREAT SERPL-MCNC: 0.82 MG/DL (ref 0.51–0.95)
EGFRCR SERPLBLD CKD-EPI 2021: 77 ML/MIN/1.73M2
ERYTHROCYTE [DISTWIDTH] IN BLOOD BY AUTOMATED COUNT: 13.3 % (ref 10–15)
EST. AVERAGE GLUCOSE BLD GHB EST-MCNC: 117 MG/DL
FASTING STATUS PATIENT QL REPORTED: YES
FASTING STATUS PATIENT QL REPORTED: YES
GLUCOSE SERPL-MCNC: 115 MG/DL (ref 70–99)
HBA1C MFR BLD: 5.7 % (ref 0–5.6)
HCO3 SERPL-SCNC: 26 MMOL/L (ref 22–29)
HCT VFR BLD AUTO: 43.3 % (ref 35–47)
HDLC SERPL-MCNC: 51 MG/DL
HGB BLD-MCNC: 14.2 G/DL (ref 11.7–15.7)
LDLC SERPL CALC-MCNC: 71 MG/DL
MCH RBC QN AUTO: 29.4 PG (ref 26.5–33)
MCHC RBC AUTO-ENTMCNC: 32.8 G/DL (ref 31.5–36.5)
MCV RBC AUTO: 90 FL (ref 78–100)
NONHDLC SERPL-MCNC: 89 MG/DL
PLATELET # BLD AUTO: 148 10E3/UL (ref 150–450)
POTASSIUM SERPL-SCNC: 4.2 MMOL/L (ref 3.4–5.3)
RBC # BLD AUTO: 4.83 10E6/UL (ref 3.8–5.2)
SODIUM SERPL-SCNC: 140 MMOL/L (ref 135–145)
TRIGL SERPL-MCNC: 89 MG/DL
WBC # BLD AUTO: 4.3 10E3/UL (ref 4–11)

## 2025-03-26 PROCEDURE — 36415 COLL VENOUS BLD VENIPUNCTURE: CPT

## 2025-03-26 PROCEDURE — 83036 HEMOGLOBIN GLYCOSYLATED A1C: CPT

## 2025-03-26 PROCEDURE — 80061 LIPID PANEL: CPT

## 2025-03-26 PROCEDURE — 85027 COMPLETE CBC AUTOMATED: CPT

## 2025-03-26 PROCEDURE — 80048 BASIC METABOLIC PNL TOTAL CA: CPT

## 2025-03-29 ASSESSMENT — ACTIVITIES OF DAILY LIVING (ADL)
LIFTING_AN_OBJECT,_LIKE_A_BAG_OF_GROCERIES_FROM_THE_FLOOR: NO DIFFICULTY
RUNNING_ON_UNEVEN_GROUND: EXTREME DIFFICULTY OR UNABLE TO PERFORM ACTIVITY
STANDING_FOR_1_HOUR: NO DIFFICULTY
WALKING_BETWEEN_ROOMS: NO DIFFICULTY
SITTING_FOR_1_HOUR: NO DIFFICULTY
YOUR_USUAL_HOBBIES,_RECREATIONAL_OR_SPORTING_ACTIVITIES: NO DIFFICULTY
ANY_OF_YOUR_USUAL_WORK,_HOUSEWORK_OR_SCHOOL_ACTIVITIES: NO DIFFICULTY
SHOPPING: EXTREME DIFFICULTY OR UNABLE TO PERFORM ACTIVITY
WALKING_2_BLOCKS: NO DIFFICULTY
GETTING_INTO_OR_OUT_OF_A_CAR: NO DIFFICULTY
MAKING_SHARP_TURNS_WHILE_RUNNING_FAST: EXTREME DIFFICULTY OR UNABLE TO PERFORM ACTIVITY
ROLLING_OVER_IN_BED: NO DIFFICULTY
PERFORMING_LIGHT_ACTIVITIES_AROUND_YOUR_HOME: NO DIFFICULTY
WALKING_A_MILE: A LITTLE BIT OF DIFFICULTY
PUTTING_ON_YOUR_SHOES_OR_SOCKS: NO DIFFICULTY
SQUATTING: NO DIFFICULTY
GOING_UP_OR_DOWN_10_STAIRS: NO DIFFICULTY
PERFORMING_HEAVY_ACTIVITIES_AROUND_YOUR_HOME: NO DIFFICULTY
LEFS_RAW_SCORE: 0
PLEASE_INDICATE_YOR_PRIMARY_REASON_FOR_REFERRAL_TO_THERAPY:: FOOT AND/OR ANKLE
LEFS_SCORE(%): 0
GETTING_INTO_AND_OUT_OF_A_BATH: NO DIFFICULTY
RUNNING_ON_EVEN_GROUND: QUITE A BIT OF DIFFICULTY

## 2025-04-02 ENCOUNTER — THERAPY VISIT (OUTPATIENT)
Dept: PHYSICAL THERAPY | Facility: CLINIC | Age: 70
End: 2025-04-02
Attending: PHYSICIAN ASSISTANT
Payer: COMMERCIAL

## 2025-04-02 DIAGNOSIS — R20.2 PARESTHESIA OF BOTH FEET: ICD-10-CM

## 2025-04-02 DIAGNOSIS — M43.10 SPONDYLOLISTHESIS, GRADE 1: ICD-10-CM

## 2025-04-02 DIAGNOSIS — G57.02 PIRIFORMIS SYNDROME, LEFT: ICD-10-CM

## 2025-04-02 DIAGNOSIS — M54.16 LUMBAR RADICULOPATHY: ICD-10-CM

## 2025-04-02 PROCEDURE — 97110 THERAPEUTIC EXERCISES: CPT | Mod: GP

## 2025-04-02 PROCEDURE — 97161 PT EVAL LOW COMPLEX 20 MIN: CPT | Mod: GP

## 2025-04-02 NOTE — PROGRESS NOTES
PHYSICAL THERAPY EVALUATION  Type of Visit: Evaluation       Fall Risk Screen:  Fall screen completed by: PT  Have you fallen 2 or more times in the past year?: No  Have you fallen and had an injury in the past year?: No  Is patient a fall risk?: No    Subjective   Pt notes chronic complications of low back pain and discomfort - notes history of radicular symptoms into B LE. Pt complaints of complications with weighted exercises such as squats. Pt points to L PSIS region as location of pain. Notes has burning and tingling in feet that gets worse when laying on stomach at night.       Presenting condition or subjective complaint: Foot and back pain  Date of onset: 04/02/25    Relevant medical history: Chest pain; Foot drop; Hearing problems; Heart problems; High blood pressure   Dates & types of surgery: Open heart surgery 2024    Prior diagnostic imaging/testing results: MRI     Prior therapy history for the same diagnosis, illness or injury: No      Living Environment  Social support: With a significant other or spouse   Type of home: Apartment/condo; 1 level   Stairs to enter the home: No       Ramp: No   Stairs inside the home: No       Help at home: None  Equipment owned:       Employment: No    Hobbies/Interests: Exercise    Patient goals for therapy: Mostly live pain free. And would like to jog.       Objective   LUMBAR SPINE EVALUATION  PAIN: Pain Level at Rest: 2/10  Pain Level with Use: 3/10  Pain Location: lumbar spine  Pain Quality: constant aching, and intermittent sharp and pinching with weighted activity  Pain Frequency: constant  Pain is Worst: daytime  Pain is Exacerbated By: weighted squats, prolonged walking, laying on stomach  Pain is Relieved By: use and use of compression socks, massage, other self massage strategies.  POSTURE: WFL  ROM:  noted peripheralization of symptoms in back into L buttock following standing lumbar extension; noted centralization of these symptoms with repeated trunk  flexion  STRENGTH:  WFL  FLEXIBILITY: WFL  FUNCTIONAL TESTS: Double Leg Squat: Anterior knee translation, Knee valgus, Hip internal rotation, and Improper use of glutes/hips  PALPATION:  tenderness to palpation to L>R PSIS    Assessment & Plan   CLINICAL IMPRESSIONS  Medical Diagnosis: lumbar radiculopathy    Treatment Diagnosis: lumbar radiculopathy   Impression/Assessment: Patient is a 70 year old female with low back complaints.  The following significant findings have been identified: Decreased activity tolerance. These impairments interfere with their ability to perform recreational activities as compared to previous level of function.     Clinical Decision Making (Complexity):  Clinical Presentation: Stable/Uncomplicated  Clinical Presentation Rationale: based on medical and personal factors listed in PT evaluation  Clinical Decision Making (Complexity): Low complexity    PLAN OF CARE  Treatment Interventions:  Interventions: Gait Training, Manual Therapy, Neuromuscular Re-education, Therapeutic Activity, Therapeutic Exercise, Self-Care/Home Management    Long Term Goals     PT Goal 1  Goal Identifier: lifting  Goal Description: Pt will tolerate squating 20# without limitations from back pain  Rationale: to maximize safety and independence with performance of ADLs and functional tasks  Target Date: 06/25/25      Frequency of Treatment: 1x/week  Duration of Treatment: 12 weeks    Education Assessment:   Learner/Method: Patient;Demonstration;Pictures/Video;No Barriers to Learning    Risks and benefits of evaluation/treatment have been explained.   Patient/Family/caregiver agrees with Plan of Care.     Evaluation Time:     PT Eval, Low Complexity Minutes (77777): 10     Signing Clinician: MIRANDA SAVAGE [facility]                                                                                   OUTPATIENT PHYSICAL THERAPY      PLAN OF TREATMENT FOR OUTPATIENT REHABILITATION    Patient's Last Name, First Name, Won Curry YOB: 1955   Provider's Name   [facility]   Medical Record No.  [id number]     Onset Date: 04/02/25  Start of Care Date: 04/02/25     Medical Diagnosis:  lumbar radiculopathy      PT Treatment Diagnosis:  lumbar radiculopathy Plan of Treatment  Frequency/Duration: 1x/week/ 12 weeks    Certification date from 04/02/25 to 06/25/25         See note for plan of treatment details and functional goals     MIRANDA ZACARIAS                         I CERTIFY THE NEED FOR THESE SERVICES FURNISHED UNDER        THIS PLAN OF TREATMENT AND WHILE UNDER MY CARE     (Physician attestation of this document indicates review and certification of the therapy plan).              Referring Provider:  Kay Phillips    Initial Assessment  See Epic Evaluation- Start of Care Date: 04/02/25

## 2025-04-05 SDOH — HEALTH STABILITY: PHYSICAL HEALTH: ON AVERAGE, HOW MANY DAYS PER WEEK DO YOU ENGAGE IN MODERATE TO STRENUOUS EXERCISE (LIKE A BRISK WALK)?: 6 DAYS

## 2025-04-05 SDOH — HEALTH STABILITY: PHYSICAL HEALTH: ON AVERAGE, HOW MANY MINUTES DO YOU ENGAGE IN EXERCISE AT THIS LEVEL?: 40 MIN

## 2025-04-05 ASSESSMENT — SOCIAL DETERMINANTS OF HEALTH (SDOH): HOW OFTEN DO YOU GET TOGETHER WITH FRIENDS OR RELATIVES?: ONCE A WEEK

## 2025-04-08 DIAGNOSIS — F41.1 GENERALIZED ANXIETY DISORDER: ICD-10-CM

## 2025-04-08 DIAGNOSIS — F43.21 ADJUSTMENT DISORDER WITH DEPRESSED MOOD: ICD-10-CM

## 2025-04-09 NOTE — TELEPHONE ENCOUNTER
Medication requested: FLUoxetine (PROZAC) 20 MG capsule   Last office visit: 12/20/24  [facility] appointments: 4/10/25  Medication last refilled: 1/7/25; 90 + 0 refills  Last qualifying labs:    1/6/2025  2:11 PM   PHQ-9 / NICOLETTE-7 Scores 8/2015 to present    NICOLETTE-7 Score DocFlow 3 (SD)       1/6/2025  2:10 PM   PHQ-9 / NICOLETTE-7 Scores 8/2015 to present    PHQ-9 Score DocFlow 3 (SD)     Prescription approved per [facility] Refill Protocol.    Rhys WATTS, RN  04/09/25 9:08 AM

## 2025-04-10 ENCOUNTER — OFFICE VISIT (OUTPATIENT)
Dept: FAMILY MEDICINE | Facility: CLINIC | Age: 70
End: 2025-04-10
Payer: COMMERCIAL

## 2025-04-10 VITALS
RESPIRATION RATE: 14 BRPM | HEART RATE: 68 BPM | WEIGHT: 173 LBS | HEIGHT: 68 IN | TEMPERATURE: 98.2 F | BODY MASS INDEX: 26.22 KG/M2 | SYSTOLIC BLOOD PRESSURE: 120 MMHG | OXYGEN SATURATION: 96 % | DIASTOLIC BLOOD PRESSURE: 80 MMHG

## 2025-04-10 DIAGNOSIS — Z00.00 ENCOUNTER FOR MEDICARE ANNUAL WELLNESS EXAM: Primary | ICD-10-CM

## 2025-04-10 DIAGNOSIS — E11.9 TYPE 2 DIABETES MELLITUS WITHOUT COMPLICATION, WITHOUT LONG-TERM CURRENT USE OF INSULIN (H): ICD-10-CM

## 2025-04-10 DIAGNOSIS — F43.21 ADJUSTMENT DISORDER WITH DEPRESSED MOOD: ICD-10-CM

## 2025-04-10 DIAGNOSIS — F41.1 GENERALIZED ANXIETY DISORDER: ICD-10-CM

## 2025-04-10 DIAGNOSIS — H90.3 ASYMMETRICAL SENSORINEURAL HEARING LOSS: ICD-10-CM

## 2025-04-10 DIAGNOSIS — Z23 NEED FOR VACCINATION: ICD-10-CM

## 2025-04-10 DIAGNOSIS — E78.5 HYPERLIPIDEMIA LDL GOAL <100: ICD-10-CM

## 2025-04-10 DIAGNOSIS — L71.0 PERIORAL DERMATITIS: ICD-10-CM

## 2025-04-10 DIAGNOSIS — I50.9 HEART FAILURE, UNSPECIFIED HF CHRONICITY, UNSPECIFIED HEART FAILURE TYPE (H): ICD-10-CM

## 2025-04-10 DIAGNOSIS — N95.8 GENITOURINARY SYNDROME OF MENOPAUSE: ICD-10-CM

## 2025-04-10 DIAGNOSIS — E11.3293 MILD NONPROLIFERATIVE DIABETIC RETINOPATHY OF BOTH EYES WITHOUT MACULAR EDEMA ASSOCIATED WITH TYPE 2 DIABETES MELLITUS (H): ICD-10-CM

## 2025-04-10 DIAGNOSIS — I48.0 PAROXYSMAL ATRIAL FIBRILLATION (H): ICD-10-CM

## 2025-04-10 RX ORDER — METRONIDAZOLE 10 MG/G
GEL TOPICAL DAILY
Qty: 60 G | Refills: 3 | Status: SHIPPED | OUTPATIENT
Start: 2025-04-10

## 2025-04-10 RX ORDER — GABAPENTIN 100 MG/1
100-200 CAPSULE ORAL AT BEDTIME
Qty: 200 CAPSULE | Refills: 2 | Status: SHIPPED | OUTPATIENT
Start: 2025-04-10

## 2025-04-10 RX ORDER — DAPAGLIFLOZIN 10 MG/1
10 TABLET, FILM COATED ORAL EVERY MORNING
Qty: 100 TABLET | Refills: 2 | Status: SHIPPED | OUTPATIENT
Start: 2025-04-10

## 2025-04-10 NOTE — PROGRESS NOTES
Preventive Care Visit  [facility]  Nannette Gallagher MD, Family Medicine  Apr 10, 2025      Assessment & Plan     Won was seen today for physical and recheck medication.    Diagnoses and all orders for this visit:    Encounter for Medicare annual wellness exam    Perioral dermatitis  -     metroNIDAZOLE (METROGEL) 1 % external gel; Apply topically daily.    Genitourinary syndrome of menopause  -     gabapentin (NEURONTIN) 100 MG capsule; Take 1-2 capsules (100-200 mg) by mouth at bedtime.    Type 2 diabetes mellitus without complication, without long-term current use of insulin (H)  -     dapagliflozin (FARXIGA) 10 MG TABS tablet; Take 1 tablet (10 mg) by mouth every morning.    Adjustment disorder with depressed mood    Hyperlipidemia LDL goal <100    Generalized anxiety disorder    Asymmetrical sensorineural hearing loss  -     Adult Audiology  Referral; Future    Need for vaccination  -     COVID-19 12+ (MODERNA)      Continue current medications.    Trial of topical metronidazole for perioral dermatitis - would recommend doxycycline x 8 weeks if no improvement with topical    Mood is stable - will contact our office if further resources needed.    Follow-up in 6 months, sooner with concerns.        Counseling  Appropriate preventive services were addressed with this patient via screening, questionnaire, or discussion as appropriate for fall prevention, nutrition, physical activity, Tobacco-use cessation, social engagement, weight loss and cognition.  Checklist reviewing preventive services available has been given to the patient.  Reviewed patient's diet, addressing concerns and/or questions.   Discussed possible causes of fatigue. The patient was provided with written information regarding signs of hearing loss.   Information on urinary incontinence and treatment options given to patient.       Subjective   Won is a 70 year old, presenting for the following:  Physical (Acne or dermatitis  around mouth? Cardiology -- prior is moving departments, back to Dr. Gunn? Hearing getting worse -- audiology referral. Night vision getting worse, eye exam in one month. Cardiac -- output on peloton down 20%, 3 weeks of SOB, fatigue, chest pressure 3/10 - spoke with Cardiologist, BP was elevated. Still having some SOB and fatigue, describes as minor. Bright red/pink in stool every couple of weeks -- hemorrhoid?) and Recheck Medication (Calcium supp? OTC.)      HPI    Gynecological history:  Menstrual/PMS/menopausal symptoms: no periods, postmenopausal  Last Pap:  per 2022 notes: - had abnormal pap smears in the past that required cryo therapy. Remembers having 10 years worth of normal pap smears afterwards and was told that she no longer needs pap smears   History of abnormal Paps: no    Other health-related habits:  Physical activity:  peloton/walking, weight lifting 5 days/week (20 lb)  Mood: see below    Health Maintenance Due   Topic Date Due    DIABETIC FOOT EXAM  03/15/2025    COVID-19 Vaccine (9 - 2024-25 season) 04/01/2025     Fluoxetine feels like it is helpful  Finding zoila every day    Shortness of breath and fatigue - seems to be related to high blood pressure  Restarted diltiazem and losartan  Stopped metoprolol - had exercise intolerance    Vaginal estrogen - working fine    Small amount of blood rectally.  Sees every 2-3 weeks  Normal bowel movements  Has had symptoms previously    Face rash for the past several months  Treated as acne - used for the past several months  Cleaning, exfoliating - using   Using mupirocin for nose - cleared that up but no further symptoms    Gabapentin - for neuropathy and for vaginal symptoms/itching at night.     Allergies - sneezing, runny nose. Xyzal and montelukast. Nasal steroids cause ageusia. Reasonably well controlled.    Advance Care Planning  Patient has a Health Care Directive on file  Advance care planning document is on file and is current.      4/5/2025    General Health   How would you rate your overall physical health? Good   Feel stress (tense, anxious, or unable to sleep) Only a little   (!) STRESS CONCERN      4/5/2025   Nutrition   Diet: Regular (no restrictions)         4/5/2025   Exercise   Days per week of moderate/strenous exercise 6 days   Average minutes spent exercising at this level 40 min         4/5/2025   Social Factors   Frequency of gathering with friends or relatives Once a week   Worry food won't last until get money to buy more No   Food not last or not have enough money for food? No   Do you have housing? (Housing is defined as stable permanent housing and does not include staying ouside in a car, in a tent, in an abandoned building, in an overnight shelter, or couch-surfing.) Yes   Are you worried about losing your housing? No   Lack of transportation? No   Unable to get utilities (heat,electricity)? No         4/10/2025   Fall Risk   Gait Speed Test (Document in seconds) 3.2   Gait Speed Test Interpretation Less than or equal to 5.00 seconds - PASS          4/5/2025   Activities of Daily Living- Home Safety   Needs help with the following daily activites None of the above   Safety concerns in the home None of the above         4/5/2025   Dental   Dentist two times every year? Yes         4/5/2025   Hearing Screening   Hearing concerns? (!) I NEED TO ASK PEOPLE TO SPEAK UP OR REPEAT THEMSELVES.    (!) IT'S HARD TO FOLLOW A CONVERSATION IN A NOISY RESTAURANT OR CROWDED ROOM.    (!) TROUBLE UNDESTANDING A SPEAKER IN A PUBLIC MEETING OR [employer] SERVICE.    (!) TROUBLE UNDERSTANDING SOFT OR WHISPERED SPEECH.       Multiple values from one day are sorted in reverse-chronological order         4/5/2025   Driving Risk Screening   Patient/family members have concerns about driving No         4/5/2025   General Alertness/Fatigue Screening   Have you been more tired than usual lately? (!) YES         4/5/2025   Urinary Incontinence Screening    Bothered by leaking urine in past 6 months Yes           3/8/2024   TB Screening   Were you born outside of the [place]? No           Today's PHQ-2 Score:       4/9/2025    10:50 AM   PHQ-2 ( 1999 Pfizer)   Q1: Little interest or pleasure in doing things 0   Q2: Feeling down, depressed or hopeless 0   PHQ-2 Score 0    Q1: Little interest or pleasure in doing things Not at all   Q2: Feeling down, depressed or hopeless Not at all   PHQ-2 Score 0       Patient-reported           4/5/2025   Substance Use   Alcohol more than 3/day or more than 7/wk No   Do you have a current opioid prescription? No   How severe/bad is pain from 1 to 10? 1/10   Do you use any other substances recreationally? No     Social History     Tobacco Use    Smoking status: Never     Passive exposure: Never    Smokeless tobacco: Never   Vaping Use    Vaping status: Never Used   Substance Use Topics    Alcohol use: Yes     Alcohol/week: 1.0 standard drink of alcohol     Types: 1 Standard drinks or equivalent per week     Comment: less than 2 drinks a week    Drug use: Not Currently     Comment: gummy 1x/month         7/11/2024   LAST FHS-7 RESULTS   Any relative bilateral breast cancer No   Any male have breast cancer No   Any ONE woman have BOTH breast AND ovarian cancer No   Any woman with breast cancer before 50yrs No   2 or more relatives with breast AND/OR ovarian cancer No       Mammogram Screening - Mammogram every 1-2 years updated in Health Maintenance based on mutual decision making      History of abnormal Pap smear: see above - remote history of abnormal. 10 years of normal pap smears, was told she was finished with screening       ASCVD Risk   The 10-year ASCVD risk score (Nick COLÓN, et al., 2019) is: 16.8%    Values used to calculate the score:      Age: 70 years      Sex: Female      Is Non- : No      Diabetic: Yes      Tobacco smoker: No      Systolic Blood Pressure: 120 mmHg      Is BP treated: Yes       HDL Cholesterol: 51 mg/dL      Total Cholesterol: 140 mg/dL      Reviewed and updated as needed this visit by Provider   Tobacco  Allergies  Meds  Problems  Med Hx  Surg Hx  Fam Hx  Soc   Hx Sexual Activity          Current providers sharing in care for this patient include:  Patient Care Team:  Nannette Gallagher MD as PCP - General (Family Medicine)  Andre Rodriguez MD as Resident (OB/Gyn)  Ghanshyam Ramirez MD as MD (Ophthalmology)  Gabriela Bloom MD as MD (Ophthalmology)  Shantelle Marti MD as MD (Ophthalmology)  Paulina Hurst, RN as Specialty Care Coordinator (Cardiology)  Niharika Olivo APRN CNP as Nurse Practitioner (Cardiology)  Karyn Matthews MD as Resident (Student in organized health care education/training program)  Lita Crespo APRN CNP as Nurse Practitioner (Anesthesiology)  Shantelle Marti MD as Assigned Surgical Provider  Priya Lindsay PA-C as Physician Assistant (Dermatology)  Radha Richards MD as Lu Rodriguez MA as Audiologist (Audiology)  Nannette Gallagher MD as Assigned PCP  Niharika Olivo APRN CNP as Assigned Heart and Vascular Provider  Driss Garza MD as Physician (Clinical Cardiac Electrophysiology)  Driss Garza MD as Physician (Clinical Cardiac Electrophysiology)  Yumi Viera MD as Physician (Neurology)  Kay Phillips PA-C as Assigned Musculoskeletal Provider  Mitali Nicole MD as Assigned Heart and Vascular Surgical Provider  Priya Culver PA-C as Assigned Dermatology Provider    The following health maintenance items are reviewed in Epic and correct as of today:  Health Maintenance   Topic Date Due    DIABETIC FOOT EXAM  03/15/2025    COVID-19 Vaccine (9 - 2024-25 season) 04/01/2025    COLORECTAL CANCER SCREENING  01/24/2026 (Originally 1/24/1965)    DTAP/TDAP/TD IMMUNIZATION (1 - Tdap) 10/12/2029 (Originally 12/13/2019)    ALT  06/25/2025    A1C  06/26/2025    MAMMO SCREENING  07/11/2025    EYE  "EXAM  09/13/2025    BMP  09/26/2025    MICROALBUMIN  10/01/2025    LIPID  03/26/2026    CBC  03/26/2026    MEDICARE ANNUAL WELLNESS VISIT  04/10/2026    FALL RISK ASSESSMENT  04/10/2026    HF ACTION PLAN  10/01/2027    ADVANCE CARE PLANNING  05/30/2029    DEXA  07/11/2029    TSH W/FREE T4 REFLEX  Completed    HEPATITIS C SCREENING  Completed    PHQ-2 (once per calendar year)  Completed    INFLUENZA VACCINE  Completed    Pneumococcal Vaccine: 50+ Years  Completed    ZOSTER IMMUNIZATION  Completed    RSV VACCINE  Completed    HPV IMMUNIZATION  Aged Out    MENINGITIS IMMUNIZATION  Aged Out            Objective    Exam  /80   Pulse 68   Temp 98.2  F (36.8  C) (Temporal)   Resp 14   Ht 1.73 m (5' 8.11\")   Wt 78.5 kg (173 lb)   SpO2 96%   BMI 26.22 kg/m     Estimated body mass index is 26.22 kg/m  as calculated from the following:    Height as of this encounter: 1.73 m (5' 8.11\").    Weight as of this encounter: 78.5 kg (173 lb).    Physical Exam  General: Well-appearing in NAD   HEENT: Normocephalic, atraumatic. Mucus membranes moist.   Resp: No respiratory distress   MSK: No peripheral edema.   Skin: Papular rash to face around mouth and nasolabial folds, L > R  Psych: Appropriate affect. Mood is good   Diabetic foot exam: normal DP and PT pulses, no trophic changes or ulcerative lesions, normal sensory exam, and normal monofilament exam        3/15/2024   Mini Cog   Clock Draw Score 2 Normal   3 Item Recall 3 objects recalled   Mini Cog Total Score 5     Signed Electronically by: Nannette Gallagher MD    "

## 2025-04-10 NOTE — NURSING NOTE
"70 year old  Chief Complaint   Patient presents with    Physical     Acne or dermatitis around mouth? Cardiology -- prior is moving departments, back to Dr. Gunn? Hearing getting worse -- audiology referral. Night vision getting worse, eye exam in one month. Cardiac -- output on peloton down 20%, 3 weeks of SOB, fatigue, chest pressure 3/10 - spoke with Cardiologist, BP was elevated. Still having some SOB and fatigue, describes as minor. Bright red/pink in stool every couple of weeks -- hemorrhoid?    Recheck Medication     Calcium supp? OTC.       Blood pressure 120/80, pulse 68, temperature 98.2  F (36.8  C), temperature source Temporal, resp. rate 14, height 1.73 m (5' 8.11\"), weight 78.5 kg (173 lb), SpO2 96%, not currently breastfeeding. Body mass index is 26.22 kg/m .  Patient Active Problem List   Diagnosis    Type 2 diabetes mellitus without complication, without long-term current use of insulin (H)    Coronary artery calcification    Nonrheumatic aortic valve stenosis    Ascending aortic aneurysm    Hyperlipidemia LDL goal <100    Essential hypertension    Screening for colon cancer    Genitourinary syndrome of menopause    Environmental allergies    Seasonal allergic rhinitis due to pollen    Status post coronary angiogram    Coronary artery disease involving native heart without angina pectoris, unspecified vessel or lesion type    CAD (coronary artery disease)    Severe aortic stenosis    Coronary artery disease involving native coronary artery without angina pectoris    Coronary artery disease involving native coronary artery of native heart without angina pectoris    Coronary artery disease due to calcified coronary lesion    Paroxysmal atrial fibrillation (H)    S/P CABG x 2    S/P aortic valve replacement    Spondylolisthesis, grade 1    Lumbar radiculopathy       Wt Readings from Last 2 Encounters:   04/10/25 78.5 kg (173 lb)   03/03/25 77.1 kg (170 lb)     BP Readings from Last 3 Encounters: "   04/10/25 120/80   03/03/25 131/83   12/11/24 124/70         Current Outpatient Medications   Medication Sig Dispense Refill    aspirin 81 MG EC tablet Take 81 mg by mouth every evening      atorvastatin (LIPITOR) 40 MG tablet Take 1 tablet (40 mg) by mouth every evening. 90 tablet 3    blood glucose (ACCU-CHEK GUIDE) test strip Use to test blood sugar one time daily or as directed. 100 strip 11    blood glucose monitoring (SOFTCLIX) lancets Use to test blood sugar 1 time daily or as directed. 100 each 3    cyanocobalamin (VITAMIN B-12) 100 MCG tablet Take 1 tablet (100 mcg) by mouth every evening. 90 tablet 3    diltiazem (CARDIZEM) 60 MG tablet Take 1 tablet (60 mg) by mouth 2 times daily. 180 tablet 1    estradiol (ESTRACE) 0.1 MG/GM vaginal cream Place vaginally twice a week. 1/2 applicator twice weekly into the vagina 85 g 3    FARXIGA 10 MG TABS tablet Take 1 tablet (10 mg) by mouth every morning. 100 tablet 0    fenofibrate (TRIGLIDE/LOFIBRA) 160 MG tablet Take 1 tablet (160 mg) by mouth every evening. 90 tablet 0    FLUoxetine (PROZAC) 20 MG capsule TAKE 1 CAPSULE DAILY 90 capsule 2    gabapentin (NEURONTIN) 100 MG capsule Take 1 capsule (100 mg) by mouth at bedtime. 200 capsule 2    levocetirizine (XYZAL) 5 MG tablet Take 1 tablet (5 mg) by mouth every evening. 90 tablet 3    losartan (COZAAR) 50 MG tablet Take 1 tablet (50 mg) by mouth daily. 90 tablet 1    metFORMIN (GLUCOPHAGE) 500 MG tablet Take 2 tablets (1,000 mg) by mouth 2 times daily (with meals). 360 tablet 2    montelukast (SINGULAIR) 10 MG tablet Take 1 tablet (10 mg) by mouth at bedtime. 100 tablet 2    multivitamin w/minerals (MULTI-VITAMIN) tablet Take 1 tablet by mouth every evening      Semaglutide, 1 MG/DOSE, (OZEMPIC, 1 MG/DOSE,) 4 MG/3ML pen INJECT SUBCUTANEOUSLY 1 MG  EVERY WEEK 9 mL 2    Vitamin D, Cholecalciferol, 10 MCG (400 UNIT) TABS Take 1 tablet by mouth every evening. 90 tablet 3     No current facility-administered medications  "for this visit.       Social History     Tobacco Use    Smoking status: Never     Passive exposure: Never    Smokeless tobacco: Never   Vaping Use    Vaping status: Never Used   Substance Use Topics    Alcohol use: Yes     Alcohol/week: 1.0 standard drink of alcohol     Types: 1 Standard drinks or equivalent per week     Comment: less than 2 drinks a week    Drug use: Not Currently     Comment: gummy 1x/month       Health Maintenance Due   Topic Date Due    DTAP/TDAP/TD IMMUNIZATION (1 - Tdap) 12/13/2019    DIABETIC FOOT EXAM  03/15/2025    COVID-19 Vaccine (9 - 2024-25 season) 04/01/2025    MEDICARE ANNUAL WELLNESS VISIT  03/15/2025       No results found for: \"PAP\"      April 10, 2025 3:23 PM    "

## 2025-04-10 NOTE — PATIENT INSTRUCTIONS
Good to see you today!    Labs look great.    Start metronidazole gel to face daily for perioral dermatitis.    No other medication changes.     Follow-up in 6 months.    Patient Education   Preventive Care Advice   This is general advice given by our system to help you stay healthy. However, your care team may have specific advice just for you. Please talk to your care team about your preventive care needs.  Nutrition  Eat 5 or more servings of fruits and vegetables each day.  Try wheat bread, brown rice and whole grain pasta (instead of white bread, rice, and pasta).  Get enough calcium and vitamin D. Check the label on foods and aim for 100% of the RDA (recommended daily allowance).  Lifestyle  Exercise at least 150 minutes each week  (30 minutes a day, 5 days a week).  Do muscle strengthening activities 2 days a week. These help control your weight and prevent disease.  No smoking.  Wear sunscreen to prevent skin cancer.  Have a dental exam and cleaning every 6 months.  Yearly exams  See your health care team every year to talk about:  Any changes in your health.  Any medicines your care team has prescribed.  Preventive care, family planning, and ways to prevent chronic diseases.  Shots (vaccines)   HPV shots (up to age 26), if you've never had them before.  Hepatitis B shots (up to age 59), if you've never had them before.  COVID-19 shot: Get this shot when it's due.  Flu shot: Get a flu shot every year.  Tetanus shot: Get a tetanus shot every 10 years.  Pneumococcal, hepatitis A, and RSV shots: Ask your care team if you need these based on your risk.  Shingles shot (for age 50 and up)  General health tests  Diabetes screening:  Starting at age 35, Get screened for diabetes at least every 3 years.  If you are younger than age 35, ask your care team if you should be screened for diabetes.  Cholesterol test: At age 39, start having a cholesterol test every 5 years, or more often if advised.  Bone density scan  (DEXA): At age 50, ask your care team if you should have this scan for osteoporosis (brittle bones).  Hepatitis C: Get tested at least once in your life.  STIs (sexually transmitted infections)  Before age 24: Ask your care team if you should be screened for STIs.  After age 24: Get screened for STIs if you're at risk. You are at risk for STIs (including HIV) if:  You are sexually active with more than one person.  You don't use condoms every time.  You or a partner was diagnosed with a sexually transmitted infection.  If you are at risk for HIV, ask about PrEP medicine to prevent HIV.  Get tested for HIV at least once in your life, whether you are at risk for HIV or not.  Cancer screening tests  Cervical cancer screening: If you have a cervix, begin getting regular cervical cancer screening tests starting at age 21.  Breast cancer scan (mammogram): If you've ever had breasts, begin having regular mammograms starting at age 40. This is a scan to check for breast cancer.  Colon cancer screening: It is important to start screening for colon cancer at age 45.  Have a colonoscopy test every 10 years (or more often if you're at risk) Or, ask your provider about stool tests like a FIT test every year or Cologuard test every 3 years.  To learn more about your testing options, visit:   .  For help making a decision, visit:   https://bit.ly/tl15488.  Prostate cancer screening test: If you have a prostate, ask your care team if a prostate cancer screening test (PSA) at age 55 is right for you.  Lung cancer screening: If you are a current or former smoker ages 50 to 80, ask your care team if ongoing lung cancer screenings are right for you.  For informational purposes only. Not to replace the advice of your health care provider. Copyright   2023 [employer]. All rights reserved. Clinically reviewed by the  [employer] [place] [facility]. Always Prepped [id number] - REV 01/24.  Preventing Falls: Care  Instructions  Injuries and health problems such as trouble walking or poor eyesight can increase your risk of falling. So can some medicines. But there are things you can do to help prevent falls. You can exercise to get stronger. You can also arrange your home to make it safer.    Talk to your doctor about the medicines you take. Ask if any of them increase the risk of falls and whether they can be changed or stopped.   Try to exercise regularly. It can help improve your strength and balance. This can help lower your risk of falling.         Practice fall safety and prevention.   Wear low-heeled shoes that fit well and give your feet good support. Talk to your doctor if you have foot problems that make this hard.  Carry a cellphone or wear a medical alert device that you can use to call for help.  Use stepladders instead of chairs to reach high objects. Don't climb if you're at risk for falls. Ask for help, if needed.  Wear the correct eyeglasses, if you need them.        Make your home safer.   Remove rugs, cords, clutter, and furniture from walkways.  Keep your house well lit. Use night-lights in hallways and bathrooms.  Install and use sturdy handrails on stairways.  Wear nonskid footwear, even inside. Don't walk barefoot or in socks without shoes.        Be safe outside.   Use handrails, curb cuts, and ramps whenever possible.  Keep your hands free by using a shoulder bag or backpack.  Try to walk in well-lit areas. Watch out for uneven ground, changes in pavement, and debris.  Be careful in the winter. Walk on the grass or gravel when sidewalks are slippery. Use de-icer on steps and walkways. Add non-slip devices to shoes.    Put grab bars and nonskid mats in your shower or tub and near the toilet. Try to use a shower chair or bath bench when bathing.   Get into a tub or shower by putting in your weaker leg first. Get out with your strong side first. Have a phone or medical alert device in the bathroom with  "you.   Where can you learn more?  Go to https://www.[employer].net/patiented  Enter G117 in the search box to learn more about \"Preventing Falls: Care Instructions.\"  Current as of: July 31, 2024  Content Version: 14.4    1426-8001 [employer].   Care instructions adapted under license by your healthcare professional. If you have questions about a medical condition or this instruction, always ask your healthcare professional. [employer] disclaims any warranty or liability for your use of this information.    Hearing Loss: Care Instructions  Overview     Hearing loss is a sudden or slow decrease in how well you hear. It can range from slight to profound. Permanent hearing loss can occur with aging. It also can happen when you are exposed long-term to loud noise. Examples include listening to loud music, riding motorcycles, or being around other loud machines.  Hearing loss can affect your work and home life. It can make you feel lonely or depressed. You may feel that you have lost your independence. But hearing aids and other devices can help you hear better and feel connected to others.  Follow-up care is a key part of your treatment and safety. Be sure to make and go to all appointments, and call your doctor if you are having problems. It's also a good idea to know your test results and keep a list of the medicines you take.  How can you care for yourself at home?  Avoid loud noises whenever possible. This helps keep your hearing from getting worse.  Always wear hearing protection around loud noises.  Wear a hearing aid as directed.  A professional can help you pick a hearing aid that will work best for you.  You can also get hearing aids over the counter for mild to moderate hearing loss.  Have hearing tests as your doctor suggests. They can show whether your hearing has changed. Your hearing aid may need to be adjusted.  Use other devices as needed. These may include:  Telephone amplifiers " "and hearing aids that can connect to a television, stereo, radio, or microphone.  Devices that use lights or vibrations. These alert you to the doorbell, a ringing telephone, or a baby monitor.  Television closed-captioning. This shows the words at the bottom of the screen. Most new TVs can do this.  TTY (text telephone). This lets you type messages back and forth on the telephone instead of talking or listening. These devices are also called TDD. When messages are typed on the keyboard, they are sent over the phone line to a receiving TTY. The message is shown on a monitor.  Use text messaging, social media, and email if it is hard for you to communicate by telephone.  Try to learn a listening technique called speechreading. It is not lipreading. You pay attention to people's gestures, expressions, posture, and tone of voice. These clues can help you understand what a person is saying. Face the person you are talking to, and have them face you. Make sure the lighting is good. You need to see the other person's face clearly.  Think about counseling if you need help to adjust to your hearing loss.  When should you call for help?  Watch closely for changes in your health, and be sure to contact your doctor if:    You think your hearing is getting worse.     You have new symptoms, such as dizziness or nausea.   Where can you learn more?  Go to https://www.emotion.me.net/patiented  Enter R798 in the search box to learn more about \"Hearing Loss: Care Instructions.\"  Current as of: October 27, 2024  Content Version: 14.4    8852-7865 [employer].   Care instructions adapted under license by your healthcare professional. If you have questions about a medical condition or this instruction, always ask your healthcare professional. [employer] disclaims any warranty or liability for your use of this information.       "

## 2025-04-22 ENCOUNTER — THERAPY VISIT (OUTPATIENT)
Dept: PHYSICAL THERAPY | Facility: CLINIC | Age: 70
End: 2025-04-22
Payer: COMMERCIAL

## 2025-04-22 DIAGNOSIS — H40.003 GLAUCOMA SUSPECT OF BOTH EYES: Primary | ICD-10-CM

## 2025-04-22 DIAGNOSIS — M54.16 LUMBAR RADICULOPATHY: ICD-10-CM

## 2025-04-22 DIAGNOSIS — M43.10 SPONDYLOLISTHESIS, GRADE 1: Primary | ICD-10-CM

## 2025-04-22 PROCEDURE — 97110 THERAPEUTIC EXERCISES: CPT | Mod: GP

## 2025-04-22 PROCEDURE — 97112 NEUROMUSCULAR REEDUCATION: CPT | Mod: GP

## 2025-04-23 ENCOUNTER — OFFICE VISIT (OUTPATIENT)
Dept: OPHTHALMOLOGY | Facility: CLINIC | Age: 70
End: 2025-04-23
Attending: OPHTHALMOLOGY
Payer: COMMERCIAL

## 2025-04-23 ENCOUNTER — OFFICE VISIT (OUTPATIENT)
Dept: AUDIOLOGY | Facility: CLINIC | Age: 70
End: 2025-04-23
Payer: COMMERCIAL

## 2025-04-23 DIAGNOSIS — H90.3 ASYMMETRICAL SENSORINEURAL HEARING LOSS: ICD-10-CM

## 2025-04-23 DIAGNOSIS — H25.813 COMBINED FORMS OF AGE-RELATED CATARACT OF BOTH EYES: Primary | ICD-10-CM

## 2025-04-23 DIAGNOSIS — H52.4 HYPEROPIA OF BOTH EYES WITH ASTIGMATISM AND PRESBYOPIA: ICD-10-CM

## 2025-04-23 DIAGNOSIS — E11.9 TYPE 2 DIABETES MELLITUS WITHOUT RETINOPATHY (H): ICD-10-CM

## 2025-04-23 DIAGNOSIS — H52.203 HYPEROPIA OF BOTH EYES WITH ASTIGMATISM AND PRESBYOPIA: ICD-10-CM

## 2025-04-23 DIAGNOSIS — H40.003 GLAUCOMA SUSPECT OF BOTH EYES: ICD-10-CM

## 2025-04-23 DIAGNOSIS — H52.03 HYPEROPIA OF BOTH EYES WITH ASTIGMATISM AND PRESBYOPIA: ICD-10-CM

## 2025-04-23 DIAGNOSIS — H33.322 RETINAL ROUND HOLE WITHOUT DETACHMENT, LEFT: ICD-10-CM

## 2025-04-23 DIAGNOSIS — H90.5 SENSORINEURAL HEARING LOSS OF RIGHT EAR: Primary | ICD-10-CM

## 2025-04-23 PROCEDURE — 92133 CPTRZD OPH DX IMG PST SGM ON: CPT | Performed by: OPHTHALMOLOGY

## 2025-04-23 PROCEDURE — 92014 COMPRE OPH EXAM EST PT 1/>: CPT | Performed by: OPHTHALMOLOGY

## 2025-04-23 PROCEDURE — G0463 HOSPITAL OUTPT CLINIC VISIT: HCPCS | Performed by: OPHTHALMOLOGY

## 2025-04-23 PROCEDURE — 2023F DILAT RTA XM W/O RTNOPTHY: CPT | Performed by: OPHTHALMOLOGY

## 2025-04-23 ASSESSMENT — CONF VISUAL FIELD
OD_INFERIOR_TEMPORAL_RESTRICTION: 0
OS_SUPERIOR_TEMPORAL_RESTRICTION: 0
METHOD: COUNTING FINGERS
OS_INFERIOR_NASAL_RESTRICTION: 0
OS_INFERIOR_TEMPORAL_RESTRICTION: 0
OD_SUPERIOR_NASAL_RESTRICTION: 0
OS_NORMAL: 1
OD_NORMAL: 1
OS_SUPERIOR_NASAL_RESTRICTION: 0
OD_SUPERIOR_TEMPORAL_RESTRICTION: 0
OD_INFERIOR_NASAL_RESTRICTION: 0

## 2025-04-23 ASSESSMENT — REFRACTION_WEARINGRX
OS_CYLINDER: +1.00
SPECS_TYPE: PAL
OD_SPHERE: +0.25
OS_SPHERE: +0.25
OS_AXIS: 175
OD_ADD: +2.75
OS_ADD: +2.75

## 2025-04-23 ASSESSMENT — REFRACTION_MANIFEST
OD_SPHERE: -0.25
OS_CYLINDER: +0.50
OS_SPHERE: +0.25
OD_ADD: +2.50
OD_AXIS: 093
OS_AXIS: 176
OS_ADD: +2.50
OD_CYLINDER: +0.50

## 2025-04-23 ASSESSMENT — TONOMETRY
IOP_METHOD: ICARE
OS_IOP_MMHG: 11
OD_IOP_MMHG: 11

## 2025-04-23 ASSESSMENT — EXTERNAL EXAM - LEFT EYE: OS_EXAM: NORMAL

## 2025-04-23 ASSESSMENT — VISUAL ACUITY
OS_CC: 20/25
METHOD: SNELLEN - LINEAR
METHOD_MR: OPPOSITE SIGNS ARE CORRECT.
OD_CC: 20/25
OD_CC+: -2
OS_CC+: -2

## 2025-04-23 ASSESSMENT — EXTERNAL EXAM - RIGHT EYE: OD_EXAM: NORMAL

## 2025-04-23 ASSESSMENT — SLIT LAMP EXAM - LIDS
COMMENTS: NORMAL
COMMENTS: 1+ MEIBOMIAN GLAND DYSFUNCTION

## 2025-04-23 ASSESSMENT — CUP TO DISC RATIO
OD_RATIO: 0.65
OS_RATIO: 0.5

## 2025-04-23 NOTE — PROGRESS NOTES
"HPI       Eye Exam For Diabetes    In both eyes.  Associated symptoms include glare, haloes, dryness and floaters.  Negative for eye pain, tearing and flashes.  Treatments tried include artificial tears.  Pain was noted as 0/10. Additional comments: Type 2 diabetes mellitus without retinopathy             Comments    Pt states vision is a lot worse.  No pain.  No flashes.  No change to floaters.  Pt sees \"columns of white lights\" in peripheral when she turns her head quick.  Increase in glare/haloes and is getting harder to drive at night.  PFAT's PRN.     DM 2  BS were 105 today.  Lab Results       Component                Value               Date                       A1C                      5.7                 03/26/2025                 A1C                      5.8                 10/01/2024                 A1C                      5.8                 03/15/2024                 A1C                      6.3                 09/15/2023                 A1C                      5.7                 04/24/2023              PABLO Ibarra April 23, 2025 3:18 PM              Last edited by Berenice Abebe COT on 4/23/2025  3:18 PM.         Review of systems for the eyes was negative other than the pertinent positives/negatives listed in the HPI.      Assessment & Plan    HPI:  Won Urena is a 70 year old female with history of HTN, HLD, T2DM, AAA who presents for complete eye exam. SHe is doing a little less night driving. Had some floaters.       POHx: hyperopia with astigmatism and presbyopia, operculated hole left eye, nyctalopia  PMHx: HTN, HLD, T2DM, AAA  Current Medications:   Current Outpatient Medications   Medication Sig Dispense Refill    aspirin 81 MG EC tablet Take 81 mg by mouth every evening      atorvastatin (LIPITOR) 40 MG tablet Take 1 tablet (40 mg) by mouth every evening. 90 tablet 3    blood glucose (ACCU-CHEK GUIDE) test strip Use to test blood sugar one time daily or as directed. 100 strip " 11    blood glucose monitoring (SOFTCLIX) lancets Use to test blood sugar 1 time daily or as directed. 100 each 3    cyanocobalamin (VITAMIN B-12) 100 MCG tablet Take 1 tablet (100 mcg) by mouth every evening. 90 tablet 3    dapagliflozin (FARXIGA) 10 MG TABS tablet Take 1 tablet (10 mg) by mouth every morning. 100 tablet 2    diltiazem (CARDIZEM) 60 MG tablet Take 1 tablet (60 mg) by mouth 2 times daily. 180 tablet 1    estradiol (ESTRACE) 0.1 MG/GM vaginal cream Place vaginally twice a week. 1/2 applicator twice weekly into the vagina 85 g 3    fenofibrate (TRIGLIDE/LOFIBRA) 160 MG tablet Take 1 tablet (160 mg) by mouth every evening. 90 tablet 0    FLUoxetine (PROZAC) 20 MG capsule TAKE 1 CAPSULE DAILY 90 capsule 2    gabapentin (NEURONTIN) 100 MG capsule Take 1-2 capsules (100-200 mg) by mouth at bedtime. 200 capsule 2    levocetirizine (XYZAL) 5 MG tablet Take 1 tablet (5 mg) by mouth every evening. 90 tablet 3    losartan (COZAAR) 50 MG tablet Take 1 tablet (50 mg) by mouth daily. 90 tablet 1    metFORMIN (GLUCOPHAGE) 500 MG tablet Take 2 tablets (1,000 mg) by mouth 2 times daily (with meals). 360 tablet 2    metroNIDAZOLE (METROGEL) 1 % external gel Apply topically daily. 60 g 3    montelukast (SINGULAIR) 10 MG tablet Take 1 tablet (10 mg) by mouth at bedtime. 100 tablet 2    multivitamin w/minerals (MULTI-VITAMIN) tablet Take 1 tablet by mouth every evening      Semaglutide, 1 MG/DOSE, (OZEMPIC, 1 MG/DOSE,) 4 MG/3ML pen INJECT SUBCUTANEOUSLY 1 MG  EVERY WEEK 9 mL 2    Vitamin D, Cholecalciferol, 10 MCG (400 UNIT) TABS Take 1 tablet by mouth every evening. 90 tablet 3     No current facility-administered medications for this visit.     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 Discussed possible surgical intervention, however patient would like to attempt glasses first. The patient will monitor for vision changes and contact us with any decrease in vision. Recheck next visit. May consider cataract surgery at any time    (E11.9) Type 2 diabetes mellitus without retinopathy (H)  (primary encounter diagnosis)  Most recent HgBA1c 5.7 on 3/26/25  No background diabetic retinopathy or neovascularization noted on today's exam.  Discussed ocular and systemic benefits of blood pressure and blood sugar control.  Return in 1 year for full exam with dilation or sooner if changes to vision.       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  The patient may wish to update the glasses if the lenses are scratched or the frames are too small.  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  Based on c/d ratio  Maximum intraocular pressure 15/15  Family history: Yes Dad  Trauma history: No  Gonioscopy:   Pachmetry:   Treatment History:   None  Today's testing:  OCT nerve fiber layer 04/23/25 :   Right eye - G(g) 92 NI (g) 78 TI (g) 132 NS (g) 88 TS (g) 138      Left eye - G(g) 100 NI (g) 91 TI (g) 131 NS (g) 92 TS (g) 152  OCT nerve fiber layer 05/22/24   Right eye - G(g) 91 NI (g) 78 TI (g) 125 NS (g) 89 TS (g) 138      Left eye - G(g) 99 NI (g) 94 TI (g) 136 NS (g) 91 TS (g) 140  OCT nerve fiber layer 05/17/23:   Right eye - G(g) 96 NI (g) 84 TI (g) 136 NS (g) 87 TS (g) 139      Left eye - G(g) 93 NI (g) 94 TI (g) 137 NS (g) 88 TS (g) 134    Mild thinning inferiorly right eye vs previous but still within normal limits, IOP within normal limits  Continue observation  for now given normal IOP and oct retinal nerve fiber layer  Will check visual field next year for baseline      Return in about 1 year (around 4/23/2026) for Annual Visit-v/t/d/MRx, OCT RNFL, 24-2 VF.        Duane Manuel MD     Attending Physician Attestation:  Complete documentation of historical and exam elements from today's encounter can be found in the full encounter summary report (not reduplicated in this progress note).  I personally obtained the chief complaint(s) and history of present illness.  I confirmed and edited as necessary the review of systems, past medical/surgical history, family history, social history, and examination findings as documented by others; and I examined the patient myself.  I personally reviewed the relevant tests, images, and reports as documented above.  I formulated and edited as necessary the assessment and plan and discussed the findings and management plan with the patient and family. - Duane Manuel MD

## 2025-04-23 NOTE — NURSING NOTE
"Chief Complaints and History of Present Illnesses   Patient presents with    Eye Exam For Diabetes     Type 2 diabetes mellitus without retinopathy     Chief Complaint(s) and History of Present Illness(es)       Eye Exam For Diabetes              Laterality: both eyes    Associated symptoms: glare, haloes, dryness and floaters.  Negative for eye pain, tearing and flashes    Treatments tried: artificial tears    Pain scale: 0/10    Comments: Type 2 diabetes mellitus without retinopathy              Comments    Pt states vision is a lot worse.  No pain.  No flashes.  No change to floaters.  Pt sees \"columns of white lights\" in peripheral when she turns her head quick.  Increase in glare/haloes and is getting harder to drive at night.  PFAT's PRN.     DM 2  BS were 105 today.  Lab Results       Component                Value               Date                       A1C                      5.7                 03/26/2025                 A1C                      5.8                 10/01/2024                 A1C                      5.8                 03/15/2024                 A1C                      6.3                 09/15/2023                 A1C                      5.7                 04/24/2023              PABLO Ibarra April 23, 2025 3:18 PM                       "

## 2025-04-23 NOTE — PROGRESS NOTES
AUDIOLOGY REPORT    SUBJECTIVE:  Won Urena is a 70 year old female who was seen in the Audiology Clinic at the [facility] for audiologic evaluation, referred by Nannette Gallagher M.D. The patient reports a known right hearing loss for which she utilizes monaural amplification. Also reports decreasing hearing bilaterally, right tinnitus and balance issues. Denies pain, vertigo or ear surgeries.     OBJECTIVE:  Otoscopic exam indicates ears are clear of cerumen bilaterally     Pure Tone Thresholds assessed using conventional audiometry with good  reliability from 250-8000 Hz bilaterally using insert earphones and circumaural headphones     RIGHT:  mild sloping to severe sensorineural hearing loss    LEFT:    Normal hearing sensitivity    Tympanogram:    RIGHT: normal eardrum mobility    LEFT:   normal eardrum mobility    Reflexes (reported by stimulus ear):  RIGHT: Ipsilateral is elevated at frequencies tested  RIGHT: Contralateral is elevated at frequencies tested  LEFT:   Ipsilateral is present at normal levels  LEFT:   Contralateral is present at normal levels      Speech Reception Threshold:    RIGHT: 50 dB HL    LEFT:   15 dB HL  Word Recognition Score:     RIGHT: 96% at 90 dB HL using NU-6 recorded word list.    LEFT:   100% at 55 dB HL using NU-6 recorded word list.      ASSESSMENT:   Today s results were discussed with the patient in detail.     PLAN:    Patient was counseled regarding hearing loss and impact on communication. It is recommended that the patient return to managing audiologist for hearing aid care.  Please call this clinic with questions regarding these results or recommendations.        Dario Marcus, [facility]  Licensed Audiologist  [place] License #[id number]

## 2025-06-04 DIAGNOSIS — I25.10 CORONARY ARTERY CALCIFICATION: ICD-10-CM

## 2025-06-04 DIAGNOSIS — E78.5 HYPERLIPIDEMIA LDL GOAL <100: ICD-10-CM

## 2025-06-04 RX ORDER — FENOFIBRATE 160 MG/1
160 TABLET ORAL EVERY EVENING
Qty: 90 TABLET | Refills: 2 | Status: SHIPPED | OUTPATIENT
Start: 2025-06-04

## 2025-06-04 NOTE — TELEPHONE ENCOUNTER
Fenofibrate (Triglide/Lofibra) 160 mg    Last Office Visit: 4/10/25  [facility] Appointments: 10/21/25  Medication last refilled: 2/5/25 #90 with 0 refill(s)    Required labs per protocol:    LAB REF RANGE 5/16/24 3/26/25   LDL < 100 mg/dL 71 71     Prescription approved per [facility] Refill Protocol.    JORDI BarrettN, RN, CCM

## 2025-06-19 ENCOUNTER — TELEPHONE (OUTPATIENT)
Dept: CARDIOLOGY | Facility: CLINIC | Age: 70
End: 2025-06-19
Payer: COMMERCIAL

## 2025-06-19 NOTE — TELEPHONE ENCOUNTER
Patient Contacted for the patient to call back and schedule the following:    Appointment type: Return Cardio & Echo  Provider: Abigail Topete  Return date: 10/2/25  Specialty phone number: [phone] OPT 1  Additional appointment(s) needed: NA  Additonal Notes: called back due to pt being already booked on 10/16 and confirmed to cancel that appt

## 2025-06-29 ENCOUNTER — HEALTH MAINTENANCE LETTER (OUTPATIENT)
Age: 70
End: 2025-06-29

## 2025-07-12 ENCOUNTER — APPOINTMENT (OUTPATIENT)
Dept: CT IMAGING | Facility: CLINIC | Age: 70
End: 2025-07-12
Attending: EMERGENCY MEDICINE
Payer: COMMERCIAL

## 2025-07-12 ENCOUNTER — HOSPITAL ENCOUNTER (EMERGENCY)
Facility: CLINIC | Age: 70
Discharge: HOME | End: 2025-07-12
Attending: EMERGENCY MEDICINE | Admitting: EMERGENCY MEDICINE
Payer: COMMERCIAL

## 2025-07-12 ENCOUNTER — APPOINTMENT (OUTPATIENT)
Dept: GENERAL RADIOLOGY | Facility: CLINIC | Age: 70
End: 2025-07-12
Attending: EMERGENCY MEDICINE
Payer: COMMERCIAL

## 2025-07-12 VITALS
OXYGEN SATURATION: 95 % | TEMPERATURE: 98.6 F | RESPIRATION RATE: 18 BRPM | DIASTOLIC BLOOD PRESSURE: 91 MMHG | SYSTOLIC BLOOD PRESSURE: 144 MMHG | HEART RATE: 74 BPM

## 2025-07-12 DIAGNOSIS — E11.9 TYPE 2 DIABETES MELLITUS WITHOUT COMPLICATION, WITHOUT LONG-TERM CURRENT USE OF INSULIN (H): Chronic | ICD-10-CM

## 2025-07-12 DIAGNOSIS — R07.89 CHEST PRESSURE: ICD-10-CM

## 2025-07-12 DIAGNOSIS — Z95.2 S/P AVR: ICD-10-CM

## 2025-07-12 LAB
ANION GAP SERPL CALCULATED.3IONS-SCNC: 10 MMOL/L (ref 7–15)
BASOPHILS # BLD AUTO: 0 10E3/UL (ref 0–0.2)
BASOPHILS NFR BLD AUTO: 0 %
BUN SERPL-MCNC: 29.3 MG/DL (ref 8–23)
CALCIUM SERPL-MCNC: 10.3 MG/DL (ref 8.8–10.4)
CHLORIDE SERPL-SCNC: 103 MMOL/L (ref 98–107)
CREAT SERPL-MCNC: 0.8 MG/DL (ref 0.51–0.95)
EGFRCR SERPLBLD CKD-EPI 2021: 79 ML/MIN/1.73M2
EOSINOPHIL # BLD AUTO: 0.1 10E3/UL (ref 0–0.7)
EOSINOPHIL NFR BLD AUTO: 2 %
ERYTHROCYTE [DISTWIDTH] IN BLOOD BY AUTOMATED COUNT: 13.4 % (ref 10–15)
GLUCOSE SERPL-MCNC: 121 MG/DL (ref 70–99)
HCO3 SERPL-SCNC: 23 MMOL/L (ref 22–29)
HCT VFR BLD AUTO: 42.7 % (ref 35–47)
HGB BLD-MCNC: 14.1 G/DL (ref 11.7–15.7)
IMM GRANULOCYTES # BLD: 0 10E3/UL
IMM GRANULOCYTES NFR BLD: 0 %
LYMPHOCYTES # BLD AUTO: 1.5 10E3/UL (ref 0.8–5.3)
LYMPHOCYTES NFR BLD AUTO: 31 %
MCH RBC QN AUTO: 29.1 PG (ref 26.5–33)
MCHC RBC AUTO-ENTMCNC: 33 G/DL (ref 31.5–36.5)
MCV RBC AUTO: 88 FL (ref 78–100)
MONOCYTES # BLD AUTO: 0.4 10E3/UL (ref 0–1.3)
MONOCYTES NFR BLD AUTO: 8 %
NEUTROPHILS # BLD AUTO: 2.9 10E3/UL (ref 1.6–8.3)
NEUTROPHILS NFR BLD AUTO: 59 %
NRBC # BLD AUTO: 0 10E3/UL
NRBC BLD AUTO-RTO: 0 /100
NT-PROBNP SERPL-MCNC: 332 PG/ML (ref 0–353)
PLATELET # BLD AUTO: 148 10E3/UL (ref 150–450)
POTASSIUM SERPL-SCNC: 4 MMOL/L (ref 3.4–5.3)
RBC # BLD AUTO: 4.84 10E6/UL (ref 3.8–5.2)
SODIUM SERPL-SCNC: 136 MMOL/L (ref 135–145)
TROPONIN T SERPL HS-MCNC: 10 NG/L
TROPONIN T SERPL HS-MCNC: 9 NG/L
WBC # BLD AUTO: 5 10E3/UL (ref 4–11)

## 2025-07-12 PROCEDURE — 85025 COMPLETE CBC W/AUTO DIFF WBC: CPT | Performed by: EMERGENCY MEDICINE

## 2025-07-12 PROCEDURE — 36415 COLL VENOUS BLD VENIPUNCTURE: CPT | Performed by: EMERGENCY MEDICINE

## 2025-07-12 PROCEDURE — 74174 CTA ABD&PLVS W/CONTRAST: CPT | Mod: 26 | Performed by: STUDENT IN AN ORGANIZED HEALTH CARE EDUCATION/TRAINING PROGRAM

## 2025-07-12 PROCEDURE — 83880 ASSAY OF NATRIURETIC PEPTIDE: CPT | Performed by: EMERGENCY MEDICINE

## 2025-07-12 PROCEDURE — 250N000011 HC RX IP 250 OP 636: Performed by: EMERGENCY MEDICINE

## 2025-07-12 PROCEDURE — 93010 ELECTROCARDIOGRAM REPORT: CPT | Performed by: EMERGENCY MEDICINE

## 2025-07-12 PROCEDURE — 80048 BASIC METABOLIC PNL TOTAL CA: CPT | Performed by: EMERGENCY MEDICINE

## 2025-07-12 PROCEDURE — 83036 HEMOGLOBIN GLYCOSYLATED A1C: CPT

## 2025-07-12 PROCEDURE — 84484 ASSAY OF TROPONIN QUANT: CPT | Performed by: EMERGENCY MEDICINE

## 2025-07-12 PROCEDURE — 99285 EMERGENCY DEPT VISIT HI MDM: CPT | Mod: 25 | Performed by: EMERGENCY MEDICINE

## 2025-07-12 PROCEDURE — 93005 ELECTROCARDIOGRAM TRACING: CPT | Performed by: EMERGENCY MEDICINE

## 2025-07-12 PROCEDURE — 71275 CT ANGIOGRAPHY CHEST: CPT | Mod: 26 | Performed by: STUDENT IN AN ORGANIZED HEALTH CARE EDUCATION/TRAINING PROGRAM

## 2025-07-12 PROCEDURE — 71046 X-RAY EXAM CHEST 2 VIEWS: CPT | Mod: 26 | Performed by: RADIOLOGY

## 2025-07-12 PROCEDURE — 83540 ASSAY OF IRON: CPT

## 2025-07-12 PROCEDURE — 71275 CT ANGIOGRAPHY CHEST: CPT

## 2025-07-12 PROCEDURE — 71046 X-RAY EXAM CHEST 2 VIEWS: CPT

## 2025-07-12 PROCEDURE — 99285 EMERGENCY DEPT VISIT HI MDM: CPT | Performed by: EMERGENCY MEDICINE

## 2025-07-12 PROCEDURE — 250N000013 HC RX MED GY IP 250 OP 250 PS 637: Performed by: EMERGENCY MEDICINE

## 2025-07-12 RX ORDER — NITROGLYCERIN 0.4 MG/1
0.4 TABLET SUBLINGUAL EVERY 5 MIN PRN
Status: DISCONTINUED | OUTPATIENT
Start: 2025-07-12 | End: 2025-07-12 | Stop reason: HOSPADM

## 2025-07-12 RX ORDER — IOPAMIDOL 755 MG/ML
110 INJECTION, SOLUTION INTRAVASCULAR ONCE
Status: COMPLETED | OUTPATIENT
Start: 2025-07-12 | End: 2025-07-12

## 2025-07-12 RX ADMIN — IOPAMIDOL 110 ML: 755 INJECTION, SOLUTION INTRAVENOUS at 14:28

## 2025-07-12 RX ADMIN — NITROGLYCERIN 0.4 MG: 0.4 TABLET SUBLINGUAL at 16:16

## 2025-07-12 ASSESSMENT — ACTIVITIES OF DAILY LIVING (ADL)
ADLS_ACUITY_SCORE: 58

## 2025-07-12 ASSESSMENT — COLUMBIA-SUICIDE SEVERITY RATING SCALE - C-SSRS
2. HAVE YOU ACTUALLY HAD ANY THOUGHTS OF KILLING YOURSELF IN THE PAST MONTH?: NO
1. IN THE PAST MONTH, HAVE YOU WISHED YOU WERE DEAD OR WISHED YOU COULD GO TO SLEEP AND NOT WAKE UP?: NO
6. HAVE YOU EVER DONE ANYTHING, STARTED TO DO ANYTHING, OR PREPARED TO DO ANYTHING TO END YOUR LIFE?: NO

## 2025-07-12 NOTE — ED PROVIDER NOTES
[facility] ([facility])    25       ED PROVIDER NOTE   History     Chief Complaint   Patient presents with    Chest Pain     HPI  Won Urena is a 70 year old female with a history of HTN, HLD, T2DM, AAA, Aortic valve stenosis, S/P CABG x 2 (last one being 2024) and, S/P aortic valve replacement who presents to the ED for chest discomfort. Patient reports intermittent chest pressure for the past 3 days. She rates it a 4/10 and is worse with exertion of force and movement. Patient notes she has had bypass and valve replacement last year with no recent surgeries. Patient endorses feeling more fatigue with diaphoresis then usual when working out yesterday which is abnormal. Patient is off of anticoagulants. Patient denies n/v. She says there is more chest pressure than pain.      Past Medical History  Past Medical History:   Diagnosis Date    Abnormal Pap smear of cervix     Aortic stenosis     Aortic valve stenosis     Benign colon polyp     CAD (coronary artery disease)     Dermatochalasis of both upper eyelids 2023    Diabetes mellitus, type 2 (H)     Gestational diabetes     HTN (hypertension)     Hyperlipidemia LDL goal <100     Metal foreign body in chest     Retained atrial temporary pacing wire    Nonsenile cataract     Very beginnings    Post-menopausal atrophic vaginitis     Ptosis of both eyelids 2023    Thickened endometrium      Past Surgical History:   Procedure Laterality Date    BYPASS GRAFT ARTERY CORONARY, REPAIR VALVE AORTIC, COMBINED N/A 2024    Procedure: Median Sternotomy, Cardiopulmonary Bypass, Left Internal Mammary Vein [place], Endoscopic [place] of Left Greater Saphenous Vein, Coronary Bypass Graft x 2, Aortic Valve Replacement with Inspiris Resilia Aortic Valve size 25mm, Transesophageal Echocardiogram by Anesthesia;  Surgeon: Mitali Nicole MD;  Location: [facility] OR     SECTION      COMBINED REPAIR PTOSIS WITH BLEPHAROPLASTY  Bilateral 09/28/2023    Procedure: Both upper eyelid blepharoplasty and ptosis repair;  Surgeon: Shantelle Marti MD;  Location: [facility] OR    COSMETIC BLEPHAROPLASTY LOWER LIDS BILATERAL Bilateral 09/28/2023    Procedure: Both lower eyelid blepharoplasty;  Surgeon: Shantelle Marti MD;  Location: [facility] OR    CV CORONARY ANGIOGRAM N/A 06/09/2023    Procedure: Coronary Angiogram;  Surgeon: Geovanni Ibarra MD;  Location: [facility]    CV CORONARY ANGIOGRAM N/A 5/2/2024    Procedure: Coronary Angiogram;  Surgeon: Bassam Flores MD;  Location: [facility]    CV INSTANTANEOUS WAVE-FREE RATIO N/A 06/09/2023    Procedure: Instantaneous Wave-Free Ratio;  Surgeon: Geovanni Ibarra MD;  [facility]    CV RIGHT HEART CATH MEASUREMENTS RECORDED N/A 5/2/2024    Procedure: Right Heart Catheterization;  Surgeon: Bassam Flores MD;  [facility]     aspirin 81 MG EC tablet  atorvastatin (LIPITOR) 40 MG tablet  blood glucose (ACCU-CHEK GUIDE) test strip  blood glucose monitoring (SOFTCLIX) lancets  cyanocobalamin (VITAMIN B-12) 100 MCG tablet  dapagliflozin (FARXIGA) 10 MG TABS tablet  diltiazem (CARDIZEM) 60 MG tablet  estradiol (ESTRACE) 0.1 MG/GM vaginal cream  fenofibrate (TRIGLIDE/LOFIBRA) 160 MG tablet  FLUoxetine (PROZAC) 20 MG capsule  gabapentin (NEURONTIN) 100 MG capsule  levocetirizine (XYZAL) 5 MG tablet  losartan (COZAAR) 50 MG tablet  metFORMIN (GLUCOPHAGE) 500 MG tablet  metroNIDAZOLE (METROGEL) 1 % external gel  montelukast (SINGULAIR) 10 MG tablet  multivitamin w/minerals (MULTI-VITAMIN) tablet  Semaglutide, 1 MG/DOSE, (OZEMPIC, 1 MG/DOSE,) 4 MG/3ML pen  Vitamin D, Cholecalciferol, 10 MCG (400 UNIT) TABS      Allergies   Allergen Reactions    Avapro [Irbesartan] Cough    Fluticasone Other (See Comments)     Loss of sense of taste     Family History  Family History   Problem Relation Age of Onset     Lung Cancer Mother         Small cell, did smoked    Diabetes Mother     Hypertension Mother     Goiter Mother     Mental Illness Mother         likely bipolar    Coronary Artery Disease Mother 42        MI, smoked    Hyperlipidemia Mother     Thyroid Disease Mother         Goiter    Obesity Mother         most of family is obese    Glaucoma Father     Atrial fibrillation Father     Hypertension Father     Parkinsonism Father     Hyperlipidemia Father     Diabetes Sister     Breast Cancer Sister 60    Hypertension Sister     Hypertension Brother     Cancer Maternal Grandmother         gynecologic cancer, unknown type    Uterine Cancer Maternal Grandmother     Chronic Obstructive Pulmonary Disease Paternal Grandmother     Uterine Cancer Paternal Grandmother     Stomach Cancer Paternal Grandfather     Colon Cancer Paternal Grandfather     Bipolar Disorder Son     Mental Illness Son     Depression Son     Macular Degeneration No family hx of     Anesthesia Reaction No family hx of     Venous thrombosis No family hx of      Social History   Social History     Tobacco Use    Smoking status: Never     Passive exposure: Never    Smokeless tobacco: Never   Vaping Use    Vaping status: Never Used   Substance Use Topics    Alcohol use: Yes     Alcohol/week: 1.0 standard drink of alcohol     Types: 1 Standard drinks or equivalent per week     Comment: less than 2 drinks a week    Drug use: Not Currently     Comment: gummy 1x/month      Past medical history, past surgical history, medications, allergies, family history, and social history were reviewed with the patient. No additional pertinent items.   A medically appropriate review of systems was performed with pertinent positives and negatives noted in the HPI, and all other systems negative.    Physical Exam   BP: (!) 156/99  Pulse: 71  Temp: 98  F (36.7  C)  Resp: 18  SpO2: 96 %  Physical Exam  Vitals and nursing note reviewed.   Constitutional:       General: She is not  in acute distress.     Appearance: She is not ill-appearing, toxic-appearing or diaphoretic.   HENT:      Head: Normocephalic and atraumatic.   Eyes:      Extraocular Movements: Extraocular movements intact.      Conjunctiva/sclera: Conjunctivae normal.   Cardiovascular:      Rate and Rhythm: Normal rate.      Pulses: Normal pulses.      Heart sounds: Normal heart sounds.   Pulmonary:      Effort: Pulmonary effort is normal. No respiratory distress.      Breath sounds: Normal breath sounds.   Abdominal:      Palpations: Abdomen is soft.      Tenderness: There is no abdominal tenderness.   Musculoskeletal:         General: No tenderness. Normal range of motion.      Cervical back: Normal range of motion and neck supple.      Right lower leg: No edema.      Left lower leg: No edema.   Skin:     General: Skin is warm.      Findings: No rash.   Neurological:      General: No focal deficit present.      Mental Status: She is alert and oriented to person, place, and time.      Cranial Nerves: No cranial nerve deficit.   Psychiatric:         Mood and Affect: Mood normal.         Behavior: Behavior normal.           ED Course, Procedures, & Data      Procedures            EKG Interpretation:      Interpreted by Sherita Neri MD  Time reviewed: 12:48 PM  Symptoms at time of EKG: Chest pressure  Rhythm: normal sinus   Rate: normal  Axis: normal  Ectopy: none  Conduction: RBBB  ST Segments/ T Waves: No ST-T wave changes  Q Waves: none  Comparison to prior: Unchanged from old EKG done on June 25, 2024    Clinical Impression: NSR, RBBB, no acute ischemia           Results for orders placed or performed during the hospital encounter of 07/12/25   XR Chest 2 Views    Impression    IMPRESSION: No focal airspace disease.    I have personally reviewed the examination and initial interpretation  and I agree with the findings.    MINOR JIMÉNEZ MD         SYSTEM ID:  [id number]   CTA Chest Abdomen Pelvis w Contrast    Impression     RESIDENT PRELIMINARY INTERPRETATION  Impression:  1. No evidence of aortic dissection. Stable postsurgical changes of  TAVR  2. No acute abnormality in the chest, abdomen, or pelvis.         Basic metabolic panel   Result Value Ref Range    Sodium 136 135 - 145 mmol/L    Potassium 4.0 3.4 - 5.3 mmol/L    Chloride 103 98 - 107 mmol/L    Carbon Dioxide (CO2) 23 22 - 29 mmol/L    Anion Gap 10 7 - 15 mmol/L    Urea Nitrogen 29.3 (H) 8.0 - 23.0 mg/dL    Creatinine 0.80 0.51 - 0.95 mg/dL    GFR Estimate 79 >60 mL/min/1.73m2    Calcium 10.3 8.8 - 10.4 mg/dL    Glucose 121 (H) 70 - 99 mg/dL   Result Value Ref Range    Troponin T, High Sensitivity 10 <=14 ng/L   CBC with platelets and differential   Result Value Ref Range    WBC Count 5.0 4.0 - 11.0 10e3/uL    RBC Count 4.84 3.80 - 5.20 10e6/uL    Hemoglobin 14.1 11.7 - 15.7 g/dL    Hematocrit 42.7 35.0 - 47.0 %    MCV 88 78 - 100 fL    MCH 29.1 26.5 - 33.0 pg    MCHC 33.0 31.5 - 36.5 g/dL    RDW 13.4 10.0 - 15.0 %    Platelet Count 148 (L) 150 - 450 10e3/uL    % Neutrophils 59 %    % Lymphocytes 31 %    % Monocytes 8 %    % Eosinophils 2 %    % Basophils 0 %    % Immature Granulocytes 0 %    NRBCs per 100 WBC 0 <1 /100    Absolute Neutrophils 2.9 1.6 - 8.3 10e3/uL    Absolute Lymphocytes 1.5 0.8 - 5.3 10e3/uL    Absolute Monocytes 0.4 0.0 - 1.3 10e3/uL    Absolute Eosinophils 0.1 0.0 - 0.7 10e3/uL    Absolute Basophils 0.0 0.0 - 0.2 10e3/uL    Absolute Immature Granulocytes 0.0 <=0.4 10e3/uL    Absolute NRBCs 0.0 10e3/uL   NT-proBNP   Result Value Ref Range    NT-proBNP 332 0 - 353 pg/mL   Result Value Ref Range    Troponin T, High Sensitivity 9 <=14 ng/L   EKG 12-lead, tracing only   Result Value Ref Range    Systolic Blood Pressure  mmHg    Diastolic Blood Pressure  mmHg    Ventricular Rate 63 BPM    Atrial Rate 63 BPM    IN Interval 176 ms    QRS Duration 146 ms     ms    QTc 468 ms    P Axis  degrees    R AXIS 269 degrees    T Axis 39 degrees    Interpretation ECG        Sinus rhythm  Right bundle branch block  Abnormal ECG       Medications   nitroGLYcerin (NITROSTAT) sublingual tablet 0.4 mg (0.4 mg Sublingual $Given 7/12/25 1616)   iopamidol (ISOVUE-370) solution 110 mL (110 mLs Intravenous $Given 7/12/25 1428)   sodium chloride (PF) 0.9% PF flush 90 mL (90 mLs Intravenous $Given 7/12/25 1429)          Critical care was not performed.     Medical Decision Making  The patient's presentation was of high complexity (an acute health issue posing potential threat to life or bodily function).    The patient's evaluation involved:  review of external note(s) from 1 sources (see separate area of note for details)  review of 3+ test result(s) ordered prior to this encounter (see separate area of note for details)  strong consideration of a test (see separate area of note for details) that was ultimately deferred  ordering and/or review of 3+ test(s) in this encounter (see separate area of note for details)  independent interpretation of testing performed by another health professional (see separate area of note for details)    The patient's management necessitated high risk (a decision regarding hospitalization).    Assessment & Plan    Won Urena is a 70-year-old woman with significant cardiac history presenting with chest pressure for 3 days.  Differential diagnosis: ACS, aortic dissection, esophageal spasm, GERD, pneumothorax, unlikely pulmonary embolus.    After thorough history and physical exam, patient appears to be in no acute distress.  I will obtain chest x-ray, EKG, and CT angiogram of the aorta for further evaluation.  I will treat her with sublingual nitroglycerin.  She agrees with the plan.     Patient's laboratory studies returned without any evidence of leukocytosis, WBC is normal at 5,000. There is no evidence of anemia, hemoglobin is normal at 14.1.  Electrolytes show no evidence of dehydration, creatinine is normal at 0.8.  Initial troponin was normal at 10,  and delta troponin was somewhat lower at 9.  EKG showed no acute ischemia.  I reviewed patient's chest x-ray and I read the radiology report; no evidence of pneumonia, pneumothorax, or widened mediastinum.  I also reviewed her CT angiogram of the aorta and I read the radiology report; no evidence of aortic dissection or ruptured aneurysm.  Patient did receive a dose of sublingual nitroglycerin in the emergency department and her symptoms resolved.  She is stable for discharge with outpatient follow-up with her cardiologist and her PCP.  She will return if her symptoms recur prior to her follow-up appointment.      I have reviewed the nursing notes. I have reviewed the findings, diagnosis, plan and need for follow up with the patient.    Discharge Medication List as of 7/12/2025  5:11 PM          Final diagnoses:   Chest pressure   John STOVER Her, am serving as a trained medical scribe to document services personally performed by Sherita Neri MD, MD, based on the provider's statements to me.     Halle STOVER Nikola, MD, MD, was physically present and have reviewed and verified the accuracy of this note documented by John Wheeler.     Sherita Neri MD  [facility] EMERGENCY DEPARTMENT  7/12/2025     Sherita Neri MD  07/12/25 [id number]

## 2025-07-12 NOTE — ED NOTES
Patient states that her chest pressure is relieved and she feels much better. Provider updated. Rich Lopez, RN

## 2025-07-12 NOTE — DISCHARGE INSTRUCTIONS
Please make an appointment to follow up with Your Primary Care Provider and Your Cardiologist in 2 days for further evaluation and recommendations.  If your symptoms recur, please come back to the emergency department.

## 2025-07-12 NOTE — ED TRIAGE NOTES
Pt arrives ambulatory to ED  For 3 days reports that she has been having chest pressure - says when it started when she would lay down it would go away now it is constant.  Says that today she is feeling SOB as well

## 2025-07-13 ENCOUNTER — MYC MEDICAL ADVICE (OUTPATIENT)
Dept: CARDIOLOGY | Facility: CLINIC | Age: 70
End: 2025-07-13
Payer: COMMERCIAL

## 2025-07-13 LAB
ATRIAL RATE - MUSE: 63 BPM
DIASTOLIC BLOOD PRESSURE - MUSE: NORMAL MMHG
INTERPRETATION ECG - MUSE: NORMAL
P AXIS - MUSE: NORMAL DEGREES
PR INTERVAL - MUSE: 176 MS
QRS DURATION - MUSE: 146 MS
QT - MUSE: 458 MS
QTC - MUSE: 468 MS
R AXIS - MUSE: 269 DEGREES
SYSTOLIC BLOOD PRESSURE - MUSE: NORMAL MMHG
T AXIS - MUSE: 39 DEGREES
VENTRICULAR RATE- MUSE: 63 BPM

## 2025-07-14 ENCOUNTER — ANCILLARY PROCEDURE (OUTPATIENT)
Dept: MAMMOGRAPHY | Facility: CLINIC | Age: 70
End: 2025-07-14
Attending: FAMILY MEDICINE
Payer: COMMERCIAL

## 2025-07-14 ENCOUNTER — OFFICE VISIT (OUTPATIENT)
Dept: CARDIOLOGY | Facility: CLINIC | Age: 70
End: 2025-07-14
Attending: CASE MANAGER/CARE COORDINATOR
Payer: COMMERCIAL

## 2025-07-14 ENCOUNTER — RESULTS FOLLOW-UP (OUTPATIENT)
Dept: CARDIOLOGY | Facility: CLINIC | Age: 70
End: 2025-07-14

## 2025-07-14 VITALS
SYSTOLIC BLOOD PRESSURE: 136 MMHG | DIASTOLIC BLOOD PRESSURE: 84 MMHG | WEIGHT: 173.4 LBS | HEART RATE: 70 BPM | BODY MASS INDEX: 26.28 KG/M2 | OXYGEN SATURATION: 95 %

## 2025-07-14 DIAGNOSIS — Z12.31 VISIT FOR SCREENING MAMMOGRAM: ICD-10-CM

## 2025-07-14 DIAGNOSIS — Z95.1 S/P CABG (CORONARY ARTERY BYPASS GRAFT): ICD-10-CM

## 2025-07-14 DIAGNOSIS — R07.89 CHEST PRESSURE: Primary | ICD-10-CM

## 2025-07-14 DIAGNOSIS — Z95.1 S/P CABG (CORONARY ARTERY BYPASS GRAFT): Primary | ICD-10-CM

## 2025-07-14 DIAGNOSIS — Z95.2 S/P AVR: ICD-10-CM

## 2025-07-14 DIAGNOSIS — I20.89 STABLE ANGINA: ICD-10-CM

## 2025-07-14 DIAGNOSIS — E11.9 TYPE 2 DIABETES MELLITUS WITHOUT COMPLICATION, WITHOUT LONG-TERM CURRENT USE OF INSULIN (H): Chronic | ICD-10-CM

## 2025-07-14 PROBLEM — I50.9 HEART FAILURE, UNSPECIFIED HF CHRONICITY, UNSPECIFIED HEART FAILURE TYPE (H): Status: RESOLVED | Noted: 2025-04-10 | Resolved: 2025-07-14

## 2025-07-14 LAB
EST. AVERAGE GLUCOSE BLD GHB EST-MCNC: 126 MG/DL
HBA1C MFR BLD: 6 %
IRON BINDING CAPACITY (ROCHE): 398 UG/DL (ref 240–430)
IRON SATN MFR SERPL: 26 % (ref 15–46)
IRON SERPL-MCNC: 102 UG/DL (ref 37–145)

## 2025-07-14 PROCEDURE — 1126F AMNT PAIN NOTED NONE PRSNT: CPT | Performed by: CASE MANAGER/CARE COORDINATOR

## 2025-07-14 PROCEDURE — 99213 OFFICE O/P EST LOW 20 MIN: CPT | Performed by: CASE MANAGER/CARE COORDINATOR

## 2025-07-14 PROCEDURE — 93242 EXT ECG>48HR<7D RECORDING: CPT | Performed by: CASE MANAGER/CARE COORDINATOR

## 2025-07-14 PROCEDURE — G0463 HOSPITAL OUTPT CLINIC VISIT: HCPCS | Performed by: CASE MANAGER/CARE COORDINATOR

## 2025-07-14 PROCEDURE — 3079F DIAST BP 80-89 MM HG: CPT | Performed by: CASE MANAGER/CARE COORDINATOR

## 2025-07-14 PROCEDURE — 77063 BREAST TOMOSYNTHESIS BI: CPT | Mod: GC | Performed by: STUDENT IN AN ORGANIZED HEALTH CARE EDUCATION/TRAINING PROGRAM

## 2025-07-14 PROCEDURE — 77067 SCR MAMMO BI INCL CAD: CPT | Mod: GC | Performed by: STUDENT IN AN ORGANIZED HEALTH CARE EDUCATION/TRAINING PROGRAM

## 2025-07-14 PROCEDURE — 3075F SYST BP GE 130 - 139MM HG: CPT | Performed by: CASE MANAGER/CARE COORDINATOR

## 2025-07-14 RX ORDER — NITROGLYCERIN 0.4 MG/1
TABLET SUBLINGUAL
Qty: 30 TABLET | Refills: 0 | Status: SHIPPED | OUTPATIENT
Start: 2025-07-14

## 2025-07-14 RX ORDER — AMOXICILLIN 500 MG/1
CAPSULE ORAL PRN
COMMUNITY
Start: 2025-03-06

## 2025-07-14 ASSESSMENT — PAIN SCALES - GENERAL: PAINLEVEL_OUTOF10: NO PAIN (0)

## 2025-07-14 NOTE — PROGRESS NOTES
[facility] - [facility] [place]   Cardiology Clinic [place]      HPI:   Ms. Won Urena is a 70 year old female with medical history pertinent for multivessel CAD s/p CABG x2 (LIMA-LAD, SVG-OM, 6/12/24), aortic stenosis s/p AVR (6/2024), HLD, HTN, ascending aortic aneurysm, and T2DM. She presents to cardiology clinic for follow up.    Interval History 07/10/24  Since our last clinic visit, Won underwent the above named surgeries with uneventful post-op course. She presented to [facility] ED on 6/25 with racing heart and Apple watch alert for AF. EKG in ED showed NSR with bifascicular block, echo unremarkable, her metoprolol dose was increased to 50mg BID. She was seen by CVTS in clinic and a Ziopatch was ordered, results pending.  Today in clinic she reports feeling well. She has been working with cardiac rehab. She still notes occasional palpitations and feeling her heart beat, and is anxious about potential AF. Denies chaitanya CP, shortness of breath, or LE edema.  Home -120/60s    Interval History 10/16/24  Won saw Dr. Garza in EP clinic, agreed to continue AF treatment with beta blocker and followup in 6 months with consideration for ablation in AF progresses. Today she reports feeling well overall. If she exercises soon after taking her metoprolol her heart rate is blunted. She rarely feels palpitations/AF/PVCs.     Interval History 07/14/25  ED visit on 7/12 for chest pressure x3 days with fatigue, lightheadedness and diuresis. Troponin flat x2, EKG without ST segment changes. CXR negative for PNA, CT angiogram of aorta negative for dissection. Symptoms resolved with sublingual nitroglycerin.    Won also reports 3-4 months of worsening fatigue. She is usually very active but sometimes needs to take a break when cooking, or can only exercise for 15 minutes.   Home BP has been well controlled <120/80s.   No palpitations, no AF alert on her Apple Watch.     PAST MEDICAL HISTORY:  Past Medical History:    Diagnosis Date    Abnormal Pap smear of cervix     Aortic stenosis     Aortic valve stenosis     Benign colon polyp     CAD (coronary artery disease)     Dermatochalasis of both upper eyelids 5/16/2023    Diabetes mellitus, type 2 (H)     Gestational diabetes     HTN (hypertension)     Hyperlipidemia LDL goal <100     Metal foreign body in chest     Retained atrial temporary pacing wire    Nonsenile cataract 2022    Very beginnings    Post-menopausal atrophic vaginitis     Ptosis of both eyelids 5/16/2023    Thickened endometrium        FAMILY HISTORY:  Family History   Problem Relation Age of Onset    Lung Cancer Mother         Small cell, did smoked    Diabetes Mother     Hypertension Mother     Goiter Mother     Mental Illness Mother         likely bipolar    Coronary Artery Disease Mother 42        MI, smoked    Hyperlipidemia Mother     Thyroid Disease Mother         Goiter    Obesity Mother         most of family is obese    Glaucoma Father     Atrial fibrillation Father     Hypertension Father     Parkinsonism Father     Hyperlipidemia Father     Diabetes Sister     Breast Cancer Sister 60    Hypertension Sister     Hypertension Brother     Cancer Maternal Grandmother         gynecologic cancer, unknown type    Uterine Cancer Maternal Grandmother     Chronic Obstructive Pulmonary Disease Paternal Grandmother     Uterine Cancer Paternal Grandmother     Stomach Cancer Paternal Grandfather     Colon Cancer Paternal Grandfather     Bipolar Disorder Son     Mental Illness Son     Depression Son     Macular Degeneration No family hx of     Anesthesia Reaction No family hx of     Venous thrombosis No family hx of        SOCIAL HISTORY:  Social History     Socioeconomic History    Marital status:    Tobacco Use    Smoking status: Never    Smokeless tobacco: Never   Vaping Use    Vaping Use: Never used   Substance and Sexual Activity    Alcohol use: Yes     Alcohol/week: 1.0 standard drink of alcohol      Types: 1 Standard drinks or equivalent per week     Comment: less than 2 drinks a week    Drug use: Never    Sexual activity: Not Currently     Partners: Male     Birth control/protection: Post-menopausal   Social History Narrative    Lives with     Two son    One son, daughter-in-law, and grandson live in [place]    One son, daughter-in-law live in the same [facility]    Gan in [place], [place]       CURRENT MEDICATIONS:  Current Outpatient Medications   Medication Sig Dispense Refill    amoxicillin (AMOXIL) 500 MG capsule as needed. Before dental procedures      aspirin 81 MG EC tablet Take 81 mg by mouth every evening      atorvastatin (LIPITOR) 40 MG tablet Take 1 tablet (40 mg) by mouth every evening. 90 tablet 3    blood glucose (ACCU-CHEK GUIDE) test strip Use to test blood sugar one time daily or as directed. 100 strip 11    blood glucose monitoring (SOFTCLIX) lancets Use to test blood sugar 1 time daily or as directed. 100 each 3    cyanocobalamin (VITAMIN B-12) 100 MCG tablet Take 1 tablet (100 mcg) by mouth every evening. 90 tablet 3    dapagliflozin (FARXIGA) 10 MG TABS tablet Take 1 tablet (10 mg) by mouth every morning. 100 tablet 2    diltiazem (CARDIZEM) 60 MG tablet Take 1 tablet (60 mg) by mouth 2 times daily. 180 tablet 1    estradiol (ESTRACE) 0.1 MG/GM vaginal cream Place vaginally twice a week. 1/2 applicator twice weekly into the vagina 85 g 3    fenofibrate (TRIGLIDE/LOFIBRA) 160 MG tablet TAKE 1 TABLET EVERY EVENING 90 tablet 2    FLUoxetine (PROZAC) 20 MG capsule TAKE 1 CAPSULE DAILY 90 capsule 2    gabapentin (NEURONTIN) 100 MG capsule Take 1-2 capsules (100-200 mg) by mouth at bedtime. 200 capsule 2    levocetirizine (XYZAL) 5 MG tablet Take 1 tablet (5 mg) by mouth every evening. 90 tablet 3    losartan (COZAAR) 50 MG tablet Take 1 tablet (50 mg) by mouth daily. 90 tablet 1    metFORMIN (GLUCOPHAGE) 500 MG tablet Take 2 tablets (1,000 mg) by mouth 2 times daily (with  meals). 360 tablet 2    metroNIDAZOLE (METROGEL) 1 % external gel Apply topically daily. 60 g 3    montelukast (SINGULAIR) 10 MG tablet Take 1 tablet (10 mg) by mouth at bedtime. 100 tablet 2    multivitamin w/minerals (MULTI-VITAMIN) tablet Take 1 tablet by mouth every evening      nitroGLYcerin (NITROSTAT) 0.4 MG sublingual tablet For chest pain place 1 tablet under the tongue every 5 minutes for 3 doses. If symptoms persist 5 minutes after 1st dose call 911. 30 tablet 0    Semaglutide, 1 MG/DOSE, (OZEMPIC, 1 MG/DOSE,) 4 MG/3ML pen INJECT SUBCUTANEOUSLY 1 MG  EVERY WEEK 9 mL 2    Vitamin D, Cholecalciferol, 10 MCG (400 UNIT) TABS Take 1 tablet by mouth every evening. 90 tablet 3     No current facility-administered medications for this visit.       ROS:   Refer to HPI    EXAM:  /84 (BP Location: Right arm, Patient Position: Chair, Cuff Size: Adult Regular)   Pulse 70   Wt 78.7 kg (173 lb 6.4 oz)   SpO2 95%   BMI 26.28 kg/m    GENERAL: Appears comfortable, in no acute distress.   HEENT: Eye symmetrical, no discharge or icterus bilaterally. Mucous membranes moist and without lesions.  CV: RRR, +S1S2, no murmur  RESPIRATORY: Respirations regular, even, and unlabored. Lungs CTA throughout.   GI: Soft and non distended.  No tenderness, rebound, guarding.  EXTREMITIES: trace b/l peripheral edema. 2+ bilateral pedal pulses.   NEUROLOGIC: Alert and oriented x 3. No focal deficits.   MUSCULOSKELETAL: No joint swelling or tenderness.   SKIN: mid-sternal incision well-healed. No jaundice. No rashes or lesions.     Labs, reviewed with patient in clinic today:  CBC RESULTS:  Lab Results   Component Value Date    WBC 5.0 07/12/2025    RBC 4.84 07/12/2025    HGB 14.1 07/12/2025    HCT 42.7 07/12/2025    MCV 88 07/12/2025    MCH 29.1 07/12/2025    MCHC 33.0 07/12/2025    RDW 13.4 07/12/2025     (L) 07/12/2025       CMP RESULTS:  Lab Results   Component Value Date     07/12/2025    POTASSIUM 4.0 07/12/2025  "   POTASSIUM 4.0 06/12/2024    CHLORIDE 103 07/12/2025    CO2 23 07/12/2025    ANIONGAP 10 07/12/2025     (H) 07/12/2025     (H) 06/17/2024    BUN 29.3 (H) 07/12/2025    CR 0.80 07/12/2025    GFRESTIMATED 79 07/12/2025    JOAQUINA 10.3 07/12/2025    BILITOTAL 0.3 06/25/2024    ALBUMIN 4.2 06/25/2024    ALKPHOS 85 06/25/2024    ALT 16 06/25/2024    AST 23 06/25/2024        INR RESULTS:  Lab Results   Component Value Date    INR 2.29 (H) 08/22/2024    INR 2.3 (A) 08/12/2024       Lab Results   Component Value Date    MAG 1.9 06/25/2024     No results found for: \"NTBNPI\"  Lab Results   Component Value Date    NTBNP 332 07/12/2025         EKG 7/12/25          Assessment and Plan:   Ms. Urena is a 69 year old female with a PMH of multivessel CAD s/p CABG x2 (LIMA-LAD, SVG-OM, 6/12/24), aortic stenosis s/p AVR (6/2024), HLD, HTN, ascending aortic aneurysm, and T2DM.    # Chest pressure  ED visit on 7/12 for chest pressure x3 days with fatigue, lightheadedness and diuresis. Troponin flat x2, EKG without ST segment changes. CXR negative for PNA, CT angiogram of aorta negative for dissection. Symptoms resolved with sublingual nitroglycerin.  - Ziopatch to assess for arrhythmias that may be causing chest pressure  - Coronary CTA to assess for graft obstruction   - SL NTG PRN     # Fatigue  3-4 months of worsening fatigue  - stable Hgb, add on iron studies  - ziopatch to assess for arrhythmias    # Multivessel CAD s/p CABG x2 (LIMA-LAD, SVG-OM, 6/12/24)  # HLD  Recent angiogram showed moderate diffuse disease of the LAD and Lcx (Borderline Pd/Pa 0.92, dPR 0.88 for both lesions), and mild non-obstructive RCA disease. She underwent CABG x2 with Dr. Nicole on 6/12, post-op course uneventful.   - Continue atorvastatin 40mg daily  - Continue aspirin 81mg daily  - fenofibrate 160mg     # Aortic valve stenosis s/p AVR (6/12/24)  - aspirin 81mg daily    # pSVT  # Paroxysmal atrial fibrillation  Recent ED visit for Apple watch " alert for AF with HR in 130s, had resolved by the time she got to ED. Tele strips from rehab show PVCs and pAF. Ziopatch shows predominately SR with frequent PACs (11%) burden, PCVs (3% burden). She has been seen by EP (12/2024), apixaban discontinued given no AF.  - diltiazem 60mg BID  - ziopatch today to assess for arrhythmias     # Ascending aortic aneurysm  Stable at 3.7 cm on recent echo, prior echo showed 3.9cm.  - annaul echo  - tight BP control <130/90    # HTN, well controlled   Home /80s  - diltiazem 60mg BID  - losartan 50mg daily     # DM2  Most recent A1c 5.8%  - Farxiga 10mg daily  - Metformin 1000mg daily  - Ozempic 0.5mg subcutaneous weekly    Follow up: 4 months with gen cards   Chart review time today: 5 minutes  Visit time today: 20 minutes  Total time spent today: 25 minutes      Niharika Olivo CNP  General Cardiology   07/14/25

## 2025-07-14 NOTE — PATIENT INSTRUCTIONS
"Cardiology Providers you saw during your visit: Niharika Olivo CNP       Medication changes:   -No changes     Follow up:  - Follow-up with Gen Karan FLORES in 2-3 months. Please let us know if you feel you need to be seen sooner and we can get you in to see Niharika.     Testing:   -Schedule CT Angiogram next available.   -We will place a Zio patch (portable cardiac monitor) on today in clinic. You will wear this for 7 days and then mail it in. It can take up to 3 weeks to get the results back from your Zio patch after it has been mailed back in. We will contact you when we receive and review the results.           Follow the American Heart Association Diet and Lifestyle recommendations:  Limit saturated fat, trans fat, sodium, red meat, sweets and sugar-sweetened beverages. If you choose to eat red meat, compare labels and select the leanest cuts available.  Aim for at least 150 minutes of moderate physical activity or 75 minutes of vigorous physical activity - or an equal combination of both - each week.      During business hours: [phone], press option # 1 to schedule or leave a message for your care team      After hours, weekends or holidays: On Call Cardiologist- [phone]   option #4 and ask to speak to the on-call Cardiologist.   Melody Brown RN   Cardiology Care Coordinator   [facility]   Questions and [phone]   First press #1 for the [place] \"To send a message to your care team\"    "

## 2025-07-14 NOTE — LETTER
7/14/2025      RE: Won Urena  [address]  [address]  [facility] [address]       Dear Colleague,    Thank you for the opportunity to participate in the care of your patient, Won Urena, at the [facility] [place] [facility]. Please see a copy of my visit note below.      [facility] Cardiology - [facility] [place]   Cardiology Clinic Note      HPI:   Ms. Won Urena is a 70 year old female with medical history pertinent for multivessel CAD s/p CABG x2 (LIMA-LAD, SVG-OM, 6/12/24), aortic stenosis s/p AVR (6/2024), HLD, HTN, ascending aortic aneurysm, and T2DM. She presents to cardiology clinic for follow up.    Interval History 07/10/24  Since our last clinic visit, Won underwent the above named surgeries with uneventful post-op course. She presented to [facility] ED on 6/25 with racing heart and Apple watch alert for AF. EKG in ED showed NSR with bifascicular block, echo unremarkable, her metoprolol dose was increased to 50mg BID. She was seen by CVTS in clinic and a Ziopatch was ordered, results pending.  Today in clinic she reports feeling well. She has been working with cardiac rehab. She still notes occasional palpitations and feeling her heart beat, and is anxious about potential AF. Denies chaitanya CP, shortness of breath, or LE edema.  Home -120/60s    Interval History 10/16/24  Won saw Dr. Garza in EP clinic, agreed to continue AF treatment with beta blocker and followup in 6 months with consideration for ablation in AF progresses. Today she reports feeling well overall. If she exercises soon after taking her metoprolol her heart rate is blunted. She rarely feels palpitations/AF/PVCs.     Interval History 07/14/25  ED visit on 7/12 for chest pressure x3 days with fatigue, lightheadedness and diuresis. Troponin flat x2, EKG without ST segment changes. CXR negative for PNA, CT angiogram of aorta negative for dissection. Symptoms resolved  with sublingual nitroglycerin.    Won also reports 3-4 months of worsening fatigue. She is usually very active but sometimes needs to take a break when cooking, or can only exercise for 15 minutes.   Home BP has been well controlled <120/80s.   No palpitations, no AF alert on her Apple Watch.     PAST MEDICAL HISTORY:  Past Medical History:   Diagnosis Date     Abnormal Pap smear of cervix      Aortic stenosis      Aortic valve stenosis      Benign colon polyp      CAD (coronary artery disease)      Dermatochalasis of both upper eyelids 5/16/2023     Diabetes mellitus, type 2 (H)      Gestational diabetes      HTN (hypertension)      Hyperlipidemia LDL goal <100      Metal foreign body in chest     Retained atrial temporary pacing wire     Nonsenile cataract 2022    Very beginnings     Post-menopausal atrophic vaginitis      Ptosis of both eyelids 5/16/2023     Thickened endometrium        FAMILY HISTORY:  Family History   Problem Relation Age of Onset     Lung Cancer Mother         Small cell, did smoked     Diabetes Mother      Hypertension Mother      Goiter Mother      Mental Illness Mother         likely bipolar     Coronary Artery Disease Mother 42        MI, smoked     Hyperlipidemia Mother      Thyroid Disease Mother         Goiter     Obesity Mother         most of family is obese     Glaucoma Father      Atrial fibrillation Father      Hypertension Father      Parkinsonism Father      Hyperlipidemia Father      Diabetes Sister      Breast Cancer Sister 60     Hypertension Sister      Hypertension Brother      Cancer Maternal Grandmother         gynecologic cancer, unknown type     Uterine Cancer Maternal Grandmother      Chronic Obstructive Pulmonary Disease Paternal Grandmother      Uterine Cancer Paternal Grandmother      Stomach Cancer Paternal Grandfather      Colon Cancer Paternal Grandfather      Bipolar Disorder Son      Mental Illness Son      Depression Son      Macular Degeneration No family  hx of      Anesthesia Reaction No family hx of      Venous thrombosis No family hx of        SOCIAL HISTORY:  Social History     Socioeconomic History     Marital status:    Tobacco Use     Smoking status: Never     Smokeless tobacco: Never   Vaping Use     Vaping Use: Never used   Substance and Sexual Activity     Alcohol use: Yes     Alcohol/week: 1.0 standard drink of alcohol     Types: 1 Standard drinks or equivalent per week     Comment: less than 2 drinks a week     Drug use: Never     Sexual activity: Not Currently     Partners: Male     Birth control/protection: Post-menopausal   Social History Narrative    Lives with     Two son    One son, daughter-in-law, and grandson live in [place]    One son, daughter-in-law live in the same [facility]    Gan in [place], [place]       CURRENT MEDICATIONS:  Current Outpatient Medications   Medication Sig Dispense Refill     amoxicillin (AMOXIL) 500 MG capsule as needed. Before dental procedures       aspirin 81 MG EC tablet Take 81 mg by mouth every evening       atorvastatin (LIPITOR) 40 MG tablet Take 1 tablet (40 mg) by mouth every evening. 90 tablet 3     blood glucose (ACCU-CHEK GUIDE) test strip Use to test blood sugar one time daily or as directed. 100 strip 11     blood glucose monitoring (SOFTCLIX) lancets Use to test blood sugar 1 time daily or as directed. 100 each 3     cyanocobalamin (VITAMIN B-12) 100 MCG tablet Take 1 tablet (100 mcg) by mouth every evening. 90 tablet 3     dapagliflozin (FARXIGA) 10 MG TABS tablet Take 1 tablet (10 mg) by mouth every morning. 100 tablet 2     diltiazem (CARDIZEM) 60 MG tablet Take 1 tablet (60 mg) by mouth 2 times daily. 180 tablet 1     estradiol (ESTRACE) 0.1 MG/GM vaginal cream Place vaginally twice a week. 1/2 applicator twice weekly into the vagina 85 g 3     fenofibrate (TRIGLIDE/LOFIBRA) 160 MG tablet TAKE 1 TABLET EVERY EVENING 90 tablet 2     FLUoxetine (PROZAC) 20 MG capsule TAKE 1  CAPSULE DAILY 90 capsule 2     gabapentin (NEURONTIN) 100 MG capsule Take 1-2 capsules (100-200 mg) by mouth at bedtime. 200 capsule 2     levocetirizine (XYZAL) 5 MG tablet Take 1 tablet (5 mg) by mouth every evening. 90 tablet 3     losartan (COZAAR) 50 MG tablet Take 1 tablet (50 mg) by mouth daily. 90 tablet 1     metFORMIN (GLUCOPHAGE) 500 MG tablet Take 2 tablets (1,000 mg) by mouth 2 times daily (with meals). 360 tablet 2     metroNIDAZOLE (METROGEL) 1 % external gel Apply topically daily. 60 g 3     montelukast (SINGULAIR) 10 MG tablet Take 1 tablet (10 mg) by mouth at bedtime. 100 tablet 2     multivitamin w/minerals (MULTI-VITAMIN) tablet Take 1 tablet by mouth every evening       nitroGLYcerin (NITROSTAT) 0.4 MG sublingual tablet For chest pain place 1 tablet under the tongue every 5 minutes for 3 doses. If symptoms persist 5 minutes after 1st dose call 911. 30 tablet 0     Semaglutide, 1 MG/DOSE, (OZEMPIC, 1 MG/DOSE,) 4 MG/3ML pen INJECT SUBCUTANEOUSLY 1 MG  EVERY WEEK 9 mL 2     Vitamin D, Cholecalciferol, 10 MCG (400 UNIT) TABS Take 1 tablet by mouth every evening. 90 tablet 3     No current facility-administered medications for this visit.       ROS:   Refer to HPI    EXAM:  /84 (BP Location: Right arm, Patient Position: Chair, Cuff Size: Adult Regular)   Pulse 70   Wt 78.7 kg (173 lb 6.4 oz)   SpO2 95%   BMI 26.28 kg/m    GENERAL: Appears comfortable, in no acute distress.   HEENT: Eye symmetrical, no discharge or icterus bilaterally. Mucous membranes moist and without lesions.  CV: RRR, +S1S2, no murmur  RESPIRATORY: Respirations regular, even, and unlabored. Lungs CTA throughout.   GI: Soft and non distended.  No tenderness, rebound, guarding.  EXTREMITIES: trace b/l peripheral edema. 2+ bilateral pedal pulses.   NEUROLOGIC: Alert and oriented x 3. No focal deficits.   MUSCULOSKELETAL: No joint swelling or tenderness.   SKIN: mid-sternal incision well-healed. No jaundice. No rashes or  "lesions.     Labs, reviewed with patient in clinic today:  CBC RESULTS:  Lab Results   Component Value Date    WBC 5.0 07/12/2025    RBC 4.84 07/12/2025    HGB 14.1 07/12/2025    HCT 42.7 07/12/2025    MCV 88 07/12/2025    MCH 29.1 07/12/2025    MCHC 33.0 07/12/2025    RDW 13.4 07/12/2025     (L) 07/12/2025       CMP RESULTS:  Lab Results   Component Value Date     07/12/2025    POTASSIUM 4.0 07/12/2025    POTASSIUM 4.0 06/12/2024    CHLORIDE 103 07/12/2025    CO2 23 07/12/2025    ANIONGAP 10 07/12/2025     (H) 07/12/2025     (H) 06/17/2024    BUN 29.3 (H) 07/12/2025    CR 0.80 07/12/2025    GFRESTIMATED 79 07/12/2025    JOAQUINA 10.3 07/12/2025    BILITOTAL 0.3 06/25/2024    ALBUMIN 4.2 06/25/2024    ALKPHOS 85 06/25/2024    ALT 16 06/25/2024    AST 23 06/25/2024        INR RESULTS:  Lab Results   Component Value Date    INR 2.29 (H) 08/22/2024    INR 2.3 (A) 08/12/2024       Lab Results   Component Value Date    MAG 1.9 06/25/2024     No results found for: \"NTBNPI\"  Lab Results   Component Value Date    NTBNP 332 07/12/2025         EKG 7/12/25          Assessment and Plan:   Ms. Urena is a 69 year old female with a PMH of multivessel CAD s/p CABG x2 (LIMA-LAD, SVG-OM, 6/12/24), aortic stenosis s/p AVR (6/2024), HLD, HTN, ascending aortic aneurysm, and T2DM.    # Chest pressure  ED visit on 7/12 for chest pressure x3 days with fatigue, lightheadedness and diuresis. Troponin flat x2, EKG without ST segment changes. CXR negative for PNA, CT angiogram of aorta negative for dissection. Symptoms resolved with sublingual nitroglycerin.  - Ziopatch to assess for arrhythmias that may be causing chest pressure  - Coronary CTA to assess for graft obstruction   - SL NTG PRN     # Fatigue  3-4 months of worsening fatigue  - stable Hgb, add on iron studies  - ziopatch to assess for arrhythmias    # Multivessel CAD s/p CABG x2 (LIMA-LAD, SVG-OM, 6/12/24)  # HLD  Recent angiogram showed moderate diffuse " disease of the LAD and Lcx (Borderline Pd/Pa 0.92, dPR 0.88 for both lesions), and mild non-obstructive RCA disease. She underwent CABG x2 with Dr. Nicole on 6/12, post-op course uneventful.   - Continue atorvastatin 40mg daily  - Continue aspirin 81mg daily  - fenofibrate 160mg     # Aortic valve stenosis s/p AVR (6/12/24)  - aspirin 81mg daily    # pSVT  # Paroxysmal atrial fibrillation  Recent ED visit for Skinny Mom alert for AF with HR in 130s, had resolved by the time she got to ED. Tele strips from rehab show PVCs and pAF. Ziopatch shows predominately SR with frequent PACs (11%) burden, PCVs (3% burden). She has been seen by EP (12/2024), apixaban discontinued given no AF.  - diltiazem 60mg BID  - ziopatch today to assess for arrhythmias     # Ascending aortic aneurysm  Stable at 3.7 cm on recent echo, prior echo showed 3.9cm.  - annaul echo  - tight BP control <130/90    # HTN, well controlled   Home /80s  - diltiazem 60mg BID  - losartan 50mg daily     # DM2  Most recent A1c 5.8%  - Farxiga 10mg daily  - Metformin 1000mg daily  - Ozempic 0.5mg subcutaneous weekly    Follow up: 4 months with gen cards   Chart review time today: 5 minutes  Visit time today: 20 minutes  Total time spent today: 25 minutes      Niharika Olivo CNP  General Cardiology   07/14/25          Please do not hesitate to contact me if you have any questions/concerns.     Sincerely,     MIKAEL JULES CNP

## 2025-07-14 NOTE — NURSING NOTE
Cardiac Testing: Patient given instructions regarding  CT angio, and 7 day  Zio. Discussed purpose, preparation, procedure and when to expect results reported back to the patient. Patient demonstrated understanding of this information and agreed to call with further questions or concerns.    Return Appointment:   - Follow-up with Gen Karan FLORES in 2-3 months. Please let us know if you feel you need to be seen sooner and we can get you in to see Niharika.   Patient given instructions regarding scheduling next clinic visit. Patient demonstrated understanding of this information and agreed to call with further questions or concerns.  Patient stated she understood all health information given and agreed to call with further questions or concerns.

## 2025-07-14 NOTE — NURSING NOTE
Chief Complaint   Patient presents with    Follow Up     RETURN CARDIOLOGY     Vitals were taken and medications reconciled.    Yvon Luna, YADIRA  2:02 PM

## 2025-07-14 NOTE — PROCEDURES
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  Dr. Baird is the supervising MD. Patient s skin was prepped per protocol.  Zio monitor was placed.  Instructions were reviewed with and given to the patient.  Patient verbalized understanding of wear, troubleshooting and monitor return instructions.

## 2025-07-24 LAB — CV ZIO PRELIM RESULTS: NORMAL

## 2025-07-27 LAB — CV ZIO PRELIM RESULTS: NORMAL

## 2025-08-17 DIAGNOSIS — I25.10 CAD (CORONARY ARTERY DISEASE): ICD-10-CM

## 2025-08-19 RX ORDER — LOSARTAN POTASSIUM 50 MG/1
50 TABLET ORAL DAILY
Qty: 90 TABLET | Refills: 0 | Status: SHIPPED | OUTPATIENT
Start: 2025-08-19

## 2025-08-25 ENCOUNTER — HOSPITAL ENCOUNTER (OUTPATIENT)
Dept: CT IMAGING | Facility: CLINIC | Age: 70
Discharge: HOME | End: 2025-08-25
Attending: CASE MANAGER/CARE COORDINATOR | Admitting: CASE MANAGER/CARE COORDINATOR
Payer: COMMERCIAL

## 2025-08-25 ENCOUNTER — TELEPHONE (OUTPATIENT)
Dept: CARDIOLOGY | Facility: CLINIC | Age: 70
End: 2025-08-25

## 2025-08-25 VITALS — HEART RATE: 57 BPM | DIASTOLIC BLOOD PRESSURE: 71 MMHG | RESPIRATION RATE: 16 BRPM | SYSTOLIC BLOOD PRESSURE: 108 MMHG

## 2025-08-25 DIAGNOSIS — I20.89 STABLE ANGINA: ICD-10-CM

## 2025-08-25 PROCEDURE — 75574 CT ANGIO HRT W/3D IMAGE: CPT

## 2025-08-25 PROCEDURE — 255N000002 HC RX 255 OP 636: Performed by: STUDENT IN AN ORGANIZED HEALTH CARE EDUCATION/TRAINING PROGRAM

## 2025-08-25 PROCEDURE — 250N000013 HC RX MED GY IP 250 OP 250 PS 637: Performed by: STUDENT IN AN ORGANIZED HEALTH CARE EDUCATION/TRAINING PROGRAM

## 2025-08-25 PROCEDURE — 75574 CT ANGIO HRT W/3D IMAGE: CPT | Mod: 26 | Performed by: STUDENT IN AN ORGANIZED HEALTH CARE EDUCATION/TRAINING PROGRAM

## 2025-08-25 RX ORDER — DILTIAZEM HYDROCHLORIDE 120 MG/1
120 TABLET, FILM COATED ORAL
Status: DISCONTINUED | OUTPATIENT
Start: 2025-08-25 | End: 2025-08-26 | Stop reason: HOSPADM

## 2025-08-25 RX ORDER — LIDOCAINE 40 MG/G
CREAM TOPICAL
Status: DISCONTINUED | OUTPATIENT
Start: 2025-08-25 | End: 2025-08-26 | Stop reason: HOSPADM

## 2025-08-25 RX ORDER — NITROGLYCERIN 0.4 MG/1
.4-.8 TABLET SUBLINGUAL
Status: DISCONTINUED | OUTPATIENT
Start: 2025-08-25 | End: 2025-08-26 | Stop reason: HOSPADM

## 2025-08-25 RX ORDER — IVABRADINE 5 MG/1
5-15 TABLET, FILM COATED ORAL
Status: DISCONTINUED | OUTPATIENT
Start: 2025-08-25 | End: 2025-08-26 | Stop reason: HOSPADM

## 2025-08-25 RX ORDER — DILTIAZEM HYDROCHLORIDE 5 MG/ML
10-15 INJECTION INTRAVENOUS
Status: DISCONTINUED | OUTPATIENT
Start: 2025-08-25 | End: 2025-08-26 | Stop reason: HOSPADM

## 2025-08-25 RX ORDER — METOPROLOL TARTRATE 1 MG/ML
5-20 INJECTION, SOLUTION INTRAVENOUS
Status: DISCONTINUED | OUTPATIENT
Start: 2025-08-25 | End: 2025-08-26 | Stop reason: HOSPADM

## 2025-08-25 RX ORDER — ONDANSETRON 2 MG/ML
4 INJECTION INTRAMUSCULAR; INTRAVENOUS
Status: DISCONTINUED | OUTPATIENT
Start: 2025-08-25 | End: 2025-08-26 | Stop reason: HOSPADM

## 2025-08-25 RX ORDER — METOPROLOL TARTRATE 25 MG/1
25-100 TABLET, FILM COATED ORAL
Status: COMPLETED | OUTPATIENT
Start: 2025-08-25 | End: 2025-08-25

## 2025-08-25 RX ADMIN — METOPROLOL TARTRATE 25 MG: 25 TABLET, FILM COATED ORAL at 07:40

## 2025-08-25 RX ADMIN — IOHEXOL 110 ML: 350 INJECTION, SOLUTION INTRAVENOUS at 08:18

## 2025-08-25 RX ADMIN — NITROGLYCERIN 0.8 MG: 0.4 TABLET SUBLINGUAL at 08:20

## (undated) DEVICE — SU MONOCRYL 4-0 PS-2 27" UND Y426H

## (undated) DEVICE — CATH GUIDING BLUE YELLOW PTFE XB3.5 6FRX100CM 67005400

## (undated) DEVICE — KIT ENDO VASOVIEW HEMOPRO 2 VH-4000

## (undated) DEVICE — SPONGE LAP 18X18" X8435

## (undated) DEVICE — SU PROLENE 7-0 BV-1DA 4X24" M8702

## (undated) DEVICE — RX SURGIFLO HEMOSTATIC MATRIX W/THROMBIN 8ML 2994

## (undated) DEVICE — SYR 03ML LL W/O NDL 309657

## (undated) DEVICE — SPECIMEN CONTAINER 5OZ STERILE 2600SA

## (undated) DEVICE — SU PDS II 2-0 CT-1 27" Z339H

## (undated) DEVICE — DRAIN CHEST TUBE RIGHT ANGLED 32FR 8132

## (undated) DEVICE — SHTH INTRO 0.021IN ID 6FR DIA

## (undated) DEVICE — SU PROLENE 6-0 C-1DA 30" 8706H

## (undated) DEVICE — LINEN TOWEL PACK X30 5481

## (undated) DEVICE — KIT HAND CONTROL ACIST 016795

## (undated) DEVICE — Device

## (undated) DEVICE — DRAPE FLUID WARMING 52 X 60" ORS-321

## (undated) DEVICE — SU PROLENE 4-0 SHDA 36" 8521H

## (undated) DEVICE — SU VICRYL 2-0 CT-1 27" UND J259H

## (undated) DEVICE — SU STEEL 6 CCS 4X18" M654G

## (undated) DEVICE — ESU HOLSTER PLASTIC DISP E2400

## (undated) DEVICE — GLOVE BIOGEL PI MICRO SZ 7.5 48575

## (undated) DEVICE — SURGICEL HEMOSTAT 4X8" 1952

## (undated) DEVICE — INTRO GLIDESHEATH SLENDER 6FR 10X45CM 60-1060

## (undated) DEVICE — PACK GOWN 3/PK DISP XL SBA32GPFCB

## (undated) DEVICE — GW VASC .035IN DIA 260CML 7CML 3 MM RADIUS J CURVE 502455

## (undated) DEVICE — PROTECTOR ARM ONE-STEP TRENDELENBURG 40418

## (undated) DEVICE — DEFIB PRO-PADZ LVP LQD GEL ADULT 8900-2105-01

## (undated) DEVICE — INSERT FOGARTY 86MM TRACTION DBL SAFEJAW DSAFE86

## (undated) DEVICE — LINEN TOWEL PACK X5 5464

## (undated) DEVICE — BLADE KNIFE BEAVER MINI STR BEAVER6900

## (undated) DEVICE — SU ETHIBOND 2-0 CT-1 8X18" CX22D

## (undated) DEVICE — CLIP SPRING FOGARTY SOFTJAW CSOFT6

## (undated) DEVICE — CLIP HORIZON LG ORANGE 004200

## (undated) DEVICE — SU PROLENE 4-0 RB-1DA 36" 8557H

## (undated) DEVICE — BNDG ELASTIC 6"X5YDS STERILE 6611-6S

## (undated) DEVICE — INTRO SHEATH 7FRX10CM PINNACLE RSS702

## (undated) DEVICE — TUBING PRESSURE 30"

## (undated) DEVICE — LINEN TOWEL PACK X6 WHITE 5487

## (undated) DEVICE — SU PLEDGET SOFT TFE 3/8"X3/26"X1/16" PCP40

## (undated) DEVICE — SU PROLENE 5-0 RB-2DA 30" 8710H

## (undated) DEVICE — PACK HEART LEFT CUSTOM

## (undated) DEVICE — CLIP HORIZON SM RED WIDE SLOT 001201

## (undated) DEVICE — SYR 01ML 27GA 0.5" NDL TBC 309623

## (undated) DEVICE — SU VICRYL 0 CTX 36" J370H

## (undated) DEVICE — TUBING SUCTION DRAINAGE PLEURAL DUAL 8884714200

## (undated) DEVICE — WIPES FOLEY CARE SURESTEP PROVON DFC100

## (undated) DEVICE — SUCTION MANIFOLD NEPTUNE 2 SYS 4 PORT 0702-020-000

## (undated) DEVICE — SU PDS II 0 CTX 36" Z370T

## (undated) DEVICE — TAPE MEDIPORE 4"X2YD 2864

## (undated) DEVICE — CLIP HORIZON MED BLUE 002200

## (undated) DEVICE — KIT DVC ANGIO IBASIXCOMPAK 13INX20ML 3WAY IN4430

## (undated) DEVICE — PREP CHLORAPREP 26ML TINTED HI-LITE ORANGE 930815

## (undated) DEVICE — SU STEEL 5 BE-2 2X30" 045-121

## (undated) DEVICE — SU VICRYL+ 3-0 FS1 27IN UND VCP442H

## (undated) DEVICE — BLADE SAW STRK STERNAL 6207-97-101

## (undated) DEVICE — SU SILK 0 TIE 6X30" A306H

## (undated) DEVICE — SU PROLENE 3-0 SHDA 36" 8522H

## (undated) DEVICE — EYE PREP BETADINE 5% SOLUTION 30ML 0065-0411-30

## (undated) DEVICE — GUIDEWIRE OPTOWIRE 3 W/O GAUGE FACTOR CONNECTOR F1032

## (undated) DEVICE — DRAIN CHEST TUBE 32FR STR 8032

## (undated) DEVICE — SUCTION CATH AIRLIFE TRI-FLO W/CONTROL PORT 14FR  T60C

## (undated) DEVICE — TIES BANDING T50R

## (undated) DEVICE — SOL WATER IRRIG 500ML BOTTLE 2F7113

## (undated) DEVICE — ESU PENCIL W/HOLSTER

## (undated) DEVICE — BLADE CLIPPER SGL USE 9680

## (undated) DEVICE — DEVICE TISSUE STABILIZATION OCTOBASE 28707

## (undated) DEVICE — SU DERMABOND ADVANCED .7ML DNX12

## (undated) DEVICE — DRSG ABDOMINAL 07 1/2X8" 7197D

## (undated) DEVICE — SUCTION DRY CHEST DRAIN OASIS 3600-100

## (undated) DEVICE — VALVE HEMOSTATIC WATCHDOG 8FR INNER LUMEN H74939343021

## (undated) DEVICE — PUNCH AORTIC 4.0MMX8" RCB40

## (undated) DEVICE — SU ETHIBOND 3-0 BBDA 36" X588H

## (undated) DEVICE — DRSG TELFA 3X8" 1238

## (undated) DEVICE — BLADE KNIFE BEAVER MICROSHARP GREEN 377515

## (undated) DEVICE — PACK ADULT HEART UMMC PV15CG92D

## (undated) DEVICE — PACK MINOR EYE CUSTOM ASC

## (undated) DEVICE — CONNECTOR BLAKE DRAIN SGL BCC1

## (undated) DEVICE — MANIFOLD KIT ANGIO AUTOMATED 014613

## (undated) DEVICE — CANNULA VESSEL DLP  30001

## (undated) DEVICE — ESU NDL COLORADO MICRO 3CM STR N103A

## (undated) DEVICE — NDL 30GA 0.5" 305106

## (undated) DEVICE — ESU HIGH TEMP LOOP TIP AA03

## (undated) DEVICE — SU DEVICE COR-KNOT MINI 4X14MM 031350

## (undated) DEVICE — BNDG ELASTIC 4"X5YDS STERILE 6611-4S

## (undated) DEVICE — ESU PENCIL W/COATED BLADE E2450H

## (undated) DEVICE — DRAPE IOBAN INCISE 23X17" 6650EZ

## (undated) DEVICE — LEAD PACER MYOCARDIAL BIPOLAR TEMPORARY 53CM 6495F

## (undated) DEVICE — FASTENER CATH BALLOON CLAMPX2 STATLOCK 0684-00-493

## (undated) DEVICE — ESU GROUND PAD ADULT W/CORD E7507

## (undated) DEVICE — SLEEVE TR BAND RADIAL COMPRESSION DEVICE 29CM XX-RF06L

## (undated) DEVICE — SOL NACL 0.9% 10ML VIAL 0409-4888-02

## (undated) DEVICE — TOURNIQUET VASCULAR KIT 7 1/2" 79012

## (undated) DEVICE — SU DEVICE ENDO COR KNOT QUICK LOAD 030850

## (undated) DEVICE — SU DEVICE ENDO COR KNOT QUICK LOAD RELOAD 030874

## (undated) RX ORDER — NITROGLYCERIN 0.4 MG/1
TABLET SUBLINGUAL
Status: DISPENSED
Start: 2025-08-25

## (undated) RX ORDER — HEPARIN SODIUM 1000 [USP'U]/ML
INJECTION, SOLUTION INTRAVENOUS; SUBCUTANEOUS
Status: DISPENSED
Start: 2024-05-02

## (undated) RX ORDER — PROPOFOL 10 MG/ML
INJECTION, EMULSION INTRAVENOUS
Status: DISPENSED
Start: 2024-06-12

## (undated) RX ORDER — GABAPENTIN 100 MG/1
CAPSULE ORAL
Status: DISPENSED
Start: 2024-06-12

## (undated) RX ORDER — EPHEDRINE SULFATE 50 MG/ML
INJECTION, SOLUTION INTRAMUSCULAR; INTRAVENOUS; SUBCUTANEOUS
Status: DISPENSED
Start: 2023-09-28

## (undated) RX ORDER — NITROGLYCERIN 0.4 MG/1
TABLET SUBLINGUAL
Status: DISPENSED
Start: 2023-05-19

## (undated) RX ORDER — FENTANYL CITRATE-0.9 % NACL/PF 10 MCG/ML
PLASTIC BAG, INJECTION (ML) INTRAVENOUS
Status: DISPENSED
Start: 2024-06-12

## (undated) RX ORDER — PROPOFOL 10 MG/ML
INJECTION, EMULSION INTRAVENOUS
Status: DISPENSED
Start: 2023-09-28

## (undated) RX ORDER — CEFAZOLIN SODIUM 1 G/3ML
INJECTION, POWDER, FOR SOLUTION INTRAMUSCULAR; INTRAVENOUS
Status: DISPENSED
Start: 2024-06-12

## (undated) RX ORDER — FENTANYL CITRATE-0.9 % NACL/PF 10 MCG/ML
PLASTIC BAG, INJECTION (ML) INTRAVENOUS
Status: DISPENSED
Start: 2023-09-28

## (undated) RX ORDER — ACETAMINOPHEN 325 MG/1
TABLET ORAL
Status: DISPENSED
Start: 2024-06-12

## (undated) RX ORDER — NICARDIPINE HCL-0.9% SOD CHLOR 1 MG/10 ML
SYRINGE (ML) INTRAVENOUS
Status: DISPENSED
Start: 2023-06-09

## (undated) RX ORDER — HEPARIN SODIUM,PORCINE 10 UNIT/ML
VIAL (ML) INTRAVENOUS
Status: DISPENSED
Start: 2024-06-12

## (undated) RX ORDER — CALCIUM CHLORIDE 100 MG/ML
INJECTION INTRAVENOUS; INTRAVENTRICULAR
Status: DISPENSED
Start: 2024-06-12

## (undated) RX ORDER — DEXAMETHASONE SODIUM PHOSPHATE 4 MG/ML
INJECTION, SOLUTION INTRA-ARTICULAR; INTRALESIONAL; INTRAMUSCULAR; INTRAVENOUS; SOFT TISSUE
Status: DISPENSED
Start: 2023-09-28

## (undated) RX ORDER — ONDANSETRON 2 MG/ML
INJECTION INTRAMUSCULAR; INTRAVENOUS
Status: DISPENSED
Start: 2023-09-28

## (undated) RX ORDER — ESMOLOL HYDROCHLORIDE 10 MG/ML
INJECTION INTRAVENOUS
Status: DISPENSED
Start: 2024-06-12

## (undated) RX ORDER — FENTANYL CITRATE 50 UG/ML
INJECTION, SOLUTION INTRAMUSCULAR; INTRAVENOUS
Status: DISPENSED
Start: 2024-06-12

## (undated) RX ORDER — SODIUM CHLORIDE 9 MG/ML
INJECTION, SOLUTION INTRAVENOUS
Status: DISPENSED
Start: 2023-06-09

## (undated) RX ORDER — ACETAMINOPHEN 325 MG/1
TABLET ORAL
Status: DISPENSED
Start: 2023-09-28

## (undated) RX ORDER — METOPROLOL TARTRATE 25 MG/1
TABLET, FILM COATED ORAL
Status: DISPENSED
Start: 2023-05-19

## (undated) RX ORDER — FENTANYL CITRATE 50 UG/ML
INJECTION, SOLUTION INTRAMUSCULAR; INTRAVENOUS
Status: DISPENSED
Start: 2023-06-09

## (undated) RX ORDER — NITROGLYCERIN 5 MG/ML
VIAL (ML) INTRAVENOUS
Status: DISPENSED
Start: 2023-06-09

## (undated) RX ORDER — FENTANYL CITRATE 50 UG/ML
INJECTION, SOLUTION INTRAMUSCULAR; INTRAVENOUS
Status: DISPENSED
Start: 2023-09-28

## (undated) RX ORDER — HYDROMORPHONE HYDROCHLORIDE 1 MG/ML
INJECTION, SOLUTION INTRAMUSCULAR; INTRAVENOUS; SUBCUTANEOUS
Status: DISPENSED
Start: 2024-06-12

## (undated) RX ORDER — GLYCOPYRROLATE 0.2 MG/ML
INJECTION INTRAMUSCULAR; INTRAVENOUS
Status: DISPENSED
Start: 2023-09-28

## (undated) RX ORDER — METOPROLOL TARTRATE 25 MG/1
TABLET, FILM COATED ORAL
Status: DISPENSED
Start: 2025-08-25

## (undated) RX ORDER — ASPIRIN 81 MG/1
TABLET ORAL
Status: DISPENSED
Start: 2024-06-12

## (undated) RX ORDER — HEPARIN SODIUM 1000 [USP'U]/ML
INJECTION, SOLUTION INTRAVENOUS; SUBCUTANEOUS
Status: DISPENSED
Start: 2024-06-12

## (undated) RX ORDER — FAMOTIDINE 20 MG/1
TABLET, FILM COATED ORAL
Status: DISPENSED
Start: 2024-06-12

## (undated) RX ORDER — LIDOCAINE HYDROCHLORIDE 10 MG/ML
INJECTION, SOLUTION EPIDURAL; INFILTRATION; INTRACAUDAL; PERINEURAL
Status: DISPENSED
Start: 2023-06-09

## (undated) RX ORDER — HEPARIN SODIUM 1000 [USP'U]/ML
INJECTION, SOLUTION INTRAVENOUS; SUBCUTANEOUS
Status: DISPENSED
Start: 2023-06-09

## (undated) RX ORDER — CEFAZOLIN SODIUM/WATER 2 G/20 ML
SYRINGE (ML) INTRAVENOUS
Status: DISPENSED
Start: 2024-06-12

## (undated) RX ORDER — CHLORHEXIDINE GLUCONATE ORAL RINSE 1.2 MG/ML
SOLUTION DENTAL
Status: DISPENSED
Start: 2024-06-12

## (undated) RX ORDER — FENTANYL CITRATE 50 UG/ML
INJECTION, SOLUTION INTRAMUSCULAR; INTRAVENOUS
Status: DISPENSED
Start: 2024-05-02

## (undated) RX ORDER — PAPAVERINE HYDROCHLORIDE 30 MG/ML
INJECTION INTRAMUSCULAR; INTRAVENOUS
Status: DISPENSED
Start: 2024-06-12